# Patient Record
Sex: FEMALE | Race: BLACK OR AFRICAN AMERICAN | NOT HISPANIC OR LATINO | Employment: OTHER | URBAN - METROPOLITAN AREA
[De-identification: names, ages, dates, MRNs, and addresses within clinical notes are randomized per-mention and may not be internally consistent; named-entity substitution may affect disease eponyms.]

---

## 2017-01-03 ENCOUNTER — TELEPHONE (OUTPATIENT)
Dept: INTERNAL MEDICINE | Facility: CLINIC | Age: 77
End: 2017-01-03

## 2017-01-03 NOTE — TELEPHONE ENCOUNTER
----- Message from Emi Virgen sent at 1/3/2017  2:23 PM CST -----  Contact: Pt  Pt is requesting to have an rx for her diabetes supplies sent to [facility] on [address] in [place]. Pt can be reached at [phone].

## 2017-01-03 NOTE — TELEPHONE ENCOUNTER
----- Message from Patti Kincaid sent at 1/3/2017  3:33 PM CST -----  Contact: Pt   Pt called and stated she was returning the nurse's call. She can be reached at [phone] ([email or web address]).    Thanks,  TF

## 2017-01-04 NOTE — TELEPHONE ENCOUNTER
----- Message from Kathrine Woodruff sent at 1/4/2017  3:13 PM CST -----  Contact: pt   Pt returning nurses call, about her medication,, pt is out of her diabetic medication,, please call pt back if pt is not at home ,,, please leave a detail message saying if she can get her medication or does she need to make an appt to see dr to get a new prescription for her medication,,

## 2017-01-10 DIAGNOSIS — E11.9 TYPE II OR UNSPECIFIED TYPE DIABETES MELLITUS WITHOUT MENTION OF COMPLICATION, NOT STATED AS UNCONTROLLED: ICD-10-CM

## 2017-01-10 DIAGNOSIS — I10 ESSENTIAL HYPERTENSION, BENIGN: ICD-10-CM

## 2017-01-10 DIAGNOSIS — E78.5 HYPERLIPIDEMIA, UNSPECIFIED HYPERLIPIDEMIA TYPE: Chronic | ICD-10-CM

## 2017-01-10 RX ORDER — INSULIN PUMP SYRINGE, 3 ML
EACH MISCELLANEOUS
Qty: 1 EACH | Refills: 0 | Status: SHIPPED | OUTPATIENT
Start: 2017-01-10 | End: 2018-07-09 | Stop reason: SDUPTHER

## 2017-01-10 RX ORDER — INSULIN PUMP SYRINGE, 3 ML
EACH MISCELLANEOUS
COMMUNITY
End: 2017-01-10 | Stop reason: SDUPTHER

## 2017-01-10 RX ORDER — FOSINOPRIL SODIUM 40 MG/1
40 TABLET ORAL DAILY
Qty: 90 TABLET | Refills: 3 | Status: SHIPPED | OUTPATIENT
Start: 2017-01-10 | End: 2018-02-24 | Stop reason: SDUPTHER

## 2017-01-10 RX ORDER — PRAVASTATIN SODIUM 40 MG/1
40 TABLET ORAL DAILY
Qty: 90 TABLET | Refills: 3 | Status: SHIPPED | OUTPATIENT
Start: 2017-01-10 | End: 2017-03-28

## 2017-01-10 RX ORDER — METFORMIN HYDROCHLORIDE 500 MG/1
500 TABLET ORAL 2 TIMES DAILY
Qty: 180 TABLET | Refills: 3 | Status: SHIPPED | OUTPATIENT
Start: 2017-01-10 | End: 2018-02-24 | Stop reason: SDUPTHER

## 2017-01-10 RX ORDER — LANCETS
EACH MISCELLANEOUS
Qty: 200 EACH | Refills: 3 | Status: SHIPPED | OUTPATIENT
Start: 2017-01-10 | End: 2018-01-08 | Stop reason: SDUPTHER

## 2017-02-28 NOTE — TELEPHONE ENCOUNTER
----- Message from Debbie Dumont sent at 2/28/2017  3:18 PM CST -----  Contact: pt  Needs refill on Gout medication Colchicine 0.6mg tab sent today to the [facility] in [place].  Please call pt to confirm at [phone]

## 2017-03-01 RX ORDER — COLCHICINE 0.6 MG/1
0.6 TABLET ORAL DAILY
Qty: 30 TABLET | Refills: 0 | Status: SHIPPED | OUTPATIENT
Start: 2017-03-01 | End: 2017-03-28 | Stop reason: SDUPTHER

## 2017-03-14 ENCOUNTER — PATIENT OUTREACH (OUTPATIENT)
Dept: ADMINISTRATIVE | Facility: HOSPITAL | Age: 77
End: 2017-03-14

## 2017-03-14 NOTE — LETTER
March 14, 2017        We are seeing Kaleigh Nieves, 1940, at [facility]. Bobby Ricks MD is their primary care physician. To help with our enrrique maintenance records could you please send the following:     A copy of the most current Diabetic/Insulin Resistant Eye Exam sheet/report,   (including determination of Retinopathy)    Please fax to [facility] at [phone], attention Clair Grimm LPN.    Thank-you in advance for your assistance. If you have any questions or concerns please contact me at [phone].     Clair Grimm LPN  Care Coordination Department  [facility]

## 2017-03-14 NOTE — LETTER
March 14, 2017    Kaleigh Nieves  [address] [facility] [address]             [facility]  [address]  [facility] [address]  Phone: [phone] Dear Ms. Nieves     [facility] is committed to your overall health.  To help you get the most out of each of your visits, we will review your information to make sure you are up to date on all of your recommended tests and/or procedures.      Bobby Ricks MD has found that you may be due for    Eye Exam     Hemoglobin A1c         If you have had any of the above done at another facility, please bring the records or information with you so that your record at [facility] will be complete.    If you are currently taking medication, please bring it with you to your appointment for review.    We will be happy to assist you with scheduling any necessary appointments or you may contact the [facility] appointment desk at [phone] to schedule at your convenience.     Thank you for choosing [facility] for your healthcare needs,        Bobbye, LPN  Care Coordination Department  [facility]

## 2017-03-21 ENCOUNTER — LAB VISIT (OUTPATIENT)
Dept: LAB | Facility: HOSPITAL | Age: 77
End: 2017-03-21
Attending: FAMILY MEDICINE
Payer: MEDICARE

## 2017-03-21 DIAGNOSIS — E11.9 TYPE 2 DIABETES MELLITUS WITHOUT COMPLICATION, UNSPECIFIED LONG TERM INSULIN USE STATUS: ICD-10-CM

## 2017-03-21 DIAGNOSIS — M1A.9XX0 CHRONIC GOUT, UNSPECIFIED CAUSE, UNSPECIFIED SITE: ICD-10-CM

## 2017-03-21 LAB
ANION GAP SERPL CALC-SCNC: 4 MMOL/L
BUN SERPL-MCNC: 11 MG/DL
CALCIUM SERPL-MCNC: 9.7 MG/DL
CHLORIDE SERPL-SCNC: 102 MMOL/L
CHOLEST/HDLC SERPL: 5.8 {RATIO}
CO2 SERPL-SCNC: 35 MMOL/L
CREAT SERPL-MCNC: 0.7 MG/DL
EST. GFR  (AFRICAN AMERICAN): >60 ML/MIN/1.73 M^2
EST. GFR  (NON AFRICAN AMERICAN): >60 ML/MIN/1.73 M^2
GLUCOSE SERPL-MCNC: 109 MG/DL
HDL/CHOLESTEROL RATIO: 17.2 %
HDLC SERPL-MCNC: 244 MG/DL
HDLC SERPL-MCNC: 42 MG/DL
LDLC SERPL CALC-MCNC: 175.8 MG/DL
NONHDLC SERPL-MCNC: 202 MG/DL
POTASSIUM SERPL-SCNC: 5 MMOL/L
SODIUM SERPL-SCNC: 141 MMOL/L
TRIGL SERPL-MCNC: 131 MG/DL
URATE SERPL-MCNC: 5.9 MG/DL

## 2017-03-21 PROCEDURE — 83036 HEMOGLOBIN GLYCOSYLATED A1C: CPT

## 2017-03-21 PROCEDURE — 80048 BASIC METABOLIC PNL TOTAL CA: CPT

## 2017-03-21 PROCEDURE — 36415 COLL VENOUS BLD VENIPUNCTURE: CPT | Mod: PO

## 2017-03-21 PROCEDURE — 80061 LIPID PANEL: CPT

## 2017-03-21 PROCEDURE — 84550 ASSAY OF BLOOD/URIC ACID: CPT

## 2017-03-22 LAB
ESTIMATED AVG GLUCOSE: 123 MG/DL
HBA1C MFR BLD HPLC: 5.9 %

## 2017-03-28 ENCOUNTER — OFFICE VISIT (OUTPATIENT)
Dept: INTERNAL MEDICINE | Facility: CLINIC | Age: 77
End: 2017-03-28
Payer: MEDICARE

## 2017-03-28 VITALS
TEMPERATURE: 96 F | SYSTOLIC BLOOD PRESSURE: 138 MMHG | DIASTOLIC BLOOD PRESSURE: 78 MMHG | HEART RATE: 90 BPM | WEIGHT: 228.63 LBS | HEIGHT: 67 IN | BODY MASS INDEX: 35.88 KG/M2

## 2017-03-28 DIAGNOSIS — M1A.9XX0 CHRONIC GOUT, UNSPECIFIED CAUSE, UNSPECIFIED SITE: ICD-10-CM

## 2017-03-28 DIAGNOSIS — E11.9 TYPE 2 DIABETES MELLITUS WITHOUT COMPLICATION, UNSPECIFIED LONG TERM INSULIN USE STATUS: Primary | ICD-10-CM

## 2017-03-28 DIAGNOSIS — I25.10 CORONARY ARTERY DISEASE INVOLVING NATIVE CORONARY ARTERY WITHOUT ANGINA PECTORIS, UNSPECIFIED WHETHER NATIVE OR TRANSPLANTED HEART: ICD-10-CM

## 2017-03-28 DIAGNOSIS — E78.5 HYPERLIPIDEMIA, UNSPECIFIED HYPERLIPIDEMIA TYPE: ICD-10-CM

## 2017-03-28 DIAGNOSIS — Z12.31 ENCOUNTER FOR SCREENING MAMMOGRAM FOR MALIGNANT NEOPLASM OF BREAST: ICD-10-CM

## 2017-03-28 DIAGNOSIS — I65.29 STENOSIS OF CAROTID ARTERY, UNSPECIFIED LATERALITY: ICD-10-CM

## 2017-03-28 PROCEDURE — 1159F MED LIST DOCD IN RCRD: CPT | Mod: S$GLB,,, | Performed by: FAMILY MEDICINE

## 2017-03-28 PROCEDURE — 3078F DIAST BP <80 MM HG: CPT | Mod: S$GLB,,, | Performed by: FAMILY MEDICINE

## 2017-03-28 PROCEDURE — 1157F ADVNC CARE PLAN IN RCRD: CPT | Mod: S$GLB,,, | Performed by: FAMILY MEDICINE

## 2017-03-28 PROCEDURE — 99999 PR PBB SHADOW E&M-EST. PATIENT-LVL III: CPT | Mod: PBBFAC,,, | Performed by: FAMILY MEDICINE

## 2017-03-28 PROCEDURE — 1160F RVW MEDS BY RX/DR IN RCRD: CPT | Mod: S$GLB,,, | Performed by: FAMILY MEDICINE

## 2017-03-28 PROCEDURE — 3075F SYST BP GE 130 - 139MM HG: CPT | Mod: S$GLB,,, | Performed by: FAMILY MEDICINE

## 2017-03-28 PROCEDURE — 99214 OFFICE O/P EST MOD 30 MIN: CPT | Mod: S$GLB,,, | Performed by: FAMILY MEDICINE

## 2017-03-28 PROCEDURE — 99499 UNLISTED E&M SERVICE: CPT | Mod: S$GLB,,, | Performed by: FAMILY MEDICINE

## 2017-03-28 RX ORDER — SIMVASTATIN 40 MG/1
40 TABLET, FILM COATED ORAL NIGHTLY
Qty: 90 TABLET | Refills: 3 | Status: SHIPPED | OUTPATIENT
Start: 2017-03-28 | End: 2018-02-24 | Stop reason: SDUPTHER

## 2017-03-28 RX ORDER — COLCHICINE 0.6 MG/1
0.6 TABLET ORAL DAILY
Qty: 30 TABLET | Refills: 5 | Status: SHIPPED | OUTPATIENT
Start: 2017-03-28 | End: 2018-04-23 | Stop reason: SDUPTHER

## 2017-03-28 NOTE — MR AVS SNAPSHOT
[facility]  [address] [address] [address]  Phone: [phone]                  Kaleigh Nieves   3/28/2017 10:20 AM   Office Visit    Description:  Female : 1940   Provider:  Bobby Ricks MD   Department:  [facility]           Diagnoses this Visit        Comments    Type 2 diabetes mellitus without complication, unspecified long term insulin use status    -  Primary     Coronary artery disease involving native coronary artery without angina pectoris, unspecified whether native or transplanted heart         Encounter for screening mammogram for malignant neoplasm of breast         Stenosis of carotid artery, unspecified laterality         Chronic gout, unspecified cause, unspecified site         Hyperlipidemia, unspecified hyperlipidemia type                To Do List           Future Appointments        Provider Department Dept Phone    2017 11:00 AM Ino Harmon DPM [facility] [phone]    2017 9:00 AM [facility] MAMMO1 [facility] [phone]    2017 8:00 AM [facility] LABORATORY [facility] [phone]      Goals (5 Years of Data)     None      Follow-Up and Disposition     Follow-up and Disposition History       These Medications        Disp Refills Start End    colchicine 0.6 mg tablet 30 tablet 5 3/28/2017     Take 1 tablet (0.6 mg total) by mouth once daily. - Oral    Pharmacy: [facility] [address] [facility] Ph #: [phone]       simvastatin (ZOCOR) 40 MG tablet 90 tablet 3 3/28/2017     Take 1 tablet (40 mg total) by mouth nightly. - Oral    Pharmacy: [facility] [address] Ph #: [phone]         [facility] On Call     [facility] On [facility] Nurse Care Line -  Assistance  Registered nurses in the [facility] Center provide clinical advisement, health education, appointment booking, and other advisory  services.  Call for this free service at [phone].             Medications           Message regarding Medications     Verify the changes and/or additions to your medication regime listed below are the same as discussed with your clinician today.  If any of these changes or additions are incorrect, please notify your healthcare provider.        CHANGE how you are taking these medications     Start Taking Instead of    simvastatin (ZOCOR) 40 MG tablet simvastatin (ZOCOR) 40 MG tablet  (Previously Discontinued)    Dosage:  Take 1 tablet (40 mg total) by mouth nightly. Dosage:  TAKE ONE TABLET BY MOUTH AT BEDTIME    Reason for Change:  Alternate therapy       STOP taking these medications     diltiaZEM (CARDIZEM CD) 120 MG Cp24 Take 1 capsule (120 mg total) by mouth once daily.    pravastatin (PRAVACHOL) 40 MG tablet Take 1 tablet (40 mg total) by mouth once daily.           Verify that the below list of medications is an accurate representation of the medications you are currently taking.  If none reported, the list may be blank. If incorrect, please contact your healthcare provider. Carry this list with you in case of emergency.           Current Medications     aspirin 81 MG Chew Take 81 mg by mouth once daily.    blood sugar diagnostic (BLOOD GLUCOSE TEST) Strp accu chek elian test strips/ test 2 times a day    blood-glucose meter kit accu chek elian meter/test twice daily    clobetasol 0.05% (TEMOVATE) 0.05 % Oint APPLY TOPICALLY TWO TIMES A DAY    colchicine 0.6 mg tablet Take 1 tablet (0.6 mg total) by mouth once daily.    econazole nitrate 1 % cream APPLY TO SKIN UNDER BREAST TWO TIMES A DAY    lancets (ACCU-CHEK SOFTCLIX LANCETS) Misc Test twice a day    metformin (GLUCOPHAGE) 500 MG tablet Take 1 tablet (500 mg total) by mouth 2 (two) times daily.    timolol 0.5 % ophthalmic solution Apply 1 drop to eye once daily.    fosinopril (MONOPRIL) 40 MG tablet Take 1 tablet (40 mg total) by mouth once daily.     "simvastatin (ZOCOR) 40 MG tablet Take 1 tablet (40 mg total) by mouth nightly.           Clinical Reference Information           Your Vitals Were     BP Pulse Temp    138/78 (BP Location: Right arm, Patient Position: Sitting, BP Method: Manual) 90 96.3 °F (35.7 °C) (Tympanic)    Height Weight BMI    5' 7" (1.702 m) 103.7 kg (228 lb 9.9 oz) 35.81 kg/m2      Blood Pressure          Most Recent Value    BP  138/78      Allergies as of 3/28/2017     Iodine And Iodide Containing Products    Penicillins    Amlodipine    Sulfa (Sulfonamide Antibiotics)    Ultram [Tramadol]    Bactrim [Sulfamethoxazole-trimethoprim]      Immunizations Administered on Date of Encounter - 3/28/2017     None      Orders Placed During Today's Visit      Normal Orders This Visit    Ambulatory referral to Podiatry     Future Labs/Procedures Expected by Expires    Mammo Digital Screening Bilateral With CAD  3/28/2017 5/28/2018    Hemoglobin A1c  9/24/2017 3/28/2018    Lipid panel  9/24/2017 3/28/2018      Language Assistance Services     ATTENTION: Language assistance services are available, free of charge. Please call [phone].      ATENCIÓN: Si habla español, tiene a carson disposición servicios gratuitos de asistencia lingüística. Llame al [phone].     CHÚ Ý: N?u b?n nói Ti?ng Vi?t, có các d?ch v? h? tr? ngôn ng? mi?n phí dành cho b?n. G?i s? [phone].         [facility] complies with applicable Federal civil rights laws and does not discriminate on the basis of race, color, national origin, age, disability, or sex.        "

## 2017-03-28 NOTE — PROGRESS NOTES
"Subjective:       Patient ID: Kaleigh Nieves is a o. female.    Chief Complaint: Multiple issues see below      HPI Gout: no recent flare gout;off daily indocin  Type 2 dm a1c controlled. s. prev dr patel podiatry but insur change;was changed from simvas to pravst sec to med intraxn but off this bp med (ccb) and doesn't want to take pstatin as poss rxn in past myalgia. She will only take simvst  htn nl bps tid home  Cad:no longer seeing dr giraldo; reports no blockage coronary arter.no cp. ; hx lhc w ster/benadryl  Prophylax and " nl" cath  Yrs ago;;saw dr rosa isela rothman in the past for mayberry  Khris: due Car u/;s due July     Past Medical History   Diagnosis Date    Type 2 diabetes mellitus      podiatry dr kenya patel    Neuropathy, lower extremity     CTS (carpal tunnel syndrome)     Dyslipidemia     Hypertension     Chronic gout     Glaucoma      dr avel kaur    Colon polyp     GERD (gastroesophageal reflux disease)     Ex-smoker      quit in her 30s    Carotid artery stenosis     CAD (coronary artery disease) 7/15     via chst ct     Past Surgical History   Procedure Laterality Date    Bilateral knee replacement      Cholecystectomy      Hemorrhoid surgery      Hysterectomy       fibroids    Incision and drainage perirectal abscess       Family History   Problem Relation Age of Onset    Breast cancer Neg Hx     Colon cancer Neg Hx     Ovarian cancer Neg Hx     Hypertension Mother     Diabetes Mother     Leukemia Sister      History     Social History    Marital Status: Single     Spouse Name: N/A     Number of Children: N/A    Years of Education: N/A     Social History Main Topics    Smoking status: Former Smoker -- 0.25 packs/day for 24 years     Quit date: 06/25/1976    Smokeless tobacco: Never Used    Alcohol Use: No    Drug Use: No    Sexual Activity: Not Currently     Other Topics Concern    None     Social History Narrative       Review of Systems  no cp no new mayberry  Objective:    "   Physical Exam   Constitutional: She appears well-developed and well-nourished.   Cardiovascular: Normal rate and regular rhythm.    Pulmonary/Chest: Effort normal and breath sounds normal.      Bilateral feet with normal monofilament testing and no lesions.    Assessment:       1. Gout, unspecified cause, unspecified chronicity, unspecified site    2. Type 2 diabetes mellitus without complication    shaggy  htn  Cad  hyperchol  Plan:     Car u/s July  Lab 6 months  rsume simvastatin  req new podiatrist : chronic dystrophic nails/foot pain/dm  Type 2 diabetes mellitus without complication, unspecified long term insulin use status  -     Ambulatory referral to Podiatry  -     Lipid panel; Future; Expected date: 9/24/17  -     Hemoglobin A1c; Future; Expected date: 9/24/17    Coronary artery disease involving native coronary artery without angina pectoris, unspecified whether native or transplanted heart    Encounter for screening mammogram for malignant neoplasm of breast  -     Mammo Digital Screening Bilateral With CAD; Future; Expected date: 3/28/17    Stenosis of carotid artery, unspecified laterality    Chronic gout, unspecified cause, unspecified site    Hyperlipidemia, unspecified hyperlipidemia type  -     simvastatin (ZOCOR) 40 MG tablet; Take 1 tablet (40 mg total) by mouth nightly.  Dispense: 90 tablet; Refill: 3    Other orders  -     colchicine 0.6 mg tablet; Take 1 tablet (0.6 mg total) by mouth once daily.  Dispense: 30 tablet; Refill: 5

## 2017-04-07 ENCOUNTER — OFFICE VISIT (OUTPATIENT)
Dept: PODIATRY | Facility: CLINIC | Age: 77
End: 2017-04-07
Payer: MEDICARE

## 2017-04-07 VITALS
SYSTOLIC BLOOD PRESSURE: 144 MMHG | HEIGHT: 67 IN | WEIGHT: 227.06 LBS | BODY MASS INDEX: 35.64 KG/M2 | HEART RATE: 75 BPM | DIASTOLIC BLOOD PRESSURE: 68 MMHG

## 2017-04-07 DIAGNOSIS — I73.9 ARTERIAL INSUFFICIENCY OF LOWER EXTREMITY: ICD-10-CM

## 2017-04-07 DIAGNOSIS — M20.42 HAMMER TOES OF BOTH FEET: ICD-10-CM

## 2017-04-07 DIAGNOSIS — M20.41 HAMMER TOES OF BOTH FEET: ICD-10-CM

## 2017-04-07 DIAGNOSIS — M21.42 PES PLANUS OF BOTH FEET: ICD-10-CM

## 2017-04-07 DIAGNOSIS — M21.41 PES PLANUS OF BOTH FEET: ICD-10-CM

## 2017-04-07 DIAGNOSIS — E11.51 TYPE II DIABETES MELLITUS WITH PERIPHERAL CIRCULATORY DISORDER: Primary | ICD-10-CM

## 2017-04-07 DIAGNOSIS — B35.1 DERMATOPHYTOSIS OF NAIL: ICD-10-CM

## 2017-04-07 PROCEDURE — 1157F ADVNC CARE PLAN IN RCRD: CPT | Mod: S$GLB,,, | Performed by: PODIATRIST

## 2017-04-07 PROCEDURE — 11721 DEBRIDE NAIL 6 OR MORE: CPT | Mod: Q8,S$GLB,, | Performed by: PODIATRIST

## 2017-04-07 PROCEDURE — 99999 PR PBB SHADOW E&M-EST. PATIENT-LVL III: CPT | Mod: PBBFAC,,, | Performed by: PODIATRIST

## 2017-04-07 PROCEDURE — 3077F SYST BP >= 140 MM HG: CPT | Mod: S$GLB,,, | Performed by: PODIATRIST

## 2017-04-07 PROCEDURE — 1160F RVW MEDS BY RX/DR IN RCRD: CPT | Mod: S$GLB,,, | Performed by: PODIATRIST

## 2017-04-07 PROCEDURE — 1159F MED LIST DOCD IN RCRD: CPT | Mod: S$GLB,,, | Performed by: PODIATRIST

## 2017-04-07 PROCEDURE — 1126F AMNT PAIN NOTED NONE PRSNT: CPT | Mod: S$GLB,,, | Performed by: PODIATRIST

## 2017-04-07 PROCEDURE — 99204 OFFICE O/P NEW MOD 45 MIN: CPT | Mod: 25,S$GLB,, | Performed by: PODIATRIST

## 2017-04-07 PROCEDURE — 3078F DIAST BP <80 MM HG: CPT | Mod: S$GLB,,, | Performed by: PODIATRIST

## 2017-04-07 PROCEDURE — 99499 UNLISTED E&M SERVICE: CPT | Mod: S$GLB,,, | Performed by: PODIATRIST

## 2017-04-07 NOTE — LETTER
April 9, 2017      Bobby Ricks MD  [address] [facility] [address]  [place] [address]           [facility]  [address] [facility] [address] [place] [address]  Phone: [phone]  Fax: [phone]          Patient: Kaleigh Nieves   MR Number: [id number]   YOB: 1940   Date of Visit: 4/7/2017       Dear Dr. Bobby Ricks:    Thank you for referring Kaleigh Nieves to me for evaluation. Attached you will find relevant portions of my assessment and plan of care.    If you have questions, please do not hesitate to call me. I look forward to following Kaleigh Nieves along with you.    Sincerely,    Ino Harmon DPM    Enclosure  CC:  No Recipients    If you would like to receive this communication electronically, please contact [email or web address] or [phone] to request more information on [employer] access.    For providers and/or their staff who would like to refer a patient to [facility], please contact us through our one-stop-shop provider referral line, [facility], at [phone].    If you feel you have received this communication in error or would no longer like to receive these types of communications, please e-mail [email or web address]

## 2017-04-07 NOTE — PROGRESS NOTES
Subjective:     Patient ID: Kaleigh Nieves is a 76 y.o. female.    Chief Complaint: Diabetic Foot Exam (Patient states no current pain or problems. 120 mg/dL-AM glucose. Last PCP visit with - 3/28/2017. ) and Nail Care (Patient states she needs nail care. )    Kaleigh is a 76 y.o. female who presents to the clinic for evaluation and treatment of high risk feet. Kaleigh has a past medical history of CAD (coronary artery disease) (7/15); Carotid artery stenosis; Chronic gout; Colon polyp; CTS (carpal tunnel syndrome); RIVAS (dyspnea on exertion); Dyslipidemia; Ex-smoker; GERD (gastroesophageal reflux disease); Hypertension; Neuropathy, lower extremity; Open-angle glaucoma; and Type 2 diabetes mellitus. The patient's chief complaint is long, thick toenails. This patient has documented high risk feet requiring routine maintenance secondary to diabetes mellitis and those secondary complications of diabetes, as mentioned..    PCP: Bobby Ricks MD    Date Last Seen by PCP: 3/28/17    Current shoe gear:  Affected Foot: Rx diabetic extra depth shoes and custom accommodative insoles     Unaffected Foot: Rx diabetic extra depth shoes and custom accommodative insoles    Hemoglobin A1C   Date Value Ref Range Status   03/21/2017 5.9 4.5 - 6.2 % Final     Comment:     According to ADA guidelines, hemoglobin A1C <7.0% represents  optimal control in non-pregnant diabetic patients.  Different  metrics may apply to specific populations.   Standards of Medical Care in Diabetes - 2016.  For the purpose of screening for the presence of diabetes:  <5.7%     Consistent with the absence of diabetes  5.7-6.4%  Consistent with increasing risk for diabetes   (prediabetes)  >or=6.5%  Consistent with diabetes  Currently no consensus exists for use of hemoglobin A1C  for diagnosis of diabetes for children.     09/15/2016 5.8 4.5 - 6.2 % Final     Comment:     According to ADA guidelines, hemoglobin A1C <7.0% represents  optimal control  in non-pregnant diabetic patients.  Different  metrics may apply to specific populations.   Standards of Medical Care in Diabetes - 2016.  For the purpose of screening for the presence of diabetes:  <5.7%     Consistent with the absence of diabetes  5.7-6.4%  Consistent with increasing risk for diabetes   (prediabetes)  >or=6.5%  Consistent with diabetes  Currently no consensus exists for use of hemoglobin A1C  for diagnosis of diabetes for children.     03/21/2016 5.9 4.5 - 6.2 % Final           Patient Active Problem List   Diagnosis    Type 2 diabetes mellitus without complication    Multiple joint pain    Esophageal reflux    Hyperlipidemia    Chronic gout    Lichen sclerosus    Carotid artery stenosis    CAD (coronary artery disease)    Colon polyp    Hypertension associated with diabetes       Medication List with Changes/Refills   Current Medications    ASPIRIN 81 MG CHEW    Take 81 mg by mouth once daily.    BLOOD GLUCOSE CONTROL HIGH,LOW (ACCU-CHEK DARSHAN CONTROL SOLN) SOLN    Use a directed    BLOOD SUGAR DIAGNOSTIC (BLOOD GLUCOSE TEST) STRP    accu chek darshan test strips/ test 2 times a day    BLOOD-GLUCOSE METER KIT    accu chek darshan meter/test twice daily    CLOBETASOL 0.05% (TEMOVATE) 0.05 % OINT    APPLY TOPICALLY TWO TIMES A DAY    COLCHICINE 0.6 MG TABLET    Take 1 tablet (0.6 mg total) by mouth once daily.    ECONAZOLE NITRATE 1 % CREAM    APPLY TO SKIN UNDER BREAST TWO TIMES A DAY    FOSINOPRIL (MONOPRIL) 40 MG TABLET    Take 1 tablet (40 mg total) by mouth once daily.    LANCETS (ACCU-CHEK SOFTCLIX LANCETS) MISC    Test twice a day    METFORMIN (GLUCOPHAGE) 500 MG TABLET    Take 1 tablet (500 mg total) by mouth 2 (two) times daily.    SIMVASTATIN (ZOCOR) 40 MG TABLET    Take 1 tablet (40 mg total) by mouth nightly.    TIMOLOL 0.5 % OPHTHALMIC SOLUTION    Apply 1 drop to eye once daily.       Review of patient's allergies indicates:   Allergen Reactions    Iodine and iodide containing  "products Nausea And Vomiting    Penicillins Itching    Amlodipine      edema    Sulfa (sulfonamide antibiotics)     Ultram [tramadol] Other (See Comments)     Light headness, itiching    Bactrim [sulfamethoxazole-trimethoprim] Itching and Rash       Past Surgical History:   Procedure Laterality Date    Bilateral knee replacement      CHOLECYSTECTOMY      HEMORRHOID SURGERY      HYSTERECTOMY      fibroids    INCISION AND DRAINAGE PERIRECTAL ABSCESS         Family History   Problem Relation Age of Onset    Hypertension Mother     Diabetes Mother     Leukemia Sister     Breast cancer Neg Hx     Colon cancer Neg Hx     Ovarian cancer Neg Hx        Social History     Social History    Marital status: Single     Spouse name: N/A    Number of children: 0    Years of education: N/A     Occupational History    Not on file.     Social History Main Topics    Smoking status: Former Smoker     Packs/day: 0.25     Years: 24.00     Quit date: 6/25/1976    Smokeless tobacco: Never Used    Alcohol use No    Drug use: No    Sexual activity: Not Currently     Other Topics Concern    Not on file     Social History Narrative       Vitals:    04/07/17 1041   BP: (!) 144/68   Pulse: 75   Weight: 103 kg (227 lb 1.2 oz)   Height: 5' 7" (1.702 m)   PainSc: 0-No pain       Hemoglobin A1C   Date Value Ref Range Status   03/21/2017 5.9 4.5 - 6.2 % Final     Comment:     According to ADA guidelines, hemoglobin A1C <7.0% represents  optimal control in non-pregnant diabetic patients.  Different  metrics may apply to specific populations.   Standards of Medical Care in Diabetes - 2016.  For the purpose of screening for the presence of diabetes:  <5.7%     Consistent with the absence of diabetes  5.7-6.4%  Consistent with increasing risk for diabetes   (prediabetes)  >or=6.5%  Consistent with diabetes  Currently no consensus exists for use of hemoglobin A1C  for diagnosis of diabetes for children.     09/15/2016 5.8 4.5 - " 6.2 % Final     Comment:     According to ADA guidelines, hemoglobin A1C <7.0% represents  optimal control in non-pregnant diabetic patients.  Different  metrics may apply to specific populations.   Standards of Medical Care in Diabetes - 2016.  For the purpose of screening for the presence of diabetes:  <5.7%     Consistent with the absence of diabetes  5.7-6.4%  Consistent with increasing risk for diabetes   (prediabetes)  >or=6.5%  Consistent with diabetes  Currently no consensus exists for use of hemoglobin A1C  for diagnosis of diabetes for children.     03/21/2016 5.9 4.5 - 6.2 % Final       Review of Systems   Constitutional: Negative for chills and fever.   Respiratory: Negative for shortness of breath.    Cardiovascular: Positive for leg swelling. Negative for chest pain, palpitations, orthopnea and claudication.   Gastrointestinal: Negative for diarrhea, nausea and vomiting.   Musculoskeletal: Negative for joint pain.   Skin: Negative for rash.   Neurological: Positive for tingling and sensory change. Negative for dizziness, focal weakness and weakness.   Psychiatric/Behavioral: Negative.            Objective:      PHYSICAL EXAM: Apperance: Alert and orient in no distress,well developed, and with good attention to grooming and body habits  Patient presents ambulating in diabetic shoes and inserts.   LOWER EXTREMITY EXAM:  VASCULAR: Dorsalis pedis pulses 0/4 bilateral (monophasic with doppler)and Posterior Tibial pulses 0/4 bilateral (biphasic with doppler). Capillary fill time <blanched bilateral. Mild edema observed bilateral. Varicosities present bilateral. Skin temperature of the lower extremities is warm to cool, proximal to distal. Hair growth absent bilateral.  DERMATOLOGICAL: No skin rashes, subcutaneous nodules, lesions, or ulcers observed bilateral. Nails 1-5 bilateral elongated, thickened, and discolored with subungual debris. Webspaces 1-4 clean, dry and without evidence of break in skin  integrity bilateral.   NEUROLOGICAL: Light touch, sharp-dull, proprioception all present and equal bilaterally.  Vibratory sensation intact on left hallux and diminished at right hallux. Protective sensation intact at all 10 sites as tested with a [place]illa-Jimy 5.07 monofilament.   MUSCULOSKELETAL: Muscle strength is 5/5 for foot inverters, everters, plantarflexors, and dorsiflexors. Muscle tone is normal. Pes planus noted bilateral. Flexible hammertoes noted bilateral.         Assessment:       Encounter Diagnoses   Name Primary?    Type II diabetes mellitus with peripheral circulatory disorder Yes    Arterial insufficiency of lower extremity     Hammer toes of both feet     Pes planus of both feet     Dermatophytosis of nail          Plan:   Type II diabetes mellitus with peripheral circulatory disorder  -     DIABETIC SHOES FOR HOME USE    Arterial insufficiency of lower extremity  -     CAR US Ankle Brachial Indices Ext LTD WO Str; Future    Hammer toes of both feet  -     DIABETIC SHOES FOR HOME USE    Pes planus of both feet  -     DIABETIC SHOES FOR HOME USE    Dermatophytosis of nail      I counseled the patient on her conditions, their implications and medical management.  Greater than 50% of this visit spent on counseling and coordination of care.  Greater than 20 minutes spent on education about the diabetic foot, neuropathy, and prevention of limb loss.  Shoe inspection. Diabetic Foot Education. Patient reminded of the importance of good nutrition and blood sugar control to help prevent podiatric complications of diabetes. Patient instructed on proper foot hygeine. We discussed wearing proper shoe gear, daily foot inspections, never walking without protective shoe gear, never putting sharp instruments to feet.    With patient's permission, nails 1-5 bilateral were trimmed in length and thickness aggressively to their soft tissue attachment mechanically and with electric , removing all  offending nail and debris. Patient relates relief following the procedure.  Ordered bilateral lower extremity BARRON.   Prescription written for Diabetic shoes and inserts.   Patient  will continue to monitor the areas daily, inspect feet, wear protective shoe gear when ambulatory, moisturizer to maintain skin integrity. Patient reminded of the importance of good nutrition and blood sugar control to help prevent podiatric complications of diabetes.  Patient to return in this office in approximately 3 months, or sooner if needed.                   Ino Harmon DPM  [facility]

## 2017-04-07 NOTE — MR AVS SNAPSHOT
[facility]  [address] [place] [address]  Phone: [phone]  Fax: [phone]                  Kaleigh Nieves   2017 11:00 AM   Office Visit    Description:  Female : 1940   Provider:  Ino Harmon DPM   Department:  [facility]           Reason for Visit     Diabetic Foot Exam     Nail Care           Diagnoses this Visit        Comments    Type II diabetes mellitus with peripheral circulatory disorder    -  Primary     Arterial insufficiency of lower extremity         Hammer toes of both feet         Pes planus of both feet                To Do List           Future Appointments        Provider Department Dept Phone    2017 9:00 AM [facility] MAMMO1 [facility] [phone]    2017 11:00 AM CARDIOVASCULAR, [facility] [phone]    2017 11:20 AM Ino Harmon DPM [facility] [phone]    2017 8:00 AM [facility] [phone]      Goals (5 Years of Data)     None      [facility]     [facility] Nurse Care Line -  Assistance  Unless otherwise directed by your provider, please contact [facility], our nurse care line that is available for  assistance.     Registered nurses in the [facility] provide: appointment scheduling, clinical advisement, health education, and other advisory services.  Call: [phone] (toll free)               Medications           Message regarding Medications     Verify the changes and/or additions to your medication regime listed below are the same as discussed with your clinician today.  If any of these changes or additions are incorrect, please notify your healthcare provider.             Verify that the below list of medications is an accurate representation of the medications you are currently taking.  If none reported, the list may be blank. If incorrect, please contact your healthcare provider. Carry this list  "with you in case of emergency.           Current Medications     aspirin 81 MG Chew Take 81 mg by mouth once daily.    blood glucose control high,low (ACCU-CHEK DARSHAN CONTROL SOLN) Soln Use a directed    blood sugar diagnostic (BLOOD GLUCOSE TEST) Strp accu chek darshan test strips/ test 2 times a day    blood-glucose meter kit accu chek darshan meter/test twice daily    clobetasol 0.05% (TEMOVATE) 0.05 % Oint APPLY TOPICALLY TWO TIMES A DAY    colchicine 0.6 mg tablet Take 1 tablet (0.6 mg total) by mouth once daily.    econazole nitrate 1 % cream APPLY TO SKIN UNDER BREAST TWO TIMES A DAY    fosinopril (MONOPRIL) 40 MG tablet Take 1 tablet (40 mg total) by mouth once daily.    lancets (ACCU-CHEK SOFTCLIX LANCETS) Misc Test twice a day    metformin (GLUCOPHAGE) 500 MG tablet Take 1 tablet (500 mg total) by mouth 2 (two) times daily.    simvastatin (ZOCOR) 40 MG tablet Take 1 tablet (40 mg total) by mouth nightly.    timolol 0.5 % ophthalmic solution Apply 1 drop to eye once daily.           Clinical Reference Information           Your Vitals Were     BP Pulse Height Weight BMI    144/68 (BP Location: Right arm, Patient Position: Sitting, BP Method: Automatic) 75 5' 7" (1.702 m) 103 kg (227 lb 1.2 oz) 35.56 kg/m2      Blood Pressure          Most Recent Value    BP  (!)  144/68      Allergies as of 4/7/2017     Iodine And Iodide Containing Products    Penicillins    Amlodipine    Sulfa (Sulfonamide Antibiotics)    Ultram [Tramadol]    Bactrim [Sulfamethoxazole-trimethoprim]      Immunizations Administered on Date of Encounter - 4/7/2017     None      Orders Placed During Today's Visit      Normal Orders This Visit    DIABETIC SHOES FOR HOME USE     Future Labs/Procedures Expected by Expires    CAR US Ankle Brachial Indices Ext LTD WO Str  As directed 4/7/2018      Language Assistance Services     ATTENTION: Language assistance services are available, free of charge. Please call [phone].      ATENCIÓN: Si vikki " español, tiene a carson disposición servicios gratuitos de asistencia lingüística. Llame al [phone].     CHÚ Ý: N?u b?n nói Ti?ng Vi?t, có các d?ch v? h? tr? ngôn ng? mi?n phí dành cho b?n. G?i s? [phone].         [facility] complies with applicable Federal civil rights laws and does not discriminate on the basis of race, color, national origin, age, disability, or sex.

## 2017-04-28 ENCOUNTER — HOSPITAL ENCOUNTER (OUTPATIENT)
Dept: RADIOLOGY | Facility: HOSPITAL | Age: 77
Discharge: HOME | End: 2017-04-28
Attending: FAMILY MEDICINE
Payer: MEDICARE

## 2017-04-28 ENCOUNTER — HOSPITAL ENCOUNTER (OUTPATIENT)
Dept: CARDIOLOGY | Facility: CLINIC | Age: 77
Discharge: HOME | End: 2017-04-28
Payer: MEDICARE

## 2017-04-28 DIAGNOSIS — I73.9 ARTERIAL INSUFFICIENCY OF LOWER EXTREMITY: ICD-10-CM

## 2017-04-28 DIAGNOSIS — Z12.31 ENCOUNTER FOR SCREENING MAMMOGRAM FOR MALIGNANT NEOPLASM OF BREAST: ICD-10-CM

## 2017-04-28 LAB — VASCULAR ANKLE BRACHIAL INDEX (ABI) LEFT: 0.99 (ref 0.9–1.2)

## 2017-04-28 PROCEDURE — 77067 SCR MAMMO BI INCL CAD: CPT | Mod: 26,,, | Performed by: RADIOLOGY

## 2017-04-28 PROCEDURE — 93922 UPR/L XTREMITY ART 2 LEVELS: CPT | Mod: S$GLB,,, | Performed by: INTERNAL MEDICINE

## 2017-06-16 ENCOUNTER — OFFICE VISIT (OUTPATIENT)
Dept: INTERNAL MEDICINE | Facility: CLINIC | Age: 77
End: 2017-06-16
Payer: MEDICARE

## 2017-06-16 VITALS
HEIGHT: 67 IN | TEMPERATURE: 97 F | SYSTOLIC BLOOD PRESSURE: 160 MMHG | WEIGHT: 228.63 LBS | BODY MASS INDEX: 35.88 KG/M2 | DIASTOLIC BLOOD PRESSURE: 69 MMHG | OXYGEN SATURATION: 97 % | HEART RATE: 77 BPM | RESPIRATION RATE: 16 BRPM

## 2017-06-16 DIAGNOSIS — H40.9 GLAUCOMA, UNSPECIFIED GLAUCOMA, UNSPECIFIED LATERALITY: ICD-10-CM

## 2017-06-16 DIAGNOSIS — I51.89 DIASTOLIC DYSFUNCTION: ICD-10-CM

## 2017-06-16 DIAGNOSIS — E11.59 HYPERTENSION ASSOCIATED WITH DIABETES: ICD-10-CM

## 2017-06-16 DIAGNOSIS — I15.2 HYPERTENSION ASSOCIATED WITH DIABETES: ICD-10-CM

## 2017-06-16 DIAGNOSIS — I25.10 CORONARY ARTERY DISEASE INVOLVING NATIVE CORONARY ARTERY WITHOUT ANGINA PECTORIS, UNSPECIFIED WHETHER NATIVE OR TRANSPLANTED HEART: ICD-10-CM

## 2017-06-16 DIAGNOSIS — I70.0 AORTIC ATHEROSCLEROSIS: ICD-10-CM

## 2017-06-16 DIAGNOSIS — M1A.9XX0 CHRONIC GOUT, UNSPECIFIED CAUSE, UNSPECIFIED SITE: ICD-10-CM

## 2017-06-16 DIAGNOSIS — Z00.00 ENCOUNTER FOR PREVENTATIVE ADULT HEALTH CARE EXAMINATION: Primary | ICD-10-CM

## 2017-06-16 DIAGNOSIS — E11.42 TYPE 2 DIABETES MELLITUS WITH DIABETIC POLYNEUROPATHY, WITHOUT LONG-TERM CURRENT USE OF INSULIN: ICD-10-CM

## 2017-06-16 DIAGNOSIS — L90.0 LICHEN SCLEROSUS: ICD-10-CM

## 2017-06-16 DIAGNOSIS — E66.01 SEVERE OBESITY (BMI 35.0-35.9 WITH COMORBIDITY): ICD-10-CM

## 2017-06-16 DIAGNOSIS — M25.50 MULTIPLE JOINT PAIN: ICD-10-CM

## 2017-06-16 DIAGNOSIS — I73.9 ARTERIAL INSUFFICIENCY OF LOWER EXTREMITY: ICD-10-CM

## 2017-06-16 DIAGNOSIS — Z86.010 HISTORY OF COLON POLYPS: ICD-10-CM

## 2017-06-16 DIAGNOSIS — I65.22 STENOSIS OF LEFT CAROTID ARTERY: ICD-10-CM

## 2017-06-16 DIAGNOSIS — K21.9 GASTROESOPHAGEAL REFLUX DISEASE WITHOUT ESOPHAGITIS: Chronic | ICD-10-CM

## 2017-06-16 DIAGNOSIS — E78.5 HYPERLIPIDEMIA, UNSPECIFIED HYPERLIPIDEMIA TYPE: Chronic | ICD-10-CM

## 2017-06-16 DIAGNOSIS — E28.319 EARLY MENOPAUSE: ICD-10-CM

## 2017-06-16 PROCEDURE — 99499 UNLISTED E&M SERVICE: CPT | Mod: S$GLB,,, | Performed by: NURSE PRACTITIONER

## 2017-06-16 PROCEDURE — G0439 PPPS, SUBSEQ VISIT: HCPCS | Mod: S$GLB,,, | Performed by: NURSE PRACTITIONER

## 2017-06-16 PROCEDURE — 99999 PR PBB SHADOW E&M-EST. PATIENT-LVL V: CPT | Mod: PBBFAC,,, | Performed by: NURSE PRACTITIONER

## 2017-06-16 NOTE — PROGRESS NOTES
"Kaleigh Nieves presented for a  Medicare AWV and comprehensive Health Risk Assessment today. The following components were reviewed and updated:    · Medical history  · Family History  · Social history  · Allergies and Current Medications  · Health Risk Assessment  · Health Maintenance  · Care Team     ** See Completed Assessments for Annual Wellness Visit within the encounter summary.**       The following assessments were completed:  · Living Situation  · CAGE  · Depression Screening  · Timed Get Up and Go  · Whisper Test  · Cognitive Function Screening  · Nutrition Screening  · ADL Screening  · PAQ Screening    Vitals:    06/16/17 1048   BP: (!) 160/69   BP Location: Left arm   Patient Position: Sitting   BP Method: Automatic   Pulse: 77   Resp: 16   Temp: 97.4 °F (36.3 °C)   TempSrc: Tympanic   SpO2: 97%   Weight: 103.7 kg (228 lb 9.9 oz)   Height: 5' 7" (1.702 m)     Body mass index is 35.81 kg/m².  Physical Exam   Constitutional: She is oriented to person, place, and time. She appears well-developed and well-nourished.   HENT:   Head: Normocephalic and atraumatic.   Right Ear: External ear normal.   Left Ear: External ear normal.   Nose: Nose normal.   Mouth/Throat: Oropharynx is clear and moist. No oropharyngeal exudate.   Eyes: Conjunctivae are normal. Pupils are equal, round, and reactive to light. Right eye exhibits no discharge. Left eye exhibits no discharge.   Neck: Normal range of motion. Neck supple. No JVD present. No thyromegaly present.   Cardiovascular: Normal rate, regular rhythm, normal heart sounds and intact distal pulses.    Pulmonary/Chest: Effort normal and breath sounds normal.   Abdominal: Soft. Bowel sounds are normal. She exhibits no distension. There is no tenderness.   Musculoskeletal: Normal range of motion. She exhibits edema (2+ pitting to BLE). She exhibits no tenderness.   Lymphadenopathy:     She has no cervical adenopathy.   Neurological: She is alert and oriented to person, " place, and time.   Skin: Skin is warm and dry. No rash noted.   Psychiatric: She has a normal mood and affect. Her behavior is normal.   Vitals reviewed.        Diagnoses and health risks identified today and associated recommendations/orders:    1. Encounter for preventative adult health care examination  Done today    2. Hypertension associated with diabetes  Worsening, uncontrolled. Taking monopril. Recommend return to PCP for medication adjustments.     3. Stenosis of left carotid artery  Approx 50% blockage per US. Repeat US ordered for July. Follow with PCP.     4. Coronary artery disease involving native coronary artery without angina pectoris, unspecified whether native or transplanted heart  Documented on imaging, Chest CT 7/2015. On statin and ASA. Follow with PCP.    5. Aortic atherosclerosis  Documented on imaging, chest CT 7/2015. On statin and ASA. Follow with PCP.     6. Diastolic dysfunction  Per Echo 1/4/2016. Follow with PCP.     7. Arterial insufficiency of lower extremity  Per [facility] 4/28/2017. Follow with PCP. Maintain control of BP and DM.     8. Hyperlipidemia, unspecified hyperlipidemia type  Uncontrolled. Recently started back on simvastatin. Repeat lipid panel scheduled in September.     9. Type 2 diabetes mellitus with diabetic polyneuropathy, without long-term current use of insulin  Controlled on metformin. Last A1C 5.9. Followed by PCP.     10. Multiple joint pain  Use tylenol for pain as needed. Follow with PCP.     11. Chronic gout, unspecified cause, unspecified site  Treating/controlled with daily colchicine. Followed by PCP.    12. Gastroesophageal reflux disease without esophagitis  Not currently on any medication. Controlling with lifestyle changes and OTC treatments. Would like to consider daily therapy. Recommend discuss at f/u with PCP.    13. Lichen sclerosus  Followed by GYN. Using clobetasol for treatment.     14. Severe obesity (BMI 35.0-35.9 with comorbidity)  Educated on  importance of healthy/balanced diet and getting 150 minutes of exercise per week to promote weight loss, reach optimal BMI, improve comorbidities and improve lifestyle.     15. Glaucoma, unspecified glaucoma, unspecified laterality   Currently on  Timolol eye drops. Needs new eye Dr as former Dr is no longer in network.   - Ambulatory Referral to Ophthalmology    16. Early menopause  - DXA Bone Density Spine And Hip; Future    17. History of colon polyp  Colonoscopy every 5 years. Next colonoscopy due 6/25/2020.      Provided Kaleigh with a 5-10 year written screening schedule and personal prevention plan. Recommendations were developed using the USPSTF age appropriate recommendations. Education, counseling, and referrals were provided as needed. After Visit Summary printed and given to patient which includes a list of additional screenings\tests needed.    No Follow-up on file.    DONOVAN Cummings,FNP-C

## 2017-06-16 NOTE — PATIENT INSTRUCTIONS
Counseling and Referral of Other Preventative  (Italic type indicates deductible and co-insurance are waived)    Patient Name: Kaleigh Nieves  Today's Date: 6/16/2017      SERVICE LIMITATIONS RECOMMENDATION    Vaccines    · Pneumococcal (once after 65)    · Influenza (annually)    · Hepatitis B (if medium/high risk)    · Prevnar 13      Hepatitis B medium/high risk factors:       - End-stage renal disease       - Hemophiliacs who received Factor VII or         IX concentrates       - Clients of institutions for the mentally             retarded       - Persons who live in the same house as          a HepB carrier       - Homosexual men       - Illicit injectable drug abusers     Pneumococcal: Done, no repeat necessary     Influenza: Done, repeat in one year     Hepatitis B: N/A     Prevnar 13: Done, no repeat necessary    Mammogram (biennial age 50-74)  Annually (age 40 or over)  Done this year, repeat every year    Pap (up to age 70 and after 70 if unknown history or abnormal study last 10 years)    N/A     The USPSTF recommends against screening for cervical cancer in women older than age 65 years who have had adequate prior screening and are not otherwise at high risk for cervical cancer.      Colorectal cancer screening (to age 75)    · Fecal occult blood test (annual)  · Flexible sigmoidoscopy (5y)  · Screening colonoscopy (10y)  · Barium enema   Last done 6/25/2015, recommend to repeat every 5  years    Diabetes self-management training (no USPSTF recommendations)  Requires referral by treating physician for patient with diabetes or renal disease. 10 hours of initial DSMT sessions of no less than 30 minutes each in a continuous 12-month period. 2 hours of follow-up DSMT in subsequent years.  Recommended to patient, declined    Bone mass measurements (age 65 & older, biennial)  Requires diagnosis related to osteoporosis or estrogen deficiency. Biennial benefit unless patient has history of long-term  glucocorticoid  Scheduled, see appointments    Glaucoma screening (no USPSTF recommendation)  Diabetes mellitus, family history   , age 50 or over    American, age 65 or over  Scheduled, see appointments    Medical nutrition therapy for diabetes or renal disease (no recommended schedule)  Requires referral by treating physician for patient with diabetes or renal disease or kidney transplant within the past 3 years.  Can be provided in same year as diabetes self-management training (DSMT), and CMS recommends medical nutrition therapy take place after DSMT. Up to 3 hours for initial year and 2 hours in subsequent years.  N/A    Cardiovascular screening blood tests (every 5 years)  · Fasting lipid panel  Order as a panel if possible  Done this year, repeat every year    Diabetes screening tests (at least every 3 years, Medicare covers annually or at 6-month intervals for prediabetic patients)  · Fasting blood sugar (FBS) or glucose tolerance test (GTT)  Patient must be diagnosed with one of the following:       - Hypertension       - Dyslipidemia       - Obesity (BMI 30kg/m2)       - Previous elevated impaired FBS or GTT       ... or any two of the following:       - Overweight (BMI 25 but <30)       - Family history of diabetes       - Age 65 or older       - History of gestational diabetes or birth of baby weighing more than 9 pounds  Done this year, repeat every year    Abdominal aortic aneurysm screening (once)  · Sonogram   Limited to patients who meet one of the following criteria:       - Men who are 65-75 years old and have smoked more than 100 cigarette in their lifetime       - Anyone with a family history of abdominal aortic aneurysm       - Anyone recommended for screening by the USPSTF  N/A    HIV screening (annually for increased risk patients)  · HIV-1 and HIV-2 by EIA, or ESTUARDO, rapid antibody test or oral mucosa transudate  Patients must be at increased risk for HIV infection  per USPSTF guidelines or pregnant. Tests covered annually for patient at increased risk or as requested by the patient. Pregnant patients may receive up to 3 tests during pregnancy.  Risks discussed, screening is not recommended    Smoking cessation counseling (up to 8 sessions per year)  Patients must be asymptomatic of tobacco-related conditions to receive as a preventative service.  Non-smoker    Subsequent annual wellness visit  At least 12 months since last AWV  Return in one year     The following information is provided to all patients.  This information is to help you find resources for any of the problems found today that may be affecting your health:                Living healthy guide: www.[facility].[place].[facility]      Understanding Diabetes: www.diabetes.org      Eating healthy: www.cdc.gov/healthyweight      CDC home safety checklist: www.cdc.gov/steadi/patient.html      Agency on Aging: www.goea.[place].[facility]      Alcoholics anonymous (AA): www.aa.org      Physical Activity: www.anderson.nih.gov/of2mwps      Tobacco use: www.quitwithusla.org

## 2017-07-06 ENCOUNTER — OFFICE VISIT (OUTPATIENT)
Dept: PODIATRY | Facility: CLINIC | Age: 77
End: 2017-07-06
Payer: MEDICARE

## 2017-07-06 VITALS
DIASTOLIC BLOOD PRESSURE: 64 MMHG | SYSTOLIC BLOOD PRESSURE: 130 MMHG | HEIGHT: 67 IN | HEART RATE: 84 BPM | WEIGHT: 224.88 LBS | BODY MASS INDEX: 35.29 KG/M2

## 2017-07-06 DIAGNOSIS — B35.1 DERMATOPHYTOSIS OF NAIL: ICD-10-CM

## 2017-07-06 DIAGNOSIS — E11.51 TYPE II DIABETES MELLITUS WITH PERIPHERAL CIRCULATORY DISORDER: Primary | ICD-10-CM

## 2017-07-06 PROCEDURE — 99499 UNLISTED E&M SERVICE: CPT | Mod: S$GLB,,, | Performed by: PODIATRIST

## 2017-07-06 PROCEDURE — 99999 PR PBB SHADOW E&M-EST. PATIENT-LVL III: CPT | Mod: PBBFAC,,, | Performed by: PODIATRIST

## 2017-07-06 PROCEDURE — 11721 DEBRIDE NAIL 6 OR MORE: CPT | Mod: Q8,S$GLB,, | Performed by: PODIATRIST

## 2017-07-06 NOTE — PROGRESS NOTES
Subjective:     Patient ID: Kaleigh Nieves is a 76 y.o. female.    Chief Complaint: Nail Care (Patient states no current pain or problems.) and Diabetes Mellitus (Last PCP visit with (JER Lackey)- 6/16/17. 98 mg/dL-AM glucose. )    Kaleigh is a 76 y.o. female who presents to the clinic for evaluation and treatment of high risk feet. Kaleigh has a past medical history of Anemia; Arthritis; Asthma; Back pain; CAD (coronary artery disease) (7/15); Carotid artery stenosis; Chronic gout; Colon polyp; CTS (carpal tunnel syndrome); Diabetes with neurologic complications; Diverticulosis; RIVAS (dyspnea on exertion); Dyslipidemia; Ex-smoker; GERD (gastroesophageal reflux disease); Hyperlipidemia; Hypertension; Neuropathy, lower extremity; Obesity; Open-angle glaucoma; Peripheral vascular disease; Polyneuropathy; and Tobacco dependence. The patient's chief complaint is long, thick toenails. Patient states her blood sugar this morning was 98mg/dl.  This patient has documented high risk feet requiring routine maintenance secondary to diabetes mellitis and those secondary complications of diabetes, as mentioned..    PCP: Bobby Ricks MD    Date Last Seen by PCP: 6/16/17    Current shoe gear:  Affected Foot: Rx diabetic extra depth shoes and custom accommodative insoles     Unaffected Foot: Rx diabetic extra depth shoes and custom accommodative insoles    Hemoglobin A1C   Date Value Ref Range Status   03/21/2017 5.9 4.5 - 6.2 % Final     Comment:     According to ADA guidelines, hemoglobin A1C <7.0% represents  optimal control in non-pregnant diabetic patients.  Different  metrics may apply to specific populations.   Standards of Medical Care in Diabetes - 2016.  For the purpose of screening for the presence of diabetes:  <5.7%     Consistent with the absence of diabetes  5.7-6.4%  Consistent with increasing risk for diabetes   (prediabetes)  >or=6.5%  Consistent with diabetes  Currently no consensus exists for use of  hemoglobin A1C  for diagnosis of diabetes for children.     09/15/2016 5.8 4.5 - 6.2 % Final     Comment:     According to ADA guidelines, hemoglobin A1C <7.0% represents  optimal control in non-pregnant diabetic patients.  Different  metrics may apply to specific populations.   Standards of Medical Care in Diabetes - 2016.  For the purpose of screening for the presence of diabetes:  <5.7%     Consistent with the absence of diabetes  5.7-6.4%  Consistent with increasing risk for diabetes   (prediabetes)  >or=6.5%  Consistent with diabetes  Currently no consensus exists for use of hemoglobin A1C  for diagnosis of diabetes for children.     03/21/2016 5.9 4.5 - 6.2 % Final           Patient Active Problem List   Diagnosis    Type 2 diabetes mellitus with diabetic polyneuropathy, without long-term current use of insulin    Multiple joint pain    Esophageal reflux    Hyperlipidemia    Chronic gout    Lichen sclerosus    Carotid artery stenosis    CAD (coronary artery disease)    Colon polyp    Hypertension associated with diabetes    Aortic atherosclerosis    Diastolic dysfunction    Arterial insufficiency of lower extremity    Severe obesity (BMI 35.0-35.9 with comorbidity)    Glaucoma       Medication List with Changes/Refills   Current Medications    ASPIRIN 81 MG CHEW    Take 81 mg by mouth once daily.    BLOOD GLUCOSE CONTROL HIGH,LOW (ACCU-CHEK DARSHAN CONTROL SOLN) SOLN    Use a directed    BLOOD SUGAR DIAGNOSTIC (BLOOD GLUCOSE TEST) STRP    accu chek darshan test strips/ test 2 times a day    BLOOD-GLUCOSE METER KIT    accu chek darshan meter/test twice daily    CLOBETASOL 0.05% (TEMOVATE) 0.05 % OINT    APPLY TOPICALLY TWO TIMES A DAY    COLCHICINE 0.6 MG TABLET    Take 1 tablet (0.6 mg total) by mouth once daily.    FOSINOPRIL (MONOPRIL) 40 MG TABLET    Take 1 tablet (40 mg total) by mouth once daily.    LANCETS (ACCU-CHEK SOFTCLIX LANCETS) MISC    Test twice a day    METFORMIN (GLUCOPHAGE) 500 MG  TABLET    Take 1 tablet (500 mg total) by mouth 2 (two) times daily.    SIMVASTATIN (ZOCOR) 40 MG TABLET    Take 1 tablet (40 mg total) by mouth nightly.    TIMOLOL 0.5 % OPHTHALMIC SOLUTION    Apply 1 drop to eye once daily.       Review of patient's allergies indicates:   Allergen Reactions    Iodine and iodide containing products Nausea And Vomiting    Penicillins Itching    Amlodipine      edema    Sulfa (sulfonamide antibiotics)     Ultram [tramadol] Other (See Comments)     Light headness, itiching    Bactrim [sulfamethoxazole-trimethoprim] Itching and Rash       Past Surgical History:   Procedure Laterality Date    Bilateral knee replacement      CATARACT EXTRACTION, BILATERAL      CHOLECYSTECTOMY      HEMORRHOID SURGERY      HYSTERECTOMY      fibroids    INCISION AND DRAINAGE PERIRECTAL ABSCESS      thyroid resection         Family History   Problem Relation Age of Onset    Hypertension Mother     Diabetes Mother     Leukemia Sister     Hypertension Sister     Cancer Sister      leukemia    Heart disease Maternal Aunt     Cancer Maternal Uncle      gastric, pacreatic    Heart disease Maternal Uncle     Miscarriages / Stillbirths Maternal Uncle     Diabetes Maternal Grandmother     Asthma Maternal Grandfather     Breast cancer Neg Hx     Colon cancer Neg Hx     Ovarian cancer Neg Hx     COPD Neg Hx     Hyperlipidemia Neg Hx     Kidney disease Neg Hx        Social History     Social History    Marital status: Single     Spouse name: N/A    Number of children: 0    Years of education: N/A     Occupational History    Not on file.     Social History Main Topics    Smoking status: Former Smoker     Packs/day: 0.25     Years: 24.00     Quit date: 6/25/1976    Smokeless tobacco: Never Used    Alcohol use No    Drug use: No    Sexual activity: Not Currently     Other Topics Concern    Not on file     Social History Narrative    No narrative on file       Vitals:    07/06/17  "1050   BP: 130/64   Pulse: 84   Weight: 102 kg (224 lb 13.9 oz)   Height: 5' 7" (1.702 m)   PainSc: 0-No pain       Hemoglobin A1C   Date Value Ref Range Status   03/21/2017 5.9 4.5 - 6.2 % Final     Comment:     According to ADA guidelines, hemoglobin A1C <7.0% represents  optimal control in non-pregnant diabetic patients.  Different  metrics may apply to specific populations.   Standards of Medical Care in Diabetes - 2016.  For the purpose of screening for the presence of diabetes:  <5.7%     Consistent with the absence of diabetes  5.7-6.4%  Consistent with increasing risk for diabetes   (prediabetes)  >or=6.5%  Consistent with diabetes  Currently no consensus exists for use of hemoglobin A1C  for diagnosis of diabetes for children.     09/15/2016 5.8 4.5 - 6.2 % Final     Comment:     According to ADA guidelines, hemoglobin A1C <7.0% represents  optimal control in non-pregnant diabetic patients.  Different  metrics may apply to specific populations.   Standards of Medical Care in Diabetes - 2016.  For the purpose of screening for the presence of diabetes:  <5.7%     Consistent with the absence of diabetes  5.7-6.4%  Consistent with increasing risk for diabetes   (prediabetes)  >or=6.5%  Consistent with diabetes  Currently no consensus exists for use of hemoglobin A1C  for diagnosis of diabetes for children.     03/21/2016 5.9 4.5 - 6.2 % Final       Review of Systems   Constitutional: Negative for chills and fever.   Respiratory: Negative for shortness of breath.    Cardiovascular: Positive for leg swelling. Negative for chest pain, palpitations, orthopnea and claudication.   Gastrointestinal: Negative for diarrhea, nausea and vomiting.   Musculoskeletal: Negative for joint pain.   Skin: Negative for rash.   Neurological: Positive for tingling and sensory change. Negative for dizziness, focal weakness and weakness.   Psychiatric/Behavioral: Negative.            Objective:      PHYSICAL EXAM: Apperance: Alert and " orient in no distress,well developed, and with good attention to grooming and body habits  Patient presents ambulating in diabetic shoes and inserts.   LOWER EXTREMITY EXAM:  VASCULAR: Dorsalis pedis pulses 0/4 bilateral (monophasic with doppler)and Posterior Tibial pulses 0/4 bilateral (biphasic with doppler). Capillary fill time <blanched bilateral. Mild edema observed bilateral. Varicosities present bilateral. Skin temperature of the lower extremities is warm to cool, proximal to distal. Hair growth absent bilateral.  DERMATOLOGICAL: No skin rashes, subcutaneous nodules, lesions, or ulcers observed bilateral. Nails 1,2,3,4,5 bilateral elongated, thickened, and discolored with subungual debris. Webspaces 1-4 clean, dry and without evidence of break in skin integrity bilateral.   NEUROLOGICAL: Light touch, sharp-dull, proprioception all present and equal bilaterally.  Vibratory sensation intact on left hallux and diminished at right hallux. Protective sensation intact at all 10 sites as tested with a Milton-Jimy 5.07 monofilament.   MUSCULOSKELETAL: Muscle strength is 5/5 for foot inverters, everters, plantarflexors, and dorsiflexors. Muscle tone is normal. Pes planus noted bilateral. Flexible hammertoes noted bilateral.       TEST RESULTS: Arterial studies reveal the right ankle brachial index was 1.24 which is normal.   The left ankle brachial index was 0.99 which suggests minimal left lower extremity arterial disease.   The right TBI is 0.45.   The left TBI is 0.49.      Assessment:       Encounter Diagnoses   Name Primary?    Type II diabetes mellitus with peripheral circulatory disorder Yes    Dermatophytosis of nail          Plan:   Type II diabetes mellitus with peripheral circulatory disorder    Dermatophytosis of nail      I counseled the patient on her conditions, their implications and medical management.  Shoe inspection. Diabetic Foot Education. Patient reminded of the importance of good  nutrition and blood sugar control to help prevent podiatric complications of diabetes. Patient instructed on proper foot hygeine. We discussed wearing proper shoe gear, daily foot inspections, never walking without protective shoe gear, never putting sharp instruments to feet.    With patient's permission, nails 1-5 bilateral were trimmed in length and thickness aggressively to their soft tissue attachment mechanically and with electric , removing all offending nail and debris. Patient relates relief following the procedure.  Reviewed bilateral lower extremity BARRON.   Patient  will continue to monitor the areas daily, inspect feet, wear protective shoe gear when ambulatory, moisturizer to maintain skin integrity. Patient reminded of the importance of good nutrition and blood sugar control to help prevent podiatric complications of diabetes.  Patient to return in this office in approximately 3 months, or sooner if needed.                   Ino Harmon DPM  [facility]

## 2017-07-10 ENCOUNTER — OFFICE VISIT (OUTPATIENT)
Dept: OPHTHALMOLOGY | Facility: CLINIC | Age: 77
End: 2017-07-10
Payer: MEDICARE

## 2017-07-10 ENCOUNTER — APPOINTMENT (OUTPATIENT)
Dept: RADIOLOGY | Facility: CLINIC | Age: 77
End: 2017-07-10
Attending: NURSE PRACTITIONER
Payer: MEDICARE

## 2017-07-10 DIAGNOSIS — H52.7 REFRACTIVE ERROR: ICD-10-CM

## 2017-07-10 DIAGNOSIS — Z96.1 PSEUDOPHAKIA OF BOTH EYES: ICD-10-CM

## 2017-07-10 DIAGNOSIS — E28.319 EARLY MENOPAUSE: ICD-10-CM

## 2017-07-10 DIAGNOSIS — H40.1134 PRIMARY OPEN ANGLE GLAUCOMA OF BOTH EYES, INDETERMINATE STAGE: Primary | ICD-10-CM

## 2017-07-10 PROCEDURE — 99999 PR PBB SHADOW E&M-EST. PATIENT-LVL II: CPT | Mod: PBBFAC,,, | Performed by: OPTOMETRIST

## 2017-07-10 PROCEDURE — 92133 CPTRZD OPH DX IMG PST SGM ON: CPT | Mod: S$GLB,,, | Performed by: OPTOMETRIST

## 2017-07-10 PROCEDURE — 99499 UNLISTED E&M SERVICE: CPT | Mod: S$GLB,,, | Performed by: OPTOMETRIST

## 2017-07-10 PROCEDURE — 92015 DETERMINE REFRACTIVE STATE: CPT | Mod: S$GLB,,, | Performed by: OPTOMETRIST

## 2017-07-10 PROCEDURE — 77080 DXA BONE DENSITY AXIAL: CPT | Mod: TC,PO

## 2017-07-10 PROCEDURE — 77080 DXA BONE DENSITY AXIAL: CPT | Mod: 26,,, | Performed by: INTERNAL MEDICINE

## 2017-07-10 PROCEDURE — 92004 COMPRE OPH EXAM NEW PT 1/>: CPT | Mod: S$GLB,,, | Performed by: OPTOMETRIST

## 2017-07-10 NOTE — PROGRESS NOTES
HPI     New Patient  Glaucoma patient  Change of care due to insurance (Eduard Camacho MD)  Insurance changed   Last visit March of 2017  Medication: TIMOLOL  0.5% = one drop OU BID  Chief complaint: itchy watery eyes  Pseudophakia, OU    Last edited by GABY Mo, OD on 7/10/2017  2:28 PM. (History)            Assessment /Plan     For exam results, see Encounter Report.    Indeterminate stage chronic open angle glaucoma    Pseudophakia of both eyes    Glaucoma screening    Refractive error      New glaucoma patient to us due to insurance issues (Eduard Camacho M.D.was her former doctor.)  Borderline IOP OU today on BID timilol 0.5%.  Cupping was large OU but with good rims 360 degrees.  Stable PIOL OU.  OH OK OU otherwise.  I will have her RTC in 4 months for IOP, HVF, CCT, and SDP.  We did do GOCT today and the OD was normal.  The OS had inferior temporal NFL loss.  She is transferring her ophthalmic care to  [facility] from Eduard Camacho M.D. Due to insurance changes. OCT study shows NFL normal findings for the right eye and borderline inferior thinning in the left eye.  I will bring her back in 4 months for IOP, HVF, CCT, and SDP.

## 2017-07-10 NOTE — LETTER
July 10, 2017      DONOVAN Ivan,FNP-C  [address] [address] [address]           [facility]  [address] [facility] [address]  Phone: [phone]  Fax: [phone]          Patient: Kaleigh Nieves   MR Number: [id number]   YOB: 1940   Date of Visit: 7/10/2017       Dear Theresa Kay:    Thank you for referring Kaleigh Nieves to me for evaluation. Attached you will find relevant portions of my assessment and plan of care.    If you have questions, please do not hesitate to call me. I look forward to following Kaleigh Nieves along with you.    Sincerely,    GABY Mo, OD    Enclosure  CC:  No Recipients    If you would like to receive this communication electronically, please contact [email or web address] or [phone] to request more information on [employer] access.    For providers and/or their staff who would like to refer a patient to [facility], please contact us through our one-stop-shop provider referral line, [facility], at [phone].    If you feel you have received this communication in error or would no longer like to receive these types of communications, please e-mail [email or web address]

## 2017-07-11 ENCOUNTER — OFFICE VISIT (OUTPATIENT)
Dept: INTERNAL MEDICINE | Facility: CLINIC | Age: 77
End: 2017-07-11
Payer: MEDICARE

## 2017-07-11 VITALS
HEART RATE: 80 BPM | BODY MASS INDEX: 35.68 KG/M2 | WEIGHT: 227.31 LBS | OXYGEN SATURATION: 97 % | SYSTOLIC BLOOD PRESSURE: 138 MMHG | TEMPERATURE: 98 F | DIASTOLIC BLOOD PRESSURE: 64 MMHG | HEIGHT: 67 IN

## 2017-07-11 DIAGNOSIS — I25.10 CORONARY ARTERY DISEASE INVOLVING NATIVE CORONARY ARTERY WITHOUT ANGINA PECTORIS, UNSPECIFIED WHETHER NATIVE OR TRANSPLANTED HEART: ICD-10-CM

## 2017-07-11 DIAGNOSIS — I65.22 STENOSIS OF LEFT CAROTID ARTERY: ICD-10-CM

## 2017-07-11 DIAGNOSIS — E11.42 TYPE 2 DIABETES MELLITUS WITH DIABETIC POLYNEUROPATHY, WITHOUT LONG-TERM CURRENT USE OF INSULIN: Primary | ICD-10-CM

## 2017-07-11 PROCEDURE — 99213 OFFICE O/P EST LOW 20 MIN: CPT | Mod: S$GLB,,, | Performed by: FAMILY MEDICINE

## 2017-07-11 PROCEDURE — 1125F AMNT PAIN NOTED PAIN PRSNT: CPT | Mod: S$GLB,,, | Performed by: FAMILY MEDICINE

## 2017-07-11 PROCEDURE — 99499 UNLISTED E&M SERVICE: CPT | Mod: S$GLB,,, | Performed by: FAMILY MEDICINE

## 2017-07-11 PROCEDURE — 1159F MED LIST DOCD IN RCRD: CPT | Mod: S$GLB,,, | Performed by: FAMILY MEDICINE

## 2017-07-11 PROCEDURE — 99999 PR PBB SHADOW E&M-EST. PATIENT-LVL III: CPT | Mod: PBBFAC,,, | Performed by: FAMILY MEDICINE

## 2017-07-11 NOTE — PROGRESS NOTES
"Subjective:       Patient ID: Kaleigh Nieves is a o. female.    Chief Complaint: Multiple issues see below      HPI Gout: no recent flare gout;off daily indocin  Type 2 dm a1c controlled.  htn nl bps tid home ;was high at hra last month home bps ok;fw times a week bit lightheaded w standing  Cad:no longer seeing dr giraldo; reports no blockage coronary arter.no cp. ; hx lhc w ster/benadryl  Prophylax and " nl" cath  Yrs ago;;saw dr rosa isela rothman in the past for mayberry  Khris: due Car u/;sJuly     Past Medical History   Diagnosis Date    Type 2 diabetes mellitus      podiatry dr kenya patel    Neuropathy, lower extremity     CTS (carpal tunnel syndrome)     Dyslipidemia     Hypertension     Chronic gout     Glaucoma      dr avel kaur    Colon polyp     GERD (gastroesophageal reflux disease)     Ex-smoker      quit in her 30s    Carotid artery stenosis     CAD (coronary artery disease) 7/15     via [facility] ct     Past Surgical History   Procedure Laterality Date    Bilateral knee replacement      Cholecystectomy      Hemorrhoid surgery      Hysterectomy       fibroids    Incision and drainage perirectal abscess       Family History   Problem Relation Age of Onset    Breast cancer Neg Hx     Colon cancer Neg Hx     Ovarian cancer Neg Hx     Hypertension Mother     Diabetes Mother     Leukemia Sister      History     Social History    Marital Status: Single     Spouse Name: N/A     Number of Children: N/A    Years of Education: N/A     Social History Main Topics    Smoking status: Former Smoker -- 0.25 packs/day for 24 years     Quit date: 06/25/1976    Smokeless tobacco: Never Used    Alcohol Use: No    Drug Use: No    Sexual Activity: Not Currently     Other Topics Concern    None     Social History Narrative       Review of Systems  no cp no new mayberry  Objective:      Physical Exam   Constitutional: She appears well-developed and well-nourished.   Cardiovascular: Normal rate and regular rhythm.  "   Pulmonary/Chest: Effort normal and breath sounds normal.     \    Assessment:       1. Gout, unspecified cause, unspecified chronicity, unspecified site    2. Type 2 diabetes mellitus without complication    shaggy  htn  Cad  hyperchol  Plan:     Car u/s July  Lab fall and f/u sched  Nurse bp machine tues aug 1;drop off bps then too;per this result might be able to dec ace inhib a bit    Type 2 diabetes mellitus with diabetic polyneuropathy, without long-term current use of insulin    Coronary artery disease involving native coronary artery without angina pectoris, unspecified whether native or transplanted heart    Stenosis of left carotid artery

## 2017-07-20 ENCOUNTER — TELEPHONE (OUTPATIENT)
Dept: INTERNAL MEDICINE | Facility: CLINIC | Age: 77
End: 2017-07-20

## 2017-07-20 NOTE — TELEPHONE ENCOUNTER
----- Message from DONOVAN Ivan,FNP-C sent at 7/20/2017 12:06 PM CDT -----  Bone scan stable. Repeat in 3 years

## 2017-09-22 ENCOUNTER — TELEPHONE (OUTPATIENT)
Dept: INTERNAL MEDICINE | Facility: CLINIC | Age: 77
End: 2017-09-22

## 2017-09-28 ENCOUNTER — LAB VISIT (OUTPATIENT)
Dept: LAB | Facility: HOSPITAL | Age: 77
End: 2017-09-28
Attending: FAMILY MEDICINE
Payer: MEDICARE

## 2017-09-28 DIAGNOSIS — E11.9 TYPE 2 DIABETES MELLITUS WITHOUT COMPLICATION, UNSPECIFIED LONG TERM INSULIN USE STATUS: ICD-10-CM

## 2017-09-28 LAB
CHOLEST SERPL-MCNC: 157 MG/DL
CHOLEST/HDLC SERPL: 3.8 {RATIO}
ESTIMATED AVG GLUCOSE: 117 MG/DL
HBA1C MFR BLD HPLC: 5.7 %
HDLC SERPL-MCNC: 41 MG/DL
HDLC SERPL: 26.1 %
LDLC SERPL CALC-MCNC: 100.6 MG/DL
NONHDLC SERPL-MCNC: 116 MG/DL
TRIGL SERPL-MCNC: 77 MG/DL

## 2017-09-28 PROCEDURE — 80061 LIPID PANEL: CPT

## 2017-09-28 PROCEDURE — 83036 HEMOGLOBIN GLYCOSYLATED A1C: CPT

## 2017-09-28 PROCEDURE — 36415 COLL VENOUS BLD VENIPUNCTURE: CPT | Mod: PO

## 2017-10-05 ENCOUNTER — OFFICE VISIT (OUTPATIENT)
Dept: PODIATRY | Facility: CLINIC | Age: 77
End: 2017-10-05
Payer: MEDICARE

## 2017-10-05 VITALS
WEIGHT: 227.75 LBS | BODY MASS INDEX: 35.75 KG/M2 | DIASTOLIC BLOOD PRESSURE: 64 MMHG | HEART RATE: 78 BPM | SYSTOLIC BLOOD PRESSURE: 140 MMHG | HEIGHT: 67 IN

## 2017-10-05 DIAGNOSIS — B35.1 DERMATOPHYTOSIS OF NAIL: ICD-10-CM

## 2017-10-05 DIAGNOSIS — E11.51 TYPE II DIABETES MELLITUS WITH PERIPHERAL CIRCULATORY DISORDER: Primary | ICD-10-CM

## 2017-10-05 PROCEDURE — 11721 DEBRIDE NAIL 6 OR MORE: CPT | Mod: Q8,S$GLB,, | Performed by: PODIATRIST

## 2017-10-05 PROCEDURE — 99499 UNLISTED E&M SERVICE: CPT | Mod: S$GLB,,, | Performed by: PODIATRIST

## 2017-10-05 PROCEDURE — 99999 PR PBB SHADOW E&M-EST. PATIENT-LVL III: CPT | Mod: PBBFAC,,, | Performed by: PODIATRIST

## 2017-10-05 NOTE — PROGRESS NOTES
Subjective:     Patient ID: Kaleigh Nieves is a 77 y.o. female.    Chief Complaint: Nail Care (Patient states she is experiencing some neuropathy and burning in her feet. ) and Diabetes Mellitus (Last PCP visit with - 7/11/17. AM glucose- 113 mg/dL. )    Kaleigh is a 77 y.o. female who presents to the clinic for evaluation and treatment of high risk feet. Kaleigh has a past medical history of Anemia; Arthritis; Asthma; Back pain; CAD (coronary artery disease) (7/15); Carotid artery stenosis; Chronic gout; Colon polyp; CTS (carpal tunnel syndrome); Diabetes with neurologic complications; Diverticulosis; RIVAS (dyspnea on exertion); Dyslipidemia; Ex-smoker; GERD (gastroesophageal reflux disease); Hyperlipidemia; Hypertension; Neuropathy, lower extremity; Obesity; Open-angle glaucoma; Peripheral vascular disease; Polyneuropathy; and Tobacco dependence. The patient's chief complaint is long, thick toenails. Patient states her blood sugar this morning was 113mg/dl.  This patient has documented high risk feet requiring routine maintenance secondary to diabetes mellitis and those secondary complications of diabetes, as mentioned..    PCP: Bobby Ricsk MD    Date Last Seen by PCP: 7/11/17    Current shoe gear:  Affected Foot: Rx diabetic extra depth shoes and custom accommodative insoles     Unaffected Foot: Rx diabetic extra depth shoes and custom accommodative insoles    Hemoglobin A1C   Date Value Ref Range Status   09/28/2017 5.7 (H) 4.0 - 5.6 % Final     Comment:     According to ADA guidelines, hemoglobin A1c <7.0% represents  optimal control in non-pregnant diabetic patients. Different  metrics may apply to specific patient populations.   Standards of Medical Care in Diabetes-2016.  For the purpose of screening for the presence of diabetes:  <5.7%     Consistent with the absence of diabetes  5.7-6.4%  Consistent with increasing risk for diabetes   (prediabetes)  >or=6.5%  Consistent with  diabetes  Currently, no consensus exists for use of hemoglobin A1c  for diagnosis of diabetes for children.  This Hemoglobin A1c assay has significant interference with fetal   hemoglobin   (HbF). The results are invalid for patients with abnormal amounts of   HbF,   including those with known Hereditary Persistence   of Fetal Hemoglobin. Heterozygous hemoglobin variants (HbAS, HbAC,   HbAD, HbAE, HbA2) do not significantly interfere with this assay;   however, presence of multiple variants in a sample may impact the %   interference.     03/21/2017 5.9 4.5 - 6.2 % Final     Comment:     According to ADA guidelines, hemoglobin A1C <7.0% represents  optimal control in non-pregnant diabetic patients.  Different  metrics may apply to specific populations.   Standards of Medical Care in Diabetes - 2016.  For the purpose of screening for the presence of diabetes:  <5.7%     Consistent with the absence of diabetes  5.7-6.4%  Consistent with increasing risk for diabetes   (prediabetes)  >or=6.5%  Consistent with diabetes  Currently no consensus exists for use of hemoglobin A1C  for diagnosis of diabetes for children.     09/15/2016 5.8 4.5 - 6.2 % Final     Comment:     According to ADA guidelines, hemoglobin A1C <7.0% represents  optimal control in non-pregnant diabetic patients.  Different  metrics may apply to specific populations.   Standards of Medical Care in Diabetes - 2016.  For the purpose of screening for the presence of diabetes:  <5.7%     Consistent with the absence of diabetes  5.7-6.4%  Consistent with increasing risk for diabetes   (prediabetes)  >or=6.5%  Consistent with diabetes  Currently no consensus exists for use of hemoglobin A1C  for diagnosis of diabetes for children.             Patient Active Problem List   Diagnosis    Type 2 diabetes mellitus with diabetic polyneuropathy, without long-term current use of insulin    Multiple joint pain    Esophageal reflux    Hyperlipidemia    Chronic gout     Lichen sclerosus    Carotid artery stenosis    CAD (coronary artery disease)    Colon polyp    Hypertension associated with diabetes    Aortic atherosclerosis    Diastolic dysfunction    Arterial insufficiency of lower extremity    Severe obesity (BMI 35.0-35.9 with comorbidity)    Glaucoma       Medication List with Changes/Refills   Current Medications    ASPIRIN 81 MG CHEW    Take 81 mg by mouth once daily.    BLOOD GLUCOSE CONTROL HIGH,LOW (ACCU-CHEK DARSHAN CONTROL SOLN) SOLN    Use a directed    BLOOD SUGAR DIAGNOSTIC (BLOOD GLUCOSE TEST) STRP    accu chek darshan test strips/ test 2 times a day    BLOOD-GLUCOSE METER KIT    accu chek darshan meter/test twice daily    CLOBETASOL 0.05% (TEMOVATE) 0.05 % OINT    APPLY TOPICALLY TWO TIMES A DAY    COLCHICINE 0.6 MG TABLET    Take 1 tablet (0.6 mg total) by mouth once daily.    FOSINOPRIL (MONOPRIL) 40 MG TABLET    Take 1 tablet (40 mg total) by mouth once daily.    LANCETS (ACCU-CHEK SOFTCLIX LANCETS) MISC    Test twice a day    METFORMIN (GLUCOPHAGE) 500 MG TABLET    Take 1 tablet (500 mg total) by mouth 2 (two) times daily.    SIMVASTATIN (ZOCOR) 40 MG TABLET    Take 1 tablet (40 mg total) by mouth nightly.    TIMOLOL 0.5 % OPHTHALMIC SOLUTION    Apply 1 drop to eye once daily.       Review of patient's allergies indicates:   Allergen Reactions    Iodine and iodide containing products Nausea And Vomiting    Penicillins Itching    Amlodipine      edema    Sulfa (sulfonamide antibiotics)     Ultram [tramadol] Other (See Comments)     Light headness, itiching    Bactrim [sulfamethoxazole-trimethoprim] Itching and Rash       Past Surgical History:   Procedure Laterality Date    Bilateral knee replacement      CATARACT EXTRACTION, BILATERAL      CHOLECYSTECTOMY      HEMORRHOID SURGERY      HYSTERECTOMY      fibroids    INCISION AND DRAINAGE PERIRECTAL ABSCESS      thyroid resection         Family History   Problem Relation Age of Onset     "Hypertension Mother     Diabetes Mother     Leukemia Sister     Hypertension Sister     Cancer Sister      leukemia    Heart disease Maternal Aunt     Cancer Maternal Uncle      gastric, pacreatic    Heart disease Maternal Uncle     Miscarriages / Stillbirths Maternal Uncle     Diabetes Maternal Grandmother     Asthma Maternal Grandfather     Breast cancer Neg Hx     Colon cancer Neg Hx     Ovarian cancer Neg Hx     COPD Neg Hx     Hyperlipidemia Neg Hx     Kidney disease Neg Hx        Social History     Social History    Marital status: Single     Spouse name: N/A    Number of children: 0    Years of education: N/A     Occupational History    Not on file.     Social History Main Topics    Smoking status: Former Smoker     Packs/day: 0.25     Years: 24.00     Quit date: 6/25/1976    Smokeless tobacco: Never Used    Alcohol use No    Drug use: No    Sexual activity: Not Currently     Other Topics Concern    Not on file     Social History Narrative    No narrative on file       Vitals:    10/05/17 1122   BP: (!) 140/64   Pulse: 78   Weight: 103.3 kg (227 lb 11.8 oz)   Height: 5' 7" (1.702 m)   PainSc: 0-No pain       Hemoglobin A1C   Date Value Ref Range Status   09/28/2017 5.7 (H) 4.0 - 5.6 % Final     Comment:     According to ADA guidelines, hemoglobin A1c <7.0% represents  optimal control in non-pregnant diabetic patients. Different  metrics may apply to specific patient populations.   Standards of Medical Care in Diabetes-2016.  For the purpose of screening for the presence of diabetes:  <5.7%     Consistent with the absence of diabetes  5.7-6.4%  Consistent with increasing risk for diabetes   (prediabetes)  >or=6.5%  Consistent with diabetes  Currently, no consensus exists for use of hemoglobin A1c  for diagnosis of diabetes for children.  This Hemoglobin A1c assay has significant interference with fetal   hemoglobin   (HbF). The results are invalid for patients with abnormal amounts " of   HbF,   including those with known Hereditary Persistence   of Fetal Hemoglobin. Heterozygous hemoglobin variants (HbAS, HbAC,   HbAD, HbAE, HbA2) do not significantly interfere with this assay;   however, presence of multiple variants in a sample may impact the %   interference.     03/21/2017 5.9 4.5 - 6.2 % Final     Comment:     According to ADA guidelines, hemoglobin A1C <7.0% represents  optimal control in non-pregnant diabetic patients.  Different  metrics may apply to specific populations.   Standards of Medical Care in Diabetes - 2016.  For the purpose of screening for the presence of diabetes:  <5.7%     Consistent with the absence of diabetes  5.7-6.4%  Consistent with increasing risk for diabetes   (prediabetes)  >or=6.5%  Consistent with diabetes  Currently no consensus exists for use of hemoglobin A1C  for diagnosis of diabetes for children.     09/15/2016 5.8 4.5 - 6.2 % Final     Comment:     According to ADA guidelines, hemoglobin A1C <7.0% represents  optimal control in non-pregnant diabetic patients.  Different  metrics may apply to specific populations.   Standards of Medical Care in Diabetes - 2016.  For the purpose of screening for the presence of diabetes:  <5.7%     Consistent with the absence of diabetes  5.7-6.4%  Consistent with increasing risk for diabetes   (prediabetes)  >or=6.5%  Consistent with diabetes  Currently no consensus exists for use of hemoglobin A1C  for diagnosis of diabetes for children.         Review of Systems   Constitutional: Negative for chills and fever.   Respiratory: Negative for shortness of breath.    Cardiovascular: Positive for leg swelling. Negative for chest pain, palpitations, orthopnea and claudication.   Gastrointestinal: Negative for diarrhea, nausea and vomiting.   Musculoskeletal: Negative for joint pain.   Skin: Negative for rash.   Neurological: Positive for tingling and sensory change. Negative for dizziness, focal weakness and weakness.    Psychiatric/Behavioral: Negative.            Objective:      PHYSICAL EXAM: Apperance: Alert and orient in no distress,well developed, and with good attention to grooming and body habits  Patient presents ambulating in diabetic shoes and inserts.   LOWER EXTREMITY EXAM:  VASCULAR: Dorsalis pedis pulses 0/4 bilateral (monophasic with doppler)and Posterior Tibial pulses 0/4 bilateral (biphasic with doppler). Capillary fill time <blanched bilateral. Mild edema observed bilateral. Varicosities present bilateral. Skin temperature of the lower extremities is warm to cool, proximal to distal. Hair growth absent bilateral.  DERMATOLOGICAL: No skin rashes, subcutaneous nodules, lesions, or ulcers observed bilateral. Nails 1,2,3,4,5 bilateral elongated, thickened, and discolored with subungual debris. Webspaces 1-4 clean, dry and without evidence of break in skin integrity bilateral.   NEUROLOGICAL: Light touch, sharp-dull, proprioception all present and equal bilaterally.  Vibratory sensation intact on left hallux and diminished at right hallux. Protective sensation intact at all 10 sites as tested with a [place] 5.07 monofilament.   MUSCULOSKELETAL: Muscle strength is 5/5 for foot inverters, everters, plantarflexors, and dorsiflexors. Muscle tone is normal. Pes planus noted bilateral. Flexible hammertoes noted bilateral.       Assessment:       Encounter Diagnoses   Name Primary?    Type II diabetes mellitus with peripheral circulatory disorder Yes    Dermatophytosis of nail          Plan:   Type II diabetes mellitus with peripheral circulatory disorder    Dermatophytosis of nail      I counseled the patient on her conditions, their implications and medical management.  Shoe inspection. Diabetic Foot Education. Patient reminded of the importance of good nutrition and blood sugar control to help prevent podiatric complications of diabetes. Patient instructed on proper foot hygeine. We discussed wearing proper  shoe gear, daily foot inspections, never walking without protective shoe gear, never putting sharp instruments to feet.    With patient's permission, nails 1-5 bilateral were debrided/trimmed in length and thickness aggressively to their soft tissue attachment mechanically and with electric , removing all offending nail and debris. Patient relates relief following the procedure.     Patient  will continue to monitor the areas daily, inspect feet, wear protective shoe gear when ambulatory, moisturizer to maintain skin integrity. Patient reminded of the importance of good nutrition and blood sugar control to help prevent podiatric complications of diabetes.  Patient to return in this office in approximately 3 months, or sooner if needed.                   Ino Harmon DPM  [facility]

## 2017-10-17 ENCOUNTER — OFFICE VISIT (OUTPATIENT)
Dept: INTERNAL MEDICINE | Facility: CLINIC | Age: 77
End: 2017-10-17
Payer: MEDICARE

## 2017-10-17 ENCOUNTER — HOSPITAL ENCOUNTER (OUTPATIENT)
Dept: RADIOLOGY | Facility: HOSPITAL | Age: 77
Discharge: HOME | End: 2017-10-17
Attending: FAMILY MEDICINE
Payer: MEDICARE

## 2017-10-17 VITALS
SYSTOLIC BLOOD PRESSURE: 138 MMHG | HEIGHT: 67 IN | DIASTOLIC BLOOD PRESSURE: 70 MMHG | WEIGHT: 227.75 LBS | OXYGEN SATURATION: 96 % | RESPIRATION RATE: 18 BRPM | BODY MASS INDEX: 35.75 KG/M2 | HEART RATE: 80 BPM

## 2017-10-17 DIAGNOSIS — I65.22 STENOSIS OF LEFT CAROTID ARTERY: ICD-10-CM

## 2017-10-17 DIAGNOSIS — I25.10 CORONARY ARTERY DISEASE INVOLVING NATIVE CORONARY ARTERY WITHOUT ANGINA PECTORIS, UNSPECIFIED WHETHER NATIVE OR TRANSPLANTED HEART: ICD-10-CM

## 2017-10-17 DIAGNOSIS — E11.42 TYPE 2 DIABETES MELLITUS WITH DIABETIC POLYNEUROPATHY, WITHOUT LONG-TERM CURRENT USE OF INSULIN: Primary | ICD-10-CM

## 2017-10-17 DIAGNOSIS — E78.5 HYPERLIPIDEMIA, UNSPECIFIED HYPERLIPIDEMIA TYPE: Chronic | ICD-10-CM

## 2017-10-17 DIAGNOSIS — I15.2 HYPERTENSION ASSOCIATED WITH DIABETES: ICD-10-CM

## 2017-10-17 DIAGNOSIS — M1A.9XX0 CHRONIC GOUT WITHOUT TOPHUS, UNSPECIFIED CAUSE, UNSPECIFIED SITE: ICD-10-CM

## 2017-10-17 DIAGNOSIS — E11.59 HYPERTENSION ASSOCIATED WITH DIABETES: ICD-10-CM

## 2017-10-17 PROCEDURE — 99214 OFFICE O/P EST MOD 30 MIN: CPT | Mod: 25,S$GLB,, | Performed by: FAMILY MEDICINE

## 2017-10-17 PROCEDURE — 90662 IIV NO PRSV INCREASED AG IM: CPT | Mod: S$GLB,,, | Performed by: FAMILY MEDICINE

## 2017-10-17 PROCEDURE — 93880 EXTRACRANIAL BILAT STUDY: CPT | Mod: 26,,, | Performed by: RADIOLOGY

## 2017-10-17 PROCEDURE — 99499 UNLISTED E&M SERVICE: CPT | Mod: S$GLB,,, | Performed by: FAMILY MEDICINE

## 2017-10-17 PROCEDURE — 93880 EXTRACRANIAL BILAT STUDY: CPT | Mod: TC,PO

## 2017-10-17 PROCEDURE — G0008 ADMIN INFLUENZA VIRUS VAC: HCPCS | Mod: S$GLB,,, | Performed by: FAMILY MEDICINE

## 2017-10-17 PROCEDURE — 99999 PR PBB SHADOW E&M-EST. PATIENT-LVL III: CPT | Mod: PBBFAC,,, | Performed by: FAMILY MEDICINE

## 2017-10-17 RX ORDER — FLUCONAZOLE 150 MG/1
150 TABLET ORAL ONCE
Qty: 2 TABLET | Refills: 11 | Status: SHIPPED | OUTPATIENT
Start: 2017-10-17 | End: 2017-10-17

## 2017-10-17 RX ORDER — ALBUTEROL SULFATE 90 UG/1
2 AEROSOL, METERED RESPIRATORY (INHALATION) EVERY 6 HOURS PRN
Qty: 1 EACH | Refills: 11 | Status: SHIPPED | OUTPATIENT
Start: 2017-10-17 | End: 2019-05-06 | Stop reason: SDUPTHER

## 2017-10-17 NOTE — PROGRESS NOTES
"Subjective:       Patient ID: Kaleigh Nieves is a o. female.    Chief Complaint: Multiple issues see below      HPI Gout: no recent flare gout;  Type 2 dm a1c controlled.  htn nl bps tid home ;;fw times a week bit lightheaded w standing  Cad:no longer seeing dr giraldo; reports no blockage coronary arter.no cp. ; hx lhc w ster/benadryl  Prophylax and " nl" cath  Yrs ago;;saw dr rosa isela rothman in the past for mayberry  Khris: due Car u/;s     Past Medical History   Diagnosis Date    Type 2 diabetes mellitus      podiatry dr kenya patel    Neuropathy, lower extremity     CTS (carpal tunnel syndrome)     Dyslipidemia     Hypertension     Chronic gout     Glaucoma      dr avel kaur    Colon polyp     GERD (gastroesophageal reflux disease)     Ex-smoker      quit in her 30s    Carotid artery stenosis     CAD (coronary artery disease) 7/15     via [facility] ct     Past Surgical History   Procedure Laterality Date    Bilateral knee replacement      Cholecystectomy      Hemorrhoid surgery      Hysterectomy       fibroids    Incision and drainage perirectal abscess       Family History   Problem Relation Age of Onset    Breast cancer Neg Hx     Colon cancer Neg Hx     Ovarian cancer Neg Hx     Hypertension Mother     Diabetes Mother     Leukemia Sister      History     Social History    Marital Status: Single     Spouse Name: N/A     Number of Children: N/A    Years of Education: N/A     Social History Main Topics    Smoking status: Former Smoker -- 0.25 packs/day for 24 years     Quit date: 06/25/1976    Smokeless tobacco: Never Used    Alcohol Use: No    Drug Use: No    Sexual Activity: Not Currently     Other Topics Concern    None     Social History Narrative       Review of Systems  no cp no new mayberry  Objective:      Physical Exam   Constitutional: She appears well-developed and well-nourished.   Cardiovascular: Normal rate and regular rhythm.    Pulmonary/Chest: Effort normal and breath sounds normal. "     \    Assessment:       1. Gout, unspecified cause, unspecified chronicity, unspecified site    2. Type 2 diabetes mellitus without complication    shaggy  htn  Cad  hyperchol  Plan:     Car u/s   Lab and follow up after in 6 months  albut (some inc mayberry during cane burning season)  Type 2 diabetes mellitus with diabetic polyneuropathy, without long-term current use of insulin  -     Lipid panel; Future; Expected date: 04/15/2018  -     Comprehensive metabolic panel; Future; Expected date: 04/15/2018  -     Microalbumin/creatinine urine ratio; Future; Expected date: 04/15/2018  -     Hemoglobin A1c; Future; Expected date: 04/15/2018    Coronary artery disease involving native coronary artery without angina pectoris, unspecified whether native or transplanted heart    Stenosis of left carotid artery  -     US Carotid Bilateral; Future; Expected date: 10/17/2017    Chronic gout without tophus, unspecified cause, unspecified site  -     Uric acid; Future; Expected date: 04/15/2018    Hyperlipidemia, unspecified hyperlipidemia type    Hypertension associated with diabetes    Other orders  -     fluconazole (DIFLUCAN) 150 MG Tab; Take 1 tablet (150 mg total) by mouth once.  Dispense: 2 tablet; Refill: 11  -     albuterol 90 mcg/actuation inhaler; Inhale 2 puffs into the lungs every 6 (six) hours as needed for Wheezing.  Dispense: 1 each; Refill: 11    flu shot  F/u dr moss after 6 months lab

## 2017-11-06 ENCOUNTER — OFFICE VISIT (OUTPATIENT)
Dept: OPHTHALMOLOGY | Facility: CLINIC | Age: 77
End: 2017-11-06
Payer: MEDICARE

## 2017-11-06 DIAGNOSIS — H40.1134 PRIMARY OPEN ANGLE GLAUCOMA OF BOTH EYES, INDETERMINATE STAGE: Primary | ICD-10-CM

## 2017-11-06 PROCEDURE — 92083 EXTENDED VISUAL FIELD XM: CPT | Mod: S$GLB,,, | Performed by: OPTOMETRIST

## 2017-11-06 PROCEDURE — 99499 UNLISTED E&M SERVICE: CPT | Mod: S$GLB,,, | Performed by: OPTOMETRIST

## 2017-11-06 PROCEDURE — 99999 PR PBB SHADOW E&M-EST. PATIENT-LVL I: CPT | Mod: PBBFAC,,, | Performed by: OPTOMETRIST

## 2017-11-06 PROCEDURE — 92012 INTRM OPH EXAM EST PATIENT: CPT | Mod: S$GLB,,, | Performed by: OPTOMETRIST

## 2017-11-06 RX ORDER — TIMOLOL MALEATE 5 MG/ML
1 SOLUTION/ DROPS OPHTHALMIC 2 TIMES DAILY
Qty: 5 ML | Refills: 5 | Status: SHIPPED | OUTPATIENT
Start: 2017-11-06 | End: 2018-05-08 | Stop reason: SDUPTHER

## 2017-11-06 NOTE — PROGRESS NOTES
HPI     Last [facility] exam 07/10/2017  glaucoma  HVF  GOCT done 07/10/2017  Medication: Timolol 0.5%, 1 drop OU BID  Pseudophakia, OU    MLC:  Mrs. Nieves is a COAG patient previously managed by Eduard Camacho M.D.  She decided to change all of her health care to [facility].  I last saw   her 7-10-17 and did a GOCT OU showing NFL loss in the OS, but OK in the   OD.  (See record).  She is here today for HVF, CCT, and repeat IOP.  She   is using timolol 0.5% OU BID.      Last edited by GABY Mo, OD on 11/6/2017  4:34 PM. (History)            Assessment /Plan     For exam results, see Encounter Report.    Primary open angle glaucoma of both eyes, indeterminate stage    Other orders  -     timolol maleate 0.5% (TIMOPTIC) 0.5 % Drop; Place 1 drop into both eyes 2 (two) times daily.  Dispense: 5 mL; Refill: 5      MLC:  Mrs. Nieves is a COAG patient previously managed by Eduard Camacho M.D.  She decided to change all of her health care to [facility].  I last saw her 7-10-17 and did a GOCT OU showing NFL loss in the OS, but OK in the OD.  (See record).  She is here today for HVF, CCT, and repeat IOP.  She is using timolol 0.5% OU BID.    OD:  Poor reliability but she shows some superior field defects (possibly artifacts with poor reliability), but suspicious.  He previously done GOCT was relatively normal.    OS:  Poor reliability with definite superior arcuate defects including paracentral area.  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    She definitely has COAG OU with greater field loss in the OS per the GOCT and HVF.  Her IOP's are good OU on the Timolol 0.5%.  These are her first tests here, so I do not know whether the testing done here reflect changes in her status.  I requested previous records.  I will see her again in 3 months for an IOP check.  I will not change her meds yet until I get her old records and one more HVF/GOCT in 6 months to check for changes over time.  I classify her left eye as  moderate COAG and her OD as mild for now.

## 2017-11-20 ENCOUNTER — OFFICE VISIT (OUTPATIENT)
Dept: INTERNAL MEDICINE | Facility: CLINIC | Age: 77
End: 2017-11-20
Payer: MEDICARE

## 2017-11-20 VITALS
SYSTOLIC BLOOD PRESSURE: 182 MMHG | BODY MASS INDEX: 35.36 KG/M2 | WEIGHT: 225.31 LBS | TEMPERATURE: 97 F | HEART RATE: 84 BPM | RESPIRATION RATE: 18 BRPM | DIASTOLIC BLOOD PRESSURE: 72 MMHG | HEIGHT: 67 IN | OXYGEN SATURATION: 97 %

## 2017-11-20 DIAGNOSIS — J20.9 ACUTE BRONCHITIS, UNSPECIFIED ORGANISM: Primary | ICD-10-CM

## 2017-11-20 PROCEDURE — 99999 PR PBB SHADOW E&M-EST. PATIENT-LVL III: CPT | Mod: PBBFAC,,, | Performed by: FAMILY MEDICINE

## 2017-11-20 PROCEDURE — 99214 OFFICE O/P EST MOD 30 MIN: CPT | Mod: S$GLB,,, | Performed by: FAMILY MEDICINE

## 2017-11-20 RX ORDER — FLUTICASONE PROPIONATE 110 UG/1
1 AEROSOL, METERED RESPIRATORY (INHALATION) 2 TIMES DAILY
Qty: 12 G | Refills: 0 | Status: SHIPPED | OUTPATIENT
Start: 2017-11-20 | End: 2019-06-06

## 2017-11-20 RX ORDER — GUAIFENESIN 100 MG/5ML
200 SOLUTION ORAL 3 TIMES DAILY PRN
Qty: 236 ML | Refills: 2 | Status: SHIPPED | OUTPATIENT
Start: 2017-11-20 | End: 2020-02-18

## 2017-11-20 RX ORDER — PREDNISONE 20 MG/1
20 TABLET ORAL DAILY
Qty: 5 TABLET | Refills: 0 | Status: SHIPPED | OUTPATIENT
Start: 2017-11-20 | End: 2017-11-25

## 2017-11-20 NOTE — PATIENT INSTRUCTIONS
COPD Flare    You have had a flare-up of your COPD.  COPD, or chronic obstructive pulmonary disease, is a common lung disease. It causes your airways to become irritated and narrower. This makes it harder for you to breathe. Emphysema and chronic bronchitis are both types of COPD. This is a chronic condition, which means you always have it. Sometimes it gets worse. When this happens, it is called a flare-up.  Symptoms of COPD  People with COPD may have symptoms most of the time. In a flare-up, your symptoms get worse. These symptoms may mean you are having a flare-up:  · Shortness of breath, shallow or rapid breathing, or wheezing that gets worse  · Lung infection  · Cough that gets worse  · More mucus, thicker mucus or mucus of a different color  · Tiredness, decreased energy, or trouble doing your usual activities  · Fever  · Chest tightness  · Your symptoms dont get better even when you use your usual medicines, inhalers, and nebulizer  · Trouble talking  · You feel confused  Causes of flare-ups  Unfortunately, a flare-up can happen even though you did everything right, and you followed your doctors instructions. Some causes of flare-ups are:  · Smoking or secondhand smoke  · Colds, the flu, or respiratory infections  · Air pollution  · Sudden change in the weather  · Dust, irritating chemicals, or strong fumes  · Not taking your medicines as prescribed  Home care  Here are some things you can do at home to treat a flare-up:  · Try not to panic. This makes it harder to breathe, and keeps you from doing the right things.  · Dont smoke or be around others who are smoking.  · Try to drink more fluids than usual during a flare-up, unless your doctor has told you not to because of heart and kidney problems. More fluids can help loosen the mucus.  · Use your inhalers and nebulizer, if you have one, as you have been told to.  · If you were given antibiotics, take them until they are used up or your doctor tells you  to stop. Its important to finish the antibiotics, even though you feel better. This will make sure the infection has cleared.  · If you were given prednisone or another steroid, finish it even if you feel better.  Preventing a flare-up  Even though flare-ups happen, the best way to treat one is to prevent it before it starts. Here are some pointers:  · Dont smoke or be around others who are smoking.  · Take your medicines as you have been told.  · Talk with your doctor about getting a flu shot every year. Also find out if you need a pneumonia shot.  · If there is a weather advisory warning to stay indoors, try to stay inside when possible.  · Try to eat healthy and get plenty of sleep.  · Try to avoid things that usually set you off, like dust, chemical fumes, hairsprays, or strong perfumes.  Follow-up care  Follow up with your healthcare provider, or as advised.  If a culture was done, you will be told if your treatment needs to be changed. You can call as directed for the results.  If X-rays were done, you will be notified of any new findings that may affect your care.  Call 911  Call 911 if any of these occur:  · You have trouble breathing  · You feel confused or its difficult to wake you up  · You faint or lose consciousness  · You have a rapid heart rate  · You have new pain in your chest, arm, shoulder, neck or upper back  When to seek medical advice  Call your healthcare provider right away if any of these occur:  · Wheezing or shortness of breath gets worse  · You need to use your inhalers more often than usual without relief  · Fever of 100.4°F (38ºC) or higher, or as directed by your healthcare provider  · Coughing up lots of dark-colored or bloody mucus (sputum)  · Chest pain with each breath  · You do not start to get better within 24 hours  · Swelling of your ankles gets worse  · Dizziness or weakness  Date Last Reviewed: 9/1/2016  © 6346-5310 [employer]. [address] [facility],  [place] [address]. All rights reserved. This information is not intended as a substitute for professional medical care. Always follow your healthcare professional's instructions.

## 2017-11-21 NOTE — PROGRESS NOTES
Subjective:       Patient ID: Kaleigh Nieves is a 77 y.o. female.    Chief Complaint: Cough; Chest Pain; Nasal Congestion; and Shortness of Breath    Cough   This is a new problem. The current episode started in the past 7 days. The problem has been gradually worsening. The problem occurs every few minutes. The cough is non-productive. Associated symptoms include chest pain (described as more of a tightness across lower chest) and shortness of breath. Pertinent negatives include no chills, fever, headaches, rash, sore throat, weight loss or wheezing. Nothing aggravates the symptoms. Risk factors: h/o chronic dyspnea. She has tried a beta-agonist inhaler and OTC cough suppressant for the symptoms. The treatment provided mild relief. Her past medical history is significant for COPD (seems likely but never documented).       Patient Active Problem List   Diagnosis    Type 2 diabetes mellitus with diabetic polyneuropathy, without long-term current use of insulin    Multiple joint pain    Esophageal reflux    Hyperlipidemia    Chronic gout    Lichen sclerosus    Carotid artery stenosis    CAD (coronary artery disease)    Colon polyp    Hypertension associated with diabetes    Aortic atherosclerosis    Diastolic dysfunction    Arterial insufficiency of lower extremity    Severe obesity (BMI 35.0-35.9 with comorbidity)    Glaucoma       Family History   Problem Relation Age of Onset    Hypertension Mother     Diabetes Mother     Leukemia Sister     Hypertension Sister     Cancer Sister      leukemia    Heart disease Maternal Aunt     Cancer Maternal Uncle      gastric, pacreatic    Heart disease Maternal Uncle     Miscarriages / Stillbirths Maternal Uncle     Diabetes Maternal Grandmother     Asthma Maternal Grandfather     Breast cancer Neg Hx     Colon cancer Neg Hx     Ovarian cancer Neg Hx     COPD Neg Hx     Hyperlipidemia Neg Hx     Kidney disease Neg Hx      Past Surgical History:    Procedure Laterality Date    Bilateral knee replacement      CATARACT EXTRACTION, BILATERAL      CHOLECYSTECTOMY      HEMORRHOID SURGERY      HYSTERECTOMY      fibroids    INCISION AND DRAINAGE PERIRECTAL ABSCESS      thyroid resection           Current Outpatient Prescriptions:     albuterol 90 mcg/actuation inhaler, Inhale 2 puffs into the lungs every 6 (six) hours as needed for Wheezing., Disp: 1 each, Rfl: 11    aspirin 81 MG Chew, Take 81 mg by mouth once daily., Disp: , Rfl:     blood glucose control high,low (ACCU-CHEK DARSHAN CONTROL SOLN) Soln, Use a directed, Disp: 1 each, Rfl: 3    blood sugar diagnostic (BLOOD GLUCOSE TEST) Strp, accu chek darshan test strips/ test 2 times a day, Disp: 200 strip, Rfl: 3    blood-glucose meter kit, accu chek darshan meter/test twice daily, Disp: 1 each, Rfl: 0    clobetasol 0.05% (TEMOVATE) 0.05 % Oint, APPLY TOPICALLY TWO TIMES A DAY, Disp: 60 g, Rfl: 1    colchicine 0.6 mg tablet, Take 1 tablet (0.6 mg total) by mouth once daily., Disp: 30 tablet, Rfl: 5    fosinopril (MONOPRIL) 40 MG tablet, Take 1 tablet (40 mg total) by mouth once daily., Disp: 90 tablet, Rfl: 3    lancets (ACCU-CHEK SOFTCLIX LANCETS) Misc, Test twice a day, Disp: 200 each, Rfl: 3    metformin (GLUCOPHAGE) 500 MG tablet, Take 1 tablet (500 mg total) by mouth 2 (two) times daily., Disp: 180 tablet, Rfl: 3    simvastatin (ZOCOR) 40 MG tablet, Take 1 tablet (40 mg total) by mouth nightly., Disp: 90 tablet, Rfl: 3    timolol 0.5 % ophthalmic solution, Apply 1 drop to eye once daily., Disp: , Rfl:     timolol maleate 0.5% (TIMOPTIC) 0.5 % Drop, Place 1 drop into both eyes 2 (two) times daily., Disp: 5 mL, Rfl: 5    fluticasone (FLOVENT HFA) 110 mcg/actuation inhaler, Inhale 1 puff into the lungs 2 (two) times daily. Controller, Disp: 12 g, Rfl: 0    guaifenesin 100 mg/5 ml (ROBITUSSIN) 100 mg/5 mL syrup, Take 10 mLs (200 mg total) by mouth 3 (three) times daily as needed for Cough.,  "Disp: 236 mL, Rfl: 2    inhalation spacing device, Use as directed for inhalation., Disp: 1 Device, Rfl: 2    predniSONE (DELTASONE) 20 MG tablet, Take 1 tablet (20 mg total) by mouth once daily., Disp: 5 tablet, Rfl: 0    Review of Systems   Constitutional: Negative for chills, fever and weight loss.   HENT: Negative for sore throat.    Respiratory: Positive for cough and shortness of breath. Negative for wheezing.    Cardiovascular: Positive for chest pain (described as more of a tightness across lower chest).   Gastrointestinal: Negative for abdominal pain.   Skin: Negative for rash.   Neurological: Negative for dizziness and headaches.       Objective:   BP (!) 182/72 (BP Location: Left arm, Patient Position: Sitting, BP Method: X-Large (Automatic))   Pulse 84   Temp 96.8 °F (36 °C) (Tympanic)   Resp 18   Ht 5' 7" (1.702 m)   Wt 102.2 kg (225 lb 5 oz)   SpO2 97%   BMI 35.29 kg/m²      Physical Exam   Constitutional: She is oriented to person, place, and time. She appears well-developed and well-nourished. No distress.   HENT:   Head: Normocephalic and atraumatic.   Nose: Nose normal.   Eyes: Conjunctivae and EOM are normal. Pupils are equal, round, and reactive to light. Right eye exhibits no discharge. Left eye exhibits no discharge.   Neck: No thyromegaly present.   Cardiovascular: Normal rate and regular rhythm.    No murmur heard.  Pulmonary/Chest: Effort normal. No respiratory distress. She has no wheezes. She has rales.   Poor air movement in all lung fields.  No wheezing but could appreciate some faint Rales   Abdominal: Soft. She exhibits no distension.   Musculoskeletal: She exhibits no edema.   Neurological: She is alert and oriented to person, place, and time.   Skin: No rash noted. She is not diaphoretic.   Psychiatric: She has a normal mood and affect. Her behavior is normal.       Assessment & Plan      1. Acute bronchitis, unspecified organism  After discussing her history of chronic " shortness of breath with exertion without any chest pain and considering the fact that she had a normal nuclear stress test last year, I think she has COPD that is currently under exacerbation secondary to environmental factors since [place].  Recommended a Medrol Dosepak and also guaifenesin cough syrup to help with symptoms.  Recommended that she also started on Flovent on a maintenance basis to see if this helps with her daily symptoms.  Advised that if she develops fever or symptoms worsen, she should arrange for follow-up.

## 2018-01-04 ENCOUNTER — OFFICE VISIT (OUTPATIENT)
Dept: PODIATRY | Facility: CLINIC | Age: 78
End: 2018-01-04
Payer: MEDICARE

## 2018-01-04 ENCOUNTER — LAB VISIT (OUTPATIENT)
Dept: LAB | Facility: HOSPITAL | Age: 78
End: 2018-01-04
Attending: PODIATRIST
Payer: MEDICARE

## 2018-01-04 VITALS
HEIGHT: 67 IN | BODY MASS INDEX: 35.71 KG/M2 | HEART RATE: 77 BPM | WEIGHT: 227.5 LBS | SYSTOLIC BLOOD PRESSURE: 146 MMHG | DIASTOLIC BLOOD PRESSURE: 73 MMHG

## 2018-01-04 DIAGNOSIS — B35.1 DERMATOPHYTOSIS OF NAIL: ICD-10-CM

## 2018-01-04 DIAGNOSIS — M10.079 ACUTE IDIOPATHIC GOUT OF FOOT, UNSPECIFIED LATERALITY: Primary | ICD-10-CM

## 2018-01-04 DIAGNOSIS — E11.51 TYPE II DIABETES MELLITUS WITH PERIPHERAL CIRCULATORY DISORDER: ICD-10-CM

## 2018-01-04 DIAGNOSIS — M10.079 ACUTE IDIOPATHIC GOUT OF FOOT, UNSPECIFIED LATERALITY: ICD-10-CM

## 2018-01-04 LAB
CRP SERPL-MCNC: 8.4 MG/L
ERYTHROCYTE [SEDIMENTATION RATE] IN BLOOD BY WESTERGREN METHOD: 19 MM/HR
URATE SERPL-MCNC: 5 MG/DL

## 2018-01-04 PROCEDURE — 85651 RBC SED RATE NONAUTOMATED: CPT | Mod: PO

## 2018-01-04 PROCEDURE — 84550 ASSAY OF BLOOD/URIC ACID: CPT

## 2018-01-04 PROCEDURE — 99499 UNLISTED E&M SERVICE: CPT | Mod: S$GLB,,, | Performed by: PODIATRIST

## 2018-01-04 PROCEDURE — 86140 C-REACTIVE PROTEIN: CPT

## 2018-01-04 PROCEDURE — 99213 OFFICE O/P EST LOW 20 MIN: CPT | Mod: 25,S$GLB,, | Performed by: PODIATRIST

## 2018-01-04 PROCEDURE — 11721 DEBRIDE NAIL 6 OR MORE: CPT | Mod: Q8,S$GLB,, | Performed by: PODIATRIST

## 2018-01-04 PROCEDURE — 99999 PR PBB SHADOW E&M-EST. PATIENT-LVL III: CPT | Mod: PBBFAC,,, | Performed by: PODIATRIST

## 2018-01-04 PROCEDURE — 36415 COLL VENOUS BLD VENIPUNCTURE: CPT | Mod: PO

## 2018-01-04 NOTE — PROGRESS NOTES
Subjective:     Patient ID: Kaleigh Neives is a 77 y.o. female.    Chief Complaint: Routine Foot Care (PCP:Ramos Hanks 11/20/2017, diabetic nail care/feet check)    Kaleigh is a 77 y.o. female who presents to the clinic for evaluation and treatment of high risk feet. Kaleigh has a past medical history of Anemia; Arthritis; Asthma; Back pain; Bronchitis; CAD (coronary artery disease) (7/15); Carotid artery stenosis; Chronic gout; Colon polyp; CTS (carpal tunnel syndrome); Diabetes mellitus, type 2; Diabetes with neurologic complications; Diverticulosis; RIVAS (dyspnea on exertion); Dyslipidemia; Ex-smoker; GERD (gastroesophageal reflux disease); Hyperlipidemia; Hypertension; Neuropathy, lower extremity; Obesity; Open-angle glaucoma; Peripheral vascular disease; Polyneuropathy; and Tobacco dependence. The patient's chief complaint is long, thick toenails. Patient states her blood sugar this morning was 101mg/dl.  This patient has documented high risk feet requiring routine maintenance secondary to diabetes mellitis and those secondary complications of diabetes, as mentioned. Patient states her toes are red because her gout is flaring up. Patient states the toes were swollen but she has been taking Colchicine and the swelling and redness has decreased.     PCP: Ramos Hanks, DO    Date Last Seen by PCP: 11/20/17    Current shoe gear:  Affected Foot: Rx diabetic extra depth shoes and custom accommodative insoles     Unaffected Foot: Rx diabetic extra depth shoes and custom accommodative insoles    Hemoglobin A1C   Date Value Ref Range Status   09/28/2017 5.7 (H) 4.0 - 5.6 % Final     Comment:     According to ADA guidelines, hemoglobin A1c <7.0% represents  optimal control in non-pregnant diabetic patients. Different  metrics may apply to specific patient populations.   Standards of Medical Care in Diabetes-2016.  For the purpose of screening for the presence of diabetes:  <5.7%     Consistent with the absence of  diabetes  5.7-6.4%  Consistent with increasing risk for diabetes   (prediabetes)  >or=6.5%  Consistent with diabetes  Currently, no consensus exists for use of hemoglobin A1c  for diagnosis of diabetes for children.  This Hemoglobin A1c assay has significant interference with fetal   hemoglobin   (HbF). The results are invalid for patients with abnormal amounts of   HbF,   including those with known Hereditary Persistence   of Fetal Hemoglobin. Heterozygous hemoglobin variants (HbAS, HbAC,   HbAD, HbAE, HbA2) do not significantly interfere with this assay;   however, presence of multiple variants in a sample may impact the %   interference.     03/21/2017 5.9 4.5 - 6.2 % Final     Comment:     According to ADA guidelines, hemoglobin A1C <7.0% represents  optimal control in non-pregnant diabetic patients.  Different  metrics may apply to specific populations.   Standards of Medical Care in Diabetes - 2016.  For the purpose of screening for the presence of diabetes:  <5.7%     Consistent with the absence of diabetes  5.7-6.4%  Consistent with increasing risk for diabetes   (prediabetes)  >or=6.5%  Consistent with diabetes  Currently no consensus exists for use of hemoglobin A1C  for diagnosis of diabetes for children.     09/15/2016 5.8 4.5 - 6.2 % Final     Comment:     According to ADA guidelines, hemoglobin A1C <7.0% represents  optimal control in non-pregnant diabetic patients.  Different  metrics may apply to specific populations.   Standards of Medical Care in Diabetes - 2016.  For the purpose of screening for the presence of diabetes:  <5.7%     Consistent with the absence of diabetes  5.7-6.4%  Consistent with increasing risk for diabetes   (prediabetes)  >or=6.5%  Consistent with diabetes  Currently no consensus exists for use of hemoglobin A1C  for diagnosis of diabetes for children.             Patient Active Problem List   Diagnosis    Type 2 diabetes mellitus with diabetic polyneuropathy, without  long-term current use of insulin    Multiple joint pain    Esophageal reflux    Hyperlipidemia    Chronic gout    Lichen sclerosus    Carotid artery stenosis    CAD (coronary artery disease)    Colon polyp    Hypertension associated with diabetes    Aortic atherosclerosis    Diastolic dysfunction    Arterial insufficiency of lower extremity    Severe obesity (BMI 35.0-35.9 with comorbidity)    Glaucoma       Medication List with Changes/Refills   Current Medications    ALBUTEROL 90 MCG/ACTUATION INHALER    Inhale 2 puffs into the lungs every 6 (six) hours as needed for Wheezing.    ASPIRIN 81 MG CHEW    Take 81 mg by mouth once daily.    BLOOD GLUCOSE CONTROL HIGH,LOW (ACCU-CHEK DARSHAN CONTROL SOLN) SOLN    Use a directed    BLOOD SUGAR DIAGNOSTIC (BLOOD GLUCOSE TEST) STRP    accu chek darshan test strips/ test 2 times a day    BLOOD-GLUCOSE METER KIT    accu chek darshan meter/test twice daily    CLOBETASOL 0.05% (TEMOVATE) 0.05 % OINT    APPLY TOPICALLY TWO TIMES A DAY    COLCHICINE 0.6 MG TABLET    Take 1 tablet (0.6 mg total) by mouth once daily.    FLUTICASONE (FLOVENT HFA) 110 MCG/ACTUATION INHALER    Inhale 1 puff into the lungs 2 (two) times daily. Controller    FOSINOPRIL (MONOPRIL) 40 MG TABLET    Take 1 tablet (40 mg total) by mouth once daily.    INHALATION SPACING DEVICE    Use as directed for inhalation.    LANCETS (ACCU-CHEK SOFTCLIX LANCETS) MISC    Test twice a day    METFORMIN (GLUCOPHAGE) 500 MG TABLET    Take 1 tablet (500 mg total) by mouth 2 (two) times daily.    SIMVASTATIN (ZOCOR) 40 MG TABLET    Take 1 tablet (40 mg total) by mouth nightly.    TIMOLOL 0.5 % OPHTHALMIC SOLUTION    Apply 1 drop to eye once daily.    TIMOLOL MALEATE 0.5% (TIMOPTIC) 0.5 % DROP    Place 1 drop into both eyes 2 (two) times daily.       Review of patient's allergies indicates:   Allergen Reactions    Iodine and iodide containing products Nausea And Vomiting    Penicillins Itching    Amlodipine       "edema    Sulfa (sulfonamide antibiotics)     Ultram [tramadol] Other (See Comments)     Light headness, itiching    Bactrim [sulfamethoxazole-trimethoprim] Itching and Rash       Past Surgical History:   Procedure Laterality Date    Bilateral knee replacement      CATARACT EXTRACTION, BILATERAL      CHOLECYSTECTOMY      HEMORRHOID SURGERY      HYSTERECTOMY      fibroids    INCISION AND DRAINAGE PERIRECTAL ABSCESS      thyroid resection         Family History   Problem Relation Age of Onset    Hypertension Mother     Diabetes Mother     Leukemia Sister     Hypertension Sister     Cancer Sister      leukemia    Heart disease Maternal Aunt     Cancer Maternal Uncle      gastric, pacreatic    Heart disease Maternal Uncle     Miscarriages / Stillbirths Maternal Uncle     Diabetes Maternal Grandmother     Asthma Maternal Grandfather     Breast cancer Neg Hx     Colon cancer Neg Hx     Ovarian cancer Neg Hx     COPD Neg Hx     Hyperlipidemia Neg Hx     Kidney disease Neg Hx        Social History     Social History    Marital status: Single     Spouse name: N/A    Number of children: 0    Years of education: N/A     Occupational History    Not on file.     Social History Main Topics    Smoking status: Former Smoker     Packs/day: 0.25     Years: 24.00     Quit date: 6/25/1976    Smokeless tobacco: Never Used    Alcohol use No    Drug use: No    Sexual activity: Not Currently     Other Topics Concern    Not on file     Social History Narrative    No narrative on file       Vitals:    01/04/18 1013   BP: (!) 146/73   Pulse: 77   Weight: 103.2 kg (227 lb 8.2 oz)   Height: 5' 7" (1.702 m)   PainSc: 0-No pain       Hemoglobin A1C   Date Value Ref Range Status   09/28/2017 5.7 (H) 4.0 - 5.6 % Final     Comment:     According to ADA guidelines, hemoglobin A1c <7.0% represents  optimal control in non-pregnant diabetic patients. Different  metrics may apply to specific patient populations. "   Standards of Medical Care in Diabetes-2016.  For the purpose of screening for the presence of diabetes:  <5.7%     Consistent with the absence of diabetes  5.7-6.4%  Consistent with increasing risk for diabetes   (prediabetes)  >or=6.5%  Consistent with diabetes  Currently, no consensus exists for use of hemoglobin A1c  for diagnosis of diabetes for children.  This Hemoglobin A1c assay has significant interference with fetal   hemoglobin   (HbF). The results are invalid for patients with abnormal amounts of   HbF,   including those with known Hereditary Persistence   of Fetal Hemoglobin. Heterozygous hemoglobin variants (HbAS, HbAC,   HbAD, HbAE, HbA2) do not significantly interfere with this assay;   however, presence of multiple variants in a sample may impact the %   interference.     03/21/2017 5.9 4.5 - 6.2 % Final     Comment:     According to ADA guidelines, hemoglobin A1C <7.0% represents  optimal control in non-pregnant diabetic patients.  Different  metrics may apply to specific populations.   Standards of Medical Care in Diabetes - 2016.  For the purpose of screening for the presence of diabetes:  <5.7%     Consistent with the absence of diabetes  5.7-6.4%  Consistent with increasing risk for diabetes   (prediabetes)  >or=6.5%  Consistent with diabetes  Currently no consensus exists for use of hemoglobin A1C  for diagnosis of diabetes for children.     09/15/2016 5.8 4.5 - 6.2 % Final     Comment:     According to ADA guidelines, hemoglobin A1C <7.0% represents  optimal control in non-pregnant diabetic patients.  Different  metrics may apply to specific populations.   Standards of Medical Care in Diabetes - 2016.  For the purpose of screening for the presence of diabetes:  <5.7%     Consistent with the absence of diabetes  5.7-6.4%  Consistent with increasing risk for diabetes   (prediabetes)  >or=6.5%  Consistent with diabetes  Currently no consensus exists for use of hemoglobin A1C  for diagnosis of  diabetes for children.         Review of Systems   Constitutional: Negative for chills and fever.   Respiratory: Negative for shortness of breath.    Cardiovascular: Positive for leg swelling. Negative for chest pain, palpitations, orthopnea and claudication.   Gastrointestinal: Negative for diarrhea, nausea and vomiting.   Musculoskeletal: Negative for joint pain.   Skin: Negative for rash.   Neurological: Positive for tingling and sensory change. Negative for dizziness, focal weakness and weakness.   Psychiatric/Behavioral: Negative.            Objective:      PHYSICAL EXAM: Apperance: Alert and orient in no distress,well developed, and with good attention to grooming and body habits  Patient presents ambulating in diabetic shoes and inserts.   LOWER EXTREMITY EXAM:  VASCULAR: Dorsalis pedis pulses 0/4 bilateral (monophasic with doppler)and Posterior Tibial pulses 0/4 bilateral (biphasic with doppler). Capillary fill time <blanched bilateral. Mild edema observed bilateral. Varicosities present bilateral. Skin temperature of the lower extremities is warm to cool, proximal to distal. Hair growth absent bilateral.  DERMATOLOGICAL: No skin rashes, subcutaneous nodules, lesions, or ulcers observed bilateral. Nails 1,2,3,4,5 bilateral elongated, thickened, and discolored with subungual debris. Webspaces 1-4 clean, dry and without evidence of break in skin integrity bilateral. (+) erythema, edema, and increased temp noted to toes 1-5 bilateral.   NEUROLOGICAL: Light touch, sharp-dull, proprioception all present and equal bilaterally.  Vibratory sensation intact on left hallux and diminished at right hallux. Protective sensation intact at all 10 sites as tested with a [place] 5.07 monofilament.   MUSCULOSKELETAL: Muscle strength is 5/5 for foot inverters, everters, plantarflexors, and dorsiflexors. Muscle tone is normal. Pes planus noted bilateral. Flexible hammertoes noted bilateral.       Assessment:        Encounter Diagnoses   Name Primary?    Acute idiopathic gout of foot, unspecified laterality Yes    Type II diabetes mellitus with peripheral circulatory disorder     Dermatophytosis of nail          Plan:   Acute idiopathic gout of foot, unspecified laterality  -     Uric acid; Future; Expected date: 01/04/2018  -     Sedimentation rate, manual; Future; Expected date: 01/04/2018  -     C-reactive protein; Future; Expected date: 01/04/2018    Type II diabetes mellitus with peripheral circulatory disorder    Dermatophytosis of nail      I counseled the patient on her conditions, their implications and medical management.  Shoe inspection. Diabetic Foot Education. Patient reminded of the importance of good nutrition and blood sugar control to help prevent podiatric complications of diabetes. Patient instructed on proper foot hygeine. We discussed wearing proper shoe gear, daily foot inspections, never walking without protective shoe gear, never putting sharp instruments to feet.    With patient's permission, nails 1-5 bilateral were debrided/trimmed in length and thickness aggressively to their soft tissue attachment mechanically and with electric , removing all offending nail and debris. Patient relates relief following the procedure.  I explained to the patient that etiologies and treatment options for gout including rest, elevation, diet control, NSAID's, long term gout medication, and/or injection therapy.  Prescription written for Colchicine 0.6mg to be taken as prescribed.  Ordered uric acid, crp, and esr testing to be completed today.    Patient  will continue to monitor the areas daily, inspect feet, wear protective shoe gear when ambulatory, moisturizer to maintain skin integrity. Patient reminded of the importance of good nutrition and blood sugar control to help prevent podiatric complications of diabetes.  Patient to return in this office in approximately 3 months, or sooner if needed.                    Ino Harmon, DPM  [facility]

## 2018-01-09 RX ORDER — BLOOD SUGAR DIAGNOSTIC
STRIP MISCELLANEOUS
Qty: 200 STRIP | Refills: 3 | Status: SHIPPED | OUTPATIENT
Start: 2018-01-09 | End: 2018-04-23 | Stop reason: SDUPTHER

## 2018-01-09 RX ORDER — LANCETS
EACH MISCELLANEOUS
Qty: 200 EACH | Refills: 3 | Status: SHIPPED | OUTPATIENT
Start: 2018-01-09 | End: 2018-04-23 | Stop reason: SDUPTHER

## 2018-01-23 ENCOUNTER — OFFICE VISIT (OUTPATIENT)
Dept: OBSTETRICS AND GYNECOLOGY | Facility: CLINIC | Age: 78
End: 2018-01-23
Payer: MEDICARE

## 2018-01-23 VITALS — BODY MASS INDEX: 36.15 KG/M2 | WEIGHT: 230.81 LBS

## 2018-01-23 DIAGNOSIS — B37.2 CUTANEOUS CANDIDIASIS: Primary | ICD-10-CM

## 2018-01-23 PROCEDURE — 99499 UNLISTED E&M SERVICE: CPT | Mod: S$GLB,,, | Performed by: OBSTETRICS & GYNECOLOGY

## 2018-01-23 PROCEDURE — 99213 OFFICE O/P EST LOW 20 MIN: CPT | Mod: S$GLB,,, | Performed by: OBSTETRICS & GYNECOLOGY

## 2018-01-23 PROCEDURE — 99999 PR PBB SHADOW E&M-EST. PATIENT-LVL II: CPT | Mod: PBBFAC,,, | Performed by: OBSTETRICS & GYNECOLOGY

## 2018-01-23 RX ORDER — CLOTRIMAZOLE 1 %
CREAM (GRAM) TOPICAL 2 TIMES DAILY
Qty: 45 G | Refills: 6 | Status: SHIPPED | OUTPATIENT
Start: 2018-01-23 | End: 2018-05-15

## 2018-01-23 NOTE — PROGRESS NOTES
Subjective:       Patient ID: Kaleigh Nieves is a 77 y.o. female.    Chief Complaint:  Vaginitis      History of Present Illness  HPI  Presents with itchy rash in her groin, inner thighs, and beneath her abdominal panniculus.  Patient has a lifelong hx of cutaneous yeast, and used topical econazole cream for years with good control of her symptoms.  She is no longer able to use that because her co-pay is too high.  Since stopping it, she has developed an itchy rash on her inner thighs, beneath the abdominal panniculus, and in the BL groin.  No vaginal itching or discharge.  No rash beneath her breasts.  Pt is diabetic, and has good glucose control.  Tried OTC monistat with no relief.  Tried diflucan tab, but that caused her gout to flare.    GYN & OB History  No LMP recorded. Patient has had a hysterectomy.   Date of Last Pap: No result found    OB History    Para Term  AB Living   1 0     1 0   SAB TAB Ectopic Multiple Live Births   1              # Outcome Date GA Lbr Chavez/2nd Weight Sex Delivery Anes PTL Lv   1 SAB                   Review of Systems  Review of Systems   Constitutional: Negative for fatigue, fever and unexpected weight change.   Gastrointestinal: Negative for abdominal pain, constipation, diarrhea, nausea and vomiting.   Endocrine: Positive for diabetes.   Genitourinary: Negative for dysuria, frequency, pelvic pain, urgency, vaginal bleeding, vaginal discharge, vaginal pain and vaginal odor.   Skin:  Positive for rash. Negative for hair changes.           Objective:    Physical Exam:   Constitutional: She is oriented to person, place, and time. She appears well-developed and well-nourished. No distress.             Abdominal: Soft. She exhibits no distension and no mass. There is no tenderness. There is no rebound and no guarding. Hernia confirmed negative in the right inguinal area and confirmed negative in the left inguinal area.     Genitourinary: Vagina normal. Pelvic exam was  performed with patient supine. There is no rash, tenderness, lesion or injury on the right labia. There is no rash, tenderness, lesion or injury on the left labia. Uterus is not deviated, not enlarged, not fixed, not tender and not experiencing uterine prolapse. Right adnexum displays no mass, no tenderness and no fullness. Left adnexum displays no mass, no tenderness and no fullness. No erythema, tenderness, bleeding, rectocele, cystocele or unspecified prolapse of vaginal walls in the vagina. No foreign body in the vagina. No signs of injury around the vagina. No vaginal discharge found. Cervix exhibits no motion tenderness, no discharge and no friability.        Uterus Size: 6 cm       Neurological: She is alert and oriented to person, place, and time.    Skin: Rash (erythematous maculopapular rash beneath abdominal panniculus, BL inguinal folds, and inner thighs.  Vulva appears normal) noted.    Psychiatric: She has a normal mood and affect.          Assessment:        1. Cutaneous candidiasis                Plan:      Kaleigh was seen today for vaginitis.    Diagnoses and all orders for this visit:    Cutaneous candidiasis  -     clotrimazole (LOTRIMIN) 1 % cream; Apply topically 2 (two) times daily.    Will try clotrimazole.  If that does not help, pt needs dermatology referral.    Patient was counseled today on vulvitis prevention including :  a. avoiding feminine products such as deoderant soaps, body wash, bubble bath, douches, scented toilet paper, deoderant tampons or pads, feminine wipes, chronic pad use, etc.  b. avoiding other vulvovaginal irritants such as long hot baths, humidity, tight, synthetic clothing, chlorine and sitting around in wet bathing suits  c. wearing cotton underwear, avoiding thong underwear and no underwear to bed  d. taking showers instead of baths and use a hair dryer on cool setting afterwards to dry  e. wearing cotton to exercise and shower immediately after exercise and change  clothes  RTC prn

## 2018-02-01 ENCOUNTER — OFFICE VISIT (OUTPATIENT)
Dept: INTERNAL MEDICINE | Facility: CLINIC | Age: 78
End: 2018-02-01
Payer: MEDICARE

## 2018-02-01 VITALS
HEART RATE: 85 BPM | OXYGEN SATURATION: 93 % | RESPIRATION RATE: 18 BRPM | DIASTOLIC BLOOD PRESSURE: 68 MMHG | BODY MASS INDEX: 36.27 KG/M2 | TEMPERATURE: 97 F | WEIGHT: 231.06 LBS | SYSTOLIC BLOOD PRESSURE: 138 MMHG | HEIGHT: 67 IN

## 2018-02-01 DIAGNOSIS — M67.441 GANGLION CYST OF FLEXOR TENDON SHEATH OF FINGER OF RIGHT HAND: Primary | ICD-10-CM

## 2018-02-01 PROCEDURE — 99999 PR PBB SHADOW E&M-EST. PATIENT-LVL III: CPT | Mod: PBBFAC,,, | Performed by: FAMILY MEDICINE

## 2018-02-01 PROCEDURE — 99213 OFFICE O/P EST LOW 20 MIN: CPT | Mod: S$GLB,,, | Performed by: FAMILY MEDICINE

## 2018-02-01 PROCEDURE — 1125F AMNT PAIN NOTED PAIN PRSNT: CPT | Mod: S$GLB,,, | Performed by: FAMILY MEDICINE

## 2018-02-01 PROCEDURE — 3008F BODY MASS INDEX DOCD: CPT | Mod: S$GLB,,, | Performed by: FAMILY MEDICINE

## 2018-02-01 PROCEDURE — 1159F MED LIST DOCD IN RCRD: CPT | Mod: S$GLB,,, | Performed by: FAMILY MEDICINE

## 2018-02-01 NOTE — ASSESSMENT & PLAN NOTE
I told her that this finding is nothing to be concerned about however since it is bothering her and inhibiting her bowling I recommended that she consult with orthopedics on management.  Recommended icing for now.  May benefit from aspiration although I told her that this could lead to recurrence and in the future she may benefit from surgical intervention if needed.

## 2018-02-01 NOTE — PROGRESS NOTES
Subjective:       Patient ID: Kaleigh Nieves is a 77 y.o. female.    Chief Complaint: Mass (on right hand middle finger)    HPI  Here today complaining of a bump on the underside of her right middle finger that she noticed about 2 weeks ago.  Says it has been slightly increasing in size since she initially noticed it and it bothers her when she is driving or when she was bowling.  Did not have any injury as far she knows.  Not having any redness or erythema and doesn't hurt, she hits it or touches it on something.    Family History   Problem Relation Age of Onset    Hypertension Mother     Diabetes Mother     Leukemia Sister     Hypertension Sister     Cancer Sister      leukemia    Heart disease Maternal Aunt     Cancer Maternal Uncle      gastric, pacreatic    Heart disease Maternal Uncle     Miscarriages / Stillbirths Maternal Uncle     Diabetes Maternal Grandmother     Asthma Maternal Grandfather     Breast cancer Neg Hx     Colon cancer Neg Hx     Ovarian cancer Neg Hx     COPD Neg Hx     Hyperlipidemia Neg Hx     Kidney disease Neg Hx        Current Outpatient Prescriptions:     ACCU-CHEK DARSHAN PLUS TEST STRP Strp, TEST BLOOD SUGAR TWICE DAILY, Disp: 200 strip, Rfl: 3    ACCU-CHEK SOFTCLIX LANCETS Misc, TEST TWICE A DAY, Disp: 200 each, Rfl: 3    albuterol 90 mcg/actuation inhaler, Inhale 2 puffs into the lungs every 6 (six) hours as needed for Wheezing., Disp: 1 each, Rfl: 11    aspirin 81 MG Chew, Take 81 mg by mouth once daily., Disp: , Rfl:     blood glucose control high,low (ACCU-CHEK DARSHAN CONTROL SOLN) Soln, Use a directed, Disp: 1 each, Rfl: 3    blood-glucose meter kit, accu chek darshan meter/test twice daily, Disp: 1 each, Rfl: 0    clobetasol 0.05% (TEMOVATE) 0.05 % Oint, APPLY TOPICALLY TWO TIMES A DAY, Disp: 60 g, Rfl: 1    clotrimazole (LOTRIMIN) 1 % cream, Apply topically 2 (two) times daily., Disp: 45 g, Rfl: 6    colchicine 0.6 mg tablet, Take 1 tablet (0.6 mg total) by  "mouth once daily., Disp: 30 tablet, Rfl: 5    fluticasone (FLOVENT HFA) 110 mcg/actuation inhaler, Inhale 1 puff into the lungs 2 (two) times daily. Controller, Disp: 12 g, Rfl: 0    fosinopril (MONOPRIL) 40 MG tablet, Take 1 tablet (40 mg total) by mouth once daily., Disp: 90 tablet, Rfl: 3    metformin (GLUCOPHAGE) 500 MG tablet, Take 1 tablet (500 mg total) by mouth 2 (two) times daily., Disp: 180 tablet, Rfl: 3    simvastatin (ZOCOR) 40 MG tablet, Take 1 tablet (40 mg total) by mouth nightly., Disp: 90 tablet, Rfl: 3    timolol maleate 0.5% (TIMOPTIC) 0.5 % Drop, Place 1 drop into both eyes 2 (two) times daily., Disp: 5 mL, Rfl: 5    Review of Systems   Constitutional: Negative for chills and fever.   Respiratory: Negative for cough and shortness of breath.    Cardiovascular: Negative for chest pain.   Gastrointestinal: Negative for abdominal pain.   Skin: Negative for rash.   Neurological: Negative for dizziness.       Objective:   /68   Pulse 85   Temp 97.1 °F (36.2 °C) (Tympanic)   Resp 18   Ht 5' 6.5" (1.689 m)   Wt 104.8 kg (231 lb 0.7 oz)   SpO2 (!) 93%   BMI 36.73 kg/m²      Physical Exam   Constitutional: She is oriented to person, place, and time. She appears well-developed and well-nourished.   HENT:   Head: Normocephalic and atraumatic.   Eyes: Conjunctivae are normal.   Cardiovascular: Normal rate.    Pulmonary/Chest: Effort normal. No respiratory distress.   Musculoskeletal: She exhibits no edema.   Has a pea-sized ganglion cyst on the palmar side of her proximal right third digit on the medial side.  It is slightly tender to palpation but no overlying erythema.  It is freely movable from side to side but not longitudinally.   Neurological: She is alert and oriented to person, place, and time. Coordination normal.   Skin: Skin is warm and dry. No rash noted.   Psychiatric: She has a normal mood and affect. Her behavior is normal.   Vitals reviewed.      Assessment & Plan "     Problem List Items Addressed This Visit        Orthopedic    Ganglion cyst of flexor tendon sheath of finger of right hand - Primary    Current Assessment & Plan     I told her that this finding is nothing to be concerned about however since it is bothering her and inhibiting her bowling I recommended that she consult with orthopedics on management.  Recommended icing for now.  May benefit from aspiration although I told her that this could lead to recurrence and in the future she may benefit from surgical intervention if needed.         Relevant Orders    Ambulatory referral to Orthopedics            No Follow-up on file.

## 2018-02-05 ENCOUNTER — OFFICE VISIT (OUTPATIENT)
Dept: OPHTHALMOLOGY | Facility: CLINIC | Age: 78
End: 2018-02-05
Payer: MEDICARE

## 2018-02-05 DIAGNOSIS — H40.1134 PRIMARY OPEN ANGLE GLAUCOMA OF BOTH EYES, INDETERMINATE STAGE: Primary | ICD-10-CM

## 2018-02-05 PROCEDURE — 92012 INTRM OPH EXAM EST PATIENT: CPT | Mod: S$GLB,,, | Performed by: OPTOMETRIST

## 2018-02-05 PROCEDURE — 99499 UNLISTED E&M SERVICE: CPT | Mod: S$GLB,,, | Performed by: OPTOMETRIST

## 2018-02-05 PROCEDURE — 99999 PR PBB SHADOW E&M-EST. PATIENT-LVL I: CPT | Mod: PBBFAC,,, | Performed by: OPTOMETRIST

## 2018-02-05 NOTE — PROGRESS NOTES
HPI     Last [facility] visit 11/03/2017  3 month IOP  Last HVF 11/06/2017  Last GOCT 07/10/2017  Medication: Timolol 0.5% 1 drop OU BID  Pseudophakia, OU    Diabetic      Last edited by Mario Jonas MA on 2/5/2018 10:22 AM. (History)            Assessment /Plan     For exam results, see Encounter Report.    Primary open angle glaucoma of both eyes, indeterminate stage      Good IOP control on Timoptic 0.5 BID OU.  Last full testing November 2017.  RTC 3 months for IOP.

## 2018-02-15 ENCOUNTER — HOSPITAL ENCOUNTER (OUTPATIENT)
Dept: RADIOLOGY | Facility: HOSPITAL | Age: 78
Discharge: HOME | End: 2018-02-15
Attending: PHYSICIAN ASSISTANT
Payer: MEDICARE

## 2018-02-15 ENCOUNTER — OFFICE VISIT (OUTPATIENT)
Dept: ORTHOPEDICS | Facility: CLINIC | Age: 78
End: 2018-02-15
Payer: MEDICARE

## 2018-02-15 VITALS
HEIGHT: 67 IN | WEIGHT: 231.06 LBS | DIASTOLIC BLOOD PRESSURE: 63 MMHG | HEART RATE: 74 BPM | SYSTOLIC BLOOD PRESSURE: 145 MMHG | BODY MASS INDEX: 36.27 KG/M2

## 2018-02-15 DIAGNOSIS — M67.40 GANGLION: ICD-10-CM

## 2018-02-15 DIAGNOSIS — M79.641 PAIN OF RIGHT HAND: ICD-10-CM

## 2018-02-15 DIAGNOSIS — G56.01 RIGHT CARPAL TUNNEL SYNDROME: Primary | ICD-10-CM

## 2018-02-15 DIAGNOSIS — M79.641 PAIN OF RIGHT HAND: Primary | ICD-10-CM

## 2018-02-15 PROCEDURE — 1126F AMNT PAIN NOTED NONE PRSNT: CPT | Mod: S$GLB,,, | Performed by: PHYSICIAN ASSISTANT

## 2018-02-15 PROCEDURE — 99999 PR PBB SHADOW E&M-EST. PATIENT-LVL IV: CPT | Mod: PBBFAC,,, | Performed by: PHYSICIAN ASSISTANT

## 2018-02-15 PROCEDURE — 3008F BODY MASS INDEX DOCD: CPT | Mod: S$GLB,,, | Performed by: PHYSICIAN ASSISTANT

## 2018-02-15 PROCEDURE — 99204 OFFICE O/P NEW MOD 45 MIN: CPT | Mod: S$GLB,,, | Performed by: PHYSICIAN ASSISTANT

## 2018-02-15 PROCEDURE — 73130 X-RAY EXAM OF HAND: CPT | Mod: 26,RT,, | Performed by: RADIOLOGY

## 2018-02-15 PROCEDURE — 73130 X-RAY EXAM OF HAND: CPT | Mod: TC,FY,PO,RT

## 2018-02-15 PROCEDURE — 1159F MED LIST DOCD IN RCRD: CPT | Mod: S$GLB,,, | Performed by: PHYSICIAN ASSISTANT

## 2018-02-15 NOTE — LETTER
February 16, 2018      Ramos Hanks,   [address] [address] [address]           [facility]oped[facility]  [address] [facility] [address]  Phone: [phone]  Fax: [phone]          Patient: Kaleigh Nieves   MR Number: [id number]   YOB: 1940   Date of Visit: 2/15/2018       Dear Dr. Ramos Hanks:    Thank you for referring Kaleigh Nieves to me for evaluation. Attached you will find relevant portions of my assessment and plan of care.    If you have questions, please do not hesitate to call me. I look forward to following Kaleigh Nieves along with you.    Sincerely,    Oscar Rizzo PA-C    Enclosure  CC:  No Recipients    If you would like to receive this communication electronically, please contact [email or web address] or [phone] to request more information on [facility] access.    For providers and/or their staff who would like to refer a patient to [facility], please contact us through our one-stop-shop provider referral line, Ernie Packer, at [phone].    If you feel you have received this communication in error or would no longer like to receive these types of communications, please e-mail [email or web address]

## 2018-02-15 NOTE — PROGRESS NOTES
This consultation has been requested by Dr. Ramos Hanks   .  Please make certain that  he gets a copy of this consultation report.      CC: 78 y/o female, finger pain and mass    HPI: Here for evaluation of a knot and mass on her right middle finger for greater in 2 months.  She is active bowler and the pain increases when obstructive bowler pressure on the finger.  Pain is resolved at rest and does not radiate.  Also complains of a multiple year history of numbness and to enhance.  States she returns carpal tunnel syndrome several years ago but declined surgery.  She states she had positive EMG test that these were several years also.  Pain level today a 4 on a 10 scale.  The mass and the numb and tingling are now affecting her ADLs as she is unable to bowl    PMH:    Past Medical History:   Diagnosis Date    Anemia     Arthritis     Asthma     Back pain     Bronchitis     CAD (coronary artery disease) 7/15    via chst ct    Carotid artery stenosis     Chronic gout     Colon polyp     CTS (carpal tunnel syndrome)     Diabetes mellitus, type 2     Diabetes with neurologic complications     Diverticulosis     RIVAS (dyspnea on exertion)     chronic; eval pulm dr natarajan    Dyslipidemia     Ex-smoker     quit in her 30s    GERD (gastroesophageal reflux disease)     Hyperlipidemia     Hypertension     Neuropathy, lower extremity     Obesity     Open-angle glaucoma     dr avel kaur    Peripheral vascular disease     Polyneuropathy     Tobacco dependence        PSH:    Past Surgical History:   Procedure Laterality Date    Bilateral knee replacement      CATARACT EXTRACTION, BILATERAL      CHOLECYSTECTOMY      HEMORRHOID SURGERY      HYSTERECTOMY      fibroids    INCISION AND DRAINAGE PERIRECTAL ABSCESS      thyroid resection         Family Hx:    Family History   Problem Relation Age of Onset    Hypertension Mother     Diabetes Mother     Leukemia Sister     Hypertension Sister      Cancer Sister      leukemia    Heart disease Maternal Aunt     Cancer Maternal Uncle      gastric, pacreatic    Heart disease Maternal Uncle     Miscarriages / Stillbirths Maternal Uncle     Diabetes Maternal Grandmother     Asthma Maternal Grandfather     Breast cancer Neg Hx     Colon cancer Neg Hx     Ovarian cancer Neg Hx     COPD Neg Hx     Hyperlipidemia Neg Hx     Kidney disease Neg Hx        Allergy:    Review of patient's allergies indicates:   Allergen Reactions    Iodine and iodide containing products Nausea And Vomiting    Penicillins Itching    Amlodipine      edema    Sulfa (sulfonamide antibiotics)     Ultram [tramadol] Other (See Comments)     Light headness, itiching    Bactrim [sulfamethoxazole-trimethoprim] Itching and Rash       Medication:    Current Outpatient Prescriptions:     ACCU-CHEK DARSHAN PLUS TEST STRP Strp, TEST BLOOD SUGAR TWICE DAILY, Disp: 200 strip, Rfl: 3    ACCU-CHEK SOFTCLIX LANCETS Misc, TEST TWICE A DAY, Disp: 200 each, Rfl: 3    albuterol 90 mcg/actuation inhaler, Inhale 2 puffs into the lungs every 6 (six) hours as needed for Wheezing., Disp: 1 each, Rfl: 11    aspirin 81 MG Chew, Take 81 mg by mouth once daily., Disp: , Rfl:     blood glucose control high,low (ACCU-CHEK DARSHAN CONTROL SOLN) Soln, Use a directed, Disp: 1 each, Rfl: 3    blood-glucose meter kit, accu chek darshan meter/test twice daily, Disp: 1 each, Rfl: 0    clobetasol 0.05% (TEMOVATE) 0.05 % Oint, APPLY TOPICALLY TWO TIMES A DAY, Disp: 60 g, Rfl: 1    clotrimazole (LOTRIMIN) 1 % cream, Apply topically 2 (two) times daily., Disp: 45 g, Rfl: 6    colchicine 0.6 mg tablet, Take 1 tablet (0.6 mg total) by mouth once daily., Disp: 30 tablet, Rfl: 5    fluticasone (FLOVENT HFA) 110 mcg/actuation inhaler, Inhale 1 puff into the lungs 2 (two) times daily. Controller, Disp: 12 g, Rfl: 0    fosinopril (MONOPRIL) 40 MG tablet, Take 1 tablet (40 mg total) by mouth once daily., Disp: 90 tablet,  "Rfl: 3    metformin (GLUCOPHAGE) 500 MG tablet, Take 1 tablet (500 mg total) by mouth 2 (two) times daily., Disp: 180 tablet, Rfl: 3    simvastatin (ZOCOR) 40 MG tablet, Take 1 tablet (40 mg total) by mouth nightly., Disp: 90 tablet, Rfl: 3    timolol maleate 0.5% (TIMOPTIC) 0.5 % Drop, Place 1 drop into both eyes 2 (two) times daily., Disp: 5 mL, Rfl: 5    Social History:    Social History     Social History    Marital status: Single     Spouse name: N/A    Number of children: 0    Years of education: N/A     Occupational History    Not on file.     Social History Main Topics    Smoking status: Former Smoker     Packs/day: 0.25     Years: 24.00     Quit date: 6/25/1976    Smokeless tobacco: Never Used    Alcohol use No    Drug use: No    Sexual activity: Not Currently     Other Topics Concern    Not on file     Social History Narrative    No narrative on file       Vitals:   BP (!) 145/63 (BP Location: Left arm, Patient Position: Sitting, BP Method: Medium (Automatic))   Pulse 74   Ht 5' 6.5" (1.689 m)   Wt 104.8 kg (231 lb 0.7 oz)   BMI 36.73 kg/m²      ROS:  GENERAL: No fever, chills, fatigability or weight loss.  SKIN: No rashes, itching or changes in color or texture of skin.  HEAD: No headaches or recent head trauma.  EYES: Visual acuity fine. No photophobia, ocular pain or diplopia.  EARS: Denies ear pain, discharge or vertigo.  NOSE: No loss of smell, no epistaxis or postnasal drip.  MOUTH & THROAT: No hoarseness or change in voice. No excessive gum bleeding.  NODES: Denies swollen glands.  CHEST: Denies RIVAS, cyanosis, wheezing, cough and sputum production.  CARDIOVASCULAR: Denies chest pain, PND, orthopnea or reduced exercise tolerance.  ABDOMEN: Appetite fine. No weight loss. Denies diarrhea, abdominal pain, hematemesis or blood in stool.  URINARY: No flank pain, dysuria or hematuria.  PERIPHERAL VASCULAR: No claudication or cyanosis.  NEUROLOGIC: No history of seizures, paralysis, " alteration of gait or coordination.  MUSCULOSKELETAL: See HPI    PE:  APPEARANCE: Well nourished, well developed, in no acute distress.   HEAD: Normocephalic, atraumatic.  NEUROLOGIC: Cranial Nerves: II-XII grossly intact, also see MUSCULOSKELETAL  MUSCULOSKELETAL:   CARPAL TUNNEL SYNDROME  Right -    - Positive- Tinel's over the Median nerve  Positive- Phalen maneuver   Positive- Thenar atrophy   Negative- hypothenar atrophy   Positive- Median Nerve Compression test less than 30 seconds   Negative- Tinel's over Guyon's Canal   Negative- Tinel's over Cubital Tunnel Negative- Tinels over the Median Nerve overlying the antecubital  Fossa   Negative- Tinel's over Radial groove  Negative- Tinel's over sensory branch of Radial nerve at the wrist  Negative- Tinel's over the PIN   Negative- pain in forearm with resistance to             FDP flexion and resisted pronation   Positive- boggy synovium of the wrist   normal- less than 2 seconds capillary all digits  Positive- light touch intact in Median nerve distribution Positive- light touch in the Radial Nerve distribution  Positive- light touch intact in the Ulnar sensory nerve distribution    Positive- Thumb opposition strength symmetrical  Negative- bruit(aneurysm)    Positive- skin color intact(claudication/Raynaud's)  Negative- cold digits(claudication/Raynaud's)  normal - Rahul Test(Vascular Exam)  normal- +2 Radial and Ulnar artery pulses  Negative- anatomical snuff box tenderness(Dequervain's Synovitis)  Negative- finger or thumb triggering(Trigger Thumb or Finger)  Negative- Lipoma  Positive- Ganglion cyst- base of middle finger.  Mobile, non tender      Sensory exam-C5,C6,C7,C8,T1- normal    Wrist extension for radial nerve- +5/5  Wrist Flexion-+5/5  Wrist Ulnar Deviation-+5/5  Wrist Radial Deviation- +5/5  Resisted opposition of the thumb for the median nerve- +5/5  Digit abduction for the ulnar nerve- +5/5        Assessment:           Diagnosis:              1.   Ganglion cysts right middle finger               2.  Carpal tunnel syndrome    Diagnostic Studies  MRI-No  X-Ray-Yes agree with findings of Findings: There is moderate joint space narrowing and osteophyte formation seen involving the interphalangeal joints.  There is also at least moderate degenerative change present at the 1st CMC joint.  Minimal degenerative changes noted about the MCP joints and wrists in greatest at the 1st MCP joint    EMG/NCV-Yes, to evaluate the severity of her carpal tunnel, which she has had for several years.  Arthrogram-No  Bone Scan-No  CT Scan-No  Doppler-No  ESR-No  CRP-No  CBC with Diff-No   Rheumatoid/Arthritis Panel-No      Plan:                                                 1. PT-no                                                 2.OT-no                                          3.NSAID-no                                        4. Narcotics-no                                     5. Wound care-N/A                                 6. Rest-no                                           7. Surgery-yes  patient may greatly benefit from ganglion cyst removal.  And depending on the EMG results possible carpal tunnel release.            8. ANGELIKA Hose-no                                    9. Anticoagulation therapy-no               10. Elevation-no                                     11. Crutches-no                                    12. Walker-no             13. Cane no                        14. Referral-no                                     15.Injection-no                            16. Splint   /    Cast   /   Cast Shoe-No              17. RICE            18. Follow up-  after EMG,     Thank you Dr. Hanks for the referral

## 2018-02-15 NOTE — PATIENT INSTRUCTIONS
Carpal Tunnel Release Surgery  Surgery may be done if your carpal tunnel syndrome (CTS) symptoms become severe. Or, you may have surgery if no other treatment brings relief. There are 2 types of CTS procedures. You will be told about the one you will have. Youll also be instructed how to prepare for it.      The goals of surgery  Two types of surgery--open and endoscopic--are used to treat CTS.  · With open surgery, your surgeon makes one incision in your palm. Standard surgical tools are used.  · With endoscopic surgery, one or two small incisions may be made in your hand. A scope (with a very small camera attached) and tools are inserted under the carpal ligament. The surgeon then operates while watching images on a video screen. No matter which one you have, the goal remains the same: Your surgeon will relieve pressure on the median nerve. To do this, the transverse carpal ligament is cut (released).  After surgery  If youve had carpal tunnel surgery, you will spend a few hours resting before you go home. The nerve sensation and circulation in your hand will be checked at this time. For the safest healing, keep the following in mind.  · Keep your hand raised above heart level. This will help reduce swelling.  · Limit hand and wrist use as instructed. A wrist brace may be required.  · Take any pain medication as directed.  · Do hand exercises as directed by your surgeon or therapist.  When to call the surgeon  Call your surgeon if you notice any of the following:  · White or pale-blue hand or nails (If you pinch your skin or nail and the color doesnt return)  · Pain that is not relieved by prescribed medicine  · Loss of sensation or excess swelling in hand or fingers  · Fever over 100.4°F (38°C)   Date Last Reviewed: 9/11/2015  © 5219-5708 [employer]. [address] [place] [address], [place] [address]. All rights reserved. This information is not intended as a substitute for professional medical care.  Always follow your healthcare professional's instructions.        Ganglion Cyst    A ganglion cyst usually is a painless bump on the wrist or finger joint. It connects to the joint capsule and grows like a balloon on a stalk. It is filled with joint fluid. The cause of a ganglion cyst is not known.   If the cyst puts pressure on a nearby nerve it may cause pain. Otherwise, cysts are painless and harmless. Most tend to disappear over time without treatment. Do not try to drain or break the cyst at home. This can cause harm and does not cure the problem.  If you are having pain from the cyst, a temporary wrist splint may be helpful to limit wrist motion. If this does not help, the fluid can be removed from the cyst. This should shrink the size of the cyst. If this doesnt give relief, the ganglion can be removed by surgery.  Home care  · If you are having wrist pain, use a wrist splint for 1-2 weeks at a time. You can buy one at many drug stores without a prescription.  · You may use over-the-counter pain medicine to control pain, unless another medicine was prescribed. If you have chronic liver or kidney disease or ever had a stomach ulcer or GI bleeding, talk with your healthcare provider before using these medicines.    · If a needle was used to drain the cyst fluid or inject medicine into it, keep the site clean and covered with a bandage for the first 24 hours. If a pressure dressing was applied, leave it in place for the time advised.  Follow-up care  Follow up with your healthcare provider, or as advised by our staff. Make an appointment for a repeat exam if pain continues for more than 2 weeks in a wrist splint.  When to seek medical advice  Call your healthcare provider right away if any of these occur:  · Increasing pain in the wrist  · Redness over the cyst  · Fluid draining from the cyst  · Numbness or tingling in the hand or arm  Date Last Reviewed: 11/20/2015  © 8800-8985 [employer] [address]  [place] [address]. All rights reserved. This information is not intended as a substitute for professional medical care. Always follow your healthcare professional's instructions.

## 2018-02-24 DIAGNOSIS — I10 ESSENTIAL HYPERTENSION, BENIGN: ICD-10-CM

## 2018-02-24 DIAGNOSIS — E78.5 HYPERLIPIDEMIA, UNSPECIFIED HYPERLIPIDEMIA TYPE: ICD-10-CM

## 2018-02-26 RX ORDER — SIMVASTATIN 40 MG/1
TABLET, FILM COATED ORAL
Qty: 90 TABLET | Refills: 3 | Status: SHIPPED | OUTPATIENT
Start: 2018-02-26 | End: 2019-03-30 | Stop reason: SDUPTHER

## 2018-02-26 RX ORDER — METFORMIN HYDROCHLORIDE 500 MG/1
TABLET ORAL
Qty: 180 TABLET | Refills: 3 | Status: SHIPPED | OUTPATIENT
Start: 2018-02-26 | End: 2018-12-06

## 2018-02-26 RX ORDER — FOSINOPRIL SODIUM 40 MG/1
TABLET ORAL
Qty: 90 TABLET | Refills: 3 | Status: SHIPPED | OUTPATIENT
Start: 2018-02-26 | End: 2018-05-15

## 2018-03-07 ENCOUNTER — OFFICE VISIT (OUTPATIENT)
Dept: PHYSICAL MEDICINE AND REHAB | Facility: CLINIC | Age: 78
End: 2018-03-07
Payer: MEDICARE

## 2018-03-07 VITALS
HEIGHT: 67 IN | HEART RATE: 87 BPM | SYSTOLIC BLOOD PRESSURE: 175 MMHG | WEIGHT: 231 LBS | RESPIRATION RATE: 14 BRPM | DIASTOLIC BLOOD PRESSURE: 70 MMHG | BODY MASS INDEX: 36.26 KG/M2

## 2018-03-07 DIAGNOSIS — G56.03 BILATERAL CARPAL TUNNEL SYNDROME: Primary | ICD-10-CM

## 2018-03-07 PROCEDURE — 3077F SYST BP >= 140 MM HG: CPT | Mod: S$GLB,,, | Performed by: PHYSICAL MEDICINE & REHABILITATION

## 2018-03-07 PROCEDURE — 99999 PR PBB SHADOW E&M-EST. PATIENT-LVL III: CPT | Mod: PBBFAC,,, | Performed by: PHYSICAL MEDICINE & REHABILITATION

## 2018-03-07 PROCEDURE — 95911 NRV CNDJ TEST 9-10 STUDIES: CPT | Mod: S$GLB,,, | Performed by: PHYSICAL MEDICINE & REHABILITATION

## 2018-03-07 PROCEDURE — 3078F DIAST BP <80 MM HG: CPT | Mod: S$GLB,,, | Performed by: PHYSICAL MEDICINE & REHABILITATION

## 2018-03-07 PROCEDURE — 99204 OFFICE O/P NEW MOD 45 MIN: CPT | Mod: 25,S$GLB,, | Performed by: PHYSICAL MEDICINE & REHABILITATION

## 2018-03-07 NOTE — PATIENT INSTRUCTIONS
Carpal Tunnel Syndrome    Carpal tunnel syndrome is a painful condition of the wrist and arm. It is caused by pressure on the median nerve.  The median nerve is one of the nerves that give feeling and movement to the hand. It passes through a tunnel in the wrist called the carpal tunnel. This tunnel is made up of bones and ligaments. Narrowing of this tunnel or swelling of the tissues inside the tunnel puts pressure on the median nerve. This causes numbness, pins and needles, or electric shooting pains in your hand and forearm. Often the pain is worse at night and may wake you when you are asleep.  Carpal tunnel syndrome may occur during pregnancy and with use of birth control pills. It is more common in workers who must often bend their wrists. It is also common in people who work with power tools that cause strong vibrations.  Home care  · Rest the painful wrist. Avoid repeated bending of the wrist back and forth. This puts pressure on the median nerve. Avoid using power tools with strong vibrations.  · If you were given a splint, wear it at night while you sleep. You may also wear it during the day for comfort.  · Move your fingers and wrists often to avoid stiffness.  · Elevate your arms on pillows when you lie down.  · Try using the unaffected hand more.  · Try not to hold your wrists in a bent, downward position.  · Sometimes changes in the work place may ease symptoms. If you type most of the day, it may help to change the position of your keyboard or add a wrist support. Your wrist should be in a neutral position and not bent back when typing.  · You may use over-the-counter pain medicine to treat pain and inflammation, unless another medicine was prescribed. Anti-inflammatory pain medicines, such as ibuprofen or naproxen may be more effective than acetaminophen, which treats pain, but not inflammation. If you have chronic liver or kidney disease or ever had a stomach ulcer or GI bleeding, talk with your  doctor before using these medicines.  · Opioid pain medicine will only give temporary relief and does not treat the problem. If pain continues, you may need a shot of a steroid drug into your wrist.  · If the above methods fail, you may need surgery. This will open the carpal tunnel and release the pressure on the trapped nerve.  Follow-up care  Follow up with your healthcare provider, or as advised, if the pain doesnt begin to improve within the next week.  If X-rays were taken, you will be notified of any new findings that may affect your care.  When to seek medical advice  Call your healthcare provider right away if any of these occur:  · Pain not improving with the above treatment  · Fingers or hand become cold, blue, numb, or tingly  · Your whole arm becomes swollen or weak  Date Last Reviewed: 11/23/2015  © 7359-0032 [employer]. [address] [place] [address]. All rights reserved. This information is not intended as a substitute for professional medical care. Always follow your healthcare professional's instructions.        Carpal Tunnel Syndrome Prevention Tips  Some repetitive hand activities put you at higher risk for carpal tunnel syndrome (CTS). But you can reduce your risk. Learn how to change the way you use your hands. Below are tips for at home and on the job. Be sure to also follow the hand and wrist safety policies at your workplace.      Keep your wrist in a neutral (straight) position when exercising.      Keep your wrist in neutral  Keep a neutral (straight) wrist position as often as you can. Dont use your wrist in a bent (flexed) position for long periods of time. This includes extended or twisted positions.  Watch your   Dont just use your thumb and index finger to grasp or lift. This can put stress on your wrist. When you can, use your whole hand and all its fingers to grasp an object.  Minimize repetition  Dont move your arms or hands or hold an object in  the same way for long periods of time. Even simple, light tasks can cause injury this way. Instead, alternate tasks or switch hands.  Rest your hands  Give your hands a break from time to time with a rest. Even a few minutes once an hour can help.  Reduce speed and force  Slow down the speed in which you do a forceful, repetitive motion. This gives your wrist time to recover from the effort. Use power tools to help reduce the force.  Strengthen the muscles  Weak muscles may lead to a poor wrist or arm position. Exercises will make your hand and arm muscles stronger. This can help you keep a better position.  Date Last Reviewed: 9/11/2015 © 2000-2017 [employer]. [address] [place] [address], [place] [address]. All rights reserved. This information is not intended as a substitute for professional medical care. Always follow your healthcare professional's instructions.        Understanding Carpal Tunnel Syndrome    The carpal tunnel is a narrow space inside the wrist. It is ringed by bone and a band of tough tissue called the transverse carpal ligament. A major nerve called the median nerve runs from the forearm into the hand through the carpal tunnel. Tendons also run through the carpal tunnel.  With carpal tunnel syndrome, the tendons or nearby tissues within the carpal tunnel may swell or thicken. Or the transverse carpal ligament may harden and shorten. This narrows the space in the carpal tunnel and puts pressure on the median nerve. This pressure leads to tingling and numbness of the hand and wrist. In time, the condition can make even simple tasks hard to do.  What causes carpal tunnel syndrome?  Doctors arent entirely clear why the condition occurs. Certain things may make a person more likely to have it. These include:  · Being female  · Being pregnant  · Being overweight  · Having diabetes or rheumatoid arthritis  Symptoms of carpal tunnel syndrome  Symptoms often come and go. At first, symptoms  may occur mainly at night. Later, they may be noticed during the day as well. They may get worse with activities such as driving, reading, typing, or holding a phone. Symptoms can include:  · Tingling and numbness in the hand or wrist  · Sharp pain that shoots up the arm or down to the fingers  · Hand stiffness or cramping, especially in the morning  · Trouble making a fist  · Hand weakness and clumsiness  Treatment for carpal tunnel syndrome  Certain treatments help reduce the pressure on the median nerve and relieve symptoms. Choices for treatment may include one or more of the following:  · Wrist splint. This involves wearing a special brace on the wrist and hand. The splint holds the wrist straight, in a neutral position. This helps keep the carpal tunnel as open as possible.  · Cortisone shots. Cortisone is a medicine that helps reduce swelling. It is injected directly into the wrist. It helps shrink tissues inside the carpal tunnel. This relieves symptoms for a time.  · Pain medicines. You may take over-the-counter or prescription medicines to help reduce swelling and relieve symptoms.  · Surgery. If the condition doesnt respond to other treatments and doesnt go away on its own, you may need surgery. During surgery, the surgeon cuts the transverse carpal ligament to relieve pressure on the median nerve.     When to call your healthcare provider  Call your healthcare provider right away if you have any of these:  · Fever of 100.4°F (38°C) or higher, or as directed  · Symptoms that dont get better, or get worse  · New symptoms   Date Last Reviewed: 3/10/2016  © 5613-3870 [employer]. [address] [place] [address], [place] [address]. All rights reserved. This information is not intended as a substitute for professional medical care. Always follow your healthcare professional's instructions.        Carpal Tunnel Release Surgery  Surgery may be done if your carpal tunnel syndrome (CTS) symptoms become  severe. Or, you may have surgery if no other treatment brings relief. There are 2 types of CTS procedures. You will be told about the one you will have. Youll also be instructed how to prepare for it.      The goals of surgery  Two types of surgery--open and endoscopic--are used to treat CTS.  · With open surgery, your surgeon makes one incision in your palm. Standard surgical tools are used.  · With endoscopic surgery, one or two small incisions may be made in your hand. A scope (with a very small camera attached) and tools are inserted under the carpal ligament. The surgeon then operates while watching images on a video screen. No matter which one you have, the goal remains the same: Your surgeon will relieve pressure on the median nerve. To do this, the transverse carpal ligament is cut (released).  After surgery  If youve had carpal tunnel surgery, you will spend a few hours resting before you go home. The nerve sensation and circulation in your hand will be checked at this time. For the safest healing, keep the following in mind.  · Keep your hand raised above heart level. This will help reduce swelling.  · Limit hand and wrist use as instructed. A wrist brace may be required.  · Take any pain medication as directed.  · Do hand exercises as directed by your surgeon or therapist.  When to call the surgeon  Call your surgeon if you notice any of the following:  · White or pale-blue hand or nails (If you pinch your skin or nail and the color doesnt return)  · Pain that is not relieved by prescribed medicine  · Loss of sensation or excess swelling in hand or fingers  · Fever over 100.4°F (38°C)   Date Last Reviewed: 9/11/2015  © 5890-0583 [employer]. [address] [place] [address]. All rights reserved. This information is not intended as a substitute for professional medical care. Always follow your healthcare professional's instructions.

## 2018-03-07 NOTE — PROGRESS NOTES
[facility] [address] [place] [address]  Phone: [phone]          Full Name: verona perez YOB: 1940  Patient ID: [id number]      Visit Date: 3/7/2018 09:36  Age: 77 Years 5 Months Old  Examining Physician: Arcelia Downey M.D.  Referring Physician: aldo  Reason for Referral: ue pain        Chief Complaint   Patient presents with    Wrist Pain     right worse than left       HPI: This is a 77 y.o.  female being seen in clinic today for evaluation of chronic wrist achy pain with numbness/tingling into her fingers.  Her right side is worse than the left and often bothers her at night when trying to rest.  Wearing a wrist brace provides some relief.  She denies loss of  strength or dropping items.    History obtained from patient    Past family, medical, social, and surgical history reviewed in chart    Review of Systems:     General- denies lethargy, weight change, fever, chills  Head/neck- denies swallowing difficulties  ENT- denies hearing changes  Cardiovascular-denies chest pain  Pulmonary- denies shortness of breath  GI- denies constipation or bowel incontinence  - denies bladder incontinence  Skin- denies wounds or rashes  Musculoskeletal- denies weakness, +pain  Neurologic- +numbness and tingling  Psychiatric- denies depressive or psychotic features, denies anxiety  Lymphatic-denies swelling  Endocrine- denies hypoglycemic symptoms/+DM history  All other pertinent systems negative     Physical Examination:  General: Well developed, well nourished female, NAD  HEENT:NCAT EOMI bilaterally   Pulmonary:Normal respirations    Spinal Examination: CERVICAL  Active ROM is within normal limits.  Inspection: No deformity of spinal alignment.    Spinal Examination: LUMBAR or THORACIC  Active ROM is within normal limits.  Inspection: No deformity of spinal alignment.        Musculoskeletal Tests:  Phalen:   Elbow compression (ulnar): neg  Tinels at wrist:  +bilaterally    Bilateral Upper and Lower Extremities:  Pulses are 2+ at radial bilaterally.  Shoulder/Elbow/Wrist/Hand ROM wnl  Hip/Knee/Ankle ROM   Bilateral Extremities show normal capillary refill.  No signs of cyanosis, rubor, edema, skin changes, or dysvascular changes of appendages.  Nails appear intact.    Neurological Exam:  Cranial Nerves:  II-XII grossly intact    Manual Muscle Testing: (Motor 5=normal)  5/5 strength bilateral upper extremities    No focal atrophy is noted of either upper extremity.    Bilateral Reflexes:hypo at bic tric br  Richey's response is absent bilaterally.    Sensation: tested to light touch  - intact in arms  Gait: Narrow base and good arm swing.      Entire procedure explained to patient prior to proceeding.  Verbal consent obtained        SNC      Nerve / Sites Rec. Site Onset Lat Peak Lat Amp Segments Distance Velocity     ms ms µV  mm m/s   R Median - Digit II (Antidromic)      Wrist Dig II 4.0 4.8 12.3 Wrist - Dig  35   L Median - Digit II (Antidromic)      Wrist Dig II 3.9 4.6 11.0 Wrist - Dig  36   R Ulnar - Digit V (Antidromic)      Wrist Dig V 3.0 3.8 30.9 Wrist - Dig V 140 47   L Ulnar - Digit V (Antidromic)      Wrist Dig V 3.0 3.9 20.1 Wrist - Dig V 140 46   R Radial - Anatomical snuff box (Forearm)      Forearm Wrist 2.0 2.7 16.9 Forearm - Wrist 100 51   L Radial - Anatomical snuff box (Forearm)      Forearm Wrist 1.7 2.6 13.1 Forearm - Wrist 100 58       MNC      Nerve / Sites Muscle Latency Amplitude Duration Rel Amp Segments Distance Lat Diff Velocity     ms mV ms %  mm ms m/s   R Median - APB      Wrist APB 4.2 7.9 7.5 100 Wrist - APB 80        Elbow APB 9.0 7.1 8.0 90.4 Elbow - Wrist 205 4.7 43   L Median - APB      Wrist APB 3.9 10.1 6.9 100 Wrist - APB 80        Elbow APB 8.5 10.0 7.2 99.4 Elbow - Wrist 235 4.7 50   R Ulnar - ADM      Wrist ADM 3.2 7.4 6.1 100 Wrist - ADM 80        B.Elbow ADM 7.9 7.3 6.5 98.6 B.Elbow - Wrist 230 4.6 50       A.Elbow ADM 9.8 6.5 7.3 88.7 A.Elbow - B.Elbow 100 2.0 51         A.Elbow - Wrist  6.6    L Ulnar - ADM      Wrist ADM 3.3 7.2 6.3 100 Wrist - ADM 80        B.Elbow ADM 8.2 6.9 7.1 95.8 B.Elbow - Wrist 250 4.9 51      A.Elbow ADM 10.2 6.5 7.9 94.1 A.Elbow - B.Elbow 100 2.0 51         A.Elbow - Wrist  6.9                                 INTERPRETATION  -Bilateral median motor nerve conduction study showed normal latency, amplitude, and dec conduction velocity on the right  -Bilateral median sensory nerve conduction study showed prolonged peak latency and normal amplitude  -Bilateral ulnar motor nerve conduction study showed normal latency, amplitude, and conduction velocity  -Bilateral ulnar sensory nerve conduction study showed normal peak latency and amplitude  -Bilateral radial sensory nerve conduction study showed normal peak latency and amplitude      IMPRESSION  1. ABNORMAL study  2. 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 Follow up with referring provider: Oscar Rizzo  2. Handouts on CTS provided. Fu with ortho to discuss right hand release  3. This study is good for one year. If symptoms worsen or do not improve, please re-consult.    Arcelia Downey M.D.  Physical Medicine and Rehab

## 2018-03-15 ENCOUNTER — LAB VISIT (OUTPATIENT)
Dept: LAB | Facility: HOSPITAL | Age: 78
End: 2018-03-15
Attending: FAMILY MEDICINE
Payer: MEDICARE

## 2018-03-15 ENCOUNTER — OFFICE VISIT (OUTPATIENT)
Dept: INTERNAL MEDICINE | Facility: CLINIC | Age: 78
End: 2018-03-15
Payer: MEDICARE

## 2018-03-15 VITALS
RESPIRATION RATE: 16 BRPM | HEART RATE: 76 BPM | TEMPERATURE: 97 F | OXYGEN SATURATION: 95 % | WEIGHT: 231.94 LBS | SYSTOLIC BLOOD PRESSURE: 168 MMHG | HEIGHT: 67 IN | BODY MASS INDEX: 36.4 KG/M2 | DIASTOLIC BLOOD PRESSURE: 74 MMHG

## 2018-03-15 DIAGNOSIS — E11.59 HYPERTENSION ASSOCIATED WITH DIABETES: Primary | ICD-10-CM

## 2018-03-15 DIAGNOSIS — I15.2 HYPERTENSION ASSOCIATED WITH DIABETES: Primary | ICD-10-CM

## 2018-03-15 DIAGNOSIS — I15.2 HYPERTENSION ASSOCIATED WITH DIABETES: ICD-10-CM

## 2018-03-15 DIAGNOSIS — E11.59 HYPERTENSION ASSOCIATED WITH DIABETES: ICD-10-CM

## 2018-03-15 DIAGNOSIS — E66.01 SEVERE OBESITY (BMI 35.0-35.9 WITH COMORBIDITY): ICD-10-CM

## 2018-03-15 LAB
ALBUMIN SERPL BCP-MCNC: 3.7 G/DL
ALP SERPL-CCNC: 36 U/L
ALT SERPL W/O P-5'-P-CCNC: 13 U/L
ANION GAP SERPL CALC-SCNC: 8 MMOL/L
AST SERPL-CCNC: 15 U/L
BILIRUB SERPL-MCNC: 0.5 MG/DL
BUN SERPL-MCNC: 8 MG/DL
CALCIUM SERPL-MCNC: 9.9 MG/DL
CHLORIDE SERPL-SCNC: 101 MMOL/L
CO2 SERPL-SCNC: 34 MMOL/L
CREAT SERPL-MCNC: 0.7 MG/DL
EST. GFR  (AFRICAN AMERICAN): >60 ML/MIN/1.73 M^2
EST. GFR  (NON AFRICAN AMERICAN): >60 ML/MIN/1.73 M^2
ESTIMATED AVG GLUCOSE: 114 MG/DL
GLUCOSE SERPL-MCNC: 103 MG/DL
HBA1C MFR BLD HPLC: 5.6 %
POTASSIUM SERPL-SCNC: 5.3 MMOL/L
PROT SERPL-MCNC: 7.5 G/DL
SODIUM SERPL-SCNC: 143 MMOL/L

## 2018-03-15 PROCEDURE — 83036 HEMOGLOBIN GLYCOSYLATED A1C: CPT

## 2018-03-15 PROCEDURE — 99999 PR PBB SHADOW E&M-EST. PATIENT-LVL IV: CPT | Mod: PBBFAC,,, | Performed by: FAMILY MEDICINE

## 2018-03-15 PROCEDURE — 36415 COLL VENOUS BLD VENIPUNCTURE: CPT | Mod: PO

## 2018-03-15 PROCEDURE — 3078F DIAST BP <80 MM HG: CPT | Mod: CPTII,S$GLB,, | Performed by: FAMILY MEDICINE

## 2018-03-15 PROCEDURE — 3077F SYST BP >= 140 MM HG: CPT | Mod: CPTII,S$GLB,, | Performed by: FAMILY MEDICINE

## 2018-03-15 PROCEDURE — 80053 COMPREHEN METABOLIC PANEL: CPT | Mod: PO

## 2018-03-15 PROCEDURE — 99499 UNLISTED E&M SERVICE: CPT | Mod: S$GLB,,, | Performed by: FAMILY MEDICINE

## 2018-03-15 PROCEDURE — 99214 OFFICE O/P EST MOD 30 MIN: CPT | Mod: S$GLB,,, | Performed by: FAMILY MEDICINE

## 2018-03-15 NOTE — ASSESSMENT & PLAN NOTE
Assessing renal function today but I think she will need to be on a diuretic of some sort.  Thinking to use chlorthalidone cautiously even though she has a history of gout.  Have her continue the colchicine but she needs something to help decrease the swelling in her legs and control her blood pressure better.  Have her follow up within 2 weeks.

## 2018-03-15 NOTE — PROGRESS NOTES
Subjective:       Patient ID: Kaleigh Nieves is a 77 y.o. female.    Chief Complaint: Hypertension and Foot Swelling (bilateral)    HPI  Kaleigh is here today with concerns about hypertension.  She has been having elevated numbers for the last several weeks.  She continues to take fosinopril.  Says in the past she was on hydrochlorothiazide however she was taken off that whenever she was found to have gout.  She has been on the colchicine for gout prevention and her last uric acid level was 5.0 in January.  For the last couple months she has been having lower extremity edema with slight redness in both of her feet.  Doesn't have any persistent pain but every now and then will have some shooting discomfort.  Not having any shortness of breath nor chest pain.    Family History   Problem Relation Age of Onset    Hypertension Mother     Diabetes Mother     Leukemia Sister     Hypertension Sister     Cancer Sister      leukemia    Heart disease Maternal Aunt     Cancer Maternal Uncle      gastric, pacreatic    Heart disease Maternal Uncle     Miscarriages / Stillbirths Maternal Uncle     Diabetes Maternal Grandmother     Asthma Maternal Grandfather     Breast cancer Neg Hx     Colon cancer Neg Hx     Ovarian cancer Neg Hx     COPD Neg Hx     Hyperlipidemia Neg Hx     Kidney disease Neg Hx        Current Outpatient Prescriptions:     ACCU-CHEK DARSHAN PLUS TEST STRP Strp, TEST BLOOD SUGAR TWICE DAILY, Disp: 200 strip, Rfl: 3    ACCU-CHEK SOFTCLIX LANCETS Misc, TEST TWICE A DAY, Disp: 200 each, Rfl: 3    albuterol 90 mcg/actuation inhaler, Inhale 2 puffs into the lungs every 6 (six) hours as needed for Wheezing., Disp: 1 each, Rfl: 11    aspirin 81 MG Chew, Take 81 mg by mouth once daily., Disp: , Rfl:     blood glucose control high,low (ACCU-CHEK DARSHAN CONTROL SOLN) Soln, Use a directed, Disp: 1 each, Rfl: 3    blood-glucose meter kit, accu chek darshan meter/test twice daily, Disp: 1 each, Rfl: 0     "clobetasol 0.05% (TEMOVATE) 0.05 % Oint, APPLY TOPICALLY TWO TIMES A DAY, Disp: 60 g, Rfl: 1    clotrimazole (LOTRIMIN) 1 % cream, Apply topically 2 (two) times daily., Disp: 45 g, Rfl: 6    colchicine 0.6 mg tablet, Take 1 tablet (0.6 mg total) by mouth once daily., Disp: 30 tablet, Rfl: 5    fluticasone (FLOVENT HFA) 110 mcg/actuation inhaler, Inhale 1 puff into the lungs 2 (two) times daily. Controller, Disp: 12 g, Rfl: 0    fosinopril (MONOPRIL) 40 MG tablet, TAKE 1 TABLET ONE TIME DAILY, Disp: 90 tablet, Rfl: 3    metFORMIN (GLUCOPHAGE) 500 MG tablet, TAKE 1 TABLET TWICE DAILY, Disp: 180 tablet, Rfl: 3    simvastatin (ZOCOR) 40 MG tablet, TAKE 1 TABLET EVERY NIGHT, Disp: 90 tablet, Rfl: 3    timolol maleate 0.5% (TIMOPTIC) 0.5 % Drop, Place 1 drop into both eyes 2 (two) times daily., Disp: 5 mL, Rfl: 5    Review of Systems   Constitutional: Negative for chills and fever.   Respiratory: Negative for cough and shortness of breath.    Cardiovascular: Positive for leg swelling. Negative for chest pain.   Gastrointestinal: Negative for abdominal pain.   Skin: Negative for rash.   Neurological: Negative for dizziness.       Objective:   BP (!) 168/74 (BP Location: Left arm, Patient Position: Sitting, BP Method: Medium (Manual))   Pulse 76   Temp 97 °F (36.1 °C) (Tympanic)   Resp 16   Ht 5' 6.5" (1.689 m)   Wt 105.2 kg (231 lb 14.8 oz)   SpO2 95%   BMI 36.87 kg/m²      Physical Exam   Constitutional: She is oriented to person, place, and time. She appears well-developed and well-nourished. No distress.   HENT:   Head: Normocephalic and atraumatic.   Nose: Nose normal.   Eyes: Conjunctivae and EOM are normal. Pupils are equal, round, and reactive to light. Right eye exhibits no discharge. Left eye exhibits no discharge.   Neck: No thyromegaly present.   Cardiovascular: Normal rate and regular rhythm.    No murmur heard.  Pulmonary/Chest: Effort normal and breath sounds normal. No respiratory distress. "   Abdominal: Soft. She exhibits no distension.   Musculoskeletal: She exhibits edema (he has 2+ pitting edema in both of her LE but not warmth/tenderness).   Neurological: She is alert and oriented to person, place, and time.   Skin: No rash noted. She is not diaphoretic.   Psychiatric: She has a normal mood and affect. Her behavior is normal.       Assessment & Plan     Problem List Items Addressed This Visit        Cardiac/Vascular    Hypertension associated with diabetes - Primary    Current Assessment & Plan     Assessing renal function today but I think she will need to be on a diuretic of some sort.  Thinking to use chlorthalidone cautiously even though she has a history of gout.  Have her continue the colchicine but she needs something to help decrease the swelling in her legs and control her blood pressure better.  Have her follow up within 2 weeks.         Relevant Orders    Comprehensive metabolic panel    Hemoglobin A1c       Endocrine    Severe obesity (BMI 35.0-35.9 with comorbidity)    Current Assessment & Plan     Counseled on importance on diet and exercise to decrease risk of comorbid conditions and for improved quality of life.                   No Follow-up on file.

## 2018-03-20 ENCOUNTER — OFFICE VISIT (OUTPATIENT)
Dept: ORTHOPEDICS | Facility: CLINIC | Age: 78
End: 2018-03-20
Payer: MEDICARE

## 2018-03-20 VITALS
HEART RATE: 79 BPM | HEIGHT: 67 IN | SYSTOLIC BLOOD PRESSURE: 175 MMHG | DIASTOLIC BLOOD PRESSURE: 71 MMHG | WEIGHT: 231 LBS | BODY MASS INDEX: 36.26 KG/M2

## 2018-03-20 DIAGNOSIS — M67.441 GANGLION CYST OF FINGER OF RIGHT HAND: ICD-10-CM

## 2018-03-20 DIAGNOSIS — Z01.818 PRE-OP TESTING: Primary | ICD-10-CM

## 2018-03-20 DIAGNOSIS — G56.00 CARPAL TUNNEL SYNDROME, UNSPECIFIED LATERALITY: ICD-10-CM

## 2018-03-20 DIAGNOSIS — G56.03 BILATERAL CARPAL TUNNEL SYNDROME: Primary | ICD-10-CM

## 2018-03-20 DIAGNOSIS — M67.449 MUCOUS CYST OF FINGER: ICD-10-CM

## 2018-03-20 PROCEDURE — 3077F SYST BP >= 140 MM HG: CPT | Mod: CPTII,S$GLB,, | Performed by: PHYSICIAN ASSISTANT

## 2018-03-20 PROCEDURE — 99213 OFFICE O/P EST LOW 20 MIN: CPT | Mod: S$GLB,,, | Performed by: PHYSICIAN ASSISTANT

## 2018-03-20 PROCEDURE — 3078F DIAST BP <80 MM HG: CPT | Mod: CPTII,S$GLB,, | Performed by: PHYSICIAN ASSISTANT

## 2018-03-20 PROCEDURE — 99999 PR PBB SHADOW E&M-EST. PATIENT-LVL IV: CPT | Mod: PBBFAC,,, | Performed by: PHYSICIAN ASSISTANT

## 2018-03-20 NOTE — PROGRESS NOTES
This consultation has been requested by Dr. Ramos Hanks   .  Please make certain that  he gets a copy of this consultation report.      CC: 76 y/o female, finger pain and mass    HPI: Here for evaluation of a knot and mass on her right middle finger for greater in 2 months.  She is active bowler and the pain increases when obstructive bowler pressure on the finger.  Pain is resolved at rest and does not radiate.  Also complains of a multiple year history of numbness and to enhance.  States she returns carpal tunnel syndrome several years ago but declined surgery.  She states she had positive EMG test that these were several years also.  Pain level today a 4 on a 10 scale.  The mass and the numb and tingling are now affecting her ADLs as she is unable to bowl    PMH:    Past Medical History:   Diagnosis Date    Anemia     Arthritis     Asthma     Back pain     Bronchitis     CAD (coronary artery disease) 7/15    via [facility] ct    Carotid artery stenosis     Chronic gout     Colon polyp     CTS (carpal tunnel syndrome)     Diabetes mellitus, type 2     Diabetes with neurologic complications     Diverticulosis     RIVAS (dyspnea on exertion)     chronic; eval pulm dr natarajan    Dyslipidemia     Ex-smoker     quit in her 30s    GERD (gastroesophageal reflux disease)     Hyperlipidemia     Hypertension     Neuropathy, lower extremity     Obesity     Open-angle glaucoma     dr avel kaur    Peripheral vascular disease     Polyneuropathy     Tobacco dependence        PSH:    Past Surgical History:   Procedure Laterality Date    Bilateral knee replacement      CATARACT EXTRACTION, BILATERAL      CHOLECYSTECTOMY      HEMORRHOID SURGERY      HYSTERECTOMY      fibroids    INCISION AND DRAINAGE PERIRECTAL ABSCESS      thyroid resection         Family Hx:    Family History   Problem Relation Age of Onset    Hypertension Mother     Diabetes Mother     Leukemia Sister     Hypertension Sister      Cancer Sister      leukemia    Heart disease Maternal Aunt     Cancer Maternal Uncle      gastric, pacreatic    Heart disease Maternal Uncle     Miscarriages / Stillbirths Maternal Uncle     Diabetes Maternal Grandmother     Asthma Maternal Grandfather     Breast cancer Neg Hx     Colon cancer Neg Hx     Ovarian cancer Neg Hx     COPD Neg Hx     Hyperlipidemia Neg Hx     Kidney disease Neg Hx        Allergy:    Review of patient's allergies indicates:   Allergen Reactions    Iodine and iodide containing products Nausea And Vomiting    Penicillins Itching    Amlodipine      edema    Sulfa (sulfonamide antibiotics)     Ultram [tramadol] Other (See Comments)     Light headness, itiching    Bactrim [sulfamethoxazole-trimethoprim] Itching and Rash       Medication:    Current Outpatient Prescriptions:     ACCU-CHEK DARSHAN PLUS TEST STRP Strp, TEST BLOOD SUGAR TWICE DAILY, Disp: 200 strip, Rfl: 3    ACCU-CHEK SOFTCLIX LANCETS Misc, TEST TWICE A DAY, Disp: 200 each, Rfl: 3    albuterol 90 mcg/actuation inhaler, Inhale 2 puffs into the lungs every 6 (six) hours as needed for Wheezing., Disp: 1 each, Rfl: 11    aspirin 81 MG Chew, Take 81 mg by mouth once daily., Disp: , Rfl:     blood glucose control high,low (ACCU-CHEK DARSHAN CONTROL SOLN) Soln, Use a directed, Disp: 1 each, Rfl: 3    blood-glucose meter kit, accu chek darshan meter/test twice daily, Disp: 1 each, Rfl: 0    clobetasol 0.05% (TEMOVATE) 0.05 % Oint, APPLY TOPICALLY TWO TIMES A DAY, Disp: 60 g, Rfl: 1    clotrimazole (LOTRIMIN) 1 % cream, Apply topically 2 (two) times daily., Disp: 45 g, Rfl: 6    colchicine 0.6 mg tablet, Take 1 tablet (0.6 mg total) by mouth once daily., Disp: 30 tablet, Rfl: 5    fluticasone (FLOVENT HFA) 110 mcg/actuation inhaler, Inhale 1 puff into the lungs 2 (two) times daily. Controller, Disp: 12 g, Rfl: 0    fosinopril (MONOPRIL) 40 MG tablet, TAKE 1 TABLET ONE TIME DAILY, Disp: 90 tablet, Rfl: 3     "metFORMIN (GLUCOPHAGE) 500 MG tablet, TAKE 1 TABLET TWICE DAILY, Disp: 180 tablet, Rfl: 3    simvastatin (ZOCOR) 40 MG tablet, TAKE 1 TABLET EVERY NIGHT, Disp: 90 tablet, Rfl: 3    timolol maleate 0.5% (TIMOPTIC) 0.5 % Drop, Place 1 drop into both eyes 2 (two) times daily., Disp: 5 mL, Rfl: 5    Social History:    Social History     Social History    Marital status: Single     Spouse name: N/A    Number of children: 0    Years of education: N/A     Occupational History    Not on file.     Social History Main Topics    Smoking status: Former Smoker     Packs/day: 0.25     Years: 24.00     Quit date: 6/25/1976    Smokeless tobacco: Never Used    Alcohol use No    Drug use: No    Sexual activity: Not Currently     Other Topics Concern    Not on file     Social History Narrative    No narrative on file       Vitals:   BP (!) 175/71   Pulse 79   Ht 5' 6.5" (1.689 m)   Wt 104.8 kg (231 lb)   BMI 36.73 kg/m²      ROS:  GENERAL: No fever, chills, fatigability or weight loss.  SKIN: No rashes, itching or changes in color or texture of skin.  HEAD: No headaches or recent head trauma.  EYES: Visual acuity fine. No photophobia, ocular pain or diplopia.  EARS: Denies ear pain, discharge or vertigo.  NOSE: No loss of smell, no epistaxis or postnasal drip.  MOUTH & THROAT: No hoarseness or change in voice. No excessive gum bleeding.  NODES: Denies swollen glands.  CHEST: Denies RIVAS, cyanosis, wheezing, cough and sputum production.  CARDIOVASCULAR: Denies chest pain, PND, orthopnea or reduced exercise tolerance.  ABDOMEN: Appetite fine. No weight loss. Denies diarrhea, abdominal pain, hematemesis or blood in stool.  URINARY: No flank pain, dysuria or hematuria.  PERIPHERAL VASCULAR: No claudication or cyanosis.  NEUROLOGIC: No history of seizures, paralysis, alteration of gait or coordination.  MUSCULOSKELETAL: See HPI    PE:  APPEARANCE: Well nourished, well developed, in no acute distress.   HEAD: Normocephalic, " atraumatic.  NEUROLOGIC: Cranial Nerves: II-XII grossly intact, also see MUSCULOSKELETAL  MUSCULOSKELETAL:   CARPAL TUNNEL SYNDROME  Right -    - Positive- Tinel's over the Median nerve  Positive- Phalen maneuver   Positive- Thenar atrophy   Negative- hypothenar atrophy   Positive- Median Nerve Compression test less than 30 seconds   Negative- Tinel's over Guyon's Canal   Negative- Tinel's over Cubital Tunnel Negative- Tinels over the Median Nerve overlying the antecubital  Fossa   Negative- Tinel's over Radial groove  Negative- Tinel's over sensory branch of Radial nerve at the wrist  Negative- Tinel's over the PIN   Negative- pain in forearm with resistance to             FDP flexion and resisted pronation   Positive- boggy synovium of the wrist   normal- less than 2 seconds capillary all digits  Positive- light touch intact in Median nerve distribution Positive- light touch in the Radial Nerve distribution  Positive- light touch intact in the Ulnar sensory nerve distribution    Positive- Thumb opposition strength symmetrical  Negative- bruit(aneurysm)    Positive- skin color intact(claudication/Raynaud's)  Negative- cold digits(claudication/Raynaud's)  normal - Rahul Test(Vascular Exam)  normal- +2 Radial and Ulnar artery pulses  Negative- anatomical snuff box tenderness(Dequervain's Synovitis)  Negative- finger or thumb triggering(Trigger Thumb or Finger)  Negative- Lipoma  Positive- Ganglion cyst- base of middle finger.  Mobile, non tender      Sensory exam-C5,C6,C7,C8,T1- normal    Wrist extension for radial nerve- +5/5  Wrist Flexion-+5/5  Wrist Ulnar Deviation-+5/5  Wrist Radial Deviation- +5/5  Resisted opposition of the thumb for the median nerve- +5/5  Digit abduction for the ulnar nerve- +5/5        Assessment:           Diagnosis:              1.  Ganglion cysts right middle finger               2.  Carpal tunnel syndrome- bilateral right worse than left    Diagnostic Studies  MRI-No  X-Ray-Yes agree  with findings of Findings: There is moderate joint space narrowing and osteophyte formation seen involving the interphalangeal joints.  There is also at least moderate degenerative change present at the 1st CMC joint.  Minimal degenerative changes noted about the MCP joints and wrists in greatest at the 1st MCP joint    EMG/NCV-Yes, 1. ABNORMAL study  2. There is electrodiagnostic evidence of a MILD-MODERATE demyelinating median neuropathy (Carpal tunnel syndrome) across the RIGHT wrist and a MILD demyelinating CTS across the LEFT  wrist        Plan:                                                 1. PT-no                                                 2.OT-no                                          3.NSAID-no                                        4. Narcotics-no                                     5. Wound care-N/A                                 6. Rest-no                                           7. Surgery-yes  patient may greatly benefit from ganglion cyst removal.  And right carpal tunnel release         8. ANGELIKA Hose-no                                    9. Anticoagulation therapy-no               10. Elevation-no                                     11. Crutches-no                                    12. Walker-no             13. Cane no                        14. Referral-no                                     15.Injection-no                            16. Splint   /    Cast   /   Cast Shoe-No              17. RICE            18. Follow up-  patient elects to proceed with a right carpal tunnel release and a right middle finger ganglion cyst removal     Thank you Dr. Hanks for the referral

## 2018-03-20 NOTE — PATIENT INSTRUCTIONS
Carpal Tunnel Release Surgery  Surgery may be done if your carpal tunnel syndrome (CTS) symptoms become severe. Or, you may have surgery if no other treatment brings relief. There are 2 types of CTS procedures. You will be told about the one you will have. Youll also be instructed how to prepare for it.      The goals of surgery  Two types of surgery--open and endoscopic--are used to treat CTS.  · With open surgery, your surgeon makes one incision in your palm. Standard surgical tools are used.  · With endoscopic surgery, one or two small incisions may be made in your hand. A scope (with a very small camera attached) and tools are inserted under the carpal ligament. The surgeon then operates while watching images on a video screen. No matter which one you have, the goal remains the same: Your surgeon will relieve pressure on the median nerve. To do this, the transverse carpal ligament is cut (released).  After surgery  If youve had carpal tunnel surgery, you will spend a few hours resting before you go home. The nerve sensation and circulation in your hand will be checked at this time. For the safest healing, keep the following in mind.  · Keep your hand raised above heart level. This will help reduce swelling.  · Limit hand and wrist use as instructed. A wrist brace may be required.  · Take any pain medication as directed.  · Do hand exercises as directed by your surgeon or therapist.  When to call the surgeon  Call your surgeon if you notice any of the following:  · White or pale-blue hand or nails (If you pinch your skin or nail and the color doesnt return)  · Pain that is not relieved by prescribed medicine  · Loss of sensation or excess swelling in hand or fingers  · Fever over 100.4°F (38°C)   Date Last Reviewed: 9/11/2015  © [id number] [employer]. [address] [place] [address], [place] [address]. All rights reserved. This information is not intended as a substitute for professional medical care.  Always follow your healthcare professional's instructions.

## 2018-03-21 ENCOUNTER — TELEPHONE (OUTPATIENT)
Dept: INTERNAL MEDICINE | Facility: CLINIC | Age: 78
End: 2018-03-21

## 2018-03-21 DIAGNOSIS — E87.5 HYPERKALEMIA: ICD-10-CM

## 2018-03-21 NOTE — TELEPHONE ENCOUNTER
----- Message from Maksim Farris sent at 3/21/2018  3:23 PM CDT -----  Pt returning a call to nurse to discuss lab results.      Please call pt back at [phone]

## 2018-03-21 NOTE — TELEPHONE ENCOUNTER
Tried calling pt back twice with no answer or way to leave a msg. The [phone] said I had the wrong number.

## 2018-03-22 ENCOUNTER — TELEPHONE (OUTPATIENT)
Dept: INTERNAL MEDICINE | Facility: CLINIC | Age: 78
End: 2018-03-22

## 2018-03-22 NOTE — TELEPHONE ENCOUNTER
Spoke with pt and informed her per  that her potassium is mildly elevated and he wants her to swing back by the lab to check it that it has probably resolved its self. Pt said she has an appt for lab already on 3/26/18. I told her I linked the potassium order to that appt and she can have it all done at the same time. Pt verbalized understanding.

## 2018-03-22 NOTE — TELEPHONE ENCOUNTER
----- Message from Ethan Pollock sent at 3/22/2018  1:25 PM CDT -----  Contact: self [phone]  Would like to discuss lab results.  Please call back at [phone].  Temi Trujillo

## 2018-03-26 ENCOUNTER — HOSPITAL ENCOUNTER (OUTPATIENT)
Dept: CARDIOLOGY | Facility: CLINIC | Age: 78
Discharge: HOME | End: 2018-03-26
Payer: MEDICARE

## 2018-03-26 ENCOUNTER — HOSPITAL ENCOUNTER (OUTPATIENT)
Dept: RADIOLOGY | Facility: HOSPITAL | Age: 78
Discharge: HOME | End: 2018-03-26
Attending: ORTHOPAEDIC SURGERY
Payer: MEDICARE

## 2018-03-26 DIAGNOSIS — Z01.818 PRE-OP TESTING: ICD-10-CM

## 2018-03-26 PROCEDURE — 71046 X-RAY EXAM CHEST 2 VIEWS: CPT | Mod: TC,PO

## 2018-03-26 PROCEDURE — 93005 ELECTROCARDIOGRAM TRACING: CPT | Mod: S$GLB,,, | Performed by: ORTHOPAEDIC SURGERY

## 2018-03-26 PROCEDURE — 93010 ELECTROCARDIOGRAM REPORT: CPT | Mod: S$GLB,,, | Performed by: NUCLEAR MEDICINE

## 2018-03-26 PROCEDURE — 71046 X-RAY EXAM CHEST 2 VIEWS: CPT | Mod: 26,,, | Performed by: RADIOLOGY

## 2018-03-29 ENCOUNTER — OFFICE VISIT (OUTPATIENT)
Dept: INTERNAL MEDICINE | Facility: CLINIC | Age: 78
End: 2018-03-29
Payer: MEDICARE

## 2018-03-29 VITALS
OXYGEN SATURATION: 98 % | TEMPERATURE: 96 F | DIASTOLIC BLOOD PRESSURE: 72 MMHG | RESPIRATION RATE: 16 BRPM | WEIGHT: 227.31 LBS | BODY MASS INDEX: 35.68 KG/M2 | SYSTOLIC BLOOD PRESSURE: 120 MMHG | HEIGHT: 67 IN | HEART RATE: 81 BPM

## 2018-03-29 DIAGNOSIS — M67.441 GANGLION CYST OF FLEXOR TENDON SHEATH OF FINGER OF RIGHT HAND: ICD-10-CM

## 2018-03-29 DIAGNOSIS — E11.59 HYPERTENSION ASSOCIATED WITH DIABETES: ICD-10-CM

## 2018-03-29 DIAGNOSIS — I15.2 HYPERTENSION ASSOCIATED WITH DIABETES: ICD-10-CM

## 2018-03-29 DIAGNOSIS — Z01.818 PREOP EXAMINATION: Primary | ICD-10-CM

## 2018-03-29 DIAGNOSIS — G56.03 BILATERAL CARPAL TUNNEL SYNDROME: ICD-10-CM

## 2018-03-29 PROCEDURE — 99999 PR PBB SHADOW E&M-EST. PATIENT-LVL IV: CPT | Mod: PBBFAC,,, | Performed by: NURSE PRACTITIONER

## 2018-03-29 PROCEDURE — 99499 UNLISTED E&M SERVICE: CPT | Mod: S$GLB,,, | Performed by: NURSE PRACTITIONER

## 2018-03-29 PROCEDURE — 3078F DIAST BP <80 MM HG: CPT | Mod: CPTII,S$GLB,, | Performed by: NURSE PRACTITIONER

## 2018-03-29 PROCEDURE — 99214 OFFICE O/P EST MOD 30 MIN: CPT | Mod: S$GLB,,, | Performed by: NURSE PRACTITIONER

## 2018-03-29 PROCEDURE — 3074F SYST BP LT 130 MM HG: CPT | Mod: CPTII,S$GLB,, | Performed by: NURSE PRACTITIONER

## 2018-03-29 NOTE — PROGRESS NOTES
Subjective:   Patient ID:  Kaleigh Nieves is a 77 y.o. female.  Chief Complaint:  Hypertension and Pre-op Exam    Past Medical History:   Diagnosis Date    Anemia     Arthritis     Asthma     Back pain     Bronchitis     CAD (coronary artery disease) 7/15    via chst ct    Carotid artery stenosis     Chronic gout     Colon polyp     CTS (carpal tunnel syndrome)     Diabetes mellitus, type 2     Diabetes with neurologic complications     Diverticulosis     RIVAS (dyspnea on exertion)     chronic; eval pulm dr natarajan    Dyslipidemia     Ex-smoker     quit in her 30s    GERD (gastroesophageal reflux disease)     Hyperlipidemia     Hypertension     Neuropathy, lower extremity     Obesity     Open-angle glaucoma     dr avel kaur    Peripheral vascular disease     Polyneuropathy     Tobacco dependence      Past Surgical History:   Procedure Laterality Date    Bilateral knee replacement      CATARACT EXTRACTION, BILATERAL      CHOLECYSTECTOMY      HEMORRHOID SURGERY      HYSTERECTOMY      fibroids    INCISION AND DRAINAGE PERIRECTAL ABSCESS      thyroid resection       Family History   Problem Relation Age of Onset    Hypertension Mother     Diabetes Mother     Leukemia Sister     Hypertension Sister     Cancer Sister      leukemia    Heart disease Maternal Aunt     Cancer Maternal Uncle      gastric, pacreatic    Heart disease Maternal Uncle     Miscarriages / Stillbirths Maternal Uncle     Diabetes Maternal Grandmother     Asthma Maternal Grandfather     Breast cancer Neg Hx     Colon cancer Neg Hx     Ovarian cancer Neg Hx     COPD Neg Hx     Hyperlipidemia Neg Hx     Kidney disease Neg Hx      Review of patient's allergies indicates:   Allergen Reactions    Iodine and iodide containing products Nausea And Vomiting    Penicillins Itching    Amlodipine      edema    Sulfa (sulfonamide antibiotics)     Ultram [tramadol] Other (See Comments)     Light headness, itiching     Bactrim [sulfamethoxazole-trimethoprim] Itching and Rash     Current Outpatient Prescriptions   Medication Sig Dispense Refill    ACCU-CHEK DARSHAN PLUS TEST STRP Strp TEST BLOOD SUGAR TWICE DAILY 200 strip 3    ACCU-CHEK SOFTCLIX LANCETS Misc TEST TWICE A  each 3    albuterol 90 mcg/actuation inhaler Inhale 2 puffs into the lungs every 6 (six) hours as needed for Wheezing. 1 each 11    aspirin 81 MG Chew Take 81 mg by mouth once daily.      blood glucose control high,low (ACCU-CHEK DARSHAN CONTROL SOLN) Soln Use a directed 1 each 3    blood-glucose meter kit accu chek darshan meter/test twice daily 1 each 0    clobetasol 0.05% (TEMOVATE) 0.05 % Oint APPLY TOPICALLY TWO TIMES A DAY 60 g 1    clotrimazole (LOTRIMIN) 1 % cream Apply topically 2 (two) times daily. 45 g 6    colchicine 0.6 mg tablet Take 1 tablet (0.6 mg total) by mouth once daily. 30 tablet 5    fluticasone (FLOVENT HFA) 110 mcg/actuation inhaler Inhale 1 puff into the lungs 2 (two) times daily. Controller 12 g 0    fosinopril (MONOPRIL) 40 MG tablet TAKE 1 TABLET ONE TIME DAILY 90 tablet 3    metFORMIN (GLUCOPHAGE) 500 MG tablet TAKE 1 TABLET TWICE DAILY 180 tablet 3    simvastatin (ZOCOR) 40 MG tablet TAKE 1 TABLET EVERY NIGHT 90 tablet 3    timolol maleate 0.5% (TIMOPTIC) 0.5 % Drop Place 1 drop into both eyes 2 (two) times daily. 5 mL 5     No current facility-administered medications for this visit.      Ms Nieves is here for preop exam for carpel tunnel release and ganglion cyst removal with Dr Cavazos. EKG, labs and CXR were already done. She Has no issues today other than some BLE that is chronic. She was taken off diuretics for gout. She is supposed to wear compression stocking but she does not as she states they cut off her circulation in her feet      Review of Systems   Constitutional: Negative for chills and fever.   HENT: Negative.    Eyes: Negative.    Respiratory: Negative for cough and shortness of breath.   "  Cardiovascular: Positive for leg swelling. Negative for chest pain and palpitations.   Gastrointestinal: Negative for abdominal pain, constipation, diarrhea, nausea and vomiting.   Endocrine: Negative.    Genitourinary: Negative.    Musculoskeletal: Negative.  Negative for myalgias.   Skin: Negative for color change, rash and wound.   Allergic/Immunologic: Negative.    Neurological: Positive for numbness (bilateral forearms/ hands wth tingling). Negative for dizziness, weakness, light-headedness and headaches.   Psychiatric/Behavioral: Negative.        Objective:   /72 (BP Location: Left arm, Patient Position: Sitting, BP Method: Large (Manual))   Pulse 81   Temp 96 °F (35.6 °C) (Tympanic)   Resp 16   Ht 5' 6.5" (1.689 m)   Wt 103.1 kg (227 lb 4.7 oz)   SpO2 98%   BMI 36.14 kg/m²   Physical Exam   Constitutional: She is oriented to person, place, and time. She appears well-developed and well-nourished. No distress.   HENT:   Head: Normocephalic and atraumatic.   Nose: Nose normal.   Eyes: Conjunctivae and EOM are normal. Pupils are equal, round, and reactive to light. Right eye exhibits no discharge. Left eye exhibits no discharge.   Neck: Normal range of motion. Neck supple. No thyromegaly present.   Cardiovascular: Normal rate and regular rhythm.    No murmur heard.  Pulmonary/Chest: Effort normal and breath sounds normal. No respiratory distress.   Abdominal: Soft. She exhibits no distension. There is no tenderness.   Musculoskeletal: She exhibits edema (he has 2+ pitting edema in both of her LE but not warmth/tenderness).   Neurological: She is alert and oriented to person, place, and time.   Skin: Skin is warm and dry. No rash noted. She is not diaphoretic.   Psychiatric: She has a normal mood and affect. Her behavior is normal.     Assessment:     1. Preop examination    2. Ganglion cyst of flexor tendon sheath of finger of right hand    3. Bilateral carpal tunnel syndrome    4. Hypertension " associated with diabetes      Plan:     Problem List Items Addressed This Visit        Neuro    Bilateral carpal tunnel syndrome    Overview     mild demyelinating on left, mild-moderate demyelianting on right         Current Assessment & Plan     Right release 4/25/18            Cardiac/Vascular    Hypertension associated with diabetes    Current Assessment & Plan     BP good today but BP has been variable. Instructed pt to monitor at home and notify if remains out of range            Orthopedic    Ganglion cyst of flexor tendon sheath of finger of right hand    Current Assessment & Plan     removal 4/25/18            Other    Preop examination - Primary    Current Assessment & Plan     All labs, EKG, CXR and history reviewed. Pt cleared for sx from PC standpoint.

## 2018-03-29 NOTE — ASSESSMENT & PLAN NOTE
BP good today but BP has been variable. Instructed pt to monitor at home and notify if remains out of range

## 2018-03-29 NOTE — PATIENT INSTRUCTIONS
Established High Blood Pressure    High blood pressure (hypertension) is a chronic disease. Often, healthcare providers dont know what causes it. But it can be caused by certain health conditions and medicines.  If you have high blood pressure, you may not have any symptoms. If you do have symptoms, they may include headache, dizziness, changes in your vision, chest pain, and shortness of breath. But even without symptoms, high blood pressure thats not treated raises your risk for heart attack and stroke. High blood pressure is a serious health risk and shouldnt be ignored.  A blood pressure reading is made up of two numbers: a higher number over a lower number. The top number is the systolic pressure. The bottom number is the diastolic pressure. A normal blood pressure is a systolic pressure of  less than 120 over a diastolic pressure of less than 80. You will see your blood pressure readings written together. For example, a person with a systolic pressure of 188 and a diastolic pressure of 78 will have 118/78 written in the medical record.  High blood pressure is when either the top number is 140 or higher, or the bottom number is 90 or higher. This must be the result when taking your blood pressure a number of times. The blood pressures between normal and high are called prehypertension.  Home care  If you have high blood pressure, you should do what is listed below to lower your blood pressure. If you are taking medicines for high blood pressure, these methods may reduce or end your need for medicines in the future.  · Begin a weight-loss program if you are overweight.  · Cut back on how much salt you get in your diet. Heres how to do this:  ¨ Dont eat foods that have a lot of salt. These include olives, pickles, smoked meats, and salted potato chips.  ¨ Dont add salt to your food at the table.  ¨ Use only small amounts of salt when cooking.  · Start an exercise program. Talk with your healthcare  provider about the type of exercise program that would be best for you. It doesn't have to be hard. Even brisk walking for 20 minutes 3 times a week is a good form of exercise.  · Dont take medicines that stimulate the heart. This includes many over-the-counter cold and sinus decongestant pills and sprays, as well as diet pills. Check the warnings about hypertension on the label. Before buying any over-the-counter medicines or supplements, always ask the pharmacist about the product's potential interaction with your high blood pressure and your high blood pressure medicines.  · Stimulants such as amphetamine or cocaine could be deadly for someone with high blood pressure. Never take these.  · Limit how much caffeine you get in your diet. Switch to caffeine-free products.  · Stop smoking. If you are a long-time smoker, this can be hard. Talk to your healthcare provider about medicines and nicotine replacement options to help you. Also, enroll in a stop-smoking program to make it more likely that you will quit for good.  · Learn how to handle stress. This is an important part of any program to lower blood pressure. Learn about relaxation methods like meditation, yoga, or biofeedback.  · If your provider prescribed medicines, take them exactly as directed. Missing doses may cause your blood pressure get out of control.  · If you miss a dose or doses, check with your healthcare provider or pharmacist about what to do.  · Consider buying an automatic blood pressure machine. Ask your provider for a recommendation. You can get one of these at most pharmacies.     The American [employer] Association recommends the following guidelines for home blood pressure monitoring:  · Don't smoke or drink coffee for 30 minutes before taking your blood pressure.  · Go to the bathroom before the test.  · Relax for 5 minutes before taking the measurement.  · Sit with your back supported (don't sit on a couch or soft chair); keep your feet on  the floor uncrossed. Place your arm on a solid flat surface (like a table) with the upper part of the arm at heart level. Place the middle of the cuff directly above the eye of the elbow. Check the monitor's instruction manual for an illustration.  · Take multiple readings. When you measure, take 2 to 3 readings one minute apart and record all of the results.  · Take your blood pressure at the same time every day, or as your healthcare provider recommends.  · Record the date, time, and blood pressure reading.  · Take the record with you to your next medical appointment. If your blood pressure monitor has a built-in memory, simply take the monitor with you to your next appointment.  · Call your provider if you have several high readings. Don't be frightened by a single high blood pressure reading, but if you get several high readings, check in with your healthcare provider.  · Note: When blood pressure reaches a systolic (top number) of 180 or higher OR diastolic (bottom number) of 110 or higher, seek emergency medical treatment.  Follow-up care  You will need to see your healthcare provider regularly. This is to check your blood pressure and to make changes to your medicines. Make a follow-up appointment as directed. Bring the record of your home blood pressure readings to the appointment.  When to seek medical advice  Call your healthcare provider right away if any of these occur:  · Blood pressure reaches a systolic (upper number) of 180 or higher OR a diastolic (bottom number) of 110 or higher  · Chest pain or shortness of breath  · Severe headache  · Throbbing or rushing sound in the ears  · Nosebleed  · Sudden severe pain in your belly (abdomen)  · Extreme drowsiness, confusion, or fainting  · Dizziness or spinning sensation (vertigo)  · Weakness of an arm or leg or one side of the face  · You have problems speaking or seeing   Date Last Reviewed: 12/1/2016  © 8651-1626 [employer]. [address] [place] [address]  [place] [address]. All rights reserved. This information is not intended as a substitute for professional medical care. Always follow your healthcare professional's instructions.

## 2018-04-05 ENCOUNTER — OFFICE VISIT (OUTPATIENT)
Dept: PODIATRY | Facility: CLINIC | Age: 78
End: 2018-04-05
Payer: MEDICARE

## 2018-04-05 VITALS
HEART RATE: 73 BPM | WEIGHT: 227.31 LBS | SYSTOLIC BLOOD PRESSURE: 131 MMHG | DIASTOLIC BLOOD PRESSURE: 65 MMHG | BODY MASS INDEX: 35.68 KG/M2 | HEIGHT: 67 IN

## 2018-04-05 DIAGNOSIS — I73.9 ARTERIAL INSUFFICIENCY OF LOWER EXTREMITY: ICD-10-CM

## 2018-04-05 DIAGNOSIS — B35.1 DERMATOPHYTOSIS OF NAIL: ICD-10-CM

## 2018-04-05 DIAGNOSIS — E11.9 COMPREHENSIVE DIABETIC FOOT EXAMINATION, TYPE 2 DM, ENCOUNTER FOR: ICD-10-CM

## 2018-04-05 DIAGNOSIS — E11.51 TYPE II DIABETES MELLITUS WITH PERIPHERAL CIRCULATORY DISORDER: Primary | ICD-10-CM

## 2018-04-05 PROCEDURE — 3075F SYST BP GE 130 - 139MM HG: CPT | Mod: CPTII,S$GLB,, | Performed by: PODIATRIST

## 2018-04-05 PROCEDURE — 99213 OFFICE O/P EST LOW 20 MIN: CPT | Mod: 25,S$GLB,, | Performed by: PODIATRIST

## 2018-04-05 PROCEDURE — 99999 PR PBB SHADOW E&M-EST. PATIENT-LVL III: CPT | Mod: PBBFAC,,, | Performed by: PODIATRIST

## 2018-04-05 PROCEDURE — 99499 UNLISTED E&M SERVICE: CPT | Mod: S$GLB,,, | Performed by: PODIATRIST

## 2018-04-05 PROCEDURE — 3078F DIAST BP <80 MM HG: CPT | Mod: CPTII,S$GLB,, | Performed by: PODIATRIST

## 2018-04-05 PROCEDURE — 11721 DEBRIDE NAIL 6 OR MORE: CPT | Mod: Q8,S$GLB,, | Performed by: PODIATRIST

## 2018-04-05 NOTE — PROGRESS NOTES
Subjective:     Patient ID: Kaleigh Nieves is a 77 y.o. female.    Chief Complaint: Routine Foot Care (PCP: Ramos Hanks 3/15/2018, diabetic nail care/feet check )    Kaleigh is a 77 y.o. female who presents to the clinic for evaluation and treatment of high risk feet. Kaleigh has a past medical history of Anemia; Arthritis; Asthma; Back pain; Bronchitis; CAD (coronary artery disease) (7/15); Carotid artery stenosis; Chronic gout; Colon polyp; CTS (carpal tunnel syndrome); Diabetes mellitus, type 2; Diabetes with neurologic complications; Diverticulosis; RIVAS (dyspnea on exertion); Dyslipidemia; Ex-smoker; GERD (gastroesophageal reflux disease); Hyperlipidemia; Hypertension; Neuropathy, lower extremity; Obesity; Open-angle glaucoma; Peripheral vascular disease; Polyneuropathy; and Tobacco dependence. The patient's chief complaint is long, thick toenails. Patient states her blood sugar this morning was 87mg/dl.  This patient has documented high risk feet requiring routine maintenance secondary to diabetes mellitis and those secondary complications of diabetes, as mentioned. Patient states the toes were swollen but she has been taking Colchicine and the swelling and redness has decreased.     PCP: Ramos Hanks, DO    Date Last Seen by PCP: 3/15/18    Current shoe gear:  Affected Foot: Rx diabetic extra depth shoes and custom accommodative insoles     Unaffected Foot: Rx diabetic extra depth shoes and custom accommodative insoles    Hemoglobin A1C   Date Value Ref Range Status   03/15/2018 5.6 4.0 - 5.6 % Final     Comment:     According to ADA guidelines, hemoglobin A1c <7.0% represents  optimal control in non-pregnant diabetic patients. Different  metrics may apply to specific patient populations.   Standards of Medical Care in Diabetes-2016.  For the purpose of screening for the presence of diabetes:  <5.7%     Consistent with the absence of diabetes  5.7-6.4%  Consistent with increasing risk for diabetes    (prediabetes)  >or=6.5%  Consistent with diabetes  Currently, no consensus exists for use of hemoglobin A1c  for diagnosis of diabetes for children.  This Hemoglobin A1c assay has significant interference with fetal   hemoglobin   (HbF). The results are invalid for patients with abnormal amounts of   HbF,   including those with known Hereditary Persistence   of Fetal Hemoglobin. Heterozygous hemoglobin variants (HbAS, HbAC,   HbAD, HbAE, HbA2) do not significantly interfere with this assay;   however, presence of multiple variants in a sample may impact the %   interference.     09/28/2017 5.7 (H) 4.0 - 5.6 % Final     Comment:     According to ADA guidelines, hemoglobin A1c <7.0% represents  optimal control in non-pregnant diabetic patients. Different  metrics may apply to specific patient populations.   Standards of Medical Care in Diabetes-2016.  For the purpose of screening for the presence of diabetes:  <5.7%     Consistent with the absence of diabetes  5.7-6.4%  Consistent with increasing risk for diabetes   (prediabetes)  >or=6.5%  Consistent with diabetes  Currently, no consensus exists for use of hemoglobin A1c  for diagnosis of diabetes for children.  This Hemoglobin A1c assay has significant interference with fetal   hemoglobin   (HbF). The results are invalid for patients with abnormal amounts of   HbF,   including those with known Hereditary Persistence   of Fetal Hemoglobin. Heterozygous hemoglobin variants (HbAS, HbAC,   HbAD, HbAE, HbA2) do not significantly interfere with this assay;   however, presence of multiple variants in a sample may impact the %   interference.     03/21/2017 5.9 4.5 - 6.2 % Final     Comment:     According to ADA guidelines, hemoglobin A1C <7.0% represents  optimal control in non-pregnant diabetic patients.  Different  metrics may apply to specific populations.   Standards of Medical Care in Diabetes - 2016.  For the purpose of screening for the presence of  diabetes:  <5.7%     Consistent with the absence of diabetes  5.7-6.4%  Consistent with increasing risk for diabetes   (prediabetes)  >or=6.5%  Consistent with diabetes  Currently no consensus exists for use of hemoglobin A1C  for diagnosis of diabetes for children.             Patient Active Problem List   Diagnosis    Type 2 diabetes mellitus with diabetic polyneuropathy, without long-term current use of insulin    Multiple joint pain    Esophageal reflux    Hyperlipidemia    Chronic gout    Lichen sclerosus    Carotid artery stenosis    CAD (coronary artery disease)    Colon polyp    Hypertension associated with diabetes    Aortic atherosclerosis    Diastolic dysfunction    Arterial insufficiency of lower extremity    Severe obesity (BMI 35.0-35.9 with comorbidity)    Glaucoma    Ganglion cyst of flexor tendon sheath of finger of right hand    Bilateral carpal tunnel syndrome    Hyperkalemia    Preop examination       Medication List with Changes/Refills   Current Medications    ACCU-CHEK DARSHAN PLUS TEST STRP STRP    TEST BLOOD SUGAR TWICE DAILY    ACCU-CHEK SOFTCLIX LANCETS MISC    TEST TWICE A DAY    ALBUTEROL 90 MCG/ACTUATION INHALER    Inhale 2 puffs into the lungs every 6 (six) hours as needed for Wheezing.    ASPIRIN 81 MG CHEW    Take 81 mg by mouth once daily.    BLOOD GLUCOSE CONTROL HIGH,LOW (ACCU-CHEK DARSHAN CONTROL SOLN) SOLN    Use a directed    BLOOD-GLUCOSE METER KIT    accu chek darshan meter/test twice daily    CLOBETASOL 0.05% (TEMOVATE) 0.05 % OINT    APPLY TOPICALLY TWO TIMES A DAY    CLOTRIMAZOLE (LOTRIMIN) 1 % CREAM    Apply topically 2 (two) times daily.    COLCHICINE 0.6 MG TABLET    Take 1 tablet (0.6 mg total) by mouth once daily.    FLUTICASONE (FLOVENT HFA) 110 MCG/ACTUATION INHALER    Inhale 1 puff into the lungs 2 (two) times daily. Controller    FOSINOPRIL (MONOPRIL) 40 MG TABLET    TAKE 1 TABLET ONE TIME DAILY    METFORMIN (GLUCOPHAGE) 500 MG TABLET    TAKE 1 TABLET  TWICE DAILY    SIMVASTATIN (ZOCOR) 40 MG TABLET    TAKE 1 TABLET EVERY NIGHT    TIMOLOL MALEATE 0.5% (TIMOPTIC) 0.5 % DROP    Place 1 drop into both eyes 2 (two) times daily.       Review of patient's allergies indicates:   Allergen Reactions    Iodine and iodide containing products Nausea And Vomiting    Penicillins Itching    Amlodipine      edema    Sulfa (sulfonamide antibiotics)     Ultram [tramadol] Other (See Comments)     Light headness, itiching    Bactrim [sulfamethoxazole-trimethoprim] Itching and Rash       Past Surgical History:   Procedure Laterality Date    Bilateral knee replacement      CATARACT EXTRACTION, BILATERAL      CHOLECYSTECTOMY      HEMORRHOID SURGERY      HYSTERECTOMY      fibroids    INCISION AND DRAINAGE PERIRECTAL ABSCESS      thyroid resection         Family History   Problem Relation Age of Onset    Hypertension Mother     Diabetes Mother     Leukemia Sister     Hypertension Sister     Cancer Sister      leukemia    Heart disease Maternal Aunt     Cancer Maternal Uncle      gastric, pacreatic    Heart disease Maternal Uncle     Miscarriages / Stillbirths Maternal Uncle     Diabetes Maternal Grandmother     Asthma Maternal Grandfather     Breast cancer Neg Hx     Colon cancer Neg Hx     Ovarian cancer Neg Hx     COPD Neg Hx     Hyperlipidemia Neg Hx     Kidney disease Neg Hx        Social History     Social History    Marital status: Single     Spouse name: N/A    Number of children: 0    Years of education: N/A     Occupational History    Not on file.     Social History Main Topics    Smoking status: Former Smoker     Packs/day: 0.25     Years: 24.00     Quit date: 6/25/1976    Smokeless tobacco: Never Used    Alcohol use No    Drug use: No    Sexual activity: Not Currently     Other Topics Concern    Not on file     Social History Narrative    No narrative on file       Vitals:    04/05/18 1004   BP: 131/65   Pulse: 73   Weight: 103.1 kg (227  "lb 4.7 oz)   Height: 5' 6.5" (1.689 m)   PainSc: 0-No pain       Hemoglobin A1C   Date Value Ref Range Status   03/15/2018 5.6 4.0 - 5.6 % Final     Comment:     According to ADA guidelines, hemoglobin A1c <7.0% represents  optimal control in non-pregnant diabetic patients. Different  metrics may apply to specific patient populations.   Standards of Medical Care in Diabetes-2016.  For the purpose of screening for the presence of diabetes:  <5.7%     Consistent with the absence of diabetes  5.7-6.4%  Consistent with increasing risk for diabetes   (prediabetes)  >or=6.5%  Consistent with diabetes  Currently, no consensus exists for use of hemoglobin A1c  for diagnosis of diabetes for children.  This Hemoglobin A1c assay has significant interference with fetal   hemoglobin   (HbF). The results are invalid for patients with abnormal amounts of   HbF,   including those with known Hereditary Persistence   of Fetal Hemoglobin. Heterozygous hemoglobin variants (HbAS, HbAC,   HbAD, HbAE, HbA2) do not significantly interfere with this assay;   however, presence of multiple variants in a sample may impact the %   interference.     09/28/2017 5.7 (H) 4.0 - 5.6 % Final     Comment:     According to ADA guidelines, hemoglobin A1c <7.0% represents  optimal control in non-pregnant diabetic patients. Different  metrics may apply to specific patient populations.   Standards of Medical Care in Diabetes-2016.  For the purpose of screening for the presence of diabetes:  <5.7%     Consistent with the absence of diabetes  5.7-6.4%  Consistent with increasing risk for diabetes   (prediabetes)  >or=6.5%  Consistent with diabetes  Currently, no consensus exists for use of hemoglobin A1c  for diagnosis of diabetes for children.  This Hemoglobin A1c assay has significant interference with fetal   hemoglobin   (HbF). The results are invalid for patients with abnormal amounts of   HbF,   including those with known Hereditary Persistence   of " Fetal Hemoglobin. Heterozygous hemoglobin variants (HbAS, HbAC,   HbAD, HbAE, HbA2) do not significantly interfere with this assay;   however, presence of multiple variants in a sample may impact the %   interference.     03/21/2017 5.9 4.5 - 6.2 % Final     Comment:     According to ADA guidelines, hemoglobin A1C <7.0% represents  optimal control in non-pregnant diabetic patients.  Different  metrics may apply to specific populations.   Standards of Medical Care in Diabetes - 2016.  For the purpose of screening for the presence of diabetes:  <5.7%     Consistent with the absence of diabetes  5.7-6.4%  Consistent with increasing risk for diabetes   (prediabetes)  >or=6.5%  Consistent with diabetes  Currently no consensus exists for use of hemoglobin A1C  for diagnosis of diabetes for children.         Review of Systems   Constitutional: Negative for chills and fever.   Respiratory: Negative for shortness of breath.    Cardiovascular: Positive for leg swelling. Negative for chest pain, palpitations, orthopnea and claudication.   Gastrointestinal: Negative for diarrhea, nausea and vomiting.   Musculoskeletal: Negative for joint pain.   Skin: Negative for rash.   Neurological: Positive for tingling and sensory change. Negative for dizziness, focal weakness and weakness.   Psychiatric/Behavioral: Negative.            Objective:      PHYSICAL EXAM: Apperance: Alert and orient in no distress,well developed, and with good attention to grooming and body habits  Patient presents ambulating in diabetic shoes and inserts.   LOWER EXTREMITY EXAM:  VASCULAR: Dorsalis pedis pulses 0/4 bilateral (monophasic with doppler)and Posterior Tibial pulses 0/4 bilateral (biphasic with doppler). Capillary fill time <blanched bilateral. Mild edema observed bilateral. Varicosities present bilateral. Skin temperature of the lower extremities is warm to cool, proximal to distal. Hair growth absent bilateral.  DERMATOLOGICAL: No skin rashes,  subcutaneous nodules, lesions, or ulcers observed bilateral. Nails 1,2,3,4,5 bilateral elongated, thickened, and discolored with subungual debris. Webspaces 1,2,3,4clean, dry and without evidence of break in skin integrity bilateral. (+) erythema, edema, and increased temp noted to toes 1-5 bilateral.   NEUROLOGICAL: Light touch, sharp-dull, proprioception all present and equal bilaterally.  Vibratory sensation intact on left hallux and diminished at right hallux. Protective sensation intact at all 10 sites as tested with a Zoe-Jimy 5.07 monofilament.   MUSCULOSKELETAL: Muscle strength is 5/5 for foot inverters, everters, plantarflexors, and dorsiflexors. Muscle tone is normal. Pes planus noted bilateral. Flexible hammertoes noted bilateral.         Assessment:       Encounter Diagnoses   Name Primary?    Type II diabetes mellitus with peripheral circulatory disorder Yes    Dermatophytosis of nail     Arterial insufficiency of lower extremity     Comprehensive diabetic foot examination, type 2 DM, encounter for          Plan:   Type II diabetes mellitus with peripheral circulatory disorder    Dermatophytosis of nail    Arterial insufficiency of lower extremity    Comprehensive diabetic foot examination, type 2 DM, encounter for      I counseled the patient on her conditions, their implications and medical management.  Greater than 50% of this visit spent on counseling and coordination of care.  Greater than 15 minutes of a 20 minute appointment spent on education about the diabetic foot, neuropathy, and prevention of limb loss.  Shoe inspection. Diabetic Foot Education. Patient reminded of the importance of good nutrition and blood sugar control to help prevent podiatric complications of diabetes. Patient instructed on proper foot hygeine. We discussed wearing proper shoe gear, daily foot inspections, never walking without protective shoe gear, never putting sharp instruments to feet.    With patient's  permission, nails 1-5 bilateral were debrided/trimmed in length and thickness aggressively to their soft tissue attachment mechanically and with electric , removing all offending nail and debris. Patient relates relief following the procedure.  Patient  will continue to monitor the areas daily, inspect feet, wear protective shoe gear when ambulatory, moisturizer to maintain skin integrity. Patient reminded of the importance of good nutrition and blood sugar control to help prevent podiatric complications of diabetes.  Patient to instructed to continue gout medication as prescribed.   Patient to return in this office in approximately 3 months, or sooner if needed.                   Ino Harmon DPM  [facility]

## 2018-04-09 ENCOUNTER — PATIENT OUTREACH (OUTPATIENT)
Dept: ADMINISTRATIVE | Facility: HOSPITAL | Age: 78
End: 2018-04-09

## 2018-04-23 ENCOUNTER — OFFICE VISIT (OUTPATIENT)
Dept: INTERNAL MEDICINE | Facility: CLINIC | Age: 78
End: 2018-04-23
Payer: MEDICARE

## 2018-04-23 VITALS
SYSTOLIC BLOOD PRESSURE: 158 MMHG | BODY MASS INDEX: 35.88 KG/M2 | HEIGHT: 67 IN | HEART RATE: 82 BPM | TEMPERATURE: 97 F | WEIGHT: 228.63 LBS | RESPIRATION RATE: 16 BRPM | OXYGEN SATURATION: 93 % | DIASTOLIC BLOOD PRESSURE: 62 MMHG

## 2018-04-23 DIAGNOSIS — E11.42 TYPE 2 DIABETES MELLITUS WITH DIABETIC POLYNEUROPATHY, WITHOUT LONG-TERM CURRENT USE OF INSULIN: ICD-10-CM

## 2018-04-23 DIAGNOSIS — E11.59 HYPERTENSION ASSOCIATED WITH DIABETES: Primary | ICD-10-CM

## 2018-04-23 DIAGNOSIS — I15.2 HYPERTENSION ASSOCIATED WITH DIABETES: Primary | ICD-10-CM

## 2018-04-23 DIAGNOSIS — L90.0 LICHEN SCLEROSUS: ICD-10-CM

## 2018-04-23 PROCEDURE — 99999 PR PBB SHADOW E&M-EST. PATIENT-LVL III: CPT | Mod: PBBFAC,,, | Performed by: FAMILY MEDICINE

## 2018-04-23 PROCEDURE — 99499 UNLISTED E&M SERVICE: CPT | Mod: S$GLB,,, | Performed by: FAMILY MEDICINE

## 2018-04-23 PROCEDURE — 3078F DIAST BP <80 MM HG: CPT | Mod: CPTII,S$GLB,, | Performed by: FAMILY MEDICINE

## 2018-04-23 PROCEDURE — 99214 OFFICE O/P EST MOD 30 MIN: CPT | Mod: S$GLB,,, | Performed by: FAMILY MEDICINE

## 2018-04-23 PROCEDURE — 3077F SYST BP >= 140 MM HG: CPT | Mod: CPTII,S$GLB,, | Performed by: FAMILY MEDICINE

## 2018-04-23 RX ORDER — LANCETS
EACH MISCELLANEOUS
Qty: 200 EACH | Refills: 3 | Status: SHIPPED | OUTPATIENT
Start: 2018-04-23 | End: 2019-05-03 | Stop reason: SDUPTHER

## 2018-04-23 RX ORDER — ECONAZOLE NITRATE 10 MG/G
CREAM TOPICAL DAILY
COMMUNITY
End: 2018-04-23 | Stop reason: SDUPTHER

## 2018-04-23 RX ORDER — ECONAZOLE NITRATE 10 MG/G
CREAM TOPICAL DAILY
Qty: 85 G | Refills: 0 | Status: SHIPPED | OUTPATIENT
Start: 2018-04-23 | End: 2018-06-05 | Stop reason: SDUPTHER

## 2018-04-23 RX ORDER — COLCHICINE 0.6 MG/1
0.6 TABLET ORAL DAILY
Qty: 90 TABLET | Refills: 1 | Status: SHIPPED | OUTPATIENT
Start: 2018-04-23 | End: 2018-10-15 | Stop reason: SDUPTHER

## 2018-04-23 RX ORDER — DOXAZOSIN 1 MG/1
1 TABLET ORAL NIGHTLY
Qty: 30 TABLET | Refills: 0 | Status: SHIPPED | OUTPATIENT
Start: 2018-04-23 | End: 2018-05-15 | Stop reason: SDUPTHER

## 2018-04-23 NOTE — PROGRESS NOTES
Subjective:       Patient ID: Kaleigh Nieves is a 77 y.o. female.    Chief Complaint: Follow-up (htn/DM)    Hypertension   This is a chronic problem. The current episode started more than 1 year ago. The problem has been gradually worsening since onset. The problem is uncontrolled. Pertinent negatives include no anxiety, chest pain, headaches, shortness of breath or sweats. Risk factors for coronary artery disease include obesity.     Review of Systems   Respiratory: Negative for shortness of breath.    Cardiovascular: Negative for chest pain.   Neurological: Negative for headaches.       Objective:      Physical Exam   Constitutional: She is oriented to person, place, and time. She appears well-developed and well-nourished. No distress.   HENT:   Head: Normocephalic and atraumatic.   Nose: Nose normal.   Mouth/Throat: Oropharynx is clear and moist.   Cardiovascular: Normal rate and regular rhythm.    Pulmonary/Chest: Effort normal and breath sounds normal. No respiratory distress. She has no wheezes.   Abdominal: Soft. There is no tenderness.   Musculoskeletal: She exhibits edema.   Neurological: She is alert and oriented to person, place, and time.   Skin: Skin is warm and dry. Capillary refill takes less than 2 seconds. No rash noted. She is not diaphoretic. No erythema.   Psychiatric: She has a normal mood and affect.   Nursing note and vitals reviewed.      Assessment:       1. Type 2 diabetes mellitus with diabetic polyneuropathy, without long-term current use of insulin    2. Lichen sclerosus        Plan:     Problem List Items Addressed This Visit        Derm    Lichen sclerosus    Relevant Medications    econazole nitrate 1 % cream       Endocrine    Type 2 diabetes mellitus with diabetic polyneuropathy, without long-term current use of insulin - Primary    Relevant Medications    econazole nitrate 1 % cream    blood sugar diagnostic (ACCU-CHEK DARSHAN PLUS TEST STRP) Strp    lancets (ACCU-CHEK SOFTCLIX  LANCETS) Misc    blood glucose control high,low (ACCU-CHEK DARSHAN CONTROL SOLN) Soln

## 2018-04-23 NOTE — PRE ADMISSION SCREENING
Pre op instructions reviewed with patient per phone:    To confirm, Your surgeon has instructed you:  Surgery is scheduled 04/25/18at 1200.      Please report to [facility] 1[facility] floor main lobby by 1030   Pre admit office to call afternoon prior to surgery with final arrival time      INSTRUCTIONS IMPORTANT!!!  ¨ Do not eat, drink, or smoke after 12 midnight, may have water and apple juice until 3 h prior to surgery  OK to brush teeth, no gum, candy or mints!    ¨ Take only these medicines with a small swallow of water-morning of surgery.  Use Albuterol and Flovent inhalers, use Timolol opth      ____  Do not wear makeup, including mascara.  ____  No powder, lotions or creams to surgical area.  ____  Please remove all jewelry, including piercings and leave at home.  ____  No money or valuables needed. Please leave at home.  ____  Please bring identification and insurance information to hospital.  ____  If going home the same day, arrange for a ride home. You will not be able to   drive if Anesthesia was used.  ____  Children, under 12 years old, must remain in the waiting room with an adult.  They are not allowed in patient areas.  ____  Wear loose fitting clothing. Allow for dressings, bandages.  ____  Stop Aspirin, Ibuprofen, Motrin and Aleve at least 5-7 days before surgery, unless otherwise instructed by your doctor, or the nurse.   You MAY use Tylenol/acetaminophen until day of surgery.  ____  If you take diabetic medication, do not take am of surgery unless instructed by   Doctor.  ____ Stop taking any Fish Oil supplement or any Vitamins that contain Vitamin E at least 5 days prior to surgery.          Bathing Instructions-- The night before surgery and the morning prior to coming to the hospital:   -Do not shave the surgical area.   -Shower and wash your hair and body as usual with anti-bacterial  soap and shampoo.   -Rinse your hair and body completely.   -Use one packet of hibiclens  to wash the surgical site (using your hand) gently for 5 minutes.  Do not scrub you skin too hard.   -Do not use hibiclens on your head, face, or genitals.   -Do not wash with anti-bacterial soap after you use the hibiclens.   -Rinse your body thoroughly.   -Dry with clean, soft towel.  Do not use lotion, cream, deodorant, or powders on   the surgical site.    Use antibacterial soap in place of hibiclens if your surgery is on the head, face or genitals.         Surgical Site Infection    Prevention of surgical site infections:     -Keep incisions clean and dry.   -Do not soak/submerge incisions in water until completely healed.   -Do not apply lotions, powders, creams, or deodorants to site.   -Always make sure hands are cleaned with antibacterial soap/ alcohol-based   prior to touching the surgical site.  (This includes doctors, nurses, staff, and yourself.)    Signs and symptoms:   -Redness and pain around the area where you had surgery   -Drainage of cloudy fluid from your surgical wound   -Fever over 100.4  I have read or had read and explained to me, and understand the above information.

## 2018-04-24 ENCOUNTER — ANESTHESIA EVENT (OUTPATIENT)
Dept: SURGERY | Facility: HOSPITAL | Age: 78
End: 2018-04-24
Payer: MEDICARE

## 2018-04-25 ENCOUNTER — SURGERY (OUTPATIENT)
Age: 78
End: 2018-04-25

## 2018-04-25 ENCOUNTER — HOSPITAL ENCOUNTER (OUTPATIENT)
Facility: HOSPITAL | Age: 78
Discharge: HOME | End: 2018-04-27
Attending: ORTHOPAEDIC SURGERY | Admitting: ORTHOPAEDIC SURGERY
Payer: MEDICARE

## 2018-04-25 ENCOUNTER — ANESTHESIA (OUTPATIENT)
Dept: SURGERY | Facility: HOSPITAL | Age: 78
End: 2018-04-25
Payer: MEDICARE

## 2018-04-25 DIAGNOSIS — G56.01 RIGHT CARPAL TUNNEL SYNDROME: ICD-10-CM

## 2018-04-25 DIAGNOSIS — G56.03 BILATERAL CARPAL TUNNEL SYNDROME: Primary | ICD-10-CM

## 2018-04-25 LAB
POCT GLUCOSE: 121 MG/DL (ref 70–110)
POCT GLUCOSE: 122 MG/DL (ref 70–110)

## 2018-04-25 PROCEDURE — 25000003 PHARM REV CODE 250: Performed by: ORTHOPAEDIC SURGERY

## 2018-04-25 PROCEDURE — 63600175 PHARM REV CODE 636 W HCPCS: Performed by: NURSE ANESTHETIST, CERTIFIED REGISTERED

## 2018-04-25 PROCEDURE — 64721 CARPAL TUNNEL SURGERY: CPT | Mod: 59,RT,, | Performed by: ORTHOPAEDIC SURGERY

## 2018-04-25 PROCEDURE — 63600175 PHARM REV CODE 636 W HCPCS: Performed by: ANESTHESIOLOGY

## 2018-04-25 PROCEDURE — 37000008 HC ANESTHESIA 1ST 15 MINUTES: Performed by: ORTHOPAEDIC SURGERY

## 2018-04-25 PROCEDURE — 25000003 PHARM REV CODE 250: Performed by: NURSE ANESTHETIST, CERTIFIED REGISTERED

## 2018-04-25 PROCEDURE — S0077 INJECTION, CLINDAMYCIN PHOSP: HCPCS | Performed by: ORTHOPAEDIC SURGERY

## 2018-04-25 PROCEDURE — 37000009 HC ANESTHESIA EA ADD 15 MINS: Performed by: ORTHOPAEDIC SURGERY

## 2018-04-25 PROCEDURE — 88305 TISSUE EXAM BY PATHOLOGIST: CPT | Mod: 26,,, | Performed by: PATHOLOGY

## 2018-04-25 PROCEDURE — 71000015 HC POSTOP RECOV 1ST HR: Performed by: ORTHOPAEDIC SURGERY

## 2018-04-25 PROCEDURE — 25000003 PHARM REV CODE 250: Performed by: ANESTHESIOLOGY

## 2018-04-25 PROCEDURE — 71000033 HC RECOVERY, INTIAL HOUR: Performed by: ORTHOPAEDIC SURGERY

## 2018-04-25 PROCEDURE — 26160 REMOVE TENDON SHEATH LESION: CPT | Mod: 51,F7,, | Performed by: ORTHOPAEDIC SURGERY

## 2018-04-25 PROCEDURE — 71000039 HC RECOVERY, EACH ADD'L HOUR: Performed by: ORTHOPAEDIC SURGERY

## 2018-04-25 PROCEDURE — 25115 REMOVE WRIST/FOREARM LESION: CPT | Mod: RT,,, | Performed by: ORTHOPAEDIC SURGERY

## 2018-04-25 PROCEDURE — 36000706: Performed by: ORTHOPAEDIC SURGERY

## 2018-04-25 PROCEDURE — 88305 TISSUE EXAM BY PATHOLOGIST: CPT | Performed by: PATHOLOGY

## 2018-04-25 PROCEDURE — 36000707: Performed by: ORTHOPAEDIC SURGERY

## 2018-04-25 PROCEDURE — 88304 TISSUE EXAM BY PATHOLOGIST: CPT | Mod: 26,,, | Performed by: PATHOLOGY

## 2018-04-25 RX ORDER — MEPERIDINE HYDROCHLORIDE 50 MG/1
50 TABLET ORAL EVERY 6 HOURS PRN
Status: ACTIVE | OUTPATIENT
Start: 2018-04-25 | End: 2018-04-27

## 2018-04-25 RX ORDER — HYDROCODONE BITARTRATE AND ACETAMINOPHEN 5; 325 MG/1; MG/1
1 TABLET ORAL
Status: DISCONTINUED | OUTPATIENT
Start: 2018-04-25 | End: 2018-04-30 | Stop reason: HOSPADM

## 2018-04-25 RX ORDER — DIPHENHYDRAMINE HYDROCHLORIDE 50 MG/ML
25 INJECTION INTRAMUSCULAR; INTRAVENOUS EVERY 6 HOURS PRN
Status: DISCONTINUED | OUTPATIENT
Start: 2018-04-25 | End: 2018-04-30 | Stop reason: HOSPADM

## 2018-04-25 RX ORDER — SODIUM CHLORIDE 9 MG/ML
INJECTION, SOLUTION INTRAVENOUS CONTINUOUS
Status: DISCONTINUED | OUTPATIENT
Start: 2018-04-25 | End: 2018-04-30 | Stop reason: HOSPADM

## 2018-04-25 RX ORDER — LIDOCAINE HYDROCHLORIDE 10 MG/ML
INJECTION INFILTRATION; PERINEURAL
Status: DISCONTINUED | OUTPATIENT
Start: 2018-04-25 | End: 2018-04-25

## 2018-04-25 RX ORDER — BUPIVACAINE HYDROCHLORIDE 2.5 MG/ML
INJECTION, SOLUTION EPIDURAL; INFILTRATION; INTRACAUDAL
Status: DISCONTINUED | OUTPATIENT
Start: 2018-04-25 | End: 2018-04-25 | Stop reason: HOSPADM

## 2018-04-25 RX ORDER — PROPOFOL 10 MG/ML
VIAL (ML) INTRAVENOUS
Status: DISCONTINUED | OUTPATIENT
Start: 2018-04-25 | End: 2018-04-25

## 2018-04-25 RX ORDER — CHLORHEXIDINE GLUCONATE ORAL RINSE 1.2 MG/ML
10 SOLUTION DENTAL 2 TIMES DAILY
Status: DISCONTINUED | OUTPATIENT
Start: 2018-04-25 | End: 2018-04-30 | Stop reason: HOSPADM

## 2018-04-25 RX ORDER — FENTANYL CITRATE 50 UG/ML
25 INJECTION, SOLUTION INTRAMUSCULAR; INTRAVENOUS EVERY 5 MIN PRN
Status: DISCONTINUED | OUTPATIENT
Start: 2018-04-25 | End: 2018-04-30 | Stop reason: HOSPADM

## 2018-04-25 RX ORDER — SODIUM CHLORIDE, SODIUM LACTATE, POTASSIUM CHLORIDE, CALCIUM CHLORIDE 600; 310; 30; 20 MG/100ML; MG/100ML; MG/100ML; MG/100ML
INJECTION, SOLUTION INTRAVENOUS CONTINUOUS PRN
Status: DISCONTINUED | OUTPATIENT
Start: 2018-04-25 | End: 2018-04-25

## 2018-04-25 RX ORDER — FENTANYL CITRATE 50 UG/ML
INJECTION, SOLUTION INTRAMUSCULAR; INTRAVENOUS
Status: DISCONTINUED | OUTPATIENT
Start: 2018-04-25 | End: 2018-04-25

## 2018-04-25 RX ORDER — CHLORHEXIDINE GLUCONATE ORAL RINSE 1.2 MG/ML
10 SOLUTION DENTAL
Status: DISCONTINUED | OUTPATIENT
Start: 2018-04-25 | End: 2018-04-30 | Stop reason: HOSPADM

## 2018-04-25 RX ORDER — CLINDAMYCIN PHOSPHATE 900 MG/50ML
900 INJECTION, SOLUTION INTRAVENOUS
Status: COMPLETED | OUTPATIENT
Start: 2018-04-25 | End: 2018-04-25

## 2018-04-25 RX ORDER — LIDOCAINE HYDROCHLORIDE 10 MG/ML
1 INJECTION, SOLUTION EPIDURAL; INFILTRATION; INTRACAUDAL; PERINEURAL ONCE
Status: DISCONTINUED | OUTPATIENT
Start: 2018-04-25 | End: 2018-04-30 | Stop reason: HOSPADM

## 2018-04-25 RX ORDER — MEPERIDINE HYDROCHLORIDE 50 MG/ML
12.5 INJECTION INTRAMUSCULAR; INTRAVENOUS; SUBCUTANEOUS ONCE AS NEEDED
Status: ACTIVE | OUTPATIENT
Start: 2018-04-25 | End: 2018-04-25

## 2018-04-25 RX ORDER — PROPOFOL 10 MG/ML
VIAL (ML) INTRAVENOUS CONTINUOUS PRN
Status: DISCONTINUED | OUTPATIENT
Start: 2018-04-25 | End: 2018-04-25

## 2018-04-25 RX ORDER — ONDANSETRON 2 MG/ML
4 INJECTION INTRAMUSCULAR; INTRAVENOUS DAILY PRN
Status: DISCONTINUED | OUTPATIENT
Start: 2018-04-25 | End: 2018-04-30 | Stop reason: HOSPADM

## 2018-04-25 RX ORDER — LIDOCAINE HYDROCHLORIDE 10 MG/ML
INJECTION, SOLUTION EPIDURAL; INFILTRATION; INTRACAUDAL; PERINEURAL
Status: DISCONTINUED | OUTPATIENT
Start: 2018-04-25 | End: 2018-04-25 | Stop reason: HOSPADM

## 2018-04-25 RX ORDER — MEPERIDINE HYDROCHLORIDE 50 MG/1
25 TABLET ORAL EVERY 4 HOURS PRN
Qty: 20 TABLET | Refills: 0 | Status: SHIPPED | OUTPATIENT
Start: 2018-04-25 | End: 2018-05-15

## 2018-04-25 RX ORDER — ONDANSETRON 2 MG/ML
INJECTION INTRAMUSCULAR; INTRAVENOUS
Status: DISCONTINUED | OUTPATIENT
Start: 2018-04-25 | End: 2018-04-25

## 2018-04-25 RX ADMIN — FENTANYL CITRATE 25 MCG: 50 INJECTION, SOLUTION INTRAMUSCULAR; INTRAVENOUS at 12:04

## 2018-04-25 RX ADMIN — LIDOCAINE HYDROCHLORIDE 50 MG: 10 INJECTION, SOLUTION INFILTRATION; PERINEURAL at 12:04

## 2018-04-25 RX ADMIN — LIDOCAINE HYDROCHLORIDE 10 ML: 10 INJECTION, SOLUTION EPIDURAL; INFILTRATION; INTRACAUDAL; PERINEURAL at 12:04

## 2018-04-25 RX ADMIN — PROPOFOL 120 MG: 10 INJECTION, EMULSION INTRAVENOUS at 12:04

## 2018-04-25 RX ADMIN — FENTANYL CITRATE 50 MCG: 50 INJECTION, SOLUTION INTRAMUSCULAR; INTRAVENOUS at 12:04

## 2018-04-25 RX ADMIN — BUPIVACAINE HYDROCHLORIDE 10 ML: 2.5 INJECTION, SOLUTION EPIDURAL; INFILTRATION; INTRACAUDAL; PERINEURAL at 12:04

## 2018-04-25 RX ADMIN — CLINDAMYCIN PHOSPHATE 900 MG: 18 INJECTION, SOLUTION INTRAVENOUS at 12:04

## 2018-04-25 RX ADMIN — FENTANYL CITRATE 25 MCG: 50 INJECTION, SOLUTION INTRAMUSCULAR; INTRAVENOUS at 01:04

## 2018-04-25 RX ADMIN — HYDROCODONE BITARTRATE AND ACETAMINOPHEN 1 TABLET: 5; 325 TABLET ORAL at 01:04

## 2018-04-25 RX ADMIN — ONDANSETRON 4 MG: 2 INJECTION, SOLUTION INTRAMUSCULAR; INTRAVENOUS at 12:04

## 2018-04-25 RX ADMIN — SODIUM CHLORIDE, SODIUM LACTATE, POTASSIUM CHLORIDE, AND CALCIUM CHLORIDE: 600; 310; 30; 20 INJECTION, SOLUTION INTRAVENOUS at 11:04

## 2018-04-25 NOTE — H&P
CC: 76 y/o female, finger pain and mass     HPI: Here for evaluation of a knot and mass on her right middle finger for greater in 2 months.  She is active bowler and the pain increases when obstructive bowler pressure on the finger.  Pain is resolved at rest and does not radiate.  Also complains of a multiple year history of numbness and to enhance.  States she returns carpal tunnel syndrome several years ago but declined surgery.  She states she had positive EMG test that these were several years also.  Pain level today a 4 on a 10 scale.  The mass and the numb and tingling are now affecting her ADLs as she is unable to bowl     PMH:         Past Medical History:   Diagnosis Date    Anemia      Arthritis      Asthma      Back pain      Bronchitis      CAD (coronary artery disease) 7/15     via [facility]    Carotid artery stenosis      Chronic gout      Colon polyp      CTS (carpal tunnel syndrome)      Diabetes mellitus, type 2      Diabetes with neurologic complications      Diverticulosis      RIVAS (dyspnea on exertion)       chronic; eval pulm dr natarajan    Dyslipidemia      Ex-smoker       quit in her 30s    GERD (gastroesophageal reflux disease)      Hyperlipidemia      Hypertension      Neuropathy, lower extremity      Obesity      Open-angle glaucoma       dr avel kaur    Peripheral vascular disease      Polyneuropathy      Tobacco dependence           PSH:          Past Surgical History:   Procedure Laterality Date    Bilateral knee replacement        CATARACT EXTRACTION, BILATERAL        CHOLECYSTECTOMY        HEMORRHOID SURGERY        HYSTERECTOMY         fibroids    INCISION AND DRAINAGE PERIRECTAL ABSCESS        thyroid resection             Family Hx:           Family History   Problem Relation Age of Onset    Hypertension Mother      Diabetes Mother      Leukemia Sister      Hypertension Sister      Cancer Sister         leukemia    Heart disease Maternal Aunt       Cancer Maternal Uncle         gastric, pacreatic    Heart disease Maternal Uncle      Miscarriages / Stillbirths Maternal Uncle      Diabetes Maternal Grandmother      Asthma Maternal Grandfather      Breast cancer Neg Hx      Colon cancer Neg Hx      Ovarian cancer Neg Hx      COPD Neg Hx      Hyperlipidemia Neg Hx      Kidney disease Neg Hx           Allergy:          Review of patient's allergies indicates:   Allergen Reactions    Iodine and iodide containing products Nausea And Vomiting    Penicillins Itching    Amlodipine         edema    Sulfa (sulfonamide antibiotics)      Ultram [tramadol] Other (See Comments)       Light headness, itiching    Bactrim [sulfamethoxazole-trimethoprim] Itching and Rash         Medication:    Current Outpatient Prescriptions:     ACCU-CHEK DARSHAN PLUS TEST STRP Strp, TEST BLOOD SUGAR TWICE DAILY, Disp: 200 strip, Rfl: 3    ACCU-CHEK SOFTCLIX LANCETS Misc, TEST TWICE A DAY, Disp: 200 each, Rfl: 3    albuterol 90 mcg/actuation inhaler, Inhale 2 puffs into the lungs every 6 (six) hours as needed for Wheezing., Disp: 1 each, Rfl: 11    aspirin 81 MG Chew, Take 81 mg by mouth once daily., Disp: , Rfl:     blood glucose control high,low (ACCU-CHEK DARSHAN CONTROL SOLN) Soln, Use a directed, Disp: 1 each, Rfl: 3    blood-glucose meter kit, accu chek darshan meter/test twice daily, Disp: 1 each, Rfl: 0    clobetasol 0.05% (TEMOVATE) 0.05 % Oint, APPLY TOPICALLY TWO TIMES A DAY, Disp: 60 g, Rfl: 1    clotrimazole (LOTRIMIN) 1 % cream, Apply topically 2 (two) times daily., Disp: 45 g, Rfl: 6    colchicine 0.6 mg tablet, Take 1 tablet (0.6 mg total) by mouth once daily., Disp: 30 tablet, Rfl: 5    fluticasone (FLOVENT HFA) 110 mcg/actuation inhaler, Inhale 1 puff into the lungs 2 (two) times daily. Controller, Disp: 12 g, Rfl: 0    fosinopril (MONOPRIL) 40 MG tablet, TAKE 1 TABLET ONE TIME DAILY, Disp: 90 tablet, Rfl: 3    metFORMIN (GLUCOPHAGE) 500 MG tablet, TAKE 1  "TABLET TWICE DAILY, Disp: 180 tablet, Rfl: 3    simvastatin (ZOCOR) 40 MG tablet, TAKE 1 TABLET EVERY NIGHT, Disp: 90 tablet, Rfl: 3    timolol maleate 0.5% (TIMOPTIC) 0.5 % Drop, Place 1 drop into both eyes 2 (two) times daily., Disp: 5 mL, Rfl: 5     Social History:    Social History   Social History            Social History    Marital status: Single       Spouse name: N/A    Number of children: 0    Years of education: N/A          Occupational History    Not on file.            Social History Main Topics    Smoking status: Former Smoker       Packs/day: 0.25       Years: 24.00       Quit date: 6/25/1976    Smokeless tobacco: Never Used    Alcohol use No    Drug use: No    Sexual activity: Not Currently           Other Topics Concern    Not on file          Social History Narrative    No narrative on file            Vitals:   BP (!) 175/71   Pulse 79   Ht 5' 6.5" (1.689 m)   Wt 104.8 kg (231 lb)   BMI 36.73 kg/m²       ROS:  GENERAL: No fever, chills, fatigability or weight loss.  SKIN: No rashes, itching or changes in color or texture of skin.  HEAD: No headaches or recent head trauma.  EYES: Visual acuity fine. No photophobia, ocular pain or diplopia.  EARS: Denies ear pain, discharge or vertigo.  NOSE: No loss of smell, no epistaxis or postnasal drip.  MOUTH & THROAT: No hoarseness or change in voice. No excessive gum bleeding.  NODES: Denies swollen glands.  CHEST: Denies RIVAS, cyanosis, wheezing, cough and sputum production.  CARDIOVASCULAR: Denies chest pain, PND, orthopnea or reduced exercise tolerance.  ABDOMEN: Appetite fine. No weight loss. Denies diarrhea, abdominal pain, hematemesis or blood in stool.  URINARY: No flank pain, dysuria or hematuria.  PERIPHERAL VASCULAR: No claudication or cyanosis.  NEUROLOGIC: No history of seizures, paralysis, alteration of gait or coordination.  MUSCULOSKELETAL: See HPI     PE:  APPEARANCE: Well nourished, well developed, in no acute distress. "   HEAD: Normocephalic, atraumatic.  NEUROLOGIC: Cranial Nerves: II-XII grossly intact, also see MUSCULOSKELETAL  MUSCULOSKELETAL:   CARPAL TUNNEL SYNDROME  Right -    - Positive- Tinel's over the Median nerve  Positive- Phalen maneuver   Positive- Thenar atrophy   Negative- hypothenar atrophy   Positive- Median Nerve Compression test less than 30 seconds   Negative- Tinel's over Guyon's Canal   Negative- Tinel's over Cubital Tunnel Negative- Tinels over the Median Nerve overlying the antecubital  Fossa   Negative- Tinel's over Radial groove  Negative- Tinel's over sensory branch of Radial nerve at the wrist  Negative- Tinel's over the PIN   Negative- pain in forearm with resistance to             FDP flexion and resisted pronation   Positive- boggy synovium of the wrist   normal- less than 2 seconds capillary all digits  Positive- light touch intact in Median nerve distribution Positive- light touch in the Radial Nerve distribution  Positive- light touch intact in the Ulnar sensory nerve distribution    Positive- Thumb opposition strength symmetrical  Negative- bruit(aneurysm)    Positive- skin color intact(claudication/Raynaud's)  Negative- cold digits(claudication/Raynaud's)  normal - Rahul Test(Vascular Exam)  normal- +2 Radial and Ulnar artery pulses  Negative- anatomical snuff box tenderness(Dequervain's Synovitis)  Negative- finger or thumb triggering(Trigger Thumb or Finger)  Negative- Lipoma  Positive- Ganglion cyst- base of middle finger.  Mobile, non tender        Sensory exam-C5,C6,C7,C8,T1- normal     Wrist extension for radial nerve- +5/5  Wrist Flexion-+5/5  Wrist Ulnar Deviation-+5/5  Wrist Radial Deviation- +5/5  Resisted opposition of the thumb for the median nerve- +5/5  Digit abduction for the ulnar nerve- +5/5         Assessment:            Diagnosis:              1.  Ganglion cysts right middle finger               2.  Carpal tunnel syndrome- bilateral right worse than left     Diagnostic  Studies  MRI-No  X-Ray-Yes agree with findings of Findings: There is moderate joint space narrowing and osteophyte formation seen involving the interphalangeal joints.  There is also at least moderate degenerative change present at the 1st CMC joint.  Minimal degenerative changes noted about the MCP joints and wrists in greatest at the 1st MCP joint     EMG/NCV-Yes, 1. ABNORMAL study  2. There is electrodiagnostic evidence of a MILD-MODERATE demyelinating median neuropathy (Carpal tunnel syndrome) across the RIGHT wrist and a MILD demyelinating CTS across the LEFT  wrist           Plan:                                                  1. PT-no                                                 2.OT-no                                          3.NSAID-no                                        4. Narcotics-no                                     5. Wound care-N/A                                 6. Rest-no                                           7. Surgery-yes  patient may greatly benefit from ganglion cyst removal.  And right carpal tunnel release         8. ANGELIKA Hose-no                                    9. Anticoagulation therapy-no               10. Elevation-no                                     11. Crutches-no                                    12. Walker-no             13. Cane no                        14. Referral-no                                     15.Injection-no                            16. Splint   /    Cast   /   Cast Shoe-No              17. RICE            18. Follow up-  patient elects to proceed with a right carpal tunnel release and a right middle finger ganglion cyst removal      Thank you Dr. Hanks for the referral

## 2018-04-25 NOTE — DISCHARGE SUMMARY
[facility]  Brief Operative Note     SUMMARY     Surgery Date: 4/25/2018     Surgeon(s) and Role:     * Bora Cavazos Sr., MD - Primary    Assisting Surgeon: None    Pre-op Diagnosis:  Mucous cyst of finger [M67.449]  Carpal tunnel syndrome, unspecified laterality [G56.00]    Post-op Diagnosis:  Post-Op Diagnosis Codes:     * Mucous cyst of finger [M67.449]     * Carpal tunnel syndrome, unspecified laterality [G56.00]  Right carpal tunnel syndrome tunnel syndrome and Right flexor digitorum tenosynovitis digits 2 through 5.  Right middle finger/hand ganglion cyst     Procedure(s) (LRB):  RELEASE-CARPAL TUNNEL-right (Right)  EXCISION-CYST-middle finger (Right)    Anesthesia: General    Description of the findings of the procedure:  Right carpal tunnel syndrome   tunnel syndrome and Right flexor digitorum tenosynovitis digits 2 through 5.  Right middle finger/hand ganglion cyst     Findings/Key Components: Right carpal tunnel syndrome   tunnel syndrome and Right flexor digitorum tenosynovitis digits 2 through 5.  Right middle finger/hand ganglion cyst     Estimated Blood Loss: 1 cc         Specimens:   Specimen (12h ago through future)    Start     Ordered    04/25/18 1232  Specimen to Pathology - Surgery  Once     Comments:  1. tenosynovium and right ganglion cystDX:carpal tunnel and ganglion cyst      04/25/18 1233          Discharge Note    SUMMARY     Admit Date: 4/25/2018    Discharge Date and Time:  04/25/2018 1:01 PM    Hospital Course (synopsis of major diagnoses, care, treatment, and services provided during the course of the hospital stay): without complications      Final Diagnosis: Post-Op Diagnosis Codes:     * Mucous cyst of finger [M67.449]     * Carpal tunnel syndrome, unspecified laterality [G56.00]  Right carpal tunnel syndrome  tunnel syndrome and Right flexor digitorum tenosynovitis digits 2 through 5.  Right middle finger/hand ganglion cyst     Disposition: Home or Self  Care    Follow Up/Patient Instructions: follow up in 2 weeks, resume regular diet, activities as tolerated    Medications: Demerol  Reconciled Home Medications:      Medication List      CONTINUE taking these medications    albuterol 90 mcg/actuation inhaler  Inhale 2 puffs into the lungs every 6 (six) hours as needed for Wheezing.     aspirin 81 MG Chew  Take 81 mg by mouth once daily.     blood glucose control high,low Soln  Commonly known as:  ACCU-CHEK DARSHAN CONTROL SOLN  Use a directed     blood sugar diagnostic Strp  Commonly known as:  ACCU-CHEK DARSHAN PLUS TEST STRP  TEST BLOOD SUGAR TWICE DAILY     blood-glucose meter kit  accu chek darshan meter/test twice daily     clobetasol 0.05% 0.05 % Oint  Commonly known as:  TEMOVATE  APPLY TOPICALLY TWO TIMES A DAY     clotrimazole 1 % cream  Commonly known as:  LOTRIMIN  Apply topically 2 (two) times daily.     colchicine 0.6 mg tablet  Commonly known as:  COLCRYS  Take 1 tablet (0.6 mg total) by mouth once daily.     doxazosin 1 MG tablet  Commonly known as:  CARDURA  Take 1 tablet (1 mg total) by mouth every evening.     econazole nitrate 1 % cream  Apply topically once daily.     fluticasone 110 mcg/actuation inhaler  Commonly known as:  FLOVENT HFA  Inhale 1 puff into the lungs 2 (two) times daily. Controller     fosinopril 40 MG tablet  Commonly known as:  MONOPRIL  TAKE 1 TABLET ONE TIME DAILY     lancets Misc  Commonly known as:  ACCU-CHEK SOFTCLIX LANCETS  TEST TWICE A DAY     metFORMIN 500 MG tablet  Commonly known as:  GLUCOPHAGE  TAKE 1 TABLET TWICE DAILY     simvastatin 40 MG tablet  Commonly known as:  ZOCOR  TAKE 1 TABLET EVERY NIGHT     timolol maleate 0.5% 0.5 % Drop  Commonly known as:  TIMOPTIC  Place 1 drop into both eyes 2 (two) times daily.            Discharge Procedure Orders  Referral to Occupational therapy   Referral Priority: Routine Referral Type: Occupational Therapy   Referral Reason: Specialty Services Required    Requested Specialty:  Occupational Therapy    Number of Visits Requested: 1      Diet general     Call MD for:  temperature >100.4     Call MD for:  persistent nausea and vomiting     Call MD for:  severe uncontrolled pain     Call MD for:  difficulty breathing, headache or visual disturbances     Call MD for:  redness, tenderness, or signs of infection (pain, swelling, redness, odor or green/yellow discharge around incision site)     Call MD for:  hives     Call MD for:  persistent dizziness or light-headedness     Call MD for:  extreme fatigue     Activity as tolerated     Remove dressing in 48 hours   Scheduling Instructions: Apply alcohol and gauze with an ace wrap, daily.       Follow-up Information     Bora Cavazos Sr, MD.    Specialty:  Orthopedic Surgery  Why:  As needed, If symptoms worsen, For suture removal, For wound re-check  Contact information:  [address]  [place] [address] [phone]

## 2018-04-25 NOTE — DISCHARGE INSTRUCTIONS
General Information:    1. Do not drink alcoholic beverages including beer for 24 hours or as long as you are on pain medication..  2. Do not drive a motor vehicle, operate machinery or power tools, or signs legal papers for 24 hours or as long as you are on pain medication.   3. You may experience light-headedness, dizziness, and sleepiness following surgery. Please do not stay alone. A responsible adult should be with you for this 24 hour period.  4. Go home and rest.  5. Progress slowly to a normal diet unless instructed.  Otherwise, begin with liquids such as soft drinks, then soup and crackers working up to solid foods. Drink plenty of nonalcoholic fluids.  6. Certain anesthetics and pain medications produce nausea and vomiting in certain individuals. If nausea becomes a problem at home, call you doctor.  7. A nurse will be calling you sometime after surgery. Do not be alarmed. This is our way of finding out how you are doing.  8. Several times every hour while you are awake, take 2-3 deep breaths and cough. If you had stomach surgery hold a pillow or rolled towel firmly against your stomach before you cough. This will help with any pain the cough might cause.  9. Several times every hour while you are awake, pump and flex your feet 5-6 times and do foot circles. This will help prevent blood clots.  10. Call your doctor for severe pain, bleeding, fever, or signs or symptoms of infection (pain, swelling, redness, foul odor, drainage).  11. You can contact your doctor anytime by [phone] for the [facility] (at [facility]) or [phone] for the [facility] on [facility].   [email or web address] is another way to contact your doctor if you are an active participant online with [facility].  12. Continue Nozin provided at discharge twice daily for 7 days or until the incision is healed.  See pamphlet or box for instructions.

## 2018-04-25 NOTE — TRANSFER OF CARE
"Anesthesia Transfer of Care Note    Patient: Kaleigh Nieves    Procedure(s) Performed: Procedure(s) (LRB):  RELEASE-CARPAL TUNNEL-right (Right)  EXCISION-CYST-middle finger (Right)    Patient location: PACU    Anesthesia Type: general    Transport from OR: Transported from OR on room air with adequate spontaneous ventilation    Post pain: adequate analgesia    Post assessment: no apparent anesthetic complications    Post vital signs: stable    Level of consciousness: sedated    Nausea/Vomiting: no nausea/vomiting    Complications: none    Transfer of care protocol was followed      Last vitals:   Visit Vitals  BP (!) 178/73 (BP Location: Left arm, Patient Position: Sitting)   Pulse 83   Temp 36.6 °C (97.9 °F) (Temporal)   Resp 18   Ht 5' 6.5" (1.689 m)   Wt 101.5 kg (223 lb 12.3 oz)   SpO2 97%   Breastfeeding? No   BMI 35.58 kg/m²     "

## 2018-04-25 NOTE — OP NOTE
DATE OF PROCEDURE: 04/25/2018    PREOPERATIVE DIAGNOSES: Right  carpal tunnel syndrome. Right hand ganglion cyst    POSTOPERATIVE DIAGNOSES: Right carpal tunnel syndrome                                                          tunnel syndrome and Right flexor digitorum tenosynovitis digits 2 through 5.  Right middle finger/hand ganglion cyst     PROCEDURE:Right carpal tunnel syndrome                                                          tunnel release and Right flexor digitorum tenosynovectomy digits 2 through 5.  Right middle finger/hand ganglion cyst excision       SURGEON: Bora Cavazos Sr., M.D.    ASSISTANT: CARA Thompson.      COMPLICATIONS: None.    ANESTHESIA: General anesthesia with intubation.    INDICATION FOR SURGERY: The patient failed conservative treatment for over 6   months' period. The patient continued to have symptoms that interfered with   activities of daily living.    ESTIMATED BLOOD LOSS: 2 mL.    COMPLICATIONS: None.    SPECIMEN:Tenosynovium    OPERATIVE PROCEDURE IN DETAIL: The patient was brought to the Operating Room   after appropriate consent, placed under general anesthesia with intubation. The  patient was placed in a supine position. The Right  upper extremity prepped with  alcohol and chlorhexidine and sterilely draped. The Esmarch used for   exsanguination after the timeout was performed. The patient did receive IV   antibiotics prior to placement of the tourniquet. The Esmarch used for   exsanguination and the tourniquet inflated to 250 mmHg pressure.  The attention was turned to the volar aspect of the palm just distal to the   wrist crease. A curved incision was made just ulnar to the thenar crease and   distal to the wrist crease extending 3 cm. Dissection carried through the   subcutaneous tissue down to the palmar fascia, which was then incised overlying   the carpal canal. The sensory branch of median nerve was preserved. The   patient noted to have compression of  the median nerve at the central portion of   the carpal canal. The patient noted to have thick and fibrotic tenosynovium   surrounding the flexor digitorum  2 through 5, which was excised and   submitted for pathological specimen. There was no evidence of vascular or mass   within the carpal canal. Irrigation was thoroughly performed. The overlying   skin opposed with a running 3-0 nylon suture.  An oblique incision was made over the   Base of the middle finger volar surface.  Blunt decision was made around the  Ganglion cyst that was excised en bloc. The digit nerves were preserved.  The incision was repaired with a running 3-0 Nylon.   Alcohol applied to the incision site. Sterile gauze applied. An Ace   wrap applied to the right upper extremity.  The tourniquet deflated. The patient noted to have less than 2 seconds   capillary refill in all digits. The patient tolerated the procedure well and   left the Operating Room in good condition.    Bora Cavazos MD

## 2018-04-25 NOTE — PROGRESS NOTES
The patient has been examined and the H&P has been reviewed:     Patient states that no changes have occurred since H&P was written. She elects to proceed with a right carpal tunnel release and right middle finger ganglion removal.    EMG/NCV- ABNORMAL study   There is electrodiagnostic evidence of a MILD-MODERATE demyelinating median neuropathy (Carpal tunnel syndrome) across the RIGHT wrist and a MILD demyelinating CTS across the LEFT  wrist         Anesthesia/Surgery risks, benefits and alternative options discussed and understood by patient/family.Patient has followed all pre-op instructions. All questions have been answered.            There are no hospital problems to display for this patient.

## 2018-04-26 NOTE — ANESTHESIA POSTPROCEDURE EVALUATION
"Anesthesia Post Evaluation    Patient: Kaleigh Nieves    Procedure(s) Performed: Procedure(s) (LRB):  RELEASE-CARPAL TUNNEL-right (Right)  EXCISION-CYST-middle finger (Right)    Final Anesthesia Type: general  Patient location during evaluation: PACU  Patient participation: Yes- Able to Participate  Level of consciousness: awake and alert  Post-procedure vital signs: reviewed and stable  Pain management: adequate  Airway patency: patent  PONV status at discharge: No PONV  Anesthetic complications: no      Cardiovascular status: blood pressure returned to baseline  Respiratory status: unassisted and spontaneous ventilation  Hydration status: euvolemic  Follow-up not needed.        Visit Vitals  BP (!) 155/68 (BP Location: Left arm, Patient Position: Lying)   Pulse 73   Temp 36.2 °C (97.2 °F) (Temporal)   Resp 12   Ht 5' 6.5" (1.689 m)   Wt 101.5 kg (223 lb 12.3 oz)   SpO2 96%   Breastfeeding? No   BMI 35.58 kg/m²       Pain/Oskar Score: Pain Assessment Performed: Yes (4/25/2018  2:15 PM)  Presence of Pain: complains of pain/discomfort (4/25/2018  2:15 PM)  Pain Rating Prior to Med Admin: 7 (4/25/2018  1:45 PM)  Oskar Score: 8 (4/25/2018  2:00 PM)      "

## 2018-05-02 ENCOUNTER — PATIENT OUTREACH (OUTPATIENT)
Dept: ADMINISTRATIVE | Facility: HOSPITAL | Age: 78
End: 2018-05-02

## 2018-05-03 VITALS
HEIGHT: 67 IN | RESPIRATION RATE: 12 BRPM | SYSTOLIC BLOOD PRESSURE: 155 MMHG | HEART RATE: 73 BPM | DIASTOLIC BLOOD PRESSURE: 68 MMHG | WEIGHT: 223.75 LBS | BODY MASS INDEX: 35.12 KG/M2 | OXYGEN SATURATION: 96 % | TEMPERATURE: 97 F

## 2018-05-08 ENCOUNTER — OFFICE VISIT (OUTPATIENT)
Dept: OPHTHALMOLOGY | Facility: CLINIC | Age: 78
End: 2018-05-08
Payer: MEDICARE

## 2018-05-08 ENCOUNTER — OFFICE VISIT (OUTPATIENT)
Dept: ORTHOPEDICS | Facility: CLINIC | Age: 78
End: 2018-05-08
Payer: MEDICARE

## 2018-05-08 VITALS
SYSTOLIC BLOOD PRESSURE: 131 MMHG | WEIGHT: 223 LBS | RESPIRATION RATE: 18 BRPM | DIASTOLIC BLOOD PRESSURE: 64 MMHG | BODY MASS INDEX: 35 KG/M2 | HEIGHT: 67 IN | TEMPERATURE: 98 F | HEART RATE: 78 BPM

## 2018-05-08 DIAGNOSIS — Z98.890 POSTOPERATIVE STATE: Primary | ICD-10-CM

## 2018-05-08 DIAGNOSIS — H40.1134 PRIMARY OPEN ANGLE GLAUCOMA OF BOTH EYES, INDETERMINATE STAGE: Primary | ICD-10-CM

## 2018-05-08 PROCEDURE — 99999 PR PBB SHADOW E&M-EST. PATIENT-LVL II: CPT | Mod: PBBFAC,,, | Performed by: OPTOMETRIST

## 2018-05-08 PROCEDURE — 99024 POSTOP FOLLOW-UP VISIT: CPT | Mod: S$GLB,,, | Performed by: ORTHOPAEDIC SURGERY

## 2018-05-08 PROCEDURE — 92012 INTRM OPH EXAM EST PATIENT: CPT | Mod: S$GLB,,, | Performed by: OPTOMETRIST

## 2018-05-08 PROCEDURE — 99499 UNLISTED E&M SERVICE: CPT | Mod: S$GLB,,, | Performed by: OPTOMETRIST

## 2018-05-08 PROCEDURE — 99999 PR PBB SHADOW E&M-EST. PATIENT-LVL III: CPT | Mod: PBBFAC,,, | Performed by: ORTHOPAEDIC SURGERY

## 2018-05-08 RX ORDER — TIMOLOL MALEATE 5 MG/ML
1 SOLUTION/ DROPS OPHTHALMIC 2 TIMES DAILY
Qty: 5 ML | Refills: 5 | Status: SHIPPED | OUTPATIENT
Start: 2018-05-08 | End: 2018-10-16 | Stop reason: SDUPTHER

## 2018-05-08 NOTE — PROGRESS NOTES
SUBJECTIVE:  Patient is status post Right carpal tunnel release and right hand cyst excision  .  Patient complains of   1/ 10pain that is better with the  pain meds and aggravated with movement.    Past Medical History:   Diagnosis Date    Anemia     Arthritis     Asthma     Back pain     Bronchitis     CAD (coronary artery disease) 7/15    via chst ct    Carotid artery stenosis     Chronic gout     Colon polyp     CTS (carpal tunnel syndrome)     Diabetes mellitus, type 2     Diabetes with neurologic complications     Diverticulosis     RIVAS (dyspnea on exertion)     chronic; eval pulm dr natarajan    Dyslipidemia     Ex-smoker     quit in her 30s    GERD (gastroesophageal reflux disease)     Hyperlipidemia     Hypertension     Neuropathy, lower extremity     Obesity     Open-angle glaucoma     dr avel kaur    Peripheral vascular disease     Polyneuropathy     Tobacco dependence      Past Surgical History:   Procedure Laterality Date    CATARACT EXTRACTION, BILATERAL      CHOLECYSTECTOMY      DILATION AND CURETTAGE OF UTERUS      EYE SURGERY      HEMORRHOID SURGERY      HYSTERECTOMY      fibroids    INCISION AND DRAINAGE PERIRECTAL ABSCESS      JOINT REPLACEMENT Bilateral     knee    KNEE ARTHROSCOPY Right     MYOMECTOMY       Review of patient's allergies indicates:   Allergen Reactions    Iodine and iodide containing products Nausea And Vomiting    Penicillins Itching    Sulfa (sulfonamide antibiotics) Hives and Itching    Amlodipine      edema    Ultram [tramadol] Other (See Comments)     Light headness, itiching    Bactrim [sulfamethoxazole-trimethoprim] Itching and Rash     Current Outpatient Prescriptions on File Prior to Visit   Medication Sig Dispense Refill    albuterol 90 mcg/actuation inhaler Inhale 2 puffs into the lungs every 6 (six) hours as needed for Wheezing. 1 each 11    aspirin 81 MG Chew Take 81 mg by mouth once daily.      blood glucose control high,low  "(ACCU-CHEK DARSHAN CONTROL SOLN) Soln Use a directed 1 each 3    blood sugar diagnostic (ACCU-CHEK DARSHAN PLUS TEST STRP) Strp TEST BLOOD SUGAR TWICE DAILY 200 strip 3    blood-glucose meter kit accu chek darshan meter/test twice daily 1 each 0    clobetasol 0.05% (TEMOVATE) 0.05 % Oint APPLY TOPICALLY TWO TIMES A DAY 60 g 1    clotrimazole (LOTRIMIN) 1 % cream Apply topically 2 (two) times daily. 45 g 6    colchicine (COLCRYS) 0.6 mg tablet Take 1 tablet (0.6 mg total) by mouth once daily. 90 tablet 1    doxazosin (CARDURA) 1 MG tablet Take 1 tablet (1 mg total) by mouth every evening. 30 tablet 0    econazole nitrate 1 % cream Apply topically once daily. 85 g 0    fluticasone (FLOVENT HFA) 110 mcg/actuation inhaler Inhale 1 puff into the lungs 2 (two) times daily. Controller 12 g 0    fosinopril (MONOPRIL) 40 MG tablet TAKE 1 TABLET ONE TIME DAILY 90 tablet 3    lancets (ACCU-CHEK SOFTCLIX LANCETS) Misc TEST TWICE A  each 3    meperidine (DEMEROL) 50 mg tablet Take 0.5 tablets (25 mg total) by mouth every 4 (four) hours as needed for Pain. 20 tablet 0    metFORMIN (GLUCOPHAGE) 500 MG tablet TAKE 1 TABLET TWICE DAILY 180 tablet 3    simvastatin (ZOCOR) 40 MG tablet TAKE 1 TABLET EVERY NIGHT 90 tablet 3    [DISCONTINUED] timolol maleate 0.5% (TIMOPTIC) 0.5 % Drop Place 1 drop into both eyes 2 (two) times daily. 5 mL 5     No current facility-administered medications on file prior to visit.      /64   Pulse 78   Temp 97.8 °F (36.6 °C)   Resp 18   Ht 5' 6.5" (1.689 m)   Wt 101.2 kg (223 lb)   BMI 35.45 kg/m²    ROS:  GENERAL: No fever, chills, fatigability or weight loss.  SKIN: No rashes, itching or changes in color or texture of skin.  HEAD: No headaches or recent head trauma.  EYES: Visual acuity fine. No photophobia, ocular pain or diplopia.  EARS: Denies ear pain, discharge or vertigo.  NOSE: No loss of smell, no epistaxis or postnasal drip.  MOUTH & THROAT: No hoarseness or change in " voice. No excessive gum bleeding.  NODES: Denies swollen glands.  CHEST: Denies RIVAS, cyanosis, wheezing, cough and sputum production.  CARDIOVASCULAR: Denies chest pain, PND, orthopnea or reduced exercise tolerance.  ABDOMEN: Appetite fine. No weight loss. Denies diarrhea, abdominal pain, hematemesis or blood in stool.  URINARY: No flank pain, dysuria or hematuria.  PERIPHERAL VASCULAR: No claudication or cyanosis.  NEUROLOGIC: No history of seizures, paralysis, alteration of gait or coordination.  MUSCULOSKELETAL: See HPI    PE:  APPEARANCE: Well nourished, well developed, in no acute distress.   HEAD: Normocephalic, atraumatic.  EYES: PERRL. EOMI.   EARS: TM's intact. Light reflex normal. No retraction or perforation.   NOSE: Mucosa pink. Airway clear.  MOUTH & THROAT: No tonsillar enlargement. No pharyngeal erythema or exudate. No stridor.  NECK: Supple.   NODES: No cervical, axillary or inguinal lymph node enlargement.  CHEST: Lungs clear to auscultation.  CARDIOVASCULAR: Normal S1, S2. No rubs, murmurs or gallops.  ABDOMEN: Bowel sounds normal. Not distended. Soft. No tenderness or masses.  NEUROLOGIC: Cranial Nerves: II-XII grossly intact, also see MUSCULOSKELETAL  MUSCULOSKELETAL:       Right   Wrist  and hand  Dressing intact, 2 plus radial  artery and ulnar artery pulses, light touch intact Right upper extremity.  All digits are warm. No erythema, no warmth, no drainage, No swelling, no significant tenderness.      ASSESSMENT:  The patient is stable and improving.      PLAN:  Continue pain medication  Continue wound care  Continue occupational therapy

## 2018-05-08 NOTE — PROGRESS NOTES
HPI     Last [facility] visit 02/05/2018  3 month IOP  Last HVF 11/06/2017  Last GOCT 07/10/2017  Last DFE 07/10/2017  Medication: Timolol 0.5% 1 drop OU BID  Pseudophakia, OU    Diabetic      Last edited by Mario Jonas MA on 5/8/2018  8:27 AM. (History)            Assessment /Plan     For exam results, see Encounter Report.    Primary open angle glaucoma of both eyes, indeterminate stage  -     timolol maleate 0.5% (TIMOPTIC) 0.5 % Drop; Place 1 drop into both eyes 2 (two) times daily.  Dispense: 5 mL; Refill: 5      Good IOP control on Timoptic OU.  RTC 3 months for IOP check.  Full testing to be repeated next fall (November).

## 2018-05-08 NOTE — PATIENT INSTRUCTIONS
What is Arthritis?  Arthritis is a disease that affects the joints (the parts where bones meet and move). It can affect any joint in your body. There are many types of arthritis, including osteoarthritis and rheumatoid arthrtitis. If your symptoms are mild, medications may be enough to reduce pain and swelling. For more severe arthritis, surgery may be needed to improve the condition of the joint or replace the joint entirely.                  What causes arthritis?  Cartilage is a smooth substance that protects the ends of your bones and provides cushioning. When you have arthritis, this cartilage breaks down and can no longer protect your bones. The bones rub against each other, causing pain and swelling. Over time, bone spurs (small pieces of rough or splintered bone) may develop, and the joint's range of motion can become limited.  Symptoms  Some of the more common symptoms of arthritis include:  · Joint pain and stiffness. Pain and stiffness get worse with long periods of rest or using a joint too long or too hard.  · Joints that have lost normal shape and motion.  · Tender, inflamed joints. They may look red and feel warm.  · Grinding or popping noise with joint movement.   · Feeling tired all the time.  Reducing symptoms  Following a healthy lifestyle by losing weight and exercising can help reduce symptoms of osteoarthritis. Medicines can be very helpful for arthritis.     Date Last Reviewed: 9/10/2015  © 7185-6726 [employer]. [address] [place] [address], [place] [address]. All rights reserved. This information is not intended as a substitute for professional medical care. Always follow your healthcare professional's instructions.

## 2018-05-15 ENCOUNTER — LAB VISIT (OUTPATIENT)
Dept: LAB | Facility: HOSPITAL | Age: 78
End: 2018-05-15
Attending: FAMILY MEDICINE
Payer: MEDICARE

## 2018-05-15 ENCOUNTER — OFFICE VISIT (OUTPATIENT)
Dept: INTERNAL MEDICINE | Facility: CLINIC | Age: 78
End: 2018-05-15
Payer: MEDICARE

## 2018-05-15 VITALS
HEIGHT: 67 IN | TEMPERATURE: 97 F | HEART RATE: 74 BPM | DIASTOLIC BLOOD PRESSURE: 60 MMHG | WEIGHT: 226 LBS | SYSTOLIC BLOOD PRESSURE: 120 MMHG | RESPIRATION RATE: 18 BRPM | BODY MASS INDEX: 35.47 KG/M2

## 2018-05-15 DIAGNOSIS — E11.59 HYPERTENSION ASSOCIATED WITH DIABETES: ICD-10-CM

## 2018-05-15 DIAGNOSIS — I15.2 HYPERTENSION ASSOCIATED WITH DIABETES: ICD-10-CM

## 2018-05-15 DIAGNOSIS — E66.01 SEVERE OBESITY (BMI 35.0-35.9 WITH COMORBIDITY): ICD-10-CM

## 2018-05-15 PROBLEM — Z01.818 PREOP EXAMINATION: Status: RESOLVED | Noted: 2018-03-29 | Resolved: 2018-05-15

## 2018-05-15 PROBLEM — Z98.890 POSTOPERATIVE STATE: Status: RESOLVED | Noted: 2018-05-08 | Resolved: 2018-05-15

## 2018-05-15 PROBLEM — E87.5 HYPERKALEMIA: Status: RESOLVED | Noted: 2018-03-21 | Resolved: 2018-05-15

## 2018-05-15 LAB
CREAT UR-MCNC: 107 MG/DL
MICROALBUMIN UR DL<=1MG/L-MCNC: 4 UG/ML
MICROALBUMIN/CREATININE RATIO: 3.7 UG/MG

## 2018-05-15 PROCEDURE — 99999 PR PBB SHADOW E&M-EST. PATIENT-LVL III: CPT | Mod: PBBFAC,,, | Performed by: FAMILY MEDICINE

## 2018-05-15 PROCEDURE — 99499 UNLISTED E&M SERVICE: CPT | Mod: ,,, | Performed by: FAMILY MEDICINE

## 2018-05-15 PROCEDURE — 99499 UNLISTED E&M SERVICE: CPT | Mod: S$GLB,,, | Performed by: FAMILY MEDICINE

## 2018-05-15 PROCEDURE — 3074F SYST BP LT 130 MM HG: CPT | Mod: CPTII,S$GLB,, | Performed by: FAMILY MEDICINE

## 2018-05-15 PROCEDURE — 99213 OFFICE O/P EST LOW 20 MIN: CPT | Mod: S$GLB,,, | Performed by: FAMILY MEDICINE

## 2018-05-15 PROCEDURE — 3078F DIAST BP <80 MM HG: CPT | Mod: CPTII,S$GLB,, | Performed by: FAMILY MEDICINE

## 2018-05-15 PROCEDURE — 82043 UR ALBUMIN QUANTITATIVE: CPT

## 2018-05-15 RX ORDER — DOXAZOSIN 1 MG/1
1 TABLET ORAL NIGHTLY
Qty: 14 TABLET | Refills: 0 | Status: SHIPPED | OUTPATIENT
Start: 2018-05-15 | End: 2018-05-15 | Stop reason: SDUPTHER

## 2018-05-15 RX ORDER — DOXAZOSIN 1 MG/1
1 TABLET ORAL NIGHTLY
Qty: 90 TABLET | Refills: 3 | Status: SHIPPED | OUTPATIENT
Start: 2018-05-15 | End: 2018-05-20

## 2018-05-15 NOTE — ASSESSMENT & PLAN NOTE
Although a bit elevated today in the clinic her average home readings have been spot on at goal.  I recommended that she continue the same medication and I sent refills to both local and mail order pharmacy

## 2018-05-15 NOTE — PROGRESS NOTES
Subjective:       Patient ID: Kaleigh Nieves is a 77 y.o. female.    Chief Complaint: Follow-up    HPI  Mrs. Nieves is here today to follow-up on hypertension.  At the last visit about 3 weeks ago she was augmented on her therapy and Cardura was added.  She has been doing well with this and keeping a log at home which her average readings have been around 120-130 systolic and 55-65 diastolic.  She has taken it in the evening and not having any issues with dizziness or lightheadedness.  She needs a refill on the medication.  She recently had carpal tunnel surgery on her right hand and is doing well from this.  She continues to go to physical therapy and is having much less pain in able to do more with her right hand.  She is wondering when she can start driving again.    Family History   Problem Relation Age of Onset    Hypertension Mother     Diabetes Mother     Leukemia Sister     Hypertension Sister     Cancer Sister         leukemia    Heart disease Maternal Aunt     Cancer Maternal Uncle         gastric, pacreatic    Heart disease Maternal Uncle     Miscarriages / Stillbirths Maternal Uncle     Diabetes Maternal Grandmother     Asthma Maternal Grandfather     Breast cancer Neg Hx     Colon cancer Neg Hx     Ovarian cancer Neg Hx     COPD Neg Hx     Hyperlipidemia Neg Hx     Kidney disease Neg Hx        Current Outpatient Prescriptions:     albuterol 90 mcg/actuation inhaler, Inhale 2 puffs into the lungs every 6 (six) hours as needed for Wheezing., Disp: 1 each, Rfl: 11    aspirin 81 MG Chew, Take 81 mg by mouth once daily., Disp: , Rfl:     blood glucose control high,low (ACCU-CHEK DARSHAN CONTROL SOLN) Soln, Use a directed, Disp: 1 each, Rfl: 3    blood sugar diagnostic (ACCU-CHEK DARSHAN PLUS TEST STRP) Strp, TEST BLOOD SUGAR TWICE DAILY, Disp: 200 strip, Rfl: 3    blood-glucose meter kit, accu chek darshan meter/test twice daily, Disp: 1 each, Rfl: 0    clobetasol 0.05% (TEMOVATE) 0.05 % Oint,  "APPLY TOPICALLY TWO TIMES A DAY, Disp: 60 g, Rfl: 1    colchicine (COLCRYS) 0.6 mg tablet, Take 1 tablet (0.6 mg total) by mouth once daily., Disp: 90 tablet, Rfl: 1    doxazosin (CARDURA) 1 MG tablet, Take 1 tablet (1 mg total) by mouth every evening., Disp: 90 tablet, Rfl: 3    econazole nitrate 1 % cream, Apply topically once daily., Disp: 85 g, Rfl: 0    fluticasone (FLOVENT HFA) 110 mcg/actuation inhaler, Inhale 1 puff into the lungs 2 (two) times daily. Controller, Disp: 12 g, Rfl: 0    lancets (ACCU-CHEK SOFTCLIX LANCETS) Misc, TEST TWICE A DAY, Disp: 200 each, Rfl: 3    metFORMIN (GLUCOPHAGE) 500 MG tablet, TAKE 1 TABLET TWICE DAILY, Disp: 180 tablet, Rfl: 3    simvastatin (ZOCOR) 40 MG tablet, TAKE 1 TABLET EVERY NIGHT, Disp: 90 tablet, Rfl: 3    timolol maleate 0.5% (TIMOPTIC) 0.5 % Drop, Place 1 drop into both eyes 2 (two) times daily., Disp: 5 mL, Rfl: 5    Review of Systems   Constitutional: Negative for chills and fever.   Respiratory: Negative for cough and shortness of breath.    Cardiovascular: Negative for chest pain.   Gastrointestinal: Negative for abdominal pain.   Skin: Negative for rash.        Has a well-healing incision on her right wrist and also on the base of her right 3rd finger.   Neurological: Negative for dizziness.       Objective:   /60 Comment: average home measurement  Pulse 74   Temp 97.1 °F (36.2 °C) (Tympanic)   Resp 18   Ht 5' 6.5" (1.689 m)   Wt 102.5 kg (225 lb 15.5 oz)   BMI 35.93 kg/m²      Physical Exam   Constitutional: She is oriented to person, place, and time. She appears well-developed and well-nourished.   HENT:   Head: Normocephalic and atraumatic.   Eyes: Conjunctivae are normal.   Cardiovascular: Normal rate.    Pulmonary/Chest: Effort normal. No respiratory distress.   Musculoskeletal: She exhibits no edema.   Neurological: She is alert and oriented to person, place, and time. Coordination normal.   Skin: Skin is warm and dry. No rash noted. "   Psychiatric: She has a normal mood and affect. Her behavior is normal.   Vitals reviewed.      Assessment & Plan     Problem List Items Addressed This Visit        Cardiac/Vascular    Hypertension associated with diabetes    Current Assessment & Plan     Although a bit elevated today in the clinic her average home readings have been spot on at goal.  I recommended that she continue the same medication and I sent refills to both local and mail order pharmacy         Relevant Medications    doxazosin (CARDURA) 1 MG tablet    Other Relevant Orders    Microalbumin/creatinine urine ratio       Endocrine    Severe obesity (BMI 35.0-35.9 with comorbidity)    Current Assessment & Plan     Continue healthy eating with low-salt intake and she has been doing.                 Follow-up in about 3 months (around 8/15/2018) for Blood pressure monitoring.

## 2018-05-16 ENCOUNTER — TELEPHONE (OUTPATIENT)
Dept: INTERNAL MEDICINE | Facility: CLINIC | Age: 78
End: 2018-05-16

## 2018-05-16 NOTE — TELEPHONE ENCOUNTER
----- Message from Ramos Hanks, DO sent at 5/16/2018 10:25 AM CDT -----  Please inform patient that all of the labs are normal if he/she does not have [facility] activated. If there are any questions please let me know.

## 2018-05-16 NOTE — TELEPHONE ENCOUNTER
----- Message from Bora Cavazos Sr., MD sent at 5/16/2018 11:03 AM CDT -----  She can drive, now.     Bora Cavazos M.D.    ----- Message -----  From: Ramos Hanks DO  Sent: 5/15/2018   1:30 PM  To: Bora Cavazos Sr., MD    When is she cleared to start driving again? Post-op for carpal tunnel

## 2018-05-16 NOTE — TELEPHONE ENCOUNTER
----- Message from Selena Izquierdo sent at 5/16/2018 12:17 PM CDT -----  Contact: Patient  Patient returned call. She can be contacted at [phone].    Thanks,  selena

## 2018-05-20 DIAGNOSIS — E11.59 HYPERTENSION ASSOCIATED WITH DIABETES: ICD-10-CM

## 2018-05-20 DIAGNOSIS — I15.2 HYPERTENSION ASSOCIATED WITH DIABETES: ICD-10-CM

## 2018-05-20 RX ORDER — DOXAZOSIN 1 MG/1
1 TABLET ORAL NIGHTLY
Qty: 90 TABLET | Refills: 1 | Status: SHIPPED | OUTPATIENT
Start: 2018-05-20 | End: 2018-08-20

## 2018-06-05 DIAGNOSIS — L90.0 LICHEN SCLEROSUS: ICD-10-CM

## 2018-06-05 DIAGNOSIS — E11.42 TYPE 2 DIABETES MELLITUS WITH DIABETIC POLYNEUROPATHY, WITHOUT LONG-TERM CURRENT USE OF INSULIN: ICD-10-CM

## 2018-06-05 RX ORDER — ECONAZOLE NITRATE 10 MG/G
CREAM TOPICAL DAILY
Qty: 85 G | Refills: 0 | Status: SHIPPED | OUTPATIENT
Start: 2018-06-05 | End: 2018-08-16 | Stop reason: SDUPTHER

## 2018-06-15 ENCOUNTER — OFFICE VISIT (OUTPATIENT)
Dept: INTERNAL MEDICINE | Facility: CLINIC | Age: 78
End: 2018-06-15
Payer: MEDICARE

## 2018-06-15 VITALS
BODY MASS INDEX: 35.88 KG/M2 | RESPIRATION RATE: 16 BRPM | WEIGHT: 228.63 LBS | HEART RATE: 88 BPM | HEIGHT: 67 IN | OXYGEN SATURATION: 96 % | TEMPERATURE: 98 F | SYSTOLIC BLOOD PRESSURE: 130 MMHG | DIASTOLIC BLOOD PRESSURE: 70 MMHG

## 2018-06-15 DIAGNOSIS — K21.9 GASTROESOPHAGEAL REFLUX DISEASE WITHOUT ESOPHAGITIS: Chronic | ICD-10-CM

## 2018-06-15 DIAGNOSIS — G56.03 BILATERAL CARPAL TUNNEL SYNDROME: ICD-10-CM

## 2018-06-15 DIAGNOSIS — E11.59 HYPERTENSION ASSOCIATED WITH DIABETES: ICD-10-CM

## 2018-06-15 DIAGNOSIS — I65.22 STENOSIS OF LEFT CAROTID ARTERY: ICD-10-CM

## 2018-06-15 DIAGNOSIS — I15.2 HYPERTENSION ASSOCIATED WITH DIABETES: ICD-10-CM

## 2018-06-15 DIAGNOSIS — H40.9 GLAUCOMA, UNSPECIFIED GLAUCOMA TYPE, UNSPECIFIED LATERALITY: ICD-10-CM

## 2018-06-15 DIAGNOSIS — I73.9 ARTERIAL INSUFFICIENCY OF LOWER EXTREMITY: ICD-10-CM

## 2018-06-15 DIAGNOSIS — M1A.9XX0 CHRONIC GOUT WITHOUT TOPHUS, UNSPECIFIED CAUSE, UNSPECIFIED SITE: ICD-10-CM

## 2018-06-15 DIAGNOSIS — L90.0 LICHEN SCLEROSUS: ICD-10-CM

## 2018-06-15 DIAGNOSIS — E78.5 HYPERLIPIDEMIA, UNSPECIFIED HYPERLIPIDEMIA TYPE: Chronic | ICD-10-CM

## 2018-06-15 DIAGNOSIS — E11.42 TYPE 2 DIABETES MELLITUS WITH DIABETIC POLYNEUROPATHY, WITHOUT LONG-TERM CURRENT USE OF INSULIN: ICD-10-CM

## 2018-06-15 DIAGNOSIS — J42 CHRONIC BRONCHITIS, UNSPECIFIED CHRONIC BRONCHITIS TYPE: ICD-10-CM

## 2018-06-15 DIAGNOSIS — I25.10 CORONARY ARTERY DISEASE INVOLVING NATIVE CORONARY ARTERY WITHOUT ANGINA PECTORIS, UNSPECIFIED WHETHER NATIVE OR TRANSPLANTED HEART: ICD-10-CM

## 2018-06-15 DIAGNOSIS — E66.01 SEVERE OBESITY (BMI 35.0-35.9 WITH COMORBIDITY): ICD-10-CM

## 2018-06-15 DIAGNOSIS — Z00.00 ENCOUNTER FOR PREVENTIVE HEALTH EXAMINATION: Primary | ICD-10-CM

## 2018-06-15 DIAGNOSIS — I51.89 DIASTOLIC DYSFUNCTION: ICD-10-CM

## 2018-06-15 DIAGNOSIS — I70.0 AORTIC ATHEROSCLEROSIS: ICD-10-CM

## 2018-06-15 DIAGNOSIS — K63.5 POLYP OF COLON, UNSPECIFIED PART OF COLON, UNSPECIFIED TYPE: ICD-10-CM

## 2018-06-15 PROCEDURE — 99999 PR PBB SHADOW E&M-EST. PATIENT-LVL V: CPT | Mod: PBBFAC,,, | Performed by: NURSE PRACTITIONER

## 2018-06-15 PROCEDURE — 99499 UNLISTED E&M SERVICE: CPT | Mod: S$GLB,,, | Performed by: NURSE PRACTITIONER

## 2018-06-15 PROCEDURE — 3075F SYST BP GE 130 - 139MM HG: CPT | Mod: CPTII,S$GLB,, | Performed by: NURSE PRACTITIONER

## 2018-06-15 PROCEDURE — 3078F DIAST BP <80 MM HG: CPT | Mod: CPTII,S$GLB,, | Performed by: NURSE PRACTITIONER

## 2018-06-15 PROCEDURE — G0439 PPPS, SUBSEQ VISIT: HCPCS | Mod: S$GLB,,, | Performed by: NURSE PRACTITIONER

## 2018-06-15 NOTE — PATIENT INSTRUCTIONS
Counseling and Referral of Other Preventative  (Italic type indicates deductible and co-insurance are waived)    Patient Name: Kaleigh Nieves  Today's Date: 6/15/2018    Health Maintenance       Date Due Completion Date    Influenza Vaccine 08/01/2018 10/17/2017    Override on 9/23/2015: Done (via [facility])    Override on 10/11/2013: Declined    Hemoglobin A1c 09/15/2018 3/15/2018    Lipid Panel 09/28/2018 9/28/2017    Foot Exam 03/15/2019 3/15/2018    Override on 9/22/2016: Done (dr patel podiatrist)    Override on 3/21/2016: Done    Override on 4/14/2015: Done (podiatry dr kenya patel)    TETANUS VACCINE 04/11/2019 4/11/2009 (Done)    Override on 4/11/2009: Done    Eye Exam 05/08/2019 5/8/2018    Override on 1/12/2016: Done (q 4 months)    Override on 4/7/2015: Done (dr kaur  q 4 months)    Override on 7/17/2014: Done    Override on 7/16/2013: Done    Urine Microalbumin 05/15/2019 5/15/2018    Colonoscopy 06/25/2020 6/25/2015    DEXA SCAN 07/10/2020 7/10/2017    Override on 12/7/2012: Done        No orders of the defined types were placed in this encounter.    The following information is provided to all patients.  This information is to help you find resources for any of the problems found today that may be affecting your health:                Living healthy guide: www.[facility].[place].gov      Understanding Diabetes: www.diabetes.org      Eating healthy: www.cdc.gov/healthyweight      CDC home safety checklist: www.cdc.gov/steadi/patient.html      Agency on Aging: www.goea.[place].gov      Alcoholics anonymous (AA): www.aa.org      Physical Activity: www.anderson.nih.gov/fx8yifi      Tobacco use: www.quitwithusla.org

## 2018-06-25 PROBLEM — J42 CHRONIC BRONCHITIS: Status: ACTIVE | Noted: 2018-06-25

## 2018-06-25 PROBLEM — Z00.00 ENCOUNTER FOR PREVENTIVE HEALTH EXAMINATION: Status: ACTIVE | Noted: 2018-06-25

## 2018-06-25 NOTE — PROGRESS NOTES
"Kaleigh Nieves presented for a  Medicare AWV and comprehensive Health Risk Assessment today. The following components were reviewed and updated:    · Medical history  · Family History  · Social history  · Allergies and Current Medications  · Health Risk Assessment  · Health Maintenance  · Care Team     ** See Completed Assessments for Annual Wellness Visit within the encounter summary.**       The following assessments were completed:  · Living Situation  · CAGE  · Depression Screening  · Timed Get Up and Go  · Whisper Test  · Cognitive Function Screening  · Nutrition Screening  · ADL Screening  · PAQ Screening    Vitals:    06/15/18 1054   BP: 130/70   BP Location: Left arm   Patient Position: Sitting   BP Method: Medium (Manual)   Pulse: 88   Resp: 16   Temp: 98.1 °F (36.7 °C)   TempSrc: Oral   SpO2: 96%   Weight: 103.7 kg (228 lb 9.9 oz)   Height: 5' 6.5" (1.689 m)     Body mass index is 36.35 kg/m².  Physical Exam   Constitutional: She is oriented to person, place, and time. She appears well-developed and well-nourished. No distress.   HENT:   Head: Normocephalic and atraumatic.   Nose: Nose normal.   Eyes: Conjunctivae and EOM are normal. Pupils are equal, round, and reactive to light. Right eye exhibits no discharge. Left eye exhibits no discharge.   Neck: Normal range of motion. Neck supple. No thyromegaly present.   Cardiovascular: Normal rate and regular rhythm.    No murmur heard.  Pulmonary/Chest: Effort normal and breath sounds normal. No respiratory distress.   Abdominal: Soft. She exhibits no distension. There is no tenderness.   Musculoskeletal: She exhibits edema (he has 2+ pitting edema in both of her LE but not warmth/tenderness).   Neurological: She is alert and oriented to person, place, and time.   Skin: Skin is warm and dry. No rash noted. She is not diaphoretic.   Psychiatric: She has a normal mood and affect. Her behavior is normal.         Diagnoses and health risks identified today and " associated recommendations/orders:    Problem List Items Addressed This Visit        Neuro    Bilateral carpal tunnel syndrome    Overview     mild demyelinating on left, mild-moderate demyelianting on right         Current Assessment & Plan     Patient is doing well after release surgery.  Continue follow with Dr. clemente as scheduled.            Ophtho    Glaucoma    Current Assessment & Plan     Follows with Ophthalmology every 3 months for this.  Stable upon last exam.  Continue to keep appointment as scheduled.             Derm    Lichen sclerosus    Current Assessment & Plan     Has no complaints at that time.  Has treated with clobetasol in the past.  Patient states that using currently.  Continue follow with gyn.            Pulmonary    Chronic bronchitis    Current Assessment & Plan     Patient has not completed PFTs.  Had what was suspected to be an exacerbation several months ago.  Follow with PCP for for further recommendations.            Cardiac/Vascular    Hyperlipidemia (Chronic)    Current Assessment & Plan     Well controlled on simvastatin.  Continue current medication and follow with PCP.         Carotid artery stenosis    Overview     Carotid ultrasound 07/02/2015   Stable findings on the LEFT with minimally elevated to PSV within the ICA.  Stenosis is in the less than 50 percent range.         Current Assessment & Plan     No lightheadedness or dizziness.  Patient is on statin, aspirin and blood sugar and blood pressure well controlled.  Continue follow with PCP          CAD (coronary artery disease)    Overview     Documented on imaging, Chest CT 7/2015.         Current Assessment & Plan     No chest pains.  Blood pressure and cholesterol well controlled.  On statin and ASA. Follow with PCP.            Hypertension associated with diabetes    Current Assessment & Plan     Controlled.  No chest pains, palpitations, shortness of breath or headache.  Continue current medications.  Follow with PCP.          Aortic atherosclerosis    Overview     Documented on imaging, chest CT 7/2015.          Current Assessment & Plan     Blood pressure and cholesterol well controlled.  On statin and ASA. Follow with PCP.          Diastolic dysfunction    Overview     Per Echo 1/4/2016.          Current Assessment & Plan     Continue to follow with PCP.         Arterial insufficiency of lower extremity    Overview     Per [facility] 4/28/2017.          Current Assessment & Plan     No worsening swelling. Follows with PCP. Maintain control of BP and DM.             Endocrine    Type 2 diabetes mellitus with diabetic polyneuropathy, without long-term current use of insulin    Current Assessment & Plan     Well controlled.  Last A1c 5.6.  Continue metformin.  Continue follow with PCP.         Severe obesity (BMI 35.0-35.9 with comorbidity)    Current Assessment & Plan     Educated on importance of healthy/balanced diet and getting 150 minutes of exercise per week to promote weight loss, reach optimal BMI, improve comorbidities and improve lifestyle.             GI    Esophageal reflux (Chronic)    Current Assessment & Plan     Start treatment with PPI.  Patient stated was no longer working.  She is now using lifestyle measures and dietary control with good results.  Continue intervention follow with PCP.         Colon polyp    Current Assessment & Plan     Due next colonoscopy in June 2020.            Orthopedic    Chronic gout    Current Assessment & Plan     Treated with daily Colcrys.  No flares in several years.  Continue follow with PCP and Podiatry.            Other    Encounter for preventive health examination - Primary    Current Assessment & Plan     Completed today.  Repeat annually               Provided Kaleigh with a 5-10 year written screening schedule and personal prevention plan. Recommendations were developed using the USPSTF age appropriate recommendations. Education, counseling, and referrals were provided as needed.  After Visit Summary printed and given to patient which includes a list of additional screenings\tests needed.    Follow-up in about 2 months (around 8/15/2018).    DONOVAN Cummings,FNP-C

## 2018-06-25 NOTE — ASSESSMENT & PLAN NOTE
Controlled.  No chest pains, palpitations, shortness of breath or headache.  Continue current medications.  Follow with PCP.

## 2018-06-25 NOTE — ASSESSMENT & PLAN NOTE
Patient has not completed PFTs.  Had what was suspected to be an exacerbation several months ago.  Follow with PCP for for further recommendations.

## 2018-06-25 NOTE — ASSESSMENT & PLAN NOTE
Start treatment with PPI.  Patient stated was no longer working.  She is now using lifestyle measures and dietary control with good results.  Continue intervention follow with PCP.

## 2018-06-25 NOTE — ASSESSMENT & PLAN NOTE
No lightheadedness or dizziness.  Patient is on statin, aspirin and blood sugar and blood pressure well controlled.  Continue follow with PCP

## 2018-06-25 NOTE — ASSESSMENT & PLAN NOTE
Follows with Ophthalmology every 3 months for this.  Stable upon last exam.  Continue to keep appointment as scheduled.

## 2018-06-25 NOTE — ASSESSMENT & PLAN NOTE
Educated on importance of healthy/balanced diet and getting 150 minutes of exercise per week to promote weight loss, reach optimal BMI, improve comorbidities and improve lifestyle.

## 2018-06-25 NOTE — ASSESSMENT & PLAN NOTE
No chest pains.  Blood pressure and cholesterol well controlled.  On statin and ASA. Follow with PCP.

## 2018-06-25 NOTE — ASSESSMENT & PLAN NOTE
Has no complaints at that time.  Has treated with clobetasol in the past.  Patient states that using currently.  Continue follow with gyn.

## 2018-06-29 ENCOUNTER — OFFICE VISIT (OUTPATIENT)
Dept: ORTHOPEDICS | Facility: CLINIC | Age: 78
End: 2018-06-29
Payer: MEDICARE

## 2018-06-29 VITALS
HEIGHT: 67 IN | WEIGHT: 228.63 LBS | HEART RATE: 89 BPM | BODY MASS INDEX: 35.88 KG/M2 | DIASTOLIC BLOOD PRESSURE: 67 MMHG | SYSTOLIC BLOOD PRESSURE: 151 MMHG

## 2018-06-29 DIAGNOSIS — Z98.890 POSTOPERATIVE STATE: Primary | ICD-10-CM

## 2018-06-29 PROCEDURE — 99999 PR PBB SHADOW E&M-EST. PATIENT-LVL III: CPT | Mod: PBBFAC,,, | Performed by: ORTHOPAEDIC SURGERY

## 2018-06-29 PROCEDURE — 99024 POSTOP FOLLOW-UP VISIT: CPT | Mod: S$GLB,,, | Performed by: ORTHOPAEDIC SURGERY

## 2018-06-29 NOTE — PROGRESS NOTES
SUBJECTIVE:  Patient is status post Right carpal tunnel release and right hand cyst excision  .  Patient complains of   0/ 10 pain.    Past Medical History:   Diagnosis Date    Anemia     Angina pectoris     Arthritis     Asthma     Back pain     Bronchitis     CAD (coronary artery disease) 7/15    via chst ct    Carotid artery stenosis     Chronic bronchitis     Chronic gout     Colon polyp     CTS (carpal tunnel syndrome)     Diabetes mellitus, type 2     Diabetes with neurologic complications     Diverticulosis     RIVAS (dyspnea on exertion)     chronic; eval pulm dr natarajan    Dyslipidemia     Ex-smoker     quit in her 30s    GERD (gastroesophageal reflux disease)     Hyperlipidemia     Hypertension     Neuropathy, lower extremity     Obesity     Open-angle glaucoma     dr avel kaur    Peripheral vascular disease     Polyneuropathy     Tobacco dependence      Past Surgical History:   Procedure Laterality Date    CARPAL TUNNEL RELEASE Bilateral     CATARACT EXTRACTION, BILATERAL      CHOLECYSTECTOMY      CYST REMOVAL  2018    DILATION AND CURETTAGE OF UTERUS      EYE SURGERY      HEMORRHOID SURGERY      HYSTERECTOMY      fibroids    INCISION AND DRAINAGE PERIRECTAL ABSCESS      JOINT REPLACEMENT Bilateral     knee    KNEE ARTHROSCOPY Right     MYOMECTOMY       Review of patient's allergies indicates:   Allergen Reactions    Iodine and iodide containing products Nausea And Vomiting    Penicillins Itching    Sulfa (sulfonamide antibiotics) Hives and Itching    Amlodipine      edema    Ultram [tramadol] Other (See Comments)     Light headness, itiching    Bactrim [sulfamethoxazole-trimethoprim] Itching and Rash     Current Outpatient Prescriptions on File Prior to Visit   Medication Sig Dispense Refill    albuterol 90 mcg/actuation inhaler Inhale 2 puffs into the lungs every 6 (six) hours as needed for Wheezing. 1 each 11    aspirin 81 MG Chew Take 81 mg by mouth once  "daily.      blood glucose control high,low (ACCU-CHEK DARSHAN CONTROL SOLN) Soln Use a directed 1 each 3    blood sugar diagnostic (ACCU-CHEK DARSHAN PLUS TEST STRP) Strp TEST BLOOD SUGAR TWICE DAILY 200 strip 3    blood-glucose meter kit accu chek darshan meter/test twice daily 1 each 0    clobetasol 0.05% (TEMOVATE) 0.05 % Oint APPLY TOPICALLY TWO TIMES A DAY 60 g 1    colchicine (COLCRYS) 0.6 mg tablet Take 1 tablet (0.6 mg total) by mouth once daily. 90 tablet 1    doxazosin (CARDURA) 1 MG tablet TAKE 1 TABLET (1 MG TOTAL) BY MOUTH EVERY EVENING. 90 tablet 1    econazole nitrate 1 % cream APPLY TOPICALLY ONCE DAILY. 85 g 0    fluticasone (FLOVENT HFA) 110 mcg/actuation inhaler Inhale 1 puff into the lungs 2 (two) times daily. Controller 12 g 0    lancets (ACCU-CHEK SOFTCLIX LANCETS) Misc TEST TWICE A  each 3    metFORMIN (GLUCOPHAGE) 500 MG tablet TAKE 1 TABLET TWICE DAILY 180 tablet 3    simvastatin (ZOCOR) 40 MG tablet TAKE 1 TABLET EVERY NIGHT 90 tablet 3    timolol maleate 0.5% (TIMOPTIC) 0.5 % Drop Place 1 drop into both eyes 2 (two) times daily. 5 mL 5     No current facility-administered medications on file prior to visit.      BP (!) 151/67   Pulse 89   Ht 5' 6.5" (1.689 m)   Wt 103.7 kg (228 lb 9.9 oz)   BMI 36.35 kg/m²    ROS:  GENERAL: No fever, chills, fatigability or weight loss.  SKIN: No rashes, itching or changes in color or texture of skin.  HEAD: No headaches or recent head trauma.  EYES: Visual acuity fine. No photophobia, ocular pain or diplopia.  EARS: Denies ear pain, discharge or vertigo.  NOSE: No loss of smell, no epistaxis or postnasal drip.  MOUTH & THROAT: No hoarseness or change in voice. No excessive gum bleeding.  NODES: Denies swollen glands.  CHEST: Denies RIVAS, cyanosis, wheezing, cough and sputum production.  CARDIOVASCULAR: Denies chest pain, PND, orthopnea or reduced exercise tolerance.  ABDOMEN: Appetite fine. No weight loss. Denies diarrhea, abdominal pain, " hematemesis or blood in stool.  URINARY: No flank pain, dysuria or hematuria.  PERIPHERAL VASCULAR: No claudication or cyanosis.  NEUROLOGIC: No history of seizures, paralysis, alteration of gait or coordination.  MUSCULOSKELETAL: See HPI    PE:  APPEARANCE: Well nourished, well developed, in no acute distress.   HEAD: Normocephalic, atraumatic.  EYES: PERRL. EOMI.   EARS: TM's intact. Light reflex normal. No retraction or perforation.   NOSE: Mucosa pink. Airway clear.  MOUTH & THROAT: No tonsillar enlargement. No pharyngeal erythema or exudate. No stridor.  NECK: Supple.   NODES: No cervical, axillary or inguinal lymph node enlargement.  CHEST: Lungs clear to auscultation.  CARDIOVASCULAR: Normal S1, S2. No rubs, murmurs or gallops.  ABDOMEN: Bowel sounds normal. Not distended. Soft. No tenderness or masses.  NEUROLOGIC: Cranial Nerves: II-XII grossly intact, also see MUSCULOSKELETAL  MUSCULOSKELETAL:       Right   Wrist  and hand    2 plus radial  artery and ulnar artery pulses, light touch intact Right upper extremity.  All digits are warm. No erythema, no warmth, no drainage, No swelling, no significant tenderness.      ASSESSMENT:  The patient is stable and improving.      PLAN:     Continue occupational therapy exercises

## 2018-06-29 NOTE — PATIENT INSTRUCTIONS
Diabetes: Activity Tips    Being more active can help you manage your diabetes. The tips on this sheet can help you get the most from your exercise. They can also help you stay safe.  Staying Active  Its important for adults to spend less time sitting and being inactive. This is especially true if you have type 2 diabetes. When you are sitting for long periods of time, get up for short sessions of light activity every 30 minutes.  You should aim for at least 150 minutes a week of exercise or physical activity. Dont let more than 2 days go by without being active.  Benefit from briskness  Brisk activity gets your heart beating faster. This can help you increase your fitness, lose extra weight, and manage your blood sugar level. Try brisk walking. Or, if you have foot or leg problems, you can try swimming or bike riding. You can break up your exercise into chunks throughout the day. Work up to at least 30 minutes of steady, brisk exercise on most days.  Warm up and cool down  Warming up and cooling down reduce your risk of injury. They also help limit muscle soreness. Do a mild version of your activity for 5 minutes before and after your routine. You can also learn stretches that will help keep your muscles loose. Your healthcare provider may show you good ways to warm up and stretch.  Do the talk-sing test  The talk-sing test is a simple way to tell how hard youre exercising. If you can talk while exercising, youre in a safe range. If youre out of breath, slow down. If you can carry a tune, its time to  the pace. Walk up a hill. Increase the resistance on your stationary bike. Or swim faster.  What about eating?  You may be told to plan your exercise for 1 to 2 hours after a meal. In most cases, you dont need to eat while being active. If you take insulin or medicine that can cause low blood sugar, test your blood sugar before exercising. And carry a fast-acting sugar that will raise your blood sugar  level quickly. This includes glucose tablets or hard candy. Use it if you feel low blood sugar symptoms.  Safety tips  These tips can help you stay safe as you become fit:  · Exercise with a friend or carry a cell phone if you have one.  · Carry or wear identification, such as a necklace or bracelet, that says you have diabetes.  · Use the proper footwear and safety equipment for your activity.  · Drink water before, during, and after exercise.  · Dress properly for the weather.  · Dont exercise in very hot or very cold weather.  · Dont exercise if you are sick.  · If you are instructed to do so, test your blood sugar before and after you exercise. Have a small carbohydrate snack if your blood sugar is low before you start exercising.   When to stop exercising and call your healthcare provider  Stop exercising and call your healthcare provider right away if you notice any of the following:  · Pain, pressure, tightness, or heaviness in the chest  · Pain or heaviness in the neck, shoulders, back, arms, legs, or feet  · Unusual shortness of breath  · Dizziness or lightheadedness  · Unusually rapid or slow pulse  · Increased joint or muscle pain  · Nausea or vomiting  Date Last Reviewed: 5/1/2016  © 5668-3566 [employer]. [address] [place] [address], [place] [address]. All rights reserved. This information is not intended as a substitute for professional medical care. Always follow your healthcare professional's instructions.

## 2018-07-06 ENCOUNTER — OFFICE VISIT (OUTPATIENT)
Dept: PODIATRY | Facility: CLINIC | Age: 78
End: 2018-07-06
Payer: MEDICARE

## 2018-07-06 VITALS
SYSTOLIC BLOOD PRESSURE: 143 MMHG | BODY MASS INDEX: 36.13 KG/M2 | DIASTOLIC BLOOD PRESSURE: 59 MMHG | HEIGHT: 67 IN | WEIGHT: 230.19 LBS | HEART RATE: 78 BPM

## 2018-07-06 DIAGNOSIS — B35.1 DERMATOPHYTOSIS OF NAIL: ICD-10-CM

## 2018-07-06 DIAGNOSIS — E11.51 TYPE II DIABETES MELLITUS WITH PERIPHERAL CIRCULATORY DISORDER: Primary | ICD-10-CM

## 2018-07-06 DIAGNOSIS — I73.9 ARTERIAL INSUFFICIENCY OF LOWER EXTREMITY: ICD-10-CM

## 2018-07-06 PROCEDURE — 99499 UNLISTED E&M SERVICE: CPT | Mod: S$GLB,,, | Performed by: PODIATRIST

## 2018-07-06 PROCEDURE — 99999 PR PBB SHADOW E&M-EST. PATIENT-LVL III: CPT | Mod: PBBFAC,,, | Performed by: PODIATRIST

## 2018-07-06 PROCEDURE — 11721 DEBRIDE NAIL 6 OR MORE: CPT | Mod: Q8,S$GLB,, | Performed by: PODIATRIST

## 2018-07-09 RX ORDER — INSULIN PUMP SYRINGE, 3 ML
EACH MISCELLANEOUS
Qty: 1 EACH | Refills: 0 | Status: SHIPPED | OUTPATIENT
Start: 2018-07-09 | End: 2018-08-16

## 2018-07-15 NOTE — PROGRESS NOTES
Subjective:     Patient ID: Kaleigh Nieves is a 77 y.o. female.    Chief Complaint: Routine Foot Care (PCP: DASHAWN Hanks 5/15/2018, diabetic nail care/feet check)    Kaleigh is a 77 y.o. female who presents to the clinic for evaluation and treatment of high risk feet. Kaleigh has a past medical history of Anemia; Angina pectoris; Arthritis; Asthma; Back pain; Bronchitis; CAD (coronary artery disease) (7/15); Carotid artery stenosis; Chronic bronchitis; Chronic gout; Colon polyp; CTS (carpal tunnel syndrome); Diabetes mellitus, type 2; Diabetes with neurologic complications; Diverticulosis; RIVAS (dyspnea on exertion); Dyslipidemia; Ex-smoker; GERD (gastroesophageal reflux disease); Hyperlipidemia; Hypertension; Neuropathy, lower extremity; Obesity; Open-angle glaucoma; Peripheral vascular disease; Polyneuropathy; and Tobacco dependence. The patient's chief complaint is long, thick toenails. Patient states her blood sugar this morning was 90mg/dl.  This patient has documented high risk feet requiring routine maintenance secondary to diabetes mellitis and those secondary complications of diabetes, as mentioned.     PCP: Ramos Hanks, DO    Date Last Seen by PCP: 5/15/18    Current shoe gear:  Affected Foot: Rx diabetic extra depth shoes and custom accommodative insoles     Unaffected Foot: Rx diabetic extra depth shoes and custom accommodative insoles    Hemoglobin A1C   Date Value Ref Range Status   03/15/2018 5.6 4.0 - 5.6 % Final     Comment:     According to ADA guidelines, hemoglobin A1c <7.0% represents  optimal control in non-pregnant diabetic patients. Different  metrics may apply to specific patient populations.   Standards of Medical Care in Diabetes-2016.  For the purpose of screening for the presence of diabetes:  <5.7%     Consistent with the absence of diabetes  5.7-6.4%  Consistent with increasing risk for diabetes   (prediabetes)  >or=6.5%  Consistent with diabetes  Currently, no consensus exists for use of  hemoglobin A1c  for diagnosis of diabetes for children.  This Hemoglobin A1c assay has significant interference with fetal   hemoglobin   (HbF). The results are invalid for patients with abnormal amounts of   HbF,   including those with known Hereditary Persistence   of Fetal Hemoglobin. Heterozygous hemoglobin variants (HbAS, HbAC,   HbAD, HbAE, HbA2) do not significantly interfere with this assay;   however, presence of multiple variants in a sample may impact the %   interference.     09/28/2017 5.7 (H) 4.0 - 5.6 % Final     Comment:     According to ADA guidelines, hemoglobin A1c <7.0% represents  optimal control in non-pregnant diabetic patients. Different  metrics may apply to specific patient populations.   Standards of Medical Care in Diabetes-2016.  For the purpose of screening for the presence of diabetes:  <5.7%     Consistent with the absence of diabetes  5.7-6.4%  Consistent with increasing risk for diabetes   (prediabetes)  >or=6.5%  Consistent with diabetes  Currently, no consensus exists for use of hemoglobin A1c  for diagnosis of diabetes for children.  This Hemoglobin A1c assay has significant interference with fetal   hemoglobin   (HbF). The results are invalid for patients with abnormal amounts of   HbF,   including those with known Hereditary Persistence   of Fetal Hemoglobin. Heterozygous hemoglobin variants (HbAS, HbAC,   HbAD, HbAE, HbA2) do not significantly interfere with this assay;   however, presence of multiple variants in a sample may impact the %   interference.     03/21/2017 5.9 4.5 - 6.2 % Final     Comment:     According to ADA guidelines, hemoglobin A1C <7.0% represents  optimal control in non-pregnant diabetic patients.  Different  metrics may apply to specific populations.   Standards of Medical Care in Diabetes - 2016.  For the purpose of screening for the presence of diabetes:  <5.7%     Consistent with the absence of diabetes  5.7-6.4%  Consistent with increasing risk for  diabetes   (prediabetes)  >or=6.5%  Consistent with diabetes  Currently no consensus exists for use of hemoglobin A1C  for diagnosis of diabetes for children.             Patient Active Problem List   Diagnosis    Type 2 diabetes mellitus with diabetic polyneuropathy, without long-term current use of insulin    Multiple joint pain    Esophageal reflux    Hyperlipidemia    Chronic gout    Lichen sclerosus    Carotid artery stenosis    CAD (coronary artery disease)    Colon polyp    Hypertension associated with diabetes    Aortic atherosclerosis    Diastolic dysfunction    Arterial insufficiency of lower extremity    Severe obesity (BMI 35.0-35.9 with comorbidity)    Glaucoma    Ganglion cyst of flexor tendon sheath of finger of right hand    Bilateral carpal tunnel syndrome    Right carpal tunnel syndrome    Encounter for preventive health examination    Chronic bronchitis       Medication List with Changes/Refills   Current Medications    ALBUTEROL 90 MCG/ACTUATION INHALER    Inhale 2 puffs into the lungs every 6 (six) hours as needed for Wheezing.    ASPIRIN 81 MG CHEW    Take 81 mg by mouth once daily.    BLOOD GLUCOSE CONTROL HIGH,LOW (ACCU-CHEK DARSHAN CONTROL SOLN) SOLN    Use a directed    BLOOD SUGAR DIAGNOSTIC (ACCU-CHEK DARSHAN PLUS TEST STRP) STRP    TEST BLOOD SUGAR TWICE DAILY    CLOBETASOL 0.05% (TEMOVATE) 0.05 % OINT    APPLY TOPICALLY TWO TIMES A DAY    COLCHICINE (COLCRYS) 0.6 MG TABLET    Take 1 tablet (0.6 mg total) by mouth once daily.    DOXAZOSIN (CARDURA) 1 MG TABLET    TAKE 1 TABLET (1 MG TOTAL) BY MOUTH EVERY EVENING.    ECONAZOLE NITRATE 1 % CREAM    APPLY TOPICALLY ONCE DAILY.    FLUTICASONE (FLOVENT HFA) 110 MCG/ACTUATION INHALER    Inhale 1 puff into the lungs 2 (two) times daily. Controller    LANCETS (ACCU-CHEK SOFTCLIX LANCETS) MISC    TEST TWICE A DAY    METFORMIN (GLUCOPHAGE) 500 MG TABLET    TAKE 1 TABLET TWICE DAILY    SIMVASTATIN (ZOCOR) 40 MG TABLET    TAKE 1  TABLET EVERY NIGHT    TIMOLOL MALEATE 0.5% (TIMOPTIC) 0.5 % DROP    Place 1 drop into both eyes 2 (two) times daily.   Changed and/or Refilled Medications    Modified Medication Previous Medication    BLOOD-GLUCOSE METER KIT blood-glucose meter kit       accu chek elian plus meter/test twice daily    accu chek elian meter/test twice daily       Review of patient's allergies indicates:   Allergen Reactions    Iodine and iodide containing products Nausea And Vomiting    Penicillins Itching    Amlodipine      edema    Sulfa (sulfonamide antibiotics)     Ultram [tramadol] Other (See Comments)     Light headness, itiching    Bactrim [sulfamethoxazole-trimethoprim] Itching and Rash       Past Surgical History:   Procedure Laterality Date    CARPAL TUNNEL RELEASE Bilateral     CATARACT EXTRACTION, BILATERAL      CHOLECYSTECTOMY      CYST REMOVAL  2018    DILATION AND CURETTAGE OF UTERUS      EYE SURGERY      HEMORRHOID SURGERY      HYSTERECTOMY      fibroids    INCISION AND DRAINAGE PERIRECTAL ABSCESS      JOINT REPLACEMENT Bilateral     knee    KNEE ARTHROSCOPY Right     MYOMECTOMY         Family History   Problem Relation Age of Onset    Hypertension Mother     Diabetes Mother     Leukemia Sister     Hypertension Sister     Cancer Sister         leukemia    Heart disease Maternal Aunt     Cancer Maternal Uncle         gastric, pacreatic    Heart disease Maternal Uncle     Miscarriages / Stillbirths Maternal Uncle     Diabetes Maternal Grandmother     Asthma Maternal Grandfather     Breast cancer Neg Hx     Colon cancer Neg Hx     Ovarian cancer Neg Hx     COPD Neg Hx     Hyperlipidemia Neg Hx     Kidney disease Neg Hx        Social History     Social History    Marital status: Single     Spouse name: N/A    Number of children: 0    Years of education: N/A     Occupational History    Not on file.     Social History Main Topics    Smoking status: Former Smoker     Packs/day: 0.25      "Years: 24.00     Quit date: 6/25/1976    Smokeless tobacco: Never Used    Alcohol use No    Drug use: No    Sexual activity: Not Currently     Other Topics Concern    Not on file     Social History Narrative    No narrative on file       Vitals:    07/06/18 1059   BP: (!) 143/59   Pulse: 78   Weight: 104.4 kg (230 lb 2.6 oz)   Height: 5' 6.5" (1.689 m)   PainSc: 0-No pain       Hemoglobin A1C   Date Value Ref Range Status   03/15/2018 5.6 4.0 - 5.6 % Final     Comment:     According to ADA guidelines, hemoglobin A1c <7.0% represents  optimal control in non-pregnant diabetic patients. Different  metrics may apply to specific patient populations.   Standards of Medical Care in Diabetes-2016.  For the purpose of screening for the presence of diabetes:  <5.7%     Consistent with the absence of diabetes  5.7-6.4%  Consistent with increasing risk for diabetes   (prediabetes)  >or=6.5%  Consistent with diabetes  Currently, no consensus exists for use of hemoglobin A1c  for diagnosis of diabetes for children.  This Hemoglobin A1c assay has significant interference with fetal   hemoglobin   (HbF). The results are invalid for patients with abnormal amounts of   HbF,   including those with known Hereditary Persistence   of Fetal Hemoglobin. Heterozygous hemoglobin variants (HbAS, HbAC,   HbAD, HbAE, HbA2) do not significantly interfere with this assay;   however, presence of multiple variants in a sample may impact the %   interference.     09/28/2017 5.7 (H) 4.0 - 5.6 % Final     Comment:     According to ADA guidelines, hemoglobin A1c <7.0% represents  optimal control in non-pregnant diabetic patients. Different  metrics may apply to specific patient populations.   Standards of Medical Care in Diabetes-2016.  For the purpose of screening for the presence of diabetes:  <5.7%     Consistent with the absence of diabetes  5.7-6.4%  Consistent with increasing risk for diabetes   (prediabetes)  >or=6.5%  Consistent with " diabetes  Currently, no consensus exists for use of hemoglobin A1c  for diagnosis of diabetes for children.  This Hemoglobin A1c assay has significant interference with fetal   hemoglobin   (HbF). The results are invalid for patients with abnormal amounts of   HbF,   including those with known Hereditary Persistence   of Fetal Hemoglobin. Heterozygous hemoglobin variants (HbAS, HbAC,   HbAD, HbAE, HbA2) do not significantly interfere with this assay;   however, presence of multiple variants in a sample may impact the %   interference.     03/21/2017 5.9 4.5 - 6.2 % Final     Comment:     According to ADA guidelines, hemoglobin A1C <7.0% represents  optimal control in non-pregnant diabetic patients.  Different  metrics may apply to specific populations.   Standards of Medical Care in Diabetes - 2016.  For the purpose of screening for the presence of diabetes:  <5.7%     Consistent with the absence of diabetes  5.7-6.4%  Consistent with increasing risk for diabetes   (prediabetes)  >or=6.5%  Consistent with diabetes  Currently no consensus exists for use of hemoglobin A1C  for diagnosis of diabetes for children.         Review of Systems   Constitutional: Negative for chills and fever.   Respiratory: Negative for shortness of breath.    Cardiovascular: Positive for leg swelling. Negative for chest pain, palpitations, orthopnea and claudication.   Gastrointestinal: Negative for diarrhea, nausea and vomiting.   Musculoskeletal: Negative for joint pain.   Skin: Negative for rash.   Neurological: Positive for tingling and sensory change. Negative for dizziness, focal weakness and weakness.   Psychiatric/Behavioral: Negative.            Objective:      PHYSICAL EXAM: Apperance: Alert and orient in no distress,well developed, and with good attention to grooming and body habits  Patient presents ambulating in diabetic shoes and inserts.   LOWER EXTREMITY EXAM:  VASCULAR: Dorsalis pedis pulses 0/4 bilateral (monophasic with  doppler)and Posterior Tibial pulses 0/4 bilateral (biphasic with doppler). Capillary fill time <blanched bilateral. Mild edema observed bilateral. Varicosities present bilateral. Skin temperature of the lower extremities is warm to cool, proximal to distal. Hair growth absent bilateral.  DERMATOLOGICAL: No skin rashes, subcutaneous nodules, lesions, or ulcers observed bilateral. Nails 1,2,3,4,5 bilateral elongated, thickened, and discolored with subungual debris. Webspaces 1,2,3,4clean, dry and without evidence of break in skin integrity bilateral. (+) erythema, edema, and increased temp noted to toes 1-5 bilateral.   NEUROLOGICAL: Light touch, sharp-dull, proprioception all present and equal bilaterally.  Vibratory sensation intact on left hallux and diminished at right hallux. Protective sensation intact at all 10 sites as tested with a Aylett-Jimy 5.07 monofilament.   MUSCULOSKELETAL: Muscle strength is 5/5 for foot inverters, everters, plantarflexors, and dorsiflexors. Muscle tone is normal. Pes planus noted bilateral. Flexible hammertoes noted bilateral.         Assessment:       Encounter Diagnoses   Name Primary?    Type II diabetes mellitus with peripheral circulatory disorder Yes    Dermatophytosis of nail     Arterial insufficiency of lower extremity          Plan:   Type II diabetes mellitus with peripheral circulatory disorder    Dermatophytosis of nail    Arterial insufficiency of lower extremity      I counseled the patient on her conditions, their implications and medical management.  Greater than 50% of this visit spent on counseling and coordination of care.  Greater than 15 minutes of a 20 minute appointment spent on education about the diabetic foot, neuropathy, and prevention of limb loss.  Shoe inspection. Diabetic Foot Education. Patient reminded of the importance of good nutrition and blood sugar control to help prevent podiatric complications of diabetes. Patient instructed on proper  foot hygeine. We discussed wearing proper shoe gear, daily foot inspections, never walking without protective shoe gear, never putting sharp instruments to feet.    With patient's permission, nails 1-5 bilateral were debrided/trimmed in length and thickness aggressively to their soft tissue attachment mechanically and with electric , removing all offending nail and debris. Patient relates relief following the procedure.  Patient  will continue to monitor the areas daily, inspect feet, wear protective shoe gear when ambulatory, moisturizer to maintain skin integrity. Patient reminded of the importance of good nutrition and blood sugar control to help prevent podiatric complications of diabetes.  Patient to instructed to continue gout medication as prescribed.   Patient to return in this office in approximately 3 months, or sooner if needed.                   Ino Harmon DPM  [facility]

## 2018-08-07 ENCOUNTER — OFFICE VISIT (OUTPATIENT)
Dept: OPHTHALMOLOGY | Facility: CLINIC | Age: 78
End: 2018-08-07
Payer: MEDICARE

## 2018-08-07 DIAGNOSIS — H40.1134 PRIMARY OPEN ANGLE GLAUCOMA OF BOTH EYES, INDETERMINATE STAGE: Primary | ICD-10-CM

## 2018-08-07 PROCEDURE — 99999 PR PBB SHADOW E&M-EST. PATIENT-LVL I: CPT | Mod: PBBFAC,,, | Performed by: OPTOMETRIST

## 2018-08-07 PROCEDURE — 92012 INTRM OPH EXAM EST PATIENT: CPT | Mod: S$GLB,,, | Performed by: OPTOMETRIST

## 2018-08-07 NOTE — PROGRESS NOTES
HPI     Last [facility] visit 05/08/2018  3 month IOP check  Last HVF 11/06/2017  Last GOCT 07/10/2017  Last DFE 07/10/2017  Medication: Timolol 0.5% 1 drop OU BID  Pseudophakia, OU  Diabetic/NIDDM    Last edited by Mario Jonas MA on 8/7/2018 10:06 AM. (History)            Assessment /Plan     For exam results, see Encounter Report.    Primary open angle glaucoma of both eyes, indeterminate stage      Good IOP control OU.  RTC in November for full dilation, HVF, GOCT, etc.

## 2018-08-16 ENCOUNTER — TELEPHONE (OUTPATIENT)
Dept: CARDIOLOGY | Facility: CLINIC | Age: 78
End: 2018-08-16

## 2018-08-16 ENCOUNTER — LAB VISIT (OUTPATIENT)
Dept: LAB | Facility: HOSPITAL | Age: 78
End: 2018-08-16
Attending: FAMILY MEDICINE
Payer: MEDICARE

## 2018-08-16 ENCOUNTER — TELEPHONE (OUTPATIENT)
Dept: INTERNAL MEDICINE | Facility: CLINIC | Age: 78
End: 2018-08-16

## 2018-08-16 ENCOUNTER — OFFICE VISIT (OUTPATIENT)
Dept: INTERNAL MEDICINE | Facility: CLINIC | Age: 78
End: 2018-08-16
Payer: MEDICARE

## 2018-08-16 VITALS
HEART RATE: 82 BPM | OXYGEN SATURATION: 94 % | TEMPERATURE: 98 F | BODY MASS INDEX: 36.5 KG/M2 | RESPIRATION RATE: 18 BRPM | HEIGHT: 67 IN | WEIGHT: 232.56 LBS | DIASTOLIC BLOOD PRESSURE: 59 MMHG | SYSTOLIC BLOOD PRESSURE: 131 MMHG

## 2018-08-16 DIAGNOSIS — L90.0 LICHEN SCLEROSUS: ICD-10-CM

## 2018-08-16 DIAGNOSIS — I51.89 DIASTOLIC DYSFUNCTION: ICD-10-CM

## 2018-08-16 DIAGNOSIS — M1A.9XX0 CHRONIC GOUT WITHOUT TOPHUS, UNSPECIFIED CAUSE, UNSPECIFIED SITE: Primary | ICD-10-CM

## 2018-08-16 DIAGNOSIS — M1A.9XX0 CHRONIC GOUT WITHOUT TOPHUS, UNSPECIFIED CAUSE, UNSPECIFIED SITE: ICD-10-CM

## 2018-08-16 LAB
ANION GAP SERPL CALC-SCNC: 6 MMOL/L
BNP SERPL-MCNC: 36 PG/ML
BUN SERPL-MCNC: 8 MG/DL
CALCIUM SERPL-MCNC: 9.4 MG/DL
CHLORIDE SERPL-SCNC: 102 MMOL/L
CO2 SERPL-SCNC: 35 MMOL/L
CREAT SERPL-MCNC: 0.7 MG/DL
EST. GFR  (AFRICAN AMERICAN): >60 ML/MIN/1.73 M^2
EST. GFR  (NON AFRICAN AMERICAN): >60 ML/MIN/1.73 M^2
GLUCOSE SERPL-MCNC: 106 MG/DL
POTASSIUM SERPL-SCNC: 5.2 MMOL/L
SODIUM SERPL-SCNC: 143 MMOL/L
URATE SERPL-MCNC: 5.8 MG/DL

## 2018-08-16 PROCEDURE — 99999 PR PBB SHADOW E&M-EST. PATIENT-LVL III: CPT | Mod: PBBFAC,,, | Performed by: FAMILY MEDICINE

## 2018-08-16 PROCEDURE — 84550 ASSAY OF BLOOD/URIC ACID: CPT | Mod: PO

## 2018-08-16 PROCEDURE — 80048 BASIC METABOLIC PNL TOTAL CA: CPT | Mod: PO

## 2018-08-16 PROCEDURE — 36415 COLL VENOUS BLD VENIPUNCTURE: CPT | Mod: PO

## 2018-08-16 PROCEDURE — 83880 ASSAY OF NATRIURETIC PEPTIDE: CPT | Mod: PO

## 2018-08-16 PROCEDURE — 3075F SYST BP GE 130 - 139MM HG: CPT | Mod: CPTII,S$GLB,, | Performed by: FAMILY MEDICINE

## 2018-08-16 PROCEDURE — 3078F DIAST BP <80 MM HG: CPT | Mod: CPTII,S$GLB,, | Performed by: FAMILY MEDICINE

## 2018-08-16 PROCEDURE — 99214 OFFICE O/P EST MOD 30 MIN: CPT | Mod: S$GLB,,, | Performed by: FAMILY MEDICINE

## 2018-08-16 RX ORDER — FUROSEMIDE 20 MG/1
20 TABLET ORAL 2 TIMES DAILY
Qty: 8 TABLET | Refills: 0 | Status: SHIPPED | OUTPATIENT
Start: 2018-08-16 | End: 2018-08-20

## 2018-08-16 RX ORDER — ECONAZOLE NITRATE 10 MG/G
CREAM TOPICAL DAILY
Qty: 85 G | Refills: 0 | Status: SHIPPED | OUTPATIENT
Start: 2018-08-16 | End: 2018-10-31 | Stop reason: SDUPTHER

## 2018-08-16 RX ORDER — POTASSIUM CHLORIDE 750 MG/1
10 CAPSULE, EXTENDED RELEASE ORAL ONCE
Qty: 1 CAPSULE | Refills: 0 | Status: SHIPPED | OUTPATIENT
Start: 2018-08-16 | End: 2018-08-16

## 2018-08-16 NOTE — TELEPHONE ENCOUNTER
Attempted without success to contact pt.  Left vm to return call.    ----- Message from Koki Menjivar MA sent at 8/16/2018 11:06 AM CDT -----  Contact: Koki/Seyd Lynch would like to have patient seen via telemedicine if possible. Please contact patient to schedule

## 2018-08-16 NOTE — TELEPHONE ENCOUNTER
----- Message from Petrona Edwards sent at 8/16/2018  4:00 PM CDT -----  Contact: self  Patient returning call. Please call back at [phone].     Thanks,   Petrona Edwards

## 2018-08-16 NOTE — TELEPHONE ENCOUNTER
Spoke with pt and made appt for October 5th as no one from cardio will be there until then.  Pt stated she could not come to [facility] due to transportation issues.  Pt was advised if fluid began to build up before the appointment to seek medical attention with primary care.  Pt agreed to this.

## 2018-08-16 NOTE — TELEPHONE ENCOUNTER
----- Message from Fannie Jonas sent at 8/16/2018  3:55 PM CDT -----  Contact: pot   Pt was returning nurse call   [phone] (home)

## 2018-08-16 NOTE — PROGRESS NOTES
Subjective:       Patient ID: Kaleigh Nieves is a 77 y.o. female.    Chief Complaint: Follow-up (dm) and Foot Pain (left)    Foot Pain   This is a new problem. The current episode started in the past 7 days. The problem occurs constantly. The problem has been gradually worsening. Associated symptoms include coughing. Pertinent negatives include no abdominal pain, chest pain, congestion, fever or nausea. The symptoms are aggravated by walking. She has tried nothing for the symptoms.     Review of Systems   Constitutional: Negative for fever.   HENT: Negative for congestion.    Respiratory: Positive for cough.    Cardiovascular: Negative for chest pain.   Gastrointestinal: Negative for abdominal pain and nausea.       Objective:      Physical Exam   Constitutional: She appears well-developed and well-nourished. She appears distressed.   HENT:   Head: Normocephalic and atraumatic.   Pulmonary/Chest: Effort normal and breath sounds normal. No respiratory distress. She has no wheezes.   Musculoskeletal: Normal range of motion. She exhibits edema. She exhibits no deformity.   ttp of dorsum of left foot   Skin: Skin is warm and dry. No rash noted. She is not diaphoretic. No erythema.   Nursing note and vitals reviewed.      Assessment:       1. Chronic gout without tophus, unspecified cause, unspecified site    2. Lichen sclerosus    3. Diastolic dysfunction        Plan:     Problem List Items Addressed This Visit        Derm    Lichen sclerosus    Relevant Medications    econazole nitrate 1 % cream       Cardiac/Vascular    Diastolic dysfunction    Overview     Per Echo 1/4/2016.          Current Assessment & Plan     Pt has compression stockings at home.         Relevant Medications    furosemide (LASIX) 20 MG tablet    potassium chloride (MICRO-K) 10 MEQ CpSR    Other Relevant Orders    Brain natriuretic peptide    Basic metabolic panel    Ambulatory referral to Cardiology       Orthopedic    Chronic gout - Primary     Relevant Orders    Uric acid

## 2018-08-20 ENCOUNTER — OFFICE VISIT (OUTPATIENT)
Dept: INTERNAL MEDICINE | Facility: CLINIC | Age: 78
End: 2018-08-20
Payer: MEDICARE

## 2018-08-20 VITALS
OXYGEN SATURATION: 97 % | BODY MASS INDEX: 35.91 KG/M2 | TEMPERATURE: 97 F | DIASTOLIC BLOOD PRESSURE: 66 MMHG | RESPIRATION RATE: 18 BRPM | HEART RATE: 77 BPM | WEIGHT: 228.81 LBS | HEIGHT: 67 IN | SYSTOLIC BLOOD PRESSURE: 150 MMHG

## 2018-08-20 DIAGNOSIS — I15.2 HYPERTENSION ASSOCIATED WITH DIABETES: Primary | ICD-10-CM

## 2018-08-20 DIAGNOSIS — R60.9 DEPENDENT EDEMA: ICD-10-CM

## 2018-08-20 DIAGNOSIS — E11.59 HYPERTENSION ASSOCIATED WITH DIABETES: Primary | ICD-10-CM

## 2018-08-20 PROBLEM — Z00.00 ENCOUNTER FOR PREVENTIVE HEALTH EXAMINATION: Status: RESOLVED | Noted: 2018-06-25 | Resolved: 2018-08-20

## 2018-08-20 PROCEDURE — 3077F SYST BP >= 140 MM HG: CPT | Mod: CPTII,S$GLB,, | Performed by: FAMILY MEDICINE

## 2018-08-20 PROCEDURE — 99999 PR PBB SHADOW E&M-EST. PATIENT-LVL III: CPT | Mod: PBBFAC,,, | Performed by: FAMILY MEDICINE

## 2018-08-20 PROCEDURE — 99214 OFFICE O/P EST MOD 30 MIN: CPT | Mod: S$GLB,,, | Performed by: FAMILY MEDICINE

## 2018-08-20 PROCEDURE — 3078F DIAST BP <80 MM HG: CPT | Mod: CPTII,S$GLB,, | Performed by: FAMILY MEDICINE

## 2018-08-20 RX ORDER — LISINOPRIL AND HYDROCHLOROTHIAZIDE 20; 25 MG/1; MG/1
1 TABLET ORAL DAILY
Qty: 30 TABLET | Refills: 11 | Status: SHIPPED | OUTPATIENT
Start: 2018-08-20 | End: 2018-12-06

## 2018-08-23 PROBLEM — R60.9 DEPENDENT EDEMA: Status: ACTIVE | Noted: 2018-08-23

## 2018-08-23 NOTE — ASSESSMENT & PLAN NOTE
No signs of cellulitis.  She is not having any shortness of breath.  Recent uric acid level was only slightly above normal.  BNP was normal.  She did have mildly elevated potassium last week.  Will want to follow this very closely since I am restarting her on ACE-inhibitor.

## 2018-08-23 NOTE — PROGRESS NOTES
Subjective:       Patient ID: Kaleigh Nieves is a 77 y.o. female.    Chief Complaint: Follow-up (jose foot swelling)    HPI  Came in today to follow-up on bilateral lower leg swelling. This is a new problem to me.  She was recently seen and started on a low dose of Lasix.  She has not noticed any change as result of this.  I inquired whether she had been on any new medication and Cardura is the only medication that is somewhat new for her but she has been on it several weeks now.  She is not having any shortness of breath.  Not having any joint pain but the swelling in her feet is causing discomfort.  She has a history of chronic gout and is taking colchicine on a daily basis.  Overall she feels that the swelling is only slightly better.  It is not worse.  She has been trying to keep her legs elevated when possible and this does help somewhat    Family History   Problem Relation Age of Onset    Hypertension Mother     Diabetes Mother     Leukemia Sister     Hypertension Sister     Cancer Sister         leukemia    Heart disease Maternal Aunt     Cancer Maternal Uncle         gastric, pacreatic    Heart disease Maternal Uncle     Miscarriages / Stillbirths Maternal Uncle     Diabetes Maternal Grandmother     Asthma Maternal Grandfather     Breast cancer Neg Hx     Colon cancer Neg Hx     Ovarian cancer Neg Hx     COPD Neg Hx     Hyperlipidemia Neg Hx     Kidney disease Neg Hx        Current Outpatient Medications:     albuterol 90 mcg/actuation inhaler, Inhale 2 puffs into the lungs every 6 (six) hours as needed for Wheezing., Disp: 1 each, Rfl: 11    aspirin 81 MG Chew, Take 81 mg by mouth once daily., Disp: , Rfl:     blood glucose control high,low (ACCU-CHEK DARSHAN CONTROL SOLN) Soln, Use a directed, Disp: 1 each, Rfl: 3    blood sugar diagnostic (ACCU-CHEK DARSHAN PLUS TEST STRP) Strp, TEST BLOOD SUGAR TWICE DAILY, Disp: 200 strip, Rfl: 3    clobetasol 0.05% (TEMOVATE) 0.05 % Oint, APPLY TOPICALLY  "TWO TIMES A DAY, Disp: 60 g, Rfl: 1    colchicine (COLCRYS) 0.6 mg tablet, Take 1 tablet (0.6 mg total) by mouth once daily., Disp: 90 tablet, Rfl: 1    econazole nitrate 1 % cream, Apply topically once daily., Disp: 85 g, Rfl: 0    fluticasone (FLOVENT HFA) 110 mcg/actuation inhaler, Inhale 1 puff into the lungs 2 (two) times daily. Controller, Disp: 12 g, Rfl: 0    lancets (ACCU-CHEK SOFTCLIX LANCETS) Misc, TEST TWICE A DAY, Disp: 200 each, Rfl: 3    lisinopril-hydrochlorothiazide (PRINZIDE,ZESTORETIC) 20-25 mg Tab, Take 1 tablet by mouth once daily., Disp: 30 tablet, Rfl: 11    metFORMIN (GLUCOPHAGE) 500 MG tablet, TAKE 1 TABLET TWICE DAILY, Disp: 180 tablet, Rfl: 3    simvastatin (ZOCOR) 40 MG tablet, TAKE 1 TABLET EVERY NIGHT, Disp: 90 tablet, Rfl: 3    timolol maleate 0.5% (TIMOPTIC) 0.5 % Drop, Place 1 drop into both eyes 2 (two) times daily., Disp: 5 mL, Rfl: 5    Review of Systems   Constitutional: Negative for chills and fever.   Respiratory: Negative for cough and shortness of breath.    Cardiovascular: Positive for leg swelling. Negative for chest pain.   Gastrointestinal: Negative for abdominal pain.   Skin: Negative for rash.   Neurological: Negative for dizziness.       Objective:   BP (!) 150/66   Pulse 77   Temp 96.5 °F (35.8 °C)   Resp 18   Ht 5' 6.5" (1.689 m)   Wt 103.8 kg (228 lb 13.4 oz)   SpO2 97%   BMI 36.38 kg/m²      Physical Exam   Constitutional: She is oriented to person, place, and time. She appears well-developed and well-nourished.   HENT:   Head: Normocephalic and atraumatic.   Eyes: Conjunctivae are normal.   Cardiovascular: Normal rate.   Pulmonary/Chest: Effort normal. No respiratory distress.   Musculoskeletal: She exhibits edema (She has symmetrical bilateral pedal and some ankle edema. Mild tenderness to palpation.  No signs of cellulitis.).   Neurological: She is alert and oriented to person, place, and time. Coordination normal.   Skin: Skin is warm and dry. No " rash noted.   Psychiatric: She has a normal mood and affect. Her behavior is normal.   Vitals reviewed.      Assessment & Plan     Problem List Items Addressed This Visit        Cardiac/Vascular    Hypertension associated with diabetes - Primary    Current Assessment & Plan     Today her blood pressure is not well controlled.  I recommended we switch her per medications a bit to get her back on lisinopril and hydrochlorothiazide.  Taking her off of the Cardura thinking that maybe this caused some of her issues of leg swelling.  Also, it is not controlling her blood pressure well.  Also stopping the Lasix.  Hopefully the diuretic effect of hydrochlorothiazide will be sufficient.            Other    Dependent edema    Current Assessment & Plan     No signs of cellulitis.  She is not having any shortness of breath.  Recent uric acid level was only slightly above normal.  BNP was normal.  She did have mildly elevated potassium last week.  Will want to follow this very closely since I am restarting her on ACE-inhibitor.                 Follow-up in about 2 weeks (around 9/3/2018) for Blood pressure monitoring.

## 2018-08-23 NOTE — ASSESSMENT & PLAN NOTE
Today her blood pressure is not well controlled.  I recommended we switch her per medications a bit to get her back on lisinopril and hydrochlorothiazide.  Taking her off of the Cardura thinking that maybe this caused some of her issues of leg swelling.  Also, it is not controlling her blood pressure well.  Also stopping the Lasix.  Hopefully the diuretic effect of hydrochlorothiazide will be sufficient.

## 2018-08-27 ENCOUNTER — PATIENT OUTREACH (OUTPATIENT)
Dept: INTERNAL MEDICINE | Facility: CLINIC | Age: 78
End: 2018-08-27

## 2018-09-06 ENCOUNTER — OFFICE VISIT (OUTPATIENT)
Dept: INTERNAL MEDICINE | Facility: CLINIC | Age: 78
End: 2018-09-06
Payer: MEDICARE

## 2018-09-06 VITALS
DIASTOLIC BLOOD PRESSURE: 70 MMHG | WEIGHT: 227.06 LBS | TEMPERATURE: 99 F | HEART RATE: 80 BPM | HEIGHT: 67 IN | RESPIRATION RATE: 16 BRPM | OXYGEN SATURATION: 95 % | BODY MASS INDEX: 35.64 KG/M2 | SYSTOLIC BLOOD PRESSURE: 126 MMHG

## 2018-09-06 DIAGNOSIS — E11.59 HYPERTENSION ASSOCIATED WITH DIABETES: ICD-10-CM

## 2018-09-06 DIAGNOSIS — R60.9 DEPENDENT EDEMA: ICD-10-CM

## 2018-09-06 DIAGNOSIS — I15.2 HYPERTENSION ASSOCIATED WITH DIABETES: ICD-10-CM

## 2018-09-06 PROCEDURE — 99999 PR PBB SHADOW E&M-EST. PATIENT-LVL III: CPT | Mod: PBBFAC,,, | Performed by: FAMILY MEDICINE

## 2018-09-06 PROCEDURE — 99213 OFFICE O/P EST LOW 20 MIN: CPT | Mod: PBBFAC,PO | Performed by: FAMILY MEDICINE

## 2018-09-06 PROCEDURE — 3074F SYST BP LT 130 MM HG: CPT | Mod: CPTII,,, | Performed by: FAMILY MEDICINE

## 2018-09-06 PROCEDURE — 99213 OFFICE O/P EST LOW 20 MIN: CPT | Mod: S$PBB,,, | Performed by: FAMILY MEDICINE

## 2018-09-06 PROCEDURE — 3078F DIAST BP <80 MM HG: CPT | Mod: CPTII,,, | Performed by: FAMILY MEDICINE

## 2018-09-06 PROCEDURE — 1101F PT FALLS ASSESS-DOCD LE1/YR: CPT | Mod: CPTII,,, | Performed by: FAMILY MEDICINE

## 2018-09-06 NOTE — PROGRESS NOTES
Subjective:       Patient ID: Kaleigh Nieves is a 77 y.o. female.    Chief Complaint: Hypertension; Foot Pain; and Sinus Problem    HPI    Came in today to follow-up on hypertension.  At the last visit it was above goal but we made some adjustments to her medication.  She has been doing well with this and not having any negative side effects.  Her foot pain and swelling has also improved.  She still has some pedal edema but is able to do more for normal activities than before.          Family History   Problem Relation Age of Onset    Hypertension Mother     Diabetes Mother     Leukemia Sister     Hypertension Sister     Cancer Sister         leukemia    Heart disease Maternal Aunt     Cancer Maternal Uncle         gastric, pacreatic    Heart disease Maternal Uncle     Miscarriages / Stillbirths Maternal Uncle     Diabetes Maternal Grandmother     Asthma Maternal Grandfather     Breast cancer Neg Hx     Colon cancer Neg Hx     Ovarian cancer Neg Hx     COPD Neg Hx     Hyperlipidemia Neg Hx     Kidney disease Neg Hx        Current Outpatient Medications:     albuterol 90 mcg/actuation inhaler, Inhale 2 puffs into the lungs every 6 (six) hours as needed for Wheezing., Disp: 1 each, Rfl: 11    aspirin 81 MG Chew, Take 81 mg by mouth once daily., Disp: , Rfl:     blood glucose control high,low (ACCU-CHEK DARSHAN CONTROL SOLN) Soln, Use a directed, Disp: 1 each, Rfl: 3    blood sugar diagnostic (ACCU-CHEK DARSHAN PLUS TEST STRP) Strp, TEST BLOOD SUGAR TWICE DAILY, Disp: 200 strip, Rfl: 3    clobetasol 0.05% (TEMOVATE) 0.05 % Oint, APPLY TOPICALLY TWO TIMES A DAY, Disp: 60 g, Rfl: 1    colchicine (COLCRYS) 0.6 mg tablet, Take 1 tablet (0.6 mg total) by mouth once daily., Disp: 90 tablet, Rfl: 1    econazole nitrate 1 % cream, Apply topically once daily., Disp: 85 g, Rfl: 0    fluticasone (FLOVENT HFA) 110 mcg/actuation inhaler, Inhale 1 puff into the lungs 2 (two) times daily. Controller, Disp: 12 g,  "Rfl: 0    lancets (ACCU-CHEK SOFTCLIX LANCETS) Misc, TEST TWICE A DAY, Disp: 200 each, Rfl: 3    lisinopril-hydrochlorothiazide (PRINZIDE,ZESTORETIC) 20-25 mg Tab, Take 1 tablet by mouth once daily., Disp: 30 tablet, Rfl: 11    metFORMIN (GLUCOPHAGE) 500 MG tablet, TAKE 1 TABLET TWICE DAILY, Disp: 180 tablet, Rfl: 3    simvastatin (ZOCOR) 40 MG tablet, TAKE 1 TABLET EVERY NIGHT, Disp: 90 tablet, Rfl: 3    timolol maleate 0.5% (TIMOPTIC) 0.5 % Drop, Place 1 drop into both eyes 2 (two) times daily., Disp: 5 mL, Rfl: 5    Review of Systems   Constitutional: Negative for chills and fever.   HENT: Positive for rhinorrhea.    Respiratory: Negative for cough and shortness of breath.    Cardiovascular: Positive for leg swelling. Negative for chest pain.   Gastrointestinal: Negative for abdominal pain.   Skin: Negative for rash.   Neurological: Negative for dizziness.       Objective:   /70 (BP Location: Left arm, Patient Position: Sitting, BP Method: Medium (Manual))   Pulse 80   Temp 98.5 °F (36.9 °C) (Oral)   Resp 16   Ht 5' 6.5" (1.689 m)   Wt 103 kg (227 lb 1.2 oz)   LMP  (Approximate)   SpO2 95%   BMI 36.10 kg/m²      Physical Exam   Constitutional: She is oriented to person, place, and time. She appears well-developed and well-nourished.   HENT:   Head: Normocephalic and atraumatic.   Eyes: Conjunctivae are normal.   Cardiovascular: Normal rate.   Pulmonary/Chest: Effort normal. No respiratory distress.   Musculoskeletal: She exhibits no edema.   Neurological: She is alert and oriented to person, place, and time. Coordination normal.   Skin: Skin is warm and dry. No rash noted.   Psychiatric: She has a normal mood and affect. Her behavior is normal.   Vitals reviewed.      Assessment & Plan     Problem List Items Addressed This Visit        Cardiac/Vascular    Hypertension associated with diabetes    Current Assessment & Plan       Hypertension better controlled.  Continue on same medications.  " Recommend follow-up in 3 months.  Last hemoglobin A1c was less than 6 so will defer rechecking for another 3 months.            Other    Dependent edema    Current Assessment & Plan       Improved since the last visit.  Continue on same medication with the diuretic component of hydrochlorothiazide.  Recommended continued elevation of her legs and continued physical activity to help prevent dependent edema.                 Follow-up in about 3 months (around 12/6/2018).

## 2018-09-06 NOTE — ASSESSMENT & PLAN NOTE
Improved since the last visit.  Continue on same medication with the diuretic component of hydrochlorothiazide.  Recommended continued elevation of her legs and continued physical activity to help prevent dependent edema.

## 2018-09-06 NOTE — ASSESSMENT & PLAN NOTE
Hypertension better controlled.  Continue on same medications.  Recommend follow-up in 3 months.  Last hemoglobin A1c was less than 6 so will defer rechecking for another 3 months.

## 2018-10-05 ENCOUNTER — OFFICE VISIT (OUTPATIENT)
Dept: CARDIOLOGY | Facility: CLINIC | Age: 78
End: 2018-10-05
Payer: MEDICARE

## 2018-10-05 VITALS
SYSTOLIC BLOOD PRESSURE: 125 MMHG | WEIGHT: 226.44 LBS | BODY MASS INDEX: 35.54 KG/M2 | HEART RATE: 78 BPM | DIASTOLIC BLOOD PRESSURE: 59 MMHG | HEIGHT: 67 IN

## 2018-10-05 DIAGNOSIS — E78.5 HYPERLIPIDEMIA, UNSPECIFIED HYPERLIPIDEMIA TYPE: Chronic | ICD-10-CM

## 2018-10-05 DIAGNOSIS — I65.22 STENOSIS OF LEFT CAROTID ARTERY: ICD-10-CM

## 2018-10-05 DIAGNOSIS — E66.01 SEVERE OBESITY (BMI 35.0-35.9 WITH COMORBIDITY): ICD-10-CM

## 2018-10-05 DIAGNOSIS — E11.59 HYPERTENSION ASSOCIATED WITH DIABETES: ICD-10-CM

## 2018-10-05 DIAGNOSIS — E11.42 TYPE 2 DIABETES MELLITUS WITH DIABETIC POLYNEUROPATHY, WITHOUT LONG-TERM CURRENT USE OF INSULIN: ICD-10-CM

## 2018-10-05 DIAGNOSIS — I51.89 DIASTOLIC DYSFUNCTION: Primary | ICD-10-CM

## 2018-10-05 DIAGNOSIS — I15.2 HYPERTENSION ASSOCIATED WITH DIABETES: ICD-10-CM

## 2018-10-05 PROCEDURE — 1101F PT FALLS ASSESS-DOCD LE1/YR: CPT | Mod: CPTII,,, | Performed by: INTERNAL MEDICINE

## 2018-10-05 PROCEDURE — 99999 PR PBB SHADOW E&M-EST. PATIENT-LVL III: CPT | Mod: PBBFAC,,, | Performed by: INTERNAL MEDICINE

## 2018-10-05 PROCEDURE — 99204 OFFICE O/P NEW MOD 45 MIN: CPT | Mod: S$PBB,,, | Performed by: INTERNAL MEDICINE

## 2018-10-05 PROCEDURE — 99213 OFFICE O/P EST LOW 20 MIN: CPT | Mod: PBBFAC,PO | Performed by: INTERNAL MEDICINE

## 2018-10-05 PROCEDURE — 3078F DIAST BP <80 MM HG: CPT | Mod: CPTII,,, | Performed by: INTERNAL MEDICINE

## 2018-10-05 PROCEDURE — 3074F SYST BP LT 130 MM HG: CPT | Mod: CPTII,,, | Performed by: INTERNAL MEDICINE

## 2018-10-05 NOTE — PROGRESS NOTES
Subjective:   Patient ID:  Kaleigh Nieves is a 78 y.o. female who presents for evaluation of [facility]      79 yo female, referred for CHF. Prior cardiologist Dr. Crawford at [facility].due to f/h CHF  PMH DM, HTN, HLD, gout, carotid artery Dz and had clear LHC in 2010.  No chest pain, palpitation, dizziness, orthopnea.  Some leg swelling,   RIVAS stable  .ECHO showed normal EF and LVH; MPI showed no ischemia  : BNP 34; BARRON wnl  ekg NSR        Past Medical History:   Diagnosis Date    Anemia     Angina pectoris     Arthritis     Asthma     Back pain     Bronchitis     [facility]    Carotid artery stenosis     Chronic bronchitis     Chronic gout     Colon polyp     CTS (carpal tunnel syndrome)     Diabetes mellitus, type 2     Diabetes with neurologic complications     Diverticulosis     RIVAS (dyspnea on exertion)     chronic; eval pulm dr natarajan    Dyslipidemia     Ex-smoker     quit in her 30s    GERD (gastroesophageal reflux disease)     Hyperlipidemia     Hypertension     Neuropathy, lower extremity     Obesity     Open-angle glaucoma     dr avel kaur    Peripheral vascular disease     Polyneuropathy     Tobacco dependence        Past Surgical History:   Procedure Laterality Date    CARPAL TUNNEL RELEASE Bilateral     CATARACT EXTRACTION, BILATERAL      CHOLECYSTECTOMY      COLONOSCOPY N/A 6/25/2015    [facility]hs[facility]O    CYST REMOVAL  2018    DILATION AND CURETTAGE OF UTERUS      ESOPHAGOGASTRODUODENOSCOPY (EGD) N/A 6/25/2015    Per[facility] by [facility] III, [facility] at [facility] ENDO    EYE SURGERY      GANGLIONECTOMY-HAND Right 4/25/2018    [facility] Sr[facility] at [facility] OR    HEMORRHOID SURGERY      HYSTERECTOMY      fibroids    INCISION AND DRAINAGE PERIRECTAL ABSCESS      JOINT REPLACEMENT Bilateral     knee    KNEE ARTHROSCOPY Right     MYOMECTOMY      RELEASE-CARPAL TUNNEL  Right 2018    [facility]    TENOSYNOVECTOMY Right 2018    Per[facility] by [facility] at [facility] OR       Social History     Tobacco Use    Smoking status: Former Smoker     Packs/day: 0.25     Years: 24.00     Pack years: 6.00     Last attempt to quit: 1976     Years since quittin.3    Smokeless tobacco: Never Used   Substance Use Topics    Alcohol use: No    Drug use: No       Family History   Problem Relation Age of Onset    Hypertension Mother     Diabetes Mother     Leukemia Sister     Hypertension Sister     Cancer Sister         leukemia    Heart disease Maternal Aunt     Cancer Maternal Uncle         gastric, pacreatic    Heart disease Maternal Uncle     Miscarriages / Stillbirths Maternal Uncle     Diabetes Maternal Grandmother     Asthma Maternal Grandfather     Breast cancer Neg Hx     Colon cancer Neg Hx     Ovarian cancer Neg Hx     COPD Neg Hx     Hyperlipidemia Neg Hx     Kidney disease Neg Hx        Review of Systems   Constitution: Negative for decreased appetite, diaphoresis, fever, weakness, malaise/fatigue and night sweats.   HENT: Negative for nosebleeds.    Eyes: Negative for blurred vision and double vision.   Cardiovascular: Positive for dyspnea on exertion and leg swelling. Negative for chest pain, claudication, irregular heartbeat, near-syncope, orthopnea, palpitations, paroxysmal nocturnal dyspnea and syncope.   Respiratory: Negative for cough, shortness of breath, sleep disturbances due to breathing, snoring, sputum production and wheezing.    Endocrine: Negative for cold intolerance and polyuria.   Hematologic/Lymphatic: Does not bruise/bleed easily.   Skin: Negative for rash.   Musculoskeletal: Negative for back pain, falls, joint pain, joint swelling and neck pain.   Gastrointestinal: Negative for abdominal pain, heartburn, nausea and vomiting.   Genitourinary: Negative for dysuria, frequency and  hematuria.   Neurological: Negative for difficulty with concentration, dizziness, focal weakness, headaches, light-headedness, numbness and seizures.   Psychiatric/Behavioral: Negative for depression, memory loss and substance abuse. The patient does not have insomnia.    Allergic/Immunologic: Negative for HIV exposure and hives.       Objective:   Physical Exam   Constitutional: She is oriented to person, place, and time. She appears well-nourished.   HENT:   Head: Normocephalic.   Eyes: Pupils are equal, round, and reactive to light.   Neck: Normal carotid pulses and no JVD present. Carotid bruit is not present. No thyromegaly present.   Cardiovascular: Normal rate, regular rhythm and normal pulses.  No extrasystoles are present. PMI is not displaced. Exam reveals no gallop and no S3.   Murmur heard.  ESM 3/6 on URSB and along LSB   Pulmonary/Chest: Breath sounds normal. No stridor. No respiratory distress.   Abdominal: Soft. Bowel sounds are normal. There is no tenderness. There is no rebound.   Musculoskeletal: Normal range of motion. She exhibits edema.   Trace edema at ankle   Neurological: She is alert and oriented to person, place, and time.   Skin: Skin is intact. No rash noted.   Psychiatric: Her behavior is normal.       Lab Results   Component Value Date    CHOL 157 09/28/2017    CHOL 244 (H) 03/21/2017    CHOL 133 03/21/2016     Lab Results   Component Value Date    HDL 41 09/28/2017    HDL 42 03/21/2017    HDL 39 (L) 03/21/2016     Lab Results   Component Value Date    LDLCALC 100.6 09/28/2017    LDLCALC 175.8 (H) 03/21/2017    LDLCALC 79.0 03/21/2016     Lab Results   Component Value Date    TRIG 77 09/28/2017    TRIG 131 03/21/2017    TRIG 75 03/21/2016     Lab Results   Component Value Date    CHOLHDL 26.1 09/28/2017    CHOLHDL 17.2 (L) 03/21/2017    CHOLHDL 29.3 03/21/2016       Chemistry        Component Value Date/Time     08/16/2018 1133    K 5.2 (H) 08/16/2018 1133     08/16/2018  1133    CO2 35 (H) 08/16/2018 1133    BUN 8 08/16/2018 1133    CREATININE 0.7 08/16/2018 1133     08/16/2018 1133        Component Value Date/Time    CALCIUM 9.4 08/16/2018 1133    ALKPHOS 31 (L) 03/26/2018 0956    AST 13 03/26/2018 0956    ALT 12 03/26/2018 0956    BILITOT 0.4 03/26/2018 0956    ESTGFRAFRICA >60.0 08/16/2018 1133    EGFRNONAA >60.0 08/16/2018 1133          Lab Results   Component Value Date    HGBA1C 5.6 03/15/2018     Lab Results   Component Value Date    TSH 1.885 03/09/2015     No results found for: INR, PROTIME  Lab Results   Component Value Date    WBC 4.27 03/26/2018    HGB 15.0 03/26/2018    HCT 46.3 03/26/2018    MCV 89 03/26/2018     03/26/2018     BNP  @LABRCNTIP(BNP,BNPTRIAGEBLO)@  CrCl cannot be calculated (Patient's most recent lab result is older than the maximum 7 days allowed.).  No results found in the last 24 hours.  No results found in the last 24 hours.  No results found in the last 24 hours.    Assessment:      1. Diastolic dysfunction    2. Hypertension associated with diabetes    3. Hyperlipidemia, unspecified hyperlipidemia type    4. Stenosis of left carotid artery    5. Severe obesity (BMI 35.0-35.9 with comorbidity)    6. Type 2 diabetes mellitus with diabetic polyneuropathy, without long-term current use of insulin      No CP and CHF  BP and LDL wnl  + murmur  Plan:   Repeat echo   Continue ASA, Prinzide and statin  DASH  RTC as needed

## 2018-10-12 ENCOUNTER — OFFICE VISIT (OUTPATIENT)
Dept: PODIATRY | Facility: CLINIC | Age: 78
End: 2018-10-12
Payer: MEDICARE

## 2018-10-12 VITALS
BODY MASS INDEX: 35.36 KG/M2 | HEART RATE: 77 BPM | HEIGHT: 67 IN | WEIGHT: 225.31 LBS | DIASTOLIC BLOOD PRESSURE: 57 MMHG | SYSTOLIC BLOOD PRESSURE: 113 MMHG

## 2018-10-12 DIAGNOSIS — E11.51 TYPE II DIABETES MELLITUS WITH PERIPHERAL CIRCULATORY DISORDER: Primary | ICD-10-CM

## 2018-10-12 DIAGNOSIS — B35.1 DERMATOPHYTOSIS OF NAIL: ICD-10-CM

## 2018-10-12 DIAGNOSIS — E66.01 SEVERE OBESITY (BMI 35.0-35.9 WITH COMORBIDITY): ICD-10-CM

## 2018-10-12 PROCEDURE — 99213 OFFICE O/P EST LOW 20 MIN: CPT | Mod: PBBFAC,PO,25 | Performed by: PODIATRIST

## 2018-10-12 PROCEDURE — 99499 UNLISTED E&M SERVICE: CPT | Mod: S$PBB,,, | Performed by: PODIATRIST

## 2018-10-12 PROCEDURE — 11721 DEBRIDE NAIL 6 OR MORE: CPT | Mod: Q8,PBBFAC,PO | Performed by: PODIATRIST

## 2018-10-12 PROCEDURE — 99999 PR PBB SHADOW E&M-EST. PATIENT-LVL III: CPT | Mod: PBBFAC,,, | Performed by: PODIATRIST

## 2018-10-12 PROCEDURE — 11721 DEBRIDE NAIL 6 OR MORE: CPT | Mod: Q8,S$PBB,, | Performed by: PODIATRIST

## 2018-10-12 NOTE — PROGRESS NOTES
Subjective:     Patient ID: Kaleigh Nieves is a 78 y.o. female.    Chief Complaint: Routine Foot Care (PCP: DASHAWN Hanks 9/6/2018 , diabetic nail car/feet check )    Kaleigh is a 78 y.o. female who presents to the clinic for evaluation and treatment of high risk feet. Kaleigh has a past medical history of Anemia, Angina pectoris, Arthritis, Asthma, Back pain, Bronchitis, CAD (coronary artery disease) (7/15), Carotid artery stenosis, Chronic bronchitis, Chronic gout, Colon polyp, CTS (carpal tunnel syndrome), Diabetes mellitus, type 2, Diabetes with neurologic complications, Diverticulosis, RIVAS (dyspnea on exertion), Dyslipidemia, Ex-smoker, GERD (gastroesophageal reflux disease), Hyperlipidemia, Hypertension, Neuropathy, lower extremity, Obesity, Open-angle glaucoma, Peripheral vascular disease, Polyneuropathy, and Tobacco dependence. The patient's chief complaint is long, thick toenails. Patient states her blood sugar this morning was 87mg/dl.  This patient has documented high risk feet requiring routine maintenance secondary to diabetes mellitis and those secondary complications of diabetes, as mentioned. Patient states the swelling is much better in her feet due to her PCP changing her blood pressure medication.     PCP: Ramos Hanks, DO    Date Last Seen by PCP: 9/6/18    Current shoe gear:  Affected Foot: Rx diabetic extra depth shoes and custom accommodative insoles     Unaffected Foot: Rx diabetic extra depth shoes and custom accommodative insoles    Hemoglobin A1C   Date Value Ref Range Status   03/15/2018 5.6 4.0 - 5.6 % Final     Comment:     According to ADA guidelines, hemoglobin A1c <7.0% represents  optimal control in non-pregnant diabetic patients. Different  metrics may apply to specific patient populations.   Standards of Medical Care in Diabetes-2016.  For the purpose of screening for the presence of diabetes:  <5.7%     Consistent with the absence of diabetes  5.7-6.4%  Consistent with increasing risk  for diabetes   (prediabetes)  >or=6.5%  Consistent with diabetes  Currently, no consensus exists for use of hemoglobin A1c  for diagnosis of diabetes for children.  This Hemoglobin A1c assay has significant interference with fetal   hemoglobin   (HbF). The results are invalid for patients with abnormal amounts of   HbF,   including those with known Hereditary Persistence   of Fetal Hemoglobin. Heterozygous hemoglobin variants (HbAS, HbAC,   HbAD, HbAE, HbA2) do not significantly interfere with this assay;   however, presence of multiple variants in a sample may impact the %   interference.     09/28/2017 5.7 (H) 4.0 - 5.6 % Final     Comment:     According to ADA guidelines, hemoglobin A1c <7.0% represents  optimal control in non-pregnant diabetic patients. Different  metrics may apply to specific patient populations.   Standards of Medical Care in Diabetes-2016.  For the purpose of screening for the presence of diabetes:  <5.7%     Consistent with the absence of diabetes  5.7-6.4%  Consistent with increasing risk for diabetes   (prediabetes)  >or=6.5%  Consistent with diabetes  Currently, no consensus exists for use of hemoglobin A1c  for diagnosis of diabetes for children.  This Hemoglobin A1c assay has significant interference with fetal   hemoglobin   (HbF). The results are invalid for patients with abnormal amounts of   HbF,   including those with known Hereditary Persistence   of Fetal Hemoglobin. Heterozygous hemoglobin variants (HbAS, HbAC,   HbAD, HbAE, HbA2) do not significantly interfere with this assay;   however, presence of multiple variants in a sample may impact the %   interference.     03/21/2017 5.9 4.5 - 6.2 % Final     Comment:     According to ADA guidelines, hemoglobin A1C <7.0% represents  optimal control in non-pregnant diabetic patients.  Different  metrics may apply to specific populations.   Standards of Medical Care in Diabetes - 2016.  For the purpose of screening for the presence of  diabetes:  <5.7%     Consistent with the absence of diabetes  5.7-6.4%  Consistent with increasing risk for diabetes   (prediabetes)  >or=6.5%  Consistent with diabetes  Currently no consensus exists for use of hemoglobin A1C  for diagnosis of diabetes for children.             Patient Active Problem List   Diagnosis    Type 2 diabetes mellitus with diabetic polyneuropathy, without long-term current use of insulin    Multiple joint pain    Esophageal reflux    Hyperlipidemia    Chronic gout    Lichen sclerosus    Carotid artery stenosis    CAD (coronary artery disease)    Colon polyp    Hypertension associated with diabetes    Aortic atherosclerosis    Diastolic dysfunction    Arterial insufficiency of lower extremity    Severe obesity (BMI 35.0-35.9 with comorbidity)    Glaucoma    Ganglion cyst of flexor tendon sheath of finger of right hand    Bilateral carpal tunnel syndrome    Right carpal tunnel syndrome    Chronic bronchitis    Dependent edema          Medication List           Accurate as of 10/12/18  1:56 PM. If you have any questions, ask your nurse or doctor.               CONTINUE taking these medications    albuterol 90 mcg/actuation inhaler  Commonly known as:  PROVENTIL/VENTOLIN HFA  Inhale 2 puffs into the lungs every 6 (six) hours as needed for Wheezing.     aspirin 81 MG Chew     blood glucose control high,low Soln  Commonly known as:  ACCU-CHEK DARSHAN CONTROL SOLN  Use a directed     blood sugar diagnostic Strp  Commonly known as:  ACCU-CHEK DARSHAN PLUS TEST STRP  TEST BLOOD SUGAR TWICE DAILY     clobetasol 0.05% 0.05 % Oint  Commonly known as:  TEMOVATE  APPLY TOPICALLY TWO TIMES A DAY     colchicine 0.6 mg tablet  Commonly known as:  COLCRYS  Take 1 tablet (0.6 mg total) by mouth once daily.     econazole nitrate 1 % cream  Apply topically once daily.     fluticasone 110 mcg/actuation inhaler  Commonly known as:  FLOVENT HFA  Inhale 1 puff into the lungs 2 (two) times daily.  Controller     lancets Misc  Commonly known as:  ACCU-CHEK SOFTCLIX LANCETS  TEST TWICE A DAY     lisinopril-hydrochlorothiazide 20-25 mg Tab  Commonly known as:  PRINZIDE,ZESTORETIC  Take 1 tablet by mouth once daily.     metFORMIN 500 MG tablet  Commonly known as:  GLUCOPHAGE  TAKE 1 TABLET TWICE DAILY     simvastatin 40 MG tablet  Commonly known as:  ZOCOR  TAKE 1 TABLET EVERY NIGHT     timolol maleate 0.5% 0.5 % Drop  Commonly known as:  TIMOPTIC  Place 1 drop into both eyes 2 (two) times daily.            Review of patient's allergies indicates:   Allergen Reactions    Iodine and iodide containing products Nausea And Vomiting    Penicillins Itching    Amlodipine      edema    Sulfa (sulfonamide antibiotics)     Ultram [tramadol] Other (See Comments)     Light headness, itiching    Bactrim [sulfamethoxazole-trimethoprim] Itching and Rash       Past Surgical History:   Procedure Laterality Date    CARPAL TUNNEL RELEASE Bilateral     CATARACT EXTRACTION, BILATERAL      CHOLECYSTECTOMY      COLONOSCOPY N/A 6/25/2015    Performed by Ihsan Londono III, MD at [facility] [place] [facility]    CYST REMOVAL  2018    DILATION AND CURETTAGE OF UTERUS      ESOPHAGOGASTRODUODENOSCOPY (EGD) N/A 6/25/2015    Performed by Ihsan Londono III, MD at [facility] [place] [facility]    EYE SURGERY      GANGLIONECTOMY-HAND Right 4/25/2018    Performed by Bora Cavazos Sr., MD at [facility] [place] [facility]    HEMORRHOID SURGERY      HYSTERECTOMY      fibroids    INCISION AND DRAINAGE PERIRECTAL ABSCESS      JOINT REPLACEMENT Bilateral     knee    KNEE ARTHROSCOPY Right     MYOMECTOMY      RELEASE-CARPAL TUNNEL Right 4/25/2018    Performed by B[facility] Cavazos Sr[facility] at [facility] [place] [facility]    TENOSYNOVECTOMY Right 4/25/2018    Performed by B[facility] Cavazos Sr[facility] at [facility] [place] [facility]       Family History   Problem Relation Age of Onset    Hypertension Mother     Diabetes Mother     Leukemia Sister     Hypertension Sister     Cancer Sister         leukemia    Heart disease Maternal  "Aunt     Cancer Maternal Uncle         gastric, pacreatic    Heart disease Maternal Uncle     Miscarriages / Stillbirths Maternal Uncle     Diabetes Maternal Grandmother     Asthma Maternal Grandfather     Breast cancer Neg Hx     Colon cancer Neg Hx     Ovarian cancer Neg Hx     COPD Neg Hx     Hyperlipidemia Neg Hx     Kidney disease Neg Hx        Social History     Socioeconomic History    Marital status: Single     Spouse name: Not on file    Number of children: 0    Years of education: Not on file    Highest education level: Not on file   Social Needs    Financial resource strain: Not on file    Food insecurity - worry: Not on file    Food insecurity - inability: Not on file    Transportation needs - medical: Not on file    Transportation needs - non-medical: Not on file   Occupational History    Not on file   Tobacco Use    Smoking status: Former Smoker     Packs/day: 0.25     Years: 24.00     Pack years: 6.00     Last attempt to quit: 1976     Years since quittin.3    Smokeless tobacco: Never Used   Substance and Sexual Activity    Alcohol use: No    Drug use: No    Sexual activity: Not Currently   Other Topics Concern    Not on file   Social History Narrative    Not on file       Vitals:    10/12/18 1100   BP: (!) 113/57   Pulse: 77   Weight: 102.2 kg (225 lb 5 oz)   Height: 5' 6.5" (1.689 m)   PainSc: 0-No pain       Hemoglobin A1C   Date Value Ref Range Status   03/15/2018 5.6 4.0 - 5.6 % Final     Comment:     According to ADA guidelines, hemoglobin A1c <7.0% represents  optimal control in non-pregnant diabetic patients. Different  metrics may apply to specific patient populations.   Standards of Medical Care in Diabetes-2016.  For the purpose of screening for the presence of diabetes:  <5.7%     Consistent with the absence of diabetes  5.7-6.4%  Consistent with increasing risk for diabetes   (prediabetes)  >or=6.5%  Consistent with diabetes  Currently, no consensus " exists for use of hemoglobin A1c  for diagnosis of diabetes for children.  This Hemoglobin A1c assay has significant interference with fetal   hemoglobin   (HbF). The results are invalid for patients with abnormal amounts of   HbF,   including those with known Hereditary Persistence   of Fetal Hemoglobin. Heterozygous hemoglobin variants (HbAS, HbAC,   HbAD, HbAE, HbA2) do not significantly interfere with this assay;   however, presence of multiple variants in a sample may impact the %   interference.     09/28/2017 5.7 (H) 4.0 - 5.6 % Final     Comment:     According to ADA guidelines, hemoglobin A1c <7.0% represents  optimal control in non-pregnant diabetic patients. Different  metrics may apply to specific patient populations.   Standards of Medical Care in Diabetes-2016.  For the purpose of screening for the presence of diabetes:  <5.7%     Consistent with the absence of diabetes  5.7-6.4%  Consistent with increasing risk for diabetes   (prediabetes)  >or=6.5%  Consistent with diabetes  Currently, no consensus exists for use of hemoglobin A1c  for diagnosis of diabetes for children.  This Hemoglobin A1c assay has significant interference with fetal   hemoglobin   (HbF). The results are invalid for patients with abnormal amounts of   HbF,   including those with known Hereditary Persistence   of Fetal Hemoglobin. Heterozygous hemoglobin variants (HbAS, HbAC,   HbAD, HbAE, HbA2) do not significantly interfere with this assay;   however, presence of multiple variants in a sample may impact the %   interference.     03/21/2017 5.9 4.5 - 6.2 % Final     Comment:     According to ADA guidelines, hemoglobin A1C <7.0% represents  optimal control in non-pregnant diabetic patients.  Different  metrics may apply to specific populations.   Standards of Medical Care in Diabetes - 2016.  For the purpose of screening for the presence of diabetes:  <5.7%     Consistent with the absence of diabetes  5.7-6.4%  Consistent with  increasing risk for diabetes   (prediabetes)  >or=6.5%  Consistent with diabetes  Currently no consensus exists for use of hemoglobin A1C  for diagnosis of diabetes for children.         Review of Systems   Constitutional: Negative for chills and fever.   Respiratory: Negative for shortness of breath.    Cardiovascular: Positive for leg swelling. Negative for chest pain, palpitations, orthopnea and claudication.   Gastrointestinal: Negative for diarrhea, nausea and vomiting.   Musculoskeletal: Negative for joint pain.   Skin: Negative for rash.   Neurological: Positive for tingling and sensory change. Negative for dizziness, focal weakness and weakness.   Psychiatric/Behavioral: Negative.            Objective:      PHYSICAL EXAM: Apperance: Alert and orient in no distress,well developed, and with good attention to grooming and body habits  Patient presents ambulating in diabetic shoes and inserts.   LOWER EXTREMITY EXAM:  VASCULAR: Dorsalis pedis pulses 0/4 bilateral (monophasic with doppler)and Posterior Tibial pulses 0/4 bilateral (biphasic with doppler). Capillary fill time <blanched bilateral. Mild edema observed bilateral. Varicosities present bilateral. Skin temperature of the lower extremities is warm to cool, proximal to distal. Hair growth absent bilateral.  DERMATOLOGICAL: No skin rashes, subcutaneous nodules, lesions, or ulcers observed bilateral. Nails 1,2,3,4,5 bilateral elongated, thickened, and discolored with subungual debris. Webspaces 1,2,3,4clean, dry and without evidence of break in skin integrity bilateral. (+) erythema, edema, and increased temp noted to toes 1-5 bilateral.   NEUROLOGICAL: Light touch, sharp-dull, proprioception all present and equal bilaterally.  Vibratory sensation intact on left hallux and diminished at right hallux. Protective sensation intact at all 10 sites as tested with a [employer] 5.07 monofilament.   MUSCULOSKELETAL: Muscle strength is 5/5 for foot inverters,  everters, plantarflexors, and dorsiflexors. Muscle tone is normal. Pes planus noted bilateral. Flexible hammertoes noted bilateral.         Assessment:       Encounter Diagnoses   Name Primary?    Type II diabetes mellitus with peripheral circulatory disorder Yes    Dermatophytosis of nail     Severe obesity (BMI 35.0-35.9 with comorbidity)          Plan:   Type II diabetes mellitus with peripheral circulatory disorder    Dermatophytosis of nail    Severe obesity (BMI 35.0-35.9 with comorbidity)      I counseled the patient on her conditions, their implications and medical management.  Greater than 50% of this visit spent on counseling and coordination of care.  Greater than 15 minutes of a 20 minute appointment spent on education about the diabetic foot, neuropathy, and prevention of limb loss.  Shoe inspection. Diabetic Foot Education. Patient reminded of the importance of good nutrition and blood sugar control to help prevent podiatric complications of diabetes. Patient instructed on proper foot hygeine. We discussed wearing proper shoe gear, daily foot inspections, never walking without protective shoe gear, never putting sharp instruments to feet.    With patient's permission, nails 1-5 bilateral were debrided/trimmed in length and thickness aggressively to their soft tissue attachment mechanically and with electric , removing all offending nail and debris. Patient relates relief following the procedure.  Patient  will continue to monitor the areas daily, inspect feet, wear protective shoe gear when ambulatory, moisturizer to maintain skin integrity. Patient reminded of the importance of good nutrition and blood sugar control to help prevent podiatric complications of diabetes.  Patient to return in this office in approximately 3 months, or sooner if needed.                   Ino Harmon DPM  [facility]

## 2018-10-15 RX ORDER — COLCHICINE 0.6 MG/1
TABLET, FILM COATED ORAL
Qty: 90 TABLET | Refills: 1 | Status: SHIPPED | OUTPATIENT
Start: 2018-10-15 | End: 2019-04-15 | Stop reason: SDUPTHER

## 2018-10-16 DIAGNOSIS — H40.1134 PRIMARY OPEN ANGLE GLAUCOMA OF BOTH EYES, INDETERMINATE STAGE: ICD-10-CM

## 2018-10-16 RX ORDER — TIMOLOL MALEATE 5 MG/ML
1 SOLUTION/ DROPS OPHTHALMIC 2 TIMES DAILY
Qty: 5 ML | Refills: 5 | Status: SHIPPED | OUTPATIENT
Start: 2018-10-16 | End: 2019-03-06 | Stop reason: SDUPTHER

## 2018-10-19 ENCOUNTER — HOSPITAL ENCOUNTER (OUTPATIENT)
Dept: CARDIOLOGY | Facility: CLINIC | Age: 78
Discharge: HOME | End: 2018-10-19
Attending: INTERNAL MEDICINE
Payer: MEDICARE

## 2018-10-19 DIAGNOSIS — E11.59 HYPERTENSION ASSOCIATED WITH DIABETES: ICD-10-CM

## 2018-10-19 DIAGNOSIS — I51.89 DIASTOLIC DYSFUNCTION: ICD-10-CM

## 2018-10-19 DIAGNOSIS — I15.2 HYPERTENSION ASSOCIATED WITH DIABETES: ICD-10-CM

## 2018-10-19 LAB
MITRAL VALVE MOBILITY: NORMAL
RETIRED EF AND QEF - SEE NOTES: 75 (ref 55–65)

## 2018-10-19 PROCEDURE — 93306 TTE W/DOPPLER COMPLETE: CPT | Mod: PBBFAC,PO | Performed by: INTERNAL MEDICINE

## 2018-10-22 ENCOUNTER — TELEPHONE (OUTPATIENT)
Dept: CARDIOLOGY | Facility: CLINIC | Age: 78
End: 2018-10-22

## 2018-10-31 ENCOUNTER — OFFICE VISIT (OUTPATIENT)
Dept: INTERNAL MEDICINE | Facility: CLINIC | Age: 78
End: 2018-10-31
Payer: MEDICARE

## 2018-10-31 ENCOUNTER — LAB VISIT (OUTPATIENT)
Dept: LAB | Facility: HOSPITAL | Age: 78
End: 2018-10-31
Attending: FAMILY MEDICINE
Payer: MEDICARE

## 2018-10-31 VITALS
RESPIRATION RATE: 18 BRPM | TEMPERATURE: 97 F | SYSTOLIC BLOOD PRESSURE: 120 MMHG | BODY MASS INDEX: 35.47 KG/M2 | WEIGHT: 226 LBS | DIASTOLIC BLOOD PRESSURE: 70 MMHG | HEIGHT: 67 IN | HEART RATE: 78 BPM | OXYGEN SATURATION: 98 %

## 2018-10-31 DIAGNOSIS — E11.59 HYPERTENSION ASSOCIATED WITH DIABETES: Primary | ICD-10-CM

## 2018-10-31 DIAGNOSIS — I15.2 HYPERTENSION ASSOCIATED WITH DIABETES: ICD-10-CM

## 2018-10-31 DIAGNOSIS — L90.0 LICHEN SCLEROSUS: ICD-10-CM

## 2018-10-31 DIAGNOSIS — I15.2 HYPERTENSION ASSOCIATED WITH DIABETES: Primary | ICD-10-CM

## 2018-10-31 DIAGNOSIS — E11.59 HYPERTENSION ASSOCIATED WITH DIABETES: ICD-10-CM

## 2018-10-31 PROCEDURE — 80061 LIPID PANEL: CPT

## 2018-10-31 PROCEDURE — 99213 OFFICE O/P EST LOW 20 MIN: CPT | Mod: S$PBB,,, | Performed by: FAMILY MEDICINE

## 2018-10-31 PROCEDURE — 3074F SYST BP LT 130 MM HG: CPT | Mod: CPTII,,, | Performed by: FAMILY MEDICINE

## 2018-10-31 PROCEDURE — 36415 COLL VENOUS BLD VENIPUNCTURE: CPT | Mod: PO

## 2018-10-31 PROCEDURE — 83036 HEMOGLOBIN GLYCOSYLATED A1C: CPT

## 2018-10-31 PROCEDURE — 3078F DIAST BP <80 MM HG: CPT | Mod: CPTII,,, | Performed by: FAMILY MEDICINE

## 2018-10-31 PROCEDURE — 99999 PR PBB SHADOW E&M-EST. PATIENT-LVL IV: CPT | Mod: PBBFAC,,, | Performed by: FAMILY MEDICINE

## 2018-10-31 PROCEDURE — 99214 OFFICE O/P EST MOD 30 MIN: CPT | Mod: PBBFAC,PO | Performed by: FAMILY MEDICINE

## 2018-10-31 PROCEDURE — 1101F PT FALLS ASSESS-DOCD LE1/YR: CPT | Mod: CPTII,,, | Performed by: FAMILY MEDICINE

## 2018-10-31 RX ORDER — ECONAZOLE NITRATE 10 MG/G
CREAM TOPICAL DAILY
Qty: 85 G | Refills: 0 | Status: SHIPPED | OUTPATIENT
Start: 2018-10-31 | End: 2018-12-17 | Stop reason: SDUPTHER

## 2018-10-31 NOTE — ASSESSMENT & PLAN NOTE
Diabetes has been very well controlled.  Due to recheck lipids and hemoglobin A1c.  Although her blood pressure was a bit elevated today her average over the last month has been better than goal blood pressure.  I recommended that she try taking the blood pressure medication earlier in the day to possibly avoid some of the early morning low blood pressure readings.

## 2018-10-31 NOTE — PROGRESS NOTES
Subjective:       Patient ID: Kaleigh Nieves is a 78 y.o. female.    Chief Complaint: Medication Problem (discuss bp medication recall )    HPI    Came in today mainly because she has some concerns about recent drug recall for the lisinopril hydrochlorothiazide.  She was not contacted from the pharmacy to say that her particular prescription was affected but she had her something on the news that was concerned.  She has not inquired with the pharmacy as of yet.    Has been monitoring her blood pressure twice a day over the last month.  We had started on lisinopril hydrochlorothiazide she has been doing pretty well.  She has been taking in the evening and has noticed sometimes in the morning her blood pressure is a little bit too low and she will feel lightheaded.          Family History   Problem Relation Age of Onset    Hypertension Mother     Diabetes Mother     Leukemia Sister     Hypertension Sister     Cancer Sister         leukemia    Heart disease Maternal Aunt     Cancer Maternal Uncle         gastric, pacreatic    Heart disease Maternal Uncle     Miscarriages / Stillbirths Maternal Uncle     Diabetes Maternal Grandmother     Asthma Maternal Grandfather     Breast cancer Neg Hx     Colon cancer Neg Hx     Ovarian cancer Neg Hx     COPD Neg Hx     Hyperlipidemia Neg Hx     Kidney disease Neg Hx        Current Outpatient Medications:     albuterol 90 mcg/actuation inhaler, Inhale 2 puffs into the lungs every 6 (six) hours as needed for Wheezing., Disp: 1 each, Rfl: 11    aspirin 81 MG Chew, Take 81 mg by mouth once daily., Disp: , Rfl:     blood glucose control high,low (ACCU-CHEK DARSHAN CONTROL SOLN) Soln, Use a directed, Disp: 1 each, Rfl: 3    blood sugar diagnostic (ACCU-CHEK DARSHAN PLUS TEST STRP) Strp, TEST BLOOD SUGAR TWICE DAILY, Disp: 200 strip, Rfl: 3    clobetasol 0.05% (TEMOVATE) 0.05 % Oint, APPLY TOPICALLY TWO TIMES A DAY, Disp: 60 g, Rfl: 1    COLCRYS 0.6 mg tablet, TAKE ONE  "TABLET BY MOUTH EVERY DAY, Disp: 90 tablet, Rfl: 1    econazole nitrate 1 % cream, Apply topically once daily., Disp: 85 g, Rfl: 0    fluticasone (FLOVENT HFA) 110 mcg/actuation inhaler, Inhale 1 puff into the lungs 2 (two) times daily. Controller, Disp: 12 g, Rfl: 0    lancets (ACCU-CHEK SOFTCLIX LANCETS) Misc, TEST TWICE A DAY, Disp: 200 each, Rfl: 3    metFORMIN (GLUCOPHAGE) 500 MG tablet, TAKE 1 TABLET TWICE DAILY, Disp: 180 tablet, Rfl: 3    simvastatin (ZOCOR) 40 MG tablet, TAKE 1 TABLET EVERY NIGHT, Disp: 90 tablet, Rfl: 3    timolol maleate 0.5% (TIMOPTIC) 0.5 % Drop, Place 1 drop into both eyes 2 (two) times daily., Disp: 5 mL, Rfl: 5    lisinopril-hydrochlorothiazide (PRINZIDE,ZESTORETIC) 20-25 mg Tab, Take 1 tablet by mouth once daily., Disp: 30 tablet, Rfl: 11    Review of Systems   Constitutional: Negative for chills and fever.   HENT: Negative for congestion and sore throat.    Eyes: Negative for visual disturbance.   Respiratory: Negative for cough and shortness of breath.    Cardiovascular: Negative for chest pain.   Gastrointestinal: Negative for abdominal pain, constipation and diarrhea.   Endocrine: Negative for polydipsia and polyuria.   Genitourinary: Negative for difficulty urinating and menstrual problem.   Skin: Negative for rash.   Neurological: Negative for dizziness.   Hematological: Does not bruise/bleed easily.       Objective:   /70 Comment: average readings at home over the last month  Pulse 78   Temp 96.5 °F (35.8 °C) (Tympanic)   Resp 18   Ht 5' 6.5" (1.689 m)   Wt 102.5 kg (225 lb 15.5 oz)   SpO2 98%   BMI 35.93 kg/m²      Physical Exam   Constitutional: She is oriented to person, place, and time. She appears well-developed and well-nourished.   HENT:   Head: Normocephalic and atraumatic.   Eyes: Conjunctivae are normal.   Cardiovascular: Normal rate.   Pulmonary/Chest: Effort normal. No respiratory distress.   Musculoskeletal: She exhibits no edema. "   Neurological: She is alert and oriented to person, place, and time. Coordination normal.   Skin: Skin is warm and dry. No rash noted.   Psychiatric: She has a normal mood and affect. Her behavior is normal.   Vitals reviewed.      Assessment & Plan     Problem List Items Addressed This Visit        Derm    Lichen sclerosus    Relevant Medications    econazole nitrate 1 % cream       Cardiac/Vascular    Hypertension associated with diabetes - Primary    Current Assessment & Plan       Diabetes has been very well controlled.  Due to recheck lipids and hemoglobin A1c.  Although her blood pressure was a bit elevated today her average over the last month has been better than goal blood pressure.  I recommended that she try taking the blood pressure medication earlier in the day to possibly avoid some of the early morning low blood pressure readings.         Relevant Orders    Lipid panel    Hemoglobin A1c            No Follow-up on file.

## 2018-11-01 LAB
CHOLEST SERPL-MCNC: 187 MG/DL
CHOLEST/HDLC SERPL: 4.6 {RATIO}
ESTIMATED AVG GLUCOSE: 123 MG/DL
HBA1C MFR BLD HPLC: 5.9 %
HDLC SERPL-MCNC: 41 MG/DL
HDLC SERPL: 21.9 %
LDLC SERPL CALC-MCNC: 118 MG/DL
NONHDLC SERPL-MCNC: 146 MG/DL
TRIGL SERPL-MCNC: 140 MG/DL

## 2018-11-12 ENCOUNTER — TELEPHONE (OUTPATIENT)
Dept: OBSTETRICS AND GYNECOLOGY | Facility: CLINIC | Age: 78
End: 2018-11-12

## 2018-11-12 NOTE — TELEPHONE ENCOUNTER
----- Message from Petrona Edwards sent at 11/12/2018  3:19 PM CST -----  Contact: self  Pt is calling to be worked in by Dr Maddox as soon as possible. Pt states she has a yeast problem on bottom of stomach and it's itching. Please call pt back at [phone].      Thanks,   Petrona Edwards

## 2018-12-06 ENCOUNTER — OFFICE VISIT (OUTPATIENT)
Dept: INTERNAL MEDICINE | Facility: CLINIC | Age: 78
End: 2018-12-06
Payer: MEDICARE

## 2018-12-06 VITALS
SYSTOLIC BLOOD PRESSURE: 115 MMHG | BODY MASS INDEX: 36.84 KG/M2 | RESPIRATION RATE: 18 BRPM | OXYGEN SATURATION: 96 % | DIASTOLIC BLOOD PRESSURE: 75 MMHG | HEART RATE: 84 BPM | WEIGHT: 229.25 LBS | TEMPERATURE: 96 F | HEIGHT: 66 IN

## 2018-12-06 DIAGNOSIS — I70.0 AORTIC ATHEROSCLEROSIS: ICD-10-CM

## 2018-12-06 DIAGNOSIS — E11.59 HYPERTENSION ASSOCIATED WITH DIABETES: Primary | ICD-10-CM

## 2018-12-06 DIAGNOSIS — I15.2 HYPERTENSION ASSOCIATED WITH DIABETES: Primary | ICD-10-CM

## 2018-12-06 DIAGNOSIS — E66.01 SEVERE OBESITY (BMI 35.0-35.9 WITH COMORBIDITY): ICD-10-CM

## 2018-12-06 PROCEDURE — 3078F DIAST BP <80 MM HG: CPT | Mod: CPTII,S$GLB,, | Performed by: FAMILY MEDICINE

## 2018-12-06 PROCEDURE — 99213 OFFICE O/P EST LOW 20 MIN: CPT | Mod: S$GLB,,, | Performed by: FAMILY MEDICINE

## 2018-12-06 PROCEDURE — 3074F SYST BP LT 130 MM HG: CPT | Mod: CPTII,S$GLB,, | Performed by: FAMILY MEDICINE

## 2018-12-06 PROCEDURE — 1101F PT FALLS ASSESS-DOCD LE1/YR: CPT | Mod: CPTII,S$GLB,, | Performed by: FAMILY MEDICINE

## 2018-12-06 PROCEDURE — 99999 PR PBB SHADOW E&M-EST. PATIENT-LVL III: CPT | Mod: PBBFAC,,, | Performed by: FAMILY MEDICINE

## 2018-12-06 RX ORDER — LISINOPRIL AND HYDROCHLOROTHIAZIDE 10; 12.5 MG/1; MG/1
1 TABLET ORAL DAILY
Qty: 90 TABLET | Refills: 3 | Status: SHIPPED | OUTPATIENT
Start: 2018-12-06 | End: 2019-12-12

## 2018-12-06 RX ORDER — METFORMIN HYDROCHLORIDE 500 MG/1
250 TABLET ORAL 2 TIMES DAILY
Qty: 90 TABLET | Refills: 3
Start: 2018-12-06 | End: 2019-01-21 | Stop reason: SDUPTHER

## 2018-12-11 ENCOUNTER — OFFICE VISIT (OUTPATIENT)
Dept: OPHTHALMOLOGY | Facility: CLINIC | Age: 78
End: 2018-12-11
Payer: MEDICARE

## 2018-12-11 DIAGNOSIS — Z96.1 PSEUDOPHAKIA OF BOTH EYES: ICD-10-CM

## 2018-12-11 DIAGNOSIS — H40.1134 PRIMARY OPEN ANGLE GLAUCOMA OF BOTH EYES, INDETERMINATE STAGE: Primary | ICD-10-CM

## 2018-12-11 DIAGNOSIS — Z13.5 GLAUCOMA SCREENING: ICD-10-CM

## 2018-12-11 DIAGNOSIS — E11.9 DIABETES MELLITUS TYPE 2 WITHOUT RETINOPATHY: ICD-10-CM

## 2018-12-11 PROCEDURE — 99999 PR PBB SHADOW E&M-EST. PATIENT-LVL II: CPT | Mod: PBBFAC,,, | Performed by: OPTOMETRIST

## 2018-12-11 PROCEDURE — 92083 EXTENDED VISUAL FIELD XM: CPT | Mod: S$GLB,,, | Performed by: OPTOMETRIST

## 2018-12-11 PROCEDURE — 92014 COMPRE OPH EXAM EST PT 1/>: CPT | Mod: S$GLB,,, | Performed by: OPTOMETRIST

## 2018-12-11 PROCEDURE — 92133 CPTRZD OPH DX IMG PST SGM ON: CPT | Mod: S$GLB,,, | Performed by: OPTOMETRIST

## 2018-12-11 NOTE — PROGRESS NOTES
HPI     Last [facility] exam 08/07/2018  Annual exam   Glaucoma  Diabetic/NIDDM   HVF/GOCT (Today)  DFE (Today)  Medication: Timolol 0.5% 1 drop OU BID  Pseudophakia, OU  No visual complaints   Deferred dilation     Last edited by Mario Jonas MA on 12/11/2018 10:35 AM. (History)            Assessment /Plan     For exam results, see Encounter Report.    Primary open angle glaucoma of both eyes, indeterminate stage  -     Cancel: OCT - Optic Nerve; Future  -     Cazares Visual Field - OU - Extended - Both Eyes  -     OCT - Optic Nerve    Diabetes mellitus type 2 without retinopathy    Pseudophakia of both eyes    Glaucoma screening      No diabetic retinopathy OU.  Both GOCT and HVF were unchanged X 2 years (see imaging notes).  Stable PIOL OU.  OH OK OU otherwise.  RTC 3 months for IOP check.  No change in specs.

## 2018-12-17 DIAGNOSIS — L90.0 LICHEN SCLEROSUS: ICD-10-CM

## 2018-12-17 RX ORDER — ECONAZOLE NITRATE 10 MG/G
CREAM TOPICAL
Qty: 85 G | Refills: 0 | Status: SHIPPED | OUTPATIENT
Start: 2018-12-17 | End: 2019-01-28 | Stop reason: SDUPTHER

## 2018-12-17 NOTE — PROGRESS NOTES
Subjective:       Patient ID: Kaleigh Nieves is a 78 y.o. female.    Chief Complaint: Follow-up (3 months); Hypertension; and Diabetes    Diabetes   She presents for her follow-up diabetic visit. She has type 2 diabetes mellitus. Her disease course has been stable. There are no hypoglycemic associated symptoms. Pertinent negatives for hypoglycemia include no dizziness. There are no diabetic associated symptoms. Pertinent negatives for diabetes include no chest pain. There are no hypoglycemic complications. Risk factors for coronary artery disease include diabetes mellitus. Current diabetic treatment includes oral agent (monotherapy) and diet. She is compliant with treatment all of the time. Her weight is stable. She is following a generally healthy diet. She participates in exercise intermittently. There is no change in her home blood glucose trend. An ACE inhibitor/angiotensin II receptor blocker is being taken. Eye exam is current.       Family History   Problem Relation Age of Onset    Hypertension Mother     Diabetes Mother     Leukemia Sister     Hypertension Sister     Cancer Sister         leukemia    Heart disease Maternal Aunt     Cancer Maternal Uncle         gastric, pacreatic    Heart disease Maternal Uncle     Miscarriages / Stillbirths Maternal Uncle     Diabetes Maternal Grandmother     Asthma Maternal Grandfather     Breast cancer Neg Hx     Colon cancer Neg Hx     Ovarian cancer Neg Hx     COPD Neg Hx     Hyperlipidemia Neg Hx     Kidney disease Neg Hx        Current Outpatient Medications:     albuterol 90 mcg/actuation inhaler, Inhale 2 puffs into the lungs every 6 (six) hours as needed for Wheezing., Disp: 1 each, Rfl: 11    aspirin 81 MG Chew, Take 81 mg by mouth once daily., Disp: , Rfl:     blood glucose control high,low (ACCU-CHEK DARSHAN CONTROL SOLN) Soln, Use a directed, Disp: 1 each, Rfl: 3    blood sugar diagnostic (ACCU-CHEK DARSHAN PLUS TEST STRP) Strp, TEST BLOOD SUGAR  "TWICE DAILY, Disp: 200 strip, Rfl: 3    clobetasol 0.05% (TEMOVATE) 0.05 % Oint, APPLY TOPICALLY TWO TIMES A DAY, Disp: 60 g, Rfl: 1    COLCRYS 0.6 mg tablet, TAKE ONE TABLET BY MOUTH EVERY DAY, Disp: 90 tablet, Rfl: 1    lancets (ACCU-CHEK SOFTCLIX LANCETS) Misc, TEST TWICE A DAY, Disp: 200 each, Rfl: 3    metFORMIN (GLUCOPHAGE) 500 MG tablet, Take 0.5 tablets (250 mg total) by mouth 2 (two) times daily., Disp: 90 tablet, Rfl: 3    simvastatin (ZOCOR) 40 MG tablet, TAKE 1 TABLET EVERY NIGHT, Disp: 90 tablet, Rfl: 3    timolol maleate 0.5% (TIMOPTIC) 0.5 % Drop, Place 1 drop into both eyes 2 (two) times daily., Disp: 5 mL, Rfl: 5    econazole nitrate 1 % cream, APPLY TO AFFECTED AREA TOPICALLY EVERY DAY, Disp: 85 g, Rfl: 0    fluticasone (FLOVENT HFA) 110 mcg/actuation inhaler, Inhale 1 puff into the lungs 2 (two) times daily. Controller, Disp: 12 g, Rfl: 0    FLUZONE HIGH-DOSE 2018-19, PF, 180 mcg/0.5 mL vaccine, TO BE ADMINISTERED BY PHARMACIST FOR IMMUNIZATION, Disp: , Rfl: 0    lisinopril-hydrochlorothiazide (PRINZIDE,ZESTORETIC) 10-12.5 mg per tablet, Take 1 tablet by mouth once daily., Disp: 90 tablet, Rfl: 3    Review of Systems   Constitutional: Negative for chills and fever.   Respiratory: Negative for cough and shortness of breath.    Cardiovascular: Negative for chest pain.   Gastrointestinal: Negative for abdominal pain.   Skin: Negative for rash.   Neurological: Negative for dizziness.       Objective:   /75 Comment: average home reading  Pulse 84   Temp 96.4 °F (35.8 °C) (Tympanic)   Resp 18   Ht 5' 6" (1.676 m)   Wt 104 kg (229 lb 4.5 oz)   SpO2 96%   BMI 37.01 kg/m²      Physical Exam   Constitutional: She is oriented to person, place, and time. She appears well-developed and well-nourished.   HENT:   Head: Normocephalic and atraumatic.   Eyes: Conjunctivae are normal.   Cardiovascular: Normal rate.   Pulmonary/Chest: Effort normal. No respiratory distress.   Musculoskeletal: " She exhibits no edema.   Neurological: She is alert and oriented to person, place, and time. Coordination normal.   Skin: Skin is warm and dry. No rash noted.   Psychiatric: She has a normal mood and affect. Her behavior is normal.   Vitals reviewed.      Assessment & Plan     Problem List Items Addressed This Visit        Cardiac/Vascular    Hypertension associated with diabetes - Primary    Current Assessment & Plan     Blood pressure and diabetes continues to be well controlled.  Continue on same medication along with lifestyle modifications.  Plan to get labs prior to the next visit.         Relevant Medications    metFORMIN (GLUCOPHAGE) 500 MG tablet    Other Relevant Orders    Basic metabolic panel    Hemoglobin A1c    Aortic atherosclerosis    Overview     Documented on imaging, chest CT 7/2015.          Current Assessment & Plan     Asymptomatic.  Continues to be on statin medication with a focus on diabetes and hypertension control.            Endocrine    Severe obesity (BMI 35.0-35.9 with comorbidity)    Current Assessment & Plan     Counseled on importance on diet and exercise to decrease risk of comorbid conditions and for improved quality of life.                   Follow-up in about 6 months (around 6/6/2019).

## 2019-01-22 ENCOUNTER — OFFICE VISIT (OUTPATIENT)
Dept: PODIATRY | Facility: CLINIC | Age: 79
End: 2019-01-22
Payer: MEDICARE

## 2019-01-22 VITALS
RESPIRATION RATE: 16 BRPM | SYSTOLIC BLOOD PRESSURE: 141 MMHG | BODY MASS INDEX: 38.9 KG/M2 | WEIGHT: 242.06 LBS | HEART RATE: 74 BPM | HEIGHT: 66 IN | DIASTOLIC BLOOD PRESSURE: 60 MMHG

## 2019-01-22 DIAGNOSIS — R60.9 DEPENDENT EDEMA: ICD-10-CM

## 2019-01-22 DIAGNOSIS — B35.1 DERMATOPHYTOSIS OF NAIL: ICD-10-CM

## 2019-01-22 DIAGNOSIS — E11.51 TYPE II DIABETES MELLITUS WITH PERIPHERAL CIRCULATORY DISORDER: Primary | ICD-10-CM

## 2019-01-22 PROCEDURE — 99499 UNLISTED E&M SERVICE: CPT | Mod: S$GLB,,, | Performed by: PODIATRIST

## 2019-01-22 PROCEDURE — 99999 PR PBB SHADOW E&M-EST. PATIENT-LVL III: ICD-10-PCS | Mod: PBBFAC,,, | Performed by: PODIATRIST

## 2019-01-22 PROCEDURE — 99999 PR PBB SHADOW E&M-EST. PATIENT-LVL III: CPT | Mod: PBBFAC,,, | Performed by: PODIATRIST

## 2019-01-22 PROCEDURE — 99499 RISK ADDL DX/OHS AUDIT: ICD-10-PCS | Mod: S$GLB,,, | Performed by: PODIATRIST

## 2019-01-22 PROCEDURE — 11721 PR DEBRIDEMENT OF NAILS, 6 OR MORE: ICD-10-PCS | Mod: Q8,S$GLB,, | Performed by: PODIATRIST

## 2019-01-22 PROCEDURE — 11721 DEBRIDE NAIL 6 OR MORE: CPT | Mod: Q8,S$GLB,, | Performed by: PODIATRIST

## 2019-01-22 RX ORDER — METFORMIN HYDROCHLORIDE 500 MG/1
TABLET ORAL
Qty: 180 TABLET | Refills: 3 | Status: SHIPPED | OUTPATIENT
Start: 2019-01-22 | End: 2019-06-06

## 2019-01-22 NOTE — PROGRESS NOTES
Subjective:     Patient ID: Kaleigh Nieves is a 78 y.o. female.    Chief Complaint: Routine Foot Care (c/o bilateral foot pain, rates pain 5/10, wears casual shoes with socks, diabetic Pt, PCP Dr. Hanks)    Kaleigh is a 78 y.o. female who presents to the clinic for evaluation and treatment of high risk feet. Kaleigh has a past medical history of Anemia, Angina pectoris, Arthritis, Asthma, Back pain, Bronchitis, CAD (coronary artery disease) (7/15), Carotid artery stenosis, Chronic bronchitis, Chronic gout, Colon polyp, CTS (carpal tunnel syndrome), Diabetes mellitus, type 2, Diabetes with neurologic complications, Diverticulosis, RIVAS (dyspnea on exertion), Dyslipidemia, Ex-smoker, GERD (gastroesophageal reflux disease), Hyperlipidemia, Hypertension, Neuropathy, lower extremity, Obesity, Open-angle glaucoma, Peripheral vascular disease, Polyneuropathy, and Tobacco dependence. The patient's chief complaint is long, thick toenails. Patient states her blood sugar this morning was 90mg/dl.  This patient has documented high risk feet requiring routine maintenance secondary to diabetes mellitis and those secondary complications of diabetes, as mentioned. Patient states the swelling is still off and on.     PCP: Ramos Hanks, DO    Date Last Seen by PCP: 12/6/18    Current shoe gear:  Affected Foot: Rx diabetic extra depth shoes and custom accommodative insoles     Unaffected Foot: Rx diabetic extra depth shoes and custom accommodative insoles    Hemoglobin A1C   Date Value Ref Range Status   10/31/2018 5.9 (H) 4.0 - 5.6 % Final     Comment:     ADA Screening Guidelines:  5.7-6.4%  Consistent with prediabetes  >or=6.5%  Consistent with diabetes  High levels of fetal hemoglobin interfere with the HbA1C  assay. Heterozygous hemoglobin variants (HbS, HgC, etc)do  not significantly interfere with this assay.   However, presence of multiple variants may affect accuracy.     03/15/2018 5.6 4.0 - 5.6 % Final     Comment:      According to ADA guidelines, hemoglobin A1c <7.0% represents  optimal control in non-pregnant diabetic patients. Different  metrics may apply to specific patient populations.   Standards of Medical Care in Diabetes-2016.  For the purpose of screening for the presence of diabetes:  <5.7%     Consistent with the absence of diabetes  5.7-6.4%  Consistent with increasing risk for diabetes   (prediabetes)  >or=6.5%  Consistent with diabetes  Currently, no consensus exists for use of hemoglobin A1c  for diagnosis of diabetes for children.  This Hemoglobin A1c assay has significant interference with fetal   hemoglobin   (HbF). The results are invalid for patients with abnormal amounts of   HbF,   including those with known Hereditary Persistence   of Fetal Hemoglobin. Heterozygous hemoglobin variants (HbAS, HbAC,   HbAD, HbAE, HbA2) do not significantly interfere with this assay;   however, presence of multiple variants in a sample may impact the %   interference.     09/28/2017 5.7 (H) 4.0 - 5.6 % Final     Comment:     According to ADA guidelines, hemoglobin A1c <7.0% represents  optimal control in non-pregnant diabetic patients. Different  metrics may apply to specific patient populations.   Standards of Medical Care in Diabetes-2016.  For the purpose of screening for the presence of diabetes:  <5.7%     Consistent with the absence of diabetes  5.7-6.4%  Consistent with increasing risk for diabetes   (prediabetes)  >or=6.5%  Consistent with diabetes  Currently, no consensus exists for use of hemoglobin A1c  for diagnosis of diabetes for children.  This Hemoglobin A1c assay has significant interference with fetal   hemoglobin   (HbF). The results are invalid for patients with abnormal amounts of   HbF,   including those with known Hereditary Persistence   of Fetal Hemoglobin. Heterozygous hemoglobin variants (HbAS, HbAC,   HbAD, HbAE, HbA2) do not significantly interfere with this assay;   however, presence of multiple  variants in a sample may impact the %   interference.             Patient Active Problem List   Diagnosis    Type 2 diabetes mellitus with diabetic polyneuropathy, without long-term current use of insulin    Multiple joint pain    Esophageal reflux    Hyperlipidemia    Chronic gout    Lichen sclerosus    Carotid artery stenosis    CAD (coronary artery disease)    Colon polyp    Hypertension associated with diabetes    Aortic atherosclerosis    Diastolic dysfunction    Arterial insufficiency of lower extremity    Severe obesity (BMI 35.0-35.9 with comorbidity)    Glaucoma    Ganglion cyst of flexor tendon sheath of finger of right hand    Bilateral carpal tunnel syndrome    Right carpal tunnel syndrome    Chronic bronchitis    Dependent edema          Medication List           Accurate as of 1/22/19 11:09 AM. If you have any questions, ask your nurse or doctor.               CONTINUE taking these medications    albuterol 90 mcg/actuation inhaler  Commonly known as:  PROVENTIL/VENTOLIN HFA  Inhale 2 puffs into the lungs every 6 (six) hours as needed for Wheezing.     aspirin 81 MG Chew     blood glucose control high,low Soln  Commonly known as:  ACCU-CHEK DARSHAN CONTROL SOLN  Use a directed     blood sugar diagnostic Strp  Commonly known as:  ACCU-CHEK DARSHAN PLUS TEST STRP  TEST BLOOD SUGAR TWICE DAILY     clobetasol 0.05% 0.05 % Oint  Commonly known as:  TEMOVATE  APPLY TOPICALLY TWO TIMES A DAY     COLCRYS 0.6 mg tablet  Generic drug:  colchicine  TAKE ONE TABLET BY MOUTH EVERY DAY     econazole nitrate 1 % cream  APPLY TO AFFECTED AREA TOPICALLY EVERY DAY     fluticasone 110 mcg/actuation inhaler  Commonly known as:  FLOVENT HFA  Inhale 1 puff into the lungs 2 (two) times daily. Controller     FLUZONE HIGH-DOSE 2018-19 (PF) 180 mcg/0.5 mL vaccine  Generic drug:  influenza     lancets Misc  Commonly known as:  ACCU-CHEK SOFTCLIX LANCETS  TEST TWICE A DAY     lisinopril-hydrochlorothiazide 10-12.5  mg per tablet  Commonly known as:  PRINZIDE,ZESTORETIC  Take 1 tablet by mouth once daily.     metFORMIN 500 MG tablet  Commonly known as:  GLUCOPHAGE  TAKE 1 TABLET TWICE DAILY     simvastatin 40 MG tablet  Commonly known as:  ZOCOR  TAKE 1 TABLET EVERY NIGHT     timolol maleate 0.5% 0.5 % Drop  Commonly known as:  TIMOPTIC  Place 1 drop into both eyes 2 (two) times daily.            Review of patient's allergies indicates:   Allergen Reactions    Iodine and iodide containing products Nausea And Vomiting    Penicillins Itching    Amlodipine      edema    Sulfa (sulfonamide antibiotics)     Ultram [tramadol] Other (See Comments)     Light headness, itiching    Bactrim [sulfamethoxazole-trimethoprim] Itching and Rash       Past Surgical History:   Procedure Laterality Date    CARPAL TUNNEL RELEASE Bilateral     CATARACT EXTRACTION, BILATERAL      CHOLECYSTECTOMY      COLONOSCOPY N/A 6/25/2015    Performed by Ihsan Londono III, [facility] at [facility]    CYST REMOVAL  2018    DILATION AND CURETTAGE OF UTERUS      ESOPHAGOGASTRODUODENOSCOPY (EGD) N/A 6/25/2015    Performed by [facility]an Londono III[facility] at [facility]    EYE SURGERY      GANGLIONECTOMY-HAND Right 4/25/2018    Performed by B[facility] Cavazos Sr[facility] at [facility]    HEMORRHOID SURGERY      HYSTERECTOMY      fibroids    INCISION AND DRAINAGE PERIRECTAL ABSCESS      JOINT REPLACEMENT Bilateral     knee    KNEE ARTHROSCOPY Right     MYOMECTOMY      RELEASE-CARPAL TUNNEL Right 4/25/2018    Performed [facility]    TENOSYNOVECTOMY Right 4/25/2018    Performed by B[facility]os Sr[facility]       Family History   Problem Relation Age of Onset    Hypertension Mother     Diabetes Mother     Leukemia Sister     Hypertension Sister     Cancer Sister         leukemia    Heart disease Maternal Aunt     Cancer Maternal Uncle         gastric, pacreatic    Heart disease Maternal Uncle     Miscarriages / Stillbirths  "Maternal Uncle     Diabetes Maternal Grandmother     Asthma Maternal Grandfather     Breast cancer Neg Hx     Colon cancer Neg Hx     Ovarian cancer Neg Hx     COPD Neg Hx     Hyperlipidemia Neg Hx     Kidney disease Neg Hx        Social History     Socioeconomic History    Marital status: Single     Spouse name: Not on file    Number of children: 0    Years of education: Not on file    Highest education level: Not on file   Social Needs    Financial resource strain: Not on file    Food insecurity - worry: Not on file    Food insecurity - inability: Not on file    Transportation needs - medical: Not on file    Transportation needs - non-medical: Not on file   Occupational History    Not on file   Tobacco Use    Smoking status: Former Smoker     Packs/day: 0.25     Years: 24.00     Pack years: 6.00     Last attempt to quit: 1976     Years since quittin.6    Smokeless tobacco: Never Used   Substance and Sexual Activity    Alcohol use: No    Drug use: No    Sexual activity: Not Currently   Other Topics Concern    Not on file   Social History Narrative    Not on file       Vitals:    19 1027   BP: (!) 141/60   Pulse: 74   Resp: 16   Weight: 109.8 kg (242 lb 1 oz)   Height: 5' 6" (1.676 m)   PainSc: 0-No pain   PainLoc: Foot       Hemoglobin A1C   Date Value Ref Range Status   10/31/2018 5.9 (H) 4.0 - 5.6 % Final     Comment:     ADA Screening Guidelines:  5.7-6.4%  Consistent with prediabetes  >or=6.5%  Consistent with diabetes  High levels of fetal hemoglobin interfere with the HbA1C  assay. Heterozygous hemoglobin variants (HbS, HgC, etc)do  not significantly interfere with this assay.   However, presence of multiple variants may affect accuracy.     03/15/2018 5.6 4.0 - 5.6 % Final     Comment:     According to ADA guidelines, hemoglobin A1c <7.0% represents  optimal control in non-pregnant diabetic patients. Different  metrics may apply to specific patient populations. "   Standards of Medical Care in Diabetes-2016.  For the purpose of screening for the presence of diabetes:  <5.7%     Consistent with the absence of diabetes  5.7-6.4%  Consistent with increasing risk for diabetes   (prediabetes)  >or=6.5%  Consistent with diabetes  Currently, no consensus exists for use of hemoglobin A1c  for diagnosis of diabetes for children.  This Hemoglobin A1c assay has significant interference with fetal   hemoglobin   (HbF). The results are invalid for patients with abnormal amounts of   HbF,   including those with known Hereditary Persistence   of Fetal Hemoglobin. Heterozygous hemoglobin variants (HbAS, HbAC,   HbAD, HbAE, HbA2) do not significantly interfere with this assay;   however, presence of multiple variants in a sample may impact the %   interference.     09/28/2017 5.7 (H) 4.0 - 5.6 % Final     Comment:     According to ADA guidelines, hemoglobin A1c <7.0% represents  optimal control in non-pregnant diabetic patients. Different  metrics may apply to specific patient populations.   Standards of Medical Care in Diabetes-2016.  For the purpose of screening for the presence of diabetes:  <5.7%     Consistent with the absence of diabetes  5.7-6.4%  Consistent with increasing risk for diabetes   (prediabetes)  >or=6.5%  Consistent with diabetes  Currently, no consensus exists for use of hemoglobin A1c  for diagnosis of diabetes for children.  This Hemoglobin A1c assay has significant interference with fetal   hemoglobin   (HbF). The results are invalid for patients with abnormal amounts of   HbF,   including those with known Hereditary Persistence   of Fetal Hemoglobin. Heterozygous hemoglobin variants (HbAS, HbAC,   HbAD, HbAE, HbA2) do not significantly interfere with this assay;   however, presence of multiple variants in a sample may impact the %   interference.         Review of Systems   Constitutional: Negative for chills and fever.   Respiratory: Negative for shortness of  breath.    Cardiovascular: Positive for leg swelling. Negative for chest pain, palpitations, orthopnea and claudication.   Gastrointestinal: Negative for diarrhea, nausea and vomiting.   Musculoskeletal: Negative for joint pain.   Skin: Negative for rash.   Neurological: Positive for tingling and sensory change. Negative for dizziness, focal weakness and weakness.   Psychiatric/Behavioral: Negative.            Objective:      PHYSICAL EXAM: Apperance: Alert and orient in no distress,well developed, and with good attention to grooming and body habits  Patient presents ambulating in diabetic shoes and inserts.   LOWER EXTREMITY EXAM:  VASCULAR: Dorsalis pedis pulses 0/4 bilateral (monophasic with doppler)and Posterior Tibial pulses 0/4 bilateral (biphasic with doppler). Capillary fill time <blanched bilateral. Mild edema observed bilateral. Varicosities present bilateral. Skin temperature of the lower extremities is warm to cool, proximal to distal. Hair growth absent bilateral.  DERMATOLOGICAL: No skin rashes, subcutaneous nodules, lesions, or ulcers observed bilateral. Nails 1,2,3,4,5 bilateral elongated, thickened, and discolored with subungual debris. Webspaces 1,2,3,4clean, dry and without evidence of break in skin integrity bilateral. (+) erythema, edema, and increased temp noted to toes 1-5 bilateral.   NEUROLOGICAL: Light touch, sharp-dull, proprioception all present and equal bilaterally.  Vibratory sensation intact on left hallux and diminished at right hallux. Protective sensation intact at all 10 sites as tested with a Clio-Jimy 5.07 monofilament.   MUSCULOSKELETAL: Muscle strength is 5/5 for foot inverters, everters, plantarflexors, and dorsiflexors. Muscle tone is normal. Pes planus noted bilateral. Flexible hammertoes noted bilateral.         Assessment:       Encounter Diagnoses   Name Primary?    Type II diabetes mellitus with peripheral circulatory disorder Yes    Dermatophytosis of nail      Dependent edema          Plan:   Type II diabetes mellitus with peripheral circulatory disorder    Dermatophytosis of nail    Dependent edema      I counseled the patient on her conditions, their implications and medical management.  Greater than 50% of this visit spent on counseling and coordination of care.  Greater than 15 minutes of a 20 minute appointment spent on education about the diabetic foot, neuropathy, and prevention of limb loss.  Shoe inspection. Diabetic Foot Education. Patient reminded of the importance of good nutrition and blood sugar control to help prevent podiatric complications of diabetes. Patient instructed on proper foot hygeine. We discussed wearing proper shoe gear, daily foot inspections, never walking without protective shoe gear, never putting sharp instruments to feet.    With patient's permission, nails 1-5 bilateral were debrided/trimmed in length and thickness aggressively to their soft tissue attachment mechanically and with electric , removing all offending nail and debris. Patient relates relief following the procedure.  Dispensed size D tubi  to be worn during the day.   Patient  will continue to monitor the areas daily, inspect feet, wear protective shoe gear when ambulatory, moisturizer to maintain skin integrity. Patient reminded of the importance of good nutrition and blood sugar control to help prevent podiatric complications of diabetes.  Patient to return in this office in approximately 3 months, or sooner if needed.                   Ino Harmon DPM  [facility]

## 2019-01-28 DIAGNOSIS — L90.0 LICHEN SCLEROSUS: ICD-10-CM

## 2019-01-28 RX ORDER — ECONAZOLE NITRATE 10 MG/G
CREAM TOPICAL
Qty: 85 G | Refills: 0 | Status: SHIPPED | OUTPATIENT
Start: 2019-01-28 | End: 2019-04-15 | Stop reason: SDUPTHER

## 2019-01-30 ENCOUNTER — TELEPHONE (OUTPATIENT)
Dept: INTERNAL MEDICINE | Facility: CLINIC | Age: 79
End: 2019-01-30

## 2019-01-30 DIAGNOSIS — L90.0 LICHEN SCLEROSUS: ICD-10-CM

## 2019-01-30 NOTE — TELEPHONE ENCOUNTER
----- Message from Patti Catherineite sent at 1/30/2019  3:35 PM CST -----  Contact: Pt   Pt called and stated she is checking the status of a refill request. She can be reached at [phone].    Thanks,  Tf

## 2019-01-30 NOTE — TELEPHONE ENCOUNTER
Pt last seen 1/23/2018.   Pt requesting refill on Clobetasol oint.   Medication pending.   Pharmacy is [facility] [place], [place]  Please advise.  Thank you!

## 2019-01-31 RX ORDER — CLOBETASOL PROPIONATE 0.5 MG/G
OINTMENT TOPICAL DAILY PRN
Qty: 60 G | Refills: 2 | Status: SHIPPED | OUTPATIENT
Start: 2019-01-31 | End: 2020-03-10

## 2019-03-06 DIAGNOSIS — H40.1134 PRIMARY OPEN ANGLE GLAUCOMA OF BOTH EYES, INDETERMINATE STAGE: ICD-10-CM

## 2019-03-07 RX ORDER — TIMOLOL MALEATE 5 MG/ML
1 SOLUTION/ DROPS OPHTHALMIC 2 TIMES DAILY
Qty: 25 ML | Refills: 5 | Status: SHIPPED | OUTPATIENT
Start: 2019-03-07 | End: 2019-09-16 | Stop reason: SDUPTHER

## 2019-03-12 ENCOUNTER — OFFICE VISIT (OUTPATIENT)
Dept: OPHTHALMOLOGY | Facility: CLINIC | Age: 79
End: 2019-03-12
Payer: MEDICARE

## 2019-03-12 DIAGNOSIS — H40.1134 PRIMARY OPEN ANGLE GLAUCOMA OF BOTH EYES, INDETERMINATE STAGE: Primary | ICD-10-CM

## 2019-03-12 PROCEDURE — 92012 INTRM OPH EXAM EST PATIENT: CPT | Mod: S$GLB,,, | Performed by: OPTOMETRIST

## 2019-03-12 PROCEDURE — 99999 PR PBB SHADOW E&M-EST. PATIENT-LVL II: ICD-10-PCS | Mod: PBBFAC,,, | Performed by: OPTOMETRIST

## 2019-03-12 PROCEDURE — 92012 PR EYE EXAM, EST PATIENT,INTERMED: ICD-10-PCS | Mod: S$GLB,,, | Performed by: OPTOMETRIST

## 2019-03-12 PROCEDURE — 99499 RISK ADDL DX/OHS AUDIT: ICD-10-PCS | Mod: S$GLB,,, | Performed by: OPTOMETRIST

## 2019-03-12 PROCEDURE — 99999 PR PBB SHADOW E&M-EST. PATIENT-LVL II: CPT | Mod: PBBFAC,,, | Performed by: OPTOMETRIST

## 2019-03-12 PROCEDURE — 99499 UNLISTED E&M SERVICE: CPT | Mod: S$GLB,,, | Performed by: OPTOMETRIST

## 2019-03-12 NOTE — PROGRESS NOTES
HPI     Last [facility] visit 12/11/2018  3 month IOP check  Diabetic/NIDDM  Last HVF/GOCT 12/11/2018  Medication: Timolol 0.5% 1 drop OU BID   Pseudophakia, OU  No visual complaints     Last edited by Mario Jonas MA on 3/12/2019 10:45 AM. (History)            Assessment /Plan     For exam results, see Encounter Report.    Primary open angle glaucoma of both eyes, indeterminate stage      Good IOP control OU.  RTC in June (3 months) for IOP check.

## 2019-03-30 DIAGNOSIS — E78.5 HYPERLIPIDEMIA, UNSPECIFIED HYPERLIPIDEMIA TYPE: ICD-10-CM

## 2019-04-01 RX ORDER — SIMVASTATIN 40 MG/1
TABLET, FILM COATED ORAL
Qty: 90 TABLET | Refills: 3 | Status: SHIPPED | OUTPATIENT
Start: 2019-04-01 | End: 2019-06-06

## 2019-04-15 DIAGNOSIS — L90.0 LICHEN SCLEROSUS: ICD-10-CM

## 2019-04-15 RX ORDER — COLCHICINE 0.6 MG/1
TABLET, FILM COATED ORAL
Qty: 90 TABLET | Refills: 1 | Status: SHIPPED | OUTPATIENT
Start: 2019-04-15 | End: 2019-10-14 | Stop reason: SDUPTHER

## 2019-04-15 RX ORDER — ECONAZOLE NITRATE 10 MG/G
CREAM TOPICAL
Qty: 85 G | Refills: 0 | Status: SHIPPED | OUTPATIENT
Start: 2019-04-15 | End: 2019-06-10 | Stop reason: SDUPTHER

## 2019-04-22 ENCOUNTER — OFFICE VISIT (OUTPATIENT)
Dept: PODIATRY | Facility: CLINIC | Age: 79
End: 2019-04-22
Payer: MEDICARE

## 2019-04-22 VITALS
DIASTOLIC BLOOD PRESSURE: 67 MMHG | HEART RATE: 72 BPM | WEIGHT: 242.06 LBS | BODY MASS INDEX: 39.07 KG/M2 | SYSTOLIC BLOOD PRESSURE: 148 MMHG

## 2019-04-22 DIAGNOSIS — B35.1 DERMATOPHYTOSIS OF NAIL: ICD-10-CM

## 2019-04-22 DIAGNOSIS — E11.51 TYPE II DIABETES MELLITUS WITH PERIPHERAL CIRCULATORY DISORDER: Primary | ICD-10-CM

## 2019-04-22 PROCEDURE — 11721 PR DEBRIDEMENT OF NAILS, 6 OR MORE: ICD-10-PCS | Mod: Q8,S$GLB,, | Performed by: PODIATRIST

## 2019-04-22 PROCEDURE — 99999 PR PBB SHADOW E&M-EST. PATIENT-LVL III: CPT | Mod: PBBFAC,,, | Performed by: PODIATRIST

## 2019-04-22 PROCEDURE — 99499 UNLISTED E&M SERVICE: CPT | Mod: S$GLB,,, | Performed by: PODIATRIST

## 2019-04-22 PROCEDURE — 99499 NO LOS: ICD-10-PCS | Mod: S$GLB,,, | Performed by: PODIATRIST

## 2019-04-22 PROCEDURE — 99999 PR PBB SHADOW E&M-EST. PATIENT-LVL III: ICD-10-PCS | Mod: PBBFAC,,, | Performed by: PODIATRIST

## 2019-04-22 PROCEDURE — 11721 DEBRIDE NAIL 6 OR MORE: CPT | Mod: Q8,S$GLB,, | Performed by: PODIATRIST

## 2019-05-03 DIAGNOSIS — E11.42 TYPE 2 DIABETES MELLITUS WITH DIABETIC POLYNEUROPATHY, WITHOUT LONG-TERM CURRENT USE OF INSULIN: ICD-10-CM

## 2019-05-04 NOTE — PROGRESS NOTES
Subjective:     Patient ID: Kaleigh Nieves is a 78 y.o. female.    Chief Complaint: Routine Foot Care (RFC.NO Noted pain. Pt wearing casual shoes with socks. Diabetic pt. PCP DR Hanks, last seen DEc 2018.)    Kaleigh is a 78 y.o. female who presents to the clinic for evaluation and treatment of high risk feet. Kaleigh has a past medical history of Anemia, Angina pectoris, Arthritis, Asthma, Back pain, Bronchitis, CAD (coronary artery disease) (7/15), Carotid artery stenosis, Chronic bronchitis, Chronic gout, Colon polyp, CTS (carpal tunnel syndrome), Diabetes mellitus, type 2, Diabetes with neurologic complications, Diverticulosis, RIVAS (dyspnea on exertion), Dyslipidemia, Ex-smoker, GERD (gastroesophageal reflux disease), Hyperlipidemia, Hypertension, Neuropathy, lower extremity, Obesity, Open-angle glaucoma, Peripheral vascular disease, Polyneuropathy, and Tobacco dependence. The patient's chief complaint is long, thick toenails. Patient states her blood sugar this morning was 103mg/dl.  This patient has documented high risk feet requiring routine maintenance secondary to diabetes mellitis and those secondary complications of diabetes, as mentioned. Patient states the swelling is still off and on.     PCP: Ramos Hanks, DO    Date Last Seen by PCP: 12/6/18    Current shoe gear:  Affected Foot: Rx diabetic extra depth shoes and custom accommodative insoles     Unaffected Foot: Rx diabetic extra depth shoes and custom accommodative insoles    Hemoglobin A1C   Date Value Ref Range Status   10/31/2018 5.9 (H) 4.0 - 5.6 % Final     Comment:     ADA Screening Guidelines:  5.7-6.4%  Consistent with prediabetes  >or=6.5%  Consistent with diabetes  High levels of fetal hemoglobin interfere with the HbA1C  assay. Heterozygous hemoglobin variants (HbS, HgC, etc)do  not significantly interfere with this assay.   However, presence of multiple variants may affect accuracy.     03/15/2018 5.6 4.0 - 5.6 % Final     Comment:      According to ADA guidelines, hemoglobin A1c <7.0% represents  optimal control in non-pregnant diabetic patients. Different  metrics may apply to specific patient populations.   Standards of Medical Care in Diabetes-2016.  For the purpose of screening for the presence of diabetes:  <5.7%     Consistent with the absence of diabetes  5.7-6.4%  Consistent with increasing risk for diabetes   (prediabetes)  >or=6.5%  Consistent with diabetes  Currently, no consensus exists for use of hemoglobin A1c  for diagnosis of diabetes for children.  This Hemoglobin A1c assay has significant interference with fetal   hemoglobin   (HbF). The results are invalid for patients with abnormal amounts of   HbF,   including those with known Hereditary Persistence   of Fetal Hemoglobin. Heterozygous hemoglobin variants (HbAS, HbAC,   HbAD, HbAE, HbA2) do not significantly interfere with this assay;   however, presence of multiple variants in a sample may impact the %   interference.     09/28/2017 5.7 (H) 4.0 - 5.6 % Final     Comment:     According to ADA guidelines, hemoglobin A1c <7.0% represents  optimal control in non-pregnant diabetic patients. Different  metrics may apply to specific patient populations.   Standards of Medical Care in Diabetes-2016.  For the purpose of screening for the presence of diabetes:  <5.7%     Consistent with the absence of diabetes  5.7-6.4%  Consistent with increasing risk for diabetes   (prediabetes)  >or=6.5%  Consistent with diabetes  Currently, no consensus exists for use of hemoglobin A1c  for diagnosis of diabetes for children.  This Hemoglobin A1c assay has significant interference with fetal   hemoglobin   (HbF). The results are invalid for patients with abnormal amounts of   HbF,   including those with known Hereditary Persistence   of Fetal Hemoglobin. Heterozygous hemoglobin variants (HbAS, HbAC,   HbAD, HbAE, HbA2) do not significantly interfere with this assay;   however, presence of multiple  variants in a sample may impact the %   interference.             Patient Active Problem List   Diagnosis    Type 2 diabetes mellitus with diabetic polyneuropathy, without long-term current use of insulin    Multiple joint pain    Esophageal reflux    Hyperlipidemia    Chronic gout    Lichen sclerosus    Carotid artery stenosis    CAD (coronary artery disease)    Colon polyp    Hypertension associated with diabetes    Aortic atherosclerosis    Diastolic dysfunction    Arterial insufficiency of lower extremity    Severe obesity (BMI 35.0-35.9 with comorbidity)    Glaucoma    Ganglion cyst of flexor tendon sheath of finger of right hand    Bilateral carpal tunnel syndrome    Right carpal tunnel syndrome    Chronic bronchitis    Dependent edema       Medication List with Changes/Refills   Current Medications    ALBUTEROL 90 MCG/ACTUATION INHALER    Inhale 2 puffs into the lungs every 6 (six) hours as needed for Wheezing.    ASPIRIN 81 MG CHEW    Take 81 mg by mouth once daily.    BLOOD GLUCOSE CONTROL HIGH,LOW (ACCU-CHEK DARSHAN CONTROL SOLN) SOLN    Use a directed    BLOOD SUGAR DIAGNOSTIC (ACCU-CHEK DARSHAN PLUS TEST STRP) STRP    TEST BLOOD SUGAR TWICE DAILY    CLOBETASOL 0.05% (TEMOVATE) 0.05 % OINT    Apply topically daily as needed. For vulvar itching    COLCRYS 0.6 MG TABLET    TAKE ONE TABLET BY MOUTH EVERY DAY    ECONAZOLE NITRATE 1 % CREAM    APPLY TO AFFECTED AREA TOPICALLY EVERY DAY    FLUTICASONE (FLOVENT HFA) 110 MCG/ACTUATION INHALER    Inhale 1 puff into the lungs 2 (two) times daily. Controller    FLUZONE HIGH-DOSE 2018-19, PF, 180 MCG/0.5 ML VACCINE    TO BE ADMINISTERED BY PHARMACIST FOR IMMUNIZATION    LANCETS (ACCU-CHEK SOFTCLIX LANCETS) MISC    TEST TWICE A DAY    LISINOPRIL-HYDROCHLOROTHIAZIDE (PRINZIDE,ZESTORETIC) 10-12.5 MG PER TABLET    Take 1 tablet by mouth once daily.    METFORMIN (GLUCOPHAGE) 500 MG TABLET    TAKE 1 TABLET TWICE DAILY    SIMVASTATIN (ZOCOR) 40 MG TABLET     TAKE 1 TABLET EVERY NIGHT    TIMOLOL MALEATE 0.5% (TIMOPTIC) 0.5 % DROP    PLACE 1 DROP INTO BOTH EYES 2 (TWO) TIMES DAILY.       Review of patient's allergies indicates:   Allergen Reactions    Iodine and iodide containing products Nausea And Vomiting    Penicillins Itching    Amlodipine      edema    Sulfa (sulfonamide antibiotics)     Ultram [tramadol] Other (See Comments)     Light headness, itiching    Bactrim [sulfamethoxazole-trimethoprim] Itching and Rash       Past Surgical History:   Procedure Laterality Date    CARPAL TUNNEL RELEASE Bilateral     CATARACT EXTRACTION, BILATERAL      CHOLECYSTECTOMY      COLONOSCOPY N/A 6/25/2015    Performed by [facility]O    CYST REMOVAL  2018    DILATION AND CURETTAGE OF UTERUS      ESOPHAGOGASTRODUODENOSCOPY (EGD) N/A 6/25/2015    Performed by [facility]an [facility]o III, [facility] at [facility] ENDO    EYE SURGERY      GANGLIONECTOMY-HAND Right 4/25/2018    Performed by B[facility]os Sr[facility] at [facility] OR    HEMORRHOID SURGERY      HYSTERECTOMY      fibroids    INCISION AND DRAINAGE PERIRECTAL ABSCESS      JOINT REPLACEMENT Bilateral     knee    KNEE ARTHROSCOPY Right     MYOMECTOMY      RELEASE-CARPAL TUNNEL Right 4/25/2018    [facility] Sr[facility] OR    TENOSYNOVECTOMY Right 4/25/2018    Performed [facility] Sr[facility] OR       Family History   Problem Relation Age of Onset    Hypertension Mother     Diabetes Mother     Leukemia Sister     Hypertension Sister     Cancer Sister         leukemia    Heart disease Maternal Aunt     Cancer Maternal Uncle         gastric, pacreatic    Heart disease Maternal Uncle     Miscarriages / Stillbirths Maternal Uncle     Diabetes Maternal Grandmother     Asthma Maternal Grandfather     Breast cancer Neg Hx     Colon cancer Neg Hx     Ovarian cancer Neg Hx     COPD Neg Hx     Hyperlipidemia Neg Hx     Kidney disease Neg Hx        Social History      Socioeconomic History    Marital status: Single     Spouse name: Not on file    Number of children: 0    Years of education: Not on file    Highest education level: Not on file   Occupational History    Not on file   Social Needs    Financial resource strain: Not on file    Food insecurity:     Worry: Not on file     Inability: Not on file    Transportation needs:     Medical: Not on file     Non-medical: Not on file   Tobacco Use    Smoking status: Former Smoker     Packs/day: 0.25     Years: 24.00     Pack years: 6.00     Last attempt to quit: 1976     Years since quittin.8    Smokeless tobacco: Never Used   Substance and Sexual Activity    Alcohol use: No    Drug use: No    Sexual activity: Not Currently   Lifestyle    Physical activity:     Days per week: Not on file     Minutes per session: Not on file    Stress: Not on file   Relationships    Social connections:     Talks on phone: Not on file     Gets together: Not on file     Attends [employer] service: Not on file     Active member of club or organization: Not on file     Attends meetings of clubs or organizations: Not on file     Relationship status: Not on file   Other Topics Concern    Not on file   Social History Narrative    Not on file       Vitals:    19 1115   BP: (!) 148/67   Pulse: 72   Weight: 109.8 kg (242 lb 1 oz)   PainSc: 0-No pain       Hemoglobin A1C   Date Value Ref Range Status   10/31/2018 5.9 (H) 4.0 - 5.6 % Final     Comment:     ADA Screening Guidelines:  5.7-6.4%  Consistent with prediabetes  >or=6.5%  Consistent with diabetes  High levels of fetal hemoglobin interfere with the HbA1C  assay. Heterozygous hemoglobin variants (HbS, HgC, etc)do  not significantly interfere with this assay.   However, presence of multiple variants may affect accuracy.     03/15/2018 5.6 4.0 - 5.6 % Final     Comment:     According to ADA guidelines, hemoglobin A1c <7.0% represents  optimal control in non-pregnant  diabetic patients. Different  metrics may apply to specific patient populations.   Standards of Medical Care in Diabetes-2016.  For the purpose of screening for the presence of diabetes:  <5.7%     Consistent with the absence of diabetes  5.7-6.4%  Consistent with increasing risk for diabetes   (prediabetes)  >or=6.5%  Consistent with diabetes  Currently, no consensus exists for use of hemoglobin A1c  for diagnosis of diabetes for children.  This Hemoglobin A1c assay has significant interference with fetal   hemoglobin   (HbF). The results are invalid for patients with abnormal amounts of   HbF,   including those with known Hereditary Persistence   of Fetal Hemoglobin. Heterozygous hemoglobin variants (HbAS, HbAC,   HbAD, HbAE, HbA2) do not significantly interfere with this assay;   however, presence of multiple variants in a sample may impact the %   interference.     09/28/2017 5.7 (H) 4.0 - 5.6 % Final     Comment:     According to ADA guidelines, hemoglobin A1c <7.0% represents  optimal control in non-pregnant diabetic patients. Different  metrics may apply to specific patient populations.   Standards of Medical Care in Diabetes-2016.  For the purpose of screening for the presence of diabetes:  <5.7%     Consistent with the absence of diabetes  5.7-6.4%  Consistent with increasing risk for diabetes   (prediabetes)  >or=6.5%  Consistent with diabetes  Currently, no consensus exists for use of hemoglobin A1c  for diagnosis of diabetes for children.  This Hemoglobin A1c assay has significant interference with fetal   hemoglobin   (HbF). The results are invalid for patients with abnormal amounts of   HbF,   including those with known Hereditary Persistence   of Fetal Hemoglobin. Heterozygous hemoglobin variants (HbAS, HbAC,   HbAD, HbAE, HbA2) do not significantly interfere with this assay;   however, presence of multiple variants in a sample may impact the %   interference.         Review of Systems    Constitutional: Negative for chills and fever.   Respiratory: Negative for shortness of breath.    Cardiovascular: Positive for leg swelling. Negative for chest pain, palpitations, orthopnea and claudication.   Gastrointestinal: Negative for diarrhea, nausea and vomiting.   Musculoskeletal: Negative for joint pain.   Skin: Negative for rash.   Neurological: Positive for tingling and sensory change. Negative for dizziness, focal weakness and weakness.   Psychiatric/Behavioral: Negative.            Objective:      PHYSICAL EXAM: Apperance: Alert and orient in no distress,well developed, and with good attention to grooming and body habits  Patient presents ambulating in diabetic shoes and inserts.   LOWER EXTREMITY EXAM:  VASCULAR: Dorsalis pedis pulses 0/4 bilateral (monophasic with doppler)and Posterior Tibial pulses 0/4 bilateral (biphasic with doppler). Capillary fill time <blanched bilateral. Mild edema observed bilateral. Varicosities present bilateral. Skin temperature of the lower extremities is warm to cool, proximal to distal. Hair growth absent bilateral.  DERMATOLOGICAL: No skin rashes, subcutaneous nodules, lesions, or ulcers observed bilateral. Nails 1,2,3,4,5 bilateral elongated, thickened, and discolored with subungual debris. Webspaces 1,2,3,4clean, dry and without evidence of break in skin integrity bilateral. (+) erythema, edema, and increased temp noted to toes 1-5 bilateral.   NEUROLOGICAL: Light touch, sharp-dull, proprioception all present and equal bilaterally.  Vibratory sensation intact on left hallux and diminished at right hallux. Protective sensation intact at all 10 sites as tested with a [place]-Jimy 5.07 monofilament.   MUSCULOSKELETAL: Muscle strength is 5/5 for foot inverters, everters, plantarflexors, and dorsiflexors. Muscle tone is normal. Pes planus noted bilateral. Flexible hammertoes noted bilateral.         Assessment:       Encounter Diagnoses   Name Primary?    Type II  diabetes mellitus with peripheral circulatory disorder Yes    Dermatophytosis of nail          Plan:   Type II diabetes mellitus with peripheral circulatory disorder    Dermatophytosis of nail      I counseled the patient on her conditions, their implications and medical management.  Greater than 15 minutes of a 20 minute appointment spent on education about the diabetic foot, neuropathy, and prevention of limb loss.  Shoe inspection. Diabetic Foot Education. Patient reminded of the importance of good nutrition and blood sugar control to help prevent podiatric complications of diabetes. Patient instructed on proper foot hygeine. We discussed wearing proper shoe gear, daily foot inspections, never walking without protective shoe gear, never putting sharp instruments to feet.    With patient's permission, nails 1-5 bilateral were debrided/trimmed in length and thickness aggressively to their soft tissue attachment mechanically and with electric , removing all offending nail and debris. Patient relates relief following the procedure.  Patient  will continue to monitor the areas daily, inspect feet, wear protective shoe gear when ambulatory, moisturizer to maintain skin integrity. Patient reminded of the importance of good nutrition and blood sugar control to help prevent podiatric complications of diabetes.  Patient to return in this office in approximately 3 months, or sooner if needed.                   Ino Harmon DPM  [facility]

## 2019-05-06 RX ORDER — ALBUTEROL SULFATE 90 UG/1
AEROSOL, METERED RESPIRATORY (INHALATION)
Qty: 18 G | Refills: 11 | Status: SHIPPED | OUTPATIENT
Start: 2019-05-06 | End: 2020-07-02

## 2019-05-06 RX ORDER — BLOOD SUGAR DIAGNOSTIC
STRIP MISCELLANEOUS
Qty: 200 STRIP | Refills: 3 | Status: SHIPPED | OUTPATIENT
Start: 2019-05-06 | End: 2019-05-08 | Stop reason: SDUPTHER

## 2019-05-06 RX ORDER — ALBUTEROL SULFATE 90 UG/1
AEROSOL, METERED RESPIRATORY (INHALATION)
Qty: 18 G | Refills: 11 | Status: SHIPPED | OUTPATIENT
Start: 2019-05-06 | End: 2019-05-06

## 2019-05-06 RX ORDER — ALBUTEROL SULFATE 90 UG/1
AEROSOL, METERED RESPIRATORY (INHALATION)
Qty: 18 G | Refills: 11 | OUTPATIENT
Start: 2019-05-06

## 2019-05-06 RX ORDER — LANCETS
EACH MISCELLANEOUS
Qty: 200 EACH | Refills: 3 | Status: SHIPPED | OUTPATIENT
Start: 2019-05-06 | End: 2020-07-22

## 2019-05-06 NOTE — TELEPHONE ENCOUNTER
----- Message from Ethan Maris sent at 5/6/2019  1:13 PM CDT -----  Contact: self [phone]  .Type:  RX Refill Request    Who Called: Kaleigh Nieves  Refill or New Rx:refill  RX Name and Strength:Ventolin HFA inhaler  How is the patient currently taking it? (ex. 1XDay):Daily  Is this a 30 day or 90 day RX:30  Preferred Pharmacy with phone number:.      [facility] [id number] [facility] [address]  Phone: [phone] Fax: [phone]      Local or Mail Order:[facility]acl  Ordering Provider:Dr. Hanks  Would the patient rather a call back or a response via [facility]? Call back  Best Call Back Number:[phone]  Additional Information:

## 2019-05-08 DIAGNOSIS — E11.42 TYPE 2 DIABETES MELLITUS WITH DIABETIC POLYNEUROPATHY, WITHOUT LONG-TERM CURRENT USE OF INSULIN: ICD-10-CM

## 2019-05-08 RX ORDER — DEXTROSE 4 G
1 TABLET,CHEWABLE ORAL 2 TIMES DAILY
Qty: 1 EACH | Refills: 1 | Status: SHIPPED | OUTPATIENT
Start: 2019-05-08 | End: 2020-07-22

## 2019-06-04 ENCOUNTER — LAB VISIT (OUTPATIENT)
Dept: LAB | Facility: HOSPITAL | Age: 79
End: 2019-06-04
Attending: FAMILY MEDICINE
Payer: MEDICARE

## 2019-06-04 DIAGNOSIS — E11.59 HYPERTENSION ASSOCIATED WITH DIABETES: ICD-10-CM

## 2019-06-04 DIAGNOSIS — I15.2 HYPERTENSION ASSOCIATED WITH DIABETES: ICD-10-CM

## 2019-06-04 LAB
ANION GAP SERPL CALC-SCNC: 7 MMOL/L (ref 8–16)
BUN SERPL-MCNC: 11 MG/DL (ref 8–23)
CALCIUM SERPL-MCNC: 9.8 MG/DL (ref 8.7–10.5)
CHLORIDE SERPL-SCNC: 97 MMOL/L (ref 95–110)
CO2 SERPL-SCNC: 35 MMOL/L (ref 23–29)
CREAT SERPL-MCNC: 0.7 MG/DL (ref 0.5–1.4)
EST. GFR  (AFRICAN AMERICAN): >60 ML/MIN/1.73 M^2
EST. GFR  (NON AFRICAN AMERICAN): >60 ML/MIN/1.73 M^2
GLUCOSE SERPL-MCNC: 118 MG/DL (ref 70–110)
POTASSIUM SERPL-SCNC: 4.8 MMOL/L (ref 3.5–5.1)
SODIUM SERPL-SCNC: 139 MMOL/L (ref 136–145)

## 2019-06-04 PROCEDURE — 36415 COLL VENOUS BLD VENIPUNCTURE: CPT | Mod: PO

## 2019-06-04 PROCEDURE — 83036 HEMOGLOBIN GLYCOSYLATED A1C: CPT

## 2019-06-04 PROCEDURE — 80048 BASIC METABOLIC PNL TOTAL CA: CPT | Mod: PO

## 2019-06-05 LAB
ESTIMATED AVG GLUCOSE: 123 MG/DL (ref 68–131)
HBA1C MFR BLD HPLC: 5.9 % (ref 4–5.6)

## 2019-06-06 ENCOUNTER — OFFICE VISIT (OUTPATIENT)
Dept: INTERNAL MEDICINE | Facility: CLINIC | Age: 79
End: 2019-06-06
Payer: MEDICARE

## 2019-06-06 VITALS
DIASTOLIC BLOOD PRESSURE: 59 MMHG | OXYGEN SATURATION: 97 % | BODY MASS INDEX: 37.67 KG/M2 | WEIGHT: 234.38 LBS | HEART RATE: 76 BPM | TEMPERATURE: 97 F | SYSTOLIC BLOOD PRESSURE: 132 MMHG | HEIGHT: 66 IN | RESPIRATION RATE: 12 BRPM

## 2019-06-06 DIAGNOSIS — I15.2 HYPERTENSION ASSOCIATED WITH DIABETES: Primary | ICD-10-CM

## 2019-06-06 DIAGNOSIS — E78.5 HYPERLIPIDEMIA, UNSPECIFIED HYPERLIPIDEMIA TYPE: ICD-10-CM

## 2019-06-06 DIAGNOSIS — E11.59 HYPERTENSION ASSOCIATED WITH DIABETES: Primary | ICD-10-CM

## 2019-06-06 DIAGNOSIS — E11.42 TYPE 2 DIABETES MELLITUS WITH DIABETIC POLYNEUROPATHY, WITHOUT LONG-TERM CURRENT USE OF INSULIN: ICD-10-CM

## 2019-06-06 DIAGNOSIS — E66.01 SEVERE OBESITY (BMI 35.0-35.9 WITH COMORBIDITY): ICD-10-CM

## 2019-06-06 DIAGNOSIS — J42 CHRONIC BRONCHITIS, UNSPECIFIED CHRONIC BRONCHITIS TYPE: ICD-10-CM

## 2019-06-06 PROCEDURE — 1101F PR PT FALLS ASSESS DOC 0-1 FALLS W/OUT INJ PAST YR: ICD-10-PCS | Mod: CPTII,S$GLB,, | Performed by: FAMILY MEDICINE

## 2019-06-06 PROCEDURE — 99499 RISK ADDL DX/OHS AUDIT: ICD-10-PCS | Mod: HCNC,S$GLB,, | Performed by: FAMILY MEDICINE

## 2019-06-06 PROCEDURE — 99999 PR PBB SHADOW E&M-EST. PATIENT-LVL III: CPT | Mod: PBBFAC,,, | Performed by: FAMILY MEDICINE

## 2019-06-06 PROCEDURE — 3078F DIAST BP <80 MM HG: CPT | Mod: CPTII,S$GLB,, | Performed by: FAMILY MEDICINE

## 2019-06-06 PROCEDURE — 99214 PR OFFICE/OUTPT VISIT, EST, LEVL IV, 30-39 MIN: ICD-10-PCS | Mod: S$GLB,,, | Performed by: FAMILY MEDICINE

## 2019-06-06 PROCEDURE — 99499 UNLISTED E&M SERVICE: CPT | Mod: HCNC,S$GLB,, | Performed by: FAMILY MEDICINE

## 2019-06-06 PROCEDURE — 3075F PR MOST RECENT SYSTOLIC BLOOD PRESS GE 130-139MM HG: ICD-10-PCS | Mod: CPTII,S$GLB,, | Performed by: FAMILY MEDICINE

## 2019-06-06 PROCEDURE — 3078F PR MOST RECENT DIASTOLIC BLOOD PRESSURE < 80 MM HG: ICD-10-PCS | Mod: CPTII,S$GLB,, | Performed by: FAMILY MEDICINE

## 2019-06-06 PROCEDURE — 1101F PT FALLS ASSESS-DOCD LE1/YR: CPT | Mod: CPTII,S$GLB,, | Performed by: FAMILY MEDICINE

## 2019-06-06 PROCEDURE — 99999 PR PBB SHADOW E&M-EST. PATIENT-LVL III: ICD-10-PCS | Mod: PBBFAC,,, | Performed by: FAMILY MEDICINE

## 2019-06-06 PROCEDURE — 99214 OFFICE O/P EST MOD 30 MIN: CPT | Mod: S$GLB,,, | Performed by: FAMILY MEDICINE

## 2019-06-06 PROCEDURE — 3075F SYST BP GE 130 - 139MM HG: CPT | Mod: CPTII,S$GLB,, | Performed by: FAMILY MEDICINE

## 2019-06-06 RX ORDER — SIMVASTATIN 20 MG/1
20 TABLET, FILM COATED ORAL NIGHTLY
Qty: 90 TABLET | Refills: 1 | Status: SHIPPED | OUTPATIENT
Start: 2019-06-06 | End: 2019-11-23 | Stop reason: SDUPTHER

## 2019-06-06 RX ORDER — SIMVASTATIN 20 MG/1
40 TABLET, FILM COATED ORAL NIGHTLY
Qty: 90 TABLET | Refills: 1 | Status: SHIPPED | OUTPATIENT
Start: 2019-06-06 | End: 2019-06-06

## 2019-06-06 NOTE — ASSESSMENT & PLAN NOTE
Recommended decreasing the simvastatin dose from 40mg-20 mg. Hopefully this will help with some of her symptoms.

## 2019-06-06 NOTE — ASSESSMENT & PLAN NOTE
Recommended to  Stop taking metformin completely.  She has maintained an A1c of 5.9 with only 250 mg of metformin twice a day.  Continue focus on dietary control.

## 2019-06-06 NOTE — ASSESSMENT & PLAN NOTE
Never started taking Flovent due to the high co-pay but she has been doing well with only intermittent use of albuterol.

## 2019-06-06 NOTE — PROGRESS NOTES
Subjective:       Patient ID: Kaleigh Nieves is a 78 y.o. female.    Chief Complaint: Hypertension and Diabetes    HPI    Came in today to follow-up on hypertension and diabetes.  Has been doing very well in trying to follow a good diet in regards to diabetes.  Continues to take only half tablet of 500 mg metformin.  Also takes her blood pressure medicine about every other day because she has found that it is running low sometimes.  Another concern that she has is that she feels the statin medication gives her constipation.  She is taking 40 mg of simvastatin.    Continues to bowl and has no new complaints at this time.    She does have swelling in her lower extremities but the compression stockings have been helping with this.    Family History   Problem Relation Age of Onset    Hypertension Mother     Diabetes Mother     Leukemia Sister     Hypertension Sister     Cancer Sister         leukemia    Heart disease Maternal Aunt     Cancer Maternal Uncle         gastric, pacreatic    Heart disease Maternal Uncle     Miscarriages / Stillbirths Maternal Uncle     Diabetes Maternal Grandmother     Asthma Maternal Grandfather     Breast cancer Neg Hx     Colon cancer Neg Hx     Ovarian cancer Neg Hx     COPD Neg Hx     Hyperlipidemia Neg Hx     Kidney disease Neg Hx        Current Outpatient Medications:     aspirin 81 MG Chew, Take 81 mg by mouth once daily., Disp: , Rfl:     blood glucose control high,low (ACCU-CHEK DARSHAN CONTROL SOLN) Soln, Use a directed, Disp: 1 each, Rfl: 3    blood sugar diagnostic (ACCU-CHEK DARSHAN PLUS TEST STRP) Strp, TEST BLOOD SUGAR TWICE DAILY, Disp: 200 strip, Rfl: 3    blood-glucose meter (ACCU-CHEK DARSHAN PLUS METER) Misc, 1 Device by Misc.(Non-Drug; Combo Route) route 2 (two) times daily., Disp: 1 each, Rfl: 1    clobetasol 0.05% (TEMOVATE) 0.05 % Oint, Apply topically daily as needed. For vulvar itching, Disp: 60 g, Rfl: 2    COLCRYS 0.6 mg tablet, TAKE ONE TABLET BY  "MOUTH EVERY DAY, Disp: 90 tablet, Rfl: 1    econazole nitrate 1 % cream, APPLY TO AFFECTED AREA TOPICALLY EVERY DAY, Disp: 85 g, Rfl: 0    FLUZONE HIGH-DOSE 2018-19, PF, 180 mcg/0.5 mL vaccine, TO BE ADMINISTERED BY PHARMACIST FOR IMMUNIZATION, Disp: , Rfl: 0    lancets (ACCU-CHEK SOFTCLIX LANCETS) Misc, TEST TWICE A DAY, Disp: 200 each, Rfl: 3    lisinopril-hydrochlorothiazide (PRINZIDE,ZESTORETIC) 10-12.5 mg per tablet, Take 1 tablet by mouth once daily., Disp: 90 tablet, Rfl: 3    simvastatin (ZOCOR) 20 MG tablet, Take 1 tablet (20 mg total) by mouth nightly., Disp: 90 tablet, Rfl: 1    timolol maleate 0.5% (TIMOPTIC) 0.5 % Drop, PLACE 1 DROP INTO BOTH EYES 2 (TWO) TIMES DAILY., Disp: 25 mL, Rfl: 5    VENTOLIN HFA 90 mcg/actuation inhaler, INHALE 2 PUFFS INTO THE LUNGS EVERY 6 HOURS AS NEEDED FOR WHEEZING., Disp: 18 g, Rfl: 11    Review of Systems   Constitutional: Negative for chills and fever.   Respiratory: Negative for cough and shortness of breath.    Cardiovascular: Positive for leg swelling (stable). Negative for chest pain.   Gastrointestinal: Negative for abdominal pain.   Skin: Negative for rash.   Neurological: Negative for dizziness.       Objective:   BP (!) 132/59 (BP Location: Left arm, Patient Position: Sitting, BP Method: Large (Automatic))   Pulse 76   Temp 96.6 °F (35.9 °C) (Tympanic)   Resp 12   Ht 5' 6" (1.676 m)   Wt 106.3 kg (234 lb 5.6 oz)   SpO2 97%   BMI 37.82 kg/m²      Physical Exam   Constitutional: She is oriented to person, place, and time. She appears well-developed and well-nourished.   HENT:   Head: Normocephalic and atraumatic.   Eyes: Conjunctivae are normal.   Cardiovascular: Normal rate.   Pulmonary/Chest: Effort normal. No respiratory distress.   Musculoskeletal: She exhibits no edema.   Neurological: She is alert and oriented to person, place, and time. Coordination normal.   Skin: Skin is warm and dry. No rash noted.   Psychiatric: She has a normal mood and " affect. Her behavior is normal.   Vitals reviewed.      Assessment & Plan     Problem List Items Addressed This Visit        Pulmonary    Chronic bronchitis    Current Assessment & Plan       Never started taking Flovent due to the high co-pay but she has been doing well with only intermittent use of albuterol.            Cardiac/Vascular    Hyperlipidemia (Chronic)    Current Assessment & Plan       Recommended decreasing the simvastatin dose from 40mg-20 mg. Hopefully this will help with some of her symptoms.         Relevant Medications    simvastatin (ZOCOR) 20 MG tablet    Hypertension associated with diabetes - Primary    Relevant Orders    Comprehensive metabolic panel    Hemoglobin A1c       Endocrine    Type 2 diabetes mellitus with diabetic polyneuropathy, without long-term current use of insulin    Current Assessment & Plan       Recommended to  Stop taking metformin completely.  She has maintained an A1c of 5.9 with only 250 mg of metformin twice a day.  Continue focus on dietary control.         Severe obesity (BMI 35.0-35.9 with comorbidity)    Current Assessment & Plan     Counseled on importance on diet and exercise to decrease risk of comorbid conditions and for improved quality of life.                   Follow up in about 6 months (around 12/6/2019).    Disclaimer:  This note may have been prepared using voice recognition software, it may have not been extensively proofed, as such there could be errors within the text such as sound alike errors.

## 2019-06-10 DIAGNOSIS — L90.0 LICHEN SCLEROSUS: ICD-10-CM

## 2019-06-10 RX ORDER — ECONAZOLE NITRATE 10 MG/G
CREAM TOPICAL
Qty: 85 G | Refills: 0 | Status: SHIPPED | OUTPATIENT
Start: 2019-06-10 | End: 2019-08-12 | Stop reason: SDUPTHER

## 2019-06-11 ENCOUNTER — OFFICE VISIT (OUTPATIENT)
Dept: OPHTHALMOLOGY | Facility: CLINIC | Age: 79
End: 2019-06-11
Payer: MEDICARE

## 2019-06-11 DIAGNOSIS — H40.1134 PRIMARY OPEN ANGLE GLAUCOMA OF BOTH EYES, INDETERMINATE STAGE: Primary | ICD-10-CM

## 2019-06-11 PROCEDURE — 92012 INTRM OPH EXAM EST PATIENT: CPT | Mod: S$GLB,,, | Performed by: OPTOMETRIST

## 2019-06-11 PROCEDURE — 99499 UNLISTED E&M SERVICE: CPT | Mod: HCNC,S$GLB,, | Performed by: OPTOMETRIST

## 2019-06-11 PROCEDURE — 99999 PR PBB SHADOW E&M-EST. PATIENT-LVL II: ICD-10-PCS | Mod: PBBFAC,,, | Performed by: OPTOMETRIST

## 2019-06-11 PROCEDURE — 99999 PR PBB SHADOW E&M-EST. PATIENT-LVL II: CPT | Mod: PBBFAC,,, | Performed by: OPTOMETRIST

## 2019-06-11 PROCEDURE — 92012 PR EYE EXAM, EST PATIENT,INTERMED: ICD-10-PCS | Mod: S$GLB,,, | Performed by: OPTOMETRIST

## 2019-06-11 PROCEDURE — 99499 RISK ADDL DX/OHS AUDIT: ICD-10-PCS | Mod: HCNC,S$GLB,, | Performed by: OPTOMETRIST

## 2019-06-11 NOTE — PROGRESS NOTES
HPI     Last [facility] visit 03/12/2019  3 month IOP Check  Diabetic/NIDDM  Last HVF/GOCT 12/11/2018  Medication: Timolol 0.5% 1 drop OU BID  Pseudophakia, OU    Last edited by Mario Jonas MA on 6/11/2019 10:15 AM. (History)            Assessment /Plan     For exam results, see Encounter Report.    Primary open angle glaucoma of both eyes, indeterminate stage      Good IOP control.  RTC 3 months for IOP check.  Full testing next December.

## 2019-07-22 ENCOUNTER — OFFICE VISIT (OUTPATIENT)
Dept: PODIATRY | Facility: CLINIC | Age: 79
End: 2019-07-22
Payer: MEDICARE

## 2019-07-22 VITALS
SYSTOLIC BLOOD PRESSURE: 143 MMHG | WEIGHT: 229.94 LBS | BODY MASS INDEX: 36.95 KG/M2 | HEIGHT: 66 IN | HEART RATE: 80 BPM | DIASTOLIC BLOOD PRESSURE: 64 MMHG

## 2019-07-22 DIAGNOSIS — R60.9 DEPENDENT EDEMA: ICD-10-CM

## 2019-07-22 DIAGNOSIS — B35.1 DERMATOPHYTOSIS OF NAIL: ICD-10-CM

## 2019-07-22 DIAGNOSIS — E11.51 TYPE II DIABETES MELLITUS WITH PERIPHERAL CIRCULATORY DISORDER: Primary | ICD-10-CM

## 2019-07-22 PROCEDURE — 11721 DEBRIDE NAIL 6 OR MORE: CPT | Mod: Q8,S$GLB,, | Performed by: PODIATRIST

## 2019-07-22 PROCEDURE — 99999 PR PBB SHADOW E&M-EST. PATIENT-LVL III: ICD-10-PCS | Mod: PBBFAC,,, | Performed by: PODIATRIST

## 2019-07-22 PROCEDURE — 99499 NO LOS: ICD-10-PCS | Mod: S$GLB,,, | Performed by: PODIATRIST

## 2019-07-22 PROCEDURE — 99499 UNLISTED E&M SERVICE: CPT | Mod: S$GLB,,, | Performed by: PODIATRIST

## 2019-07-22 PROCEDURE — 11721 PR DEBRIDEMENT OF NAILS, 6 OR MORE: ICD-10-PCS | Mod: Q8,S$GLB,, | Performed by: PODIATRIST

## 2019-07-22 PROCEDURE — 99999 PR PBB SHADOW E&M-EST. PATIENT-LVL III: CPT | Mod: PBBFAC,,, | Performed by: PODIATRIST

## 2019-07-22 NOTE — PROGRESS NOTES
Subjective:     Patient ID: Kaleigh Nieves is a 78 y.o. female.    Chief Complaint: Nail Care (RNC. Noted pain in B/L foot. rates pain 1/10.Diabetic Pt Pt wearing sandals w/ compression socks. PCP DR Hanks, last visit June 6, 2019)    Kaleigh is a 78 y.o. female who presents to the clinic for evaluation and treatment of high risk feet. Kaleigh has a past medical history of Anemia, Angina pectoris, Arthritis, Asthma, Back pain, Bronchitis, CAD (coronary artery disease) (7/15), Carotid artery stenosis, Chronic bronchitis, Chronic gout, Colon polyp, CTS (carpal tunnel syndrome), Diabetes mellitus, type 2, Diabetes with neurologic complications, Diverticulosis, RIVAS (dyspnea on exertion), Dyslipidemia, Ex-smoker, GERD (gastroesophageal reflux disease), Hyperlipidemia, Hypertension, Neuropathy, lower extremity, Obesity, Open-angle glaucoma, Peripheral vascular disease, Polyneuropathy, and Tobacco dependence. The patient's chief complaint is long, thick toenails. Patient states her blood sugar this morning was 97mg/dl.  This patient has documented high risk feet requiring routine maintenance secondary to diabetes mellitis and those secondary complications of diabetes, as mentioned. Patient states the swelling is still off and on.     PCP: Ramos Hanks, DO    Date Last Seen by PCP: 6/6/19    Current shoe gear:  Affected Foot: Rx diabetic extra depth shoes and custom accommodative insoles     Unaffected Foot: Rx diabetic extra depth shoes and custom accommodative insoles    Hemoglobin A1C   Date Value Ref Range Status   06/04/2019 5.9 (H) 4.0 - 5.6 % Final     Comment:     ADA Screening Guidelines:  5.7-6.4%  Consistent with prediabetes  >or=6.5%  Consistent with diabetes  High levels of fetal hemoglobin interfere with the HbA1C  assay. Heterozygous hemoglobin variants (HbS, HgC, etc)do  not significantly interfere with this assay.   However, presence of multiple variants may affect accuracy.     10/31/2018 5.9 (H) 4.0 - 5.6  % Final     Comment:     ADA Screening Guidelines:  5.7-6.4%  Consistent with prediabetes  >or=6.5%  Consistent with diabetes  High levels of fetal hemoglobin interfere with the HbA1C  assay. Heterozygous hemoglobin variants (HbS, HgC, etc)do  not significantly interfere with this assay.   However, presence of multiple variants may affect accuracy.     03/15/2018 5.6 4.0 - 5.6 % Final     Comment:     According to ADA guidelines, hemoglobin A1c <7.0% represents  optimal control in non-pregnant diabetic patients. Different  metrics may apply to specific patient populations.   Standards of Medical Care in Diabetes-2016.  For the purpose of screening for the presence of diabetes:  <5.7%     Consistent with the absence of diabetes  5.7-6.4%  Consistent with increasing risk for diabetes   (prediabetes)  >or=6.5%  Consistent with diabetes  Currently, no consensus exists for use of hemoglobin A1c  for diagnosis of diabetes for children.  This Hemoglobin A1c assay has significant interference with fetal   hemoglobin   (HbF). The results are invalid for patients with abnormal amounts of   HbF,   including those with known Hereditary Persistence   of Fetal Hemoglobin. Heterozygous hemoglobin variants (HbAS, HbAC,   HbAD, HbAE, HbA2) do not significantly interfere with this assay;   however, presence of multiple variants in a sample may impact the %   interference.             Patient Active Problem List   Diagnosis    Type 2 diabetes mellitus with diabetic polyneuropathy, without long-term current use of insulin    Multiple joint pain    Esophageal reflux    Hyperlipidemia    Chronic gout    Lichen sclerosus    Carotid artery stenosis    CAD (coronary artery disease)    Colon polyp    Hypertension associated with diabetes    Aortic atherosclerosis    Diastolic dysfunction    Arterial insufficiency of lower extremity    Severe obesity (BMI 35.0-35.9 with comorbidity)    Glaucoma    Ganglion cyst of flexor  tendon sheath of finger of right hand    Bilateral carpal tunnel syndrome    Right carpal tunnel syndrome    Chronic bronchitis    Dependent edema       Medication List with Changes/Refills   Current Medications    ASPIRIN 81 MG CHEW    Take 81 mg by mouth once daily.    BLOOD GLUCOSE CONTROL HIGH,LOW (ACCU-CHEK DARSHAN CONTROL SOLN) SOLN    Use a directed    BLOOD SUGAR DIAGNOSTIC (ACCU-CHEK DARSHAN PLUS TEST STRP) STRP    TEST BLOOD SUGAR TWICE DAILY    BLOOD-GLUCOSE METER (ACCU-CHEK DARSHAN PLUS METER) MISC    1 Device by Misc.(Non-Drug; Combo Route) route 2 (two) times daily.    CLOBETASOL 0.05% (TEMOVATE) 0.05 % OINT    Apply topically daily as needed. For vulvar itching    COLCRYS 0.6 MG TABLET    TAKE ONE TABLET BY MOUTH EVERY DAY    ECONAZOLE NITRATE 1 % CREAM    APPLY TO AFFECTED AREA TOPICALLY EVERY DAY    FLUZONE HIGH-DOSE 2018-19, PF, 180 MCG/0.5 ML VACCINE    TO BE ADMINISTERED BY PHARMACIST FOR IMMUNIZATION    LANCETS (ACCU-CHEK SOFTCLIX LANCETS) MISC    TEST TWICE A DAY    LISINOPRIL-HYDROCHLOROTHIAZIDE (PRINZIDE,ZESTORETIC) 10-12.5 MG PER TABLET    Take 1 tablet by mouth once daily.    SIMVASTATIN (ZOCOR) 20 MG TABLET    Take 1 tablet (20 mg total) by mouth nightly.    TIMOLOL MALEATE 0.5% (TIMOPTIC) 0.5 % DROP    PLACE 1 DROP INTO BOTH EYES 2 (TWO) TIMES DAILY.    VENTOLIN HFA 90 MCG/ACTUATION INHALER    INHALE 2 PUFFS INTO THE LUNGS EVERY 6 HOURS AS NEEDED FOR WHEEZING.       Review of patient's allergies indicates:   Allergen Reactions    Iodine and iodide containing products Nausea And Vomiting    Penicillins Itching    Amlodipine      edema    Sulfa (sulfonamide antibiotics)     Ultram [tramadol] Other (See Comments)     Light headness, itiching    Bactrim [sulfamethoxazole-trimethoprim] Itching and Rash       Past Surgical History:   Procedure Laterality Date    CARPAL TUNNEL RELEASE Bilateral     CATARACT EXTRACTION, BILATERAL      CHOLECYSTECTOMY      COLONOSCOPY N/A 6/25/2015     [facility]o [facility], [facility]    CYST REMOVAL  2018    DILATION AND CURETTAGE OF UTERUS      ESOPHAGOGASTRODUODENOSCOPY (EGD) N/A 2015    [facility]    EYE SURGERY      GANGLIONECTOMY-HAND Right 2018    [facility] Sr[facility]    HEMORRHOID SURGERY      HYSTERECTOMY      fibroids    INCISION AND DRAINAGE PERIRECTAL ABSCESS      JOINT REPLACEMENT Bilateral     knee    KNEE ARTHROSCOPY Right     MYOMECTOMY      RELEASE-CARPAL TUNNEL Right 2018    [facility]    TENOSYNOVECTOMY Right 2018    [facility]       Family History   Problem Relation Age of Onset    Hypertension Mother     Diabetes Mother     Leukemia Sister     Hypertension Sister     Cancer Sister         leukemia    Heart disease Maternal Aunt     Cancer Maternal Uncle         gastric, pacreatic    Heart disease Maternal Uncle     Miscarriages / Stillbirths Maternal Uncle     Diabetes Maternal Grandmother     Asthma Maternal Grandfather     Breast cancer Neg Hx     Colon cancer Neg Hx     Ovarian cancer Neg Hx     COPD Neg Hx     Hyperlipidemia Neg Hx     Kidney disease Neg Hx        Social History     Socioeconomic History    Marital status: Single     Spouse name: Not on file    Number of children: 0    Years of education: Not on file    Highest education level: Not on file   Occupational History    Not on file   Social Needs    Financial resource strain: Not on file    Food insecurity:     Worry: Not on file     Inability: Not on file    Transportation needs:     Medical: Not on file     Non-medical: Not on file   Tobacco Use    Smoking status: Former Smoker     Packs/day: 0.25     Years: 24.00     Pack years: 6.00     Last attempt to quit: 1976     Years since quittin.1    Smokeless tobacco: Never Used   Substance and Sexual Activity  "   Alcohol use: No    Drug use: No    Sexual activity: Not Currently   Lifestyle    Physical activity:     Days per week: Not on file     Minutes per session: Not on file    Stress: Not on file   Relationships    Social connections:     Talks on phone: Not on file     Gets together: Not on file     Attends Mu-ism service: Not on file     Active member of club or organization: Not on file     Attends meetings of clubs or organizations: Not on file     Relationship status: Not on file   Other Topics Concern    Not on file   Social History Narrative    Not on file       Vitals:    07/22/19 1121   BP: (!) 143/64   Pulse: 80   Weight: 104.3 kg (229 lb 15 oz)   Height: 5' 6" (1.676 m)   PainSc: 0-No pain       Hemoglobin A1C   Date Value Ref Range Status   06/04/2019 5.9 (H) 4.0 - 5.6 % Final     Comment:     ADA Screening Guidelines:  5.7-6.4%  Consistent with prediabetes  >or=6.5%  Consistent with diabetes  High levels of fetal hemoglobin interfere with the HbA1C  assay. Heterozygous hemoglobin variants (HbS, HgC, etc)do  not significantly interfere with this assay.   However, presence of multiple variants may affect accuracy.     10/31/2018 5.9 (H) 4.0 - 5.6 % Final     Comment:     ADA Screening Guidelines:  5.7-6.4%  Consistent with prediabetes  >or=6.5%  Consistent with diabetes  High levels of fetal hemoglobin interfere with the HbA1C  assay. Heterozygous hemoglobin variants (HbS, HgC, etc)do  not significantly interfere with this assay.   However, presence of multiple variants may affect accuracy.     03/15/2018 5.6 4.0 - 5.6 % Final     Comment:     According to ADA guidelines, hemoglobin A1c <7.0% represents  optimal control in non-pregnant diabetic patients. Different  metrics may apply to specific patient populations.   Standards of Medical Care in Diabetes-2016.  For the purpose of screening for the presence of diabetes:  <5.7%     Consistent with the absence of diabetes  5.7-6.4%  Consistent with " increasing risk for diabetes   (prediabetes)  >or=6.5%  Consistent with diabetes  Currently, no consensus exists for use of hemoglobin A1c  for diagnosis of diabetes for children.  This Hemoglobin A1c assay has significant interference with fetal   hemoglobin   (HbF). The results are invalid for patients with abnormal amounts of   HbF,   including those with known Hereditary Persistence   of Fetal Hemoglobin. Heterozygous hemoglobin variants (HbAS, HbAC,   HbAD, HbAE, HbA2) do not significantly interfere with this assay;   however, presence of multiple variants in a sample may impact the %   interference.         Review of Systems   Constitutional: Negative for chills and fever.   Respiratory: Negative for shortness of breath.    Cardiovascular: Positive for leg swelling. Negative for chest pain, palpitations, orthopnea and claudication.   Gastrointestinal: Negative for diarrhea, nausea and vomiting.   Musculoskeletal: Negative for joint pain.   Skin: Negative for rash.   Neurological: Positive for tingling and sensory change. Negative for dizziness, focal weakness and weakness.   Psychiatric/Behavioral: Negative.            Objective:      PHYSICAL EXAM: Apperance: Alert and orient in no distress,well developed, and with good attention to grooming and body habits  Patient presents ambulating in diabetic shoes and inserts.   LOWER EXTREMITY EXAM:  VASCULAR: Dorsalis pedis pulses 0/4 bilateral (monophasic with doppler)and Posterior Tibial pulses 0/4 bilateral (biphasic with doppler). Capillary fill time <blanched bilateral. Mild edema observed bilateral. Varicosities present bilateral. Skin temperature of the lower extremities is warm to cool, proximal to distal. Hair growth absent bilateral.  DERMATOLOGICAL: No skin rashes, subcutaneous nodules, lesions, or ulcers observed bilateral. Nails 1,2,3,4,5 bilateral elongated, thickened, and discolored with subungual debris. Webspaces 1,2,3,4clean, dry and without evidence  of break in skin integrity bilateral. (-) erythema, edema, and increased temp noted to toes 1-5 bilateral.   NEUROLOGICAL: Light touch, sharp-dull, proprioception all present and equal bilaterally.  Vibratory sensation intact on left hallux and diminished at right hallux. Protective sensation intact at all 10 sites as tested with a [employer]-Jim[employer] 5.07 monofilament.   MUSCULOSKELETAL: Muscle strength is 5/5 for foot inverters, everters, plantarflexors, and dorsiflexors. Muscle tone is normal. Pes planus noted bilateral. Flexible hammertoes noted bilateral.         Assessment:       Encounter Diagnoses   Name Primary?    Type II diabetes mellitus with peripheral circulatory disorder Yes    Dermatophytosis of nail     Dependent edema          Plan:   Type II diabetes mellitus with peripheral circulatory disorder    Dermatophytosis of nail    Dependent edema      I counseled the patient on her conditions, their implications and medical management.  Greater than 15 minutes of a 20 minute appointment spent on education about the diabetic foot, neuropathy, and prevention of limb loss.  Shoe inspection. Diabetic Foot Education. Patient reminded of the importance of good nutrition and blood sugar control to help prevent podiatric complications of diabetes. Patient instructed on proper foot hygeine. We discussed wearing proper shoe gear, daily foot inspections, never walking without protective shoe gear, never putting sharp instruments to feet.    With patient's permission, nails 1-5 bilateral were debrided/trimmed in length and thickness aggressively to their soft tissue attachment mechanically and with electric , removing all offending nail and debris. Patient relates relief following the procedure.  Patient  will continue to monitor the areas daily, inspect feet, wear protective shoe gear when ambulatory, moisturizer to maintain skin integrity. Patient reminded of the importance of good nutrition and blood  sugar control to help prevent podiatric complications of diabetes.  Patient to return in this office in approximately 3 months, or sooner if needed.                   Ino Harmon DPM  [facility]

## 2019-08-12 DIAGNOSIS — L90.0 LICHEN SCLEROSUS: ICD-10-CM

## 2019-08-12 RX ORDER — ECONAZOLE NITRATE 10 MG/G
CREAM TOPICAL
Qty: 85 G | Refills: 0 | Status: SHIPPED | OUTPATIENT
Start: 2019-08-12 | End: 2020-01-02 | Stop reason: SDUPTHER

## 2019-09-16 ENCOUNTER — OFFICE VISIT (OUTPATIENT)
Dept: OPHTHALMOLOGY | Facility: CLINIC | Age: 79
End: 2019-09-16
Payer: MEDICARE

## 2019-09-16 DIAGNOSIS — H40.1134 PRIMARY OPEN ANGLE GLAUCOMA OF BOTH EYES, INDETERMINATE STAGE: Primary | ICD-10-CM

## 2019-09-16 DIAGNOSIS — H40.1134 PRIMARY OPEN ANGLE GLAUCOMA OF BOTH EYES, INDETERMINATE STAGE: ICD-10-CM

## 2019-09-16 PROCEDURE — 92012 INTRM OPH EXAM EST PATIENT: CPT | Mod: HCNC,S$GLB,, | Performed by: OPTOMETRIST

## 2019-09-16 PROCEDURE — 99999 PR PBB SHADOW E&M-EST. PATIENT-LVL II: ICD-10-PCS | Mod: PBBFAC,HCNC,, | Performed by: OPTOMETRIST

## 2019-09-16 PROCEDURE — 92012 PR EYE EXAM, EST PATIENT,INTERMED: ICD-10-PCS | Mod: HCNC,S$GLB,, | Performed by: OPTOMETRIST

## 2019-09-16 PROCEDURE — 99999 PR PBB SHADOW E&M-EST. PATIENT-LVL II: CPT | Mod: PBBFAC,HCNC,, | Performed by: OPTOMETRIST

## 2019-09-16 RX ORDER — TIMOLOL MALEATE 5 MG/ML
1 SOLUTION/ DROPS OPHTHALMIC 2 TIMES DAILY
Qty: 25 ML | Refills: 5 | Status: SHIPPED | OUTPATIENT
Start: 2019-09-16 | End: 2020-01-28 | Stop reason: SDUPTHER

## 2019-09-16 NOTE — PROGRESS NOTES
HPI     Last [facility] visit 06/11/2019  3 month IOP Check  Glaucoma  Last HVF/GOCT 12/11/2018  Timolol 0.5% 1 drop OU BID  Pseudophakia, OU      Last edited by Mario Jonas MA on 9/16/2019  9:22 AM. (History)            Assessment /Plan     For exam results, see Encounter Report.    Primary open angle glaucoma of both eyes, indeterminate stage      Good IOP control.  RTC 3 months for IOP check.  Full testing next December   Good IOP control OU.  RTC in November for full dilation, HVF, GOCT, etc.

## 2019-10-14 RX ORDER — COLCHICINE 0.6 MG/1
TABLET, FILM COATED ORAL
Qty: 90 TABLET | Refills: 4 | Status: SHIPPED | OUTPATIENT
Start: 2019-10-14 | End: 2020-11-02

## 2019-10-21 ENCOUNTER — TELEPHONE (OUTPATIENT)
Dept: PODIATRY | Facility: CLINIC | Age: 79
End: 2019-10-21

## 2019-10-22 ENCOUNTER — OFFICE VISIT (OUTPATIENT)
Dept: PODIATRY | Facility: CLINIC | Age: 79
End: 2019-10-22
Payer: MEDICARE

## 2019-10-22 VITALS
SYSTOLIC BLOOD PRESSURE: 140 MMHG | HEART RATE: 79 BPM | HEIGHT: 66 IN | WEIGHT: 229 LBS | BODY MASS INDEX: 36.8 KG/M2 | DIASTOLIC BLOOD PRESSURE: 66 MMHG

## 2019-10-22 DIAGNOSIS — R60.9 DEPENDENT EDEMA: ICD-10-CM

## 2019-10-22 DIAGNOSIS — E11.51 TYPE II DIABETES MELLITUS WITH PERIPHERAL CIRCULATORY DISORDER: Primary | ICD-10-CM

## 2019-10-22 DIAGNOSIS — B35.1 DERMATOPHYTOSIS OF NAIL: ICD-10-CM

## 2019-10-22 PROCEDURE — 99999 PR PBB SHADOW E&M-EST. PATIENT-LVL III: CPT | Mod: PBBFAC,HCNC,, | Performed by: PODIATRIST

## 2019-10-22 PROCEDURE — 11721 PR DEBRIDEMENT OF NAILS, 6 OR MORE: ICD-10-PCS | Mod: Q8,HCNC,S$GLB, | Performed by: PODIATRIST

## 2019-10-22 PROCEDURE — 99499 NO LOS: ICD-10-PCS | Mod: HCNC,S$GLB,, | Performed by: PODIATRIST

## 2019-10-22 PROCEDURE — 99499 UNLISTED E&M SERVICE: CPT | Mod: HCNC,S$GLB,, | Performed by: PODIATRIST

## 2019-10-22 PROCEDURE — 11721 DEBRIDE NAIL 6 OR MORE: CPT | Mod: Q8,HCNC,S$GLB, | Performed by: PODIATRIST

## 2019-10-22 PROCEDURE — 99999 PR PBB SHADOW E&M-EST. PATIENT-LVL III: ICD-10-PCS | Mod: PBBFAC,HCNC,, | Performed by: PODIATRIST

## 2019-10-22 NOTE — PROGRESS NOTES
Subjective:     Patient ID: Kaleigh Nieves is a 79 y.o. female.    Chief Complaint: Nail Care (c/o left foot pain. rates pain 2/10. wears casual shoes with compression stockings. diabteic Pt. last seen on 12/06/18 with PCP Dr. Hanks.)    Kaleigh is a 79 y.o. female who presents to the clinic for evaluation and treatment of high risk feet. Kaleigh has a past medical history of Anemia, Angina pectoris, Arthritis, Asthma, Back pain, Bronchitis, CAD (coronary artery disease) (7/15), Carotid artery stenosis, Chronic bronchitis, Chronic gout, Colon polyp, CTS (carpal tunnel syndrome), Diabetes mellitus, type 2, Diabetes with neurologic complications, Diverticulosis, RIVAS (dyspnea on exertion), Dyslipidemia, Ex-smoker, GERD (gastroesophageal reflux disease), Hyperlipidemia, Hypertension, Neuropathy, lower extremity, Obesity, Open-angle glaucoma, Peripheral vascular disease, Polyneuropathy, and Tobacco dependence. The patient's chief complaint is long, thick toenails. Patient states her blood sugar this morning was 103mg/dl.  This patient has documented high risk feet requiring routine maintenance secondary to diabetes mellitis and those secondary complications of diabetes, as mentioned. Patient states the swelling is still off and on.     PCP: Ramos Hanks, DO    Date Last Seen by PCP: 6/6/19    Current shoe gear:  Affected Foot: Rx diabetic extra depth shoes and custom accommodative insoles     Unaffected Foot: Rx diabetic extra depth shoes and custom accommodative insoles    Hemoglobin A1C   Date Value Ref Range Status   06/04/2019 5.9 (H) 4.0 - 5.6 % Final     Comment:     ADA Screening Guidelines:  5.7-6.4%  Consistent with prediabetes  >or=6.5%  Consistent with diabetes  High levels of fetal hemoglobin interfere with the HbA1C  assay. Heterozygous hemoglobin variants (HbS, HgC, etc)do  not significantly interfere with this assay.   However, presence of multiple variants may affect accuracy.     10/31/2018 5.9 (H) 4.0 -  5.6 % Final     Comment:     ADA Screening Guidelines:  5.7-6.4%  Consistent with prediabetes  >or=6.5%  Consistent with diabetes  High levels of fetal hemoglobin interfere with the HbA1C  assay. Heterozygous hemoglobin variants (HbS, HgC, etc)do  not significantly interfere with this assay.   However, presence of multiple variants may affect accuracy.     03/15/2018 5.6 4.0 - 5.6 % Final     Comment:     According to ADA guidelines, hemoglobin A1c <7.0% represents  optimal control in non-pregnant diabetic patients. Different  metrics may apply to specific patient populations.   Standards of Medical Care in Diabetes-2016.  For the purpose of screening for the presence of diabetes:  <5.7%     Consistent with the absence of diabetes  5.7-6.4%  Consistent with increasing risk for diabetes   (prediabetes)  >or=6.5%  Consistent with diabetes  Currently, no consensus exists for use of hemoglobin A1c  for diagnosis of diabetes for children.  This Hemoglobin A1c assay has significant interference with fetal   hemoglobin   (HbF). The results are invalid for patients with abnormal amounts of   HbF,   including those with known Hereditary Persistence   of Fetal Hemoglobin. Heterozygous hemoglobin variants (HbAS, HbAC,   HbAD, HbAE, HbA2) do not significantly interfere with this assay;   however, presence of multiple variants in a sample may impact the %   interference.             Patient Active Problem List   Diagnosis    Type 2 diabetes mellitus with diabetic polyneuropathy, without long-term current use of insulin    Multiple joint pain    Esophageal reflux    Hyperlipidemia    Chronic gout    Lichen sclerosus    Carotid artery stenosis    CAD (coronary artery disease)    Colon polyp    Hypertension associated with diabetes    Aortic atherosclerosis    Diastolic dysfunction    Arterial insufficiency of lower extremity    Severe obesity (BMI 35.0-35.9 with comorbidity)    Glaucoma    Ganglion cyst of flexor  tendon sheath of finger of right hand    Bilateral carpal tunnel syndrome    Right carpal tunnel syndrome    Chronic bronchitis    Dependent edema       Medication List with Changes/Refills   Current Medications    ASPIRIN 81 MG CHEW    Take 81 mg by mouth once daily.    BLOOD GLUCOSE CONTROL HIGH,LOW (ACCU-CHEK DARSHAN CONTROL SOLN) SOLN    Use a directed    BLOOD SUGAR DIAGNOSTIC (ACCU-CHEK DARSHAN PLUS TEST STRP) STRP    TEST BLOOD SUGAR TWICE DAILY    BLOOD-GLUCOSE METER (ACCU-CHEK DARSHAN PLUS METER) MISC    1 Device by Misc.(Non-Drug; Combo Route) route 2 (two) times daily.    CLOBETASOL 0.05% (TEMOVATE) 0.05 % OINT    Apply topically daily as needed. For vulvar itching    COLCRYS 0.6 MG TABLET    TAKE ONE TABLET BY MOUTH EVERY DAY    ECONAZOLE NITRATE 1 % CREAM    APPLY TO AFFECTED AREA TOPICALLY EVERY DAY    FLUZONE HIGH-DOSE 2018-19, PF, 180 MCG/0.5 ML VACCINE    TO BE ADMINISTERED BY PHARMACIST FOR IMMUNIZATION    LANCETS (ACCU-CHEK SOFTCLIX LANCETS) MISC    TEST TWICE A DAY    LISINOPRIL-HYDROCHLOROTHIAZIDE (PRINZIDE,ZESTORETIC) 10-12.5 MG PER TABLET    Take 1 tablet by mouth once daily.    SIMVASTATIN (ZOCOR) 20 MG TABLET    Take 1 tablet (20 mg total) by mouth nightly.    TIMOLOL MALEATE 0.5% (TIMOPTIC) 0.5 % DROP    Place 1 drop into both eyes 2 (two) times daily.    VENTOLIN HFA 90 MCG/ACTUATION INHALER    INHALE 2 PUFFS INTO THE LUNGS EVERY 6 HOURS AS NEEDED FOR WHEEZING.       Review of patient's allergies indicates:   Allergen Reactions    Iodine and iodide containing products Nausea And Vomiting    Penicillins Itching    Amlodipine      edema    Sulfa (sulfonamide antibiotics)     Ultram [tramadol] Other (See Comments)     Light headness, itiching    Bactrim [sulfamethoxazole-trimethoprim] Itching and Rash       Past Surgical History:   Procedure Laterality Date    CARPAL TUNNEL RELEASE Bilateral     CATARACT EXTRACTION, BILATERAL      CHOLECYSTECTOMY      CYST REMOVAL  2018    DILATION  AND CURETTAGE OF UTERUS      EYE SURGERY      HEMORRHOID SURGERY      HYSTERECTOMY      fibroids    INCISION AND DRAINAGE PERIRECTAL ABSCESS      JOINT REPLACEMENT Bilateral     knee    KNEE ARTHROSCOPY Right     MYOMECTOMY         Family History   Problem Relation Age of Onset    Hypertension Mother     Diabetes Mother     Leukemia Sister     Hypertension Sister     Cancer Sister         leukemia    Heart disease Maternal Aunt     Cancer Maternal Uncle         gastric, pacreatic    Heart disease Maternal Uncle     Miscarriages / Stillbirths Maternal Uncle     Diabetes Maternal Grandmother     Asthma Maternal Grandfather     Breast cancer Neg Hx     Colon cancer Neg Hx     Ovarian cancer Neg Hx     COPD Neg Hx     Hyperlipidemia Neg Hx     Kidney disease Neg Hx        Social History     Socioeconomic History    Marital status: Single     Spouse name: Not on file    Number of children: 0    Years of education: Not on file    Highest education level: Not on file   Occupational History    Not on file   Social Needs    Financial resource strain: Not on file    Food insecurity:     Worry: Not on file     Inability: Not on file    Transportation needs:     Medical: Not on file     Non-medical: Not on file   Tobacco Use    Smoking status: Former Smoker     Packs/day: 0.25     Years: 24.00     Pack years: 6.00     Last attempt to quit: 1976     Years since quittin.3    Smokeless tobacco: Never Used   Substance and Sexual Activity    Alcohol use: No    Drug use: No    Sexual activity: Not Currently   Lifestyle    Physical activity:     Days per week: Not on file     Minutes per session: Not on file    Stress: Not on file   Relationships    Social connections:     Talks on phone: Not on file     Gets together: Not on file     Attends Religion service: Not on file     Active member of club or organization: Not on file     Attends meetings of clubs or organizations: Not on file  "    Relationship status: Not on file   Other Topics Concern    Not on file   Social History Narrative    Not on file       Vitals:    10/22/19 1058   BP: (!) 140/66   Pulse: 79   Weight: 103.9 kg (229 lb)   Height: 5' 6" (1.676 m)   PainSc:   2   PainLoc: Foot       Hemoglobin A1C   Date Value Ref Range Status   06/04/2019 5.9 (H) 4.0 - 5.6 % Final     Comment:     ADA Screening Guidelines:  5.7-6.4%  Consistent with prediabetes  >or=6.5%  Consistent with diabetes  High levels of fetal hemoglobin interfere with the HbA1C  assay. Heterozygous hemoglobin variants (HbS, HgC, etc)do  not significantly interfere with this assay.   However, presence of multiple variants may affect accuracy.     10/31/2018 5.9 (H) 4.0 - 5.6 % Final     Comment:     ADA Screening Guidelines:  5.7-6.4%  Consistent with prediabetes  >or=6.5%  Consistent with diabetes  High levels of fetal hemoglobin interfere with the HbA1C  assay. Heterozygous hemoglobin variants (HbS, HgC, etc)do  not significantly interfere with this assay.   However, presence of multiple variants may affect accuracy.     03/15/2018 5.6 4.0 - 5.6 % Final     Comment:     According to ADA guidelines, hemoglobin A1c <7.0% represents  optimal control in non-pregnant diabetic patients. Different  metrics may apply to specific patient populations.   Standards of Medical Care in Diabetes-2016.  For the purpose of screening for the presence of diabetes:  <5.7%     Consistent with the absence of diabetes  5.7-6.4%  Consistent with increasing risk for diabetes   (prediabetes)  >or=6.5%  Consistent with diabetes  Currently, no consensus exists for use of hemoglobin A1c  for diagnosis of diabetes for children.  This Hemoglobin A1c assay has significant interference with fetal   hemoglobin   (HbF). The results are invalid for patients with abnormal amounts of   HbF,   including those with known Hereditary Persistence   of Fetal Hemoglobin. Heterozygous hemoglobin variants (HbAS, " HbAC,   HbAD, HbAE, HbA2) do not significantly interfere with this assay;   however, presence of multiple variants in a sample may impact the %   interference.         Review of Systems   Constitutional: Negative for chills and fever.   Respiratory: Negative for shortness of breath.    Cardiovascular: Positive for leg swelling. Negative for chest pain, palpitations, orthopnea and claudication.   Gastrointestinal: Negative for diarrhea, nausea and vomiting.   Musculoskeletal: Negative for joint pain.   Skin: Negative for rash.   Neurological: Positive for tingling and sensory change. Negative for dizziness, focal weakness and weakness.   Psychiatric/Behavioral: Negative.            Objective:      PHYSICAL EXAM: Apperance: Alert and orient in no distress,well developed, and with good attention to grooming and body habits  Patient presents ambulating in diabetic shoes and inserts.   LOWER EXTREMITY EXAM:  VASCULAR: Dorsalis pedis pulses 0/4 bilateral (monophasic with doppler)and Posterior Tibial pulses 0/4 bilateral (biphasic with doppler). Capillary fill time <blanched bilateral. Mild edema observed bilateral. Varicosities present bilateral. Skin temperature of the lower extremities is warm to cool, proximal to distal. Hair growth absent bilateral.  DERMATOLOGICAL: No skin rashes, subcutaneous nodules, lesions, or ulcers observed bilateral. Nails 1,2,3,4,5 bilateral elongated, thickened, and discolored with subungual debris. Webspaces 1,2,3,4clean, dry and without evidence of break in skin integrity bilateral. (-) erythema, edema, and increased temp noted to toes 1-5 bilateral.   NEUROLOGICAL: Light touch, sharp-dull, proprioception all present and equal bilaterally.  Vibratory sensation intact on left hallux and diminished at right hallux. Protective sensation intact at all 10 sites as tested with a [place]-Jimy 5.07 monofilament.   MUSCULOSKELETAL: Muscle strength is 5/5 for foot inverters, everters,  plantarflexors, and dorsiflexors. Muscle tone is normal. Pes planus noted bilateral. Flexible hammertoes noted bilateral.         Assessment:       Encounter Diagnoses   Name Primary?    Type II diabetes mellitus with peripheral circulatory disorder Yes    Dermatophytosis of nail     Dependent edema          Plan:   Type II diabetes mellitus with peripheral circulatory disorder    Dermatophytosis of nail    Dependent edema      I counseled the patient on her conditions, their implications and medical management.  Greater than 15 minutes of a 20 minute appointment spent on education about the diabetic foot, neuropathy, and prevention of limb loss.  Shoe inspection. Diabetic Foot Education. Patient reminded of the importance of good nutrition and blood sugar control to help prevent podiatric complications of diabetes. Patient instructed on proper foot hygeine. We discussed wearing proper shoe gear, daily foot inspections, never walking without protective shoe gear, never putting sharp instruments to feet.    With patient's permission, nails 1-5 bilateral were debrided/trimmed in length and thickness aggressively to their soft tissue attachment mechanically and with electric , removing all offending nail and debris. Patient relates relief following the procedure.  Patient  will continue to monitor the areas daily, inspect feet, wear protective shoe gear when ambulatory, moisturizer to maintain skin integrity. Patient reminded of the importance of good nutrition and blood sugar control to help prevent podiatric complications of diabetes.  Patient to return in this office in approximately 3 months, or sooner if needed.                   Ino Harmon DPM  [facility]

## 2019-11-11 ENCOUNTER — OFFICE VISIT (OUTPATIENT)
Dept: INTERNAL MEDICINE | Facility: CLINIC | Age: 79
End: 2019-11-11
Payer: MEDICARE

## 2019-11-11 VITALS
BODY MASS INDEX: 37.77 KG/M2 | WEIGHT: 235 LBS | HEIGHT: 66 IN | TEMPERATURE: 98 F | DIASTOLIC BLOOD PRESSURE: 58 MMHG | SYSTOLIC BLOOD PRESSURE: 148 MMHG

## 2019-11-11 DIAGNOSIS — R60.9 DEPENDENT EDEMA: ICD-10-CM

## 2019-11-11 DIAGNOSIS — H40.1134 PRIMARY OPEN-ANGLE GLAUCOMA, BILATERAL, INDETERMINATE STAGE: ICD-10-CM

## 2019-11-11 DIAGNOSIS — Z86.010 HISTORY OF COLON POLYPS: ICD-10-CM

## 2019-11-11 DIAGNOSIS — K21.9 GASTROESOPHAGEAL REFLUX DISEASE WITHOUT ESOPHAGITIS: Chronic | ICD-10-CM

## 2019-11-11 DIAGNOSIS — M1A.9XX0 CHRONIC GOUT WITHOUT TOPHUS, UNSPECIFIED CAUSE, UNSPECIFIED SITE: ICD-10-CM

## 2019-11-11 DIAGNOSIS — I51.89 DIASTOLIC DYSFUNCTION: ICD-10-CM

## 2019-11-11 DIAGNOSIS — G56.03 BILATERAL CARPAL TUNNEL SYNDROME: ICD-10-CM

## 2019-11-11 DIAGNOSIS — I70.0 AORTIC ATHEROSCLEROSIS: ICD-10-CM

## 2019-11-11 DIAGNOSIS — I15.2 HYPERTENSION ASSOCIATED WITH DIABETES: ICD-10-CM

## 2019-11-11 DIAGNOSIS — E11.59 HYPERTENSION ASSOCIATED WITH DIABETES: ICD-10-CM

## 2019-11-11 DIAGNOSIS — J42 CHRONIC BRONCHITIS, UNSPECIFIED CHRONIC BRONCHITIS TYPE: ICD-10-CM

## 2019-11-11 DIAGNOSIS — E66.01 SEVERE OBESITY (BMI 35.0-39.9) WITH COMORBIDITY: ICD-10-CM

## 2019-11-11 DIAGNOSIS — K57.90 DIVERTICULOSIS: ICD-10-CM

## 2019-11-11 DIAGNOSIS — E11.42 TYPE 2 DIABETES MELLITUS WITH DIABETIC POLYNEUROPATHY, WITHOUT LONG-TERM CURRENT USE OF INSULIN: ICD-10-CM

## 2019-11-11 DIAGNOSIS — E78.5 HYPERLIPIDEMIA, UNSPECIFIED HYPERLIPIDEMIA TYPE: Chronic | ICD-10-CM

## 2019-11-11 DIAGNOSIS — I73.9 ARTERIAL INSUFFICIENCY OF LOWER EXTREMITY: ICD-10-CM

## 2019-11-11 DIAGNOSIS — Z00.00 ENCOUNTER FOR PREVENTIVE HEALTH EXAMINATION: Primary | ICD-10-CM

## 2019-11-11 DIAGNOSIS — I77.9 CAROTID ARTERY DISEASE, UNSPECIFIED LATERALITY, UNSPECIFIED TYPE: ICD-10-CM

## 2019-11-11 DIAGNOSIS — Z91.89 POTENTIAL FOR COGNITIVE IMPAIRMENT: ICD-10-CM

## 2019-11-11 DIAGNOSIS — I25.10 CORONARY ARTERY DISEASE INVOLVING NATIVE CORONARY ARTERY WITHOUT ANGINA PECTORIS, UNSPECIFIED WHETHER NATIVE OR TRANSPLANTED HEART: ICD-10-CM

## 2019-11-11 PROCEDURE — 3078F DIAST BP <80 MM HG: CPT | Mod: HCNC,CPTII,S$GLB, | Performed by: PHYSICIAN ASSISTANT

## 2019-11-11 PROCEDURE — 3077F SYST BP >= 140 MM HG: CPT | Mod: HCNC,CPTII,S$GLB, | Performed by: PHYSICIAN ASSISTANT

## 2019-11-11 PROCEDURE — G0439 PR MEDICARE ANNUAL WELLNESS SUBSEQUENT VISIT: ICD-10-PCS | Mod: HCNC,S$GLB,, | Performed by: PHYSICIAN ASSISTANT

## 2019-11-11 PROCEDURE — 99999 PR PBB SHADOW E&M-EST. PATIENT-LVL III: CPT | Mod: PBBFAC,HCNC,, | Performed by: PHYSICIAN ASSISTANT

## 2019-11-11 PROCEDURE — G0439 PPPS, SUBSEQ VISIT: HCPCS | Mod: HCNC,S$GLB,, | Performed by: PHYSICIAN ASSISTANT

## 2019-11-11 PROCEDURE — 99999 PR PBB SHADOW E&M-EST. PATIENT-LVL III: ICD-10-PCS | Mod: PBBFAC,HCNC,, | Performed by: PHYSICIAN ASSISTANT

## 2019-11-11 PROCEDURE — 3078F PR MOST RECENT DIASTOLIC BLOOD PRESSURE < 80 MM HG: ICD-10-PCS | Mod: HCNC,CPTII,S$GLB, | Performed by: PHYSICIAN ASSISTANT

## 2019-11-11 PROCEDURE — 3077F PR MOST RECENT SYSTOLIC BLOOD PRESSURE >= 140 MM HG: ICD-10-PCS | Mod: HCNC,CPTII,S$GLB, | Performed by: PHYSICIAN ASSISTANT

## 2019-11-11 NOTE — PATIENT INSTRUCTIONS
Counseling and Referral of Other Preventative  (Italic type indicates deductible and co-insurance are waived)    Patient Name: Kaleigh Nieves  Today's Date: 11/11/2019    Health Maintenance       Date Due Completion Date    Shingles Vaccine (1 of 2) 09/25/1988 ---    TETANUS VACCINE 04/11/2019 4/11/2009 (Done)    Override on 4/11/2009: Done    Influenza Vaccine (1) 09/01/2019 11/14/2018 (Done)    Override on 11/14/2018: Done    Override on 9/23/2015: Done (via [facility])    Override on 10/11/2013: Declined    Lipid Panel 10/31/2019 10/31/2018    Hemoglobin A1c 12/04/2019 6/4/2019    Foot Exam 01/22/2020 1/22/2019 (Done)    Override on 1/22/2019: Done    Override on 9/22/2016: Done (dr patel podiatrist)    Override on 3/21/2016: Done    Override on 4/14/2015: Done (podiatry dr kenya patel)    Colonoscopy 07/10/2020 7/10/2015    Eye Exam 09/16/2020 9/16/2019    Override on 12/11/2018: Done    Override on 1/12/2016: Done (q 4 months)    Override on 4/7/2015: Done (dr kaur  q 4 months)    Override on 7/17/2014: Done    Override on 7/16/2013: Done    Aspirin/Antiplatelet Therapy 11/11/2020 11/11/2019    DEXA SCAN 07/10/2022 7/10/2017    Override on 12/7/2012: Done        No orders of the defined types were placed in this encounter.    The following information is provided to all patients.  This information is to help you find resources for any of the problems found today that may be affecting your health:                Living healthy guide: www.[facility].[place].gov      Understanding Diabetes: www.diabetes.org      Eating healthy: www.cdc.gov/healthyweight      CDC home safety checklist: www.cdc.gov/steadi/patient.html      Agency on Aging: www.goea.[place].[facility]      Alcoholics anonymous (AA): www.aa.org      Physical Activity: www.anderson.nih.gov/kt3cymr      Tobacco use: www.quitwithusla.org

## 2019-11-11 NOTE — PROGRESS NOTES
"Kaleigh Nieves presented for a  Medicare AWV and comprehensive Health Risk Assessment today. The following components were reviewed and updated:    · Medical history  · Family History  · Social history  · Allergies and Current Medications  · Health Risk Assessment  · Health Maintenance  · Care Team     ** See Completed Assessments for Annual Wellness Visit within the encounter summary.**       The following assessments were completed:  · Living Situation  · CAGE  · Depression Screening  · Timed Get Up and Go  · Whisper Test  · Cognitive Function Screening  · Nutrition Screening  · ADL Screening  · PAQ Screening    Patient Care Team:  Ramos Hanks DO as PCP - General (Family Medicine)  DONOVAN Ivan,FNP-C (Inactive) as Nurse Practitioner (Internal Medicine)  GABY Mo OD as Consulting Physician (Optometry)  Ino Harmon DPM (Podiatry)  Bora Cavazos Sr., MD (Inactive) as Consulting Physician (Orthopedic Surgery)  Arcelia Downey MD as Consulting Physician (Physical Medicine and Rehabilitation)  Rock Jane MD as Consulting Physician (Cardiology)  Arcelia Maddox MD as Consulting Physician (Obstetrics and Gynecology)    Vitals:    11/11/19 1056   BP: (!) 148/58   BP Location: Right arm   Temp: 97.9 °F (36.6 °C)   TempSrc: Oral   Weight: 106.6 kg (235 lb 0.2 oz)   Height: 5' 6" (1.676 m)     Body mass index is 37.93 kg/m².     Physical Exam   Constitutional: She is oriented to person, place, and time. She appears well-developed and well-nourished. No distress.   Pt ambulating with cane   HENT:   Head: Normocephalic and atraumatic.   Eyes: EOM are normal. No scleral icterus.   Neck: Neck supple.   Cardiovascular: Normal rate and regular rhythm.   Pulmonary/Chest: Effort normal and breath sounds normal. No respiratory distress. She has no decreased breath sounds. She has no wheezes. She has no rhonchi. She has no rales.   Musculoskeletal: Normal range of motion.   Neurological: She is alert and " oriented to person, place, and time.   Skin: Skin is warm and dry.   Psychiatric: She has a normal mood and affect. Her speech is normal and behavior is normal. Thought content normal.         Diagnoses and health risks identified today and associated recommendations/orders:    1. Encounter for preventive health examination  Pt to consider Shingrix and tetanus vaccine at pharmacy as discussed. Pt is interested in rollator - will forward to PCP.    2. Aortic atherosclerosis  Stable. CXR 3/26/18. Pt taking aspirin and simvastatin. Continue current treatment plan as prescribed by your PCP and cardiologist and f/u with them for further management.    3. Severe obesity (BMI 35.0-39.9) with comorbidity  Not currently controlled. F/u with your PCP to discuss adjustments to your treatment plan.    4. Chronic bronchitis, unspecified chronic bronchitis type  Stable. Pt using albuterol inhaler PRN. Continue current treatment plan as prescribed by your PCP and f/u with your PCP for further management.    5. Arterial insufficiency of lower extremity  Stable. BARRON 4/28/17. Pt taking aspirin and simvastatin and using compression stockings. Continue current treatment plan as prescribed by your PCP and cardiologist and f/u with them for further management.    6. Type 2 diabetes mellitus with diabetic polyneuropathy, without long-term current use of insulin  Controlled with diet. Check feet daily and stay UTD with eye doctor. Continue current treatment plan as prescribed by your PCP and f/u with your PCP for further management.  Component      Latest Ref Rng & Units 6/4/2019 10/31/2018   Hemoglobin A1C External      4.0 - 5.6 % 5.9 (H) 5.9 (H)   Estimated Avg Glucose      68 - 131 mg/dL 123 123     7. Hypertension associated with diabetes  Stable. Pt taking lisinopril-HCT. Continue current treatment plan as prescribed by your PCP and cardiologist and f/u with them for further management.    8. Carotid artery disease, unspecified  laterality, unspecified type  Stable. Carotid artery U/S 7/8/16. Pt taking aspirin and simvastatin. Continue current treatment plan as prescribed by your PCP and cardiologist and f/u with them for further management.    9. Coronary artery disease involving native coronary artery without angina pectoris, unspecified whether native or transplanted heart  Stable. Chest CT 7/9/15. Pt s/p C 2010. Pt taking aspirin and simvastatin. Continue current treatment plan as prescribed by your PCP and cardiologist and f/u with them for further management.    10. Diastolic dysfunction  Stable. 2D Echo 1/4/16. Continue current treatment plan as prescribed by your PCP and cardiologist and f/u with them for further management.    11. Hyperlipidemia, unspecified hyperlipidemia type  Stable. Pt taking simvastatin as well as aspirin. Continue current treatment plan as prescribed by your PCP and cardiologist and f/u with them for further management.  Component      Latest Ref Rng & Units 10/31/2018 9/28/2017   Cholesterol      120 - 199 mg/dL 187 157   Triglycerides      30 - 150 mg/dL 140 77   HDL      40 - 75 mg/dL 41 41   LDL Cholesterol External      63.0 - 159.0 mg/dL 118.0 100.6   Hdl/Cholesterol Ratio      20.0 - 50.0 % 21.9 26.1   Total Cholesterol/HDL Ratio      2.0 - 5.0 4.6 3.8   Non-HDL Cholesterol      mg/dL 146 116     12. Gastroesophageal reflux disease without esophagitis  Stable. EGD 6/25/15. Continue current treatment plan as prescribed by your PCP and f/u with your PCP for further management.    13. Chronic gout without tophus, unspecified cause, unspecified site  Stable. Pt taking Colcrys. Continue current treatment plan as prescribed by your PCP and f/u with your PCP for further management.    14. Dependent edema  Stable. Pt wearing compression stockings. Continue current treatment plan as prescribed by your PCP and specialists and f/u with them for further management.    15. Bilateral carpal tunnel  syndrome  Improved. NCS 3/7/18. Pt s/p R CTR. Continue current treatment plan as prescribed by your PCP and f/u with your PCP for further management.    16. Primary open-angle glaucoma, bilateral, indeterminate stage  Stable. Pt using timolol eyedrops. Continue current treatment plan as prescribed by your PCP and ophthalmologist and f/u with them for further management.    17. Diverticulosis, 18. History of colon polyps  Stable. Colonoscopy 6/25/15. Continue current treatment plan as prescribed by your PCP and f/u with your PCP for further management.    18. Potential for cognitive impairment  This a new problem identified during clinic visit today. Pt with abnormal score of 4 on Cognitive Function Screen today. Pt reports she has noticed more than usual difficulty remembering over the last 2 years - reports feels sxs are stable. Denies getting lost or leaving stove on. Follow-up with PCP for evaluation and treatment plan.    Please obtain any medical records from past / present outside medical providers and give to PCP for review and further medical recommendations.    Provided Kaleigh with a 5-10 year written screening schedule and personal prevention plan. Recommendations were developed using the USPSTF age appropriate recommendations. Education, counseling, and referrals were provided as needed. After Visit Summary printed and given to patient which includes a list of additional screenings\tests needed.    Follow up in about 1 year (around 11/11/2020) for HRA. F/u with PCP Dr. Hanks as scheduled 12/12/19 and specialists as recommended for health management.    DEISY Chong     I offered to discuss end of life issues, including information on how to make advance directives that the patient could use to name someone who would make medical decisions on their behalf if they became too ill to make themselves.  _X_Patient declined  ___Patient is interested, I provided paper work and offered to discuss.

## 2019-11-13 DIAGNOSIS — E66.01 SEVERE OBESITY (BMI 35.0-39.9) WITH COMORBIDITY: ICD-10-CM

## 2019-11-13 DIAGNOSIS — E78.5 HYPERLIPIDEMIA, UNSPECIFIED HYPERLIPIDEMIA TYPE: Primary | Chronic | ICD-10-CM

## 2019-11-13 NOTE — TELEPHONE ENCOUNTER
----- Message from Ramos Hanks DO sent at 11/13/2019  2:45 PM CST -----  Regarding: FW: Lab / rollator  Can you pend an order for rollator and also add lipid panel to upcoming labs.    -Dr. Hanks      ----- Message -----  From: DEISY Espinoza  Sent: 11/11/2019  11:37 AM CST  To: Ramos Hanks DO  Subject: Lab / rollator                                   Hi Dr. Hanks -- I saw pt for an HRA visit today. She stated she is interested in a rollator - she has a f/u appt with you next month. She has labs scheduled prior to f/u with you but I believe lipid panel may be due as well (it is not included in the lab appt). Thanks, Susan Schuster PA-C

## 2019-11-23 DIAGNOSIS — E78.5 HYPERLIPIDEMIA, UNSPECIFIED HYPERLIPIDEMIA TYPE: ICD-10-CM

## 2019-11-25 RX ORDER — SIMVASTATIN 20 MG/1
TABLET, FILM COATED ORAL
Qty: 90 TABLET | Refills: 4 | Status: SHIPPED | OUTPATIENT
Start: 2019-11-25 | End: 2021-02-17

## 2019-12-06 ENCOUNTER — LAB VISIT (OUTPATIENT)
Dept: LAB | Facility: HOSPITAL | Age: 79
End: 2019-12-06
Attending: FAMILY MEDICINE
Payer: MEDICARE

## 2019-12-06 DIAGNOSIS — E78.5 HYPERLIPIDEMIA, UNSPECIFIED HYPERLIPIDEMIA TYPE: Chronic | ICD-10-CM

## 2019-12-06 DIAGNOSIS — I15.2 HYPERTENSION ASSOCIATED WITH DIABETES: ICD-10-CM

## 2019-12-06 DIAGNOSIS — E11.59 HYPERTENSION ASSOCIATED WITH DIABETES: ICD-10-CM

## 2019-12-06 LAB
ALBUMIN SERPL BCP-MCNC: 3.4 G/DL (ref 3.5–5.2)
ALP SERPL-CCNC: 31 U/L (ref 55–135)
ALT SERPL W/O P-5'-P-CCNC: 15 U/L (ref 10–44)
ANION GAP SERPL CALC-SCNC: 8 MMOL/L (ref 8–16)
AST SERPL-CCNC: 15 U/L (ref 10–40)
BILIRUB SERPL-MCNC: 0.5 MG/DL (ref 0.1–1)
BUN SERPL-MCNC: 7 MG/DL (ref 8–23)
CALCIUM SERPL-MCNC: 9.4 MG/DL (ref 8.7–10.5)
CHLORIDE SERPL-SCNC: 97 MMOL/L (ref 95–110)
CHOLEST SERPL-MCNC: 149 MG/DL (ref 120–199)
CHOLEST/HDLC SERPL: 3.8 {RATIO} (ref 2–5)
CO2 SERPL-SCNC: 36 MMOL/L (ref 23–29)
CREAT SERPL-MCNC: 0.7 MG/DL (ref 0.5–1.4)
EST. GFR  (AFRICAN AMERICAN): >60 ML/MIN/1.73 M^2
EST. GFR  (NON AFRICAN AMERICAN): >60 ML/MIN/1.73 M^2
GLUCOSE SERPL-MCNC: 121 MG/DL (ref 70–110)
HDLC SERPL-MCNC: 39 MG/DL (ref 40–75)
HDLC SERPL: 26.2 % (ref 20–50)
LDLC SERPL CALC-MCNC: 95.6 MG/DL (ref 63–159)
NONHDLC SERPL-MCNC: 110 MG/DL
POTASSIUM SERPL-SCNC: 4.6 MMOL/L (ref 3.5–5.1)
PROT SERPL-MCNC: 6.9 G/DL (ref 6–8.4)
SODIUM SERPL-SCNC: 141 MMOL/L (ref 136–145)
TRIGL SERPL-MCNC: 72 MG/DL (ref 30–150)

## 2019-12-06 PROCEDURE — 80053 COMPREHEN METABOLIC PANEL: CPT | Mod: HCNC,PO

## 2019-12-06 PROCEDURE — 80061 LIPID PANEL: CPT | Mod: HCNC

## 2019-12-06 PROCEDURE — 83036 HEMOGLOBIN GLYCOSYLATED A1C: CPT | Mod: HCNC

## 2019-12-06 PROCEDURE — 36415 COLL VENOUS BLD VENIPUNCTURE: CPT | Mod: HCNC,PO

## 2019-12-07 LAB
ESTIMATED AVG GLUCOSE: 126 MG/DL (ref 68–131)
HBA1C MFR BLD HPLC: 6 % (ref 4–5.6)

## 2019-12-12 ENCOUNTER — OFFICE VISIT (OUTPATIENT)
Dept: INTERNAL MEDICINE | Facility: CLINIC | Age: 79
End: 2019-12-12
Payer: MEDICARE

## 2019-12-12 ENCOUNTER — TELEPHONE (OUTPATIENT)
Dept: INTERNAL MEDICINE | Facility: CLINIC | Age: 79
End: 2019-12-12

## 2019-12-12 VITALS
HEART RATE: 82 BPM | BODY MASS INDEX: 38.09 KG/M2 | SYSTOLIC BLOOD PRESSURE: 152 MMHG | DIASTOLIC BLOOD PRESSURE: 68 MMHG | TEMPERATURE: 96 F | HEIGHT: 66 IN | OXYGEN SATURATION: 95 % | WEIGHT: 237 LBS

## 2019-12-12 DIAGNOSIS — R60.9 DEPENDENT EDEMA: ICD-10-CM

## 2019-12-12 DIAGNOSIS — E11.59 HYPERTENSION ASSOCIATED WITH DIABETES: ICD-10-CM

## 2019-12-12 DIAGNOSIS — I15.2 HYPERTENSION ASSOCIATED WITH DIABETES: ICD-10-CM

## 2019-12-12 DIAGNOSIS — E11.42 TYPE 2 DIABETES MELLITUS WITH DIABETIC POLYNEUROPATHY, WITHOUT LONG-TERM CURRENT USE OF INSULIN: Primary | ICD-10-CM

## 2019-12-12 PROCEDURE — 1125F PR PAIN SEVERITY QUANTIFIED, PAIN PRESENT: ICD-10-PCS | Mod: HCNC,S$GLB,, | Performed by: FAMILY MEDICINE

## 2019-12-12 PROCEDURE — 99213 PR OFFICE/OUTPT VISIT, EST, LEVL III, 20-29 MIN: ICD-10-PCS | Mod: HCNC,S$GLB,, | Performed by: FAMILY MEDICINE

## 2019-12-12 PROCEDURE — 99213 OFFICE O/P EST LOW 20 MIN: CPT | Mod: HCNC,S$GLB,, | Performed by: FAMILY MEDICINE

## 2019-12-12 PROCEDURE — 1159F MED LIST DOCD IN RCRD: CPT | Mod: HCNC,S$GLB,, | Performed by: FAMILY MEDICINE

## 2019-12-12 PROCEDURE — 99999 PR PBB SHADOW E&M-EST. PATIENT-LVL III: ICD-10-PCS | Mod: PBBFAC,HCNC,, | Performed by: FAMILY MEDICINE

## 2019-12-12 PROCEDURE — 3077F SYST BP >= 140 MM HG: CPT | Mod: HCNC,CPTII,S$GLB, | Performed by: FAMILY MEDICINE

## 2019-12-12 PROCEDURE — 3078F PR MOST RECENT DIASTOLIC BLOOD PRESSURE < 80 MM HG: ICD-10-PCS | Mod: HCNC,CPTII,S$GLB, | Performed by: FAMILY MEDICINE

## 2019-12-12 PROCEDURE — 1101F PR PT FALLS ASSESS DOC 0-1 FALLS W/OUT INJ PAST YR: ICD-10-PCS | Mod: HCNC,CPTII,S$GLB, | Performed by: FAMILY MEDICINE

## 2019-12-12 PROCEDURE — 1101F PT FALLS ASSESS-DOCD LE1/YR: CPT | Mod: HCNC,CPTII,S$GLB, | Performed by: FAMILY MEDICINE

## 2019-12-12 PROCEDURE — 1125F AMNT PAIN NOTED PAIN PRSNT: CPT | Mod: HCNC,S$GLB,, | Performed by: FAMILY MEDICINE

## 2019-12-12 PROCEDURE — 3077F PR MOST RECENT SYSTOLIC BLOOD PRESSURE >= 140 MM HG: ICD-10-PCS | Mod: HCNC,CPTII,S$GLB, | Performed by: FAMILY MEDICINE

## 2019-12-12 PROCEDURE — 1159F PR MEDICATION LIST DOCUMENTED IN MEDICAL RECORD: ICD-10-PCS | Mod: HCNC,S$GLB,, | Performed by: FAMILY MEDICINE

## 2019-12-12 PROCEDURE — 99999 PR PBB SHADOW E&M-EST. PATIENT-LVL III: CPT | Mod: PBBFAC,HCNC,, | Performed by: FAMILY MEDICINE

## 2019-12-12 PROCEDURE — 3078F DIAST BP <80 MM HG: CPT | Mod: HCNC,CPTII,S$GLB, | Performed by: FAMILY MEDICINE

## 2019-12-12 RX ORDER — HYDROCHLOROTHIAZIDE 12.5 MG/1
12.5 TABLET ORAL DAILY
Qty: 90 TABLET | Refills: 2 | Status: SHIPPED | OUTPATIENT
Start: 2019-12-12 | End: 2021-01-25 | Stop reason: SDUPTHER

## 2019-12-12 NOTE — TELEPHONE ENCOUNTER
----- Message from Ty Gary sent at 12/12/2019  3:19 PM CST -----  Contact: [employer] (Chasity)  ..Type:  Pharmacy Calling to Clarify an RX    Name of Caller: Channel   Pharmacy Name  [facility]  Prescription Name: tdap  What do they need to clarify?: would like to see if can swith out to the tdap for tetnus  Best Call Back Number:[phone]  Additional Information:               [facility] - [place], [place] - [address] [address] [facility]  [address] [address] [facility]  [place] [address]  Phone: [phone] Fax: [phone]

## 2019-12-12 NOTE — ASSESSMENT & PLAN NOTE
Decreasing treatment so she can take it daily. Has been having lows with taking lisinopril-hctz daily. hctz only.

## 2019-12-12 NOTE — PROGRESS NOTES
Subjective:       Patient ID: Kaleigh Nieves is a 79 y.o. female.    Chief Complaint: Follow-up    HPI  Came in today for routine 6 month follow-up on hypertension and diabetes.  Has been monitoring her blood pressure at home and in fact she has been having some low readings which has prompted her to take the medication every other day.  She has also been having some symptoms of easy fatigue and shortness of breath where she has to and sit down on walker.  Has been having intermittent bilateral ankle swelling and discomfort that she feels is secondary to her gout.  She does take colchicine on routine basis.    Family History   Problem Relation Age of Onset    Hypertension Mother     Diabetes Mother     Leukemia Sister     Hypertension Sister     Cancer Sister         leukemia    Heart disease Maternal Aunt     Cancer Maternal Uncle         gastric, pacreatic    Heart disease Maternal Uncle     Miscarriages / Stillbirths Maternal Uncle     Diabetes Maternal Grandmother     Asthma Maternal Grandfather     Breast cancer Neg Hx     Colon cancer Neg Hx     Ovarian cancer Neg Hx     COPD Neg Hx     Hyperlipidemia Neg Hx     Kidney disease Neg Hx        Current Outpatient Medications:     aspirin 81 MG Chew, Take 81 mg by mouth once daily., Disp: , Rfl:     clobetasol 0.05% (TEMOVATE) 0.05 % Oint, Apply topically daily as needed. For vulvar itching, Disp: 60 g, Rfl: 2    COLCRYS 0.6 mg tablet, TAKE ONE TABLET BY MOUTH EVERY DAY, Disp: 90 tablet, Rfl: 4    econazole nitrate 1 % cream, APPLY TO AFFECTED AREA TOPICALLY EVERY DAY, Disp: 85 g, Rfl: 0    simvastatin (ZOCOR) 20 MG tablet, TAKE 1 TABLET EVERY NIGHT, Disp: 90 tablet, Rfl: 4    timolol maleate 0.5% (TIMOPTIC) 0.5 % Drop, Place 1 drop into both eyes 2 (two) times daily., Disp: 25 mL, Rfl: 5    VENTOLIN HFA 90 mcg/actuation inhaler, INHALE 2 PUFFS INTO THE LUNGS EVERY 6 HOURS AS NEEDED FOR WHEEZING., Disp: 18 g, Rfl: 11    blood glucose control  "high,low (ACCU-CHEK DARSHAN CONTROL SOLN) Soln, Use a directed, Disp: 1 each, Rfl: 3    blood sugar diagnostic (ACCU-CHEK DARSHAN PLUS TEST STRP) Strp, TEST BLOOD SUGAR TWICE DAILY, Disp: 200 strip, Rfl: 3    blood-glucose meter (ACCU-CHEK DARSHAN PLUS METER) Misc, 1 Device by Misc.(Non-Drug; Combo Route) route 2 (two) times daily., Disp: 1 each, Rfl: 1    diphth,pertus,acell,,tetanus (BOOSTRIX) 2.5-8-5 Lf-mcg-Lf/0.5mL Susp, Inject 0.5 mLs into the muscle once. for 1 dose, Disp: 0.5 mL, Rfl: 0    hydroCHLOROthiazide (HYDRODIURIL) 12.5 MG Tab, Take 1 tablet (12.5 mg total) by mouth once daily., Disp: 90 tablet, Rfl: 2    lancets (ACCU-CHEK SOFTCLIX LANCETS) Misc, TEST TWICE A DAY, Disp: 200 each, Rfl: 3    Review of Systems    Objective:   BP (!) 148/67   Pulse 82   Temp 96.2 °F (35.7 °C)   Ht 5' 6" (1.676 m)   Wt 107.5 kg (236 lb 15.9 oz)   SpO2 95%   BMI 38.25 kg/m²      Physical Exam   Constitutional: She is oriented to person, place, and time. She appears well-developed and well-nourished.   HENT:   Head: Normocephalic and atraumatic.   Eyes: Conjunctivae are normal.   Cardiovascular: Normal rate.   Pulmonary/Chest: Effort normal. No respiratory distress.   Musculoskeletal: She exhibits edema (of ankles).   Neurological: She is alert and oriented to person, place, and time. Coordination normal.   Skin: Skin is warm and dry. No rash noted.   Psychiatric: She has a normal mood and affect. Her behavior is normal.   Vitals reviewed.      Assessment & Plan     Problem List Items Addressed This Visit        Cardiac/Vascular    Hypertension associated with diabetes    Current Assessment & Plan     Decreasing treatment so she can take it daily. Has been having lows with taking lisinopril-hctz daily. hctz only.              Endocrine    Type 2 diabetes mellitus with diabetic polyneuropathy, without long-term current use of insulin - Primary    Relevant Orders    Microalbumin/creatinine urine ratio       Other    " Dependent edema    Current Assessment & Plan     Stable. Continuing on hctz.                 No follow-ups on file.    Disclaimer:  This note may have been prepared using voice recognition software, it may have not been extensively proofed, as such there could be errors within the text such as sound alike errors.

## 2020-01-02 ENCOUNTER — CLINICAL SUPPORT (OUTPATIENT)
Dept: INTERNAL MEDICINE | Facility: CLINIC | Age: 80
End: 2020-01-02
Payer: MEDICARE

## 2020-01-02 VITALS — SYSTOLIC BLOOD PRESSURE: 138 MMHG | HEART RATE: 89 BPM | DIASTOLIC BLOOD PRESSURE: 60 MMHG

## 2020-01-02 DIAGNOSIS — I15.2 HYPERTENSION ASSOCIATED WITH DIABETES: Primary | ICD-10-CM

## 2020-01-02 DIAGNOSIS — L90.0 LICHEN SCLEROSUS: ICD-10-CM

## 2020-01-02 DIAGNOSIS — E11.59 HYPERTENSION ASSOCIATED WITH DIABETES: Primary | ICD-10-CM

## 2020-01-02 PROCEDURE — 99999 PR PBB SHADOW E&M-EST. PATIENT-LVL I: ICD-10-PCS | Mod: PBBFAC,HCNC,,

## 2020-01-02 PROCEDURE — 99999 PR PBB SHADOW E&M-EST. PATIENT-LVL I: CPT | Mod: PBBFAC,HCNC,,

## 2020-01-02 RX ORDER — ECONAZOLE NITRATE 10 MG/G
CREAM TOPICAL
Qty: 85 G | Refills: 0 | Status: SHIPPED | OUTPATIENT
Start: 2020-01-02 | End: 2020-02-21 | Stop reason: SDUPTHER

## 2020-01-02 NOTE — PROGRESS NOTES
Patient return to clinic for blood pressure check. Patient has been out of town for holidays. Patient complain of been lightheaded and dizzy. Patient complains gout was acting up

## 2020-01-27 ENCOUNTER — OFFICE VISIT (OUTPATIENT)
Dept: PODIATRY | Facility: CLINIC | Age: 80
End: 2020-01-27
Payer: MEDICARE

## 2020-01-27 VITALS
WEIGHT: 236 LBS | BODY MASS INDEX: 37.93 KG/M2 | HEIGHT: 66 IN | SYSTOLIC BLOOD PRESSURE: 137 MMHG | DIASTOLIC BLOOD PRESSURE: 68 MMHG | HEART RATE: 79 BPM

## 2020-01-27 DIAGNOSIS — B35.1 DERMATOPHYTOSIS OF NAIL: ICD-10-CM

## 2020-01-27 DIAGNOSIS — E11.51 TYPE II DIABETES MELLITUS WITH PERIPHERAL CIRCULATORY DISORDER: Primary | ICD-10-CM

## 2020-01-27 DIAGNOSIS — R60.9 DEPENDENT EDEMA: ICD-10-CM

## 2020-01-27 DIAGNOSIS — E66.01 SEVERE OBESITY (BMI 35.0-35.9 WITH COMORBIDITY): ICD-10-CM

## 2020-01-27 PROCEDURE — 99499 UNLISTED E&M SERVICE: CPT | Mod: HCNC,S$GLB,, | Performed by: PODIATRIST

## 2020-01-27 PROCEDURE — 1126F AMNT PAIN NOTED NONE PRSNT: CPT | Mod: HCNC,S$GLB,, | Performed by: PODIATRIST

## 2020-01-27 PROCEDURE — 1101F PT FALLS ASSESS-DOCD LE1/YR: CPT | Mod: HCNC,CPTII,S$GLB, | Performed by: PODIATRIST

## 2020-01-27 PROCEDURE — 99999 PR PBB SHADOW E&M-EST. PATIENT-LVL III: ICD-10-PCS | Mod: PBBFAC,HCNC,, | Performed by: PODIATRIST

## 2020-01-27 PROCEDURE — 99499 RISK ADDL DX/OHS AUDIT: ICD-10-PCS | Mod: HCNC,S$GLB,, | Performed by: PODIATRIST

## 2020-01-27 PROCEDURE — 3075F SYST BP GE 130 - 139MM HG: CPT | Mod: HCNC,CPTII,S$GLB, | Performed by: PODIATRIST

## 2020-01-27 PROCEDURE — 99999 PR PBB SHADOW E&M-EST. PATIENT-LVL III: CPT | Mod: PBBFAC,HCNC,, | Performed by: PODIATRIST

## 2020-01-27 PROCEDURE — 3078F PR MOST RECENT DIASTOLIC BLOOD PRESSURE < 80 MM HG: ICD-10-PCS | Mod: HCNC,CPTII,S$GLB, | Performed by: PODIATRIST

## 2020-01-27 PROCEDURE — 11721 PR DEBRIDEMENT OF NAILS, 6 OR MORE: ICD-10-PCS | Mod: Q8,HCNC,S$GLB, | Performed by: PODIATRIST

## 2020-01-27 PROCEDURE — 11721 DEBRIDE NAIL 6 OR MORE: CPT | Mod: Q8,HCNC,S$GLB, | Performed by: PODIATRIST

## 2020-01-27 PROCEDURE — 99213 OFFICE O/P EST LOW 20 MIN: CPT | Mod: 25,HCNC,S$GLB, | Performed by: PODIATRIST

## 2020-01-27 PROCEDURE — 1126F PR PAIN SEVERITY QUANTIFIED, NO PAIN PRESENT: ICD-10-PCS | Mod: HCNC,S$GLB,, | Performed by: PODIATRIST

## 2020-01-27 PROCEDURE — 1159F PR MEDICATION LIST DOCUMENTED IN MEDICAL RECORD: ICD-10-PCS | Mod: HCNC,S$GLB,, | Performed by: PODIATRIST

## 2020-01-27 PROCEDURE — 99213 PR OFFICE/OUTPT VISIT, EST, LEVL III, 20-29 MIN: ICD-10-PCS | Mod: 25,HCNC,S$GLB, | Performed by: PODIATRIST

## 2020-01-27 PROCEDURE — 1101F PR PT FALLS ASSESS DOC 0-1 FALLS W/OUT INJ PAST YR: ICD-10-PCS | Mod: HCNC,CPTII,S$GLB, | Performed by: PODIATRIST

## 2020-01-27 PROCEDURE — 3075F PR MOST RECENT SYSTOLIC BLOOD PRESS GE 130-139MM HG: ICD-10-PCS | Mod: HCNC,CPTII,S$GLB, | Performed by: PODIATRIST

## 2020-01-27 PROCEDURE — 3078F DIAST BP <80 MM HG: CPT | Mod: HCNC,CPTII,S$GLB, | Performed by: PODIATRIST

## 2020-01-27 PROCEDURE — 1159F MED LIST DOCD IN RCRD: CPT | Mod: HCNC,S$GLB,, | Performed by: PODIATRIST

## 2020-01-27 NOTE — PROGRESS NOTES
Subjective:     Patient ID: Kaleigh Nieves is a 79 y.o. female.    Chief Complaint: Nail Care (c/p bilateral shooting pains in toes. wears casual shoes with compression stocking. diabetic Pt. PCP Dr. Hanks.)    Kaleigh is a 79 y.o. female who presents to the clinic for evaluation and treatment of high risk feet. Kaleigh has a past medical history of Anemia, Angina pectoris, Arthritis, Asthma, Back pain, Bronchitis, CAD (coronary artery disease) (7/15), Carotid artery stenosis, Chronic bronchitis, Chronic gout, Colon polyp, CTS (carpal tunnel syndrome), Diabetes mellitus, type 2, Diabetes with neurologic complications, Diverticulosis, RIAVS (dyspnea on exertion), Dyslipidemia, Ex-smoker, GERD (gastroesophageal reflux disease), Hyperlipidemia, Hypertension, Neuropathy, lower extremity, Obesity, Open-angle glaucoma, Peripheral vascular disease, Polyneuropathy, and Tobacco dependence. The patient's chief complaint is long, thick toenails. Patient states her blood sugar this morning was 91mg/dl.  This patient has documented high risk feet requiring routine maintenance secondary to diabetes mellitis and those secondary complications of diabetes, as mentioned. Patient states the swelling is still off and on.     PCP: Ramos Hanks, DO    Date Last Seen by PCP: 12/12/19    Current shoe gear:  Affected Foot: Rx diabetic extra depth shoes and custom accommodative insoles     Unaffected Foot: Rx diabetic extra depth shoes and custom accommodative insoles    Hemoglobin A1C   Date Value Ref Range Status   12/06/2019 6.0 (H) 4.0 - 5.6 % Final     Comment:     ADA Screening Guidelines:  5.7-6.4%  Consistent with prediabetes  >or=6.5%  Consistent with diabetes  High levels of fetal hemoglobin interfere with the HbA1C  assay. Heterozygous hemoglobin variants (HbS, HgC, etc)do  not significantly interfere with this assay.   However, presence of multiple variants may affect accuracy.     06/04/2019 5.9 (H) 4.0 - 5.6 % Final     Comment:      ADA Screening Guidelines:  5.7-6.4%  Consistent with prediabetes  >or=6.5%  Consistent with diabetes  High levels of fetal hemoglobin interfere with the HbA1C  assay. Heterozygous hemoglobin variants (HbS, HgC, etc)do  not significantly interfere with this assay.   However, presence of multiple variants may affect accuracy.     10/31/2018 5.9 (H) 4.0 - 5.6 % Final     Comment:     ADA Screening Guidelines:  5.7-6.4%  Consistent with prediabetes  >or=6.5%  Consistent with diabetes  High levels of fetal hemoglobin interfere with the HbA1C  assay. Heterozygous hemoglobin variants (HbS, HgC, etc)do  not significantly interfere with this assay.   However, presence of multiple variants may affect accuracy.             Patient Active Problem List   Diagnosis    Type 2 diabetes mellitus with diabetic polyneuropathy, without long-term current use of insulin    Multiple joint pain    Esophageal reflux    Hyperlipidemia    Chronic gout    Lichen sclerosus    Carotid artery disease    CAD (coronary artery disease)    Colon polyp    Hypertension associated with diabetes    Aortic atherosclerosis    Diastolic dysfunction    Arterial insufficiency of lower extremity    Severe obesity (BMI 35.0-35.9 with comorbidity)    Glaucoma    Ganglion cyst of flexor tendon sheath of finger of right hand    Bilateral carpal tunnel syndrome    Right carpal tunnel syndrome    Chronic bronchitis    Dependent edema    Diverticulosis    Primary open-angle glaucoma, bilateral, indeterminate stage       Medication List with Changes/Refills   Current Medications    ASPIRIN 81 MG CHEW    Take 81 mg by mouth once daily.    BLOOD GLUCOSE CONTROL HIGH,LOW (ACCU-CHEK DARSHAN CONTROL SOLN) SOLN    Use a directed    BLOOD SUGAR DIAGNOSTIC (ACCU-CHEK DARSHAN PLUS TEST STRP) STRP    TEST BLOOD SUGAR TWICE DAILY    BLOOD-GLUCOSE METER (ACCU-CHEK DARSHAN PLUS METER) MISC    1 Device by Misc.(Non-Drug; Combo Route) route 2 (two) times daily.     CLOBETASOL 0.05% (TEMOVATE) 0.05 % OINT    Apply topically daily as needed. For vulvar itching    COLCRYS 0.6 MG TABLET    TAKE ONE TABLET BY MOUTH EVERY DAY    ECONAZOLE NITRATE 1 % CREAM    APPLY TO AFFECTED AREA TOPICALLY EVERY DAY    HYDROCHLOROTHIAZIDE (HYDRODIURIL) 12.5 MG TAB    Take 1 tablet (12.5 mg total) by mouth once daily.    LANCETS (ACCU-CHEK SOFTCLIX LANCETS) MISC    TEST TWICE A DAY    SIMVASTATIN (ZOCOR) 20 MG TABLET    TAKE 1 TABLET EVERY NIGHT    TIMOLOL MALEATE 0.5% (TIMOPTIC) 0.5 % DROP    Place 1 drop into both eyes 2 (two) times daily.    VENTOLIN HFA 90 MCG/ACTUATION INHALER    INHALE 2 PUFFS INTO THE LUNGS EVERY 6 HOURS AS NEEDED FOR WHEEZING.       Review of patient's allergies indicates:   Allergen Reactions    Iodine and iodide containing products Nausea And Vomiting    Penicillins Itching    Amlodipine      edema    Sulfa (sulfonamide antibiotics)     Ultram [tramadol] Other (See Comments)     Light headness, itiching    Bactrim [sulfamethoxazole-trimethoprim] Itching and Rash       Past Surgical History:   Procedure Laterality Date    CARPAL TUNNEL RELEASE Right     CATARACT EXTRACTION, BILATERAL      CHOLECYSTECTOMY      CYST REMOVAL  2018    DILATION AND CURETTAGE OF UTERUS      EYE SURGERY      HEMORRHOID SURGERY      HYSTERECTOMY      fibroids    INCISION AND DRAINAGE PERIRECTAL ABSCESS      JOINT REPLACEMENT Bilateral     knee    KNEE ARTHROSCOPY Right     MYOMECTOMY         Family History   Problem Relation Age of Onset    Hypertension Mother     Diabetes Mother     Leukemia Sister     Hypertension Sister     Cancer Sister         leukemia    Heart disease Maternal Aunt     Cancer Maternal Uncle         gastric, pacreatic    Heart disease Maternal Uncle     Miscarriages / Stillbirths Maternal Uncle     Diabetes Maternal Grandmother     Asthma Maternal Grandfather     Breast cancer Neg Hx     Colon cancer Neg Hx     Ovarian cancer Neg Hx      "COPD Neg Hx     Hyperlipidemia Neg Hx     Kidney disease Neg Hx        Social History     Socioeconomic History    Marital status: Single     Spouse name: Not on file    Number of children: 0    Years of education: Not on file    Highest education level: Not on file   Occupational History    Not on file   Social Needs    Financial resource strain: Not on file    Food insecurity:     Worry: Not on file     Inability: Not on file    Transportation needs:     Medical: Not on file     Non-medical: Not on file   Tobacco Use    Smoking status: Former Smoker     Packs/day: 0.25     Years: 24.00     Pack years: 6.00     Last attempt to quit: 1976     Years since quittin.6    Smokeless tobacco: Never Used   Substance and Sexual Activity    Alcohol use: No    Drug use: No    Sexual activity: Not Currently   Lifestyle    Physical activity:     Days per week: Not on file     Minutes per session: Not on file    Stress: Not on file   Relationships    Social connections:     Talks on phone: Not on file     Gets together: Not on file     Attends Spiritism service: Not on file     Active member of club or organization: Not on file     Attends meetings of clubs or organizations: Not on file     Relationship status: Not on file   Other Topics Concern    Not on file   Social History Narrative    Not on file       Vitals:    20 1058   BP: 137/68   Pulse: 79   Weight: 107 kg (236 lb)   Height: 5' 6" (1.676 m)   PainSc: 0-No pain   PainLoc: Foot       Hemoglobin A1C   Date Value Ref Range Status   2019 6.0 (H) 4.0 - 5.6 % Final     Comment:     ADA Screening Guidelines:  5.7-6.4%  Consistent with prediabetes  >or=6.5%  Consistent with diabetes  High levels of fetal hemoglobin interfere with the HbA1C  assay. Heterozygous hemoglobin variants (HbS, HgC, etc)do  not significantly interfere with this assay.   However, presence of multiple variants may affect accuracy.     2019 5.9 (H) 4.0 - 5.6 " % Final     Comment:     ADA Screening Guidelines:  5.7-6.4%  Consistent with prediabetes  >or=6.5%  Consistent with diabetes  High levels of fetal hemoglobin interfere with the HbA1C  assay. Heterozygous hemoglobin variants (HbS, HgC, etc)do  not significantly interfere with this assay.   However, presence of multiple variants may affect accuracy.     10/31/2018 5.9 (H) 4.0 - 5.6 % Final     Comment:     ADA Screening Guidelines:  5.7-6.4%  Consistent with prediabetes  >or=6.5%  Consistent with diabetes  High levels of fetal hemoglobin interfere with the HbA1C  assay. Heterozygous hemoglobin variants (HbS, HgC, etc)do  not significantly interfere with this assay.   However, presence of multiple variants may affect accuracy.         Review of Systems   Constitutional: Negative for chills and fever.   Respiratory: Negative for shortness of breath.    Cardiovascular: Positive for leg swelling. Negative for chest pain, palpitations, orthopnea and claudication.   Gastrointestinal: Negative for diarrhea, nausea and vomiting.   Musculoskeletal: Negative for joint pain.   Skin: Negative for rash.   Neurological: Positive for tingling and sensory change. Negative for dizziness, focal weakness and weakness.   Psychiatric/Behavioral: Negative.            Objective:      PHYSICAL EXAM: Apperance: Alert and orient in no distress,well developed, and with good attention to grooming and body habits  Patient presents ambulating in diabetic shoes and inserts.   LOWER EXTREMITY EXAM:  VASCULAR: Dorsalis pedis pulses 0/4 bilateral (monophasic with doppler)and Posterior Tibial pulses 0/4 bilateral (biphasic with doppler). Capillary fill time <blanched bilateral. Mild edema observed bilateral. Varicosities present bilateral. Skin temperature of the lower extremities is warm to cool, proximal to distal. Hair growth absent bilateral.  DERMATOLOGICAL: No skin rashes, subcutaneous nodules, lesions, or ulcers observed bilateral. Nails  1,2,3,4,5 bilateral elongated, thickened, and discolored with subungual debris. Webspaces 1,2,3,4clean, dry and without evidence of break in skin integrity bilateral. (-) erythema, edema, and increased temp noted to toes 1-5 bilateral.   NEUROLOGICAL: Light touch, sharp-dull, proprioception all present and equal bilaterally.  Vibratory sensation intact on left hallux and diminished at right hallux. Protective sensation intact at all 10 sites as tested with a Winthrop-Jimy 5.07 monofilament.   MUSCULOSKELETAL: Muscle strength is 5/5 for foot inverters, everters, plantarflexors, and dorsiflexors. Muscle tone is normal. Pes planus noted bilateral. Flexible hammertoes noted bilateral.         Assessment:       Encounter Diagnoses   Name Primary?    Type II diabetes mellitus with peripheral circulatory disorder Yes    Dermatophytosis of nail     Dependent edema     Severe obesity (BMI 35.0-35.9 with comorbidity)          Plan:   Type II diabetes mellitus with peripheral circulatory disorder    Dermatophytosis of nail    Dependent edema    Severe obesity (BMI 35.0-35.9 with comorbidity)      I counseled the patient on her conditions, their implications and medical management.  Greater than 15 minutes of a 20 minute appointment spent on education about the diabetic foot, neuropathy, and prevention of limb loss.  Shoe inspection. Diabetic Foot Education. Patient reminded of the importance of good nutrition and blood sugar control to help prevent podiatric complications of diabetes. Patient instructed on proper foot hygeine. We discussed wearing proper shoe gear, daily foot inspections, never walking without protective shoe gear, never putting sharp instruments to feet.    With patient's permission, nails 1-5 bilateral were debrided/trimmed in length and thickness aggressively to their soft tissue attachment mechanically and with electric , removing all offending nail and debris. Patient relates relief  following the procedure.  Patient  will continue to monitor the areas daily, inspect feet, wear protective shoe gear when ambulatory, moisturizer to maintain skin integrity. Patient reminded of the importance of good nutrition and blood sugar control to help prevent podiatric complications of diabetes.  Patient to return in this office in approximately 3 months, or sooner if needed.                   Ino Harmon DPM  [facility]

## 2020-01-28 ENCOUNTER — OFFICE VISIT (OUTPATIENT)
Dept: OPHTHALMOLOGY | Facility: CLINIC | Age: 80
End: 2020-01-28
Payer: MEDICARE

## 2020-01-28 DIAGNOSIS — Z96.1 PSEUDOPHAKIA OF BOTH EYES: ICD-10-CM

## 2020-01-28 DIAGNOSIS — E11.9 DIABETES MELLITUS TYPE 2 WITHOUT RETINOPATHY: ICD-10-CM

## 2020-01-28 DIAGNOSIS — Z13.5 GLAUCOMA SCREENING: ICD-10-CM

## 2020-01-28 DIAGNOSIS — H40.1134 PRIMARY OPEN ANGLE GLAUCOMA OF BOTH EYES, INDETERMINATE STAGE: Primary | ICD-10-CM

## 2020-01-28 PROCEDURE — 92014 COMPRE OPH EXAM EST PT 1/>: CPT | Mod: HCNC,S$GLB,, | Performed by: OPTOMETRIST

## 2020-01-28 PROCEDURE — 92133 POSTERIOR SEGMENT OCT OPTIC NERVE(OCULAR COHERENCE TOMOGRAPHY) - OU - BOTH EYES: ICD-10-PCS | Mod: HCNC,S$GLB,, | Performed by: OPTOMETRIST

## 2020-01-28 PROCEDURE — 99499 UNLISTED E&M SERVICE: CPT | Mod: HCNC,S$GLB,, | Performed by: OPTOMETRIST

## 2020-01-28 PROCEDURE — 92133 CPTRZD OPH DX IMG PST SGM ON: CPT | Mod: HCNC,S$GLB,, | Performed by: OPTOMETRIST

## 2020-01-28 PROCEDURE — 99999 PR PBB SHADOW E&M-EST. PATIENT-LVL II: ICD-10-PCS | Mod: PBBFAC,HCNC,, | Performed by: OPTOMETRIST

## 2020-01-28 PROCEDURE — 92081 HUMPHREY VISUAL FIELD -OU-LIMITED-BOTH EYES: ICD-10-PCS | Mod: HCNC,S$GLB,, | Performed by: OPTOMETRIST

## 2020-01-28 PROCEDURE — 99499 RISK ADDL DX/OHS AUDIT: ICD-10-PCS | Mod: HCNC,S$GLB,, | Performed by: OPTOMETRIST

## 2020-01-28 PROCEDURE — 92014 PR EYE EXAM, EST PATIENT,COMPREHESV: ICD-10-PCS | Mod: HCNC,S$GLB,, | Performed by: OPTOMETRIST

## 2020-01-28 PROCEDURE — 92081 LIMITED VISUAL FIELD XM: CPT | Mod: HCNC,S$GLB,, | Performed by: OPTOMETRIST

## 2020-01-28 PROCEDURE — 99999 PR PBB SHADOW E&M-EST. PATIENT-LVL II: CPT | Mod: PBBFAC,HCNC,, | Performed by: OPTOMETRIST

## 2020-01-28 RX ORDER — TIMOLOL MALEATE 5 MG/ML
1 SOLUTION/ DROPS OPHTHALMIC 2 TIMES DAILY
Qty: 25 ML | Refills: 5 | Status: SHIPPED | OUTPATIENT
Start: 2020-01-28 | End: 2020-06-29 | Stop reason: SDUPTHER

## 2020-01-28 NOTE — PROGRESS NOTES
HPI     Last [facility] exam 09/16/2019  Glaucoma  Timolol 0.5% 1 drop OU BID  Pseudophakia, OU  HVF/GOCT (Today)      Last edited by Mario Jonas MA on 1/28/2020  9:47 AM. (History)            Assessment /Plan     For exam results, see Encounter Report.    Primary open angle glaucoma of both eyes, indeterminate stage  -     timolol maleate 0.5% (TIMOPTIC) 0.5 % Drop; Place 1 drop into both eyes 2 (two) times daily.  Dispense: 25 mL; Refill: 5  -     OCT - Optic Nerve  -     Cazares Visual Field - Limited - OU - Both Eyes    Diabetes mellitus type 2 without retinopathy    Pseudophakia of both eyes    Glaucoma screening      No diabetic retinopathy OU.  Stable PIOL OU.  OH OK  OU other than COAG.  No need to change spec Rx.  RTC 3 monts for IOP check.  Timoptic Rx renewed.  GOCT:  No changes in either eye since 2017.  OS has TI changes since 2017 without change.  Repeat in one year.  Continue with drops.  24-2 HVF:  Very poor reliability OU.  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  Her GOCT have not changed in either eye for 3 years.  Will repeat in one year.  IOP: normal OU

## 2020-02-18 RX ORDER — GUAIFENESIN 100 MG/5ML
SOLUTION ORAL
Qty: 236 ML | Refills: 2 | Status: SHIPPED | OUTPATIENT
Start: 2020-02-18 | End: 2021-02-26

## 2020-02-21 ENCOUNTER — OFFICE VISIT (OUTPATIENT)
Dept: INTERNAL MEDICINE | Facility: CLINIC | Age: 80
End: 2020-02-21
Payer: MEDICARE

## 2020-02-21 ENCOUNTER — TELEPHONE (OUTPATIENT)
Dept: INTERNAL MEDICINE | Facility: CLINIC | Age: 80
End: 2020-02-21

## 2020-02-21 ENCOUNTER — HOSPITAL ENCOUNTER (OUTPATIENT)
Dept: RADIOLOGY | Facility: HOSPITAL | Age: 80
Discharge: HOME | End: 2020-02-21
Attending: NURSE PRACTITIONER
Payer: MEDICARE

## 2020-02-21 VITALS
HEIGHT: 66 IN | TEMPERATURE: 98 F | OXYGEN SATURATION: 97 % | SYSTOLIC BLOOD PRESSURE: 138 MMHG | BODY MASS INDEX: 37.56 KG/M2 | WEIGHT: 233.69 LBS | HEART RATE: 83 BPM | RESPIRATION RATE: 17 BRPM | DIASTOLIC BLOOD PRESSURE: 61 MMHG

## 2020-02-21 DIAGNOSIS — L90.0 LICHEN SCLEROSUS: ICD-10-CM

## 2020-02-21 DIAGNOSIS — R04.2 COUGH WITH HEMOPTYSIS: ICD-10-CM

## 2020-02-21 DIAGNOSIS — J42 CHRONIC BRONCHITIS, UNSPECIFIED CHRONIC BRONCHITIS TYPE: ICD-10-CM

## 2020-02-21 DIAGNOSIS — R04.2 COUGH WITH HEMOPTYSIS: Primary | ICD-10-CM

## 2020-02-21 PROCEDURE — 99214 OFFICE O/P EST MOD 30 MIN: CPT | Mod: HCNC,S$GLB,, | Performed by: NURSE PRACTITIONER

## 2020-02-21 PROCEDURE — 3075F SYST BP GE 130 - 139MM HG: CPT | Mod: HCNC,CPTII,S$GLB, | Performed by: NURSE PRACTITIONER

## 2020-02-21 PROCEDURE — 1126F AMNT PAIN NOTED NONE PRSNT: CPT | Mod: HCNC,S$GLB,, | Performed by: NURSE PRACTITIONER

## 2020-02-21 PROCEDURE — 99999 PR PBB SHADOW E&M-EST. PATIENT-LVL V: CPT | Mod: PBBFAC,HCNC,, | Performed by: NURSE PRACTITIONER

## 2020-02-21 PROCEDURE — 3078F PR MOST RECENT DIASTOLIC BLOOD PRESSURE < 80 MM HG: ICD-10-PCS | Mod: HCNC,CPTII,S$GLB, | Performed by: NURSE PRACTITIONER

## 2020-02-21 PROCEDURE — 3078F DIAST BP <80 MM HG: CPT | Mod: HCNC,CPTII,S$GLB, | Performed by: NURSE PRACTITIONER

## 2020-02-21 PROCEDURE — 1159F PR MEDICATION LIST DOCUMENTED IN MEDICAL RECORD: ICD-10-PCS | Mod: HCNC,S$GLB,, | Performed by: NURSE PRACTITIONER

## 2020-02-21 PROCEDURE — 99499 UNLISTED E&M SERVICE: CPT | Mod: HCNC,S$GLB,, | Performed by: NURSE PRACTITIONER

## 2020-02-21 PROCEDURE — 1101F PR PT FALLS ASSESS DOC 0-1 FALLS W/OUT INJ PAST YR: ICD-10-PCS | Mod: HCNC,CPTII,S$GLB, | Performed by: NURSE PRACTITIONER

## 2020-02-21 PROCEDURE — 71046 XR CHEST PA AND LATERAL: ICD-10-PCS | Mod: 26,HCNC,, | Performed by: RADIOLOGY

## 2020-02-21 PROCEDURE — 99214 PR OFFICE/OUTPT VISIT, EST, LEVL IV, 30-39 MIN: ICD-10-PCS | Mod: HCNC,S$GLB,, | Performed by: NURSE PRACTITIONER

## 2020-02-21 PROCEDURE — 71046 X-RAY EXAM CHEST 2 VIEWS: CPT | Mod: 26,HCNC,, | Performed by: RADIOLOGY

## 2020-02-21 PROCEDURE — 3075F PR MOST RECENT SYSTOLIC BLOOD PRESS GE 130-139MM HG: ICD-10-PCS | Mod: HCNC,CPTII,S$GLB, | Performed by: NURSE PRACTITIONER

## 2020-02-21 PROCEDURE — 1126F PR PAIN SEVERITY QUANTIFIED, NO PAIN PRESENT: ICD-10-PCS | Mod: HCNC,S$GLB,, | Performed by: NURSE PRACTITIONER

## 2020-02-21 PROCEDURE — 71046 X-RAY EXAM CHEST 2 VIEWS: CPT | Mod: TC,HCNC,PO

## 2020-02-21 PROCEDURE — 99499 RISK ADDL DX/OHS AUDIT: ICD-10-PCS | Mod: HCNC,S$GLB,, | Performed by: NURSE PRACTITIONER

## 2020-02-21 PROCEDURE — 1159F MED LIST DOCD IN RCRD: CPT | Mod: HCNC,S$GLB,, | Performed by: NURSE PRACTITIONER

## 2020-02-21 PROCEDURE — 1101F PT FALLS ASSESS-DOCD LE1/YR: CPT | Mod: HCNC,CPTII,S$GLB, | Performed by: NURSE PRACTITIONER

## 2020-02-21 PROCEDURE — 99999 PR PBB SHADOW E&M-EST. PATIENT-LVL V: ICD-10-PCS | Mod: PBBFAC,HCNC,, | Performed by: NURSE PRACTITIONER

## 2020-02-21 RX ORDER — ECONAZOLE NITRATE 10 MG/G
CREAM TOPICAL
Qty: 30 G | Refills: 0 | Status: SHIPPED | OUTPATIENT
Start: 2020-02-21 | End: 2020-09-16 | Stop reason: SDUPTHER

## 2020-02-21 NOTE — ASSESSMENT & PLAN NOTE
I believe symptoms are caused from URI; however, will do CXR to r/o other causes with pts c/o bloody sputum. No overt signs of distress, pt is very talkative without any signs of SOB or resp distress. Discussed symptomatic tx for URI symptoms. She is unable to use some medications due to hx of glaucoma. She verbalized understanding.

## 2020-02-21 NOTE — ASSESSMENT & PLAN NOTE
Is not using Flovent due to high copay at pharmacy. She continues to use albuterol PRN. Does not need refill at this time

## 2020-02-21 NOTE — TELEPHONE ENCOUNTER
----- Message from Toyin Cartwright sent at 2/21/2020  3:12 PM CST -----  Contact: self/[phone]  Would like to consult with nurse regarding her results on her xray today. Please call back at [phone]. Thanks/ar

## 2020-02-21 NOTE — PROGRESS NOTES
Subjective:       Patient ID: Kaleigh Nieves is a 79 y.o. female.    Chief Complaint: Chest Congestion and Cough    Mrs. Nieves is here today c/o URI symptoms of sore throat, nasal congestion, and productive cough. She reports that her sore throat is intermittent and improved with salt water gargles.      Cough   This is a new problem. The current episode started in the past 7 days (Monday). The problem has been waxing and waning. The cough is productive of sputum and productive of bloody sputum (2 episodes of blood sputum, otherwise yellow). Associated symptoms include hemoptysis, nasal congestion, postnasal drip, rhinorrhea, a sore throat (improved), shortness of breath (chronic) and wheezing. Pertinent negatives include no chest pain, chills, ear congestion, ear pain, eye redness, fever, headaches or myalgias. She has tried a beta-agonist inhaler and OTC cough suppressant (salt water gargles and antiseptic) for the symptoms. Her past medical history is significant for asthma, bronchitis and environmental allergies. There is no history of pneumonia.       Patient Active Problem List   Diagnosis    Type 2 diabetes mellitus with diabetic polyneuropathy, without long-term current use of insulin    Multiple joint pain    Esophageal reflux    Hyperlipidemia    Chronic gout    Lichen sclerosus    Carotid artery disease    CAD (coronary artery disease)    Colon polyp    Hypertension associated with diabetes    Aortic atherosclerosis    Diastolic dysfunction    Arterial insufficiency of lower extremity    Severe obesity (BMI 35.0-35.9 with comorbidity)    Glaucoma    Ganglion cyst of flexor tendon sheath of finger of right hand    Bilateral carpal tunnel syndrome    Right carpal tunnel syndrome    Chronic bronchitis    Dependent edema    Diverticulosis    Primary open-angle glaucoma, bilateral, indeterminate stage    Cough with hemoptysis       Family History   Problem Relation Age of Onset     Hypertension Mother     Diabetes Mother     Leukemia Sister     Hypertension Sister     Cancer Sister         leukemia    Heart disease Maternal Aunt     Cancer Maternal Uncle         gastric, pacreatic    Heart disease Maternal Uncle     Miscarriages / Stillbirths Maternal Uncle     Diabetes Maternal Grandmother     Asthma Maternal Grandfather     Breast cancer Neg Hx     Colon cancer Neg Hx     Ovarian cancer Neg Hx     COPD Neg Hx     Hyperlipidemia Neg Hx     Kidney disease Neg Hx      Past Surgical History:   Procedure Laterality Date    CARPAL TUNNEL RELEASE Right     CATARACT EXTRACTION, BILATERAL      CHOLECYSTECTOMY      CYST REMOVAL  2018    DILATION AND CURETTAGE OF UTERUS      EYE SURGERY      HEMORRHOID SURGERY      HYSTERECTOMY      fibroids    INCISION AND DRAINAGE PERIRECTAL ABSCESS      JOINT REPLACEMENT Bilateral     knee    KNEE ARTHROSCOPY Right     MYOMECTOMY           Current Outpatient Medications:     aspirin 81 MG Chew, Take 81 mg by mouth once daily., Disp: , Rfl:     blood glucose control high,low (ACCU-CHEK DARSHAN CONTROL SOLN) Soln, Use a directed, Disp: 1 each, Rfl: 3    blood sugar diagnostic (ACCU-CHEK DARSHAN PLUS TEST STRP) Strp, TEST BLOOD SUGAR TWICE DAILY, Disp: 200 strip, Rfl: 3    blood-glucose meter (ACCU-CHEK DARSHAN PLUS METER) Misc, 1 Device by Misc.(Non-Drug; Combo Route) route 2 (two) times daily., Disp: 1 each, Rfl: 1    clobetasol 0.05% (TEMOVATE) 0.05 % Oint, Apply topically daily as needed. For vulvar itching, Disp: 60 g, Rfl: 2    COLCRYS 0.6 mg tablet, TAKE ONE TABLET BY MOUTH EVERY DAY, Disp: 90 tablet, Rfl: 4    econazole nitrate 1 % cream, APPLY TO AFFECTED AREA TOPICALLY EVERY DAY, Disp: 30 g, Rfl: 0    guaifenesin 100 mg/5 ml (ROBITUSSIN) 100 mg/5 mL syrup, TAKE 2 TEASPOONSFUL (10MLS) BY MOUTH THREE TIMES A DAY AS NEEDED FOR COUGH, Disp: 236 mL, Rfl: 2    hydroCHLOROthiazide (HYDRODIURIL) 12.5 MG Tab, Take 1 tablet (12.5 mg  "total) by mouth once daily., Disp: 90 tablet, Rfl: 2    lancets (ACCU-CHEK SOFTCLIX LANCETS) Misc, TEST TWICE A DAY, Disp: 200 each, Rfl: 3    simvastatin (ZOCOR) 20 MG tablet, TAKE 1 TABLET EVERY NIGHT, Disp: 90 tablet, Rfl: 4    timolol maleate 0.5% (TIMOPTIC) 0.5 % Drop, Place 1 drop into both eyes 2 (two) times daily., Disp: 25 mL, Rfl: 5    VENTOLIN HFA 90 mcg/actuation inhaler, INHALE 2 PUFFS INTO THE LUNGS EVERY 6 HOURS AS NEEDED FOR WHEEZING., Disp: 18 g, Rfl: 11    Review of Systems   Constitutional: Negative for chills, fatigue and fever.   HENT: Positive for congestion, postnasal drip, rhinorrhea, sneezing and sore throat (improved). Negative for ear pain, sinus pressure, sinus pain and trouble swallowing.    Eyes: Positive for discharge (watery). Negative for pain, redness and visual disturbance.   Respiratory: Positive for cough, hemoptysis, shortness of breath (chronic) and wheezing. Negative for chest tightness.    Cardiovascular: Negative for chest pain and palpitations.   Musculoskeletal: Negative for myalgias.   Allergic/Immunologic: Positive for environmental allergies.   Neurological: Positive for dizziness (intermittent, chronic). Negative for weakness, light-headedness, numbness and headaches.       Objective:   /61 (BP Location: Left arm, Patient Position: Sitting, BP Method: Large (Automatic))   Pulse 83   Temp 97.8 °F (36.6 °C) (Tympanic)   Resp 17   Ht 5' 6" (1.676 m)   Wt 106 kg (233 lb 11 oz)   SpO2 97%   BMI 37.72 kg/m²      Physical Exam   Constitutional: She is oriented to person, place, and time. She appears well-developed and well-nourished. She is cooperative. She does not appear ill. No distress.   Very talkative   HENT:   Head: Normocephalic and atraumatic.   Right Ear: Tympanic membrane is not injected and not erythematous. A middle ear effusion is present.   Left Ear: Tympanic membrane is not injected and not erythematous. A middle ear effusion is present. "   Nose: Mucosal edema and rhinorrhea present. Right sinus exhibits no maxillary sinus tenderness and no frontal sinus tenderness. Left sinus exhibits no maxillary sinus tenderness and no frontal sinus tenderness.   Mouth/Throat: Uvula is midline, oropharynx is clear and moist and mucous membranes are normal. No oropharyngeal exudate.   Eyes: Pupils are equal, round, and reactive to light. Conjunctivae and EOM are normal. Right eye exhibits no discharge. Left eye exhibits no discharge.   Neck: Normal range of motion. Neck supple.   Cardiovascular: Normal rate, regular rhythm and intact distal pulses. Exam reveals no gallop and no friction rub.   Murmur heard.  Pulmonary/Chest: Effort normal and breath sounds normal. No respiratory distress. She has no wheezes.   Lymphadenopathy:     She has no cervical adenopathy.   Neurological: She is alert and oriented to person, place, and time. No cranial nerve deficit.   Skin: Skin is warm and dry. She is not diaphoretic. No erythema.   Vitals reviewed.      Assessment & Plan     Problem List Items Addressed This Visit        Derm    Lichen sclerosus    Relevant Medications    econazole nitrate 1 % cream       Pulmonary    Chronic bronchitis    Current Assessment & Plan     Is not using Flovent due to high copay at pharmacy. She continues to use albuterol PRN. Does not need refill at this time          Cough with hemoptysis - Primary    Current Assessment & Plan     I believe symptoms are caused from URI; however, will do CXR to r/o other causes with pts c/o bloody sputum. No overt signs of distress, pt is very talkative without any signs of SOB or resp distress. Discussed symptomatic tx for URI symptoms. She is unable to use some medications due to hx of glaucoma. She verbalized understanding.          Relevant Orders    X-Ray Chest PA And Lateral (Completed)        Follow up if symptoms worsen or fail to improve.

## 2020-03-10 ENCOUNTER — OFFICE VISIT (OUTPATIENT)
Dept: INTERNAL MEDICINE | Facility: CLINIC | Age: 80
End: 2020-03-10
Payer: MEDICARE

## 2020-03-10 VITALS
HEIGHT: 66 IN | BODY MASS INDEX: 38.41 KG/M2 | RESPIRATION RATE: 17 BRPM | WEIGHT: 239 LBS | HEART RATE: 89 BPM | OXYGEN SATURATION: 96 % | DIASTOLIC BLOOD PRESSURE: 63 MMHG | TEMPERATURE: 96 F | SYSTOLIC BLOOD PRESSURE: 132 MMHG

## 2020-03-10 DIAGNOSIS — I15.2 HYPERTENSION ASSOCIATED WITH DIABETES: ICD-10-CM

## 2020-03-10 DIAGNOSIS — E11.59 HYPERTENSION ASSOCIATED WITH DIABETES: ICD-10-CM

## 2020-03-10 DIAGNOSIS — A08.4 VIRAL GASTROENTERITIS: Primary | ICD-10-CM

## 2020-03-10 PROCEDURE — 1101F PR PT FALLS ASSESS DOC 0-1 FALLS W/OUT INJ PAST YR: ICD-10-PCS | Mod: HCNC,CPTII,S$GLB, | Performed by: FAMILY MEDICINE

## 2020-03-10 PROCEDURE — 1159F MED LIST DOCD IN RCRD: CPT | Mod: HCNC,S$GLB,, | Performed by: FAMILY MEDICINE

## 2020-03-10 PROCEDURE — 3075F PR MOST RECENT SYSTOLIC BLOOD PRESS GE 130-139MM HG: ICD-10-PCS | Mod: HCNC,CPTII,S$GLB, | Performed by: FAMILY MEDICINE

## 2020-03-10 PROCEDURE — 99999 PR PBB SHADOW E&M-EST. PATIENT-LVL V: ICD-10-PCS | Mod: PBBFAC,HCNC,, | Performed by: FAMILY MEDICINE

## 2020-03-10 PROCEDURE — 99213 PR OFFICE/OUTPT VISIT, EST, LEVL III, 20-29 MIN: ICD-10-PCS | Mod: HCNC,S$GLB,, | Performed by: FAMILY MEDICINE

## 2020-03-10 PROCEDURE — 1159F PR MEDICATION LIST DOCUMENTED IN MEDICAL RECORD: ICD-10-PCS | Mod: HCNC,S$GLB,, | Performed by: FAMILY MEDICINE

## 2020-03-10 PROCEDURE — 3078F DIAST BP <80 MM HG: CPT | Mod: HCNC,CPTII,S$GLB, | Performed by: FAMILY MEDICINE

## 2020-03-10 PROCEDURE — 99213 OFFICE O/P EST LOW 20 MIN: CPT | Mod: HCNC,S$GLB,, | Performed by: FAMILY MEDICINE

## 2020-03-10 PROCEDURE — 1125F AMNT PAIN NOTED PAIN PRSNT: CPT | Mod: HCNC,S$GLB,, | Performed by: FAMILY MEDICINE

## 2020-03-10 PROCEDURE — 3075F SYST BP GE 130 - 139MM HG: CPT | Mod: HCNC,CPTII,S$GLB, | Performed by: FAMILY MEDICINE

## 2020-03-10 PROCEDURE — 1125F PR PAIN SEVERITY QUANTIFIED, PAIN PRESENT: ICD-10-PCS | Mod: HCNC,S$GLB,, | Performed by: FAMILY MEDICINE

## 2020-03-10 PROCEDURE — 1101F PT FALLS ASSESS-DOCD LE1/YR: CPT | Mod: HCNC,CPTII,S$GLB, | Performed by: FAMILY MEDICINE

## 2020-03-10 PROCEDURE — 3078F PR MOST RECENT DIASTOLIC BLOOD PRESSURE < 80 MM HG: ICD-10-PCS | Mod: HCNC,CPTII,S$GLB, | Performed by: FAMILY MEDICINE

## 2020-03-10 PROCEDURE — 99999 PR PBB SHADOW E&M-EST. PATIENT-LVL V: CPT | Mod: PBBFAC,HCNC,, | Performed by: FAMILY MEDICINE

## 2020-03-10 RX ORDER — ALLOPURINOL 100 MG/1
100 TABLET ORAL DAILY
Qty: 90 TABLET | Refills: 0 | Status: SHIPPED | OUTPATIENT
Start: 2020-03-10 | End: 2021-01-25 | Stop reason: SDUPTHER

## 2020-03-10 NOTE — ASSESSMENT & PLAN NOTE
Symptomatic lay improving.  Advised on continued supportive care.  No clinical symptoms nor signs of diverticulitis.

## 2020-03-10 NOTE — PROGRESS NOTES
Subjective:       Patient ID: Kaleigh Nieves is a 79 y.o. female.    Chief Complaint: Abdominal Pain and Diarrhea    Abdominal Pain   This is a new problem. Episode onset: 4 days. The onset quality is sudden. The problem has been gradually improving. The pain is located in the suprapubic region, LLQ and RLQ. The pain is moderate. The quality of the pain is cramping. Associated symptoms include diarrhea (with some mucous plugs coming). Pertinent negatives include no belching, fever, nausea or vomiting. Nothing aggravates the pain. The pain is relieved by bowel movements. She has tried antacids for the symptoms. The treatment provided mild relief.       Family History   Problem Relation Age of Onset    Hypertension Mother     Diabetes Mother     Leukemia Sister     Hypertension Sister     Cancer Sister         leukemia    Heart disease Maternal Aunt     Cancer Maternal Uncle         gastric, pacreatic    Heart disease Maternal Uncle     Miscarriages / Stillbirths Maternal Uncle     Diabetes Maternal Grandmother     Asthma Maternal Grandfather     Breast cancer Neg Hx     Colon cancer Neg Hx     Ovarian cancer Neg Hx     COPD Neg Hx     Hyperlipidemia Neg Hx     Kidney disease Neg Hx        Current Outpatient Medications:     aspirin 81 MG Chew, Take 81 mg by mouth once daily., Disp: , Rfl:     blood glucose control high,low (ACCU-CHEK DARSHAN CONTROL SOLN) Soln, Use a directed, Disp: 1 each, Rfl: 3    blood sugar diagnostic (ACCU-CHEK DARSHAN PLUS TEST STRP) Strp, TEST BLOOD SUGAR TWICE DAILY, Disp: 200 strip, Rfl: 3    blood-glucose meter (ACCU-CHEK DARSHAN PLUS METER) Misc, 1 Device by Misc.(Non-Drug; Combo Route) route 2 (two) times daily., Disp: 1 each, Rfl: 1    COLCRYS 0.6 mg tablet, TAKE ONE TABLET BY MOUTH EVERY DAY, Disp: 90 tablet, Rfl: 4    econazole nitrate 1 % cream, APPLY TO AFFECTED AREA TOPICALLY EVERY DAY, Disp: 30 g, Rfl: 0    guaifenesin 100 mg/5 ml (ROBITUSSIN) 100 mg/5 mL syrup,  "TAKE 2 TEASPOONSFUL (10MLS) BY MOUTH THREE TIMES A DAY AS NEEDED FOR COUGH, Disp: 236 mL, Rfl: 2    hydroCHLOROthiazide (HYDRODIURIL) 12.5 MG Tab, Take 1 tablet (12.5 mg total) by mouth once daily., Disp: 90 tablet, Rfl: 2    lancets (ACCU-CHEK SOFTCLIX LANCETS) Misc, TEST TWICE A DAY, Disp: 200 each, Rfl: 3    simvastatin (ZOCOR) 20 MG tablet, TAKE 1 TABLET EVERY NIGHT, Disp: 90 tablet, Rfl: 4    timolol maleate 0.5% (TIMOPTIC) 0.5 % Drop, Place 1 drop into both eyes 2 (two) times daily., Disp: 25 mL, Rfl: 5    VENTOLIN HFA 90 mcg/actuation inhaler, INHALE 2 PUFFS INTO THE LUNGS EVERY 6 HOURS AS NEEDED FOR WHEEZING., Disp: 18 g, Rfl: 11    allopurinoL (ZYLOPRIM) 100 MG tablet, Take 1 tablet (100 mg total) by mouth once daily., Disp: 90 tablet, Rfl: 0    Review of Systems   Constitutional: Negative for chills and fever.   Respiratory: Negative for cough and shortness of breath.    Cardiovascular: Negative for chest pain.   Gastrointestinal: Positive for abdominal pain and diarrhea (with some mucous plugs coming). Negative for nausea and vomiting.   Skin: Negative for rash.   Neurological: Negative for dizziness.       Objective:   /63 (BP Location: Right arm, Patient Position: Sitting, BP Method: Large (Automatic))   Pulse 89   Temp 96.2 °F (35.7 °C) (Tympanic)   Resp 17   Ht 5' 6" (1.676 m)   Wt 108.4 kg (238 lb 15.7 oz)   SpO2 96%   BMI 38.57 kg/m²      Physical Exam   Constitutional: She is oriented to person, place, and time. She appears well-developed and well-nourished. No distress.   HENT:   Head: Normocephalic and atraumatic.   Nose: Nose normal.   Eyes: Pupils are equal, round, and reactive to light. Conjunctivae and EOM are normal. Right eye exhibits no discharge. Left eye exhibits no discharge.   Neck: No thyromegaly present.   Cardiovascular: Normal rate and regular rhythm.   No murmur heard.  Pulmonary/Chest: Effort normal and breath sounds normal. No respiratory distress. "   Abdominal: Soft. She exhibits no distension and no mass. There is tenderness (MildDiscomfort in the suprapubic region). There is no rebound and no guarding.   Musculoskeletal: She exhibits no edema.   Neurological: She is alert and oriented to person, place, and time.   Skin: No rash noted. She is not diaphoretic.   Psychiatric: She has a normal mood and affect. Her behavior is normal.       Assessment & Plan     Problem List Items Addressed This Visit        Cardiac/Vascular    Hypertension associated with diabetes    Current Assessment & Plan      Blood pressure well controlled.  Not due for any labs at this time.  Will have her follow-up in about 3 months for routine follow-up            ID    Viral gastroenteritis - Primary    Current Assessment & Plan      Symptomatic lay improving.  Advised on continued supportive care.  No clinical symptoms nor signs of diverticulitis.                 No follow-ups on file.    Disclaimer:  This note may have been prepared using voice recognition software, it may have not been extensively proofed, as such there could be errors within the text such as sound alike errors.

## 2020-03-10 NOTE — ASSESSMENT & PLAN NOTE
Blood pressure well controlled.  Not due for any labs at this time.  Will have her follow-up in about 3 months for routine follow-up

## 2020-04-16 ENCOUNTER — PATIENT MESSAGE (OUTPATIENT)
Dept: PODIATRY | Facility: CLINIC | Age: 80
End: 2020-04-16

## 2020-05-22 ENCOUNTER — TELEPHONE (OUTPATIENT)
Dept: INTERNAL MEDICINE | Facility: CLINIC | Age: 80
End: 2020-05-22

## 2020-05-22 NOTE — TELEPHONE ENCOUNTER
----- Message from Earl Sorenson sent at 5/22/2020  4:02 PM CDT -----  Contact: pt [phone]  Would like to consult with nurse regarding rescheduling an appt, and labs ordered.  Please contact Kaleigh Nieves @ [phone].  Thanks/as

## 2020-06-29 ENCOUNTER — OFFICE VISIT (OUTPATIENT)
Dept: PODIATRY | Facility: CLINIC | Age: 80
End: 2020-06-29
Payer: MEDICARE

## 2020-06-29 ENCOUNTER — PATIENT OUTREACH (OUTPATIENT)
Dept: ADMINISTRATIVE | Facility: OTHER | Age: 80
End: 2020-06-29

## 2020-06-29 ENCOUNTER — OFFICE VISIT (OUTPATIENT)
Dept: OPHTHALMOLOGY | Facility: CLINIC | Age: 80
End: 2020-06-29
Payer: MEDICARE

## 2020-06-29 VITALS
HEIGHT: 66 IN | DIASTOLIC BLOOD PRESSURE: 66 MMHG | HEART RATE: 85 BPM | BODY MASS INDEX: 38.41 KG/M2 | WEIGHT: 239 LBS | SYSTOLIC BLOOD PRESSURE: 144 MMHG

## 2020-06-29 DIAGNOSIS — H40.1134 PRIMARY OPEN ANGLE GLAUCOMA OF BOTH EYES, INDETERMINATE STAGE: ICD-10-CM

## 2020-06-29 DIAGNOSIS — E11.51 TYPE II DIABETES MELLITUS WITH PERIPHERAL CIRCULATORY DISORDER: Primary | ICD-10-CM

## 2020-06-29 DIAGNOSIS — B35.1 DERMATOPHYTOSIS OF NAIL: ICD-10-CM

## 2020-06-29 DIAGNOSIS — H40.1134 PRIMARY OPEN ANGLE GLAUCOMA OF BOTH EYES, INDETERMINATE STAGE: Primary | ICD-10-CM

## 2020-06-29 DIAGNOSIS — E11.42 TYPE 2 DIABETES MELLITUS WITH DIABETIC POLYNEUROPATHY, WITHOUT LONG-TERM CURRENT USE OF INSULIN: Primary | ICD-10-CM

## 2020-06-29 PROCEDURE — 99499 UNLISTED E&M SERVICE: CPT | Mod: S$GLB,,, | Performed by: PODIATRIST

## 2020-06-29 PROCEDURE — 92012 PR EYE EXAM, EST PATIENT,INTERMED: ICD-10-PCS | Mod: HCNC,S$GLB,, | Performed by: OPTOMETRIST

## 2020-06-29 PROCEDURE — 99999 PR PBB SHADOW E&M-EST. PATIENT-LVL IV: ICD-10-PCS | Mod: PBBFAC,HCNC,, | Performed by: PODIATRIST

## 2020-06-29 PROCEDURE — 99999 PR PBB SHADOW E&M-EST. PATIENT-LVL III: CPT | Mod: PBBFAC,HCNC,, | Performed by: OPTOMETRIST

## 2020-06-29 PROCEDURE — 99999 PR PBB SHADOW E&M-EST. PATIENT-LVL IV: CPT | Mod: PBBFAC,HCNC,, | Performed by: PODIATRIST

## 2020-06-29 PROCEDURE — 99499 UNLISTED E&M SERVICE: CPT | Mod: HCNC,S$GLB,, | Performed by: PODIATRIST

## 2020-06-29 PROCEDURE — 92012 INTRM OPH EXAM EST PATIENT: CPT | Mod: HCNC,S$GLB,, | Performed by: OPTOMETRIST

## 2020-06-29 PROCEDURE — 99499 RISK ADDL DX/OHS AUDIT: ICD-10-PCS | Mod: S$GLB,,, | Performed by: PODIATRIST

## 2020-06-29 PROCEDURE — 99999 PR PBB SHADOW E&M-EST. PATIENT-LVL III: ICD-10-PCS | Mod: PBBFAC,HCNC,, | Performed by: OPTOMETRIST

## 2020-06-29 PROCEDURE — 11721 DEBRIDE NAIL 6 OR MORE: CPT | Mod: Q8,HCNC,S$GLB, | Performed by: PODIATRIST

## 2020-06-29 PROCEDURE — 11721 PR DEBRIDEMENT OF NAILS, 6 OR MORE: ICD-10-PCS | Mod: Q8,HCNC,S$GLB, | Performed by: PODIATRIST

## 2020-06-29 RX ORDER — TIMOLOL MALEATE 5 MG/ML
1 SOLUTION/ DROPS OPHTHALMIC 2 TIMES DAILY
Qty: 25 ML | Refills: 5 | Status: SHIPPED | OUTPATIENT
Start: 2020-06-29 | End: 2021-04-22 | Stop reason: SDUPTHER

## 2020-06-29 NOTE — PROGRESS NOTES
HPI     PT was last seen on 1/28/20 with [facility] for HVF/gOCT. PT was told to RTC  3 Months for IOP check.  Medication eye drops if any: Timolol BID OU  Last HVF: 1/28/2020  Last gOCT: 1/28/2020  Last SDPs:None      Last edited by Peggy Clifford, PCT on 6/29/2020 10:06 AM. (History)              Assessment /Plan     For exam results, see Encounter Report.    Primary open angle glaucoma of both eyes, indeterminate stage      IOP stable today and within acceptable range OU, although a little higher than usual  Continue current treatment for now  Pt is on timolol, well-tolerated for years now  Monitor 3 months    Timolol 0.5% bid OU    RTC 3 months for IOP check with gOCT or PRN  Discussed above and all questions were answered.

## 2020-06-29 NOTE — PROGRESS NOTES
Subjective:     Patient ID: Kaleigh Nieves is a 79 y.o. female.    Chief Complaint: Nail Care (RNC. Diabetic Pt. c/o no pain. Wears casual shoes. PCP DR Hanks, last visit 3/10/20)    Kaleigh is a 79 y.o. female who presents to the clinic for evaluation and treatment of high risk feet. Kaleigh has a past medical history of Anemia, Angina pectoris, Arthritis, Asthma, Back pain, Bronchitis, CAD (coronary artery disease) (7/15), Carotid artery stenosis, Chronic bronchitis, Chronic gout, Colon polyp, CTS (carpal tunnel syndrome), Diabetes mellitus, type 2, Diabetes with neurologic complications, Diverticulosis, RIVAS (dyspnea on exertion), Dyslipidemia, Ex-smoker, GERD (gastroesophageal reflux disease), Hyperlipidemia, Hypertension, Neuropathy, lower extremity, Obesity, Open-angle glaucoma, Peripheral vascular disease, Polyneuropathy, Tobacco dependence, and Viral gastroenteritis (3/10/2020). The patient's chief complaint is long, thick toenails. Patient states her blood sugar this morning was 106mg/dl.  This patient has documented high risk feet requiring routine maintenance secondary to diabetes mellitis and those secondary complications of diabetes, as mentioned. Patient states she has pain on top of the right foot at night time.      PCP: Ramos Hanks, DO    Date Last Seen by PCP: 03/10/2020    Current shoe gear:  Affected Foot: Rx diabetic extra depth shoes and custom accommodative insoles     Unaffected Foot: Rx diabetic extra depth shoes and custom accommodative insoles    Hemoglobin A1C   Date Value Ref Range Status   12/06/2019 6.0 (H) 4.0 - 5.6 % Final     Comment:     ADA Screening Guidelines:  5.7-6.4%  Consistent with prediabetes  >or=6.5%  Consistent with diabetes  High levels of fetal hemoglobin interfere with the HbA1C  assay. Heterozygous hemoglobin variants (HbS, HgC, etc)do  not significantly interfere with this assay.   However, presence of multiple variants may affect accuracy.     06/04/2019 5.9 (H)  4.0 - 5.6 % Final     Comment:     ADA Screening Guidelines:  5.7-6.4%  Consistent with prediabetes  >or=6.5%  Consistent with diabetes  High levels of fetal hemoglobin interfere with the HbA1C  assay. Heterozygous hemoglobin variants (HbS, HgC, etc)do  not significantly interfere with this assay.   However, presence of multiple variants may affect accuracy.     10/31/2018 5.9 (H) 4.0 - 5.6 % Final     Comment:     ADA Screening Guidelines:  5.7-6.4%  Consistent with prediabetes  >or=6.5%  Consistent with diabetes  High levels of fetal hemoglobin interfere with the HbA1C  assay. Heterozygous hemoglobin variants (HbS, HgC, etc)do  not significantly interfere with this assay.   However, presence of multiple variants may affect accuracy.             Patient Active Problem List   Diagnosis    Type 2 diabetes mellitus with diabetic polyneuropathy, without long-term current use of insulin    Multiple joint pain    Esophageal reflux    Hyperlipidemia    Chronic gout    Lichen sclerosus    Carotid artery disease    CAD (coronary artery disease)    Colon polyp    Hypertension associated with diabetes    Aortic atherosclerosis    Diastolic dysfunction    Arterial insufficiency of lower extremity    Severe obesity (BMI 35.0-35.9 with comorbidity)    Glaucoma    Ganglion cyst of flexor tendon sheath of finger of right hand    Bilateral carpal tunnel syndrome    Right carpal tunnel syndrome    Chronic bronchitis    Dependent edema    Diverticulosis    Primary open-angle glaucoma, bilateral, indeterminate stage    Cough with hemoptysis    Viral gastroenteritis       Medication List with Changes/Refills   Current Medications    ALLOPURINOL (ZYLOPRIM) 100 MG TABLET    Take 1 tablet (100 mg total) by mouth once daily.    ASPIRIN 81 MG CHEW    Take 81 mg by mouth once daily.    BLOOD GLUCOSE CONTROL HIGH,LOW (ACCU-CHEK DARSHAN CONTROL SOLN) SOLN    Use a directed    BLOOD SUGAR DIAGNOSTIC (ACCU-CHEK DARSHAN PLUS  TEST STRP) STRP    TEST BLOOD SUGAR TWICE DAILY    BLOOD-GLUCOSE METER (ACCU-CHEK DARSHAN PLUS METER) MISC    1 Device by Misc.(Non-Drug; Combo Route) route 2 (two) times daily.    COLCRYS 0.6 MG TABLET    TAKE ONE TABLET BY MOUTH EVERY DAY    ECONAZOLE NITRATE 1 % CREAM    APPLY TO AFFECTED AREA TOPICALLY EVERY DAY    GUAIFENESIN 100 MG/5 ML (ROBITUSSIN) 100 MG/5 ML SYRUP    TAKE 2 TEASPOONSFUL (10MLS) BY MOUTH THREE TIMES A DAY AS NEEDED FOR COUGH    HYDROCHLOROTHIAZIDE (HYDRODIURIL) 12.5 MG TAB    Take 1 tablet (12.5 mg total) by mouth once daily.    LANCETS (ACCU-CHEK SOFTCLIX LANCETS) MISC    TEST TWICE A DAY    SIMVASTATIN (ZOCOR) 20 MG TABLET    TAKE 1 TABLET EVERY NIGHT    TIMOLOL MALEATE 0.5% (TIMOPTIC) 0.5 % DROP    Place 1 drop into both eyes 2 (two) times daily.    VENTOLIN HFA 90 MCG/ACTUATION INHALER    INHALE 2 PUFFS INTO THE LUNGS EVERY 6 HOURS AS NEEDED FOR WHEEZING.       Review of patient's allergies indicates:   Allergen Reactions    Iodine and iodide containing products Nausea And Vomiting    Penicillins Itching    Amlodipine      edema    Sulfa (sulfonamide antibiotics)     Ultram [tramadol] Other (See Comments)     Light headness, itiching    Bactrim [sulfamethoxazole-trimethoprim] Itching and Rash       Past Surgical History:   Procedure Laterality Date    CARPAL TUNNEL RELEASE Right     CATARACT EXTRACTION, BILATERAL      CHOLECYSTECTOMY      CYST REMOVAL  2018    DILATION AND CURETTAGE OF UTERUS      EYE SURGERY      HEMORRHOID SURGERY      HYSTERECTOMY      fibroids    INCISION AND DRAINAGE PERIRECTAL ABSCESS      JOINT REPLACEMENT Bilateral     knee    KNEE ARTHROSCOPY Right     MYOMECTOMY         Family History   Problem Relation Age of Onset    Hypertension Mother     Diabetes Mother     Leukemia Sister     Hypertension Sister     Cancer Sister         leukemia    Heart disease Maternal Aunt     Cancer Maternal Uncle         gastric, pacreatic    Heart disease  "Maternal Uncle     Miscarriages / Stillbirths Maternal Uncle     Diabetes Maternal Grandmother     Asthma Maternal Grandfather     Breast cancer Neg Hx     Colon cancer Neg Hx     Ovarian cancer Neg Hx     COPD Neg Hx     Hyperlipidemia Neg Hx     Kidney disease Neg Hx        Social History     Socioeconomic History    Marital status: Single     Spouse name: Not on file    Number of children: 0    Years of education: Not on file    Highest education level: Not on file   Occupational History    Not on file   Social Needs    Financial resource strain: Not on file    Food insecurity     Worry: Not on file     Inability: Not on file    Transportation needs     Medical: Not on file     Non-medical: Not on file   Tobacco Use    Smoking status: Former Smoker     Packs/day: 0.25     Years: 24.00     Pack years: 6.00     Quit date: 1976     Years since quittin.0    Smokeless tobacco: Never Used   Substance and Sexual Activity    Alcohol use: No    Drug use: No    Sexual activity: Not Currently   Lifestyle    Physical activity     Days per week: Not on file     Minutes per session: Not on file    Stress: Not at all   Relationships    Social connections     Talks on phone: Not on file     Gets together: Not on file     Attends Yazdanism service: Not on file     Active member of club or organization: Not on file     Attends meetings of clubs or organizations: Not on file     Relationship status: Not on file   Other Topics Concern    Not on file   Social History Narrative    Not on file       Vitals:    20 0923   BP: (!) 144/66   Pulse: 85   Weight: 108.4 kg (238 lb 15.7 oz)   Height: 5' 6" (1.676 m)   PainSc: 0-No pain       Hemoglobin A1C   Date Value Ref Range Status   2019 6.0 (H) 4.0 - 5.6 % Final     Comment:     ADA Screening Guidelines:  5.7-6.4%  Consistent with prediabetes  >or=6.5%  Consistent with diabetes  High levels of fetal hemoglobin interfere with the " HbA1C  assay. Heterozygous hemoglobin variants (HbS, HgC, etc)do  not significantly interfere with this assay.   However, presence of multiple variants may affect accuracy.     06/04/2019 5.9 (H) 4.0 - 5.6 % Final     Comment:     ADA Screening Guidelines:  5.7-6.4%  Consistent with prediabetes  >or=6.5%  Consistent with diabetes  High levels of fetal hemoglobin interfere with the HbA1C  assay. Heterozygous hemoglobin variants (HbS, HgC, etc)do  not significantly interfere with this assay.   However, presence of multiple variants may affect accuracy.     10/31/2018 5.9 (H) 4.0 - 5.6 % Final     Comment:     ADA Screening Guidelines:  5.7-6.4%  Consistent with prediabetes  >or=6.5%  Consistent with diabetes  High levels of fetal hemoglobin interfere with the HbA1C  assay. Heterozygous hemoglobin variants (HbS, HgC, etc)do  not significantly interfere with this assay.   However, presence of multiple variants may affect accuracy.         Review of Systems   Constitutional: Negative for chills and fever.   Respiratory: Negative for shortness of breath.    Cardiovascular: Positive for leg swelling. Negative for chest pain, palpitations, orthopnea and claudication.   Gastrointestinal: Negative for diarrhea, nausea and vomiting.   Musculoskeletal: Negative for joint pain.   Skin: Negative for rash.   Neurological: Positive for tingling and sensory change. Negative for dizziness, focal weakness and weakness.   Psychiatric/Behavioral: Negative.            Objective:      PHYSICAL EXAM: Apperance: Alert and orient in no distress,well developed, and with good attention to grooming and body habits  Patient presents ambulating in diabetic shoes and inserts.   LOWER EXTREMITY EXAM:  VASCULAR: Dorsalis pedis pulses 0/4 bilateral (monophasic with doppler)and Posterior Tibial pulses 0/4 bilateral (biphasic with doppler). Capillary fill time <blanched bilateral. Mild edema observed bilateral. Varicosities present bilateral. Skin  temperature of the lower extremities is warm to cool, proximal to distal. Hair growth absent bilateral.  DERMATOLOGICAL: No skin rashes, subcutaneous nodules, lesions, or ulcers observed bilateral. Nails 1,2,3,4,5 bilateral elongated, thickened, and discolored with subungual debris. Webspaces 1,2,3,4clean, dry and without evidence of break in skin integrity bilateral. (-) erythema, edema, and increased temp noted to toes 1-5 bilateral.   NEUROLOGICAL: Light touch, sharp-dull, proprioception all present and equal bilaterally.  Vibratory sensation intact on left hallux and diminished at right hallux. Protective sensation intact at all 10 sites as tested with a Orange-Jimy 5.07 monofilament.   MUSCULOSKELETAL: Muscle strength is 5/5 for foot inverters, everters, plantarflexors, and dorsiflexors. Muscle tone is normal. No pain on palpation of right dorsal foot/ankle. Pes planus noted bilateral. Flexible hammertoes noted bilateral.         Assessment:       Encounter Diagnoses   Name Primary?    Type II diabetes mellitus with peripheral circulatory disorder Yes    Dermatophytosis of nail          Plan:   Type II diabetes mellitus with peripheral circulatory disorder    Dermatophytosis of nail      I counseled the patient on her conditions, their implications and medical management.  Greater than 15 minutes of a 20 minute appointment spent on education about the diabetic foot, neuropathy, and prevention of limb loss.  Shoe inspection. Diabetic Foot Education. Patient reminded of the importance of good nutrition and blood sugar control to help prevent podiatric complications of diabetes. Patient instructed on proper foot hygeine. We discussed wearing proper shoe gear, daily foot inspections, never walking without protective shoe gear, never putting sharp instruments to feet.    With patient's permission, nails 1-5 bilateral were debrided/trimmed in length and thickness aggressively to their soft tissue attachment  mechanically and with electric , removing all offending nail and debris. Patient relates relief following the procedure.  Patient  will continue to monitor the areas daily, inspect feet, wear protective shoe gear when ambulatory, moisturizer to maintain skin integrity. Patient reminded of the importance of good nutrition and blood sugar control to help prevent podiatric complications of diabetes.  Patient to return in this office in approximately 3 months, or sooner if needed.                   Ino Harmon DPM  [facility]

## 2020-06-29 NOTE — PROGRESS NOTES
Chart reviewed.   Immunizations: Triggered Imm Registry     Orders placed: A1c  Upcoming appts to satisfy [place] topics: n/a

## 2020-07-06 ENCOUNTER — PATIENT OUTREACH (OUTPATIENT)
Dept: ADMINISTRATIVE | Facility: HOSPITAL | Age: 80
End: 2020-07-06

## 2020-07-06 NOTE — PROGRESS NOTES
Last BP noted 144/66.   reviewed and updated. Immunizations abstracted.  Care Everywhere abstracted. [facility]: [facility]  AWV:completed 11/11/2019  CC note updated.  Health Maintenance Due   Topic    Shingles Vaccine (1 of 2)    Hemoglobin A1c      Previsit chart audit completed.  *KDL*

## 2020-07-20 ENCOUNTER — HOSPITAL ENCOUNTER (OUTPATIENT)
Dept: RADIOLOGY | Facility: HOSPITAL | Age: 80
Discharge: HOME | End: 2020-07-20
Attending: FAMILY MEDICINE
Payer: MEDICARE

## 2020-07-20 ENCOUNTER — TELEPHONE (OUTPATIENT)
Dept: RADIOLOGY | Facility: HOSPITAL | Age: 80
End: 2020-07-20

## 2020-07-20 ENCOUNTER — OFFICE VISIT (OUTPATIENT)
Dept: INTERNAL MEDICINE | Facility: CLINIC | Age: 80
End: 2020-07-20
Payer: MEDICARE

## 2020-07-20 VITALS
HEIGHT: 66 IN | DIASTOLIC BLOOD PRESSURE: 55 MMHG | RESPIRATION RATE: 20 BRPM | TEMPERATURE: 98 F | WEIGHT: 225.06 LBS | OXYGEN SATURATION: 95 % | BODY MASS INDEX: 36.17 KG/M2 | SYSTOLIC BLOOD PRESSURE: 118 MMHG | HEART RATE: 81 BPM

## 2020-07-20 DIAGNOSIS — R10.11 RIGHT UPPER QUADRANT PAIN: ICD-10-CM

## 2020-07-20 DIAGNOSIS — R10.11 RIGHT UPPER QUADRANT PAIN: Primary | ICD-10-CM

## 2020-07-20 DIAGNOSIS — E11.59 HYPERTENSION ASSOCIATED WITH DIABETES: ICD-10-CM

## 2020-07-20 DIAGNOSIS — I15.2 HYPERTENSION ASSOCIATED WITH DIABETES: ICD-10-CM

## 2020-07-20 PROCEDURE — 71046 XR CHEST PA AND LATERAL: ICD-10-PCS | Mod: 26,HCNC,, | Performed by: RADIOLOGY

## 2020-07-20 PROCEDURE — 71046 X-RAY EXAM CHEST 2 VIEWS: CPT | Mod: TC,HCNC,PO

## 2020-07-20 PROCEDURE — 1101F PT FALLS ASSESS-DOCD LE1/YR: CPT | Mod: HCNC,CPTII,S$GLB, | Performed by: FAMILY MEDICINE

## 2020-07-20 PROCEDURE — 3074F PR MOST RECENT SYSTOLIC BLOOD PRESSURE < 130 MM HG: ICD-10-PCS | Mod: HCNC,CPTII,S$GLB, | Performed by: FAMILY MEDICINE

## 2020-07-20 PROCEDURE — 3078F DIAST BP <80 MM HG: CPT | Mod: HCNC,CPTII,S$GLB, | Performed by: FAMILY MEDICINE

## 2020-07-20 PROCEDURE — 99214 OFFICE O/P EST MOD 30 MIN: CPT | Mod: HCNC,S$GLB,, | Performed by: FAMILY MEDICINE

## 2020-07-20 PROCEDURE — 1101F PR PT FALLS ASSESS DOC 0-1 FALLS W/OUT INJ PAST YR: ICD-10-PCS | Mod: HCNC,CPTII,S$GLB, | Performed by: FAMILY MEDICINE

## 2020-07-20 PROCEDURE — 71046 X-RAY EXAM CHEST 2 VIEWS: CPT | Mod: 26,HCNC,, | Performed by: RADIOLOGY

## 2020-07-20 PROCEDURE — 99499 RISK ADDL DX/OHS AUDIT: ICD-10-PCS | Mod: HCNC,S$GLB,, | Performed by: FAMILY MEDICINE

## 2020-07-20 PROCEDURE — 1159F MED LIST DOCD IN RCRD: CPT | Mod: HCNC,S$GLB,, | Performed by: FAMILY MEDICINE

## 2020-07-20 PROCEDURE — 1126F PR PAIN SEVERITY QUANTIFIED, NO PAIN PRESENT: ICD-10-PCS | Mod: HCNC,S$GLB,, | Performed by: FAMILY MEDICINE

## 2020-07-20 PROCEDURE — 99214 PR OFFICE/OUTPT VISIT, EST, LEVL IV, 30-39 MIN: ICD-10-PCS | Mod: HCNC,S$GLB,, | Performed by: FAMILY MEDICINE

## 2020-07-20 PROCEDURE — 3078F PR MOST RECENT DIASTOLIC BLOOD PRESSURE < 80 MM HG: ICD-10-PCS | Mod: HCNC,CPTII,S$GLB, | Performed by: FAMILY MEDICINE

## 2020-07-20 PROCEDURE — 1159F PR MEDICATION LIST DOCUMENTED IN MEDICAL RECORD: ICD-10-PCS | Mod: HCNC,S$GLB,, | Performed by: FAMILY MEDICINE

## 2020-07-20 PROCEDURE — 99999 PR PBB SHADOW E&M-EST. PATIENT-LVL V: CPT | Mod: PBBFAC,HCNC,, | Performed by: FAMILY MEDICINE

## 2020-07-20 PROCEDURE — 99999 PR PBB SHADOW E&M-EST. PATIENT-LVL V: ICD-10-PCS | Mod: PBBFAC,HCNC,, | Performed by: FAMILY MEDICINE

## 2020-07-20 PROCEDURE — 99499 UNLISTED E&M SERVICE: CPT | Mod: HCNC,S$GLB,, | Performed by: FAMILY MEDICINE

## 2020-07-20 PROCEDURE — 1126F AMNT PAIN NOTED NONE PRSNT: CPT | Mod: HCNC,S$GLB,, | Performed by: FAMILY MEDICINE

## 2020-07-20 PROCEDURE — 3074F SYST BP LT 130 MM HG: CPT | Mod: HCNC,CPTII,S$GLB, | Performed by: FAMILY MEDICINE

## 2020-07-20 NOTE — ASSESSMENT & PLAN NOTE
Diabetes and hypertension continue to be well controlled.  Plan to continue dietary control for diabetes and continue on same hydrochlorothiazide dose for blood pressure.

## 2020-07-20 NOTE — PROGRESS NOTES
Subjective:       Patient ID: Kaleigh Nieves is a 79 y.o. female.    Chief Complaint: Follow-up (6 month- DM/HTN)    HPI  Came in today for routine 6 month follow-up on hypertension and diabetes.  She says overall she has been doing well.  Reports less swelling in her feet which was an issue that she had previously.  Monitoring her blood pressure which remains well controlled at home.  She does have a new complaint of right upper quadrant and right lower ribcage discomfort.  Says that has been going on for about a month.  Exacerbated whenever she lays on that side.  Not having any urinary nor bowel issues although she says she has fluctuating constipation and diarrhea at times.  No shortness of breath.  No cough.  Nothing makes the symptoms better other than changing position.    Family History   Problem Relation Age of Onset    Hypertension Mother     Diabetes Mother     Leukemia Sister     Hypertension Sister     Cancer Sister         leukemia    Heart disease Maternal Aunt     Cancer Maternal Uncle         gastric, pacreatic    Heart disease Maternal Uncle     Miscarriages / Stillbirths Maternal Uncle     Diabetes Maternal Grandmother     Asthma Maternal Grandfather     Breast cancer Neg Hx     Colon cancer Neg Hx     Ovarian cancer Neg Hx     COPD Neg Hx     Hyperlipidemia Neg Hx     Kidney disease Neg Hx        Current Outpatient Medications:     albuterol (PROVENTIL/VENTOLIN HFA) 90 mcg/actuation inhaler, INHALE 2 PUFFS INTO THE LUNGS EVERY 6 HOURS AS NEEDED FOR WHEEZING., Disp: 18 g, Rfl: 11    allopurinoL (ZYLOPRIM) 100 MG tablet, Take 1 tablet (100 mg total) by mouth once daily., Disp: 90 tablet, Rfl: 0    aspirin 81 MG Chew, Take 81 mg by mouth once daily., Disp: , Rfl:     blood glucose control high,low (ACCU-CHEK DARSHAN CONTROL SOLN) Soln, Use a directed, Disp: 1 each, Rfl: 3    blood sugar diagnostic (ACCU-CHEK DARSHAN PLUS TEST STRP) Strp, TEST BLOOD SUGAR TWICE DAILY, Disp: 200 strip,  "Rfl: 3    blood-glucose meter (ACCU-CHEK DARSHAN PLUS METER) Misc, 1 Device by Misc.(Non-Drug; Combo Route) route 2 (two) times daily., Disp: 1 each, Rfl: 1    COLCRYS 0.6 mg tablet, TAKE ONE TABLET BY MOUTH EVERY DAY, Disp: 90 tablet, Rfl: 4    econazole nitrate 1 % cream, APPLY TO AFFECTED AREA TOPICALLY EVERY DAY, Disp: 30 g, Rfl: 0    guaifenesin 100 mg/5 ml (ROBITUSSIN) 100 mg/5 mL syrup, TAKE 2 TEASPOONSFUL (10MLS) BY MOUTH THREE TIMES A DAY AS NEEDED FOR COUGH, Disp: 236 mL, Rfl: 2    hydroCHLOROthiazide (HYDRODIURIL) 12.5 MG Tab, Take 1 tablet (12.5 mg total) by mouth once daily., Disp: 90 tablet, Rfl: 2    lancets (ACCU-CHEK SOFTCLIX LANCETS) Misc, TEST TWICE A DAY, Disp: 200 each, Rfl: 3    simvastatin (ZOCOR) 20 MG tablet, TAKE 1 TABLET EVERY NIGHT, Disp: 90 tablet, Rfl: 4    timolol maleate 0.5% (TIMOPTIC) 0.5 % Drop, Place 1 drop into both eyes 2 (two) times daily., Disp: 25 mL, Rfl: 5    Review of Systems   Constitutional: Negative for chills and fever.   Respiratory: Negative for cough and shortness of breath.    Cardiovascular: Negative for chest pain.   Gastrointestinal: Negative for abdominal pain.   Skin: Negative for rash.   Neurological: Negative for dizziness.       Objective:   BP (!) 118/55 Comment: average home reading  Pulse 81   Temp 97.7 °F (36.5 °C) (Temporal)   Resp 20   Ht 5' 6" (1.676 m)   Wt 102.1 kg (225 lb 1.4 oz)   SpO2 95%   BMI 36.33 kg/m²      Physical Exam  Constitutional:       General: She is not in acute distress.     Appearance: She is well-developed. She is not diaphoretic.   HENT:      Head: Normocephalic and atraumatic.      Nose: Nose normal.   Eyes:      General:         Right eye: No discharge.         Left eye: No discharge.      Conjunctiva/sclera: Conjunctivae normal.      Pupils: Pupils are equal, round, and reactive to light.   Neck:      Thyroid: No thyromegaly.   Cardiovascular:      Rate and Rhythm: Normal rate and regular rhythm.      Heart " sounds: No murmur.   Pulmonary:      Effort: Pulmonary effort is normal. No respiratory distress.      Breath sounds: Normal breath sounds.   Abdominal:      General: There is no distension.      Palpations: Abdomen is soft.       Skin:     Findings: No rash.   Neurological:      Mental Status: She is alert and oriented to person, place, and time.   Psychiatric:         Behavior: Behavior normal.         Assessment & Plan     Problem List Items Addressed This Visit        Cardiac/Vascular    Hypertension associated with diabetes - Primary    Current Assessment & Plan     Diabetes and hypertension continue to be well controlled.  Plan to continue dietary control for diabetes and continue on same hydrochlorothiazide dose for blood pressure.         Relevant Orders    Hemoglobin A1C    Comprehensive metabolic panel       GI    Right upper quadrant pain    Current Assessment & Plan     Recommended minimal imaging with chest x-ray to rule out right lower lobe pathology and doing right upper quadrant ultrasound to look at liver.  If continues to have pain and this imaging is not revealing may consider doing more advanced imaging with CT scan.         Relevant Orders    X-Ray Chest PA And Lateral    US Abdomen Complete    URINALYSIS            No follow-ups on file.    Disclaimer:  This note may have been prepared using voice recognition software, it may have not been extensively proofed, as such there could be errors within the text such as sound alike errors.

## 2020-07-20 NOTE — ASSESSMENT & PLAN NOTE
Recommended minimal imaging with chest x-ray to rule out right lower lobe pathology and doing right upper quadrant ultrasound to look at liver.  If continues to have pain and this imaging is not revealing may consider doing more advanced imaging with CT scan.

## 2020-07-21 ENCOUNTER — HOSPITAL ENCOUNTER (OUTPATIENT)
Dept: RADIOLOGY | Facility: HOSPITAL | Age: 80
Discharge: HOME | End: 2020-07-21
Attending: FAMILY MEDICINE
Payer: MEDICARE

## 2020-07-21 DIAGNOSIS — R10.11 RIGHT UPPER QUADRANT PAIN: ICD-10-CM

## 2020-07-21 PROCEDURE — 76700 US ABDOMEN COMPLETE: ICD-10-PCS | Mod: 26,HCNC,, | Performed by: RADIOLOGY

## 2020-07-21 PROCEDURE — 76700 US EXAM ABDOM COMPLETE: CPT | Mod: 26,HCNC,, | Performed by: RADIOLOGY

## 2020-07-21 PROCEDURE — 76700 US EXAM ABDOM COMPLETE: CPT | Mod: TC,HCNC,PO

## 2020-07-22 DIAGNOSIS — E11.42 TYPE 2 DIABETES MELLITUS WITH DIABETIC POLYNEUROPATHY, WITHOUT LONG-TERM CURRENT USE OF INSULIN: ICD-10-CM

## 2020-07-22 RX ORDER — BLOOD SUGAR DIAGNOSTIC
STRIP MISCELLANEOUS
Qty: 200 STRIP | Refills: 3 | Status: SHIPPED | OUTPATIENT
Start: 2020-07-22 | End: 2021-06-29

## 2020-07-22 RX ORDER — BLOOD GLUCOSE CONTROL HIGH,LOW
EACH MISCELLANEOUS
Qty: 1 EACH | Refills: 5 | Status: SHIPPED | OUTPATIENT
Start: 2020-07-22 | End: 2022-03-21

## 2020-07-22 RX ORDER — LANCETS
EACH MISCELLANEOUS
Qty: 100 EACH | Refills: 11 | Status: SHIPPED | OUTPATIENT
Start: 2020-07-22 | End: 2022-01-18 | Stop reason: SDUPTHER

## 2020-09-02 ENCOUNTER — TELEPHONE (OUTPATIENT)
Dept: INTERNAL MEDICINE | Facility: CLINIC | Age: 80
End: 2020-09-02

## 2020-09-02 NOTE — TELEPHONE ENCOUNTER
----- Message from Rhonda Cohn sent at 9/2/2020  1:53 PM CDT -----  Regarding: Pharmacy auth  Type:  Pharmacy Calling to Clarify an RX    Name of Caller: Glenda   [facility]   Prescription Name: econazole nitrate 1 % cream  What do they need to clarify?: New Refill request   Best Call Back Number:[phone] option#8   Additional Information: ref#[id number]

## 2020-09-16 DIAGNOSIS — L90.0 LICHEN SCLEROSUS: ICD-10-CM

## 2020-09-16 RX ORDER — ECONAZOLE NITRATE 10 MG/G
CREAM TOPICAL
Qty: 30 G | Refills: 1 | Status: SHIPPED | OUTPATIENT
Start: 2020-09-16 | End: 2021-01-25 | Stop reason: SDUPTHER

## 2020-10-15 ENCOUNTER — OFFICE VISIT (OUTPATIENT)
Dept: OPHTHALMOLOGY | Facility: CLINIC | Age: 80
End: 2020-10-15
Payer: MEDICARE

## 2020-10-15 ENCOUNTER — OFFICE VISIT (OUTPATIENT)
Dept: PODIATRY | Facility: CLINIC | Age: 80
End: 2020-10-15
Payer: MEDICARE

## 2020-10-15 ENCOUNTER — PATIENT OUTREACH (OUTPATIENT)
Dept: ADMINISTRATIVE | Facility: OTHER | Age: 80
End: 2020-10-15

## 2020-10-15 VITALS
WEIGHT: 225 LBS | SYSTOLIC BLOOD PRESSURE: 156 MMHG | DIASTOLIC BLOOD PRESSURE: 71 MMHG | HEIGHT: 66 IN | HEART RATE: 80 BPM | BODY MASS INDEX: 36.16 KG/M2

## 2020-10-15 DIAGNOSIS — B35.1 DERMATOPHYTOSIS OF NAIL: ICD-10-CM

## 2020-10-15 DIAGNOSIS — E11.51 TYPE II DIABETES MELLITUS WITH PERIPHERAL CIRCULATORY DISORDER: Primary | ICD-10-CM

## 2020-10-15 DIAGNOSIS — H40.1134 PRIMARY OPEN ANGLE GLAUCOMA OF BOTH EYES, INDETERMINATE STAGE: Primary | ICD-10-CM

## 2020-10-15 PROCEDURE — 99499 UNLISTED E&M SERVICE: CPT | Mod: HCNC,S$GLB,, | Performed by: PODIATRIST

## 2020-10-15 PROCEDURE — 92012 INTRM OPH EXAM EST PATIENT: CPT | Mod: HCNC,S$GLB,, | Performed by: OPTOMETRIST

## 2020-10-15 PROCEDURE — 11721 PR DEBRIDEMENT OF NAILS, 6 OR MORE: ICD-10-PCS | Mod: Q8,HCNC,S$GLB, | Performed by: PODIATRIST

## 2020-10-15 PROCEDURE — 99999 PR PBB SHADOW E&M-EST. PATIENT-LVL III: CPT | Mod: PBBFAC,HCNC,, | Performed by: OPTOMETRIST

## 2020-10-15 PROCEDURE — 92012 PR EYE EXAM, EST PATIENT,INTERMED: ICD-10-PCS | Mod: HCNC,S$GLB,, | Performed by: OPTOMETRIST

## 2020-10-15 PROCEDURE — 99499 NO LOS: ICD-10-PCS | Mod: HCNC,S$GLB,, | Performed by: PODIATRIST

## 2020-10-15 PROCEDURE — 99999 PR PBB SHADOW E&M-EST. PATIENT-LVL IV: ICD-10-PCS | Mod: PBBFAC,HCNC,, | Performed by: PODIATRIST

## 2020-10-15 PROCEDURE — 99999 PR PBB SHADOW E&M-EST. PATIENT-LVL III: ICD-10-PCS | Mod: PBBFAC,HCNC,, | Performed by: OPTOMETRIST

## 2020-10-15 PROCEDURE — 92133 CPTRZD OPH DX IMG PST SGM ON: CPT | Mod: HCNC,S$GLB,, | Performed by: OPTOMETRIST

## 2020-10-15 PROCEDURE — 99999 PR PBB SHADOW E&M-EST. PATIENT-LVL IV: CPT | Mod: PBBFAC,HCNC,, | Performed by: PODIATRIST

## 2020-10-15 PROCEDURE — 11721 DEBRIDE NAIL 6 OR MORE: CPT | Mod: Q8,HCNC,S$GLB, | Performed by: PODIATRIST

## 2020-10-15 PROCEDURE — 92133 POSTERIOR SEGMENT OCT OPTIC NERVE(OCULAR COHERENCE TOMOGRAPHY) - OU - BOTH EYES: ICD-10-PCS | Mod: HCNC,S$GLB,, | Performed by: OPTOMETRIST

## 2020-10-15 NOTE — PROGRESS NOTES
Health Maintenance Due   Topic Date Due    Shingles Vaccine (1 of 2) 09/25/1990    Influenza Vaccine (1) 08/01/2020    Urine Microalbumin  12/12/2020     Updates were requested from care everywhere.  Chart was reviewed for overdue Proactive [facility] ([place]) topics (CRS, Breast Cancer Screening, Eye exam)  Health Maintenance has been updated.  [facility] immunization registry triggered.  Immunizations were reconciled.

## 2020-10-15 NOTE — PROGRESS NOTES
HPI     PT was last seen on 6/29/20 with with DNL for IOP check. PT was told to   RTC 3 months for IOP check and gOCT  Medication eye drops if any: Timolol BID OU  Pt states that OD is watering and itch. Uses Refresh 1-2 times a day.  Last HVF: 1/28/2020   Last gOCT: 10/15/20  Last SDPs:None    Last edited by Mary Villagomez MA on 10/15/2020 10:01 AM. (History)            Assessment /Plan     For exam results, see Encounter Report.    Primary open angle glaucoma of both eyes, indeterminate stage  -     Posterior Segment OCT Optic Nerve- Both eyes  IOP stable today and within acceptable range OU  gOCT stable as well with no progression compared to previous visit  Continue current Timolol bid OU  Monitor 4 months      RTC 4 months for 24-2VF, optos SDPs and dilation or PRN  Discussed above and all questions were answered.

## 2020-10-15 NOTE — PROGRESS NOTES
Subjective:     Patient ID: Kaleigh Nieves is a 80 y.o. female.    Chief Complaint: Nail Care (no c/o pain. wears casual shoes with socks. diabetic Pt. PCP Dr. Hanks.)    Kaleigh is a 80 y.o. female who presents to the clinic for evaluation and treatment of high risk feet. Kaleigh has a past medical history of Anemia, Angina pectoris, Arthritis, Asthma, Back pain, Bronchitis, CAD (coronary artery disease) (7/15), Carotid artery stenosis, Chronic bronchitis, Chronic gout, Colon polyp, CTS (carpal tunnel syndrome), Diabetes mellitus, type 2, Diabetes with neurologic complications, Diverticulosis, RIVAS (dyspnea on exertion), Dyslipidemia, Ex-smoker, GERD (gastroesophageal reflux disease), Hyperlipidemia, Hypertension, Neuropathy, lower extremity, Obesity, Open-angle glaucoma, Peripheral vascular disease, Polyneuropathy, Tobacco dependence, and Viral gastroenteritis (3/10/2020). The patient's chief complaint is long, thick toenails. Patient states her blood sugar this morning was 98mg/dl.  This patient has documented high risk feet requiring routine maintenance secondary to diabetes mellitis and those secondary complications of diabetes, as mentioned.     PCP: Ramos Hanks, DO    Date Last Seen by PCP: 07/20/2020    Current shoe gear:  Affected Foot: Rx diabetic extra depth shoes and custom accommodative insoles     Unaffected Foot: Rx diabetic extra depth shoes and custom accommodative insoles    Hemoglobin A1C   Date Value Ref Range Status   07/20/2020 5.7 (H) 4.0 - 5.6 % Final     Comment:     ADA Screening Guidelines:  5.7-6.4%  Consistent with prediabetes  >or=6.5%  Consistent with diabetes  High levels of fetal hemoglobin interfere with the HbA1C  assay. Heterozygous hemoglobin variants (HbS, HgC, etc)do  not significantly interfere with this assay.   However, presence of multiple variants may affect accuracy.     12/06/2019 6.0 (H) 4.0 - 5.6 % Final     Comment:     ADA Screening Guidelines:  5.7-6.4%  Consistent  with prediabetes  >or=6.5%  Consistent with diabetes  High levels of fetal hemoglobin interfere with the HbA1C  assay. Heterozygous hemoglobin variants (HbS, HgC, etc)do  not significantly interfere with this assay.   However, presence of multiple variants may affect accuracy.     06/04/2019 5.9 (H) 4.0 - 5.6 % Final     Comment:     ADA Screening Guidelines:  5.7-6.4%  Consistent with prediabetes  >or=6.5%  Consistent with diabetes  High levels of fetal hemoglobin interfere with the HbA1C  assay. Heterozygous hemoglobin variants (HbS, HgC, etc)do  not significantly interfere with this assay.   However, presence of multiple variants may affect accuracy.             Patient Active Problem List   Diagnosis    Type 2 diabetes mellitus with diabetic polyneuropathy, without long-term current use of insulin    Multiple joint pain    Esophageal reflux    Hyperlipidemia    Chronic gout    Lichen sclerosus    Carotid artery disease    CAD (coronary artery disease)    Colon polyp    Hypertension associated with diabetes    Aortic atherosclerosis    Diastolic dysfunction    Arterial insufficiency of lower extremity    Severe obesity (BMI 35.0-35.9 with comorbidity)    Glaucoma    Ganglion cyst of flexor tendon sheath of finger of right hand    Bilateral carpal tunnel syndrome    Right carpal tunnel syndrome    Chronic bronchitis    Dependent edema    Diverticulosis    Primary open-angle glaucoma, bilateral, indeterminate stage    Cough with hemoptysis    Viral gastroenteritis    Right upper quadrant pain       Medication List with Changes/Refills   Current Medications    ACCU-CHEK DARSHAN PLUS METER MISC    USE AS DIRECTED    ACCU-CHEK DARSHAN PLUS TEST STRP STRP    TEST BLOOD SUGAR TWICE DAILY    ALBUTEROL (PROVENTIL/VENTOLIN HFA) 90 MCG/ACTUATION INHALER    INHALE 2 PUFFS INTO THE LUNGS EVERY 6 HOURS AS NEEDED FOR WHEEZING.    ALLOPURINOL (ZYLOPRIM) 100 MG TABLET    Take 1 tablet (100 mg total) by mouth  once daily.    ASPIRIN 81 MG CHEW    Take 81 mg by mouth once daily.    BLOOD GLUCOSE CONTROL HIGH,LOW (ACCU-CHEK DARSHAN CONTROL SOLN) SOLN    USE AS DIRECTED    COLCRYS 0.6 MG TABLET    TAKE ONE TABLET BY MOUTH EVERY DAY    ECONAZOLE NITRATE 1 % CREAM    APPLY TO AFFECTED AREA TOPICALLY EVERY DAY    GUAIFENESIN 100 MG/5 ML (ROBITUSSIN) 100 MG/5 ML SYRUP    TAKE 2 TEASPOONSFUL (10MLS) BY MOUTH THREE TIMES A DAY AS NEEDED FOR COUGH    HYDROCHLOROTHIAZIDE (HYDRODIURIL) 12.5 MG TAB    Take 1 tablet (12.5 mg total) by mouth once daily.    LANCETS (ACCU-CHEK SOFTCLIX LANCETS) MISC    TEST TWO TIMES DAILY    SIMVASTATIN (ZOCOR) 20 MG TABLET    TAKE 1 TABLET EVERY NIGHT    TIMOLOL MALEATE 0.5% (TIMOPTIC) 0.5 % DROP    Place 1 drop into both eyes 2 (two) times daily.       Review of patient's allergies indicates:   Allergen Reactions    Iodine and iodide containing products Nausea And Vomiting    Penicillins Itching    Amlodipine      edema    Sulfa (sulfonamide antibiotics)     Ultram [tramadol] Other (See Comments)     Light headness, itiching    Bactrim [sulfamethoxazole-trimethoprim] Itching and Rash       Past Surgical History:   Procedure Laterality Date    CARPAL TUNNEL RELEASE Right     CATARACT EXTRACTION, BILATERAL      CHOLECYSTECTOMY      CYST REMOVAL  2018    DILATION AND CURETTAGE OF UTERUS      EYE SURGERY      HEMORRHOID SURGERY      HYSTERECTOMY      fibroids    INCISION AND DRAINAGE PERIRECTAL ABSCESS      JOINT REPLACEMENT Bilateral     knee    KNEE ARTHROSCOPY Right     MYOMECTOMY         Family History   Problem Relation Age of Onset    Hypertension Mother     Diabetes Mother     Leukemia Sister     Hypertension Sister     Cancer Sister         leukemia    Heart disease Maternal Aunt     Cancer Maternal Uncle         gastric, pacreatic    Heart disease Maternal Uncle     Miscarriages / Stillbirths Maternal Uncle     Diabetes Maternal Grandmother     Asthma Maternal  "Grandfather     Breast cancer Neg Hx     Colon cancer Neg Hx     Ovarian cancer Neg Hx     COPD Neg Hx     Hyperlipidemia Neg Hx     Kidney disease Neg Hx        Social History     Socioeconomic History    Marital status: Single     Spouse name: Not on file    Number of children: 0    Years of education: Not on file    Highest education level: Not on file   Occupational History    Not on file   Social Needs    Financial resource strain: Not on file    Food insecurity     Worry: Not on file     Inability: Not on file    Transportation needs     Medical: Not on file     Non-medical: Not on file   Tobacco Use    Smoking status: Former Smoker     Packs/day: 0.25     Years: 24.00     Pack years: 6.00     Quit date: 1976     Years since quittin.3    Smokeless tobacco: Never Used   Substance and Sexual Activity    Alcohol use: No    Drug use: No    Sexual activity: Not Currently   Lifestyle    Physical activity     Days per week: Not on file     Minutes per session: Not on file    Stress: Not at all   Relationships    Social connections     Talks on phone: Not on file     Gets together: Not on file     Attends [employer] service: Not on file     Active member of club or organization: Not on file     Attends meetings of clubs or organizations: Not on file     Relationship status: Not on file   Other Topics Concern    Not on file   Social History Narrative    Not on file       Vitals:    10/15/20 1108   BP: (!) 156/71   Pulse: 80   Weight: 102.1 kg (225 lb)   Height: 5' 6" (1.676 m)   PainSc: 0-No pain   PainLoc: Foot       Hemoglobin A1C   Date Value Ref Range Status   2020 5.7 (H) 4.0 - 5.6 % Final     Comment:     ADA Screening Guidelines:  5.7-6.4%  Consistent with prediabetes  >or=6.5%  Consistent with diabetes  High levels of fetal hemoglobin interfere with the HbA1C  assay. Heterozygous hemoglobin variants (HbS, HgC, etc)do  not significantly interfere with this assay.   However, " presence of multiple variants may affect accuracy.     12/06/2019 6.0 (H) 4.0 - 5.6 % Final     Comment:     ADA Screening Guidelines:  5.7-6.4%  Consistent with prediabetes  >or=6.5%  Consistent with diabetes  High levels of fetal hemoglobin interfere with the HbA1C  assay. Heterozygous hemoglobin variants (HbS, HgC, etc)do  not significantly interfere with this assay.   However, presence of multiple variants may affect accuracy.     06/04/2019 5.9 (H) 4.0 - 5.6 % Final     Comment:     ADA Screening Guidelines:  5.7-6.4%  Consistent with prediabetes  >or=6.5%  Consistent with diabetes  High levels of fetal hemoglobin interfere with the HbA1C  assay. Heterozygous hemoglobin variants (HbS, HgC, etc)do  not significantly interfere with this assay.   However, presence of multiple variants may affect accuracy.         Review of Systems   Constitutional: Negative for chills and fever.   Respiratory: Negative for shortness of breath.    Cardiovascular: Negative for chest pain, palpitations, orthopnea and claudication.   Gastrointestinal: Negative for diarrhea, nausea and vomiting.   Musculoskeletal: Negative for joint pain.   Skin: Negative for rash.   Neurological: Positive for tingling and sensory change. Negative for dizziness, focal weakness and weakness.   Psychiatric/Behavioral: Negative.            Objective:      PHYSICAL EXAM: Apperance: Alert and orient in no distress,well developed, and with good attention to grooming and body habits  Patient presents ambulating in diabetic shoes and inserts.   LOWER EXTREMITY EXAM:  VASCULAR: Dorsalis pedis pulses 0/4 bilateral (monophasic with doppler) and Posterior Tibial pulses 0/4 bilateral (biphasic with doppler). Capillary fill time <blanched bilateral. Mild edema observed bilateral. Varicosities present bilateral. Skin temperature of the lower extremities is warm to cool, proximal to distal. Hair growth absent bilateral.  DERMATOLOGICAL: No skin rashes, subcutaneous  nodules, lesions, or ulcers observed bilateral. Nails 1,2,3,4,5 bilateral elongated, thickened, and discolored with subungual debris. Webspaces 1,2,3,4clean, dry and without evidence of break in skin integrity bilateral.   NEUROLOGICAL: Light touch, sharp-dull, proprioception all present and equal bilaterally.  Vibratory sensation intact on left hallux and diminished at right hallux. Protective sensation intact at all 10 sites as tested with a [place] [employer] 5.07 monofilament.   MUSCULOSKELETAL: Muscle strength is 5/5 for foot inverters, everters, plantarflexors, and dorsiflexors. Muscle tone is normal. No pain on palpation of right dorsal foot/ankle. Pes planus noted bilateral. Flexible hammertoes noted bilateral.         Assessment:       Encounter Diagnoses   Name Primary?    Type II diabetes mellitus with peripheral circulatory disorder Yes    Dermatophytosis of nail          Plan:   Type II diabetes mellitus with peripheral circulatory disorder    Dermatophytosis of nail      I counseled the patient on her conditions, their implications and medical management.  Greater than 15 minutes of a 20 minute appointment spent on education about the diabetic foot, neuropathy, and prevention of limb loss.  Shoe inspection. Diabetic Foot Education. Patient reminded of the importance of good nutrition and blood sugar control to help prevent podiatric complications of diabetes. Patient instructed on proper foot hygeine. We discussed wearing proper shoe gear, daily foot inspections, never walking without protective shoe gear, never putting sharp instruments to feet.    With patient's permission, nails 1-5 bilateral were debrided/trimmed in length and thickness aggressively to their soft tissue attachment mechanically and with electric , removing all offending nail and debris. Patient relates relief following the procedure.  Patient  will continue to monitor the areas daily, inspect feet, wear protective shoe gear  when ambulatory, moisturizer to maintain skin integrity. Patient reminded of the importance of good nutrition and blood sugar control to help prevent podiatric complications of diabetes.  Patient to return in this office in approximately 3 months, or sooner if needed.                   Ino Harmon DPM  [facility]

## 2020-11-02 RX ORDER — COLCHICINE 0.6 MG/1
TABLET, FILM COATED ORAL
Qty: 30 TABLET | Refills: 4 | Status: SHIPPED | OUTPATIENT
Start: 2020-11-02 | End: 2021-01-25

## 2020-11-02 NOTE — TELEPHONE ENCOUNTER
----- Message from Earl Sorenson sent at 11/2/2020  3:13 PM CST -----  Contact: self  Would like to consult with nurse regarding upcoming labs.  Pt states she needs labs for A1c, liver fuction, and urine protein.  Please contact Kaleigh Nieves @ [phone].  Thanks/As

## 2020-11-17 ENCOUNTER — PATIENT OUTREACH (OUTPATIENT)
Dept: ADMINISTRATIVE | Facility: HOSPITAL | Age: 80
End: 2020-11-17

## 2020-12-27 NOTE — LETTER
March 7, 2018      Oscar Rizzo PA-C  [address] [place] [address]           [facility]  [address] [place] [address]  Phone: [phone]  Fax: [phone]          Patient: Kaleigh Nieves   MR Number: [id number]   YOB: 1940   Date of Visit: 3/7/2018       Dear Oscar Rizzo:    Thank you for referring Kaleigh Nieves to me for evaluation. Attached you will find relevant portions of my assessment and plan of care.    If you have questions, please do not hesitate to call me. I look forward to following Kaleigh Nieves along with you.    Sincerely,    Arcelia Downey MD    Enclosure  CC:  No Recipients    If you would like to receive this communication electronically, please contact [email or web address] or [phone] to request more information on [employer] access.    For providers and/or their staff who would like to refer a patient to [facility], please contact us through our one-stop-shop provider referral line, [facility] , at [phone].    If you feel you have received this communication in error or would no longer like to receive these types of communications, please e-mail [email or web address]         
27-Dec-2020 00:35

## 2021-01-21 ENCOUNTER — OFFICE VISIT (OUTPATIENT)
Dept: PODIATRY | Facility: CLINIC | Age: 81
End: 2021-01-21
Payer: MEDICARE

## 2021-01-21 VITALS
DIASTOLIC BLOOD PRESSURE: 67 MMHG | HEART RATE: 78 BPM | WEIGHT: 225.06 LBS | SYSTOLIC BLOOD PRESSURE: 156 MMHG | BODY MASS INDEX: 36.17 KG/M2 | HEIGHT: 66 IN

## 2021-01-21 DIAGNOSIS — B35.1 DERMATOPHYTOSIS OF NAIL: ICD-10-CM

## 2021-01-21 DIAGNOSIS — E11.51 TYPE II DIABETES MELLITUS WITH PERIPHERAL CIRCULATORY DISORDER: ICD-10-CM

## 2021-01-21 DIAGNOSIS — M1A.9XX0 CHRONIC GOUT WITHOUT TOPHUS, UNSPECIFIED CAUSE, UNSPECIFIED SITE: Primary | ICD-10-CM

## 2021-01-21 PROCEDURE — 1159F MED LIST DOCD IN RCRD: CPT | Mod: S$GLB,,, | Performed by: PODIATRIST

## 2021-01-21 PROCEDURE — 3077F SYST BP >= 140 MM HG: CPT | Mod: CPTII,S$GLB,, | Performed by: PODIATRIST

## 2021-01-21 PROCEDURE — 99999 PR PBB SHADOW E&M-EST. PATIENT-LVL III: ICD-10-PCS | Mod: PBBFAC,,, | Performed by: PODIATRIST

## 2021-01-21 PROCEDURE — 1159F PR MEDICATION LIST DOCUMENTED IN MEDICAL RECORD: ICD-10-PCS | Mod: S$GLB,,, | Performed by: PODIATRIST

## 2021-01-21 PROCEDURE — 99999 PR PBB SHADOW E&M-EST. PATIENT-LVL III: CPT | Mod: PBBFAC,,, | Performed by: PODIATRIST

## 2021-01-21 PROCEDURE — 11721 DEBRIDE NAIL 6 OR MORE: CPT | Mod: Q8,S$GLB,, | Performed by: PODIATRIST

## 2021-01-21 PROCEDURE — 99213 OFFICE O/P EST LOW 20 MIN: CPT | Mod: 25,S$GLB,, | Performed by: PODIATRIST

## 2021-01-21 PROCEDURE — 3072F PR LOW RISK FOR RETINOPATHY: ICD-10-PCS | Mod: S$GLB,,, | Performed by: PODIATRIST

## 2021-01-21 PROCEDURE — 3078F PR MOST RECENT DIASTOLIC BLOOD PRESSURE < 80 MM HG: ICD-10-PCS | Mod: CPTII,S$GLB,, | Performed by: PODIATRIST

## 2021-01-21 PROCEDURE — 3288F FALL RISK ASSESSMENT DOCD: CPT | Mod: CPTII,S$GLB,, | Performed by: PODIATRIST

## 2021-01-21 PROCEDURE — 11721 PR DEBRIDEMENT OF NAILS, 6 OR MORE: ICD-10-PCS | Mod: Q8,S$GLB,, | Performed by: PODIATRIST

## 2021-01-21 PROCEDURE — 1101F PT FALLS ASSESS-DOCD LE1/YR: CPT | Mod: CPTII,S$GLB,, | Performed by: PODIATRIST

## 2021-01-21 PROCEDURE — 1101F PR PT FALLS ASSESS DOC 0-1 FALLS W/OUT INJ PAST YR: ICD-10-PCS | Mod: CPTII,S$GLB,, | Performed by: PODIATRIST

## 2021-01-21 PROCEDURE — 99213 PR OFFICE/OUTPT VISIT, EST, LEVL III, 20-29 MIN: ICD-10-PCS | Mod: 25,S$GLB,, | Performed by: PODIATRIST

## 2021-01-21 PROCEDURE — 3077F PR MOST RECENT SYSTOLIC BLOOD PRESSURE >= 140 MM HG: ICD-10-PCS | Mod: CPTII,S$GLB,, | Performed by: PODIATRIST

## 2021-01-21 PROCEDURE — 3288F PR FALLS RISK ASSESSMENT DOCUMENTED: ICD-10-PCS | Mod: CPTII,S$GLB,, | Performed by: PODIATRIST

## 2021-01-21 PROCEDURE — 3072F LOW RISK FOR RETINOPATHY: CPT | Mod: S$GLB,,, | Performed by: PODIATRIST

## 2021-01-21 PROCEDURE — 3078F DIAST BP <80 MM HG: CPT | Mod: CPTII,S$GLB,, | Performed by: PODIATRIST

## 2021-01-25 ENCOUNTER — HOSPITAL ENCOUNTER (OUTPATIENT)
Dept: RADIOLOGY | Facility: HOSPITAL | Age: 81
Discharge: HOME | End: 2021-01-25
Attending: FAMILY MEDICINE
Payer: MEDICARE

## 2021-01-25 ENCOUNTER — HOSPITAL ENCOUNTER (OUTPATIENT)
Dept: CARDIOLOGY | Facility: HOSPITAL | Age: 81
Discharge: HOME | End: 2021-01-25
Attending: FAMILY MEDICINE
Payer: MEDICARE

## 2021-01-25 ENCOUNTER — OFFICE VISIT (OUTPATIENT)
Dept: INTERNAL MEDICINE | Facility: CLINIC | Age: 81
End: 2021-01-25
Payer: MEDICARE

## 2021-01-25 VITALS
RESPIRATION RATE: 20 BRPM | SYSTOLIC BLOOD PRESSURE: 152 MMHG | BODY MASS INDEX: 36.32 KG/M2 | DIASTOLIC BLOOD PRESSURE: 60 MMHG | TEMPERATURE: 99 F | HEART RATE: 82 BPM | WEIGHT: 226 LBS | HEIGHT: 66 IN | OXYGEN SATURATION: 95 %

## 2021-01-25 DIAGNOSIS — R06.09 DOE (DYSPNEA ON EXERTION): ICD-10-CM

## 2021-01-25 DIAGNOSIS — L90.0 LICHEN SCLEROSUS: ICD-10-CM

## 2021-01-25 DIAGNOSIS — E11.59 HYPERTENSION ASSOCIATED WITH DIABETES: ICD-10-CM

## 2021-01-25 DIAGNOSIS — I70.0 AORTIC ATHEROSCLEROSIS: ICD-10-CM

## 2021-01-25 DIAGNOSIS — I15.2 HYPERTENSION ASSOCIATED WITH DIABETES: ICD-10-CM

## 2021-01-25 DIAGNOSIS — I25.10 CORONARY ARTERY DISEASE INVOLVING NATIVE CORONARY ARTERY WITHOUT ANGINA PECTORIS, UNSPECIFIED WHETHER NATIVE OR TRANSPLANTED HEART: ICD-10-CM

## 2021-01-25 DIAGNOSIS — E11.42 TYPE 2 DIABETES MELLITUS WITH DIABETIC POLYNEUROPATHY, WITHOUT LONG-TERM CURRENT USE OF INSULIN: Primary | ICD-10-CM

## 2021-01-25 DIAGNOSIS — J42 CHRONIC BRONCHITIS, UNSPECIFIED CHRONIC BRONCHITIS TYPE: ICD-10-CM

## 2021-01-25 DIAGNOSIS — M1A.9XX0 CHRONIC GOUT WITHOUT TOPHUS, UNSPECIFIED CAUSE, UNSPECIFIED SITE: ICD-10-CM

## 2021-01-25 DIAGNOSIS — R60.9 EDEMA, UNSPECIFIED TYPE: ICD-10-CM

## 2021-01-25 DIAGNOSIS — I77.9 CAROTID ARTERY DISEASE, UNSPECIFIED LATERALITY, UNSPECIFIED TYPE: ICD-10-CM

## 2021-01-25 DIAGNOSIS — E78.5 HYPERLIPIDEMIA, UNSPECIFIED HYPERLIPIDEMIA TYPE: Chronic | ICD-10-CM

## 2021-01-25 DIAGNOSIS — E66.01 SEVERE OBESITY (BMI 35.0-35.9 WITH COMORBIDITY): ICD-10-CM

## 2021-01-25 PROBLEM — R04.2 COUGH WITH HEMOPTYSIS: Status: RESOLVED | Noted: 2020-02-21 | Resolved: 2021-01-25

## 2021-01-25 PROCEDURE — 99499 UNLISTED E&M SERVICE: CPT | Mod: S$GLB,,, | Performed by: FAMILY MEDICINE

## 2021-01-25 PROCEDURE — 99214 PR OFFICE/OUTPT VISIT, EST, LEVL IV, 30-39 MIN: ICD-10-PCS | Mod: S$GLB,,, | Performed by: FAMILY MEDICINE

## 2021-01-25 PROCEDURE — 93005 ELECTROCARDIOGRAM TRACING: CPT | Mod: PO

## 2021-01-25 PROCEDURE — 3077F PR MOST RECENT SYSTOLIC BLOOD PRESSURE >= 140 MM HG: ICD-10-PCS | Mod: CPTII,S$GLB,, | Performed by: FAMILY MEDICINE

## 2021-01-25 PROCEDURE — 1101F PR PT FALLS ASSESS DOC 0-1 FALLS W/OUT INJ PAST YR: ICD-10-PCS | Mod: CPTII,S$GLB,, | Performed by: FAMILY MEDICINE

## 2021-01-25 PROCEDURE — 93010 EKG 12-LEAD: ICD-10-PCS | Mod: ,,, | Performed by: INTERNAL MEDICINE

## 2021-01-25 PROCEDURE — 93010 ELECTROCARDIOGRAM REPORT: CPT | Mod: ,,, | Performed by: INTERNAL MEDICINE

## 2021-01-25 PROCEDURE — 99999 PR PBB SHADOW E&M-EST. PATIENT-LVL IV: CPT | Mod: PBBFAC,,, | Performed by: FAMILY MEDICINE

## 2021-01-25 PROCEDURE — 71046 XR CHEST PA AND LATERAL: ICD-10-PCS | Mod: 26,,, | Performed by: RADIOLOGY

## 2021-01-25 PROCEDURE — 1159F MED LIST DOCD IN RCRD: CPT | Mod: S$GLB,,, | Performed by: FAMILY MEDICINE

## 2021-01-25 PROCEDURE — 3288F FALL RISK ASSESSMENT DOCD: CPT | Mod: CPTII,S$GLB,, | Performed by: FAMILY MEDICINE

## 2021-01-25 PROCEDURE — 3288F PR FALLS RISK ASSESSMENT DOCUMENTED: ICD-10-PCS | Mod: CPTII,S$GLB,, | Performed by: FAMILY MEDICINE

## 2021-01-25 PROCEDURE — 99999 PR PBB SHADOW E&M-EST. PATIENT-LVL IV: ICD-10-PCS | Mod: PBBFAC,,, | Performed by: FAMILY MEDICINE

## 2021-01-25 PROCEDURE — 99499 RISK ADDL DX/OHS AUDIT: ICD-10-PCS | Mod: S$GLB,,, | Performed by: FAMILY MEDICINE

## 2021-01-25 PROCEDURE — 3072F PR LOW RISK FOR RETINOPATHY: ICD-10-PCS | Mod: S$GLB,,, | Performed by: FAMILY MEDICINE

## 2021-01-25 PROCEDURE — 3078F DIAST BP <80 MM HG: CPT | Mod: CPTII,S$GLB,, | Performed by: FAMILY MEDICINE

## 2021-01-25 PROCEDURE — 1125F AMNT PAIN NOTED PAIN PRSNT: CPT | Mod: S$GLB,,, | Performed by: FAMILY MEDICINE

## 2021-01-25 PROCEDURE — 71046 X-RAY EXAM CHEST 2 VIEWS: CPT | Mod: TC,PO

## 2021-01-25 PROCEDURE — 71046 X-RAY EXAM CHEST 2 VIEWS: CPT | Mod: 26,,, | Performed by: RADIOLOGY

## 2021-01-25 PROCEDURE — 1125F PR PAIN SEVERITY QUANTIFIED, PAIN PRESENT: ICD-10-PCS | Mod: S$GLB,,, | Performed by: FAMILY MEDICINE

## 2021-01-25 PROCEDURE — 3072F LOW RISK FOR RETINOPATHY: CPT | Mod: S$GLB,,, | Performed by: FAMILY MEDICINE

## 2021-01-25 PROCEDURE — 1159F PR MEDICATION LIST DOCUMENTED IN MEDICAL RECORD: ICD-10-PCS | Mod: S$GLB,,, | Performed by: FAMILY MEDICINE

## 2021-01-25 PROCEDURE — 99214 OFFICE O/P EST MOD 30 MIN: CPT | Mod: S$GLB,,, | Performed by: FAMILY MEDICINE

## 2021-01-25 PROCEDURE — 3077F SYST BP >= 140 MM HG: CPT | Mod: CPTII,S$GLB,, | Performed by: FAMILY MEDICINE

## 2021-01-25 PROCEDURE — 3078F PR MOST RECENT DIASTOLIC BLOOD PRESSURE < 80 MM HG: ICD-10-PCS | Mod: CPTII,S$GLB,, | Performed by: FAMILY MEDICINE

## 2021-01-25 PROCEDURE — 1101F PT FALLS ASSESS-DOCD LE1/YR: CPT | Mod: CPTII,S$GLB,, | Performed by: FAMILY MEDICINE

## 2021-01-25 RX ORDER — ALLOPURINOL 100 MG/1
100 TABLET ORAL DAILY
Qty: 90 TABLET | Refills: 1 | Status: SHIPPED | OUTPATIENT
Start: 2021-01-25 | End: 2021-07-15

## 2021-01-25 RX ORDER — HYDROCHLOROTHIAZIDE 12.5 MG/1
12.5 TABLET ORAL DAILY
Qty: 90 TABLET | Refills: 2 | Status: SHIPPED | OUTPATIENT
Start: 2021-01-25 | End: 2021-03-16

## 2021-01-25 RX ORDER — ECONAZOLE NITRATE 10 MG/G
CREAM TOPICAL
Qty: 30 G | Refills: 1 | Status: SHIPPED | OUTPATIENT
Start: 2021-01-25 | End: 2022-03-21

## 2021-01-27 ENCOUNTER — LAB VISIT (OUTPATIENT)
Dept: LAB | Facility: HOSPITAL | Age: 81
End: 2021-01-27
Attending: PODIATRIST
Payer: MEDICARE

## 2021-01-27 DIAGNOSIS — M1A.9XX0 CHRONIC GOUT WITHOUT TOPHUS, UNSPECIFIED CAUSE, UNSPECIFIED SITE: ICD-10-CM

## 2021-01-27 DIAGNOSIS — E11.42 TYPE 2 DIABETES MELLITUS WITH DIABETIC POLYNEUROPATHY, WITHOUT LONG-TERM CURRENT USE OF INSULIN: ICD-10-CM

## 2021-01-27 DIAGNOSIS — R60.9 EDEMA, UNSPECIFIED TYPE: ICD-10-CM

## 2021-01-27 LAB
ALBUMIN SERPL BCP-MCNC: 3.7 G/DL (ref 3.5–5.2)
ALP SERPL-CCNC: 31 U/L (ref 55–135)
ALT SERPL W/O P-5'-P-CCNC: 15 U/L (ref 10–44)
ANION GAP SERPL CALC-SCNC: 9 MMOL/L (ref 8–16)
AST SERPL-CCNC: 17 U/L (ref 10–40)
BASOPHILS # BLD AUTO: 0.03 K/UL (ref 0–0.2)
BASOPHILS NFR BLD: 0.6 % (ref 0–1.9)
BILIRUB SERPL-MCNC: 0.5 MG/DL (ref 0.1–1)
BNP SERPL-MCNC: 32 PG/ML (ref 0–99)
BUN SERPL-MCNC: 7 MG/DL (ref 8–23)
CALCIUM SERPL-MCNC: 9.7 MG/DL (ref 8.7–10.5)
CHLORIDE SERPL-SCNC: 96 MMOL/L (ref 95–110)
CO2 SERPL-SCNC: 37 MMOL/L (ref 23–29)
CREAT SERPL-MCNC: 0.7 MG/DL (ref 0.5–1.4)
CRP SERPL-MCNC: 13 MG/L (ref 0–8.2)
DIFFERENTIAL METHOD: ABNORMAL
EOSINOPHIL # BLD AUTO: 0.1 K/UL (ref 0–0.5)
EOSINOPHIL NFR BLD: 2 % (ref 0–8)
ERYTHROCYTE [DISTWIDTH] IN BLOOD BY AUTOMATED COUNT: 13.8 % (ref 11.5–14.5)
ERYTHROCYTE [SEDIMENTATION RATE] IN BLOOD BY WESTERGREN METHOD: 37 MM/HR (ref 0–36)
EST. GFR  (AFRICAN AMERICAN): >60 ML/MIN/1.73 M^2
EST. GFR  (NON AFRICAN AMERICAN): >60 ML/MIN/1.73 M^2
GLUCOSE SERPL-MCNC: 125 MG/DL (ref 70–110)
HCT VFR BLD AUTO: 46.2 % (ref 37–48.5)
HGB BLD-MCNC: 14.7 G/DL (ref 12–16)
IMM GRANULOCYTES # BLD AUTO: 0.02 K/UL (ref 0–0.04)
IMM GRANULOCYTES NFR BLD AUTO: 0.4 % (ref 0–0.5)
LYMPHOCYTES # BLD AUTO: 1.5 K/UL (ref 1–4.8)
LYMPHOCYTES NFR BLD: 29.3 % (ref 18–48)
MCH RBC QN AUTO: 29.2 PG (ref 27–31)
MCHC RBC AUTO-ENTMCNC: 31.8 G/DL (ref 32–36)
MCV RBC AUTO: 92 FL (ref 82–98)
MONOCYTES # BLD AUTO: 0.6 K/UL (ref 0.3–1)
MONOCYTES NFR BLD: 11.8 % (ref 4–15)
NEUTROPHILS # BLD AUTO: 2.8 K/UL (ref 1.8–7.7)
NEUTROPHILS NFR BLD: 55.9 % (ref 38–73)
NRBC BLD-RTO: 0 /100 WBC
PLATELET # BLD AUTO: 321 K/UL (ref 150–350)
PMV BLD AUTO: 9.6 FL (ref 9.2–12.9)
POTASSIUM SERPL-SCNC: 4.9 MMOL/L (ref 3.5–5.1)
PROT SERPL-MCNC: 7.6 G/DL (ref 6–8.4)
RBC # BLD AUTO: 5.04 M/UL (ref 4–5.4)
SODIUM SERPL-SCNC: 142 MMOL/L (ref 136–145)
URATE SERPL-MCNC: 4.9 MG/DL (ref 2.4–5.7)
WBC # BLD AUTO: 4.98 K/UL (ref 3.9–12.7)

## 2021-01-27 PROCEDURE — 83880 ASSAY OF NATRIURETIC PEPTIDE: CPT | Mod: PO

## 2021-01-27 PROCEDURE — 85652 RBC SED RATE AUTOMATED: CPT

## 2021-01-27 PROCEDURE — 83036 HEMOGLOBIN GLYCOSYLATED A1C: CPT

## 2021-01-27 PROCEDURE — 85025 COMPLETE CBC W/AUTO DIFF WBC: CPT | Mod: PO

## 2021-01-27 PROCEDURE — 80053 COMPREHEN METABOLIC PANEL: CPT | Mod: PO

## 2021-01-27 PROCEDURE — 86140 C-REACTIVE PROTEIN: CPT | Mod: PO

## 2021-01-27 PROCEDURE — 84550 ASSAY OF BLOOD/URIC ACID: CPT | Mod: PO

## 2021-01-27 PROCEDURE — 36415 COLL VENOUS BLD VENIPUNCTURE: CPT | Mod: PO

## 2021-01-27 PROCEDURE — 80061 LIPID PANEL: CPT

## 2021-01-28 LAB
CHOLEST SERPL-MCNC: 147 MG/DL (ref 120–199)
CHOLEST/HDLC SERPL: 3.8 {RATIO} (ref 2–5)
ESTIMATED AVG GLUCOSE: 117 MG/DL (ref 68–131)
HBA1C MFR BLD HPLC: 5.7 % (ref 4–5.6)
HDLC SERPL-MCNC: 39 MG/DL (ref 40–75)
HDLC SERPL: 26.5 % (ref 20–50)
LDLC SERPL CALC-MCNC: 89.8 MG/DL (ref 63–159)
NONHDLC SERPL-MCNC: 108 MG/DL
TRIGL SERPL-MCNC: 91 MG/DL (ref 30–150)

## 2021-01-29 ENCOUNTER — OFFICE VISIT (OUTPATIENT)
Dept: INTERNAL MEDICINE | Facility: CLINIC | Age: 81
End: 2021-01-29
Payer: MEDICARE

## 2021-01-29 VITALS
TEMPERATURE: 99 F | HEART RATE: 85 BPM | OXYGEN SATURATION: 96 % | DIASTOLIC BLOOD PRESSURE: 70 MMHG | HEIGHT: 66 IN | BODY MASS INDEX: 36.7 KG/M2 | WEIGHT: 228.38 LBS | SYSTOLIC BLOOD PRESSURE: 152 MMHG

## 2021-01-29 DIAGNOSIS — I15.2 HYPERTENSION ASSOCIATED WITH DIABETES: ICD-10-CM

## 2021-01-29 DIAGNOSIS — E11.42 TYPE 2 DIABETES MELLITUS WITH DIABETIC POLYNEUROPATHY, WITHOUT LONG-TERM CURRENT USE OF INSULIN: ICD-10-CM

## 2021-01-29 DIAGNOSIS — I70.0 AORTIC ATHEROSCLEROSIS: ICD-10-CM

## 2021-01-29 DIAGNOSIS — E11.59 HYPERTENSION ASSOCIATED WITH DIABETES: ICD-10-CM

## 2021-01-29 DIAGNOSIS — I25.10 CORONARY ARTERY DISEASE INVOLVING NATIVE CORONARY ARTERY WITHOUT ANGINA PECTORIS, UNSPECIFIED WHETHER NATIVE OR TRANSPLANTED HEART: ICD-10-CM

## 2021-01-29 DIAGNOSIS — R06.09 DOE (DYSPNEA ON EXERTION): Primary | ICD-10-CM

## 2021-01-29 DIAGNOSIS — E78.5 HYPERLIPIDEMIA, UNSPECIFIED HYPERLIPIDEMIA TYPE: Chronic | ICD-10-CM

## 2021-01-29 PROCEDURE — 3288F PR FALLS RISK ASSESSMENT DOCUMENTED: ICD-10-PCS | Mod: CPTII,S$GLB,, | Performed by: NURSE PRACTITIONER

## 2021-01-29 PROCEDURE — 3077F SYST BP >= 140 MM HG: CPT | Mod: CPTII,S$GLB,, | Performed by: NURSE PRACTITIONER

## 2021-01-29 PROCEDURE — 1126F AMNT PAIN NOTED NONE PRSNT: CPT | Mod: S$GLB,,, | Performed by: NURSE PRACTITIONER

## 2021-01-29 PROCEDURE — 3078F DIAST BP <80 MM HG: CPT | Mod: CPTII,S$GLB,, | Performed by: NURSE PRACTITIONER

## 2021-01-29 PROCEDURE — 99214 OFFICE O/P EST MOD 30 MIN: CPT | Mod: S$GLB,,, | Performed by: NURSE PRACTITIONER

## 2021-01-29 PROCEDURE — 99214 PR OFFICE/OUTPT VISIT, EST, LEVL IV, 30-39 MIN: ICD-10-PCS | Mod: S$GLB,,, | Performed by: NURSE PRACTITIONER

## 2021-01-29 PROCEDURE — 3077F PR MOST RECENT SYSTOLIC BLOOD PRESSURE >= 140 MM HG: ICD-10-PCS | Mod: CPTII,S$GLB,, | Performed by: NURSE PRACTITIONER

## 2021-01-29 PROCEDURE — 1101F PR PT FALLS ASSESS DOC 0-1 FALLS W/OUT INJ PAST YR: ICD-10-PCS | Mod: CPTII,S$GLB,, | Performed by: NURSE PRACTITIONER

## 2021-01-29 PROCEDURE — 1126F PR PAIN SEVERITY QUANTIFIED, NO PAIN PRESENT: ICD-10-PCS | Mod: S$GLB,,, | Performed by: NURSE PRACTITIONER

## 2021-01-29 PROCEDURE — 3072F PR LOW RISK FOR RETINOPATHY: ICD-10-PCS | Mod: S$GLB,,, | Performed by: NURSE PRACTITIONER

## 2021-01-29 PROCEDURE — 3072F LOW RISK FOR RETINOPATHY: CPT | Mod: S$GLB,,, | Performed by: NURSE PRACTITIONER

## 2021-01-29 PROCEDURE — 1159F MED LIST DOCD IN RCRD: CPT | Mod: S$GLB,,, | Performed by: NURSE PRACTITIONER

## 2021-01-29 PROCEDURE — 99999 PR PBB SHADOW E&M-EST. PATIENT-LVL V: ICD-10-PCS | Mod: PBBFAC,,, | Performed by: NURSE PRACTITIONER

## 2021-01-29 PROCEDURE — 1101F PT FALLS ASSESS-DOCD LE1/YR: CPT | Mod: CPTII,S$GLB,, | Performed by: NURSE PRACTITIONER

## 2021-01-29 PROCEDURE — 99999 PR PBB SHADOW E&M-EST. PATIENT-LVL V: CPT | Mod: PBBFAC,,, | Performed by: NURSE PRACTITIONER

## 2021-01-29 PROCEDURE — 1159F PR MEDICATION LIST DOCUMENTED IN MEDICAL RECORD: ICD-10-PCS | Mod: S$GLB,,, | Performed by: NURSE PRACTITIONER

## 2021-01-29 PROCEDURE — 3288F FALL RISK ASSESSMENT DOCD: CPT | Mod: CPTII,S$GLB,, | Performed by: NURSE PRACTITIONER

## 2021-01-29 PROCEDURE — 3078F PR MOST RECENT DIASTOLIC BLOOD PRESSURE < 80 MM HG: ICD-10-PCS | Mod: CPTII,S$GLB,, | Performed by: NURSE PRACTITIONER

## 2021-02-03 ENCOUNTER — OFFICE VISIT (OUTPATIENT)
Dept: CARDIOLOGY | Facility: CLINIC | Age: 81
End: 2021-02-03
Payer: MEDICARE

## 2021-02-03 VITALS
WEIGHT: 225.5 LBS | HEART RATE: 82 BPM | SYSTOLIC BLOOD PRESSURE: 150 MMHG | DIASTOLIC BLOOD PRESSURE: 66 MMHG | BODY MASS INDEX: 36.4 KG/M2 | OXYGEN SATURATION: 93 %

## 2021-02-03 DIAGNOSIS — I50.32 CHRONIC DIASTOLIC HEART FAILURE: ICD-10-CM

## 2021-02-03 DIAGNOSIS — R06.09 DOE (DYSPNEA ON EXERTION): ICD-10-CM

## 2021-02-03 DIAGNOSIS — I35.0 NONRHEUMATIC AORTIC (VALVE) STENOSIS: ICD-10-CM

## 2021-02-03 DIAGNOSIS — E11.59 HYPERTENSION ASSOCIATED WITH DIABETES: ICD-10-CM

## 2021-02-03 DIAGNOSIS — I77.9 CAROTID ARTERY DISEASE, UNSPECIFIED LATERALITY, UNSPECIFIED TYPE: ICD-10-CM

## 2021-02-03 DIAGNOSIS — I35.8 AORTIC VALVE SCLEROSIS: ICD-10-CM

## 2021-02-03 DIAGNOSIS — E78.2 MIXED HYPERLIPIDEMIA: ICD-10-CM

## 2021-02-03 DIAGNOSIS — I73.9 ARTERIAL INSUFFICIENCY OF LOWER EXTREMITY: ICD-10-CM

## 2021-02-03 DIAGNOSIS — I15.2 HYPERTENSION ASSOCIATED WITH DIABETES: ICD-10-CM

## 2021-02-03 DIAGNOSIS — I70.0 AORTIC ATHEROSCLEROSIS: Primary | ICD-10-CM

## 2021-02-03 PROCEDURE — 3077F PR MOST RECENT SYSTOLIC BLOOD PRESSURE >= 140 MM HG: ICD-10-PCS | Mod: CPTII,S$GLB,, | Performed by: INTERNAL MEDICINE

## 2021-02-03 PROCEDURE — 99214 PR OFFICE/OUTPT VISIT, EST, LEVL IV, 30-39 MIN: ICD-10-PCS | Mod: S$GLB,,, | Performed by: INTERNAL MEDICINE

## 2021-02-03 PROCEDURE — 99999 PR PBB SHADOW E&M-EST. PATIENT-LVL III: CPT | Mod: PBBFAC,,, | Performed by: INTERNAL MEDICINE

## 2021-02-03 PROCEDURE — 3078F DIAST BP <80 MM HG: CPT | Mod: CPTII,S$GLB,, | Performed by: INTERNAL MEDICINE

## 2021-02-03 PROCEDURE — 3288F FALL RISK ASSESSMENT DOCD: CPT | Mod: CPTII,S$GLB,, | Performed by: INTERNAL MEDICINE

## 2021-02-03 PROCEDURE — 3288F PR FALLS RISK ASSESSMENT DOCUMENTED: ICD-10-PCS | Mod: CPTII,S$GLB,, | Performed by: INTERNAL MEDICINE

## 2021-02-03 PROCEDURE — 3072F PR LOW RISK FOR RETINOPATHY: ICD-10-PCS | Mod: S$GLB,,, | Performed by: INTERNAL MEDICINE

## 2021-02-03 PROCEDURE — 3072F LOW RISK FOR RETINOPATHY: CPT | Mod: S$GLB,,, | Performed by: INTERNAL MEDICINE

## 2021-02-03 PROCEDURE — 1101F PR PT FALLS ASSESS DOC 0-1 FALLS W/OUT INJ PAST YR: ICD-10-PCS | Mod: CPTII,S$GLB,, | Performed by: INTERNAL MEDICINE

## 2021-02-03 PROCEDURE — 1159F PR MEDICATION LIST DOCUMENTED IN MEDICAL RECORD: ICD-10-PCS | Mod: S$GLB,,, | Performed by: INTERNAL MEDICINE

## 2021-02-03 PROCEDURE — 1101F PT FALLS ASSESS-DOCD LE1/YR: CPT | Mod: CPTII,S$GLB,, | Performed by: INTERNAL MEDICINE

## 2021-02-03 PROCEDURE — 99499 RISK ADDL DX/OHS AUDIT: ICD-10-PCS | Mod: S$GLB,,, | Performed by: INTERNAL MEDICINE

## 2021-02-03 PROCEDURE — 1159F MED LIST DOCD IN RCRD: CPT | Mod: S$GLB,,, | Performed by: INTERNAL MEDICINE

## 2021-02-03 PROCEDURE — 3078F PR MOST RECENT DIASTOLIC BLOOD PRESSURE < 80 MM HG: ICD-10-PCS | Mod: CPTII,S$GLB,, | Performed by: INTERNAL MEDICINE

## 2021-02-03 PROCEDURE — 99499 UNLISTED E&M SERVICE: CPT | Mod: S$GLB,,, | Performed by: INTERNAL MEDICINE

## 2021-02-03 PROCEDURE — 3077F SYST BP >= 140 MM HG: CPT | Mod: CPTII,S$GLB,, | Performed by: INTERNAL MEDICINE

## 2021-02-03 PROCEDURE — 99999 PR PBB SHADOW E&M-EST. PATIENT-LVL III: ICD-10-PCS | Mod: PBBFAC,,, | Performed by: INTERNAL MEDICINE

## 2021-02-03 PROCEDURE — 99214 OFFICE O/P EST MOD 30 MIN: CPT | Mod: S$GLB,,, | Performed by: INTERNAL MEDICINE

## 2021-02-03 RX ORDER — FUROSEMIDE 20 MG/1
20 TABLET ORAL DAILY PRN
Qty: 30 TABLET | Refills: 11 | Status: SHIPPED | OUTPATIENT
Start: 2021-02-03 | End: 2021-05-14 | Stop reason: SDUPTHER

## 2021-02-03 RX ORDER — FUROSEMIDE 20 MG/1
20 TABLET ORAL DAILY PRN
Qty: 30 TABLET | Refills: 11 | Status: SHIPPED | OUTPATIENT
Start: 2021-02-03 | End: 2021-02-03

## 2021-02-12 ENCOUNTER — PES CALL (OUTPATIENT)
Dept: ADMINISTRATIVE | Facility: CLINIC | Age: 81
End: 2021-02-12

## 2021-02-12 ENCOUNTER — OFFICE VISIT (OUTPATIENT)
Dept: INTERNAL MEDICINE | Facility: CLINIC | Age: 81
End: 2021-02-12
Payer: MEDICARE

## 2021-02-12 VITALS
DIASTOLIC BLOOD PRESSURE: 56 MMHG | HEART RATE: 84 BPM | HEIGHT: 66 IN | RESPIRATION RATE: 19 BRPM | TEMPERATURE: 98 F | OXYGEN SATURATION: 97 % | BODY MASS INDEX: 36.81 KG/M2 | SYSTOLIC BLOOD PRESSURE: 132 MMHG | WEIGHT: 229.06 LBS

## 2021-02-12 DIAGNOSIS — I15.2 HYPERTENSION ASSOCIATED WITH DIABETES: Primary | ICD-10-CM

## 2021-02-12 DIAGNOSIS — I77.9 CAROTID ARTERY DISEASE, UNSPECIFIED LATERALITY, UNSPECIFIED TYPE: ICD-10-CM

## 2021-02-12 DIAGNOSIS — R06.09 DOE (DYSPNEA ON EXERTION): ICD-10-CM

## 2021-02-12 DIAGNOSIS — E11.59 HYPERTENSION ASSOCIATED WITH DIABETES: Primary | ICD-10-CM

## 2021-02-12 PROCEDURE — 3078F DIAST BP <80 MM HG: CPT | Mod: CPTII,S$GLB,, | Performed by: NURSE PRACTITIONER

## 2021-02-12 PROCEDURE — 1159F MED LIST DOCD IN RCRD: CPT | Mod: S$GLB,,, | Performed by: NURSE PRACTITIONER

## 2021-02-12 PROCEDURE — 1101F PR PT FALLS ASSESS DOC 0-1 FALLS W/OUT INJ PAST YR: ICD-10-PCS | Mod: CPTII,S$GLB,, | Performed by: NURSE PRACTITIONER

## 2021-02-12 PROCEDURE — 3078F PR MOST RECENT DIASTOLIC BLOOD PRESSURE < 80 MM HG: ICD-10-PCS | Mod: CPTII,S$GLB,, | Performed by: NURSE PRACTITIONER

## 2021-02-12 PROCEDURE — 3288F FALL RISK ASSESSMENT DOCD: CPT | Mod: CPTII,S$GLB,, | Performed by: NURSE PRACTITIONER

## 2021-02-12 PROCEDURE — 3288F PR FALLS RISK ASSESSMENT DOCUMENTED: ICD-10-PCS | Mod: CPTII,S$GLB,, | Performed by: NURSE PRACTITIONER

## 2021-02-12 PROCEDURE — 3075F SYST BP GE 130 - 139MM HG: CPT | Mod: CPTII,S$GLB,, | Performed by: NURSE PRACTITIONER

## 2021-02-12 PROCEDURE — 99213 PR OFFICE/OUTPT VISIT, EST, LEVL III, 20-29 MIN: ICD-10-PCS | Mod: S$GLB,,, | Performed by: NURSE PRACTITIONER

## 2021-02-12 PROCEDURE — 3072F LOW RISK FOR RETINOPATHY: CPT | Mod: S$GLB,,, | Performed by: NURSE PRACTITIONER

## 2021-02-12 PROCEDURE — 3072F PR LOW RISK FOR RETINOPATHY: ICD-10-PCS | Mod: S$GLB,,, | Performed by: NURSE PRACTITIONER

## 2021-02-12 PROCEDURE — 99213 OFFICE O/P EST LOW 20 MIN: CPT | Mod: S$GLB,,, | Performed by: NURSE PRACTITIONER

## 2021-02-12 PROCEDURE — 1159F PR MEDICATION LIST DOCUMENTED IN MEDICAL RECORD: ICD-10-PCS | Mod: S$GLB,,, | Performed by: NURSE PRACTITIONER

## 2021-02-12 PROCEDURE — 99999 PR PBB SHADOW E&M-EST. PATIENT-LVL V: ICD-10-PCS | Mod: PBBFAC,,, | Performed by: NURSE PRACTITIONER

## 2021-02-12 PROCEDURE — 1126F PR PAIN SEVERITY QUANTIFIED, NO PAIN PRESENT: ICD-10-PCS | Mod: S$GLB,,, | Performed by: NURSE PRACTITIONER

## 2021-02-12 PROCEDURE — 1126F AMNT PAIN NOTED NONE PRSNT: CPT | Mod: S$GLB,,, | Performed by: NURSE PRACTITIONER

## 2021-02-12 PROCEDURE — 99999 PR PBB SHADOW E&M-EST. PATIENT-LVL V: CPT | Mod: PBBFAC,,, | Performed by: NURSE PRACTITIONER

## 2021-02-12 PROCEDURE — 1101F PT FALLS ASSESS-DOCD LE1/YR: CPT | Mod: CPTII,S$GLB,, | Performed by: NURSE PRACTITIONER

## 2021-02-12 PROCEDURE — 3075F PR MOST RECENT SYSTOLIC BLOOD PRESS GE 130-139MM HG: ICD-10-PCS | Mod: CPTII,S$GLB,, | Performed by: NURSE PRACTITIONER

## 2021-02-26 ENCOUNTER — HOSPITAL ENCOUNTER (OUTPATIENT)
Dept: CARDIOLOGY | Facility: HOSPITAL | Age: 81
Discharge: HOME | End: 2021-02-26
Attending: INTERNAL MEDICINE
Payer: MEDICARE

## 2021-02-26 VITALS
BODY MASS INDEX: 36.8 KG/M2 | HEIGHT: 66 IN | DIASTOLIC BLOOD PRESSURE: 70 MMHG | DIASTOLIC BLOOD PRESSURE: 70 MMHG | HEIGHT: 66 IN | BODY MASS INDEX: 36.8 KG/M2 | WEIGHT: 229 LBS | WEIGHT: 229 LBS | SYSTOLIC BLOOD PRESSURE: 132 MMHG | SYSTOLIC BLOOD PRESSURE: 132 MMHG

## 2021-02-26 DIAGNOSIS — I35.0 NONRHEUMATIC AORTIC (VALVE) STENOSIS: ICD-10-CM

## 2021-02-26 DIAGNOSIS — R06.09 DOE (DYSPNEA ON EXERTION): ICD-10-CM

## 2021-02-26 DIAGNOSIS — I70.0 AORTIC ATHEROSCLEROSIS: ICD-10-CM

## 2021-02-26 LAB
LEFT ARM DIASTOLIC BLOOD PRESSURE: 70 MMHG
LEFT ARM SYSTOLIC BLOOD PRESSURE: 134 MMHG
LEFT CBA DIAS: 7 CM/S
LEFT CBA SYS: 133 CM/S
LEFT CCA DIST DIAS: 8 CM/S
LEFT CCA DIST SYS: 127 CM/S
LEFT CCA MID DIAS: 8 CM/S
LEFT CCA MID SYS: 187 CM/S
LEFT CCA PROX DIAS: 5 CM/S
LEFT CCA PROX SYS: 167 CM/S
LEFT ECA DIAS: 5 CM/S
LEFT ECA SYS: 171 CM/S
LEFT ICA DIST DIAS: 20 CM/S
LEFT ICA DIST SYS: 207 CM/S
LEFT ICA MID DIAS: 22 CM/S
LEFT ICA MID SYS: 228 CM/S
LEFT ICA PROX DIAS: 10 CM/S
LEFT ICA PROX SYS: 158 CM/S
LEFT VERTEBRAL DIAS: 14 CM/S
LEFT VERTEBRAL SYS: 111 CM/S
OHS CV CAROTID RIGHT ICA EDV HIGHEST: 22
OHS CV CAROTID ULTRASOUND LEFT ICA/CCA RATIO: 1.8
OHS CV CAROTID ULTRASOUND RIGHT ICA/CCA RATIO: 1.49
OHS CV PV CAROTID LEFT HIGHEST CCA: 187
OHS CV PV CAROTID LEFT HIGHEST ICA: 228
OHS CV PV CAROTID RIGHT HIGHEST CCA: 139
OHS CV PV CAROTID RIGHT HIGHEST ICA: 137
OHS CV US CAROTID LEFT HIGHEST EDV: 22
RIGHT ARM DIASTOLIC BLOOD PRESSURE: 65 MMHG
RIGHT ARM SYSTOLIC BLOOD PRESSURE: 132 MMHG
RIGHT CBA DIAS: 4 CM/S
RIGHT CBA SYS: 85 CM/S
RIGHT CCA DIST DIAS: 9 CM/S
RIGHT CCA DIST SYS: 92 CM/S
RIGHT CCA MID DIAS: 9 CM/S
RIGHT CCA MID SYS: 132 CM/S
RIGHT CCA PROX DIAS: 9 CM/S
RIGHT CCA PROX SYS: 139 CM/S
RIGHT ECA DIAS: 5 CM/S
RIGHT ECA SYS: 124 CM/S
RIGHT ICA DIST DIAS: 22 CM/S
RIGHT ICA DIST SYS: 137 CM/S
RIGHT ICA MID DIAS: 22 CM/S
RIGHT ICA MID SYS: 135 CM/S
RIGHT ICA PROX DIAS: 11 CM/S
RIGHT ICA PROX SYS: 98 CM/S
RIGHT VERTEBRAL DIAS: 9 CM/S
RIGHT VERTEBRAL SYS: 104 CM/S

## 2021-02-26 PROCEDURE — 93306 TTE W/DOPPLER COMPLETE: CPT | Mod: 26,,, | Performed by: INTERNAL MEDICINE

## 2021-02-26 PROCEDURE — 93880 EXTRACRANIAL BILAT STUDY: CPT | Mod: 26,,, | Performed by: INTERNAL MEDICINE

## 2021-02-26 PROCEDURE — 93306 ECHO (CUPID ONLY): ICD-10-PCS | Mod: 26,,, | Performed by: INTERNAL MEDICINE

## 2021-02-26 PROCEDURE — 93306 TTE W/DOPPLER COMPLETE: CPT | Mod: PO

## 2021-02-26 PROCEDURE — 93880 EXTRACRANIAL BILAT STUDY: CPT | Mod: PO

## 2021-02-26 PROCEDURE — 93880 CV US DOPPLER CAROTID (CUPID ONLY): ICD-10-PCS | Mod: 26,,, | Performed by: INTERNAL MEDICINE

## 2021-03-01 LAB
AORTIC ROOT ANNULUS: 3.12 CM
AV INDEX (PROSTH): 1
AV MEAN GRADIENT: 13 MMHG
AV PEAK GRADIENT: 25 MMHG
AV VALVE AREA: 3.17 CM2
AV VELOCITY RATIO: 0.76
BSA FOR ECHO PROCEDURE: 2.2 M2
CV ECHO LV RWT: 0.79 CM
DOP CALC AO PEAK VEL: 2.49 M/S
DOP CALC AO VTI: 40.99 CM
DOP CALC LVOT AREA: 3.2 CM2
DOP CALC LVOT DIAMETER: 2.01 CM
DOP CALC LVOT PEAK VEL: 1.88 M/S
DOP CALC LVOT STROKE VOLUME: 130.13 CM3
DOP CALC RVOT PEAK VEL: 0.81 M/S
DOP CALC RVOT VTI: 17.84 CM
DOP CALCLVOT PEAK VEL VTI: 41.03 CM
E WAVE DECELERATION TIME: 374.81 MSEC
E/A RATIO: 0.69
E/E' RATIO: 17 M/S
ECHO LV POSTERIOR WALL: 1.2 CM (ref 0.6–1.1)
FRACTIONAL SHORTENING: 38 % (ref 28–44)
INTERVENTRICULAR SEPTUM: 1.6 CM (ref 0.6–1.1)
IVRT: 100.35 MSEC
LA MAJOR: 4.38 CM
LA MINOR: 4.4 CM
LA WIDTH: 3.24 CM
LEFT ATRIUM SIZE: 3.33 CM
LEFT ATRIUM VOLUME INDEX: 19 ML/M2
LEFT ATRIUM VOLUME: 40.26 CM3
LEFT INTERNAL DIMENSION IN SYSTOLE: 1.9 CM (ref 2.1–4)
LEFT VENTRICLE DIASTOLIC VOLUME INDEX: 17.21 ML/M2
LEFT VENTRICLE DIASTOLIC VOLUME: 36.49 ML
LEFT VENTRICLE MASS INDEX: 68 G/M2
LEFT VENTRICLE SYSTOLIC VOLUME INDEX: 5.3 ML/M2
LEFT VENTRICLE SYSTOLIC VOLUME: 11.15 ML
LEFT VENTRICULAR INTERNAL DIMENSION IN DIASTOLE: 3.05 CM (ref 3.5–6)
LEFT VENTRICULAR MASS: 143.56 G
LV LATERAL E/E' RATIO: 17 M/S
LV SEPTAL E/E' RATIO: 17 M/S
MV PEAK A VEL: 1.23 M/S
MV PEAK E VEL: 0.85 M/S
MV STENOSIS PRESSURE HALF TIME: 108.7 MS
MV VALVE AREA P 1/2 METHOD: 2.02 CM2
PISA TR MAX VEL: 1.98 M/S
PV MEAN GRADIENT: 1 MMHG
RA MAJOR: 3.54 CM
RA PRESSURE: 3 MMHG
RA WIDTH: 3.16 CM
RIGHT VENTRICULAR END-DIASTOLIC DIMENSION: 3.29 CM
SINUS: 3.14 CM
STJ: 3.13 CM
TDI LATERAL: 0.05 M/S
TDI SEPTAL: 0.05 M/S
TDI: 0.05 M/S
TR MAX PG: 16 MMHG
TRICUSPID ANNULAR PLANE SYSTOLIC EXCURSION: 1.66 CM
TV REST PULMONARY ARTERY PRESSURE: 19 MMHG

## 2021-03-02 ENCOUNTER — OFFICE VISIT (OUTPATIENT)
Dept: OPHTHALMOLOGY | Facility: CLINIC | Age: 81
End: 2021-03-02
Payer: MEDICARE

## 2021-03-02 DIAGNOSIS — Z96.1 PSEUDOPHAKIA OF BOTH EYES: ICD-10-CM

## 2021-03-02 DIAGNOSIS — H40.1131 PRIMARY OPEN ANGLE GLAUCOMA (POAG) OF BOTH EYES, MILD STAGE: Primary | ICD-10-CM

## 2021-03-02 DIAGNOSIS — E11.9 TYPE 2 DIABETES MELLITUS WITHOUT RETINOPATHY: ICD-10-CM

## 2021-03-02 DIAGNOSIS — H40.1134 PRIMARY OPEN ANGLE GLAUCOMA OF BOTH EYES, INDETERMINATE STAGE: ICD-10-CM

## 2021-03-02 PROCEDURE — 92014 PR EYE EXAM, EST PATIENT,COMPREHESV: ICD-10-PCS | Mod: S$GLB,,, | Performed by: OPTOMETRIST

## 2021-03-02 PROCEDURE — 99999 PR PBB SHADOW E&M-EST. PATIENT-LVL III: ICD-10-PCS | Mod: PBBFAC,,, | Performed by: OPTOMETRIST

## 2021-03-02 PROCEDURE — 92014 COMPRE OPH EXAM EST PT 1/>: CPT | Mod: S$GLB,,, | Performed by: OPTOMETRIST

## 2021-03-02 PROCEDURE — 2023F DILAT RTA XM W/O RTNOPTHY: CPT | Mod: S$GLB,,, | Performed by: OPTOMETRIST

## 2021-03-02 PROCEDURE — 99999 PR PBB SHADOW E&M-EST. PATIENT-LVL III: CPT | Mod: PBBFAC,,, | Performed by: OPTOMETRIST

## 2021-03-02 PROCEDURE — 99499 UNLISTED E&M SERVICE: CPT | Mod: S$GLB,,, | Performed by: OPTOMETRIST

## 2021-03-02 PROCEDURE — 92083 HUMPHREY VISUAL FIELD - OU - BOTH EYES: ICD-10-PCS | Mod: S$GLB,,, | Performed by: OPTOMETRIST

## 2021-03-02 PROCEDURE — 92250 FUNDUS PHOTOGRAPHY W/I&R: CPT | Mod: S$GLB,,, | Performed by: OPTOMETRIST

## 2021-03-02 PROCEDURE — 92250 COLOR FUNDUS PHOTOGRAPHY - OU - BOTH EYES: ICD-10-PCS | Mod: S$GLB,,, | Performed by: OPTOMETRIST

## 2021-03-02 PROCEDURE — 92083 EXTENDED VISUAL FIELD XM: CPT | Mod: S$GLB,,, | Performed by: OPTOMETRIST

## 2021-03-02 PROCEDURE — 2023F PR DILATED RETINAL EXAM W/O EVID OF RETINOPATHY: ICD-10-PCS | Mod: S$GLB,,, | Performed by: OPTOMETRIST

## 2021-03-02 PROCEDURE — 99499 RISK ADDL DX/OHS AUDIT: ICD-10-PCS | Mod: S$GLB,,, | Performed by: OPTOMETRIST

## 2021-03-02 RX ORDER — LATANOPROST 50 UG/ML
1 SOLUTION/ DROPS OPHTHALMIC NIGHTLY
Qty: 1 BOTTLE | Refills: 11 | Status: SHIPPED | OUTPATIENT
Start: 2021-03-02 | End: 2022-01-20 | Stop reason: SDUPTHER

## 2021-03-06 ENCOUNTER — HOSPITAL ENCOUNTER (EMERGENCY)
Facility: HOSPITAL | Age: 81
Discharge: HOME | End: 2021-03-06
Attending: EMERGENCY MEDICINE
Payer: MEDICARE

## 2021-03-06 VITALS
SYSTOLIC BLOOD PRESSURE: 150 MMHG | RESPIRATION RATE: 20 BRPM | TEMPERATURE: 98 F | WEIGHT: 224.88 LBS | BODY MASS INDEX: 36.14 KG/M2 | HEIGHT: 66 IN | HEART RATE: 73 BPM | DIASTOLIC BLOOD PRESSURE: 65 MMHG | OXYGEN SATURATION: 98 %

## 2021-03-06 DIAGNOSIS — I50.9 CONGESTIVE HEART FAILURE, UNSPECIFIED HF CHRONICITY, UNSPECIFIED HEART FAILURE TYPE: Primary | ICD-10-CM

## 2021-03-06 DIAGNOSIS — R07.9 CHEST PAIN: ICD-10-CM

## 2021-03-06 DIAGNOSIS — R06.09 DYSPNEA ON EXERTION: ICD-10-CM

## 2021-03-06 LAB
ALBUMIN SERPL BCP-MCNC: 3.7 G/DL (ref 3.5–5.2)
ALP SERPL-CCNC: 33 U/L (ref 55–135)
ALT SERPL W/O P-5'-P-CCNC: 11 U/L (ref 10–44)
ANION GAP SERPL CALC-SCNC: 11 MMOL/L (ref 8–16)
AST SERPL-CCNC: 20 U/L (ref 10–40)
BASOPHILS # BLD AUTO: 0.02 K/UL (ref 0–0.2)
BASOPHILS NFR BLD: 0.4 % (ref 0–1.9)
BILIRUB SERPL-MCNC: 0.4 MG/DL (ref 0.1–1)
BNP SERPL-MCNC: 46 PG/ML (ref 0–99)
BUN SERPL-MCNC: 6 MG/DL (ref 8–23)
CALCIUM SERPL-MCNC: 9.1 MG/DL (ref 8.7–10.5)
CHLORIDE SERPL-SCNC: 98 MMOL/L (ref 95–110)
CO2 SERPL-SCNC: 29 MMOL/L (ref 23–29)
CREAT SERPL-MCNC: 0.7 MG/DL (ref 0.5–1.4)
DIFFERENTIAL METHOD: NORMAL
EOSINOPHIL # BLD AUTO: 0.1 K/UL (ref 0–0.5)
EOSINOPHIL NFR BLD: 2.7 % (ref 0–8)
ERYTHROCYTE [DISTWIDTH] IN BLOOD BY AUTOMATED COUNT: 14 % (ref 11.5–14.5)
EST. GFR  (AFRICAN AMERICAN): >60 ML/MIN/1.73 M^2
EST. GFR  (NON AFRICAN AMERICAN): >60 ML/MIN/1.73 M^2
EXTRA BLUE TOP RAINBOW: NORMAL
GLUCOSE SERPL-MCNC: 114 MG/DL (ref 70–110)
HCT VFR BLD AUTO: 44.7 % (ref 37–48.5)
HGB BLD-MCNC: 14.3 G/DL (ref 12–16)
IMM GRANULOCYTES # BLD AUTO: 0.02 K/UL (ref 0–0.04)
IMM GRANULOCYTES NFR BLD AUTO: 0.4 % (ref 0–0.5)
LYMPHOCYTES # BLD AUTO: 1.4 K/UL (ref 1–4.8)
LYMPHOCYTES NFR BLD: 27.1 % (ref 18–48)
MCH RBC QN AUTO: 29.3 PG (ref 27–31)
MCHC RBC AUTO-ENTMCNC: 32 G/DL (ref 32–36)
MCV RBC AUTO: 92 FL (ref 82–98)
MONOCYTES # BLD AUTO: 0.7 K/UL (ref 0.3–1)
MONOCYTES NFR BLD: 13.3 % (ref 4–15)
NEUTROPHILS # BLD AUTO: 2.9 K/UL (ref 1.8–7.7)
NEUTROPHILS NFR BLD: 56.1 % (ref 38–73)
NRBC BLD-RTO: 0 /100 WBC
PLATELET # BLD AUTO: 322 K/UL (ref 150–350)
PMV BLD AUTO: 9.2 FL (ref 9.2–12.9)
POTASSIUM SERPL-SCNC: 5 MMOL/L (ref 3.5–5.1)
PROT SERPL-MCNC: 7.5 G/DL (ref 6–8.4)
RBC # BLD AUTO: 4.88 M/UL (ref 4–5.4)
SODIUM SERPL-SCNC: 138 MMOL/L (ref 136–145)
TROPONIN I SERPL DL<=0.01 NG/ML-MCNC: 0.01 NG/ML (ref 0–0.03)
TROPONIN I SERPL DL<=0.01 NG/ML-MCNC: <0.006 NG/ML (ref 0–0.03)
WBC # BLD AUTO: 5.13 K/UL (ref 3.9–12.7)

## 2021-03-06 PROCEDURE — 63600175 PHARM REV CODE 636 W HCPCS: Mod: ER | Performed by: EMERGENCY MEDICINE

## 2021-03-06 PROCEDURE — 25000003 PHARM REV CODE 250: Mod: ER | Performed by: EMERGENCY MEDICINE

## 2021-03-06 PROCEDURE — 84484 ASSAY OF TROPONIN QUANT: CPT | Mod: ER | Performed by: EMERGENCY MEDICINE

## 2021-03-06 PROCEDURE — 99291 CRITICAL CARE FIRST HOUR: CPT | Mod: 25,ER

## 2021-03-06 PROCEDURE — 93010 ELECTROCARDIOGRAM REPORT: CPT | Mod: ,,, | Performed by: INTERNAL MEDICINE

## 2021-03-06 PROCEDURE — 83880 ASSAY OF NATRIURETIC PEPTIDE: CPT | Mod: ER | Performed by: EMERGENCY MEDICINE

## 2021-03-06 PROCEDURE — 85025 COMPLETE CBC W/AUTO DIFF WBC: CPT | Mod: ER | Performed by: EMERGENCY MEDICINE

## 2021-03-06 PROCEDURE — 93005 ELECTROCARDIOGRAM TRACING: CPT | Mod: ER

## 2021-03-06 PROCEDURE — 93010 EKG 12-LEAD: ICD-10-PCS | Mod: ,,, | Performed by: INTERNAL MEDICINE

## 2021-03-06 PROCEDURE — 96374 THER/PROPH/DIAG INJ IV PUSH: CPT | Mod: ER

## 2021-03-06 PROCEDURE — 80053 COMPREHEN METABOLIC PANEL: CPT | Mod: ER | Performed by: EMERGENCY MEDICINE

## 2021-03-06 RX ORDER — FUROSEMIDE 10 MG/ML
40 INJECTION INTRAMUSCULAR; INTRAVENOUS
Status: COMPLETED | OUTPATIENT
Start: 2021-03-06 | End: 2021-03-06

## 2021-03-06 RX ORDER — FUROSEMIDE 20 MG/1
20 TABLET ORAL DAILY
Qty: 7 TABLET | Refills: 0 | Status: SHIPPED | OUTPATIENT
Start: 2021-03-06 | End: 2021-03-13

## 2021-03-06 RX ORDER — ASPIRIN 325 MG
325 TABLET ORAL
Status: COMPLETED | OUTPATIENT
Start: 2021-03-06 | End: 2021-03-06

## 2021-03-06 RX ADMIN — FUROSEMIDE 40 MG: 10 INJECTION, SOLUTION INTRAMUSCULAR; INTRAVENOUS at 02:03

## 2021-03-06 RX ADMIN — NITROGLYCERIN 1 INCH: 20 OINTMENT TOPICAL at 01:03

## 2021-03-06 RX ADMIN — ASPIRIN 325 MG ORAL TABLET 325 MG: 325 PILL ORAL at 01:03

## 2021-03-16 ENCOUNTER — OFFICE VISIT (OUTPATIENT)
Dept: INTERNAL MEDICINE | Facility: CLINIC | Age: 81
End: 2021-03-16
Payer: MEDICARE

## 2021-03-16 VITALS
HEART RATE: 84 BPM | HEIGHT: 66 IN | WEIGHT: 223.75 LBS | SYSTOLIC BLOOD PRESSURE: 124 MMHG | RESPIRATION RATE: 18 BRPM | BODY MASS INDEX: 35.96 KG/M2 | TEMPERATURE: 99 F | DIASTOLIC BLOOD PRESSURE: 60 MMHG | OXYGEN SATURATION: 95 %

## 2021-03-16 DIAGNOSIS — I15.2 HYPERTENSION ASSOCIATED WITH DIABETES: ICD-10-CM

## 2021-03-16 DIAGNOSIS — R60.9 DEPENDENT EDEMA: ICD-10-CM

## 2021-03-16 DIAGNOSIS — M1A.9XX0 CHRONIC GOUT WITHOUT TOPHUS, UNSPECIFIED CAUSE, UNSPECIFIED SITE: ICD-10-CM

## 2021-03-16 DIAGNOSIS — I51.89 DIASTOLIC DYSFUNCTION: Primary | ICD-10-CM

## 2021-03-16 DIAGNOSIS — E11.59 HYPERTENSION ASSOCIATED WITH DIABETES: ICD-10-CM

## 2021-03-16 DIAGNOSIS — R06.09 DOE (DYSPNEA ON EXERTION): ICD-10-CM

## 2021-03-16 PROCEDURE — 1159F PR MEDICATION LIST DOCUMENTED IN MEDICAL RECORD: ICD-10-PCS | Mod: S$GLB,,, | Performed by: FAMILY MEDICINE

## 2021-03-16 PROCEDURE — 3288F FALL RISK ASSESSMENT DOCD: CPT | Mod: CPTII,S$GLB,, | Performed by: FAMILY MEDICINE

## 2021-03-16 PROCEDURE — 99999 PR PBB SHADOW E&M-EST. PATIENT-LVL IV: CPT | Mod: PBBFAC,,, | Performed by: FAMILY MEDICINE

## 2021-03-16 PROCEDURE — 3078F PR MOST RECENT DIASTOLIC BLOOD PRESSURE < 80 MM HG: ICD-10-PCS | Mod: CPTII,S$GLB,, | Performed by: FAMILY MEDICINE

## 2021-03-16 PROCEDURE — 1125F AMNT PAIN NOTED PAIN PRSNT: CPT | Mod: S$GLB,,, | Performed by: FAMILY MEDICINE

## 2021-03-16 PROCEDURE — 1101F PR PT FALLS ASSESS DOC 0-1 FALLS W/OUT INJ PAST YR: ICD-10-PCS | Mod: CPTII,S$GLB,, | Performed by: FAMILY MEDICINE

## 2021-03-16 PROCEDURE — 1159F MED LIST DOCD IN RCRD: CPT | Mod: S$GLB,,, | Performed by: FAMILY MEDICINE

## 2021-03-16 PROCEDURE — 3074F SYST BP LT 130 MM HG: CPT | Mod: CPTII,S$GLB,, | Performed by: FAMILY MEDICINE

## 2021-03-16 PROCEDURE — 3078F DIAST BP <80 MM HG: CPT | Mod: CPTII,S$GLB,, | Performed by: FAMILY MEDICINE

## 2021-03-16 PROCEDURE — 99999 PR PBB SHADOW E&M-EST. PATIENT-LVL IV: ICD-10-PCS | Mod: PBBFAC,,, | Performed by: FAMILY MEDICINE

## 2021-03-16 PROCEDURE — 3072F LOW RISK FOR RETINOPATHY: CPT | Mod: S$GLB,,, | Performed by: FAMILY MEDICINE

## 2021-03-16 PROCEDURE — 3074F PR MOST RECENT SYSTOLIC BLOOD PRESSURE < 130 MM HG: ICD-10-PCS | Mod: CPTII,S$GLB,, | Performed by: FAMILY MEDICINE

## 2021-03-16 PROCEDURE — 3288F PR FALLS RISK ASSESSMENT DOCUMENTED: ICD-10-PCS | Mod: CPTII,S$GLB,, | Performed by: FAMILY MEDICINE

## 2021-03-16 PROCEDURE — 1101F PT FALLS ASSESS-DOCD LE1/YR: CPT | Mod: CPTII,S$GLB,, | Performed by: FAMILY MEDICINE

## 2021-03-16 PROCEDURE — 99214 OFFICE O/P EST MOD 30 MIN: CPT | Mod: S$GLB,,, | Performed by: FAMILY MEDICINE

## 2021-03-16 PROCEDURE — 1125F PR PAIN SEVERITY QUANTIFIED, PAIN PRESENT: ICD-10-PCS | Mod: S$GLB,,, | Performed by: FAMILY MEDICINE

## 2021-03-16 PROCEDURE — 3072F PR LOW RISK FOR RETINOPATHY: ICD-10-PCS | Mod: S$GLB,,, | Performed by: FAMILY MEDICINE

## 2021-03-16 PROCEDURE — 99214 PR OFFICE/OUTPT VISIT, EST, LEVL IV, 30-39 MIN: ICD-10-PCS | Mod: S$GLB,,, | Performed by: FAMILY MEDICINE

## 2021-03-29 ENCOUNTER — TELEPHONE (OUTPATIENT)
Dept: INTERNAL MEDICINE | Facility: CLINIC | Age: 81
End: 2021-03-29

## 2021-04-22 ENCOUNTER — TELEPHONE (OUTPATIENT)
Dept: PODIATRY | Facility: CLINIC | Age: 81
End: 2021-04-22

## 2021-04-22 ENCOUNTER — OFFICE VISIT (OUTPATIENT)
Dept: OPHTHALMOLOGY | Facility: CLINIC | Age: 81
End: 2021-04-22
Payer: MEDICARE

## 2021-04-22 DIAGNOSIS — H40.1131 PRIMARY OPEN ANGLE GLAUCOMA (POAG) OF BOTH EYES, MILD STAGE: Primary | ICD-10-CM

## 2021-04-22 DIAGNOSIS — H40.1134 PRIMARY OPEN ANGLE GLAUCOMA OF BOTH EYES, INDETERMINATE STAGE: ICD-10-CM

## 2021-04-22 PROCEDURE — 99213 PR OFFICE/OUTPT VISIT, EST, LEVL III, 20-29 MIN: ICD-10-PCS | Mod: S$GLB,,, | Performed by: OPTOMETRIST

## 2021-04-22 PROCEDURE — 1159F PR MEDICATION LIST DOCUMENTED IN MEDICAL RECORD: ICD-10-PCS | Mod: S$GLB,,, | Performed by: OPTOMETRIST

## 2021-04-22 PROCEDURE — 99999 PR PBB SHADOW E&M-EST. PATIENT-LVL II: ICD-10-PCS | Mod: PBBFAC,,, | Performed by: OPTOMETRIST

## 2021-04-22 PROCEDURE — 99213 OFFICE O/P EST LOW 20 MIN: CPT | Mod: S$GLB,,, | Performed by: OPTOMETRIST

## 2021-04-22 PROCEDURE — 1159F MED LIST DOCD IN RCRD: CPT | Mod: S$GLB,,, | Performed by: OPTOMETRIST

## 2021-04-22 PROCEDURE — 99999 PR PBB SHADOW E&M-EST. PATIENT-LVL II: CPT | Mod: PBBFAC,,, | Performed by: OPTOMETRIST

## 2021-04-22 RX ORDER — TIMOLOL MALEATE 5 MG/ML
1 SOLUTION/ DROPS OPHTHALMIC 2 TIMES DAILY
Qty: 25 ML | Refills: 5 | Status: SHIPPED | OUTPATIENT
Start: 2021-04-22 | End: 2022-01-20 | Stop reason: SDUPTHER

## 2021-05-06 ENCOUNTER — OFFICE VISIT (OUTPATIENT)
Dept: PODIATRY | Facility: CLINIC | Age: 81
End: 2021-05-06
Payer: MEDICARE

## 2021-05-06 VITALS
WEIGHT: 218.06 LBS | BODY MASS INDEX: 35.04 KG/M2 | HEART RATE: 81 BPM | DIASTOLIC BLOOD PRESSURE: 68 MMHG | SYSTOLIC BLOOD PRESSURE: 142 MMHG | HEIGHT: 66 IN

## 2021-05-06 DIAGNOSIS — E11.51 TYPE II DIABETES MELLITUS WITH PERIPHERAL CIRCULATORY DISORDER: Primary | ICD-10-CM

## 2021-05-06 DIAGNOSIS — B35.1 DERMATOPHYTOSIS OF NAIL: ICD-10-CM

## 2021-05-06 DIAGNOSIS — R60.9 DEPENDENT EDEMA: ICD-10-CM

## 2021-05-06 DIAGNOSIS — E66.01 SEVERE OBESITY (BMI 35.0-35.9 WITH COMORBIDITY): ICD-10-CM

## 2021-05-06 PROCEDURE — 99499 UNLISTED E&M SERVICE: CPT | Mod: S$GLB,,, | Performed by: PODIATRIST

## 2021-05-06 PROCEDURE — 3288F PR FALLS RISK ASSESSMENT DOCUMENTED: ICD-10-PCS | Mod: CPTII,S$GLB,, | Performed by: PODIATRIST

## 2021-05-06 PROCEDURE — 99499 NO LOS: ICD-10-PCS | Mod: S$GLB,,, | Performed by: PODIATRIST

## 2021-05-06 PROCEDURE — 99999 PR PBB SHADOW E&M-EST. PATIENT-LVL IV: ICD-10-PCS | Mod: PBBFAC,,, | Performed by: PODIATRIST

## 2021-05-06 PROCEDURE — 1101F PT FALLS ASSESS-DOCD LE1/YR: CPT | Mod: CPTII,S$GLB,, | Performed by: PODIATRIST

## 2021-05-06 PROCEDURE — 3072F LOW RISK FOR RETINOPATHY: CPT | Mod: S$GLB,,, | Performed by: PODIATRIST

## 2021-05-06 PROCEDURE — 1125F PR PAIN SEVERITY QUANTIFIED, PAIN PRESENT: ICD-10-PCS | Mod: S$GLB,,, | Performed by: PODIATRIST

## 2021-05-06 PROCEDURE — 99999 PR PBB SHADOW E&M-EST. PATIENT-LVL IV: CPT | Mod: PBBFAC,,, | Performed by: PODIATRIST

## 2021-05-06 PROCEDURE — 3288F FALL RISK ASSESSMENT DOCD: CPT | Mod: CPTII,S$GLB,, | Performed by: PODIATRIST

## 2021-05-06 PROCEDURE — 11721 PR DEBRIDEMENT OF NAILS, 6 OR MORE: ICD-10-PCS | Mod: Q8,S$GLB,, | Performed by: PODIATRIST

## 2021-05-06 PROCEDURE — 11721 DEBRIDE NAIL 6 OR MORE: CPT | Mod: Q8,S$GLB,, | Performed by: PODIATRIST

## 2021-05-06 PROCEDURE — 1125F AMNT PAIN NOTED PAIN PRSNT: CPT | Mod: S$GLB,,, | Performed by: PODIATRIST

## 2021-05-06 PROCEDURE — 1101F PR PT FALLS ASSESS DOC 0-1 FALLS W/OUT INJ PAST YR: ICD-10-PCS | Mod: CPTII,S$GLB,, | Performed by: PODIATRIST

## 2021-05-06 PROCEDURE — 3072F PR LOW RISK FOR RETINOPATHY: ICD-10-PCS | Mod: S$GLB,,, | Performed by: PODIATRIST

## 2021-05-14 ENCOUNTER — OFFICE VISIT (OUTPATIENT)
Dept: INTERNAL MEDICINE | Facility: CLINIC | Age: 81
End: 2021-05-14
Payer: MEDICARE

## 2021-05-14 VITALS
DIASTOLIC BLOOD PRESSURE: 70 MMHG | HEIGHT: 66 IN | SYSTOLIC BLOOD PRESSURE: 136 MMHG | OXYGEN SATURATION: 100 % | WEIGHT: 218.94 LBS | TEMPERATURE: 98 F | BODY MASS INDEX: 35.19 KG/M2 | HEART RATE: 82 BPM

## 2021-05-14 DIAGNOSIS — I50.32 CHRONIC DIASTOLIC HEART FAILURE: ICD-10-CM

## 2021-05-14 DIAGNOSIS — Z00.00 ENCOUNTER FOR MEDICARE ANNUAL WELLNESS EXAM: Primary | ICD-10-CM

## 2021-05-14 DIAGNOSIS — E66.01 SEVERE OBESITY (BMI 35.0-35.9 WITH COMORBIDITY): ICD-10-CM

## 2021-05-14 DIAGNOSIS — H40.1134 PRIMARY OPEN-ANGLE GLAUCOMA, BILATERAL, INDETERMINATE STAGE: ICD-10-CM

## 2021-05-14 DIAGNOSIS — I25.10 CORONARY ARTERY DISEASE INVOLVING NATIVE CORONARY ARTERY WITHOUT ANGINA PECTORIS, UNSPECIFIED WHETHER NATIVE OR TRANSPLANTED HEART: ICD-10-CM

## 2021-05-14 DIAGNOSIS — I51.89 DIASTOLIC DYSFUNCTION: ICD-10-CM

## 2021-05-14 DIAGNOSIS — E11.51 TYPE 2 DIABETES MELLITUS WITH DIABETIC PERIPHERAL ANGIOPATHY WITHOUT GANGRENE, WITHOUT LONG-TERM CURRENT USE OF INSULIN: ICD-10-CM

## 2021-05-14 DIAGNOSIS — E11.59 HYPERTENSION ASSOCIATED WITH DIABETES: ICD-10-CM

## 2021-05-14 DIAGNOSIS — E78.2 MIXED HYPERLIPIDEMIA: Chronic | ICD-10-CM

## 2021-05-14 DIAGNOSIS — J42 CHRONIC BRONCHITIS, UNSPECIFIED CHRONIC BRONCHITIS TYPE: ICD-10-CM

## 2021-05-14 DIAGNOSIS — E11.42 TYPE 2 DIABETES MELLITUS WITH DIABETIC POLYNEUROPATHY, WITHOUT LONG-TERM CURRENT USE OF INSULIN: ICD-10-CM

## 2021-05-14 DIAGNOSIS — I15.2 HYPERTENSION ASSOCIATED WITH DIABETES: ICD-10-CM

## 2021-05-14 PROCEDURE — 99999 PR PBB SHADOW E&M-EST. PATIENT-LVL IV: ICD-10-PCS | Mod: PBBFAC,,, | Performed by: NURSE PRACTITIONER

## 2021-05-14 PROCEDURE — 3072F LOW RISK FOR RETINOPATHY: CPT | Mod: S$GLB,,, | Performed by: NURSE PRACTITIONER

## 2021-05-14 PROCEDURE — 3072F PR LOW RISK FOR RETINOPATHY: ICD-10-PCS | Mod: S$GLB,,, | Performed by: NURSE PRACTITIONER

## 2021-05-14 PROCEDURE — 1101F PT FALLS ASSESS-DOCD LE1/YR: CPT | Mod: CPTII,S$GLB,, | Performed by: NURSE PRACTITIONER

## 2021-05-14 PROCEDURE — 3288F PR FALLS RISK ASSESSMENT DOCUMENTED: ICD-10-PCS | Mod: CPTII,S$GLB,, | Performed by: NURSE PRACTITIONER

## 2021-05-14 PROCEDURE — G0439 PPPS, SUBSEQ VISIT: HCPCS | Mod: S$GLB,,, | Performed by: NURSE PRACTITIONER

## 2021-05-14 PROCEDURE — 99499 RISK ADDL DX/OHS AUDIT: ICD-10-PCS | Mod: HCNC,S$GLB,, | Performed by: NURSE PRACTITIONER

## 2021-05-14 PROCEDURE — 1126F AMNT PAIN NOTED NONE PRSNT: CPT | Mod: S$GLB,,, | Performed by: NURSE PRACTITIONER

## 2021-05-14 PROCEDURE — 1101F PR PT FALLS ASSESS DOC 0-1 FALLS W/OUT INJ PAST YR: ICD-10-PCS | Mod: CPTII,S$GLB,, | Performed by: NURSE PRACTITIONER

## 2021-05-14 PROCEDURE — 3288F FALL RISK ASSESSMENT DOCD: CPT | Mod: CPTII,S$GLB,, | Performed by: NURSE PRACTITIONER

## 2021-05-14 PROCEDURE — 1126F PR PAIN SEVERITY QUANTIFIED, NO PAIN PRESENT: ICD-10-PCS | Mod: S$GLB,,, | Performed by: NURSE PRACTITIONER

## 2021-05-14 PROCEDURE — G0439 PR MEDICARE ANNUAL WELLNESS SUBSEQUENT VISIT: ICD-10-PCS | Mod: S$GLB,,, | Performed by: NURSE PRACTITIONER

## 2021-05-14 PROCEDURE — 99499 UNLISTED E&M SERVICE: CPT | Mod: HCNC,S$GLB,, | Performed by: NURSE PRACTITIONER

## 2021-05-14 PROCEDURE — 99999 PR PBB SHADOW E&M-EST. PATIENT-LVL IV: CPT | Mod: PBBFAC,,, | Performed by: NURSE PRACTITIONER

## 2021-05-14 RX ORDER — FUROSEMIDE 10 MG/ML
INJECTION INTRAMUSCULAR; INTRAVENOUS
COMMUNITY
Start: 2021-03-06 | End: 2021-05-14

## 2021-05-26 ENCOUNTER — OFFICE VISIT (OUTPATIENT)
Dept: CARDIOLOGY | Facility: CLINIC | Age: 81
End: 2021-05-26
Payer: MEDICARE

## 2021-05-26 VITALS
DIASTOLIC BLOOD PRESSURE: 66 MMHG | BODY MASS INDEX: 35.33 KG/M2 | OXYGEN SATURATION: 95 % | SYSTOLIC BLOOD PRESSURE: 138 MMHG | HEART RATE: 81 BPM | WEIGHT: 218.94 LBS

## 2021-05-26 DIAGNOSIS — I77.9 CAROTID ARTERY DISEASE, UNSPECIFIED LATERALITY, UNSPECIFIED TYPE: ICD-10-CM

## 2021-05-26 DIAGNOSIS — I50.32 CHRONIC DIASTOLIC HEART FAILURE: ICD-10-CM

## 2021-05-26 DIAGNOSIS — I70.0 AORTIC ATHEROSCLEROSIS: ICD-10-CM

## 2021-05-26 DIAGNOSIS — I35.8 AORTIC VALVE SCLEROSIS: ICD-10-CM

## 2021-05-26 DIAGNOSIS — E78.2 MIXED HYPERLIPIDEMIA: ICD-10-CM

## 2021-05-26 DIAGNOSIS — E11.59 HYPERTENSION ASSOCIATED WITH DIABETES: ICD-10-CM

## 2021-05-26 DIAGNOSIS — I50.42 CHRONIC COMBINED SYSTOLIC AND DIASTOLIC HEART FAILURE: Primary | ICD-10-CM

## 2021-05-26 DIAGNOSIS — R06.09 DOE (DYSPNEA ON EXERTION): ICD-10-CM

## 2021-05-26 DIAGNOSIS — I15.2 HYPERTENSION ASSOCIATED WITH DIABETES: ICD-10-CM

## 2021-05-26 DIAGNOSIS — I35.0 NONRHEUMATIC AORTIC (VALVE) STENOSIS: ICD-10-CM

## 2021-05-26 PROCEDURE — 99999 PR PBB SHADOW E&M-EST. PATIENT-LVL III: CPT | Mod: PBBFAC,,, | Performed by: INTERNAL MEDICINE

## 2021-05-26 PROCEDURE — 1159F PR MEDICATION LIST DOCUMENTED IN MEDICAL RECORD: ICD-10-PCS | Mod: S$GLB,,, | Performed by: INTERNAL MEDICINE

## 2021-05-26 PROCEDURE — 3072F LOW RISK FOR RETINOPATHY: CPT | Mod: S$GLB,,, | Performed by: INTERNAL MEDICINE

## 2021-05-26 PROCEDURE — 3072F PR LOW RISK FOR RETINOPATHY: ICD-10-PCS | Mod: S$GLB,,, | Performed by: INTERNAL MEDICINE

## 2021-05-26 PROCEDURE — 99213 PR OFFICE/OUTPT VISIT, EST, LEVL III, 20-29 MIN: ICD-10-PCS | Mod: S$GLB,,, | Performed by: INTERNAL MEDICINE

## 2021-05-26 PROCEDURE — 1159F MED LIST DOCD IN RCRD: CPT | Mod: S$GLB,,, | Performed by: INTERNAL MEDICINE

## 2021-05-26 PROCEDURE — 99999 PR PBB SHADOW E&M-EST. PATIENT-LVL III: ICD-10-PCS | Mod: PBBFAC,,, | Performed by: INTERNAL MEDICINE

## 2021-05-26 PROCEDURE — 99213 OFFICE O/P EST LOW 20 MIN: CPT | Mod: S$GLB,,, | Performed by: INTERNAL MEDICINE

## 2021-05-26 RX ORDER — FUROSEMIDE 20 MG/1
20 TABLET ORAL DAILY PRN
Qty: 30 TABLET | Refills: 11 | Status: SHIPPED | OUTPATIENT
Start: 2021-05-26 | End: 2021-09-21 | Stop reason: SDUPTHER

## 2021-06-28 DIAGNOSIS — E11.42 TYPE 2 DIABETES MELLITUS WITH DIABETIC POLYNEUROPATHY, WITHOUT LONG-TERM CURRENT USE OF INSULIN: ICD-10-CM

## 2021-06-29 RX ORDER — BLOOD SUGAR DIAGNOSTIC
STRIP MISCELLANEOUS
Qty: 200 STRIP | Refills: 3 | Status: SHIPPED | OUTPATIENT
Start: 2021-06-29 | End: 2022-01-18 | Stop reason: SDUPTHER

## 2021-07-06 ENCOUNTER — TELEPHONE (OUTPATIENT)
Dept: INTERNAL MEDICINE | Facility: CLINIC | Age: 81
End: 2021-07-06

## 2021-07-12 ENCOUNTER — OFFICE VISIT (OUTPATIENT)
Dept: INTERNAL MEDICINE | Facility: CLINIC | Age: 81
End: 2021-07-12
Payer: MEDICARE

## 2021-07-12 VITALS
SYSTOLIC BLOOD PRESSURE: 132 MMHG | RESPIRATION RATE: 15 BRPM | TEMPERATURE: 99 F | WEIGHT: 216.69 LBS | BODY MASS INDEX: 34.82 KG/M2 | HEIGHT: 66 IN | HEART RATE: 80 BPM | OXYGEN SATURATION: 95 % | DIASTOLIC BLOOD PRESSURE: 70 MMHG

## 2021-07-12 DIAGNOSIS — E11.59 HYPERTENSION ASSOCIATED WITH DIABETES: ICD-10-CM

## 2021-07-12 DIAGNOSIS — I15.2 HYPERTENSION ASSOCIATED WITH DIABETES: ICD-10-CM

## 2021-07-12 DIAGNOSIS — E11.42 TYPE 2 DIABETES MELLITUS WITH DIABETIC POLYNEUROPATHY, WITHOUT LONG-TERM CURRENT USE OF INSULIN: ICD-10-CM

## 2021-07-12 DIAGNOSIS — M10.9 ACUTE GOUT OF ANKLE, UNSPECIFIED CAUSE, UNSPECIFIED LATERALITY: Primary | ICD-10-CM

## 2021-07-12 DIAGNOSIS — R79.89 LOW VITAMIN D LEVEL: ICD-10-CM

## 2021-07-12 DIAGNOSIS — M89.9 DISORDER OF BONE, UNSPECIFIED: ICD-10-CM

## 2021-07-12 PROCEDURE — 99499 RISK ADDL DX/OHS AUDIT: ICD-10-PCS | Mod: HCNC,S$GLB,, | Performed by: FAMILY MEDICINE

## 2021-07-12 PROCEDURE — 99999 PR PBB SHADOW E&M-EST. PATIENT-LVL IV: CPT | Mod: PBBFAC,,, | Performed by: FAMILY MEDICINE

## 2021-07-12 PROCEDURE — 3075F SYST BP GE 130 - 139MM HG: CPT | Mod: CPTII,S$GLB,, | Performed by: FAMILY MEDICINE

## 2021-07-12 PROCEDURE — 99499 UNLISTED E&M SERVICE: CPT | Mod: HCNC,S$GLB,, | Performed by: FAMILY MEDICINE

## 2021-07-12 PROCEDURE — 1125F AMNT PAIN NOTED PAIN PRSNT: CPT | Mod: S$GLB,,, | Performed by: FAMILY MEDICINE

## 2021-07-12 PROCEDURE — 1101F PR PT FALLS ASSESS DOC 0-1 FALLS W/OUT INJ PAST YR: ICD-10-PCS | Mod: CPTII,S$GLB,, | Performed by: FAMILY MEDICINE

## 2021-07-12 PROCEDURE — 1159F PR MEDICATION LIST DOCUMENTED IN MEDICAL RECORD: ICD-10-PCS | Mod: S$GLB,,, | Performed by: FAMILY MEDICINE

## 2021-07-12 PROCEDURE — 3078F PR MOST RECENT DIASTOLIC BLOOD PRESSURE < 80 MM HG: ICD-10-PCS | Mod: CPTII,S$GLB,, | Performed by: FAMILY MEDICINE

## 2021-07-12 PROCEDURE — 3078F DIAST BP <80 MM HG: CPT | Mod: CPTII,S$GLB,, | Performed by: FAMILY MEDICINE

## 2021-07-12 PROCEDURE — 3288F PR FALLS RISK ASSESSMENT DOCUMENTED: ICD-10-PCS | Mod: CPTII,S$GLB,, | Performed by: FAMILY MEDICINE

## 2021-07-12 PROCEDURE — 3072F PR LOW RISK FOR RETINOPATHY: ICD-10-PCS | Mod: S$GLB,,, | Performed by: FAMILY MEDICINE

## 2021-07-12 PROCEDURE — 3072F LOW RISK FOR RETINOPATHY: CPT | Mod: S$GLB,,, | Performed by: FAMILY MEDICINE

## 2021-07-12 PROCEDURE — 1159F MED LIST DOCD IN RCRD: CPT | Mod: S$GLB,,, | Performed by: FAMILY MEDICINE

## 2021-07-12 PROCEDURE — 99214 OFFICE O/P EST MOD 30 MIN: CPT | Mod: S$GLB,,, | Performed by: FAMILY MEDICINE

## 2021-07-12 PROCEDURE — 3075F PR MOST RECENT SYSTOLIC BLOOD PRESS GE 130-139MM HG: ICD-10-PCS | Mod: CPTII,S$GLB,, | Performed by: FAMILY MEDICINE

## 2021-07-12 PROCEDURE — 3288F FALL RISK ASSESSMENT DOCD: CPT | Mod: CPTII,S$GLB,, | Performed by: FAMILY MEDICINE

## 2021-07-12 PROCEDURE — 99999 PR PBB SHADOW E&M-EST. PATIENT-LVL IV: ICD-10-PCS | Mod: PBBFAC,,, | Performed by: FAMILY MEDICINE

## 2021-07-12 PROCEDURE — 99214 PR OFFICE/OUTPT VISIT, EST, LEVL IV, 30-39 MIN: ICD-10-PCS | Mod: S$GLB,,, | Performed by: FAMILY MEDICINE

## 2021-07-12 PROCEDURE — 1101F PT FALLS ASSESS-DOCD LE1/YR: CPT | Mod: CPTII,S$GLB,, | Performed by: FAMILY MEDICINE

## 2021-07-12 PROCEDURE — 1125F PR PAIN SEVERITY QUANTIFIED, PAIN PRESENT: ICD-10-PCS | Mod: S$GLB,,, | Performed by: FAMILY MEDICINE

## 2021-07-12 RX ORDER — PREDNISONE 20 MG/1
20 TABLET ORAL DAILY
Qty: 7 TABLET | Refills: 0 | Status: SHIPPED | OUTPATIENT
Start: 2021-07-12 | End: 2021-07-19

## 2021-07-13 ENCOUNTER — LAB VISIT (OUTPATIENT)
Dept: LAB | Facility: HOSPITAL | Age: 81
End: 2021-07-13
Attending: FAMILY MEDICINE
Payer: MEDICARE

## 2021-07-13 DIAGNOSIS — M89.9 DISORDER OF BONE, UNSPECIFIED: ICD-10-CM

## 2021-07-13 DIAGNOSIS — M10.9 ACUTE GOUT OF ANKLE, UNSPECIFIED CAUSE, UNSPECIFIED LATERALITY: ICD-10-CM

## 2021-07-13 DIAGNOSIS — R79.89 LOW VITAMIN D LEVEL: ICD-10-CM

## 2021-07-13 LAB
ALBUMIN SERPL BCP-MCNC: 3.7 G/DL (ref 3.5–5.2)
ALP SERPL-CCNC: 33 U/L (ref 55–135)
ALT SERPL W/O P-5'-P-CCNC: 11 U/L (ref 10–44)
ANION GAP SERPL CALC-SCNC: 8 MMOL/L (ref 8–16)
AST SERPL-CCNC: 12 U/L (ref 10–40)
BASOPHILS # BLD AUTO: 0.02 K/UL (ref 0–0.2)
BASOPHILS NFR BLD: 0.4 % (ref 0–1.9)
BILIRUB SERPL-MCNC: 0.5 MG/DL (ref 0.1–1)
BUN SERPL-MCNC: 7 MG/DL (ref 8–23)
CALCIUM SERPL-MCNC: 9.7 MG/DL (ref 8.7–10.5)
CHLORIDE SERPL-SCNC: 96 MMOL/L (ref 95–110)
CO2 SERPL-SCNC: 35 MMOL/L (ref 23–29)
CREAT SERPL-MCNC: 0.8 MG/DL (ref 0.5–1.4)
DIFFERENTIAL METHOD: ABNORMAL
EOSINOPHIL # BLD AUTO: 0.1 K/UL (ref 0–0.5)
EOSINOPHIL NFR BLD: 1.5 % (ref 0–8)
ERYTHROCYTE [DISTWIDTH] IN BLOOD BY AUTOMATED COUNT: 14.2 % (ref 11.5–14.5)
EST. GFR  (AFRICAN AMERICAN): >60 ML/MIN/1.73 M^2
EST. GFR  (NON AFRICAN AMERICAN): >60 ML/MIN/1.73 M^2
GLUCOSE SERPL-MCNC: 146 MG/DL (ref 70–110)
HCT VFR BLD AUTO: 43.6 % (ref 37–48.5)
HGB BLD-MCNC: 13.9 G/DL (ref 12–16)
IMM GRANULOCYTES # BLD AUTO: 0.02 K/UL (ref 0–0.04)
IMM GRANULOCYTES NFR BLD AUTO: 0.4 % (ref 0–0.5)
LYMPHOCYTES # BLD AUTO: 1.3 K/UL (ref 1–4.8)
LYMPHOCYTES NFR BLD: 24.8 % (ref 18–48)
MCH RBC QN AUTO: 28.6 PG (ref 27–31)
MCHC RBC AUTO-ENTMCNC: 31.9 G/DL (ref 32–36)
MCV RBC AUTO: 90 FL (ref 82–98)
MONOCYTES # BLD AUTO: 0.5 K/UL (ref 0.3–1)
MONOCYTES NFR BLD: 10.4 % (ref 4–15)
NEUTROPHILS # BLD AUTO: 3.3 K/UL (ref 1.8–7.7)
NEUTROPHILS NFR BLD: 62.5 % (ref 38–73)
NRBC BLD-RTO: 0 /100 WBC
PLATELET # BLD AUTO: 381 K/UL (ref 150–450)
PMV BLD AUTO: 8.8 FL (ref 9.2–12.9)
POTASSIUM SERPL-SCNC: 4.8 MMOL/L (ref 3.5–5.1)
PROT SERPL-MCNC: 7.8 G/DL (ref 6–8.4)
RBC # BLD AUTO: 4.86 M/UL (ref 4–5.4)
SODIUM SERPL-SCNC: 139 MMOL/L (ref 136–145)
URATE SERPL-MCNC: 4.6 MG/DL (ref 2.4–5.7)
WBC # BLD AUTO: 5.2 K/UL (ref 3.9–12.7)

## 2021-07-13 PROCEDURE — 80053 COMPREHEN METABOLIC PANEL: CPT | Mod: PO | Performed by: FAMILY MEDICINE

## 2021-07-13 PROCEDURE — 36415 COLL VENOUS BLD VENIPUNCTURE: CPT | Mod: PO | Performed by: FAMILY MEDICINE

## 2021-07-13 PROCEDURE — 82306 VITAMIN D 25 HYDROXY: CPT | Performed by: FAMILY MEDICINE

## 2021-07-13 PROCEDURE — 85025 COMPLETE CBC W/AUTO DIFF WBC: CPT | Mod: PO | Performed by: FAMILY MEDICINE

## 2021-07-13 PROCEDURE — 84550 ASSAY OF BLOOD/URIC ACID: CPT | Mod: PO | Performed by: FAMILY MEDICINE

## 2021-07-14 ENCOUNTER — TELEPHONE (OUTPATIENT)
Dept: INTERNAL MEDICINE | Facility: CLINIC | Age: 81
End: 2021-07-14

## 2021-07-14 LAB — 25(OH)D3+25(OH)D2 SERPL-MCNC: 7 NG/ML (ref 30–96)

## 2021-07-14 RX ORDER — ERGOCALCIFEROL 1.25 MG/1
50000 CAPSULE ORAL
Qty: 12 CAPSULE | Refills: 1 | Status: SHIPPED | OUTPATIENT
Start: 2021-07-14 | End: 2021-10-12

## 2021-08-04 ENCOUNTER — PATIENT MESSAGE (OUTPATIENT)
Dept: ADMINISTRATIVE | Facility: HOSPITAL | Age: 81
End: 2021-08-04

## 2021-08-16 PROBLEM — Z00.00 ENCOUNTER FOR MEDICARE ANNUAL WELLNESS EXAM: Status: RESOLVED | Noted: 2018-06-25 | Resolved: 2021-08-16

## 2021-08-18 ENCOUNTER — TELEPHONE (OUTPATIENT)
Dept: PODIATRY | Facility: CLINIC | Age: 81
End: 2021-08-18

## 2021-08-23 ENCOUNTER — OFFICE VISIT (OUTPATIENT)
Dept: OPHTHALMOLOGY | Facility: CLINIC | Age: 81
End: 2021-08-23
Payer: MEDICARE

## 2021-08-23 DIAGNOSIS — H40.1131 PRIMARY OPEN ANGLE GLAUCOMA (POAG) OF BOTH EYES, MILD STAGE: Primary | ICD-10-CM

## 2021-08-23 PROCEDURE — 99213 OFFICE O/P EST LOW 20 MIN: CPT | Mod: S$GLB,,, | Performed by: OPTOMETRIST

## 2021-08-23 PROCEDURE — 99999 PR PBB SHADOW E&M-EST. PATIENT-LVL III: ICD-10-PCS | Mod: PBBFAC,,, | Performed by: OPTOMETRIST

## 2021-08-23 PROCEDURE — 99213 PR OFFICE/OUTPT VISIT, EST, LEVL III, 20-29 MIN: ICD-10-PCS | Mod: S$GLB,,, | Performed by: OPTOMETRIST

## 2021-08-23 PROCEDURE — 1160F PR REVIEW ALL MEDS BY PRESCRIBER/CLIN PHARMACIST DOCUMENTED: ICD-10-PCS | Mod: CPTII,S$GLB,, | Performed by: OPTOMETRIST

## 2021-08-23 PROCEDURE — 1159F MED LIST DOCD IN RCRD: CPT | Mod: CPTII,S$GLB,, | Performed by: OPTOMETRIST

## 2021-08-23 PROCEDURE — 1160F RVW MEDS BY RX/DR IN RCRD: CPT | Mod: CPTII,S$GLB,, | Performed by: OPTOMETRIST

## 2021-08-23 PROCEDURE — 99999 PR PBB SHADOW E&M-EST. PATIENT-LVL III: CPT | Mod: PBBFAC,,, | Performed by: OPTOMETRIST

## 2021-08-23 PROCEDURE — 1159F PR MEDICATION LIST DOCUMENTED IN MEDICAL RECORD: ICD-10-PCS | Mod: CPTII,S$GLB,, | Performed by: OPTOMETRIST

## 2021-09-08 ENCOUNTER — OFFICE VISIT (OUTPATIENT)
Dept: PODIATRY | Facility: CLINIC | Age: 81
End: 2021-09-08
Payer: MEDICARE

## 2021-09-08 VITALS — BODY MASS INDEX: 34.72 KG/M2 | WEIGHT: 216 LBS | HEIGHT: 66 IN

## 2021-09-08 DIAGNOSIS — R60.9 DEPENDENT EDEMA: ICD-10-CM

## 2021-09-08 DIAGNOSIS — E66.01 SEVERE OBESITY (BMI 35.0-35.9 WITH COMORBIDITY): ICD-10-CM

## 2021-09-08 DIAGNOSIS — B35.1 DERMATOPHYTOSIS OF NAIL: ICD-10-CM

## 2021-09-08 DIAGNOSIS — E11.51 TYPE II DIABETES MELLITUS WITH PERIPHERAL CIRCULATORY DISORDER: Primary | ICD-10-CM

## 2021-09-08 PROCEDURE — 1101F PT FALLS ASSESS-DOCD LE1/YR: CPT | Mod: CPTII,S$GLB,, | Performed by: PODIATRIST

## 2021-09-08 PROCEDURE — 99499 NO LOS: ICD-10-PCS | Mod: S$GLB,,, | Performed by: PODIATRIST

## 2021-09-08 PROCEDURE — 99999 PR PBB SHADOW E&M-EST. PATIENT-LVL III: CPT | Mod: PBBFAC,,, | Performed by: PODIATRIST

## 2021-09-08 PROCEDURE — 1160F RVW MEDS BY RX/DR IN RCRD: CPT | Mod: CPTII,S$GLB,, | Performed by: PODIATRIST

## 2021-09-08 PROCEDURE — 1159F PR MEDICATION LIST DOCUMENTED IN MEDICAL RECORD: ICD-10-PCS | Mod: CPTII,S$GLB,, | Performed by: PODIATRIST

## 2021-09-08 PROCEDURE — 11721 DEBRIDE NAIL 6 OR MORE: CPT | Mod: Q8,S$GLB,, | Performed by: PODIATRIST

## 2021-09-08 PROCEDURE — 3288F FALL RISK ASSESSMENT DOCD: CPT | Mod: CPTII,S$GLB,, | Performed by: PODIATRIST

## 2021-09-08 PROCEDURE — 3288F PR FALLS RISK ASSESSMENT DOCUMENTED: ICD-10-PCS | Mod: CPTII,S$GLB,, | Performed by: PODIATRIST

## 2021-09-08 PROCEDURE — 99999 PR PBB SHADOW E&M-EST. PATIENT-LVL III: ICD-10-PCS | Mod: PBBFAC,,, | Performed by: PODIATRIST

## 2021-09-08 PROCEDURE — 1126F AMNT PAIN NOTED NONE PRSNT: CPT | Mod: CPTII,S$GLB,, | Performed by: PODIATRIST

## 2021-09-08 PROCEDURE — 1160F PR REVIEW ALL MEDS BY PRESCRIBER/CLIN PHARMACIST DOCUMENTED: ICD-10-PCS | Mod: CPTII,S$GLB,, | Performed by: PODIATRIST

## 2021-09-08 PROCEDURE — 1126F PR PAIN SEVERITY QUANTIFIED, NO PAIN PRESENT: ICD-10-PCS | Mod: CPTII,S$GLB,, | Performed by: PODIATRIST

## 2021-09-08 PROCEDURE — 1101F PR PT FALLS ASSESS DOC 0-1 FALLS W/OUT INJ PAST YR: ICD-10-PCS | Mod: CPTII,S$GLB,, | Performed by: PODIATRIST

## 2021-09-08 PROCEDURE — 11721 PR DEBRIDEMENT OF NAILS, 6 OR MORE: ICD-10-PCS | Mod: Q8,S$GLB,, | Performed by: PODIATRIST

## 2021-09-08 PROCEDURE — 3072F PR LOW RISK FOR RETINOPATHY: ICD-10-PCS | Mod: CPTII,S$GLB,, | Performed by: PODIATRIST

## 2021-09-08 PROCEDURE — 3072F LOW RISK FOR RETINOPATHY: CPT | Mod: CPTII,S$GLB,, | Performed by: PODIATRIST

## 2021-09-08 PROCEDURE — 99499 UNLISTED E&M SERVICE: CPT | Mod: S$GLB,,, | Performed by: PODIATRIST

## 2021-09-08 PROCEDURE — 1159F MED LIST DOCD IN RCRD: CPT | Mod: CPTII,S$GLB,, | Performed by: PODIATRIST

## 2021-09-21 ENCOUNTER — LAB VISIT (OUTPATIENT)
Dept: LAB | Facility: HOSPITAL | Age: 81
End: 2021-09-21
Attending: FAMILY MEDICINE
Payer: MEDICARE

## 2021-09-21 ENCOUNTER — OFFICE VISIT (OUTPATIENT)
Dept: INTERNAL MEDICINE | Facility: CLINIC | Age: 81
End: 2021-09-21
Payer: MEDICARE

## 2021-09-21 VITALS
SYSTOLIC BLOOD PRESSURE: 136 MMHG | BODY MASS INDEX: 34.33 KG/M2 | RESPIRATION RATE: 19 BRPM | TEMPERATURE: 98 F | DIASTOLIC BLOOD PRESSURE: 64 MMHG | HEIGHT: 66 IN | HEART RATE: 78 BPM | OXYGEN SATURATION: 95 % | WEIGHT: 213.63 LBS

## 2021-09-21 DIAGNOSIS — I50.42 CHRONIC COMBINED SYSTOLIC AND DIASTOLIC HEART FAILURE: ICD-10-CM

## 2021-09-21 DIAGNOSIS — I51.89 DIASTOLIC DYSFUNCTION: ICD-10-CM

## 2021-09-21 DIAGNOSIS — E11.42 TYPE 2 DIABETES MELLITUS WITH DIABETIC POLYNEUROPATHY, WITHOUT LONG-TERM CURRENT USE OF INSULIN: Primary | ICD-10-CM

## 2021-09-21 DIAGNOSIS — E11.42 TYPE 2 DIABETES MELLITUS WITH DIABETIC POLYNEUROPATHY, WITHOUT LONG-TERM CURRENT USE OF INSULIN: ICD-10-CM

## 2021-09-21 DIAGNOSIS — M1A.9XX0 CHRONIC GOUT WITHOUT TOPHUS, UNSPECIFIED CAUSE, UNSPECIFIED SITE: ICD-10-CM

## 2021-09-21 DIAGNOSIS — R60.9 DEPENDENT EDEMA: ICD-10-CM

## 2021-09-21 PROCEDURE — G0008 FLU VACCINE - QUADRIVALENT - ADJUVANTED: ICD-10-PCS | Mod: S$GLB,,, | Performed by: FAMILY MEDICINE

## 2021-09-21 PROCEDURE — 3072F LOW RISK FOR RETINOPATHY: CPT | Mod: CPTII,S$GLB,, | Performed by: FAMILY MEDICINE

## 2021-09-21 PROCEDURE — 1159F PR MEDICATION LIST DOCUMENTED IN MEDICAL RECORD: ICD-10-PCS | Mod: CPTII,S$GLB,, | Performed by: FAMILY MEDICINE

## 2021-09-21 PROCEDURE — 83036 HEMOGLOBIN GLYCOSYLATED A1C: CPT | Performed by: FAMILY MEDICINE

## 2021-09-21 PROCEDURE — 99214 PR OFFICE/OUTPT VISIT, EST, LEVL IV, 30-39 MIN: ICD-10-PCS | Mod: 25,S$GLB,, | Performed by: FAMILY MEDICINE

## 2021-09-21 PROCEDURE — 99999 PR PBB SHADOW E&M-EST. PATIENT-LVL V: CPT | Mod: PBBFAC,,, | Performed by: FAMILY MEDICINE

## 2021-09-21 PROCEDURE — 1125F PR PAIN SEVERITY QUANTIFIED, PAIN PRESENT: ICD-10-PCS | Mod: CPTII,S$GLB,, | Performed by: FAMILY MEDICINE

## 2021-09-21 PROCEDURE — 99214 OFFICE O/P EST MOD 30 MIN: CPT | Mod: 25,S$GLB,, | Performed by: FAMILY MEDICINE

## 2021-09-21 PROCEDURE — 3072F PR LOW RISK FOR RETINOPATHY: ICD-10-PCS | Mod: CPTII,S$GLB,, | Performed by: FAMILY MEDICINE

## 2021-09-21 PROCEDURE — G0008 ADMIN INFLUENZA VIRUS VAC: HCPCS | Mod: S$GLB,,, | Performed by: FAMILY MEDICINE

## 2021-09-21 PROCEDURE — 1160F RVW MEDS BY RX/DR IN RCRD: CPT | Mod: CPTII,S$GLB,, | Performed by: FAMILY MEDICINE

## 2021-09-21 PROCEDURE — 1125F AMNT PAIN NOTED PAIN PRSNT: CPT | Mod: CPTII,S$GLB,, | Performed by: FAMILY MEDICINE

## 2021-09-21 PROCEDURE — 90694 FLU VACCINE - QUADRIVALENT - ADJUVANTED: ICD-10-PCS | Mod: S$GLB,,, | Performed by: FAMILY MEDICINE

## 2021-09-21 PROCEDURE — 99999 PR PBB SHADOW E&M-EST. PATIENT-LVL V: ICD-10-PCS | Mod: PBBFAC,,, | Performed by: FAMILY MEDICINE

## 2021-09-21 PROCEDURE — 3078F DIAST BP <80 MM HG: CPT | Mod: CPTII,S$GLB,, | Performed by: FAMILY MEDICINE

## 2021-09-21 PROCEDURE — 1101F PT FALLS ASSESS-DOCD LE1/YR: CPT | Mod: CPTII,S$GLB,, | Performed by: FAMILY MEDICINE

## 2021-09-21 PROCEDURE — 1159F MED LIST DOCD IN RCRD: CPT | Mod: CPTII,S$GLB,, | Performed by: FAMILY MEDICINE

## 2021-09-21 PROCEDURE — 1160F PR REVIEW ALL MEDS BY PRESCRIBER/CLIN PHARMACIST DOCUMENTED: ICD-10-PCS | Mod: CPTII,S$GLB,, | Performed by: FAMILY MEDICINE

## 2021-09-21 PROCEDURE — 3288F FALL RISK ASSESSMENT DOCD: CPT | Mod: CPTII,S$GLB,, | Performed by: FAMILY MEDICINE

## 2021-09-21 PROCEDURE — 99499 UNLISTED E&M SERVICE: CPT | Mod: HCNC,S$GLB,, | Performed by: FAMILY MEDICINE

## 2021-09-21 PROCEDURE — 3078F PR MOST RECENT DIASTOLIC BLOOD PRESSURE < 80 MM HG: ICD-10-PCS | Mod: CPTII,S$GLB,, | Performed by: FAMILY MEDICINE

## 2021-09-21 PROCEDURE — 1101F PR PT FALLS ASSESS DOC 0-1 FALLS W/OUT INJ PAST YR: ICD-10-PCS | Mod: CPTII,S$GLB,, | Performed by: FAMILY MEDICINE

## 2021-09-21 PROCEDURE — 3288F PR FALLS RISK ASSESSMENT DOCUMENTED: ICD-10-PCS | Mod: CPTII,S$GLB,, | Performed by: FAMILY MEDICINE

## 2021-09-21 PROCEDURE — 90694 VACC AIIV4 NO PRSRV 0.5ML IM: CPT | Mod: S$GLB,,, | Performed by: FAMILY MEDICINE

## 2021-09-21 PROCEDURE — 3075F PR MOST RECENT SYSTOLIC BLOOD PRESS GE 130-139MM HG: ICD-10-PCS | Mod: CPTII,S$GLB,, | Performed by: FAMILY MEDICINE

## 2021-09-21 PROCEDURE — 3075F SYST BP GE 130 - 139MM HG: CPT | Mod: CPTII,S$GLB,, | Performed by: FAMILY MEDICINE

## 2021-09-21 PROCEDURE — 99499 RISK ADDL DX/OHS AUDIT: ICD-10-PCS | Mod: HCNC,S$GLB,, | Performed by: FAMILY MEDICINE

## 2021-09-21 PROCEDURE — 36415 COLL VENOUS BLD VENIPUNCTURE: CPT | Mod: PO | Performed by: FAMILY MEDICINE

## 2021-09-21 RX ORDER — FUROSEMIDE 20 MG/1
20 TABLET ORAL DAILY
Qty: 30 TABLET | Refills: 11
Start: 2021-09-21 | End: 2021-12-22

## 2021-09-21 RX ORDER — TRIAMCINOLONE ACETONIDE 0.25 MG/G
CREAM TOPICAL
COMMUNITY
Start: 2021-08-28 | End: 2023-04-10

## 2021-09-21 RX ORDER — BETAMETHASONE DIPROPIONATE 0.5 MG/G
CREAM TOPICAL
COMMUNITY
Start: 2021-08-28 | End: 2023-01-10 | Stop reason: ALTCHOICE

## 2021-09-22 LAB
ESTIMATED AVG GLUCOSE: 114 MG/DL (ref 68–131)
HBA1C MFR BLD: 5.6 % (ref 4–5.6)

## 2021-10-03 PROBLEM — R10.11 RIGHT UPPER QUADRANT PAIN: Status: RESOLVED | Noted: 2020-07-20 | Resolved: 2021-10-03

## 2021-10-18 ENCOUNTER — PATIENT MESSAGE (OUTPATIENT)
Dept: ADMINISTRATIVE | Facility: HOSPITAL | Age: 81
End: 2021-10-18
Payer: MEDICARE

## 2021-12-08 ENCOUNTER — PATIENT OUTREACH (OUTPATIENT)
Dept: ADMINISTRATIVE | Facility: OTHER | Age: 81
End: 2021-12-08
Payer: MEDICARE

## 2021-12-08 ENCOUNTER — OFFICE VISIT (OUTPATIENT)
Dept: PODIATRY | Facility: CLINIC | Age: 81
End: 2021-12-08
Payer: MEDICARE

## 2021-12-08 VITALS
DIASTOLIC BLOOD PRESSURE: 65 MMHG | HEART RATE: 80 BPM | HEIGHT: 66 IN | SYSTOLIC BLOOD PRESSURE: 151 MMHG | BODY MASS INDEX: 34.4 KG/M2 | WEIGHT: 214.06 LBS

## 2021-12-08 DIAGNOSIS — B35.1 DERMATOPHYTOSIS OF NAIL: ICD-10-CM

## 2021-12-08 DIAGNOSIS — E11.51 TYPE II DIABETES MELLITUS WITH PERIPHERAL CIRCULATORY DISORDER: Primary | ICD-10-CM

## 2021-12-08 PROCEDURE — 99999 PR PBB SHADOW E&M-EST. PATIENT-LVL III: ICD-10-PCS | Mod: PBBFAC,HCNC,, | Performed by: PODIATRIST

## 2021-12-08 PROCEDURE — 99499 RISK ADDL DX/OHS AUDIT: ICD-10-PCS | Mod: S$GLB,,, | Performed by: PODIATRIST

## 2021-12-08 PROCEDURE — 99999 PR PBB SHADOW E&M-EST. PATIENT-LVL III: CPT | Mod: PBBFAC,HCNC,, | Performed by: PODIATRIST

## 2021-12-08 PROCEDURE — 99213 OFFICE O/P EST LOW 20 MIN: CPT | Mod: 25,HCNC,S$GLB, | Performed by: PODIATRIST

## 2021-12-08 PROCEDURE — 99499 UNLISTED E&M SERVICE: CPT | Mod: S$GLB,,, | Performed by: PODIATRIST

## 2021-12-08 PROCEDURE — 11721 DEBRIDE NAIL 6 OR MORE: CPT | Mod: Q9,HCNC,S$GLB, | Performed by: PODIATRIST

## 2021-12-08 PROCEDURE — 99213 PR OFFICE/OUTPT VISIT, EST, LEVL III, 20-29 MIN: ICD-10-PCS | Mod: 25,HCNC,S$GLB, | Performed by: PODIATRIST

## 2021-12-08 PROCEDURE — 11721 PR DEBRIDEMENT OF NAILS, 6 OR MORE: ICD-10-PCS | Mod: Q9,HCNC,S$GLB, | Performed by: PODIATRIST

## 2021-12-08 PROCEDURE — 3072F LOW RISK FOR RETINOPATHY: CPT | Mod: HCNC,CPTII,S$GLB, | Performed by: PODIATRIST

## 2021-12-08 PROCEDURE — 3072F PR LOW RISK FOR RETINOPATHY: ICD-10-PCS | Mod: HCNC,CPTII,S$GLB, | Performed by: PODIATRIST

## 2021-12-22 ENCOUNTER — OFFICE VISIT (OUTPATIENT)
Dept: CARDIOLOGY | Facility: CLINIC | Age: 81
End: 2021-12-22
Payer: MEDICARE

## 2021-12-22 VITALS
WEIGHT: 213.63 LBS | OXYGEN SATURATION: 96 % | HEART RATE: 83 BPM | SYSTOLIC BLOOD PRESSURE: 158 MMHG | BODY MASS INDEX: 34.48 KG/M2 | DIASTOLIC BLOOD PRESSURE: 68 MMHG

## 2021-12-22 DIAGNOSIS — I50.42 CHRONIC COMBINED SYSTOLIC AND DIASTOLIC HEART FAILURE: Primary | ICD-10-CM

## 2021-12-22 DIAGNOSIS — R06.09 DOE (DYSPNEA ON EXERTION): ICD-10-CM

## 2021-12-22 PROCEDURE — 3077F SYST BP >= 140 MM HG: CPT | Mod: HCNC,CPTII,S$GLB, | Performed by: INTERNAL MEDICINE

## 2021-12-22 PROCEDURE — 3072F PR LOW RISK FOR RETINOPATHY: ICD-10-PCS | Mod: HCNC,CPTII,S$GLB, | Performed by: INTERNAL MEDICINE

## 2021-12-22 PROCEDURE — 99214 PR OFFICE/OUTPT VISIT, EST, LEVL IV, 30-39 MIN: ICD-10-PCS | Mod: HCNC,S$GLB,, | Performed by: INTERNAL MEDICINE

## 2021-12-22 PROCEDURE — 99214 OFFICE O/P EST MOD 30 MIN: CPT | Mod: HCNC,S$GLB,, | Performed by: INTERNAL MEDICINE

## 2021-12-22 PROCEDURE — 1101F PT FALLS ASSESS-DOCD LE1/YR: CPT | Mod: HCNC,CPTII,S$GLB, | Performed by: INTERNAL MEDICINE

## 2021-12-22 PROCEDURE — 3078F DIAST BP <80 MM HG: CPT | Mod: HCNC,CPTII,S$GLB, | Performed by: INTERNAL MEDICINE

## 2021-12-22 PROCEDURE — 1101F PR PT FALLS ASSESS DOC 0-1 FALLS W/OUT INJ PAST YR: ICD-10-PCS | Mod: HCNC,CPTII,S$GLB, | Performed by: INTERNAL MEDICINE

## 2021-12-22 PROCEDURE — 3288F FALL RISK ASSESSMENT DOCD: CPT | Mod: HCNC,CPTII,S$GLB, | Performed by: INTERNAL MEDICINE

## 2021-12-22 PROCEDURE — 1126F PR PAIN SEVERITY QUANTIFIED, NO PAIN PRESENT: ICD-10-PCS | Mod: HCNC,CPTII,S$GLB, | Performed by: INTERNAL MEDICINE

## 2021-12-22 PROCEDURE — 1159F MED LIST DOCD IN RCRD: CPT | Mod: HCNC,CPTII,S$GLB, | Performed by: INTERNAL MEDICINE

## 2021-12-22 PROCEDURE — 1126F AMNT PAIN NOTED NONE PRSNT: CPT | Mod: HCNC,CPTII,S$GLB, | Performed by: INTERNAL MEDICINE

## 2021-12-22 PROCEDURE — 3078F PR MOST RECENT DIASTOLIC BLOOD PRESSURE < 80 MM HG: ICD-10-PCS | Mod: HCNC,CPTII,S$GLB, | Performed by: INTERNAL MEDICINE

## 2021-12-22 PROCEDURE — 99999 PR PBB SHADOW E&M-EST. PATIENT-LVL III: ICD-10-PCS | Mod: PBBFAC,HCNC,, | Performed by: INTERNAL MEDICINE

## 2021-12-22 PROCEDURE — 3077F PR MOST RECENT SYSTOLIC BLOOD PRESSURE >= 140 MM HG: ICD-10-PCS | Mod: HCNC,CPTII,S$GLB, | Performed by: INTERNAL MEDICINE

## 2021-12-22 PROCEDURE — 99999 PR PBB SHADOW E&M-EST. PATIENT-LVL III: CPT | Mod: PBBFAC,HCNC,, | Performed by: INTERNAL MEDICINE

## 2021-12-22 PROCEDURE — 1159F PR MEDICATION LIST DOCUMENTED IN MEDICAL RECORD: ICD-10-PCS | Mod: HCNC,CPTII,S$GLB, | Performed by: INTERNAL MEDICINE

## 2021-12-22 PROCEDURE — 3288F PR FALLS RISK ASSESSMENT DOCUMENTED: ICD-10-PCS | Mod: HCNC,CPTII,S$GLB, | Performed by: INTERNAL MEDICINE

## 2021-12-22 PROCEDURE — 3072F LOW RISK FOR RETINOPATHY: CPT | Mod: HCNC,CPTII,S$GLB, | Performed by: INTERNAL MEDICINE

## 2021-12-22 RX ORDER — FUROSEMIDE 40 MG/1
40 TABLET ORAL DAILY
Qty: 90 TABLET | Refills: 3 | Status: SHIPPED | OUTPATIENT
Start: 2021-12-22 | End: 2022-01-18 | Stop reason: SDUPTHER

## 2021-12-22 RX ORDER — BISOPROLOL FUMARATE 5 MG/1
5 TABLET, FILM COATED ORAL DAILY
Qty: 90 TABLET | Refills: 3 | Status: SHIPPED | OUTPATIENT
Start: 2021-12-22 | End: 2022-01-18 | Stop reason: SDUPTHER

## 2021-12-27 ENCOUNTER — OFFICE VISIT (OUTPATIENT)
Dept: OPHTHALMOLOGY | Facility: CLINIC | Age: 81
End: 2021-12-27
Payer: MEDICARE

## 2021-12-27 DIAGNOSIS — H40.1131 PRIMARY OPEN ANGLE GLAUCOMA (POAG) OF BOTH EYES, MILD STAGE: Primary | ICD-10-CM

## 2021-12-27 PROCEDURE — 99213 PR OFFICE/OUTPT VISIT, EST, LEVL III, 20-29 MIN: ICD-10-PCS | Mod: HCNC,S$GLB,, | Performed by: OPTOMETRIST

## 2021-12-27 PROCEDURE — 92133 POSTERIOR SEGMENT OCT OPTIC NERVE(OCULAR COHERENCE TOMOGRAPHY) - OU - BOTH EYES: ICD-10-PCS | Mod: HCNC,S$GLB,, | Performed by: OPTOMETRIST

## 2021-12-27 PROCEDURE — 99999 PR PBB SHADOW E&M-EST. PATIENT-LVL III: ICD-10-PCS | Mod: PBBFAC,HCNC,, | Performed by: OPTOMETRIST

## 2021-12-27 PROCEDURE — 99213 OFFICE O/P EST LOW 20 MIN: CPT | Mod: HCNC,S$GLB,, | Performed by: OPTOMETRIST

## 2021-12-27 PROCEDURE — 1159F MED LIST DOCD IN RCRD: CPT | Mod: HCNC,CPTII,S$GLB, | Performed by: OPTOMETRIST

## 2021-12-27 PROCEDURE — 1160F PR REVIEW ALL MEDS BY PRESCRIBER/CLIN PHARMACIST DOCUMENTED: ICD-10-PCS | Mod: HCNC,CPTII,S$GLB, | Performed by: OPTOMETRIST

## 2021-12-27 PROCEDURE — 1160F RVW MEDS BY RX/DR IN RCRD: CPT | Mod: HCNC,CPTII,S$GLB, | Performed by: OPTOMETRIST

## 2021-12-27 PROCEDURE — 99999 PR PBB SHADOW E&M-EST. PATIENT-LVL III: CPT | Mod: PBBFAC,HCNC,, | Performed by: OPTOMETRIST

## 2021-12-27 PROCEDURE — 92133 CPTRZD OPH DX IMG PST SGM ON: CPT | Mod: HCNC,S$GLB,, | Performed by: OPTOMETRIST

## 2021-12-27 PROCEDURE — 1159F PR MEDICATION LIST DOCUMENTED IN MEDICAL RECORD: ICD-10-PCS | Mod: HCNC,CPTII,S$GLB, | Performed by: OPTOMETRIST

## 2022-01-18 ENCOUNTER — TELEPHONE (OUTPATIENT)
Dept: OPHTHALMOLOGY | Facility: CLINIC | Age: 82
End: 2022-01-18
Payer: MEDICARE

## 2022-01-18 DIAGNOSIS — R06.09 DOE (DYSPNEA ON EXERTION): ICD-10-CM

## 2022-01-18 DIAGNOSIS — E78.5 HYPERLIPIDEMIA, UNSPECIFIED HYPERLIPIDEMIA TYPE: ICD-10-CM

## 2022-01-18 DIAGNOSIS — I50.42 CHRONIC COMBINED SYSTOLIC AND DIASTOLIC HEART FAILURE: ICD-10-CM

## 2022-01-18 DIAGNOSIS — E11.42 TYPE 2 DIABETES MELLITUS WITH DIABETIC POLYNEUROPATHY, WITHOUT LONG-TERM CURRENT USE OF INSULIN: ICD-10-CM

## 2022-01-18 RX ORDER — SIMVASTATIN 20 MG/1
20 TABLET, FILM COATED ORAL NIGHTLY
Qty: 90 TABLET | Refills: 3 | Status: SHIPPED | OUTPATIENT
Start: 2022-01-18 | End: 2023-02-01 | Stop reason: SDUPTHER

## 2022-01-18 RX ORDER — FUROSEMIDE 40 MG/1
40 TABLET ORAL DAILY
Qty: 90 TABLET | Refills: 3 | Status: SHIPPED | OUTPATIENT
Start: 2022-01-18 | End: 2023-01-17 | Stop reason: SDUPTHER

## 2022-01-18 RX ORDER — DEXTROSE 4 G
TABLET,CHEWABLE ORAL
Qty: 100 EACH | Refills: 11 | Status: SHIPPED | OUTPATIENT
Start: 2022-01-18 | End: 2022-04-21 | Stop reason: SDUPTHER

## 2022-01-18 RX ORDER — ALLOPURINOL 100 MG/1
100 TABLET ORAL DAILY
Qty: 90 TABLET | Refills: 1 | Status: SHIPPED | OUTPATIENT
Start: 2022-01-18 | End: 2022-11-01 | Stop reason: SDUPTHER

## 2022-01-18 RX ORDER — BISOPROLOL FUMARATE 5 MG/1
5 TABLET, FILM COATED ORAL DAILY
Qty: 90 TABLET | Refills: 3 | Status: SHIPPED | OUTPATIENT
Start: 2022-01-18 | End: 2023-02-01 | Stop reason: SDUPTHER

## 2022-01-18 RX ORDER — LANCETS
EACH MISCELLANEOUS
Qty: 100 EACH | Refills: 11 | Status: SHIPPED | OUTPATIENT
Start: 2022-01-18 | End: 2022-04-21 | Stop reason: SDUPTHER

## 2022-01-18 NOTE — TELEPHONE ENCOUNTER
No new care gaps identified.  Powered by [employer]. Reference number: [id number].   1/18/2022 3:05:26 PM CST

## 2022-01-18 NOTE — TELEPHONE ENCOUNTER
----- Message from Misti Munoz sent at 1/18/2022  3:08 PM CST -----  Pt is calling in regards to timolol maleate 0.5% (TIMOPTIC) 0.5 % Drop and latanoprost 0.005 % ophthalmic solution. Pt stated that her insurance has changed and she will need her medications det up with her new insurance. Please call [phone].

## 2022-01-18 NOTE — TELEPHONE ENCOUNTER
----- Message from Marisabel Yu sent at 1/18/2022  1:19 PM CST -----  Kaleigh called regarding from now on getting her all prescriptions sent to     [facility]   [phone] - fax  [phone] - phone       Please give her a call back at [phone] (home)       Thanks  kb

## 2022-01-20 DIAGNOSIS — H40.1134 PRIMARY OPEN ANGLE GLAUCOMA OF BOTH EYES, INDETERMINATE STAGE: ICD-10-CM

## 2022-01-20 RX ORDER — TIMOLOL MALEATE 5 MG/ML
1 SOLUTION/ DROPS OPHTHALMIC 2 TIMES DAILY
Qty: 10 ML | Refills: 4 | Status: SHIPPED | OUTPATIENT
Start: 2022-01-20 | End: 2022-08-30 | Stop reason: SDUPTHER

## 2022-01-20 RX ORDER — LATANOPROST 50 UG/ML
1 SOLUTION/ DROPS OPHTHALMIC NIGHTLY
Qty: 2.5 ML | Refills: 11 | Status: SHIPPED | OUTPATIENT
Start: 2022-01-20 | End: 2023-01-18

## 2022-03-08 ENCOUNTER — OFFICE VISIT (OUTPATIENT)
Dept: PODIATRY | Facility: CLINIC | Age: 82
End: 2022-03-08
Payer: MEDICARE

## 2022-03-08 VITALS
BODY MASS INDEX: 34.33 KG/M2 | WEIGHT: 213.63 LBS | DIASTOLIC BLOOD PRESSURE: 60 MMHG | HEIGHT: 66 IN | SYSTOLIC BLOOD PRESSURE: 136 MMHG | HEART RATE: 69 BPM

## 2022-03-08 DIAGNOSIS — E11.51 TYPE II DIABETES MELLITUS WITH PERIPHERAL CIRCULATORY DISORDER: Primary | ICD-10-CM

## 2022-03-08 DIAGNOSIS — B35.1 DERMATOPHYTOSIS OF NAIL: ICD-10-CM

## 2022-03-08 PROCEDURE — 99999 PR PBB SHADOW E&M-EST. PATIENT-LVL IV: ICD-10-PCS | Mod: PBBFAC,,, | Performed by: PODIATRIST

## 2022-03-08 PROCEDURE — 99499 UNLISTED E&M SERVICE: CPT | Mod: S$GLB,,, | Performed by: PODIATRIST

## 2022-03-08 PROCEDURE — 11721 PR DEBRIDEMENT OF NAILS, 6 OR MORE: ICD-10-PCS | Mod: Q8,S$GLB,, | Performed by: PODIATRIST

## 2022-03-08 PROCEDURE — 11721 DEBRIDE NAIL 6 OR MORE: CPT | Mod: Q8,S$GLB,, | Performed by: PODIATRIST

## 2022-03-08 PROCEDURE — 99499 NO LOS: ICD-10-PCS | Mod: S$GLB,,, | Performed by: PODIATRIST

## 2022-03-08 PROCEDURE — 99999 PR PBB SHADOW E&M-EST. PATIENT-LVL IV: CPT | Mod: PBBFAC,,, | Performed by: PODIATRIST

## 2022-03-21 ENCOUNTER — OFFICE VISIT (OUTPATIENT)
Dept: INTERNAL MEDICINE | Facility: CLINIC | Age: 82
End: 2022-03-21
Payer: MEDICARE

## 2022-03-21 ENCOUNTER — LAB VISIT (OUTPATIENT)
Dept: LAB | Facility: HOSPITAL | Age: 82
End: 2022-03-21
Attending: FAMILY MEDICINE
Payer: MEDICARE

## 2022-03-21 VITALS
DIASTOLIC BLOOD PRESSURE: 56 MMHG | HEIGHT: 66 IN | BODY MASS INDEX: 34.22 KG/M2 | WEIGHT: 212.94 LBS | RESPIRATION RATE: 17 BRPM | SYSTOLIC BLOOD PRESSURE: 124 MMHG | HEART RATE: 65 BPM | OXYGEN SATURATION: 95 % | TEMPERATURE: 100 F

## 2022-03-21 DIAGNOSIS — E11.42 TYPE 2 DIABETES MELLITUS WITH DIABETIC POLYNEUROPATHY, WITHOUT LONG-TERM CURRENT USE OF INSULIN: ICD-10-CM

## 2022-03-21 DIAGNOSIS — E11.42 TYPE 2 DIABETES MELLITUS WITH DIABETIC POLYNEUROPATHY, WITHOUT LONG-TERM CURRENT USE OF INSULIN: Primary | ICD-10-CM

## 2022-03-21 LAB
ALBUMIN SERPL BCP-MCNC: 3.6 G/DL (ref 3.5–5.2)
ALP SERPL-CCNC: 31 U/L (ref 55–135)
ALT SERPL W/O P-5'-P-CCNC: 15 U/L (ref 10–44)
ANION GAP SERPL CALC-SCNC: 7 MMOL/L (ref 8–16)
AST SERPL-CCNC: 13 U/L (ref 10–40)
BILIRUB SERPL-MCNC: 0.6 MG/DL (ref 0.1–1)
BUN SERPL-MCNC: 9 MG/DL (ref 8–23)
CALCIUM SERPL-MCNC: 9.4 MG/DL (ref 8.7–10.5)
CHLORIDE SERPL-SCNC: 97 MMOL/L (ref 95–110)
CO2 SERPL-SCNC: 34 MMOL/L (ref 23–29)
CREAT SERPL-MCNC: 0.7 MG/DL (ref 0.5–1.4)
EST. GFR  (AFRICAN AMERICAN): >60 ML/MIN/1.73 M^2
EST. GFR  (NON AFRICAN AMERICAN): >60 ML/MIN/1.73 M^2
GLUCOSE SERPL-MCNC: 101 MG/DL (ref 70–110)
POTASSIUM SERPL-SCNC: 5.2 MMOL/L (ref 3.5–5.1)
PROT SERPL-MCNC: 7.5 G/DL (ref 6–8.4)
SODIUM SERPL-SCNC: 138 MMOL/L (ref 136–145)

## 2022-03-21 PROCEDURE — 99999 PR PBB SHADOW E&M-EST. PATIENT-LVL IV: ICD-10-PCS | Mod: PBBFAC,,, | Performed by: FAMILY MEDICINE

## 2022-03-21 PROCEDURE — 83036 HEMOGLOBIN GLYCOSYLATED A1C: CPT | Performed by: FAMILY MEDICINE

## 2022-03-21 PROCEDURE — 36415 COLL VENOUS BLD VENIPUNCTURE: CPT | Mod: PO | Performed by: FAMILY MEDICINE

## 2022-03-21 PROCEDURE — 99999 PR PBB SHADOW E&M-EST. PATIENT-LVL IV: CPT | Mod: PBBFAC,,, | Performed by: FAMILY MEDICINE

## 2022-03-21 PROCEDURE — 99213 PR OFFICE/OUTPT VISIT, EST, LEVL III, 20-29 MIN: ICD-10-PCS | Mod: S$GLB,,, | Performed by: FAMILY MEDICINE

## 2022-03-21 PROCEDURE — 80061 LIPID PANEL: CPT | Performed by: FAMILY MEDICINE

## 2022-03-21 PROCEDURE — 99213 OFFICE O/P EST LOW 20 MIN: CPT | Mod: S$GLB,,, | Performed by: FAMILY MEDICINE

## 2022-03-21 PROCEDURE — 80053 COMPREHEN METABOLIC PANEL: CPT | Mod: PO | Performed by: FAMILY MEDICINE

## 2022-03-21 NOTE — PROGRESS NOTES
Subjective:       Patient ID: Kaleigh Nieves is a 81 y.o. female.    Chief Complaint: Follow-up (6 mo)      HPI  Came in today for routine six-month follow-up.  Is due for labs.  Has no acute concerns at this time.  Says that she has been having some lightheadedness which she feels is associated with her seasonal allergies which have been bothering her more recently.    Diabetes Management Status    Statin: Taking  ACE/ARB: Not taking    Screening or Prevention Patient's value Goal Complete/Controlled?   HgA1C Testing and Control   Lab Results   Component Value Date    HGBA1C 5.6 09/21/2021      Annually/Less than 8% Yes   Lipid profile : 01/27/2021 Annually No   LDL control Lab Results   Component Value Date    LDLCALC 89.8 01/27/2021    Annually/Less than 100 mg/dl  No   Nephropathy screening Lab Results   Component Value Date    LABMICR 5.0 01/27/2021     Lab Results   Component Value Date    PROTEINUA Negative 07/20/2020     No results found for: UTPCR   Annually No   Blood pressure BP Readings from Last 1 Encounters:   03/21/22 (!) 124/56    Less than 140/90 Yes   Dilated retinal exam : 03/02/2021 Annually No   Foot exam   : 12/08/2021 Annually Yes       Family History   Problem Relation Age of Onset    Hypertension Mother     Diabetes Mother     Leukemia Sister     Hypertension Sister     Cancer Sister         leukemia    Heart disease Maternal Aunt     Cancer Maternal Uncle         gastric, pacreatic    Heart disease Maternal Uncle     Miscarriages / Stillbirths Maternal Uncle     Diabetes Maternal Grandmother     Asthma Maternal Grandfather     Breast cancer Neg Hx     Colon cancer Neg Hx     Ovarian cancer Neg Hx     COPD Neg Hx     Hyperlipidemia Neg Hx     Kidney disease Neg Hx        Current Outpatient Medications:     albuterol (PROVENTIL/VENTOLIN HFA) 90 mcg/actuation inhaler, INHALE 2 PUFFS INTO THE LUNGS EVERY 6 HOURS AS NEEDED FOR WHEEZING., Disp: 18 g, Rfl: 11    allopurinoL  (ZYLOPRIM) 100 MG tablet, Take 1 tablet (100 mg total) by mouth once daily., Disp: 90 tablet, Rfl: 1    aspirin 81 MG Chew, Take 81 mg by mouth once daily., Disp: , Rfl:     bisoprolol (ZEBETA) 5 MG tablet, Take 1 tablet (5 mg total) by mouth once daily., Disp: 90 tablet, Rfl: 3    blood glucose control high,low Soln, by Misc.(Non-Drug; Combo Route) route., Disp: , Rfl:     blood sugar diagnostic Strp, by Misc.(Non-Drug; Combo Route) route., Disp: , Rfl:     blood-glucose meter (ACCU-CHEK DARSHAN PLUS METER) Misc, USE AS DIRECTED (Patient taking differently: USE AS DIRECTED), Disp: 100 each, Rfl: 11    furosemide (LASIX) 40 MG tablet, Take 1 tablet (40 mg total) by mouth once daily., Disp: 90 tablet, Rfl: 3    lancets (ACCU-CHEK SOFTCLIX LANCETS) Misc, TEST TWO TIMES DAILY, Disp: 100 each, Rfl: 11    latanoprost 0.005 % ophthalmic solution, Place 1 drop into both eyes every evening., Disp: 2.5 mL, Rfl: 11    MUCUS-CHEST CONGESTION 100 mg/5 mL syrup, TAKE 2 TEASPOONSFUL (10MLS) BY MOUTH THREE TIMES A DAY AS NEEDED FOR COUGH, Disp: 200 mL, Rfl: 2    simvastatin (ZOCOR) 20 MG tablet, Take 1 tablet (20 mg total) by mouth nightly., Disp: 90 tablet, Rfl: 3    timolol maleate 0.5% (TIMOPTIC) 0.5 % Drop, Place 1 drop into both eyes 2 (two) times daily., Disp: 10 mL, Rfl: 4    betamethasone dipropionate 0.05 % cream, , Disp: , Rfl:     triamcinolone acetonide 0.025% (KENALOG) 0.025 % cream, , Disp: , Rfl:     Review of Systems   Constitutional: Negative for chills and fever.   HENT: Positive for rhinorrhea.    Respiratory: Negative for cough and shortness of breath.    Cardiovascular: Negative for chest pain.   Gastrointestinal: Negative for abdominal pain.   Skin: Negative for rash.   Neurological: Positive for light-headedness. Negative for dizziness.       Objective:   BP (!) 124/56 (BP Location: Left arm, Patient Position: Sitting, BP Method: Large (Manual))   Pulse 65   Temp 99.5 °F (37.5 °C) (Temporal)    "Resp 17   Ht 5' 6" (1.676 m)   Wt 96.6 kg (212 lb 15.4 oz)   SpO2 95%   BMI 34.37 kg/m²      Physical Exam  Vitals reviewed.   Constitutional:       Appearance: She is well-developed.   HENT:      Head: Normocephalic and atraumatic.   Eyes:      Conjunctiva/sclera: Conjunctivae normal.   Cardiovascular:      Rate and Rhythm: Normal rate.   Pulmonary:      Effort: Pulmonary effort is normal. No respiratory distress.   Skin:     General: Skin is warm and dry.      Findings: No rash.   Neurological:      Mental Status: She is alert and oriented to person, place, and time.      Coordination: Coordination normal.   Psychiatric:         Behavior: Behavior normal.         Assessment & Plan     Problem List Items Addressed This Visit        Endocrine    Type 2 diabetes mellitus with diabetic polyneuropathy, without long-term current use of insulin - Primary    Relevant Orders    Hemoglobin A1C    Lipid Panel    Comprehensive Metabolic Panel (Completed)    Microalbumin/Creatinine Ratio, Urine            Immunizations Administered on Date of Encounter - 3/21/2022     No immunizations on file.           No follow-ups on file.    Disclaimer:  This note may have been prepared using voice recognition software, it may have not been extensively proofed, as such there could be errors within the text such as sound alike errors.    "

## 2022-03-22 LAB
CHOLEST SERPL-MCNC: 136 MG/DL (ref 120–199)
CHOLEST/HDLC SERPL: 4.1 {RATIO} (ref 2–5)
ESTIMATED AVG GLUCOSE: 123 MG/DL (ref 68–131)
HBA1C MFR BLD: 5.9 % (ref 4–5.6)
HDLC SERPL-MCNC: 33 MG/DL (ref 40–75)
HDLC SERPL: 24.3 % (ref 20–50)
LDLC SERPL CALC-MCNC: 84.8 MG/DL (ref 63–159)
NONHDLC SERPL-MCNC: 103 MG/DL
TRIGL SERPL-MCNC: 91 MG/DL (ref 30–150)

## 2022-03-27 NOTE — PROGRESS NOTES
Subjective:     Patient ID: Kaleigh Nieves is a 81 y.o. female.    Chief Complaint: Nail Care (Diabetic pt wears diabetic shoes w/ stockings, PCP Dr. Hanks last seen  9-21-21)    Kaleigh is a 81 y.o. female who presents to the clinic for evaluation and treatment of high risk feet. Kaleigh has a past medical history of Anemia, Angina pectoris, Arthritis, Asthma, Back pain, Bronchitis, CAD (coronary artery disease) (7/15), Carotid artery stenosis, Chronic bronchitis, Chronic gout, Colon polyp, CTS (carpal tunnel syndrome), Diabetes mellitus, type 2, Diabetes with neurologic complications, Diverticulosis, RIVAS (dyspnea on exertion), Dyslipidemia, Ex-smoker, GERD (gastroesophageal reflux disease), Heart failure, Hyperlipidemia, Hypertension, Neuropathy, lower extremity, Obesity, Open-angle glaucoma, Peripheral vascular disease, Polyneuropathy, Tobacco dependence, and Viral gastroenteritis (3/10/2020). The patient's chief complaint is long, thick toenails. Patient states her blood sugar this morning was 114mg/dl.  This patient has documented high risk feet requiring routine maintenance secondary to diabetes mellitis and those secondary complications of diabetes, as mentioned.     PCP: Ramos Hanks, DO    Date Last Seen by PCP: 09/21/2021    Current shoe gear:  Affected Foot: Diabetic shoes     Unaffected Foot: Diabetic shoes    Hemoglobin A1C   Date Value Ref Range Status   03/21/2022 5.9 (H) 4.0 - 5.6 % Final     Comment:     ADA Screening Guidelines:  5.7-6.4%  Consistent with prediabetes  >or=6.5%  Consistent with diabetes    High levels of fetal hemoglobin interfere with the HbA1C  assay. Heterozygous hemoglobin variants (HbS, HgC, etc)do  not significantly interfere with this assay.   However, presence of multiple variants may affect accuracy.     09/21/2021 5.6 4.0 - 5.6 % Final     Comment:     ADA Screening Guidelines:  5.7-6.4%  Consistent with prediabetes  >or=6.5%  Consistent with diabetes    High levels of  fetal hemoglobin interfere with the HbA1C  assay. Heterozygous hemoglobin variants (HbS, HgC, etc)do  not significantly interfere with this assay.   However, presence of multiple variants may affect accuracy.     01/27/2021 5.7 (H) 4.0 - 5.6 % Final     Comment:     ADA Screening Guidelines:  5.7-6.4%  Consistent with prediabetes  >or=6.5%  Consistent with diabetes    High levels of fetal hemoglobin interfere with the HbA1C  assay. Heterozygous hemoglobin variants (HbS, HgC, etc)do  not significantly interfere with this assay.   However, presence of multiple variants may affect accuracy.             Patient Active Problem List   Diagnosis    Type 2 diabetes mellitus with diabetic polyneuropathy, without long-term current use of insulin    Multiple joint pain    Esophageal reflux    Hyperlipidemia    Chronic gout    Lichen sclerosus    Carotid artery disease    Coronary artery disease involving native coronary artery without angina pectoris    Colon polyp    Hypertension associated with diabetes    Aortic atherosclerosis    Diastolic dysfunction    Arterial insufficiency of lower extremity    Severe obesity (BMI 35.0-35.9 with comorbidity)    Glaucoma    Ganglion cyst of flexor tendon sheath of finger of right hand    Bilateral carpal tunnel syndrome    Right carpal tunnel syndrome    Chronic bronchitis    Dependent edema    Diverticulosis    Primary open-angle glaucoma, bilateral, indeterminate stage    RIVAS (dyspnea on exertion)    Chronic diastolic heart failure    Acute gout of ankle    Low vitamin D level       Medication List with Changes/Refills   Current Medications    ALBUTEROL (PROVENTIL/VENTOLIN HFA) 90 MCG/ACTUATION INHALER    INHALE 2 PUFFS INTO THE LUNGS EVERY 6 HOURS AS NEEDED FOR WHEEZING.    ALLOPURINOL (ZYLOPRIM) 100 MG TABLET    Take 1 tablet (100 mg total) by mouth once daily.    ASPIRIN 81 MG CHEW    Take 81 mg by mouth once daily.    BETAMETHASONE DIPROPIONATE 0.05 %  CREAM        BISOPROLOL (ZEBETA) 5 MG TABLET    Take 1 tablet (5 mg total) by mouth once daily.    BLOOD GLUCOSE CONTROL HIGH,LOW SOLN    by Misc.(Non-Drug; Combo Route) route.    BLOOD SUGAR DIAGNOSTIC STRP    by Misc.(Non-Drug; Combo Route) route.    BLOOD-GLUCOSE METER (ACCU-CHEK DARSHAN PLUS METER) MISC    USE AS DIRECTED    FUROSEMIDE (LASIX) 40 MG TABLET    Take 1 tablet (40 mg total) by mouth once daily.    LANCETS (ACCU-CHEK SOFTCLIX LANCETS) MISC    TEST TWO TIMES DAILY    LATANOPROST 0.005 % OPHTHALMIC SOLUTION    Place 1 drop into both eyes every evening.    MUCUS-CHEST CONGESTION 100 MG/5 ML SYRUP    TAKE 2 TEASPOONSFUL (10MLS) BY MOUTH THREE TIMES A DAY AS NEEDED FOR COUGH    SIMVASTATIN (ZOCOR) 20 MG TABLET    Take 1 tablet (20 mg total) by mouth nightly.    TIMOLOL MALEATE 0.5% (TIMOPTIC) 0.5 % DROP    Place 1 drop into both eyes 2 (two) times daily.    TRIAMCINOLONE ACETONIDE 0.025% (KENALOG) 0.025 % CREAM       Discontinued Medications    BLOOD GLUCOSE CONTROL HIGH,LOW (ACCU-CHEK DARSHAN CONTROL SOLN) SOLN    USE AS DIRECTED    BLOOD SUGAR DIAGNOSTIC (ACCU-CHEK DARSHAN PLUS TEST STRP) STRP    1 strip by Misc.(Non-Drug; Combo Route) route 2 (two) times a day. Test Blood Sugar    ECONAZOLE NITRATE 1 % CREAM    APPLY TO AFFECTED AREA TOPICALLY EVERY DAY       Review of patient's allergies indicates:   Allergen Reactions    Iodine and iodide containing products Nausea And Vomiting    Penicillins Itching    Amlodipine      edema    Sulfa (sulfonamide antibiotics)     Ultram [tramadol] Other (See Comments)     Light headness, itiching    Bactrim [sulfamethoxazole-trimethoprim] Itching and Rash       Past Surgical History:   Procedure Laterality Date    APPENDECTOMY      CARPAL TUNNEL RELEASE Right     CATARACT EXTRACTION, BILATERAL      CHOLECYSTECTOMY      CYST REMOVAL  2018    DILATION AND CURETTAGE OF UTERUS      EYE SURGERY      HEMORRHOID SURGERY      HYSTERECTOMY      fibroids    INCISION  "AND DRAINAGE PERIRECTAL ABSCESS      JOINT REPLACEMENT Bilateral     knee    KNEE ARTHROSCOPY Right     MYOMECTOMY         Family History   Problem Relation Age of Onset    Hypertension Mother     Diabetes Mother     Leukemia Sister     Hypertension Sister     Cancer Sister         leukemia    Heart disease Maternal Aunt     Cancer Maternal Uncle         gastric, pacreatic    Heart disease Maternal Uncle     Miscarriages / Stillbirths Maternal Uncle     Diabetes Maternal Grandmother     Asthma Maternal Grandfather     Breast cancer Neg Hx     Colon cancer Neg Hx     Ovarian cancer Neg Hx     COPD Neg Hx     Hyperlipidemia Neg Hx     Kidney disease Neg Hx        Social History     Socioeconomic History    Marital status: Single    Number of children: 0   Tobacco Use    Smoking status: Former Smoker     Packs/day: 0.25     Years: 24.00     Pack years: 6.00     Quit date: 1976     Years since quittin.7    Smokeless tobacco: Never Used   Substance and Sexual Activity    Alcohol use: No    Drug use: No    Sexual activity: Not Currently       Vitals:    22 1042   BP: 136/60   Pulse: 69   Weight: 96.9 kg (213 lb 10 oz)   Height: 5' 6" (1.676 m)       Hemoglobin A1C   Date Value Ref Range Status   2022 5.9 (H) 4.0 - 5.6 % Final     Comment:     ADA Screening Guidelines:  5.7-6.4%  Consistent with prediabetes  >or=6.5%  Consistent with diabetes    High levels of fetal hemoglobin interfere with the HbA1C  assay. Heterozygous hemoglobin variants (HbS, HgC, etc)do  not significantly interfere with this assay.   However, presence of multiple variants may affect accuracy.     2021 5.6 4.0 - 5.6 % Final     Comment:     ADA Screening Guidelines:  5.7-6.4%  Consistent with prediabetes  >or=6.5%  Consistent with diabetes    High levels of fetal hemoglobin interfere with the HbA1C  assay. Heterozygous hemoglobin variants (HbS, HgC, etc)do  not significantly interfere with this " assay.   However, presence of multiple variants may affect accuracy.     01/27/2021 5.7 (H) 4.0 - 5.6 % Final     Comment:     ADA Screening Guidelines:  5.7-6.4%  Consistent with prediabetes  >or=6.5%  Consistent with diabetes    High levels of fetal hemoglobin interfere with the HbA1C  assay. Heterozygous hemoglobin variants (HbS, HgC, etc)do  not significantly interfere with this assay.   However, presence of multiple variants may affect accuracy.         Review of Systems   Constitutional: Negative for chills and fever.   Respiratory: Negative for shortness of breath.    Cardiovascular: Negative for chest pain, palpitations, orthopnea and claudication.   Gastrointestinal: Negative for diarrhea, nausea and vomiting.   Musculoskeletal: Negative for joint pain.   Skin: Negative for rash.   Neurological: Positive for tingling and sensory change. Negative for dizziness, focal weakness and weakness.   Psychiatric/Behavioral: Negative.            Objective:      PHYSICAL EXAM: Apperance: Alert and orient in no distress,well developed, and with good attention to grooming and body habits  Patient presents ambulating in diabetic shoes and inserts.   LOWER EXTREMITY EXAM:  VASCULAR: Dorsalis pedis pulses 0/4 bilateral (monophasic with doppler) and Posterior Tibial pulses 0/4 bilateral (biphasic with doppler). Capillary fill time <blanched bilateral. Mild edema observed bilateral. Varicosities present bilateral. Skin temperature of the lower extremities is warm to cool, proximal to distal. Hair growth absent bilateral.  DERMATOLOGICAL: No skin rashes, subcutaneous nodules, lesions, or ulcers observed bilateral. Nails 1,2,3,4,5 bilateral elongated, thickened, and discolored with subungual debris. Webspaces 1,2,3,4clean, dry and without evidence of break in skin integrity bilateral.   NEUROLOGICAL: Light touch, sharp-dull, proprioception all present and equal bilaterally.  Vibratory sensation intact on left hallux and  diminished at right hallux. Protective sensation intact at all 10 sites as tested with a Waverly-Jimy 5.07 monofilament.   MUSCULOSKELETAL: Muscle strength is 5/5 for foot inverters, everters, plantarflexors, and dorsiflexors. Muscle tone is normal. No pain on palpation of right dorsal foot/ankle. Pes planus noted bilateral. Flexible hammertoes noted bilateral.         Assessment:       Encounter Diagnoses   Name Primary?    Type II diabetes mellitus with peripheral circulatory disorder Yes    Dermatophytosis of nail          Plan:   Type II diabetes mellitus with peripheral circulatory disorder    Dermatophytosis of nail      I counseled the patient on her conditions, their implications and medical management.  Greater than 15 minutes of a 20 minute appointment spent on education about the diabetic foot, neuropathy, and prevention of limb loss.  Shoe inspection. Diabetic Foot Education. Patient reminded of the importance of good nutrition and blood sugar control to help prevent podiatric complications of diabetes. Patient instructed on proper foot hygeine. We discussed wearing proper shoe gear, daily foot inspections, never walking without protective shoe gear, never putting sharp instruments to feet.    With patient's permission, nails 1-5 bilateral were debrided/trimmed in length and thickness aggressively to their soft tissue attachment mechanically and with electric , removing all offending nail and debris. Patient relates relief following the procedure.  Patient  will continue to monitor the areas daily, inspect feet, wear protective shoe gear when ambulatory, moisturizer to maintain skin integrity. Patient reminded of the importance of good nutrition and blood sugar control to help prevent podiatric complications of diabetes.  Patient to return in this office in approximately 3 months, or sooner if needed.           Ino Harmon DPM  [facility]

## 2022-04-21 ENCOUNTER — TELEPHONE (OUTPATIENT)
Dept: INTERNAL MEDICINE | Facility: CLINIC | Age: 82
End: 2022-04-21
Payer: MEDICARE

## 2022-04-21 DIAGNOSIS — E11.42 TYPE 2 DIABETES MELLITUS WITH DIABETIC POLYNEUROPATHY, WITHOUT LONG-TERM CURRENT USE OF INSULIN: ICD-10-CM

## 2022-04-21 RX ORDER — LANCETS
EACH MISCELLANEOUS
Qty: 100 EACH | Refills: 11 | Status: SHIPPED | OUTPATIENT
Start: 2022-04-21 | End: 2022-05-31 | Stop reason: SDUPTHER

## 2022-04-21 RX ORDER — DEXTROSE 4 G
TABLET,CHEWABLE ORAL
Qty: 100 EACH | Refills: 11 | Status: SHIPPED | OUTPATIENT
Start: 2022-04-21 | End: 2023-04-10 | Stop reason: SDUPTHER

## 2022-04-21 NOTE — TELEPHONE ENCOUNTER
Insurance [facility]    Patient is request is her Diabetes supplies  Be sent to [employer] [facility] with [facility]

## 2022-04-21 NOTE — TELEPHONE ENCOUNTER
----- Message from Earl Sorenson sent at 4/21/2022 12:18 PM CDT -----  Contact: self  Pt would like to consult with nurse regarding diabetic supplies.  Pt states she need a prior auth for her supplies.  Please contact Kaleigh Nieves @ [phone].  Thanks/As

## 2022-04-27 ENCOUNTER — PATIENT OUTREACH (OUTPATIENT)
Dept: ADMINISTRATIVE | Facility: OTHER | Age: 82
End: 2022-04-27
Payer: MEDICARE

## 2022-04-27 NOTE — PROGRESS NOTES
Health Maintenance Due   Topic Date Due    Shingles Vaccine (1 of 2) Never done    COVID-19 Vaccine (3 - Booster for Moderna series) 09/16/2021    Diabetes Urine Screening  01/27/2022    Eye Exam  03/02/2022     Updates were requested from care everywhere.  Chart was reviewed for overdue Proactive [facility] Encounters ([place]) topics (CRS, Breast Cancer Screening, Eye exam)  Health Maintenance has been updated.  [facility] immunization registry triggered.  Immunizations were reconciled.

## 2022-04-28 ENCOUNTER — OFFICE VISIT (OUTPATIENT)
Dept: OPHTHALMOLOGY | Facility: CLINIC | Age: 82
End: 2022-04-28
Payer: MEDICARE

## 2022-04-28 DIAGNOSIS — Z96.1 PSEUDOPHAKIA OF BOTH EYES: ICD-10-CM

## 2022-04-28 DIAGNOSIS — E11.9 TYPE 2 DIABETES MELLITUS WITHOUT RETINOPATHY: ICD-10-CM

## 2022-04-28 DIAGNOSIS — H04.129 DRY EYE: ICD-10-CM

## 2022-04-28 DIAGNOSIS — H26.491 PCO (POSTERIOR CAPSULAR OPACIFICATION), RIGHT: ICD-10-CM

## 2022-04-28 DIAGNOSIS — H40.1131 PRIMARY OPEN ANGLE GLAUCOMA (POAG) OF BOTH EYES, MILD STAGE: Primary | ICD-10-CM

## 2022-04-28 DIAGNOSIS — H52.13 MYOPIA WITH ASTIGMATISM AND PRESBYOPIA, BILATERAL: ICD-10-CM

## 2022-04-28 DIAGNOSIS — H52.4 MYOPIA WITH ASTIGMATISM AND PRESBYOPIA, BILATERAL: ICD-10-CM

## 2022-04-28 DIAGNOSIS — H52.203 MYOPIA WITH ASTIGMATISM AND PRESBYOPIA, BILATERAL: ICD-10-CM

## 2022-04-28 PROCEDURE — 92014 PR EYE EXAM, EST PATIENT,COMPREHESV: ICD-10-PCS | Mod: S$GLB,,, | Performed by: OPTOMETRIST

## 2022-04-28 PROCEDURE — 92083 HUMPHREY VISUAL FIELD - OU - BOTH EYES: ICD-10-PCS | Mod: S$GLB,,, | Performed by: OPTOMETRIST

## 2022-04-28 PROCEDURE — 92015 DETERMINE REFRACTIVE STATE: CPT | Mod: S$GLB,,, | Performed by: OPTOMETRIST

## 2022-04-28 PROCEDURE — 92015 PR REFRACTION: ICD-10-PCS | Mod: S$GLB,,, | Performed by: OPTOMETRIST

## 2022-04-28 PROCEDURE — 99999 PR PBB SHADOW E&M-EST. PATIENT-LVL III: CPT | Mod: PBBFAC,,, | Performed by: OPTOMETRIST

## 2022-04-28 PROCEDURE — 99999 PR PBB SHADOW E&M-EST. PATIENT-LVL III: ICD-10-PCS | Mod: PBBFAC,,, | Performed by: OPTOMETRIST

## 2022-04-28 PROCEDURE — 92014 COMPRE OPH EXAM EST PT 1/>: CPT | Mod: S$GLB,,, | Performed by: OPTOMETRIST

## 2022-04-28 PROCEDURE — 92083 EXTENDED VISUAL FIELD XM: CPT | Mod: S$GLB,,, | Performed by: OPTOMETRIST

## 2022-04-28 NOTE — PROGRESS NOTES
HPI     Glaucoma     Comments: Medication eye drops: Timolol BID, Latanoprost QHS OU  Last HVF: 4/28/22  Last gOCT: 10/15/21  Last SDP: 03/02/21                 Diabetic Eye Exam     Comments: Diagnosed with diabetes in   Lab Results       Component                Value               Date                       HGBA1C                   5.9 (H)             03/21/2022            Pt c/o itching OU, uses refresh prn with no relief  No changes to vision per pt                  Last edited by Mary Villagomez MA on 4/28/2022  1:09 PM. (History)            Assessment /Plan     For exam results, see Encounter Report.    Primary open angle glaucoma (POAG) of both eyes, mild stage  -     Cazares Visual Field - OU - Extended - Both Eyes  IOP stable today and within acceptable range relative to target OU  VF inconclusive due to decreased reliability OU  Continue current treatment  Monitor 4 months    Continue Timolol bid OU and Latanoprost qhs OU    Type 2 diabetes mellitus without retinopathy  There was no diabetic retinopathy present in either eye today.   Recommended that pt continue care with PCP and/or specialists regarding diabetes.  Follow-up dilated eye exam recommended in 12 months, sooner with any vision changes or new concerns.    Pseudophakia of both eyes  PCO (posterior capsular opacification), right  Stable OU  Mild PCO OD, YAG not yet indicated  Monitor 12 months    Dry eye  Continue refresh bid OU  Recommended Lastacaft for itching qd OU  Myopia with astigmatism and presbyopia, bilateral  Eyeglass Final Rx     Eyeglass Final Rx       Sphere Cylinder Axis Add    Right -1.50 +0.75 015 +2.50    Left -0.25 +0.50 165 +2.50    Expiration Date: 4/28/2023                  RTC 4 months for IOP check and gOCT or PRN  Discussed above and all questions were answered.

## 2022-04-30 NOTE — TELEPHONE ENCOUNTER
Refill Routing Note   Medication(s) are not appropriate for processing by [facility] for the following reason(s):      - Non-participating provider    [facility] action(s):  Route          Medication reconciliation completed: No     Appointments  past 12m or future 3m with PCP    Date Provider   Last Visit   5/14/2021 Mana Ochoa NP   Next Visit   Visit date not found Mana Ochoa NP   ED visits in past 90 days: 0        Note composed:8:46 PM 04/29/2022

## 2022-05-02 ENCOUNTER — TELEPHONE (OUTPATIENT)
Dept: INTERNAL MEDICINE | Facility: CLINIC | Age: 82
End: 2022-05-02
Payer: MEDICARE

## 2022-05-02 DIAGNOSIS — E11.42 TYPE 2 DIABETES MELLITUS WITH DIABETIC POLYNEUROPATHY, WITHOUT LONG-TERM CURRENT USE OF INSULIN: Primary | ICD-10-CM

## 2022-05-02 RX ORDER — ALBUTEROL SULFATE 90 UG/1
AEROSOL, METERED RESPIRATORY (INHALATION)
Qty: 18 G | Refills: 11 | Status: SHIPPED | OUTPATIENT
Start: 2022-05-02 | End: 2023-01-17 | Stop reason: SDUPTHER

## 2022-05-02 NOTE — TELEPHONE ENCOUNTER
----- Message from Ethan Pollock sent at 5/2/2022  8:59 AM CDT -----  .Type:  Pharmacy Calling to Clarify an RX    Name of Caller: Chasity   Pharmacy Name:...  [facility] - [place], [place] - [address] [facility]  [address] [facility] [address]  Phone: [phone] Fax: [phone]        Prescription Name: test strips  What do they need to clarify?:needing prescription for Accu Check guide strips  Best Call Back Number:[phone]  Additional Information:

## 2022-05-10 ENCOUNTER — PES CALL (OUTPATIENT)
Dept: ADMINISTRATIVE | Facility: CLINIC | Age: 82
End: 2022-05-10
Payer: MEDICARE

## 2022-05-31 ENCOUNTER — OFFICE VISIT (OUTPATIENT)
Dept: INTERNAL MEDICINE | Facility: CLINIC | Age: 82
End: 2022-05-31
Payer: MEDICARE

## 2022-05-31 VITALS
DIASTOLIC BLOOD PRESSURE: 62 MMHG | RESPIRATION RATE: 20 BRPM | HEART RATE: 69 BPM | WEIGHT: 212.5 LBS | OXYGEN SATURATION: 95 % | SYSTOLIC BLOOD PRESSURE: 126 MMHG | BODY MASS INDEX: 34.3 KG/M2 | TEMPERATURE: 98 F

## 2022-05-31 DIAGNOSIS — I70.0 AORTIC ATHEROSCLEROSIS: ICD-10-CM

## 2022-05-31 DIAGNOSIS — E78.2 MIXED HYPERLIPIDEMIA: Chronic | ICD-10-CM

## 2022-05-31 DIAGNOSIS — Z78.0 ASYMPTOMATIC POSTMENOPAUSAL STATUS: ICD-10-CM

## 2022-05-31 DIAGNOSIS — E11.42 TYPE 2 DIABETES MELLITUS WITH DIABETIC POLYNEUROPATHY, WITHOUT LONG-TERM CURRENT USE OF INSULIN: Primary | ICD-10-CM

## 2022-05-31 DIAGNOSIS — M1A.9XX0 CHRONIC GOUT WITHOUT TOPHUS, UNSPECIFIED CAUSE, UNSPECIFIED SITE: ICD-10-CM

## 2022-05-31 DIAGNOSIS — I77.9 CAROTID ARTERY DISEASE, UNSPECIFIED LATERALITY, UNSPECIFIED TYPE: ICD-10-CM

## 2022-05-31 DIAGNOSIS — M79.89 LEG SWELLING: ICD-10-CM

## 2022-05-31 PROCEDURE — 99214 PR OFFICE/OUTPT VISIT, EST, LEVL IV, 30-39 MIN: ICD-10-PCS | Mod: S$GLB,,, | Performed by: FAMILY MEDICINE

## 2022-05-31 PROCEDURE — 99999 PR PBB SHADOW E&M-EST. PATIENT-LVL IV: CPT | Mod: PBBFAC,,, | Performed by: FAMILY MEDICINE

## 2022-05-31 PROCEDURE — 99214 OFFICE O/P EST MOD 30 MIN: CPT | Mod: S$GLB,,, | Performed by: FAMILY MEDICINE

## 2022-05-31 PROCEDURE — 99999 PR PBB SHADOW E&M-EST. PATIENT-LVL IV: ICD-10-PCS | Mod: PBBFAC,,, | Performed by: FAMILY MEDICINE

## 2022-05-31 RX ORDER — LANCETS 28 GAUGE
EACH MISCELLANEOUS 2 TIMES DAILY
COMMUNITY
Start: 2022-05-27 | End: 2022-08-10

## 2022-05-31 NOTE — PROGRESS NOTES
Subjective:      Patient ID: Kaleigh Nieves is a 81 y.o. female.    Chief Complaint: Joint Swelling (Connor feet/legs) and Diabetes    HPI. Type 2 diabetes: a1c controlled. Tolerating medicine. No hypoglycemia  Chronic lower ext edema and intermitt ant ankle pain.  > a year . On lasix. Neg bnp. No c/o sob cp. Has had eval via card and podiatry; prn tyl   Past Medical History:   Diagnosis Date    Anemia     Angina pectoris     Arthritis     Asthma     Back pain     Bronchitis     CAD (coronary artery disease) 7/15    via chst ct    Carotid artery stenosis     Chronic bronchitis     Chronic gout     Colon polyp     CTS (carpal tunnel syndrome)     Diabetes mellitus, type 2     Diabetes with neurologic complications     Diverticulosis     RIVAS (dyspnea on exertion)     chronic; eval pulm dr natarajan    Dyslipidemia     Ex-smoker     quit in her 30s    GERD (gastroesophageal reflux disease)     Heart failure     Hyperlipidemia     Hypertension     Neuropathy, lower extremity     Obesity     Open-angle glaucoma     dr avel kaur    Peripheral vascular disease     Polyneuropathy     Tobacco dependence     Viral gastroenteritis 3/10/2020      Past Surgical History:   Procedure Laterality Date    APPENDECTOMY      CARPAL TUNNEL RELEASE Right     CATARACT EXTRACTION, BILATERAL      CHOLECYSTECTOMY      CYST REMOVAL  2018    DILATION AND CURETTAGE OF UTERUS      EYE SURGERY      HEMORRHOID SURGERY      HYSTERECTOMY      fibroids    INCISION AND DRAINAGE PERIRECTAL ABSCESS      JOINT REPLACEMENT Bilateral     knee    KNEE ARTHROSCOPY Right     MYOMECTOMY        Social History     Socioeconomic History    Marital status: Single    Number of children: 0   Tobacco Use    Smoking status: Former Smoker     Packs/day: 0.25     Years: 24.00     Pack years: 6.00     Quit date: 1976     Years since quittin.9    Smokeless tobacco: Never Used   Substance and Sexual Activity    Alcohol  use: No    Drug use: No    Sexual activity: Not Currently      Family History   Problem Relation Age of Onset    Hypertension Mother     Diabetes Mother     Leukemia Sister     Hypertension Sister     Cancer Sister         leukemia    Heart disease Maternal Aunt     Cancer Maternal Uncle         gastric, pacreatic    Heart disease Maternal Uncle     Miscarriages / Stillbirths Maternal Uncle     Diabetes Maternal Grandmother     Asthma Maternal Grandfather     Breast cancer Neg Hx     Colon cancer Neg Hx     Ovarian cancer Neg Hx     COPD Neg Hx     Hyperlipidemia Neg Hx     Kidney disease Neg Hx       Review of Systems        Objective:     Physical Examgen nad  cvrrr  Chest ctabilat   bilat lower ext 2+ pitting edema. No redness  Assessment:         ICD-10-CM ICD-9-CM   1. Type 2 diabetes mellitus with diabetic polyneuropathy, without long-term current use of insulin  E11.42 250.60     357.2   2. Chronic gout without tophus, unspecified cause, unspecified site  M1A.9XX0 274.02   3. Mixed hyperlipidemia  E78.2 272.2   4. Aortic atherosclerosis  I70.0 440.0   5. Carotid artery disease, unspecified laterality, unspecified type  I77.9 447.9   6. Asymptomatic postmenopausal status  Z78.0 V49.81   7. Leg swelling  M79.89 729.81      Plan:      *f/u card and podiatry etc as sched  **Lab and follow up Mana after in 3 months  Please coordinate dexa with one of her [facility] appts    Type 2 diabetes mellitus with diabetic polyneuropathy, without long-term current use of insulin  -     Hemoglobin A1C; Future; Expected date: 08/29/2022  -     Comprehensive Metabolic Panel; Future; Expected date: 08/31/2022  -     Microalbumin/Creatinine Ratio, Urine; Future; Expected date: 08/29/2022  -     TSH; Future; Expected date: 08/31/2022    Chronic gout without tophus, unspecified cause, unspecified site  -     CBC Auto Differential; Future; Expected date: 08/29/2022  -     Uric Acid; Future; Expected date:  08/31/2022    Mixed hyperlipidemia    Aortic atherosclerosis    Carotid artery disease, unspecified laterality, unspecified type  -     US Carotid Bilateral; Future; Expected date: 05/31/2022    Asymptomatic postmenopausal status  -     DXA Bone Density Spine And Hip; Future; Expected date: 05/31/2022    Leg swelling  -     US Lower Extremity Veins Bilateral; Future; Expected date: 05/31/2022

## 2022-06-01 ENCOUNTER — HOSPITAL ENCOUNTER (OUTPATIENT)
Dept: RADIOLOGY | Facility: HOSPITAL | Age: 82
Discharge: HOME | End: 2022-06-01
Attending: FAMILY MEDICINE
Payer: MEDICARE

## 2022-06-01 DIAGNOSIS — M79.89 LEG SWELLING: ICD-10-CM

## 2022-06-01 DIAGNOSIS — I77.9 CAROTID ARTERY DISEASE, UNSPECIFIED LATERALITY, UNSPECIFIED TYPE: ICD-10-CM

## 2022-06-01 PROCEDURE — 93880 EXTRACRANIAL BILAT STUDY: CPT | Mod: 26,,, | Performed by: RADIOLOGY

## 2022-06-01 PROCEDURE — 93970 EXTREMITY STUDY: CPT | Mod: TC,PO

## 2022-06-01 PROCEDURE — 93970 EXTREMITY STUDY: CPT | Mod: 26,,, | Performed by: RADIOLOGY

## 2022-06-01 PROCEDURE — 93880 EXTRACRANIAL BILAT STUDY: CPT | Mod: TC,PO

## 2022-06-01 PROCEDURE — 93970 US LOWER EXTREMITY VEINS BILATERAL: ICD-10-PCS | Mod: 26,,, | Performed by: RADIOLOGY

## 2022-06-01 PROCEDURE — 93880 US CAROTID BILATERAL: ICD-10-PCS | Mod: 26,,, | Performed by: RADIOLOGY

## 2022-06-03 ENCOUNTER — TELEPHONE (OUTPATIENT)
Dept: INTERNAL MEDICINE | Facility: CLINIC | Age: 82
End: 2022-06-03
Payer: MEDICARE

## 2022-06-03 NOTE — TELEPHONE ENCOUNTER
----- Message from Sari Dean sent at 6/3/2022 12:41 PM CDT -----  Contact: patient/[phone]  Patient needs help all of her bloodwork are showing in her Mychart. She is very concern that her results or not there and she needs to know what each test is CBC the ATC the Utera Acid there is only one results showing her in her mychart.    Thanks KELVIN

## 2022-06-08 ENCOUNTER — OFFICE VISIT (OUTPATIENT)
Dept: PODIATRY | Facility: CLINIC | Age: 82
End: 2022-06-08
Payer: MEDICARE

## 2022-06-08 VITALS
SYSTOLIC BLOOD PRESSURE: 125 MMHG | HEIGHT: 66 IN | DIASTOLIC BLOOD PRESSURE: 59 MMHG | HEART RATE: 68 BPM | WEIGHT: 212 LBS | BODY MASS INDEX: 34.07 KG/M2

## 2022-06-08 DIAGNOSIS — B35.1 DERMATOPHYTOSIS OF NAIL: ICD-10-CM

## 2022-06-08 DIAGNOSIS — E11.51 TYPE II DIABETES MELLITUS WITH PERIPHERAL CIRCULATORY DISORDER: Primary | ICD-10-CM

## 2022-06-08 PROCEDURE — 11721 PR DEBRIDEMENT OF NAILS, 6 OR MORE: ICD-10-PCS | Mod: Q8,S$GLB,, | Performed by: PODIATRIST

## 2022-06-08 PROCEDURE — 99999 PR PBB SHADOW E&M-EST. PATIENT-LVL IV: CPT | Mod: PBBFAC,,, | Performed by: PODIATRIST

## 2022-06-08 PROCEDURE — 99999 PR PBB SHADOW E&M-EST. PATIENT-LVL IV: ICD-10-PCS | Mod: PBBFAC,,, | Performed by: PODIATRIST

## 2022-06-08 PROCEDURE — 99499 UNLISTED E&M SERVICE: CPT | Mod: S$GLB,,, | Performed by: PODIATRIST

## 2022-06-08 PROCEDURE — 99499 NO LOS: ICD-10-PCS | Mod: S$GLB,,, | Performed by: PODIATRIST

## 2022-06-08 PROCEDURE — 11721 DEBRIDE NAIL 6 OR MORE: CPT | Mod: Q8,S$GLB,, | Performed by: PODIATRIST

## 2022-06-08 NOTE — PROGRESS NOTES
Subjective:     Patient ID: Kaleigh Nieves is a 81 y.o. female.    Chief Complaint: Nail Care (C/o bilateral sharp pains, rates pain 1/10, wears diabetic shoes with stockings, diabetic Pt, last seen on 05/31/22 with PCP Dr. Ricks)    Kaleigh is a 81 y.o. female who presents to the clinic for evaluation and treatment of high risk feet. Kaleigh has a past medical history of Anemia, Angina pectoris, Arthritis, Asthma, Back pain, Bronchitis, CAD (coronary artery disease) (7/15), Carotid artery stenosis, Chronic bronchitis, Chronic gout, Colon polyp, CTS (carpal tunnel syndrome), Diabetes mellitus, type 2, Diabetes with neurologic complications, Diverticulosis, RIVAS (dyspnea on exertion), Dyslipidemia, Ex-smoker, GERD (gastroesophageal reflux disease), Heart failure, Hyperlipidemia, Hypertension, Neuropathy, lower extremity, Obesity, Open-angle glaucoma, Peripheral vascular disease, Polyneuropathy, Tobacco dependence, and Viral gastroenteritis (3/10/2020). The patient's chief complaint is long, thick toenails. This patient has documented high risk feet requiring routine maintenance secondary to diabetes mellitis and those secondary complications of diabetes, as mentioned..    PCP: Mana Ochoa NP    Date Last Seen by PCP: 05/31/2022    Current shoe gear:  Affected Foot: Rx diabetic extra depth shoes and custom accommodative insoles     Unaffected Foot: Rx diabetic extra depth shoes and custom accommodative insoles    Hemoglobin A1C   Date Value Ref Range Status   03/21/2022 5.9 (H) 4.0 - 5.6 % Final     Comment:     ADA Screening Guidelines:  5.7-6.4%  Consistent with prediabetes  >or=6.5%  Consistent with diabetes    High levels of fetal hemoglobin interfere with the HbA1C  assay. Heterozygous hemoglobin variants (HbS, HgC, etc)do  not significantly interfere with this assay.   However, presence of multiple variants may affect accuracy.     09/21/2021 5.6 4.0 - 5.6 % Final     Comment:     ADA Screening  Guidelines:  5.7-6.4%  Consistent with prediabetes  >or=6.5%  Consistent with diabetes    High levels of fetal hemoglobin interfere with the HbA1C  assay. Heterozygous hemoglobin variants (HbS, HgC, etc)do  not significantly interfere with this assay.   However, presence of multiple variants may affect accuracy.     01/27/2021 5.7 (H) 4.0 - 5.6 % Final     Comment:     ADA Screening Guidelines:  5.7-6.4%  Consistent with prediabetes  >or=6.5%  Consistent with diabetes    High levels of fetal hemoglobin interfere with the HbA1C  assay. Heterozygous hemoglobin variants (HbS, HgC, etc)do  not significantly interfere with this assay.   However, presence of multiple variants may affect accuracy.         Patient Active Problem List   Diagnosis    Type 2 diabetes mellitus with diabetic polyneuropathy, without long-term current use of insulin    Multiple joint pain    Esophageal reflux    Hyperlipidemia    Chronic gout    Lichen sclerosus    Carotid artery disease    Coronary artery disease involving native coronary artery without angina pectoris    Colon polyp    Hypertension associated with diabetes    Aortic atherosclerosis    Diastolic dysfunction    Arterial insufficiency of lower extremity    Severe obesity (BMI 35.0-35.9 with comorbidity)    Glaucoma    Ganglion cyst of flexor tendon sheath of finger of right hand    Bilateral carpal tunnel syndrome    Right carpal tunnel syndrome    Chronic bronchitis    Dependent edema    Diverticulosis    Primary open-angle glaucoma, bilateral, indeterminate stage    RIVAS (dyspnea on exertion)    Chronic diastolic heart failure    Acute gout of ankle    Low vitamin D level       Medication List with Changes/Refills   Current Medications    ALBUTEROL (PROVENTIL/VENTOLIN HFA) 90 MCG/ACTUATION INHALER    INHALE 2 PUFFS INTO THE LUNGS EVERY 6 HOURS AS NEEDED FOR WHEEZING.    ALLOPURINOL (ZYLOPRIM) 100 MG TABLET    Take 1 tablet (100 mg total) by mouth once  daily.    ASPIRIN 81 MG CHEW    Take 81 mg by mouth once daily.    BETAMETHASONE DIPROPIONATE 0.05 % CREAM        BISOPROLOL (ZEBETA) 5 MG TABLET    Take 1 tablet (5 mg total) by mouth once daily.    BLOOD GLUCOSE CONTROL HIGH,LOW SOLN    1 each by Misc.(Non-Drug; Combo Route) route once a week.    BLOOD SUGAR DIAGNOSTIC STRP    1 each by Misc.(Non-Drug; Combo Route) route 2 (two) times a day.    BLOOD SUGAR DIAGNOSTIC STRP    1 each by Misc.(Non-Drug; Combo Route) route 2 (two) times a day.    BLOOD-GLUCOSE METER (ACCU-CHEK DARSHAN PLUS METER) MISC    USE AS DIRECTED    FREESTYLE LANCETS 28 GAUGE LANCETS    Apply topically 2 (two) times daily.    FUROSEMIDE (LASIX) 40 MG TABLET    Take 1 tablet (40 mg total) by mouth once daily.    LATANOPROST 0.005 % OPHTHALMIC SOLUTION    Place 1 drop into both eyes every evening.    MUCUS-CHEST CONGESTION 100 MG/5 ML SYRUP    TAKE 2 TEASPOONSFUL (10MLS) BY MOUTH THREE TIMES A DAY AS NEEDED FOR COUGH    SIMVASTATIN (ZOCOR) 20 MG TABLET    Take 1 tablet (20 mg total) by mouth nightly.    TIMOLOL MALEATE 0.5% (TIMOPTIC) 0.5 % DROP    Place 1 drop into both eyes 2 (two) times daily.    TRIAMCINOLONE ACETONIDE 0.025% (KENALOG) 0.025 % CREAM           Review of patient's allergies indicates:   Allergen Reactions    Iodine and iodide containing products Nausea And Vomiting    Penicillins Itching    Sulfa (sulfonamide antibiotics) Hives and Itching    Amlodipine      edema    Ultram [tramadol] Other (See Comments)     Light headness, itiching    Bactrim [sulfamethoxazole-trimethoprim] Itching and Rash       Past Surgical History:   Procedure Laterality Date    APPENDECTOMY      CARPAL TUNNEL RELEASE Right     CATARACT EXTRACTION, BILATERAL      CHOLECYSTECTOMY      CYST REMOVAL  2018    DILATION AND CURETTAGE OF UTERUS      EYE SURGERY      HEMORRHOID SURGERY      HYSTERECTOMY      fibroids    INCISION AND DRAINAGE PERIRECTAL ABSCESS      JOINT REPLACEMENT Bilateral      "knee    KNEE ARTHROSCOPY Right     MYOMECTOMY         Family History   Problem Relation Age of Onset    Hypertension Mother     Diabetes Mother     Leukemia Sister     Hypertension Sister     Cancer Sister         leukemia    Heart disease Maternal Aunt     Cancer Maternal Uncle         gastric, pacreatic    Heart disease Maternal Uncle     Miscarriages / Stillbirths Maternal Uncle     Diabetes Maternal Grandmother     Asthma Maternal Grandfather     Breast cancer Neg Hx     Colon cancer Neg Hx     Ovarian cancer Neg Hx     COPD Neg Hx     Hyperlipidemia Neg Hx     Kidney disease Neg Hx        Social History     Socioeconomic History    Marital status: Single    Number of children: 0   Tobacco Use    Smoking status: Former Smoker     Packs/day: 0.25     Years: 24.00     Pack years: 6.00     Quit date: 1976     Years since quittin.9    Smokeless tobacco: Never Used   Substance and Sexual Activity    Alcohol use: No    Drug use: No    Sexual activity: Not Currently       Vitals:    22 1107   BP: (!) 125/59   Pulse: 68   Weight: 96.2 kg (212 lb)   Height: 5' 6" (1.676 m)   PainSc: 10-Worst pain ever   PainLoc: Foot       Hemoglobin A1C   Date Value Ref Range Status   2022 5.9 (H) 4.0 - 5.6 % Final     Comment:     ADA Screening Guidelines:  5.7-6.4%  Consistent with prediabetes  >or=6.5%  Consistent with diabetes    High levels of fetal hemoglobin interfere with the HbA1C  assay. Heterozygous hemoglobin variants (HbS, HgC, etc)do  not significantly interfere with this assay.   However, presence of multiple variants may affect accuracy.     2021 5.6 4.0 - 5.6 % Final     Comment:     ADA Screening Guidelines:  5.7-6.4%  Consistent with prediabetes  >or=6.5%  Consistent with diabetes    High levels of fetal hemoglobin interfere with the HbA1C  assay. Heterozygous hemoglobin variants (HbS, HgC, etc)do  not significantly interfere with this assay.   However, presence of " multiple variants may affect accuracy.     01/27/2021 5.7 (H) 4.0 - 5.6 % Final     Comment:     ADA Screening Guidelines:  5.7-6.4%  Consistent with prediabetes  >or=6.5%  Consistent with diabetes    High levels of fetal hemoglobin interfere with the HbA1C  assay. Heterozygous hemoglobin variants (HbS, HgC, etc)do  not significantly interfere with this assay.   However, presence of multiple variants may affect accuracy.         Review of Systems   Constitutional: Negative for chills and fever.   Respiratory: Negative for shortness of breath.    Cardiovascular: Positive for leg swelling. Negative for chest pain, palpitations, orthopnea and claudication.   Gastrointestinal: Negative for diarrhea, nausea and vomiting.   Musculoskeletal: Negative for joint pain.   Skin: Negative for rash.   Neurological: Negative for dizziness, tingling, sensory change, focal weakness and weakness.   Psychiatric/Behavioral: Negative.              Objective:      PHYSICAL EXAM: Apperance: Alert and orient in no distress,well developed, and with good attention to grooming and body habits  Patient presents ambulating in diabetic shoes and inserts.   LOWER EXTREMITY EXAM:  VASCULAR: Dorsalis pedis pulses 0/4 bilateral (monophasic with doppler) and Posterior Tibial pulses 0/4 bilateral (biphasic with doppler). Capillary fill time <blanched bilateral. Mild edema observed bilateral. Varicosities present bilateral. Skin temperature of the lower extremities is warm to cool, proximal to distal. Hair growth absent bilateral.  DERMATOLOGICAL: No skin rashes, subcutaneous nodules, lesions, or ulcers observed bilateral. Nails 1,2,3,4,5 bilateral elongated, thickened, and discolored with subungual debris. Webspaces 1,2,3,4clean, dry and without evidence of break in skin integrity bilateral.   NEUROLOGICAL: Light touch, sharp-dull, proprioception all present and equal bilaterally.  Vibratory sensation intact on left hallux and diminished at right  hallux. Protective sensation intact at all 10 sites as tested with a Gwynn-Jimy 5.07 monofilament.   MUSCULOSKELETAL: Muscle strength is 5/5 for foot inverters, everters, plantarflexors, and dorsiflexors. Muscle tone is normal. No pain on palpation of right dorsal foot/ankle. Pes planus noted bilateral. Flexible hammertoes noted bilateral.         Assessment:       ICD-10-CM ICD-9-CM   1. Type II diabetes mellitus with peripheral circulatory disorder  E11.51 250.70     443.81   2. Dermatophytosis of nail  B35.1 110.1       Plan:   Type II diabetes mellitus with peripheral circulatory disorder    Dermatophytosis of nail      I counseled the patient on her conditions, regarding findings of my examination, my impressions, and usual treatment plan.   Greater than 15 minutes of a 20 minute appointment spent on education about the diabetic foot, neuropathy, and prevention of limb loss.  Shoe inspection. Diabetic Foot Education. Patient reminded of the importance of good nutrition and blood sugar control to help prevent podiatric complications of diabetes. Patient instructed on proper foot hygeine. We discussed wearing proper shoe gear, daily foot inspections, never walking without protective shoe gear, never putting sharp instruments to feet.    With patient's permission, nails 1-5 bilateral were debrided/trimmed in length and thickness aggressively to their soft tissue attachment mechanically and with electric , removing all offending nail and debris. Patient relates relief following the procedure.  Patient  will continue to monitor the areas daily, inspect feet, wear protective shoe gear when ambulatory, moisturizer to maintain skin integrity. Patient reminded of the importance of good nutrition and blood sugar control to help prevent podiatric complications of diabetes.  Patient to return 4 months or sooner if needed.                Ino Harmon DPM  [facility]

## 2022-06-22 ENCOUNTER — APPOINTMENT (OUTPATIENT)
Dept: RADIOLOGY | Facility: HOSPITAL | Age: 82
End: 2022-06-22
Attending: FAMILY MEDICINE
Payer: MEDICARE

## 2022-06-22 DIAGNOSIS — Z78.0 ASYMPTOMATIC POSTMENOPAUSAL STATUS: ICD-10-CM

## 2022-06-22 PROCEDURE — 77080 DEXA BONE DENSITY SPINE HIP: ICD-10-PCS | Mod: 26,,, | Performed by: RADIOLOGY

## 2022-06-22 PROCEDURE — 77080 DXA BONE DENSITY AXIAL: CPT | Mod: TC

## 2022-06-22 PROCEDURE — 77080 DXA BONE DENSITY AXIAL: CPT | Mod: 26,,, | Performed by: RADIOLOGY

## 2022-06-29 ENCOUNTER — OFFICE VISIT (OUTPATIENT)
Dept: CARDIOLOGY | Facility: CLINIC | Age: 82
End: 2022-06-29
Payer: MEDICARE

## 2022-06-29 VITALS
BODY MASS INDEX: 34.16 KG/M2 | SYSTOLIC BLOOD PRESSURE: 124 MMHG | OXYGEN SATURATION: 98 % | HEART RATE: 68 BPM | WEIGHT: 211.63 LBS | DIASTOLIC BLOOD PRESSURE: 68 MMHG

## 2022-06-29 DIAGNOSIS — I70.0 AORTIC ATHEROSCLEROSIS: ICD-10-CM

## 2022-06-29 DIAGNOSIS — I50.32 CHRONIC DIASTOLIC HEART FAILURE: Primary | ICD-10-CM

## 2022-06-29 PROCEDURE — 99214 PR OFFICE/OUTPT VISIT, EST, LEVL IV, 30-39 MIN: ICD-10-PCS | Mod: S$GLB,,, | Performed by: INTERNAL MEDICINE

## 2022-06-29 PROCEDURE — 99999 PR PBB SHADOW E&M-EST. PATIENT-LVL III: ICD-10-PCS | Mod: PBBFAC,,, | Performed by: INTERNAL MEDICINE

## 2022-06-29 PROCEDURE — 99999 PR PBB SHADOW E&M-EST. PATIENT-LVL III: CPT | Mod: PBBFAC,,, | Performed by: INTERNAL MEDICINE

## 2022-06-29 PROCEDURE — 99214 OFFICE O/P EST MOD 30 MIN: CPT | Mod: S$GLB,,, | Performed by: INTERNAL MEDICINE

## 2022-06-29 NOTE — PROGRESS NOTES
Subjective:   Patient ID:  Kaleigh Nieves is a 81 y.o. female who presents for evaluation of No chief complaint on file.    06/29/2022   Improved symptoms after increasing lasix and adding bisoprolol last visit  Still has NYHA BUT 1 Now  No chest pain   Trace edema, wearing compression stocking   Sometimes has wheezing and uses ventoling   Normal BNP multiple times   Mild AS AI last year     12.2021  Comes in for 6 months follow   Denies any CP, reports MAYBERRY NYHA 1-2, Increased leg swelling, states lasix didn't make a big difference on 20 mg only   BP uncontrolled   Denies PND, orthopnea  No palpitaitons  No syncope or LOC       Interval hx: 5/26/2021   Went to the ED last month with sharp, LUQ pain, underneath her left breast, sharp few seconds only, across her belly, felt like gas as per patient and she felt better once she started to pass gas   Denies any exertional chest pain   Has stable mayberry, nyha1 , had normal BNP , TROP AND EKG in the ED, had very elevated  SBP   Today bp under good control   No more stomach pain   See ros     =================================================  HPI 2/1/2021  80-year-old female with history of diastolic heart failure, diabetes, hypertension, hyperlipidemia, gout, carotid artery disease and normal angiogram 2010  Comes in for follow up   Continues to have MAYBERRY , NYHA 2, Orthopnea, mild bilateral LE edema R>L   Compliant with low salt diet   Denies any chest pain   /750 at home     ==================================================================================================================================    2018: seen by Dr Jane   77 yo female, referred for CHF. Prior cardiologist Dr. Crawford at [facility].due to f/h CHF  PMH DM, HTN, HLD, gout, carotid artery Dz and had clear LHC in 2010.  No chest pain, palpitation, dizziness, orthopnea.  Some leg swelling,   MAYBERRY stable  .ECHO showed normal EF and LVH; MPI showed no ischemia  : BNP 34; BARRON wnl  ekg  NSR    Shortness of Breath  Associated symptoms include leg swelling. Pertinent negatives include no abdominal pain, chest pain, claudication, fever, headaches, neck pain, orthopnea, PND, rash, sputum production, syncope, vomiting or wheezing.       Current Outpatient Medications:     allopurinoL (ZYLOPRIM) 100 MG tablet, Take 1 tablet (100 mg total) by mouth once daily., Disp: 90 tablet, Rfl: 1    aspirin 81 MG Chew, Take 81 mg by mouth once daily., Disp: , Rfl:     bisoprolol (ZEBETA) 5 MG tablet, Take 1 tablet (5 mg total) by mouth once daily., Disp: 90 tablet, Rfl: 3    furosemide (LASIX) 40 MG tablet, Take 1 tablet (40 mg total) by mouth once daily., Disp: 90 tablet, Rfl: 3    simvastatin (ZOCOR) 20 MG tablet, Take 1 tablet (20 mg total) by mouth nightly., Disp: 90 tablet, Rfl: 3    albuterol (PROVENTIL/VENTOLIN HFA) 90 mcg/actuation inhaler, INHALE 2 PUFFS INTO THE LUNGS EVERY 6 HOURS AS NEEDED FOR WHEEZING., Disp: 18 g, Rfl: 11    betamethasone dipropionate 0.05 % cream, , Disp: , Rfl:     blood glucose control high,low Soln, 1 each by Misc.(Non-Drug; Combo Route) route once a week., Disp: 1 each, Rfl: 11    blood sugar diagnostic Strp, 1 each by Misc.(Non-Drug; Combo Route) route 2 (two) times a day., Disp: 100 each, Rfl: 11    blood sugar diagnostic Strp, 1 each by Misc.(Non-Drug; Combo Route) route 2 (two) times a day., Disp: 100 each, Rfl: 11    blood-glucose meter (ACCU-CHEK DARSHAN PLUS METER) [facility] [place]ED, Disp: 100 each, Rfl: 11    FREESTYLE LANCETS 28 gauge lancets, Apply topically 2 (two) times daily., Disp: , Rfl:     latanoprost 0.005 % ophthalmic solution, Place 1 drop into both eyes every evening., Disp: 2.5 mL, Rfl: 11    MUCUS-CHEST CONGESTION 100 mg/5 mL syrup, TAKE 2 TEASPOONSFUL (10MLS) BY MOUTH THREE TIMES A DAY AS NEEDED FOR COUGH, Disp: 200 mL, Rfl: 2    timolol maleate 0.5% (TIMOPTIC) 0.5 % Drop, Place 1 drop into both eyes 2 (two) times daily., Disp: 10 mL, Rfl:  4    triamcinolone acetonide 0.025% (KENALOG) 0.025 % cream, , Disp: , Rfl:     Past Medical History:   Diagnosis Date    Anemia     Angina pectoris     Arthritis     Asthma     Back pain     Bronchitis     [facility]    Carotid artery stenosis     Chronic bronchitis     Chronic gout     Colon polyp     CTS (carpal tunnel syndrome)     Diabetes mellitus, type 2     Diabetes with neurologic complications     Diverticulosis     RIVAS (dyspnea on exertion)     chronic; eval pulm dr natarajan    Dyslipidemia     Ex-smoker     quit in her 30s    GERD (gastroesophageal reflux disease)     Heart failure     Hyperlipidemia     Hypertension     Neuropathy, lower extremity     Obesity     Open-angle glaucoma     dr avel kaur    Peripheral vascular disease     Polyneuropathy     Tobacco dependence     Viral gastroenteritis 3/10/2020       Past Surgical History:   Procedure Laterality Date    APPENDECTOMY      CARPAL TUNNEL RELEASE Right     CATARACT EXTRACTION, BILATERAL      CHOLECYSTECTOMY      CYST REMOVAL  2018    DILATION AND CURETTAGE OF UTERUS      EYE SURGERY      HEMORRHOID SURGERY      HYSTERECTOMY      fibroids    INCISION AND DRAINAGE PERIRECTAL ABSCESS      JOINT REPLACEMENT Bilateral     knee    KNEE ARTHROSCOPY Right     MYOMECTOMY         Social History     Tobacco Use    Smoking status: Former Smoker     Packs/day: 0.25     Years: 24.00     Pack years: 6.00     Quit date: 1976     Years since quittin.0    Smokeless tobacco: Never Used   Substance Use Topics    Alcohol use: No    Drug use: No       Family History   Problem Relation Age of Onset    Hypertension Mother     Diabetes Mother     Leukemia Sister     Hypertension Sister     Cancer Sister         leukemia    Heart disease Maternal Aunt     Cancer Maternal Uncle         gastric, pacreatic    Heart disease Maternal Uncle     Miscarriages / Stillbirths  Maternal Uncle     Diabetes Maternal Grandmother     Asthma Maternal Grandfather     Breast cancer Neg Hx     Colon cancer Neg Hx     Ovarian cancer Neg Hx     COPD Neg Hx     Hyperlipidemia Neg Hx     Kidney disease Neg Hx        Review of Systems   Constitutional: Negative for decreased appetite, diaphoresis, fever, malaise/fatigue and night sweats.   HENT: Negative for nosebleeds.    Eyes: Negative for blurred vision and double vision.   Cardiovascular: Positive for dyspnea on exertion and leg swelling. Negative for chest pain, claudication, irregular heartbeat, near-syncope, orthopnea, palpitations, paroxysmal nocturnal dyspnea and syncope.   Respiratory: Positive for shortness of breath. Negative for cough, sleep disturbances due to breathing, snoring, sputum production and wheezing.    Endocrine: Negative for cold intolerance and polyuria.   Hematologic/Lymphatic: Does not bruise/bleed easily.   Skin: Negative for rash.   Musculoskeletal: Negative for back pain, falls, joint pain, joint swelling and neck pain.   Gastrointestinal: Negative for abdominal pain, heartburn, nausea and vomiting.   Genitourinary: Negative for dysuria, frequency and hematuria.   Neurological: Negative for difficulty with concentration, dizziness, focal weakness, headaches, light-headedness, numbness, seizures and weakness.   Psychiatric/Behavioral: Negative for depression, memory loss and substance abuse. The patient does not have insomnia.    Allergic/Immunologic: Negative for HIV exposure and hives.       Objective:  Last 3 sets of Vitals    Vitals - 1 value per visit 6/8/2022 6/8/2022 6/29/2022   SYSTOLIC - 125 124   DIASTOLIC - 59 68   Pulse - 68 68   Temp - - -   Resp - - -   SPO2 - - 98   Weight (lb) - 212 211.64   Weight (kg) - 96.163 96   Height - 66 -   BMI (Calculated) - 34.2 -   VISIT REPORT - - -   Pain Score  10 - -   Some recent data might be hidden        Physical Exam  HENT:      Head: Normocephalic.   Eyes:       Pupils: Pupils are equal, round, and reactive to light.   Neck:      Thyroid: No thyromegaly.      Vascular: Normal carotid pulses. No carotid bruit or JVD.   Cardiovascular:      Rate and Rhythm: Normal rate and regular rhythm.  No extrasystoles are present.     Chest Wall: PMI is not displaced.      Pulses: Normal pulses.      Heart sounds: Murmur heard.     No gallop. No S3 sounds.      Comments: ESM 3/6 on URSB and along LSB  Pulmonary:      Effort: No respiratory distress.      Breath sounds: Normal breath sounds. No stridor.   Abdominal:      General: Bowel sounds are normal.      Palpations: Abdomen is soft.      Tenderness: There is no abdominal tenderness. There is no rebound.   Musculoskeletal:         General: Swelling present. Normal range of motion.      Comments: Trace edema at ankle   Skin:     Findings: No rash.   Neurological:      Mental Status: She is alert and oriented to person, place, and time.   Psychiatric:         Behavior: Behavior normal.     2017   -------  Real-time, color flow and Doppler imaging performed.    Atherosclerotic plaque noted within the bilateral bulb regions and RIGHT proximal ICA and ECA.                     ICA                                    R                               L  Peak systolic velocity:          88  Cm/sec               133 cm/sec  Peak diastolic velocity:        18 Cm/sec                   20 cm/sec  Systolic velocity ratio:          1.1                             1.2  Diastolic velocity ratio:         1.6                             1.8  Vertebral artery flow:            Antegrade                             antegrade  ECA flow:                                 Antegrade                             antegrade  IMPRESSION:     1.  Stable exam.   2.  No hemodynamically significant plaque formation or stenosis on the RIGHT.   3.  Stable findings on the LEFT with minimally elevated to PSV within the ICA.  Stenosis is in the less than 50 percent range.    4.  Followup and/or further evaluation as warranted.  ==============================================================================   Impression: NORMAL MYOCARDIAL PERFUSION 2016  1. The perfusion scan is free of evidence for myocardial ischemia or injury.   2. There is a mild intensity fixed defect in the anterior wall of the left ventricle, secondary to breast attenuation.   3. Resting wall motion is physiologic.   4. Resting LV function is normal.   5. The ventricular volumes are normal at rest and stress.   6. The extracardiac distribution of radioactivity is normal.   ==============================================================================  2017  The right ankle brachial index was 1.24 which is normal.   The left ankle brachial index was 0.99 which suggests minimal left lower extremity arterial disease.   The right TBI is 0.45.   The left TBI is 0.49.   ==============================================================================  CONCLUSIONS 2018    1 - Hyperdynamic left ventricular systolic function (EF >70%).     2 - Indeterminate LV diastolic function.     3 - Normal right ventricular systolic function .     4 - Concentric remodeling.     5 - No wall motion abnormalities.  Lab Results   Component Value Date    CHOL 136 03/21/2022    CHOL 147 01/27/2021    CHOL 149 12/06/2019     Lab Results   Component Value Date    HDL 33 (L) 03/21/2022    HDL 39 (L) 01/27/2021    HDL 39 (L) 12/06/2019     Lab Results   Component Value Date    LDLCALC 84.8 03/21/2022    LDLCALC 89.8 01/27/2021    LDLCALC 95.6 12/06/2019     Lab Results   Component Value Date    TRIG 91 03/21/2022    TRIG 91 01/27/2021    TRIG 72 12/06/2019     Lab Results   Component Value Date    CHOLHDL 24.3 03/21/2022    CHOLHDL 26.5 01/27/2021    CHOLHDL 26.2 12/06/2019       Chemistry        Component Value Date/Time     03/21/2022 1435    K 5.2 (H) 03/21/2022 1435    CL 97 03/21/2022 1435    CO2 34 (H) 03/21/2022 1435    BUN 9  03/21/2022 1435    CREATININE 0.7 03/21/2022 1435     03/21/2022 1435        Component Value Date/Time    CALCIUM 9.4 03/21/2022 1435    ALKPHOS 31 (L) 03/21/2022 1435    AST 13 03/21/2022 1435    ALT 15 03/21/2022 1435    BILITOT 0.6 03/21/2022 1435    ESTGFRAFRICA >60.0 03/21/2022 1435    EGFRNONAA >60.0 03/21/2022 1435          Lab Results   Component Value Date    HGBA1C 5.9 (H) 03/21/2022     Lab Results   Component Value Date    TSH 1.091 06/01/2022     No results found for: INR, PROTIME  Lab Results   Component Value Date    WBC 5.20 07/13/2021    HGB 13.9 07/13/2021    HCT 43.6 07/13/2021    MCV 90 07/13/2021     07/13/2021     BNP  @LABRCNTIP(BNP,BNPTRIAGEBLO)@  CrCl cannot be calculated (Patient's most recent lab result is older than the maximum 7 days allowed.).  No results found in the last 24 hours.  No results found in the last 24 hours.  No results found in the last 24 hours.      Conclusion 2/2021    · The left ventricle is small with concentric remodeling and hyperdynamic systolic function. The estimated ejection fraction is 75%  · Indeterminate left ventricular diastolic function.  · Normal right ventricular size with low normal right ventricular systolic function.  · There is mild aortic valve stenosis.  · Aortic valve area is 3.17 cm2; peak velocity is 2.49 m/s; mean gradient is 13 mmHg.  · Mild-to-moderate aortic regurgitation.  · Normal central venous pressure (3 mmHg).  · The estimated PA systolic pressure is 19 mmHg.     Conclusion    · There is 40-49% right Internal Carotid Stenosis.  · There is 50-59% left Internal Carotid Stenosis.        Impression:     1. Atherosclerosis with no evidence of flow-limiting carotid stenosis greater than 50% on the right  2. Atherosclerosis with mildly elevated velocities suggesting 50-69% stenosis of the left ICA.  Velocities are similar to prior.  .        Electronically signed by: Liam Espinoza MD  Date:                                             06/01/2022  Time:                                           10:12    ECG reviewed January 25, 2021  Normal sinus rhythm   Normal ECG  Assessment:     Chronic diastolic heart failure    Aortic atherosclerosis       Chronic diastolic heart failure    Aortic atherosclerosis    RIVAS (dyspnea on exertion)  - resolving  Change lasix to 40 mg daily     Arterial insufficiency of lower extremity    Hypertension associated with diabetes      Aortic valve sclerosis    Mixed hyperlipidemia    Carotid artery disease    Nonrheumatic aortic (valve) stenosis        Continue with current medical management.   cw lasix to 40 mg daily   cw asa, zocor,   Low salt diet   cw bisoprolol for better BP control   Exercise in the form of walking 30 min+ a day   Mild AS , echo q3-5 years   BP not at goal   Carotid us reviewed   ldl 85 , less than half of baseline , given age will continue with same dose of statin  Reviewed all tests and above medical conditions with patient in detail and formulated treatment plan.  Risk factor modification discussed.   Cardiac low salt diet discussed.  Maintaining healthy weight and weight loss goals were discussed in clinic.  rtc in 6 months

## 2022-07-07 ENCOUNTER — PATIENT MESSAGE (OUTPATIENT)
Dept: INTERNAL MEDICINE | Facility: CLINIC | Age: 82
End: 2022-07-07
Payer: MEDICARE

## 2022-07-07 DIAGNOSIS — I77.9 CAROTID ARTERY DISEASE, UNSPECIFIED LATERALITY, UNSPECIFIED TYPE: Primary | ICD-10-CM

## 2022-07-08 ENCOUNTER — TELEPHONE (OUTPATIENT)
Dept: INTERNAL MEDICINE | Facility: CLINIC | Age: 82
End: 2022-07-08
Payer: MEDICARE

## 2022-07-08 NOTE — TELEPHONE ENCOUNTER
----- Message from Shilo Rogers LPN sent at 7/7/2022  4:22 PM CDT -----  Contact: Patient @ [phone]    ----- Message -----  From: Prettynicole Cassidy  Sent: 7/7/2022   3:56 PM CDT  To: [facility] Staff    Patient is returning a phone call.  Who left a message for the patient: Jesika Mendez LPN   Does patient know what this is regarding:    Would you like a call back, or a response through your [facility] portal?:   call   Comments:

## 2022-08-04 ENCOUNTER — PES CALL (OUTPATIENT)
Dept: ADMINISTRATIVE | Facility: CLINIC | Age: 82
End: 2022-08-04
Payer: MEDICARE

## 2022-08-30 ENCOUNTER — OFFICE VISIT (OUTPATIENT)
Dept: OPHTHALMOLOGY | Facility: CLINIC | Age: 82
End: 2022-08-30
Payer: MEDICARE

## 2022-08-30 DIAGNOSIS — H40.1134 PRIMARY OPEN ANGLE GLAUCOMA OF BOTH EYES, INDETERMINATE STAGE: ICD-10-CM

## 2022-08-30 DIAGNOSIS — H40.1131 PRIMARY OPEN ANGLE GLAUCOMA (POAG) OF BOTH EYES, MILD STAGE: Primary | ICD-10-CM

## 2022-08-30 PROCEDURE — 92133 CPTRZD OPH DX IMG PST SGM ON: CPT | Mod: S$GLB,,, | Performed by: OPTOMETRIST

## 2022-08-30 PROCEDURE — 92133 POSTERIOR SEGMENT OCT OPTIC NERVE(OCULAR COHERENCE TOMOGRAPHY) - OU - BOTH EYES: ICD-10-PCS | Mod: S$GLB,,, | Performed by: OPTOMETRIST

## 2022-08-30 PROCEDURE — 1160F PR REVIEW ALL MEDS BY PRESCRIBER/CLIN PHARMACIST DOCUMENTED: ICD-10-PCS | Mod: CPTII,S$GLB,, | Performed by: OPTOMETRIST

## 2022-08-30 PROCEDURE — 1159F MED LIST DOCD IN RCRD: CPT | Mod: CPTII,S$GLB,, | Performed by: OPTOMETRIST

## 2022-08-30 PROCEDURE — 99999 PR PBB SHADOW E&M-EST. PATIENT-LVL III: CPT | Mod: PBBFAC,,, | Performed by: OPTOMETRIST

## 2022-08-30 PROCEDURE — 99999 PR PBB SHADOW E&M-EST. PATIENT-LVL III: ICD-10-PCS | Mod: PBBFAC,,, | Performed by: OPTOMETRIST

## 2022-08-30 PROCEDURE — 1160F RVW MEDS BY RX/DR IN RCRD: CPT | Mod: CPTII,S$GLB,, | Performed by: OPTOMETRIST

## 2022-08-30 PROCEDURE — 92012 INTRM OPH EXAM EST PATIENT: CPT | Mod: S$GLB,,, | Performed by: OPTOMETRIST

## 2022-08-30 PROCEDURE — 92012 PR EYE EXAM, EST PATIENT,INTERMED: ICD-10-PCS | Mod: S$GLB,,, | Performed by: OPTOMETRIST

## 2022-08-30 PROCEDURE — 1159F PR MEDICATION LIST DOCUMENTED IN MEDICAL RECORD: ICD-10-PCS | Mod: CPTII,S$GLB,, | Performed by: OPTOMETRIST

## 2022-08-30 RX ORDER — TIMOLOL MALEATE 5 MG/ML
1 SOLUTION/ DROPS OPHTHALMIC 2 TIMES DAILY
Qty: 30 ML | Refills: 4 | Status: SHIPPED | OUTPATIENT
Start: 2022-08-30 | End: 2023-08-30

## 2022-08-30 NOTE — PROGRESS NOTES
HPI     Glaucoma            Comments: PT was last seen on 4/28/22 with DNL for annual. PT was told to   RTC  4 months for IOP and gOCt.  Medication eye drops if any: Timolol bid OU and Latanoprost qhs Ou  Last HVF: 4/28/22  Last gOCT: 8/30/22  Last SDPs:3/02/21  Pt states needing refill on Timolol          Last edited by Cristina Silva MA on 8/30/2022  9:51 AM.            Assessment /Plan     For exam results, see Encounter Report.    Primary open angle glaucoma (POAG) of both eyes, mild stage  -     Posterior Segment OCT Optic Nerve- Both eyes  -     timolol maleate 0.5% (TIMOPTIC) 0.5 % Drop; Place 1 drop into both eyes 2 (two) times daily.  Dispense: 30 mL; Refill: 4  IOP stable today and within acceptable range relative to target OU  Stable gOCT today OU-no changes compared to previous scans  Continue current treatment  Monitor 4 months    Continue Timolol bid OU and Latanoprost qhs OU      RTC 4 months for IOP check or PRN  Discussed above and all questions were answered.

## 2022-08-31 ENCOUNTER — LAB VISIT (OUTPATIENT)
Dept: LAB | Facility: HOSPITAL | Age: 82
End: 2022-08-31
Attending: FAMILY MEDICINE
Payer: MEDICARE

## 2022-08-31 DIAGNOSIS — E11.42 TYPE 2 DIABETES MELLITUS WITH DIABETIC POLYNEUROPATHY, WITHOUT LONG-TERM CURRENT USE OF INSULIN: ICD-10-CM

## 2022-08-31 PROCEDURE — 82570 ASSAY OF URINE CREATININE: CPT | Performed by: FAMILY MEDICINE

## 2022-09-01 LAB
ALBUMIN/CREAT UR: 5.5 UG/MG (ref 0–30)
CREAT UR-MCNC: 109 MG/DL (ref 15–325)
MICROALBUMIN UR DL<=1MG/L-MCNC: 6 UG/ML

## 2022-09-07 ENCOUNTER — OFFICE VISIT (OUTPATIENT)
Dept: INTERNAL MEDICINE | Facility: CLINIC | Age: 82
End: 2022-09-07
Payer: MEDICARE

## 2022-09-07 VITALS
SYSTOLIC BLOOD PRESSURE: 130 MMHG | HEART RATE: 83 BPM | WEIGHT: 211.44 LBS | TEMPERATURE: 98 F | DIASTOLIC BLOOD PRESSURE: 54 MMHG | OXYGEN SATURATION: 95 % | BODY MASS INDEX: 33.98 KG/M2 | HEIGHT: 66 IN

## 2022-09-07 DIAGNOSIS — E11.42 TYPE 2 DIABETES MELLITUS WITH DIABETIC POLYNEUROPATHY, WITHOUT LONG-TERM CURRENT USE OF INSULIN: Primary | ICD-10-CM

## 2022-09-07 DIAGNOSIS — I65.23 BILATERAL CAROTID ARTERY STENOSIS: ICD-10-CM

## 2022-09-07 DIAGNOSIS — E11.59 HYPERTENSION ASSOCIATED WITH DIABETES: ICD-10-CM

## 2022-09-07 DIAGNOSIS — R60.9 DEPENDENT EDEMA: ICD-10-CM

## 2022-09-07 DIAGNOSIS — E78.2 MIXED HYPERLIPIDEMIA: ICD-10-CM

## 2022-09-07 DIAGNOSIS — I25.10 CORONARY ARTERY DISEASE INVOLVING NATIVE CORONARY ARTERY OF NATIVE HEART WITHOUT ANGINA PECTORIS: ICD-10-CM

## 2022-09-07 DIAGNOSIS — M1A.9XX0 CHRONIC GOUT WITHOUT TOPHUS, UNSPECIFIED CAUSE, UNSPECIFIED SITE: ICD-10-CM

## 2022-09-07 DIAGNOSIS — I50.32 CHRONIC DIASTOLIC HEART FAILURE: ICD-10-CM

## 2022-09-07 DIAGNOSIS — H40.1134 PRIMARY OPEN-ANGLE GLAUCOMA, BILATERAL, INDETERMINATE STAGE: ICD-10-CM

## 2022-09-07 DIAGNOSIS — E87.5 HYPERKALEMIA: ICD-10-CM

## 2022-09-07 DIAGNOSIS — J42 CHRONIC BRONCHITIS, UNSPECIFIED CHRONIC BRONCHITIS TYPE: ICD-10-CM

## 2022-09-07 DIAGNOSIS — I15.2 HYPERTENSION ASSOCIATED WITH DIABETES: ICD-10-CM

## 2022-09-07 PROBLEM — E66.01 SEVERE OBESITY (BMI 35.0-35.9 WITH COMORBIDITY): Status: RESOLVED | Noted: 2017-06-16 | Resolved: 2022-09-07

## 2022-09-07 PROCEDURE — 1101F PR PT FALLS ASSESS DOC 0-1 FALLS W/OUT INJ PAST YR: ICD-10-PCS | Mod: CPTII,S$GLB,, | Performed by: NURSE PRACTITIONER

## 2022-09-07 PROCEDURE — 1101F PT FALLS ASSESS-DOCD LE1/YR: CPT | Mod: CPTII,S$GLB,, | Performed by: NURSE PRACTITIONER

## 2022-09-07 PROCEDURE — 1125F PR PAIN SEVERITY QUANTIFIED, PAIN PRESENT: ICD-10-PCS | Mod: CPTII,S$GLB,, | Performed by: NURSE PRACTITIONER

## 2022-09-07 PROCEDURE — 99214 PR OFFICE/OUTPT VISIT, EST, LEVL IV, 30-39 MIN: ICD-10-PCS | Mod: S$GLB,,, | Performed by: NURSE PRACTITIONER

## 2022-09-07 PROCEDURE — 3072F PR LOW RISK FOR RETINOPATHY: ICD-10-PCS | Mod: CPTII,S$GLB,, | Performed by: NURSE PRACTITIONER

## 2022-09-07 PROCEDURE — 1159F PR MEDICATION LIST DOCUMENTED IN MEDICAL RECORD: ICD-10-PCS | Mod: CPTII,S$GLB,, | Performed by: NURSE PRACTITIONER

## 2022-09-07 PROCEDURE — 3288F FALL RISK ASSESSMENT DOCD: CPT | Mod: CPTII,S$GLB,, | Performed by: NURSE PRACTITIONER

## 2022-09-07 PROCEDURE — 1125F AMNT PAIN NOTED PAIN PRSNT: CPT | Mod: CPTII,S$GLB,, | Performed by: NURSE PRACTITIONER

## 2022-09-07 PROCEDURE — 3075F PR MOST RECENT SYSTOLIC BLOOD PRESS GE 130-139MM HG: ICD-10-PCS | Mod: CPTII,S$GLB,, | Performed by: NURSE PRACTITIONER

## 2022-09-07 PROCEDURE — 99214 OFFICE O/P EST MOD 30 MIN: CPT | Mod: S$GLB,,, | Performed by: NURSE PRACTITIONER

## 2022-09-07 PROCEDURE — 1160F PR REVIEW ALL MEDS BY PRESCRIBER/CLIN PHARMACIST DOCUMENTED: ICD-10-PCS | Mod: CPTII,S$GLB,, | Performed by: NURSE PRACTITIONER

## 2022-09-07 PROCEDURE — 99999 PR PBB SHADOW E&M-EST. PATIENT-LVL IV: CPT | Mod: PBBFAC,,, | Performed by: NURSE PRACTITIONER

## 2022-09-07 PROCEDURE — 3288F PR FALLS RISK ASSESSMENT DOCUMENTED: ICD-10-PCS | Mod: CPTII,S$GLB,, | Performed by: NURSE PRACTITIONER

## 2022-09-07 PROCEDURE — 3072F LOW RISK FOR RETINOPATHY: CPT | Mod: CPTII,S$GLB,, | Performed by: NURSE PRACTITIONER

## 2022-09-07 PROCEDURE — 3078F PR MOST RECENT DIASTOLIC BLOOD PRESSURE < 80 MM HG: ICD-10-PCS | Mod: CPTII,S$GLB,, | Performed by: NURSE PRACTITIONER

## 2022-09-07 PROCEDURE — 3075F SYST BP GE 130 - 139MM HG: CPT | Mod: CPTII,S$GLB,, | Performed by: NURSE PRACTITIONER

## 2022-09-07 PROCEDURE — 1160F RVW MEDS BY RX/DR IN RCRD: CPT | Mod: CPTII,S$GLB,, | Performed by: NURSE PRACTITIONER

## 2022-09-07 PROCEDURE — 1159F MED LIST DOCD IN RCRD: CPT | Mod: CPTII,S$GLB,, | Performed by: NURSE PRACTITIONER

## 2022-09-07 PROCEDURE — 99999 PR PBB SHADOW E&M-EST. PATIENT-LVL IV: ICD-10-PCS | Mod: PBBFAC,,, | Performed by: NURSE PRACTITIONER

## 2022-09-07 PROCEDURE — 3078F DIAST BP <80 MM HG: CPT | Mod: CPTII,S$GLB,, | Performed by: NURSE PRACTITIONER

## 2022-09-07 RX ORDER — LANCETS 28 GAUGE
EACH MISCELLANEOUS
Qty: 200 EACH | Refills: 11 | Status: SHIPPED | OUTPATIENT
Start: 2022-09-07 | End: 2023-04-10 | Stop reason: SDUPTHER

## 2022-09-07 NOTE — PROGRESS NOTES
Subjective:       Patient ID: Kaleigh Nieves is a 81 y.o. female.    Chief Complaint: Follow-up (3 month)    Diabetes  She presents for her follow-up diabetic visit. She has type 2 diabetes mellitus. There are no hypoglycemic associated symptoms. Associated symptoms include foot paresthesias. Pertinent negatives for diabetes include no blurred vision, no chest pain, no fatigue, no foot ulcerations, no polydipsia, no polyphagia, no polyuria, no visual change, no weakness and no weight loss. There are no hypoglycemic complications. Symptoms are stable. Diabetic complications include peripheral neuropathy. She is following a generally healthy diet. She rarely (d/t comorbidities) participates in exercise. An ACE inhibitor/angiotensin II receptor blocker is not being taken. She sees a podiatrist (next appt schedueld 9/14/22).Eye exam is current.   Reviewed labs completed prior to visit. Potassium level remained slightly elevated at 5.2. Admits she eats multiple dried apricots nightly. She is without compliant today.    Diabetes Management Status    Statin: Taking  ACE/ARB: Not taking    Screening or Prevention Patient's value Goal Complete/Controlled?   HgA1C Testing and Control   Lab Results   Component Value Date    HGBA1C 5.7 (H) 08/31/2022      Annually/Less than 8% Yes     Lipid profile : 03/21/2022 Annually Yes     LDL control Lab Results   Component Value Date    LDLCALC 84.8 03/21/2022    Annually/Less than 100 mg/dl  Yes     Nephropathy screening Lab Results   Component Value Date    LABMICR 6.0 08/31/2022     Lab Results   Component Value Date    PROTEINUA Negative 07/20/2020     No results found for: UTPCR   Annually Yes     Blood pressure BP Readings from Last 1 Encounters:   09/07/22 (!) 130/54    Less than 140/90 Yes     Dilated retinal exam : 08/30/2022 Annually Yes     Foot exam   : 12/08/2021 Annually Yes         Patient Active Problem List   Diagnosis    Type 2 diabetes mellitus with diabetic  polyneuropathy, without long-term current use of insulin    Multiple joint pain    Esophageal reflux    Hyperlipidemia    Chronic gout    Lichen sclerosus    Carotid artery disease    Coronary artery disease involving native coronary artery without angina pectoris    Colon polyp    Hypertension associated with diabetes    Aortic atherosclerosis    Arterial insufficiency of lower extremity    Ganglion cyst of flexor tendon sheath of finger of right hand    Bilateral carpal tunnel syndrome    Hyperkalemia    Chronic bronchitis    Dependent edema    Diverticulosis    Primary open-angle glaucoma, bilateral, indeterminate stage    RIVAS (dyspnea on exertion)    Chronic diastolic heart failure    Low vitamin D level    Body mass index (BMI) 34.0-34.9, adult       Family History   Problem Relation Age of Onset    Hypertension Mother     Diabetes Mother     Leukemia Sister     Hypertension Sister     Cancer Sister         leukemia    Heart disease Maternal Aunt     Cancer Maternal Uncle         gastric, pacreatic    Heart disease Maternal Uncle     Miscarriages / Stillbirths Maternal Uncle     Diabetes Maternal Grandmother     Asthma Maternal Grandfather     Breast cancer Neg Hx     Colon cancer Neg Hx     Ovarian cancer Neg Hx     COPD Neg Hx     Hyperlipidemia Neg Hx     Kidney disease Neg Hx      Past Surgical History:   Procedure Laterality Date    APPENDECTOMY      CARPAL TUNNEL RELEASE Right     CATARACT EXTRACTION, BILATERAL      CHOLECYSTECTOMY      CYST REMOVAL  2018    DILATION AND CURETTAGE OF UTERUS      EYE SURGERY      HEMORRHOID SURGERY      HYSTERECTOMY      fibroids    INCISION AND DRAINAGE PERIRECTAL ABSCESS      JOINT REPLACEMENT Bilateral     knee    KNEE ARTHROSCOPY Right     MYOMECTOMY           Current Outpatient Medications:     albuterol (PROVENTIL/VENTOLIN HFA) 90 mcg/actuation inhaler, INHALE 2 PUFFS INTO THE LUNGS EVERY 6 HOURS AS NEEDED FOR WHEEZING., Disp: 18 g, Rfl: 11    allopurinoL (ZYLOPRIM)  100 MG tablet, Take 1 tablet (100 mg total) by mouth once daily., Disp: 90 tablet, Rfl: 1    aspirin 81 MG Chew, Take 81 mg by mouth once daily., Disp: , Rfl:     betamethasone dipropionate 0.05 % cream, , Disp: , Rfl:     bisoprolol (ZEBETA) 5 MG tablet, Take 1 tablet (5 mg total) by mouth once daily., Disp: 90 tablet, Rfl: 3    blood glucose control high,low Soln, 1 each by Misc.(Non-Drug; Combo Route) route once a week., Disp: 1 each, Rfl: 11    blood-glucose meter (ACCU-CHEK DARSHAN PLUS METER) [facility] [place] AS DIRECTED, Disp: 100 each, Rfl: 11    furosemide (LASIX) 40 MG tablet, Take 1 tablet (40 mg total) by mouth once daily., Disp: 90 tablet, Rfl: 3    latanoprost 0.005 % ophthalmic solution, Place 1 drop into both eyes every evening., Disp: 2.5 mL, Rfl: 11    MUCUS-CHEST CONGESTION 100 mg/5 mL syrup, TAKE 2 TEASPOONSFUL (10MLS) BY MOUTH THREE TIMES A DAY AS NEEDED FOR COUGH, Disp: 200 mL, Rfl: 2    simvastatin (ZOCOR) 20 MG tablet, Take 1 tablet (20 mg total) by mouth nightly., Disp: 90 tablet, Rfl: 3    timolol maleate 0.5% (TIMOPTIC) 0.5 % Drop, Place 1 drop into both eyes 2 (two) times daily., Disp: 30 mL, Rfl: 4    triamcinolone acetonide 0.025% (KENALOG) 0.025 % cream, , Disp: , Rfl:     blood sugar diagnostic Strp, 1 each by Misc.(Non-Drug; Combo Route) route 2 (two) times a day., Disp: 200 each, Rfl: 11    lancets (FREESTYLE LANCETS) 28 gauge Misc, USE AS DIRECTED BY YOUR PHYSICIAN TWO TIMES A DAY Strength: 28 gauge, Disp: 200 each, Rfl: 11    Review of Systems   Constitutional:  Negative for appetite change, chills, fatigue, fever and weight loss.   Eyes:  Negative for blurred vision.   Respiratory:  Negative for cough, chest tightness, shortness of breath and wheezing.    Cardiovascular:  Positive for leg swelling. Negative for chest pain and palpitations.   Gastrointestinal:  Negative for abdominal pain, constipation and diarrhea.   Endocrine: Negative for polydipsia, polyphagia and polyuria.   Skin:   "Negative for rash and wound.   Neurological:  Negative for syncope and weakness.     Objective:   BP (!) 130/54 (BP Location: Left arm, Patient Position: Sitting, BP Method: Large (Manual))   Pulse 83   Temp 97.9 °F (36.6 °C)   Ht 5' 6" (1.676 m)   Wt 95.9 kg (211 lb 6.7 oz)   SpO2 95%   BMI 34.12 kg/m²      Physical Exam  Vitals reviewed.   Constitutional:       General: She is not in acute distress.     Appearance: Normal appearance. She is well-developed. She is obese. She is not ill-appearing.   HENT:      Head: Normocephalic and atraumatic.   Eyes:      Conjunctiva/sclera: Conjunctivae normal.   Cardiovascular:      Rate and Rhythm: Normal rate and regular rhythm.      Pulses: Normal pulses.      Heart sounds: Normal heart sounds.   Pulmonary:      Effort: Pulmonary effort is normal. No respiratory distress.      Breath sounds: Normal breath sounds. No wheezing.   Musculoskeletal:      Right lower le+ Edema present.      Left lower le+ Edema present.   Skin:     General: Skin is warm and dry.      Findings: No rash.   Neurological:      Mental Status: She is alert and oriented to person, place, and time.      Motor: No weakness.      Coordination: Coordination normal.      Gait: Gait abnormal (ambulated with rollator).   Psychiatric:         Behavior: Behavior normal.       Assessment & Plan     Problem List Items Addressed This Visit          Ophtho    Primary open-angle glaucoma, bilateral, indeterminate stage    Current Assessment & Plan     Followed by ophthalmology. Next appt 2022            Pulmonary    Chronic bronchitis    Current Assessment & Plan     Stable. No acute exacerbations at this time. Albuterol inhaler as needed            Cardiac/Vascular    Hyperlipidemia (Chronic)    Current Assessment & Plan     Reviewed last lipid panel. Continue statin. Repeat lipid panel in 6 months         Relevant Orders    LIPID PANEL    Carotid artery disease    Overview     Carotid ultrasound " 07/02/2015   Stable findings on the LEFT with minimally elevated to PSV within the ICA.  Stenosis is in the less than 50 percent range.         Current Assessment & Plan     Reviewed CUS completed in June 2022. Stable. Continue to follow up with cardiology as instructed         Coronary artery disease involving native coronary artery without angina pectoris    Overview     Documented on imaging, Chest CT 7/2015.         Current Assessment & Plan     Continue asa and statin. Followed by cardiology         Hypertension associated with diabetes    Current Assessment & Plan     Stable. Continue current medications as prescribed         Chronic diastolic heart failure    Current Assessment & Plan     Asymptomatic today. Followed by cardiology            Renal/    Hyperkalemia    Current Assessment & Plan     Reviewed labs. K level 5.2. Reduce oral intake of high potassium foods.            Endocrine    Type 2 diabetes mellitus with diabetic polyneuropathy, without long-term current use of insulin - Primary    Current Assessment & Plan     Well controlled. A1c 5.7. Continue current medications as prescribed. Followed by podiatry         Relevant Medications    blood sugar diagnostic Strp    lancets (FREESTYLE LANCETS) 28 gauge Misc    Other Relevant Orders    COMPREHENSIVE METABOLIC PANEL    HEMOGLOBIN A1C    Body mass index (BMI) 34.0-34.9, adult    Overview     .         Current Assessment & Plan     Counseled on importance of diet and exercise. Encouraged patient to have an active lifestyle with regular exercise with healthy eating. Discussed the potential complications associated with obesity such as bone and joint pain, HTN, diabetes, and hyperlipidemia.            Orthopedic    Chronic gout    Current Assessment & Plan     Reviewed uric acid level. No flare up at this time. Continue current medication.            Other    Dependent edema    Current Assessment & Plan     Continue lasix. Reviewed BLE US completed in  "June 2022.              Vianey Turner, SCOTT-C    Portions of this note may have been created with voice recognition software. Occasional "wrong-word" or "sound-a-like" substitutions may have occurred due to the inherent limitations of voice recognition software. Please, read the note carefully and recognize, using context, where substitutions have occurred.         "

## 2022-09-07 NOTE — PATIENT INSTRUCTIONS
Continue current medications as prescribed.  Reduce excessive potassium intake such as apricots  Keep scheduled appointments with podiatry, cardiology, and ophthalmology.  Keep scheduled appointment for AWV.  Check blood work in 6 months prior to appointment  Return to clinic in 6 months or sooner if needed.

## 2022-09-07 NOTE — ASSESSMENT & PLAN NOTE
Counseled on importance of diet and exercise. Encouraged patient to have an active lifestyle with regular exercise with healthy eating. Discussed the potential complications associated with obesity such as bone and joint pain, HTN, diabetes, and hyperlipidemia.

## 2022-09-08 PROBLEM — I51.89 DIASTOLIC DYSFUNCTION: Status: RESOLVED | Noted: 2017-06-16 | Resolved: 2022-09-08

## 2022-09-08 PROBLEM — M10.9 ACUTE GOUT OF ANKLE: Status: RESOLVED | Noted: 2021-07-12 | Resolved: 2022-09-08

## 2022-09-08 PROBLEM — G56.01 RIGHT CARPAL TUNNEL SYNDROME: Status: RESOLVED | Noted: 2018-04-25 | Resolved: 2022-09-08

## 2022-09-14 ENCOUNTER — OFFICE VISIT (OUTPATIENT)
Dept: PODIATRY | Facility: CLINIC | Age: 82
End: 2022-09-14
Payer: MEDICARE

## 2022-09-14 VITALS — BODY MASS INDEX: 33.98 KG/M2 | WEIGHT: 211.44 LBS | HEIGHT: 66 IN

## 2022-09-14 DIAGNOSIS — B35.1 DERMATOPHYTOSIS OF NAIL: ICD-10-CM

## 2022-09-14 DIAGNOSIS — R60.9 DEPENDENT EDEMA: ICD-10-CM

## 2022-09-14 DIAGNOSIS — E66.01 SEVERE OBESITY (BMI 35.0-35.9 WITH COMORBIDITY): ICD-10-CM

## 2022-09-14 DIAGNOSIS — E11.51 TYPE II DIABETES MELLITUS WITH PERIPHERAL CIRCULATORY DISORDER: Primary | ICD-10-CM

## 2022-09-14 PROCEDURE — 3072F PR LOW RISK FOR RETINOPATHY: ICD-10-PCS | Mod: CPTII,S$GLB,, | Performed by: PODIATRIST

## 2022-09-14 PROCEDURE — 11721 PR DEBRIDEMENT OF NAILS, 6 OR MORE: ICD-10-PCS | Mod: Q8,S$GLB,, | Performed by: PODIATRIST

## 2022-09-14 PROCEDURE — 3288F PR FALLS RISK ASSESSMENT DOCUMENTED: ICD-10-PCS | Mod: CPTII,S$GLB,, | Performed by: PODIATRIST

## 2022-09-14 PROCEDURE — 1159F MED LIST DOCD IN RCRD: CPT | Mod: CPTII,S$GLB,, | Performed by: PODIATRIST

## 2022-09-14 PROCEDURE — 1101F PT FALLS ASSESS-DOCD LE1/YR: CPT | Mod: CPTII,S$GLB,, | Performed by: PODIATRIST

## 2022-09-14 PROCEDURE — 99999 PR PBB SHADOW E&M-EST. PATIENT-LVL III: CPT | Mod: PBBFAC,,, | Performed by: PODIATRIST

## 2022-09-14 PROCEDURE — 3288F FALL RISK ASSESSMENT DOCD: CPT | Mod: CPTII,S$GLB,, | Performed by: PODIATRIST

## 2022-09-14 PROCEDURE — 1101F PR PT FALLS ASSESS DOC 0-1 FALLS W/OUT INJ PAST YR: ICD-10-PCS | Mod: CPTII,S$GLB,, | Performed by: PODIATRIST

## 2022-09-14 PROCEDURE — 99999 PR PBB SHADOW E&M-EST. PATIENT-LVL III: ICD-10-PCS | Mod: PBBFAC,,, | Performed by: PODIATRIST

## 2022-09-14 PROCEDURE — 3072F LOW RISK FOR RETINOPATHY: CPT | Mod: CPTII,S$GLB,, | Performed by: PODIATRIST

## 2022-09-14 PROCEDURE — 1159F PR MEDICATION LIST DOCUMENTED IN MEDICAL RECORD: ICD-10-PCS | Mod: CPTII,S$GLB,, | Performed by: PODIATRIST

## 2022-09-14 PROCEDURE — 1126F AMNT PAIN NOTED NONE PRSNT: CPT | Mod: CPTII,S$GLB,, | Performed by: PODIATRIST

## 2022-09-14 PROCEDURE — 99499 UNLISTED E&M SERVICE: CPT | Mod: S$GLB,,, | Performed by: PODIATRIST

## 2022-09-14 PROCEDURE — 1160F PR REVIEW ALL MEDS BY PRESCRIBER/CLIN PHARMACIST DOCUMENTED: ICD-10-PCS | Mod: CPTII,S$GLB,, | Performed by: PODIATRIST

## 2022-09-14 PROCEDURE — 1160F RVW MEDS BY RX/DR IN RCRD: CPT | Mod: CPTII,S$GLB,, | Performed by: PODIATRIST

## 2022-09-14 PROCEDURE — 1126F PR PAIN SEVERITY QUANTIFIED, NO PAIN PRESENT: ICD-10-PCS | Mod: CPTII,S$GLB,, | Performed by: PODIATRIST

## 2022-09-14 PROCEDURE — 11721 DEBRIDE NAIL 6 OR MORE: CPT | Mod: Q8,S$GLB,, | Performed by: PODIATRIST

## 2022-09-14 PROCEDURE — 99499 NO LOS: ICD-10-PCS | Mod: S$GLB,,, | Performed by: PODIATRIST

## 2022-09-25 NOTE — PROGRESS NOTES
Subjective:     Patient ID: Kaleigh Nieves is a 81 y.o. female.    Chief Complaint: Nail Care (Diabetic pt wears sandals, PCP Dr. Ochoa last seen 9-7-22)    Kaleigh is a 81 y.o. female who presents to the clinic for evaluation and treatment of high risk feet. Kaleigh has a past medical history of Anemia, Angina pectoris, Arthritis, Asthma, Back pain, Bronchitis, CAD (coronary artery disease) (07/2015), Carotid artery stenosis, Chronic bronchitis, Chronic gout, Colon polyp, CTS (carpal tunnel syndrome), Diabetes mellitus, type 2, Diabetes with neurologic complications, Diverticulosis, RIVAS (dyspnea on exertion), Dyslipidemia, Ex-smoker, GERD (gastroesophageal reflux disease), Heart failure, Hyperlipidemia, Hypertension, Neuropathy, lower extremity, Obesity, Open-angle glaucoma, Peripheral vascular disease, Polyneuropathy, and Tobacco dependence. The patient's chief complaint is long, thick toenails. This patient has documented high risk feet requiring routine maintenance secondary to diabetes mellitis and those secondary complications of diabetes, as mentioned..    PCP: Mana Ochoa, JER    Date Last Seen by PCP: 09/07/2022    Current shoe gear:  Affected Foot: Rx diabetic extra depth shoes and custom accommodative insoles     Unaffected Foot: Rx diabetic extra depth shoes and custom accommodative insoles    Hemoglobin A1C   Date Value Ref Range Status   08/31/2022 5.7 (H) 4.0 - 5.6 % Final     Comment:     ADA Screening Guidelines:  5.7-6.4%  Consistent with prediabetes  >or=6.5%  Consistent with diabetes    High levels of fetal hemoglobin interfere with the HbA1C  assay. Heterozygous hemoglobin variants (HbS, HgC, etc)do  not significantly interfere with this assay.   However, presence of multiple variants may affect accuracy.     03/21/2022 5.9 (H) 4.0 - 5.6 % Final     Comment:     ADA Screening Guidelines:  5.7-6.4%  Consistent with prediabetes  >or=6.5%  Consistent with diabetes    High levels of fetal  hemoglobin interfere with the HbA1C  assay. Heterozygous hemoglobin variants (HbS, HgC, etc)do  not significantly interfere with this assay.   However, presence of multiple variants may affect accuracy.     09/21/2021 5.6 4.0 - 5.6 % Final     Comment:     ADA Screening Guidelines:  5.7-6.4%  Consistent with prediabetes  >or=6.5%  Consistent with diabetes    High levels of fetal hemoglobin interfere with the HbA1C  assay. Heterozygous hemoglobin variants (HbS, HgC, etc)do  not significantly interfere with this assay.   However, presence of multiple variants may affect accuracy.         Patient Active Problem List   Diagnosis    Type 2 diabetes mellitus with diabetic polyneuropathy, without long-term current use of insulin    Multiple joint pain    Esophageal reflux    Hyperlipidemia    Chronic gout    Lichen sclerosus    Carotid artery disease    Coronary artery disease involving native coronary artery without angina pectoris    Colon polyp    Hypertension associated with diabetes    Aortic atherosclerosis    Arterial insufficiency of lower extremity    Ganglion cyst of flexor tendon sheath of finger of right hand    Bilateral carpal tunnel syndrome    Hyperkalemia    Chronic bronchitis    Dependent edema    Diverticulosis    Primary open-angle glaucoma, bilateral, indeterminate stage    RIVAS (dyspnea on exertion)    Chronic diastolic heart failure    Low vitamin D level    Body mass index (BMI) 34.0-34.9, adult       Medication List with Changes/Refills   Current Medications    ALBUTEROL (PROVENTIL/VENTOLIN HFA) 90 MCG/ACTUATION INHALER    INHALE 2 PUFFS INTO THE LUNGS EVERY 6 HOURS AS NEEDED FOR WHEEZING.    ALLOPURINOL (ZYLOPRIM) 100 MG TABLET    Take 1 tablet (100 mg total) by mouth once daily.    ASPIRIN 81 MG CHEW    Take 81 mg by mouth once daily.    BETAMETHASONE DIPROPIONATE 0.05 % CREAM        BISOPROLOL (ZEBETA) 5 MG TABLET    Take 1 tablet (5 mg total) by mouth once daily.    BLOOD GLUCOSE CONTROL  HIGH,LOW SOLN    1 each by Misc.(Non-Drug; Combo Route) route once a week.    BLOOD SUGAR DIAGNOSTIC STRP    1 each by Misc.(Non-Drug; Combo Route) route 2 (two) times a day.    BLOOD-GLUCOSE METER (ACCU-CHEK DARSHAN PLUS METER) MISC    USE AS DIRECTED    FUROSEMIDE (LASIX) 40 MG TABLET    Take 1 tablet (40 mg total) by mouth once daily.    LANCETS (FREESTYLE LANCETS) 28 GAUGE MISC    USE AS DIRECTED BY YOUR PHYSICIAN TWO TIMES A DAY Strength: 28 gauge    LATANOPROST 0.005 % OPHTHALMIC SOLUTION    Place 1 drop into both eyes every evening.    MUCUS-CHEST CONGESTION 100 MG/5 ML SYRUP    TAKE 2 TEASPOONSFUL (10MLS) BY MOUTH THREE TIMES A DAY AS NEEDED FOR COUGH    SIMVASTATIN (ZOCOR) 20 MG TABLET    Take 1 tablet (20 mg total) by mouth nightly.    TIMOLOL MALEATE 0.5% (TIMOPTIC) 0.5 % DROP    Place 1 drop into both eyes 2 (two) times daily.    TRIAMCINOLONE ACETONIDE 0.025% (KENALOG) 0.025 % CREAM           Review of patient's allergies indicates:   Allergen Reactions    Iodine and iodide containing products Nausea And Vomiting    Penicillins Itching    Sulfa (sulfonamide antibiotics) Hives and Itching    Amlodipine      edema    Ultram [tramadol] Other (See Comments)     Light headness, itiching    Bactrim [sulfamethoxazole-trimethoprim] Itching and Rash       Past Surgical History:   Procedure Laterality Date    APPENDECTOMY      CARPAL TUNNEL RELEASE Right     CATARACT EXTRACTION, BILATERAL      CHOLECYSTECTOMY      CYST REMOVAL  2018    DILATION AND CURETTAGE OF UTERUS      EYE SURGERY      HEMORRHOID SURGERY      HYSTERECTOMY      fibroids    INCISION AND DRAINAGE PERIRECTAL ABSCESS      JOINT REPLACEMENT Bilateral     knee    KNEE ARTHROSCOPY Right     MYOMECTOMY         Family History   Problem Relation Age of Onset    Hypertension Mother     Diabetes Mother     Leukemia Sister     Hypertension Sister     Cancer Sister         leukemia    Heart disease Maternal Aunt     Cancer Maternal Uncle         gastric,  "pacreatic    Heart disease Maternal Uncle     Miscarriages / Stillbirths Maternal Uncle     Diabetes Maternal Grandmother     Asthma Maternal Grandfather     Breast cancer Neg Hx     Colon cancer Neg Hx     Ovarian cancer Neg Hx     COPD Neg Hx     Hyperlipidemia Neg Hx     Kidney disease Neg Hx        Social History     Socioeconomic History    Marital status: Single    Number of children: 0   Tobacco Use    Smoking status: Former     Packs/day: 0.25     Years: 24.00     Pack years: 6.00     Types: Cigarettes     Quit date: 1976     Years since quittin.2    Smokeless tobacco: Never   Substance and Sexual Activity    Alcohol use: No    Drug use: No    Sexual activity: Not Currently       Vitals:    22 1111   Weight: 95.9 kg (211 lb 6.7 oz)   Height: 5' 6" (1.676 m)   PainSc: 0-No pain       Hemoglobin A1C   Date Value Ref Range Status   2022 5.7 (H) 4.0 - 5.6 % Final     Comment:     ADA Screening Guidelines:  5.7-6.4%  Consistent with prediabetes  >or=6.5%  Consistent with diabetes    High levels of fetal hemoglobin interfere with the HbA1C  assay. Heterozygous hemoglobin variants (HbS, HgC, etc)do  not significantly interfere with this assay.   However, presence of multiple variants may affect accuracy.     2022 5.9 (H) 4.0 - 5.6 % Final     Comment:     ADA Screening Guidelines:  5.7-6.4%  Consistent with prediabetes  >or=6.5%  Consistent with diabetes    High levels of fetal hemoglobin interfere with the HbA1C  assay. Heterozygous hemoglobin variants (HbS, HgC, etc)do  not significantly interfere with this assay.   However, presence of multiple variants may affect accuracy.     2021 5.6 4.0 - 5.6 % Final     Comment:     ADA Screening Guidelines:  5.7-6.4%  Consistent with prediabetes  >or=6.5%  Consistent with diabetes    High levels of fetal hemoglobin interfere with the HbA1C  assay. Heterozygous hemoglobin variants (HbS, HgC, etc)do  not significantly interfere with this " assay.   However, presence of multiple variants may affect accuracy.         Review of Systems   Constitutional:  Negative for chills and fever.   Respiratory:  Negative for shortness of breath.    Cardiovascular:  Positive for leg swelling. Negative for chest pain, palpitations, orthopnea and claudication.   Gastrointestinal:  Negative for diarrhea, nausea and vomiting.   Musculoskeletal:  Negative for joint pain.   Skin:  Negative for rash.   Neurological:  Negative for dizziness, tingling, sensory change, focal weakness and weakness.   Psychiatric/Behavioral: Negative.             Objective:      PHYSICAL EXAM: Apperance: Alert and orient in no distress,well developed, and with good attention to grooming and body habits  Patient presents ambulating in diabetic shoes and inserts.   LOWER EXTREMITY EXAM:  VASCULAR: Dorsalis pedis pulses 0/4 bilateral (monophasic with doppler) and Posterior Tibial pulses 0/4 bilateral (biphasic with doppler). Capillary fill time <blanched bilateral. Mild edema observed bilateral. Varicosities present bilateral. Skin temperature of the lower extremities is warm to cool, proximal to distal. Hair growth absent bilateral.  DERMATOLOGICAL: No skin rashes, subcutaneous nodules, lesions, or ulcers observed bilateral. Nails 1,2,3,4,5 bilateral elongated, thickened, and discolored with subungual debris. Webspaces 1,2,3,4clean, dry and without evidence of break in skin integrity bilateral.   NEUROLOGICAL: Light touch, sharp-dull, proprioception all present and equal bilaterally.  Vibratory sensation intact on left hallux and diminished at right hallux. Protective sensation intact at all 10 sites as tested with a [place]-[employer] 5.07 monofilament.   MUSCULOSKELETAL: Muscle strength is 5/5 for foot inverters, everters, plantarflexors, and dorsiflexors. Muscle tone is normal. No pain on palpation of right dorsal foot/ankle. Pes planus noted bilateral. Flexible hammertoes noted bilateral.          Assessment:       ICD-10-CM ICD-9-CM   1. Type II diabetes mellitus with peripheral circulatory disorder  E11.51 250.70     443.81   2. Dermatophytosis of nail  B35.1 110.1   3. Dependent edema  R60.9 782.3   4. Severe obesity (BMI 35.0-35.9 with comorbidity)  E66.01 278.01    Z68.35 V85.35       Plan:   Type II diabetes mellitus with peripheral circulatory disorder    Dermatophytosis of nail    Dependent edema    Severe obesity (BMI 35.0-35.9 with comorbidity)    I counseled the patient on her conditions, regarding findings of my examination, my impressions, and usual treatment plan.   Greater than 15 minutes of a 20 minute appointment spent on education about the diabetic foot, neuropathy, and prevention of limb loss.  Shoe inspection. Diabetic Foot Education. Patient reminded of the importance of good nutrition and blood sugar control to help prevent podiatric complications of diabetes. Patient instructed on proper foot hygeine. We discussed wearing proper shoe gear, daily foot inspections, never walking without protective shoe gear, never putting sharp instruments to feet.    With patient's permission, nails 1-5 bilateral were debrided/trimmed in length and thickness aggressively to their soft tissue attachment mechanically and with electric , removing all offending nail and debris. Patient relates relief following the procedure.  Patient  will continue to monitor the areas daily, inspect feet, wear protective shoe gear when ambulatory, moisturizer to maintain skin integrity. Patient reminded of the importance of good nutrition and blood sugar control to help prevent podiatric complications of diabetes.  Patient to return 4 months or sooner if needed.          Ino Harmon DPM  [facility]

## 2022-09-26 ENCOUNTER — TELEPHONE (OUTPATIENT)
Dept: ADMINISTRATIVE | Facility: CLINIC | Age: 82
End: 2022-09-26
Payer: MEDICARE

## 2022-09-27 ENCOUNTER — OFFICE VISIT (OUTPATIENT)
Dept: INTERNAL MEDICINE | Facility: CLINIC | Age: 82
End: 2022-09-27
Payer: MEDICARE

## 2022-09-27 VITALS
BODY MASS INDEX: 34.61 KG/M2 | HEIGHT: 66 IN | DIASTOLIC BLOOD PRESSURE: 68 MMHG | HEART RATE: 86 BPM | TEMPERATURE: 98 F | SYSTOLIC BLOOD PRESSURE: 136 MMHG | WEIGHT: 215.38 LBS

## 2022-09-27 DIAGNOSIS — E78.2 MIXED HYPERLIPIDEMIA: Chronic | ICD-10-CM

## 2022-09-27 DIAGNOSIS — I77.9 CAROTID ARTERY DISEASE, UNSPECIFIED LATERALITY, UNSPECIFIED TYPE: ICD-10-CM

## 2022-09-27 DIAGNOSIS — E11.42 TYPE 2 DIABETES MELLITUS WITH DIABETIC POLYNEUROPATHY, WITHOUT LONG-TERM CURRENT USE OF INSULIN: ICD-10-CM

## 2022-09-27 DIAGNOSIS — R26.9 ABNORMALITY OF GAIT AND MOBILITY: ICD-10-CM

## 2022-09-27 DIAGNOSIS — E66.9 OBESITY, CLASS I, BMI 30-34.9: ICD-10-CM

## 2022-09-27 DIAGNOSIS — M1A.9XX0 CHRONIC GOUT WITHOUT TOPHUS, UNSPECIFIED CAUSE, UNSPECIFIED SITE: ICD-10-CM

## 2022-09-27 DIAGNOSIS — I73.9 ARTERIAL INSUFFICIENCY OF LOWER EXTREMITY: ICD-10-CM

## 2022-09-27 DIAGNOSIS — I25.10 CORONARY ARTERY DISEASE INVOLVING NATIVE CORONARY ARTERY OF NATIVE HEART WITHOUT ANGINA PECTORIS: ICD-10-CM

## 2022-09-27 DIAGNOSIS — Z74.09 OTHER REDUCED MOBILITY: ICD-10-CM

## 2022-09-27 DIAGNOSIS — Z00.00 ENCOUNTER FOR PREVENTIVE HEALTH EXAMINATION: Primary | ICD-10-CM

## 2022-09-27 DIAGNOSIS — E11.51 TYPE 2 DIABETES WITH PERIPHERAL CIRCULATORY DISORDER, CONTROLLED: ICD-10-CM

## 2022-09-27 DIAGNOSIS — I50.32 CHRONIC DIASTOLIC HEART FAILURE: ICD-10-CM

## 2022-09-27 DIAGNOSIS — E11.59 HYPERTENSION ASSOCIATED WITH DIABETES: ICD-10-CM

## 2022-09-27 DIAGNOSIS — H40.1134 PRIMARY OPEN-ANGLE GLAUCOMA, BILATERAL, INDETERMINATE STAGE: ICD-10-CM

## 2022-09-27 DIAGNOSIS — J42 CHRONIC BRONCHITIS, UNSPECIFIED CHRONIC BRONCHITIS TYPE: ICD-10-CM

## 2022-09-27 DIAGNOSIS — I70.0 AORTIC ATHEROSCLEROSIS: ICD-10-CM

## 2022-09-27 DIAGNOSIS — Z99.89 DEPENDENCE ON OTHER ENABLING MACHINES AND DEVICES: ICD-10-CM

## 2022-09-27 DIAGNOSIS — I15.2 HYPERTENSION ASSOCIATED WITH DIABETES: ICD-10-CM

## 2022-09-27 PROCEDURE — 1160F RVW MEDS BY RX/DR IN RCRD: CPT | Mod: CPTII,S$GLB,, | Performed by: NURSE PRACTITIONER

## 2022-09-27 PROCEDURE — 1101F PT FALLS ASSESS-DOCD LE1/YR: CPT | Mod: CPTII,S$GLB,, | Performed by: NURSE PRACTITIONER

## 2022-09-27 PROCEDURE — G0439 PPPS, SUBSEQ VISIT: HCPCS | Mod: S$GLB,,, | Performed by: NURSE PRACTITIONER

## 2022-09-27 PROCEDURE — 99999 PR PBB SHADOW E&M-EST. PATIENT-LVL IV: ICD-10-PCS | Mod: PBBFAC,,, | Performed by: NURSE PRACTITIONER

## 2022-09-27 PROCEDURE — 1159F PR MEDICATION LIST DOCUMENTED IN MEDICAL RECORD: ICD-10-PCS | Mod: CPTII,S$GLB,, | Performed by: NURSE PRACTITIONER

## 2022-09-27 PROCEDURE — 1160F PR REVIEW ALL MEDS BY PRESCRIBER/CLIN PHARMACIST DOCUMENTED: ICD-10-PCS | Mod: CPTII,S$GLB,, | Performed by: NURSE PRACTITIONER

## 2022-09-27 PROCEDURE — 3288F PR FALLS RISK ASSESSMENT DOCUMENTED: ICD-10-PCS | Mod: CPTII,S$GLB,, | Performed by: NURSE PRACTITIONER

## 2022-09-27 PROCEDURE — 3072F LOW RISK FOR RETINOPATHY: CPT | Mod: CPTII,S$GLB,, | Performed by: NURSE PRACTITIONER

## 2022-09-27 PROCEDURE — 3075F PR MOST RECENT SYSTOLIC BLOOD PRESS GE 130-139MM HG: ICD-10-PCS | Mod: CPTII,S$GLB,, | Performed by: NURSE PRACTITIONER

## 2022-09-27 PROCEDURE — 3078F DIAST BP <80 MM HG: CPT | Mod: CPTII,S$GLB,, | Performed by: NURSE PRACTITIONER

## 2022-09-27 PROCEDURE — 99999 PR PBB SHADOW E&M-EST. PATIENT-LVL IV: CPT | Mod: PBBFAC,,, | Performed by: NURSE PRACTITIONER

## 2022-09-27 PROCEDURE — 3072F PR LOW RISK FOR RETINOPATHY: ICD-10-PCS | Mod: CPTII,S$GLB,, | Performed by: NURSE PRACTITIONER

## 2022-09-27 PROCEDURE — 3078F PR MOST RECENT DIASTOLIC BLOOD PRESSURE < 80 MM HG: ICD-10-PCS | Mod: CPTII,S$GLB,, | Performed by: NURSE PRACTITIONER

## 2022-09-27 PROCEDURE — 1170F FXNL STATUS ASSESSED: CPT | Mod: CPTII,S$GLB,, | Performed by: NURSE PRACTITIONER

## 2022-09-27 PROCEDURE — 1170F PR FUNCTIONAL STATUS ASSESSED: ICD-10-PCS | Mod: CPTII,S$GLB,, | Performed by: NURSE PRACTITIONER

## 2022-09-27 PROCEDURE — 3288F FALL RISK ASSESSMENT DOCD: CPT | Mod: CPTII,S$GLB,, | Performed by: NURSE PRACTITIONER

## 2022-09-27 PROCEDURE — 1159F MED LIST DOCD IN RCRD: CPT | Mod: CPTII,S$GLB,, | Performed by: NURSE PRACTITIONER

## 2022-09-27 PROCEDURE — 1126F AMNT PAIN NOTED NONE PRSNT: CPT | Mod: CPTII,S$GLB,, | Performed by: NURSE PRACTITIONER

## 2022-09-27 PROCEDURE — 1126F PR PAIN SEVERITY QUANTIFIED, NO PAIN PRESENT: ICD-10-PCS | Mod: CPTII,S$GLB,, | Performed by: NURSE PRACTITIONER

## 2022-09-27 PROCEDURE — 1101F PR PT FALLS ASSESS DOC 0-1 FALLS W/OUT INJ PAST YR: ICD-10-PCS | Mod: CPTII,S$GLB,, | Performed by: NURSE PRACTITIONER

## 2022-09-27 PROCEDURE — 3075F SYST BP GE 130 - 139MM HG: CPT | Mod: CPTII,S$GLB,, | Performed by: NURSE PRACTITIONER

## 2022-09-27 PROCEDURE — G0439 PR MEDICARE ANNUAL WELLNESS SUBSEQUENT VISIT: ICD-10-PCS | Mod: S$GLB,,, | Performed by: NURSE PRACTITIONER

## 2022-09-27 NOTE — PROGRESS NOTES
"Kaleigh Nieves presented for a  Medicare AWV and comprehensive Health Risk Assessment today. The following components were reviewed and updated:    Medical history  Family History  Social history  Allergies and Current Medications  Health Risk Assessment  Health Maintenance  Care Team     Patient Care Team:  Mana Ochoa NP as PCP - General (Family Medicine)  Ino Harmon DPM (Podiatry)  Rock Jane MD as Consulting Physician (Cardiology)  Arcelia Maddox MD as Consulting Physician (Obstetrics and Gynecology)  Earle Espinoza OD as Consulting Physician (Optometry)  Hung Partida MD as Consulting Physician (Interventional Cardiology)     ** See Completed Assessments for Annual Wellness Visit within the encounter summary.**         The following assessments were completed:  Living Situation  CAGE  Depression Screening  Timed Get Up and Go  Whisper Test  Cognitive Function Screening  Nutrition Screening  ADL Screening  PAQ Screening    Review for Opioid Screening: Patient does not have rx for Opioids.  Review for Substance Use Disorders: Patient does not use substance.      Vitals:    09/27/22 1100   BP: 136/68   Pulse: 86   Temp: 97.7 °F (36.5 °C)   TempSrc: Temporal   Weight: 97.7 kg (215 lb 6.2 oz)   Height: 5' 6" (1.676 m)     Body mass index is 34.76 kg/m².  Physical Exam  Vitals and nursing note reviewed.   Constitutional:       General: She is not in acute distress.     Appearance: Normal appearance. She is well-developed. She is not diaphoretic.   HENT:      Head: Normocephalic and atraumatic.   Cardiovascular:      Rate and Rhythm: Normal rate and regular rhythm.      Heart sounds: Normal heart sounds.   Pulmonary:      Effort: Pulmonary effort is normal. No respiratory distress.      Breath sounds: Normal breath sounds.   Musculoskeletal:         General: Normal range of motion.   Skin:     General: Skin is warm and dry.      Findings: No rash.   Neurological:      Mental Status: She is " alert.   Psychiatric:         Mood and Affect: Mood normal.         Behavior: Behavior normal. Behavior is cooperative.         Thought Content: Thought content normal.         Judgment: Judgment normal.             Diagnoses and health risks identified today and associated recommendations/orders:    1. Encounter for preventive health examination  Declined shingrix    2. Type 2 diabetes mellitus with diabetic polyneuropathy, without long-term current use of insulin  Stable. Continue treatment plan as prescribed by your Primary Care Physician and follow up with Primary Care Physician for further management.  Lab Results   Component Value Date    HGBA1C 5.7 (H) 08/31/2022       3. Type 2 diabetes with peripheral circulatory disorder, controlled  Stable. Continue treatment plan as prescribed by your Primary Care Physician and follow up with Primary Care Physician for further management.  Lab Results   Component Value Date    HGBA1C 5.7 (H) 08/31/2022       4. Hypertension associated with diabetes  Stable. On bisoprolol, lasix. Continue treatment plan as prescribed by your Primary Care Physician and Cardiologist and follow up with them for further management.    5. Obesity, Class I, BMI 30-34.9  Stable. Continue treatment plan as prescribed by your Primary Care Physician and follow up with Primary Care Physician for further management.    6. Chronic diastolic heart failure  Stable. Followed by cards. On lasix and bisoprolol. Continue treatment plan as prescribed by your Primary Care Physician and Cardiologist and follow up with them for further management.    7. Carotid artery disease, unspecified laterality, unspecified type  Stable. Us 2/3/21.   There is 40-49% right Internal Carotid Stenosis.  There is 50-59% left Internal Carotid Stenosis.   Has upcoming carotid us in feb. On statin and asa. Continue treatment plan as prescribed by your Primary Care Physician and Cardiologist and follow up with them for further  management.    8. Aortic atherosclerosis  Stable. Cxr 1/25/21. On statin and asa. Continue treatment plan as prescribed by your Primary Care Physician and Cardiologist and follow up with them for further management.    9. Arterial insufficiency of lower extremity  Stable. Continue treatment plan as prescribed by your Primary Care Physician and follow up with Primary Care Physician for further management.    10. Chronic bronchitis, unspecified chronic bronchitis type  Stable. Continue treatment plan as prescribed by your Primary Care Physician and follow up with Primary Care Physician for further management.    11. Primary open-angle glaucoma, bilateral, indeterminate stage  Stable. Followed by Dr. Espinoza. Continue treatment plan as prescribed by your Primary Care Physician and follow up with Primary Care Physician for further management.    12. Mixed hyperlipidemia  Stable. On statin. Continue treatment plan as prescribed by your Primary Care Physician and follow up with Primary Care Physician for further management.    13. Coronary artery disease involving native coronary artery of native heart without angina pectoris  Stable. Continue treatment plan as prescribed by your Primary Care Physician and follow up with Primary Care Physician for further management.    14. Chronic gout without tophus, unspecified cause, unspecified site  Stable. On allopurinol. Continue treatment plan as prescribed by your Primary Care Physician and follow up with Primary Care Physician for further management.    15. Other reduced mobility  16. Dependence on other enabling machines and devices  17. Abnormality of gait and mobility  Stable. Uses can mostly. Has rolling walker as well. Fall precautions discussed. Continue treatment plan as prescribed by your Primary Care Physician and follow up with Primary Care Physician for further management.        Provided Kaleigh with a 5-10 year written screening schedule and personal prevention  plan. Recommendations were developed using the USPSTF age appropriate recommendations. Education, counseling, and referrals were provided as needed. After Visit Summary printed and given to patient which includes a list of additional screenings\tests needed.    Follow up in about 1 year (around 9/27/2023) for hra and March with PCP.    Pearl Rodriguez, DEACONP-C  I offered to discuss advanced care planning, including how to pick a person who would make decisions for you if you were unable to make them for yourself, called a health care power of , and what kind of decisions you might make such as use of life sustaining treatments such as ventilators and tube feeding when faced with a life limiting illness recorded on a living will that they will need to know. (How you want to be cared for as you near the end of your natural life)     X Patient is interested in learning more about how to make advanced directives.  I provided them paperwork and offered to discuss this with them.

## 2022-09-27 NOTE — PATIENT INSTRUCTIONS
Counseling and Referral of Other Preventative  (Italic type indicates deductible and co-insurance are waived)    Patient Name: Kaleigh Nieves  Today's Date: 9/27/2022    Health Maintenance       Date Due Completion Date    Shingles Vaccine (1 of 2) Never done ---    COVID-19 Vaccine (3 - Booster for Moderna series) 06/11/2021 4/16/2021    Influenza Vaccine (1) 09/01/2022 9/21/2021    Hemoglobin A1c 02/28/2023 8/31/2022    Lipid Panel 03/21/2023 3/21/2022    Eye Exam 08/30/2023 8/30/2022    Diabetes Urine Screening 08/31/2023 8/31/2022    Aspirin/Antiplatelet Therapy 09/24/2023 9/24/2022    DEXA Scan 06/22/2027 6/22/2022    TETANUS VACCINE 12/12/2029 12/12/2019        No orders of the defined types were placed in this encounter.    The following information is provided to all patients.  This information is to help you find resources for any of the problems found today that may be affecting your health:                Living healthy guide: www.[facility].[place].gov      Understanding Diabetes: www.diabetes.org      Eating healthy: www.cdc.gov/healthyweight      CDC home safety checklist: www.cdc.gov/steadi/patient.html      Agency on Aging: www.goea.[place].[facility]      Alcoholics anonymous (AA): www.aa.org      Physical Activity: www.anderson.nih.gov/sb7gtsz      Tobacco use: www.quitwithusla.org

## 2022-11-01 RX ORDER — ALLOPURINOL 100 MG/1
100 TABLET ORAL DAILY
Qty: 90 TABLET | Refills: 1 | Status: SHIPPED | OUTPATIENT
Start: 2022-11-01 | End: 2023-02-01 | Stop reason: SDUPTHER

## 2022-12-15 ENCOUNTER — OFFICE VISIT (OUTPATIENT)
Dept: PODIATRY | Facility: CLINIC | Age: 82
End: 2022-12-15
Payer: MEDICARE

## 2022-12-15 VITALS — BODY MASS INDEX: 34.55 KG/M2 | HEIGHT: 66 IN | WEIGHT: 215 LBS

## 2022-12-15 DIAGNOSIS — E11.51 TYPE II DIABETES MELLITUS WITH PERIPHERAL CIRCULATORY DISORDER: ICD-10-CM

## 2022-12-15 DIAGNOSIS — B35.1 DERMATOPHYTOSIS OF NAIL: ICD-10-CM

## 2022-12-15 DIAGNOSIS — M19.079 OSTEOARTHRITIS OF ANKLE AND FOOT, UNSPECIFIED LATERALITY: Primary | ICD-10-CM

## 2022-12-15 PROCEDURE — 1101F PR PT FALLS ASSESS DOC 0-1 FALLS W/OUT INJ PAST YR: ICD-10-PCS | Mod: CPTII,S$GLB,, | Performed by: PODIATRIST

## 2022-12-15 PROCEDURE — 1159F PR MEDICATION LIST DOCUMENTED IN MEDICAL RECORD: ICD-10-PCS | Mod: CPTII,S$GLB,, | Performed by: PODIATRIST

## 2022-12-15 PROCEDURE — 3288F PR FALLS RISK ASSESSMENT DOCUMENTED: ICD-10-PCS | Mod: CPTII,S$GLB,, | Performed by: PODIATRIST

## 2022-12-15 PROCEDURE — 3072F LOW RISK FOR RETINOPATHY: CPT | Mod: CPTII,S$GLB,, | Performed by: PODIATRIST

## 2022-12-15 PROCEDURE — 99999 PR PBB SHADOW E&M-EST. PATIENT-LVL III: ICD-10-PCS | Mod: PBBFAC,,, | Performed by: PODIATRIST

## 2022-12-15 PROCEDURE — 99999 PR PBB SHADOW E&M-EST. PATIENT-LVL III: CPT | Mod: PBBFAC,,, | Performed by: PODIATRIST

## 2022-12-15 PROCEDURE — 3072F PR LOW RISK FOR RETINOPATHY: ICD-10-PCS | Mod: CPTII,S$GLB,, | Performed by: PODIATRIST

## 2022-12-15 PROCEDURE — 1160F PR REVIEW ALL MEDS BY PRESCRIBER/CLIN PHARMACIST DOCUMENTED: ICD-10-PCS | Mod: CPTII,S$GLB,, | Performed by: PODIATRIST

## 2022-12-15 PROCEDURE — 3288F FALL RISK ASSESSMENT DOCD: CPT | Mod: CPTII,S$GLB,, | Performed by: PODIATRIST

## 2022-12-15 PROCEDURE — 1159F MED LIST DOCD IN RCRD: CPT | Mod: CPTII,S$GLB,, | Performed by: PODIATRIST

## 2022-12-15 PROCEDURE — 1126F PR PAIN SEVERITY QUANTIFIED, NO PAIN PRESENT: ICD-10-PCS | Mod: CPTII,S$GLB,, | Performed by: PODIATRIST

## 2022-12-15 PROCEDURE — 11721 DEBRIDE NAIL 6 OR MORE: CPT | Mod: Q8,S$GLB,, | Performed by: PODIATRIST

## 2022-12-15 PROCEDURE — 1126F AMNT PAIN NOTED NONE PRSNT: CPT | Mod: CPTII,S$GLB,, | Performed by: PODIATRIST

## 2022-12-15 PROCEDURE — 1160F RVW MEDS BY RX/DR IN RCRD: CPT | Mod: CPTII,S$GLB,, | Performed by: PODIATRIST

## 2022-12-15 PROCEDURE — 11721 PR DEBRIDEMENT OF NAILS, 6 OR MORE: ICD-10-PCS | Mod: Q8,S$GLB,, | Performed by: PODIATRIST

## 2022-12-15 PROCEDURE — 99213 OFFICE O/P EST LOW 20 MIN: CPT | Mod: 25,S$GLB,, | Performed by: PODIATRIST

## 2022-12-15 PROCEDURE — 1101F PT FALLS ASSESS-DOCD LE1/YR: CPT | Mod: CPTII,S$GLB,, | Performed by: PODIATRIST

## 2022-12-15 PROCEDURE — 99213 PR OFFICE/OUTPT VISIT, EST, LEVL III, 20-29 MIN: ICD-10-PCS | Mod: 25,S$GLB,, | Performed by: PODIATRIST

## 2022-12-15 RX ORDER — METHYLPREDNISOLONE 4 MG/1
4 TABLET ORAL DAILY
Qty: 1 EACH | Refills: 0 | Status: SHIPPED | OUTPATIENT
Start: 2022-12-15 | End: 2023-01-10 | Stop reason: ALTCHOICE

## 2022-12-20 ENCOUNTER — NURSE TRIAGE (OUTPATIENT)
Dept: ADMINISTRATIVE | Facility: CLINIC | Age: 82
End: 2022-12-20
Payer: MEDICARE

## 2022-12-20 NOTE — TELEPHONE ENCOUNTER
Please have her recheck blood pressure 1 hour after taking her morning medications. If blood pressure decreases with medication, okay to continue oral steroid for gout flare hold BB in setting of hypotension in shock  Repeat INR to dose next scheduled coumadin likely acute on chronic but unable to compare since patient has not been to [facility] before  Avoid nephrotoxic agents  consult nephrology  f/u urology recommendation

## 2022-12-20 NOTE — TELEPHONE ENCOUNTER
Pt stated that she was seen by Podiatry and was prescribed steroids for gout that she has been dealing with for 2 years. Pt has hx of DM and HTN and is concerned that steroids are increasing BP.  BP this am 156/66 and asymptomatic. Pt has not taken any of morning meds. Pt asking is she should continue steroids that is to be taken before breakfast. Advised to take Bp meds and receive call back within a hour per protocol to clarify if she should continue steroids. Verbalized understanding. Encounter routed to provider.       Reason for Disposition   Nursing judgment    Protocols used: Information Only Call - No Triage-A-OH

## 2023-01-04 ENCOUNTER — OFFICE VISIT (OUTPATIENT)
Dept: OPHTHALMOLOGY | Facility: CLINIC | Age: 83
End: 2023-01-04
Payer: MEDICARE

## 2023-01-04 DIAGNOSIS — H40.1131 PRIMARY OPEN ANGLE GLAUCOMA (POAG) OF BOTH EYES, MILD STAGE: Primary | ICD-10-CM

## 2023-01-04 PROCEDURE — 99213 PR OFFICE/OUTPT VISIT, EST, LEVL III, 20-29 MIN: ICD-10-PCS | Mod: S$GLB,,, | Performed by: OPTOMETRIST

## 2023-01-04 PROCEDURE — 1159F PR MEDICATION LIST DOCUMENTED IN MEDICAL RECORD: ICD-10-PCS | Mod: CPTII,S$GLB,, | Performed by: OPTOMETRIST

## 2023-01-04 PROCEDURE — 1159F MED LIST DOCD IN RCRD: CPT | Mod: CPTII,S$GLB,, | Performed by: OPTOMETRIST

## 2023-01-04 PROCEDURE — 99999 PR PBB SHADOW E&M-EST. PATIENT-LVL I: ICD-10-PCS | Mod: PBBFAC,,, | Performed by: OPTOMETRIST

## 2023-01-04 PROCEDURE — 99999 PR PBB SHADOW E&M-EST. PATIENT-LVL I: CPT | Mod: PBBFAC,,, | Performed by: OPTOMETRIST

## 2023-01-04 PROCEDURE — 1160F PR REVIEW ALL MEDS BY PRESCRIBER/CLIN PHARMACIST DOCUMENTED: ICD-10-PCS | Mod: CPTII,S$GLB,, | Performed by: OPTOMETRIST

## 2023-01-04 PROCEDURE — 99213 OFFICE O/P EST LOW 20 MIN: CPT | Mod: S$GLB,,, | Performed by: OPTOMETRIST

## 2023-01-04 PROCEDURE — 1160F RVW MEDS BY RX/DR IN RCRD: CPT | Mod: CPTII,S$GLB,, | Performed by: OPTOMETRIST

## 2023-01-04 NOTE — PROGRESS NOTES
HPI     Glaucoma            Comments: 4 month IOP          Comments    States that her vision is stable and that she has been compliant with gtts   as directed.           Last edited by Mary Kate Howard on 1/4/2023 10:41 AM.            Assessment /Plan     For exam results, see Encounter Report.    Primary open angle glaucoma (POAG) of both eyes, mild stage      IOP stable today and within acceptable range relative to target OU  Continue current treatment  Monitor 4 months    Continue Timolol bid OU and Latanoprost qhs OU     RTC 4 months for 24-2 HVF and dilation or PRN  Discussed above and all questions were answered.

## 2023-01-05 ENCOUNTER — TELEPHONE (OUTPATIENT)
Dept: OPHTHALMOLOGY | Facility: CLINIC | Age: 83
End: 2023-01-05
Payer: MEDICARE

## 2023-01-07 NOTE — PROGRESS NOTES
Subjective:     Patient ID: Kaleigh Nieves is a 82 y.o. female.    Chief Complaint: Nail Care (Bilateral foot pain, rates pain 10/10, swelling, sharp pains, Diabetic Pt, wears tennis shoes with stockings, last seen on 09/27/22 with PATTI Diaz)      Kaleigh is a 82 y.o. female who presents to the clinic for evaluation and treatment of high risk feet. Kaleigh has a past medical history of Anemia, Angina pectoris, Arthritis, Asthma, Back pain, Bronchitis, CAD (coronary artery disease) (07/2015), Carotid artery stenosis, Chronic bronchitis, Chronic gout, Colon polyp, CTS (carpal tunnel syndrome), Diabetes mellitus, type 2, Diabetes with neurologic complications, Diverticulosis, RIVAS (dyspnea on exertion), Dyslipidemia, Ex-smoker, GERD (gastroesophageal reflux disease), Heart failure, Hyperlipidemia, Hypertension, Neuropathy, lower extremity, Obesity, Open-angle glaucoma, Peripheral vascular disease, Polyneuropathy, and Tobacco dependence. The patient's chief complaint is long, thick toenails. This patient has documented high risk feet requiring routine maintenance secondary to diabetes mellitis and those secondary complications of diabetes, as mentioned. Patient complains of bilateral foot pain and swelling. Patient rates pain 10/10.     PCP: Mana Ochoa NP    Date Last Seen by PCP: 09/07/2022    Current shoe gear:  Affected Foot: Rx diabetic extra depth shoes and custom accommodative insoles     Unaffected Foot: Rx diabetic extra depth shoes and custom accommodative insoles    Hemoglobin A1C   Date Value Ref Range Status   08/31/2022 5.7 (H) 4.0 - 5.6 % Final     Comment:     ADA Screening Guidelines:  5.7-6.4%  Consistent with prediabetes  >or=6.5%  Consistent with diabetes    High levels of fetal hemoglobin interfere with the HbA1C  assay. Heterozygous hemoglobin variants (HbS, HgC, etc)do  not significantly interfere with this assay.   However, presence of multiple variants may affect accuracy.      03/21/2022 5.9 (H) 4.0 - 5.6 % Final     Comment:     ADA Screening Guidelines:  5.7-6.4%  Consistent with prediabetes  >or=6.5%  Consistent with diabetes    High levels of fetal hemoglobin interfere with the HbA1C  assay. Heterozygous hemoglobin variants (HbS, HgC, etc)do  not significantly interfere with this assay.   However, presence of multiple variants may affect accuracy.     09/21/2021 5.6 4.0 - 5.6 % Final     Comment:     ADA Screening Guidelines:  5.7-6.4%  Consistent with prediabetes  >or=6.5%  Consistent with diabetes    High levels of fetal hemoglobin interfere with the HbA1C  assay. Heterozygous hemoglobin variants (HbS, HgC, etc)do  not significantly interfere with this assay.   However, presence of multiple variants may affect accuracy.         Patient Active Problem List   Diagnosis    Type 2 diabetes mellitus with diabetic polyneuropathy, without long-term current use of insulin    Multiple joint pain    Esophageal reflux    Hyperlipidemia    Chronic gout    Lichen sclerosus    Carotid artery disease    Coronary artery disease involving native coronary artery without angina pectoris    Colon polyp    Hypertension associated with diabetes    Aortic atherosclerosis    Arterial insufficiency of lower extremity    Ganglion cyst of flexor tendon sheath of finger of right hand    Bilateral carpal tunnel syndrome    Hyperkalemia    Chronic bronchitis    Dependent edema    Diverticulosis    Primary open-angle glaucoma, bilateral, indeterminate stage    RIVAS (dyspnea on exertion)    Chronic diastolic heart failure    Low vitamin D level    Body mass index (BMI) 34.0-34.9, adult       Medication List with Changes/Refills   New Medications    METHYLPREDNISOLONE (MEDROL DOSEPACK) 4 MG TABLET    Take 1 tablet (4 mg total) by mouth once daily. Use as instructed on dose pack   Current Medications    ALBUTEROL (PROVENTIL/VENTOLIN HFA) 90 MCG/ACTUATION INHALER    INHALE 2 PUFFS INTO THE LUNGS EVERY 6 HOURS AS  NEEDED FOR WHEEZING.    ALLOPURINOL (ZYLOPRIM) 100 MG TABLET    Take 1 tablet (100 mg total) by mouth once daily.    ASPIRIN 81 MG CHEW    Take 81 mg by mouth once daily.    BETAMETHASONE DIPROPIONATE 0.05 % CREAM        BISOPROLOL (ZEBETA) 5 MG TABLET    Take 1 tablet (5 mg total) by mouth once daily.    BLOOD GLUCOSE CONTROL HIGH,LOW SOLN    1 each by Misc.(Non-Drug; Combo Route) route once a week.    BLOOD SUGAR DIAGNOSTIC STRP    1 each by Misc.(Non-Drug; Combo Route) route 2 (two) times a day.    BLOOD-GLUCOSE METER (ACCU-CHEK DARSHAN PLUS METER) MISC    USE AS DIRECTED    FUROSEMIDE (LASIX) 40 MG TABLET    Take 1 tablet (40 mg total) by mouth once daily.    LANCETS (FREESTYLE LANCETS) 28 GAUGE MISC    USE AS DIRECTED BY YOUR PHYSICIAN TWO TIMES A DAY Strength: 28 gauge    LATANOPROST 0.005 % OPHTHALMIC SOLUTION    Place 1 drop into both eyes every evening.    MUCUS-CHEST CONGESTION 100 MG/5 ML SYRUP    TAKE 2 TEASPOONSFUL (10MLS) BY MOUTH THREE TIMES A DAY AS NEEDED FOR COUGH    SIMVASTATIN (ZOCOR) 20 MG TABLET    Take 1 tablet (20 mg total) by mouth nightly.    TIMOLOL MALEATE 0.5% (TIMOPTIC) 0.5 % DROP    Place 1 drop into both eyes 2 (two) times daily.    TRIAMCINOLONE ACETONIDE 0.025% (KENALOG) 0.025 % CREAM           Review of patient's allergies indicates:   Allergen Reactions    Iodine and iodide containing products Nausea And Vomiting    Penicillins Itching    Sulfa (sulfonamide antibiotics) Hives and Itching    Amlodipine      edema    Ultram [tramadol] Other (See Comments)     Light headness, itiching    Bactrim [sulfamethoxazole-trimethoprim] Itching and Rash       Past Surgical History:   Procedure Laterality Date    APPENDECTOMY      CARPAL TUNNEL RELEASE Right     CATARACT EXTRACTION, BILATERAL      CHOLECYSTECTOMY      CYST REMOVAL  2018    DILATION AND CURETTAGE OF UTERUS      EYE SURGERY      HEMORRHOID SURGERY      HYSTERECTOMY      fibroids    INCISION AND DRAINAGE PERIRECTAL ABSCESS       JOINT REPLACEMENT Bilateral     knee    KNEE ARTHROSCOPY Right     MYOMECTOMY         Family History   Problem Relation Age of Onset    Hypertension Mother     Diabetes Mother     Leukemia Sister     Hypertension Sister     Cancer Sister         leukemia    Heart disease Maternal Aunt     Cancer Maternal Uncle         gastric, pacreatic    Heart disease Maternal Uncle     Miscarriages / Stillbirths Maternal Uncle     Diabetes Maternal Grandmother     Asthma Maternal Grandfather     Breast cancer Neg Hx     Colon cancer Neg Hx     Ovarian cancer Neg Hx     COPD Neg Hx     Hyperlipidemia Neg Hx     Kidney disease Neg Hx        Social History     Socioeconomic History    Marital status: Single    Number of children: 0   Tobacco Use    Smoking status: Former     Packs/day: 0.25     Years: 24.00     Pack years: 6.00     Types: Cigarettes     Quit date: 1976     Years since quittin.5    Smokeless tobacco: Never   Substance and Sexual Activity    Alcohol use: No    Drug use: No    Sexual activity: Not Currently     Social Determinants of Health     Financial Resource Strain: Low Risk     Difficulty of Paying Living Expenses: Not hard at all   Food Insecurity: No Food Insecurity    Worried About Running Out of Food in the Last Year: Never true    Ran Out of Food in the Last Year: Never true   Transportation Needs: No Transportation Needs    Lack of Transportation (Medical): No    Lack of Transportation (Non-Medical): No   Physical Activity: Inactive    Days of Exercise per Week: 0 days    Minutes of Exercise per Session: 0 min   Stress: No Stress Concern Present    Feeling of Stress : Not at all   Social Connections: Socially Isolated    Frequency of Communication with Friends and Family: Once a week    Frequency of Social Gatherings with Friends and Family: Once a week    Attends Restorationism Services: More than 4 times per year    Active Member of Clubs or Organizations: No    Attends Club or Organization Meetings:  "Never    Marital Status:    Housing Stability: Low Risk     Unable to Pay for Housing in the Last Year: No    Number of Places Lived in the Last Year: 1    Unstable Housing in the Last Year: No       Vitals:    12/15/22 1057   Weight: 97.5 kg (215 lb)   Height: 5' 6" (1.676 m)   PainSc: 0-No pain   PainLoc: Foot       Hemoglobin A1C   Date Value Ref Range Status   08/31/2022 5.7 (H) 4.0 - 5.6 % Final     Comment:     ADA Screening Guidelines:  5.7-6.4%  Consistent with prediabetes  >or=6.5%  Consistent with diabetes    High levels of fetal hemoglobin interfere with the HbA1C  assay. Heterozygous hemoglobin variants (HbS, HgC, etc)do  not significantly interfere with this assay.   However, presence of multiple variants may affect accuracy.     03/21/2022 5.9 (H) 4.0 - 5.6 % Final     Comment:     ADA Screening Guidelines:  5.7-6.4%  Consistent with prediabetes  >or=6.5%  Consistent with diabetes    High levels of fetal hemoglobin interfere with the HbA1C  assay. Heterozygous hemoglobin variants (HbS, HgC, etc)do  not significantly interfere with this assay.   However, presence of multiple variants may affect accuracy.     09/21/2021 5.6 4.0 - 5.6 % Final     Comment:     ADA Screening Guidelines:  5.7-6.4%  Consistent with prediabetes  >or=6.5%  Consistent with diabetes    High levels of fetal hemoglobin interfere with the HbA1C  assay. Heterozygous hemoglobin variants (HbS, HgC, etc)do  not significantly interfere with this assay.   However, presence of multiple variants may affect accuracy.         Review of Systems   Constitutional:  Negative for chills and fever.   Respiratory:  Negative for shortness of breath.    Cardiovascular:  Positive for leg swelling. Negative for chest pain, palpitations, orthopnea and claudication.   Gastrointestinal:  Negative for diarrhea, nausea and vomiting.   Musculoskeletal:  Negative for joint pain.   Skin:  Negative for rash.   Neurological:  Negative for dizziness, " tingling, sensory change, focal weakness and weakness.   Psychiatric/Behavioral: Negative.             Objective:      PHYSICAL EXAM: Apperance: Alert and orient in no distress,well developed, and with good attention to grooming and body habits  Patient presents ambulating in diabetic shoes and inserts.   LOWER EXTREMITY EXAM:  VASCULAR: Dorsalis pedis pulses 0/4 bilateral (monophasic with doppler) and Posterior Tibial pulses 0/4 bilateral (biphasic with doppler). Capillary fill time <blanched bilateral. Mild edema observed bilateral. Varicosities present bilateral. Skin temperature of the lower extremities is warm to cool, proximal to distal. Hair growth absent bilateral.  DERMATOLOGICAL: No skin rashes, subcutaneous nodules, lesions, or ulcers observed bilateral. Nails 1,2,3,4,5 bilateral elongated, thickened, and discolored with subungual debris. Webspaces 1,2,3,4clean, dry and without evidence of break in skin integrity bilateral.   NEUROLOGICAL: Light touch, sharp-dull, proprioception all present and equal bilaterally.  Vibratory sensation intact on left hallux and diminished at right hallux. Protective sensation intact at all 10 sites as tested with a Barnwell-Jimy 5.07 monofilament.   MUSCULOSKELETAL: Muscle strength is 5/5 for foot inverters, everters, plantarflexors, and dorsiflexors. Muscle tone is normal. No pain on palpation of right dorsal foot/ankle. Pes planus noted bilateral. Flexible hammertoes noted bilateral.         Assessment:       ICD-10-CM ICD-9-CM   1. Osteoarthritis of ankle and foot, unspecified laterality  M19.079 715.97   2. Type II diabetes mellitus with peripheral circulatory disorder  E11.51 250.70     443.81   3. Dermatophytosis of nail  B35.1 110.1       Plan:   Osteoarthritis of ankle and foot, unspecified laterality  -     methylPREDNISolone (MEDROL DOSEPACK) 4 mg tablet; Take 1 tablet (4 mg total) by mouth once daily. Use as instructed on dose pack  Dispense: 1 each; Refill:  0    Type II diabetes mellitus with peripheral circulatory disorder    Dermatophytosis of nail      I counseled the patient on her conditions, regarding findings of my examination, my impressions, and usual treatment plan.   Greater than 15 minutes of a 20 minute appointment spent on education about the diabetic foot, neuropathy, and prevention of limb loss.  Shoe inspection. Diabetic Foot Education. Patient reminded of the importance of good nutrition and blood sugar control to help prevent podiatric complications of diabetes. Patient instructed on proper foot hygeine. We discussed wearing proper shoe gear, daily foot inspections, never walking without protective shoe gear, never putting sharp instruments to feet.    With patient's permission, nails 1-5 bilateral were debrided/trimmed in length and thickness aggressively to their soft tissue attachment mechanically and with electric , removing all offending nail and debris. Patient relates relief following the procedure.  Prescribed Medrol Dosepak to be taken as directed on package. Discussed possible increase in blood sugar with taking steroid medication. Patient advised on the possible elevation of blood pressure sugar and caution to take pills as prescribed and to discontinue use if symptoms arise, patient agreed.  Patient  will continue to monitor the areas daily, inspect feet, wear protective shoe gear when ambulatory, moisturizer to maintain skin integrity. Patient reminded of the importance of good nutrition and blood sugar control to help prevent podiatric complications of diabetes.  Patient to return 4 months or sooner if needed.          Ino Harmon DPM  [facility]

## 2023-01-10 ENCOUNTER — HOSPITAL ENCOUNTER (OUTPATIENT)
Dept: RADIOLOGY | Facility: HOSPITAL | Age: 83
Discharge: HOME | End: 2023-01-10
Attending: FAMILY MEDICINE
Payer: MEDICARE

## 2023-01-10 ENCOUNTER — OFFICE VISIT (OUTPATIENT)
Dept: PRIMARY CARE CLINIC | Facility: CLINIC | Age: 83
End: 2023-01-10
Payer: MEDICARE

## 2023-01-10 ENCOUNTER — TELEPHONE (OUTPATIENT)
Dept: PRIMARY CARE CLINIC | Facility: CLINIC | Age: 83
End: 2023-01-10
Payer: MEDICARE

## 2023-01-10 ENCOUNTER — TELEPHONE (OUTPATIENT)
Dept: CARDIOLOGY | Facility: CLINIC | Age: 83
End: 2023-01-10
Payer: MEDICARE

## 2023-01-10 VITALS
RESPIRATION RATE: 15 BRPM | TEMPERATURE: 98 F | SYSTOLIC BLOOD PRESSURE: 122 MMHG | OXYGEN SATURATION: 95 % | WEIGHT: 208.56 LBS | HEART RATE: 68 BPM | BODY MASS INDEX: 33.52 KG/M2 | DIASTOLIC BLOOD PRESSURE: 68 MMHG | HEIGHT: 66 IN

## 2023-01-10 DIAGNOSIS — I77.9 CAROTID ARTERY DISEASE, UNSPECIFIED LATERALITY, UNSPECIFIED TYPE: ICD-10-CM

## 2023-01-10 DIAGNOSIS — M79.672 LEFT FOOT PAIN: ICD-10-CM

## 2023-01-10 DIAGNOSIS — R60.0 BILATERAL LOWER EXTREMITY EDEMA: ICD-10-CM

## 2023-01-10 DIAGNOSIS — R60.0 BILATERAL LOWER EXTREMITY EDEMA: Primary | ICD-10-CM

## 2023-01-10 PROCEDURE — 99215 OFFICE O/P EST HI 40 MIN: CPT | Mod: S$GLB,,, | Performed by: FAMILY MEDICINE

## 2023-01-10 PROCEDURE — 99999 PR PBB SHADOW E&M-EST. PATIENT-LVL V: ICD-10-PCS | Mod: PBBFAC,,, | Performed by: FAMILY MEDICINE

## 2023-01-10 PROCEDURE — 93880 US CAROTID BILATERAL: ICD-10-PCS | Mod: 26,,, | Performed by: STUDENT IN AN ORGANIZED HEALTH CARE EDUCATION/TRAINING PROGRAM

## 2023-01-10 PROCEDURE — 93925 LOWER EXTREMITY STUDY: CPT | Mod: TC,PN

## 2023-01-10 PROCEDURE — 3072F LOW RISK FOR RETINOPATHY: CPT | Mod: CPTII,S$GLB,, | Performed by: FAMILY MEDICINE

## 2023-01-10 PROCEDURE — 93925 LOWER EXTREMITY STUDY: CPT | Mod: 26,,, | Performed by: STUDENT IN AN ORGANIZED HEALTH CARE EDUCATION/TRAINING PROGRAM

## 2023-01-10 PROCEDURE — 99215 PR OFFICE/OUTPT VISIT, EST, LEVL V, 40-54 MIN: ICD-10-PCS | Mod: S$GLB,,, | Performed by: FAMILY MEDICINE

## 2023-01-10 PROCEDURE — 93880 EXTRACRANIAL BILAT STUDY: CPT | Mod: TC,PN

## 2023-01-10 PROCEDURE — 93880 EXTRACRANIAL BILAT STUDY: CPT | Mod: 26,,, | Performed by: STUDENT IN AN ORGANIZED HEALTH CARE EDUCATION/TRAINING PROGRAM

## 2023-01-10 PROCEDURE — 3072F PR LOW RISK FOR RETINOPATHY: ICD-10-PCS | Mod: CPTII,S$GLB,, | Performed by: FAMILY MEDICINE

## 2023-01-10 PROCEDURE — 99999 PR PBB SHADOW E&M-EST. PATIENT-LVL V: CPT | Mod: PBBFAC,,, | Performed by: FAMILY MEDICINE

## 2023-01-10 PROCEDURE — 93925 US LOWER EXTREMITY ARTERIES BILATERAL: ICD-10-PCS | Mod: 26,,, | Performed by: STUDENT IN AN ORGANIZED HEALTH CARE EDUCATION/TRAINING PROGRAM

## 2023-01-10 NOTE — PATIENT INSTRUCTIONS
Suspect that the leg swelling and pains are due to a flow issue in the vessels going to the feet.  This does not look like gout.    Let us get an ultrasound of the arterial side to check the flow.  We will contact you with the results when they are available.      Let us also have you follow-up with the vascular specialist to get their input.  If there is a blockage, they can suggest treatment to restore blood flow.    In the meantime, try elevating the feet, above the heart, several times during the day.  This will let gravity help pull the fluid back into circulation.      Keep your appointment with Cardiology on February 1, as scheduled.    Continue to eat a healthy diet.  Be careful with portion sizes.  Includes lots of fresh fruits, vegetables, whole grains, lean proteins.      Keep hydrated.  Be sure to drink at least 8-10, 8 oz, glasses of water every day.    Stay active.  Try to do some sort of physical activity every day.  Nothing outrageous, just try walking for 5-10 minutes each day.

## 2023-01-10 NOTE — PROGRESS NOTES
"    [facility]       [address] [place] [place] [address]      Phone: [phone]      Fax: [phone]    Lucio Morris MD                Office Visit  01/10/2023        Subjective      HPI:  Kaleigh Nieves is a 82 y.o. female presents today in clinic for "Leg Swelling and Establish Care  ."     82-year-old female presents today to establish care, discuss leg pains.      Her cousin, Brenda, is present with her.  She provides portions of the history.    Patient states that her legs have been swelling for over two years now.  In the past, her cardiologist put her on a fluid pill, Lasix, to see if that would help with the swelling.  She takes this daily, but legs do not seem to go down.  Also wears compression socks.  She tries to prop her feet up, but is limited due to knee pains from her knee replacement surgeries.    She states she has taken steroids on a couple of occasions in the past.  When she takes them, swelling will go down for about one or two days, then it returns.    States that both of her feet tend to hurt, but for the last couple of weeks her left foot has been hurting even more.  Initially, there was some color change.  It turned black and blue.  Got red.  Pain is sharp, crampy pain is mostly located in the big toe.  Both of her feet hurt across the top of the foot, and on the lateral edge of the foot.  Last year, had venous ultrasound done.  No signs of DVT.    She states she has a history of gout.  Has been taking allopurinol 100 mg daily for years.  Has not had severe gout flare up in a couple of years.  Last August, had some blood work done, uric acid level was 4.8.    She also has diabetes.  Not on any medication for it.  A1c was 5.7% last August.    Has hypertension.  Takes bisoprolol 5 mg daily, along with Lasix 40 mg daily.  Also takes simvastatin 20 mg nightly for cholesterol.  Follows with cardiology regularly.  Had carotid ultrasound done " last year.  Showed 50-69% blockage on the left.  No signs of blockage on the right.    PMH: HTN, dm, gout, GERD, diverticulosis, osteoarthritis, glaucoma   PSH:  Torn meniscus in knee.  Bilateral knee replacement.  Hemorrhoids.  Perirectal abscess.  Cataracts in both eyes.  D& C.  Fibroid/hysterectomy.  Gallbladder.  Appendectomy.  Carpal tunnel.  Left toe corn removal.    Allergies:  Iodine.  Penicillin.  Sulfa drugs.  Tramadol.  Amlodipine.    Social: Lives alone.  Nephew occasionally stays there.    T: Denies   A:  Denies   D:  Denies     Exercise:  No regular exercise program.  Use to bowel frequently.  Limited now due to foot swelling and pain.      Podiatry: Faustino Reyes)      The following were updated and reviewed by myself in the chart: medications, past medical history, past surgical history, family history, social history, and allergies.     Medications:  Current Outpatient Medications on File Prior to Visit   Medication Sig Dispense Refill    bisoprolol (ZEBETA) 5 MG tablet Take 1 tablet (5 mg total) by mouth once daily. 90 tablet 3    blood glucose control high,low Soln 1 each by Misc.(Non-Drug; Combo Route) route once a week. 1 each 11    blood sugar diagnostic Strp 1 each by Misc.(Non-Drug; Combo Route) route 2 (two) times a day. 200 each 11    blood-glucose meter (ACCU-CHEK DARSHAN PLUS METER) Misc USE AS DIRECTED 100 each 11    furosemide (LASIX) 40 MG tablet Take 1 tablet (40 mg total) by mouth once daily. 90 tablet 3    lancets (FREESTYLE LANCETS) 28 gauge Misc USE AS DIRECTED BY YOUR PHYSICIAN TWO TIMES A DAY Strength: 28 gauge 200 each 11    latanoprost 0.005 % ophthalmic solution Place 1 drop into both eyes every evening. 2.5 mL 11    simvastatin (ZOCOR) 20 MG tablet Take 1 tablet (20 mg total) by mouth nightly. 90 tablet 3    timolol maleate 0.5% (TIMOPTIC) 0.5 % Drop Place 1 drop into both eyes 2 (two) times daily. 30 mL 4    triamcinolone acetonide 0.025% (KENALOG) 0.025 % cream        albuterol (PROVENTIL/VENTOLIN HFA) 90 mcg/actuation inhaler INHALE 2 PUFFS INTO THE LUNGS EVERY 6 HOURS AS NEEDED FOR WHEEZING. 18 g 11    allopurinoL (ZYLOPRIM) 100 MG tablet Take 1 tablet (100 mg total) by mouth once daily. 90 tablet 1    aspirin 81 MG Chew Take 81 mg by mouth once daily.      betamethasone dipropionate 0.05 % cream       methylPREDNISolone (MEDROL DOSEPACK) 4 mg tablet Take 1 tablet (4 mg total) by mouth once daily. Use as instructed on dose pack (Patient not taking: Reported on 1/10/2023) 1 each 0    MUCUS-CHEST CONGESTION 100 mg/5 mL syrup TAKE 2 TEASPOONSFUL (10MLS) BY MOUTH THREE TIMES A DAY AS NEEDED FOR COUGH (Patient not taking: Reported on 1/10/2023) 200 mL 2     No current facility-administered medications on file prior to visit.        PMHx:  Past Medical History:   Diagnosis Date    Anemia     Angina pectoris     Arthritis     Asthma     Back pain     Bronchitis     CAD (coronary artery disease) 07/2015    via [facility] ct    Carotid artery stenosis     Chronic bronchitis     Chronic gout     Colon polyp     CTS (carpal tunnel syndrome)     Diabetes mellitus, type 2     Diabetes with neurologic complications     Diverticulosis     RIVAS (dyspnea on exertion)     chronic; eval pulm dr natarajan    Dyslipidemia     Ex-smoker     quit in her 30s    GERD (gastroesophageal reflux disease)     Heart failure     Hyperlipidemia     Hypertension     Neuropathy, lower extremity     Obesity     Open-angle glaucoma     dr avel kaur    Peripheral vascular disease     Polyneuropathy     Tobacco dependence       Patient Active Problem List    Diagnosis Date Noted    Body mass index (BMI) 34.0-34.9, adult 09/07/2022    Low vitamin D level 07/12/2021    Chronic diastolic heart failure 05/14/2021    RIVAS (dyspnea on exertion) 01/29/2021    Diverticulosis 11/11/2019    Primary open-angle glaucoma, bilateral, indeterminate stage 11/11/2019    Dependent edema 08/23/2018    Chronic bronchitis 06/25/2018     Hyperkalemia 03/21/2018    Bilateral carpal tunnel syndrome 03/07/2018    Ganglion cyst of flexor tendon sheath of finger of right hand 02/01/2018    Aortic atherosclerosis 06/16/2017    Arterial insufficiency of lower extremity 06/16/2017    Hypertension associated with diabetes 10/25/2016    Colon polyp     Coronary artery disease involving native coronary artery without angina pectoris     Carotid artery disease     Lichen sclerosus 05/26/2015    Hyperlipidemia 08/11/2014    Chronic gout 08/11/2014    Esophageal reflux 07/08/2013    Multiple joint pain 07/03/2013    Type 2 diabetes mellitus with diabetic polyneuropathy, without long-term current use of insulin 06/18/2013        PSHx:  Past Surgical History:   Procedure Laterality Date    APPENDECTOMY      CARPAL TUNNEL RELEASE Right     CATARACT EXTRACTION, BILATERAL      CHOLECYSTECTOMY      CYST REMOVAL  2018    DILATION AND CURETTAGE OF UTERUS      EYE SURGERY      HEMORRHOID SURGERY      HYSTERECTOMY      fibroids    INCISION AND DRAINAGE PERIRECTAL ABSCESS      JOINT REPLACEMENT Bilateral     knee    KNEE ARTHROSCOPY Right     MYOMECTOMY          FHx:  Family History   Problem Relation Age of Onset    Hypertension Mother     Diabetes Mother     Leukemia Sister     Hypertension Sister     Cancer Sister         leukemia    Heart disease Maternal Aunt     Cancer Maternal Uncle         gastric, pacreatic    Heart disease Maternal Uncle     Miscarriages / Stillbirths Maternal Uncle     Diabetes Maternal Grandmother     Asthma Maternal Grandfather     Breast cancer Neg Hx     Colon cancer Neg Hx     Ovarian cancer Neg Hx     COPD Neg Hx     Hyperlipidemia Neg Hx     Kidney disease Neg Hx         Social:  Social History     Socioeconomic History    Marital status: Single    Number of children: 0   Tobacco Use    Smoking status: Former     Packs/day: 0.25     Years: 24.00     Pack years: 6.00     Types: Cigarettes     Quit date: 6/25/1976     Years since quitting:  46.5    Smokeless tobacco: Never   Substance and Sexual Activity    Alcohol use: No    Drug use: No    Sexual activity: Not Currently     Social Determinants of Health     Financial Resource Strain: Low Risk     Difficulty of Paying Living Expenses: Not hard at all   Food Insecurity: No Food Insecurity    Worried About Running Out of Food in the Last Year: Never true    Ran Out of Food in the Last Year: Never true   Transportation Needs: No Transportation Needs    Lack of Transportation (Medical): No    Lack of Transportation (Non-Medical): No   Physical Activity: Inactive    Days of Exercise per Week: 0 days    Minutes of Exercise per Session: 0 min   Stress: No Stress Concern Present    Feeling of Stress : Not at all   Social Connections: Socially Isolated    Frequency of Communication with Friends and Family: Once a week    Frequency of Social Gatherings with Friends and Family: Once a week    Attends Buddhist Services: More than 4 times per year    Active Member of Clubs or Organizations: No    Attends Club or Organization Meetings: Never    Marital Status:    Housing Stability: Low Risk     Unable to Pay for Housing in the Last Year: No    Number of Places Lived in the Last Year: 1    Unstable Housing in the Last Year: No        Allergies:  Review of patient's allergies indicates:   Allergen Reactions    Iodine and iodide containing products Nausea And Vomiting    Penicillins Itching    Ultram [tramadol] Hives and Itching     Light headness, itiching    Sulfa (sulfonamide antibiotics) Hives and Itching    Amlodipine      edema    Bactrim [sulfamethoxazole-trimethoprim] Itching and Rash        ROS:  Review of Systems   Constitutional:  Negative for activity change, appetite change, chills and fever.   HENT:  Negative for congestion, postnasal drip, rhinorrhea, sore throat and trouble swallowing.    Respiratory:  Negative for cough, shortness of breath and wheezing.    Cardiovascular:  Positive for leg  "swelling. Negative for chest pain and palpitations.   Gastrointestinal:  Negative for abdominal pain, constipation, diarrhea, nausea and vomiting.   Genitourinary:  Negative for difficulty urinating.   Musculoskeletal:  Positive for arthralgias, joint swelling and myalgias.   Skin:  Negative for color change and rash.   Neurological:  Negative for speech difficulty and headaches.   All other systems reviewed and are negative.       Objective      /68   Pulse 68   Temp 98.1 °F (36.7 °C) (Temporal)   Resp 15   Ht 5' 6" (1.676 m)   Wt 94.6 kg (208 lb 8.9 oz)   SpO2 95%   BMI 33.66 kg/m²   Ht Readings from Last 3 Encounters:   01/10/23 5' 6" (1.676 m)   12/15/22 5' 6" (1.676 m)   09/27/22 5' 6" (1.676 m)     Wt Readings from Last 3 Encounters:   01/10/23 94.6 kg (208 lb 8.9 oz)   12/15/22 97.5 kg (215 lb)   09/27/22 97.7 kg (215 lb 6.2 oz)       PHYSICAL EXAM:  Physical Exam  Vitals and nursing note reviewed.   Constitutional:       General: She is not in acute distress.     Appearance: Normal appearance.   HENT:      Head: Normocephalic and atraumatic.      Right Ear: Tympanic membrane, ear canal and external ear normal.      Left Ear: Tympanic membrane, ear canal and external ear normal.      Nose: Nose normal. No congestion or rhinorrhea.      Mouth/Throat:      Mouth: Mucous membranes are moist.      Pharynx: Oropharynx is clear. No oropharyngeal exudate or posterior oropharyngeal erythema.   Eyes:      Extraocular Movements: Extraocular movements intact.      Conjunctiva/sclera: Conjunctivae normal.      Pupils: Pupils are equal, round, and reactive to light.   Cardiovascular:      Rate and Rhythm: Normal rate and regular rhythm.      Pulses:           Dorsalis pedis pulses are 0 on the right side and 0 on the left side.      Comments: Decreased capillary refill bilaterally.  Pulmonary:      Effort: Pulmonary effort is normal. No respiratory distress.      Breath sounds: No wheezing, rhonchi or rales. "   Musculoskeletal:         General: Normal range of motion.      Cervical back: Normal range of motion.      Right lower le+ Edema present.      Left lower le+ Edema present.   Lymphadenopathy:      Cervical: No cervical adenopathy.   Skin:     General: Skin is warm and dry.      Findings: No rash.   Neurological:      Mental Status: She is alert.            LABS / IMAGING:  Recent Results (from the past 4368 hour(s))   Microalbumin/Creatinine Ratio, Urine    Collection Time: 22  9:09 AM   Result Value Ref Range    Microalbumin, Urine 6.0 ug/mL    Creatinine, Urine 109.0 15.0 - 325.0 mg/dL    Microalb/Creat Ratio 5.5 0.0 - 30.0 ug/mg   Hemoglobin A1C    Collection Time: 22  9:27 AM   Result Value Ref Range    Hemoglobin A1C 5.7 (H) 4.0 - 5.6 %    Estimated Avg Glucose 117 68 - 131 mg/dL   CBC Auto Differential    Collection Time: 22  9:27 AM   Result Value Ref Range    WBC 4.47 3.90 - 12.70 K/uL    RBC 5.15 4.00 - 5.40 M/uL    Hemoglobin 14.6 12.0 - 16.0 g/dL    Hematocrit 46.2 37.0 - 48.5 %    MCV 90 82 - 98 fL    MCH 28.3 27.0 - 31.0 pg    MCHC 31.6 (L) 32.0 - 36.0 g/dL    RDW 13.5 11.5 - 14.5 %    Platelets 311 150 - 450 K/uL    MPV 8.8 (L) 9.2 - 12.9 fL    Immature Granulocytes 0.2 0.0 - 0.5 %    Gran # (ANC) 2.5 1.8 - 7.7 K/uL    Immature Grans (Abs) 0.01 0.00 - 0.04 K/uL    Lymph # 1.3 1.0 - 4.8 K/uL    Mono # 0.6 0.3 - 1.0 K/uL    Eos # 0.1 0.0 - 0.5 K/uL    Baso # 0.02 0.00 - 0.20 K/uL    nRBC 0 0 /100 WBC    Gran % 56.3 38.0 - 73.0 %    Lymph % 28.6 18.0 - 48.0 %    Mono % 13.4 4.0 - 15.0 %    Eosinophil % 1.1 0.0 - 8.0 %    Basophil % 0.4 0.0 - 1.9 %    Differential Method Automated    Comprehensive Metabolic Panel    Collection Time: 22  9:27 AM   Result Value Ref Range    Sodium 137 136 - 145 mmol/L    Potassium 5.2 (H) 3.5 - 5.1 mmol/L    Chloride 96 95 - 110 mmol/L    CO2 35 (H) 23 - 29 mmol/L    Glucose 115 (H) 70 - 110 mg/dL    BUN 10 8 - 23 mg/dL    Creatinine 0.7 0.5  - 1.4 mg/dL    Calcium 10.0 8.7 - 10.5 mg/dL    Total Protein 7.5 6.0 - 8.4 g/dL    Albumin 3.7 3.5 - 5.2 g/dL    Total Bilirubin 0.6 0.1 - 1.0 mg/dL    Alkaline Phosphatase 25 (L) 55 - 135 U/L    AST 17 10 - 40 U/L    ALT 18 10 - 44 U/L    Anion Gap 6 (L) 8 - 16 mmol/L    eGFR >60.0 >60 mL/min/1.73 m^2   Uric Acid    Collection Time: 08/31/22  9:27 AM   Result Value Ref Range    Uric Acid 4.8 2.4 - 5.7 mg/dL         Assessment    1. Bilateral lower extremity edema    2. Left foot pain          Plan    Kaleigh was seen today for leg swelling and establish care.    Diagnoses and all orders for this visit:    Bilateral lower extremity edema  -     US Lower Extremity Arteries Bilateral; Future  -     Ambulatory referral/consult to Vascular Surgery; Future    Left foot pain  -     US Lower Extremity Arteries Bilateral; Future      Both legs have extensive edema, swelling.  Unable to palpate pulses due to swelling.  Toes of left feet are cold, decreased capillary refill.    Symptoms sound suspicious for claudication, rather than gout.  She had venous ultrasound of the legs done last year.  Will get arterial ultrasound today.    She does sleep in a recliner, but does not get the legs elevated above her heart, so I suspect we also have a component of dependent edema contributing.    Will have her follow-up with vascular for additional options.    FOLLOW-UP:  Follow up in about 3 months (around 4/10/2023) for check up.    I spent a total of 45 minutes face to face and non-face to face on the date of this visit.This includes time preparing to see the patient (eg, review of tests, notes), obtaining and/or reviewing additional history from an independent historian and/or outside medical records, documenting clinical information in the electronic health record, independently interpreting results and/or communicating results to the patient/family/caregiver, or care coordinator.    Signed by:  Lucio Morris MD

## 2023-01-10 NOTE — TELEPHONE ENCOUNTER
Attempted to schedule pt for Vascular appt.     1st attempt: Number rang multiple times prior to recording that explained voicemail box had not yet been set up yet; call ended. Unable to leave voicemail.

## 2023-01-11 NOTE — PROGRESS NOTES
MsLizzie Nieves,     You should be able to see the results of the vascular ultrasound in [facility].  Interestingly, everything looks normal.  No signs of stenosis.      I would still like you to see vascular to get their opinion on the leg swelling.  They may be able to provide some more insight.      In the meantime, keep trying to prop the feet up.  Ideally, try and get them above the level of your heart.      If you like, we can try some colchicine to see if that would help with the pains?  Have you used it before?  Let me know and I can send a prescription to the pharmacy.

## 2023-01-14 ENCOUNTER — NURSE TRIAGE (OUTPATIENT)
Dept: ADMINISTRATIVE | Facility: CLINIC | Age: 83
End: 2023-01-14

## 2023-01-14 NOTE — TELEPHONE ENCOUNTER
Attempted to contact patient x3 without success. Left message to call back on unidentified voicemail.   Reason for Disposition   No answer.  First attempt to contact caller.  Follow-up call scheduled within 15 minutes.   Message left on unidentified voice mail.  Phone number verified.   Second attempt to contact caller AND no contact made. Phone number verified.   Third attempt to contact caller AND no contact made. Phone number verified.    Protocols used: No Contact or Duplicate Contact Call-A-AH

## 2023-01-17 DIAGNOSIS — E11.42 TYPE 2 DIABETES MELLITUS WITH DIABETIC POLYNEUROPATHY, WITHOUT LONG-TERM CURRENT USE OF INSULIN: ICD-10-CM

## 2023-01-17 DIAGNOSIS — R06.09 DOE (DYSPNEA ON EXERTION): ICD-10-CM

## 2023-01-17 DIAGNOSIS — I50.42 CHRONIC COMBINED SYSTOLIC AND DIASTOLIC HEART FAILURE: ICD-10-CM

## 2023-01-17 RX ORDER — ALBUTEROL SULFATE 90 UG/1
2 AEROSOL, METERED RESPIRATORY (INHALATION) EVERY 6 HOURS PRN
Qty: 18 G | Refills: 11 | Status: SHIPPED | OUTPATIENT
Start: 2023-01-17 | End: 2023-02-01 | Stop reason: SDUPTHER

## 2023-01-17 RX ORDER — FUROSEMIDE 40 MG/1
40 TABLET ORAL DAILY
Qty: 90 TABLET | Refills: 3 | Status: SHIPPED | OUTPATIENT
Start: 2023-01-17 | End: 2023-02-01 | Stop reason: SDUPTHER

## 2023-01-25 ENCOUNTER — INITIAL CONSULT (OUTPATIENT)
Dept: VASCULAR SURGERY | Facility: CLINIC | Age: 83
End: 2023-01-25
Payer: MEDICARE

## 2023-01-25 VITALS — BODY MASS INDEX: 34.09 KG/M2 | WEIGHT: 211.19 LBS

## 2023-01-25 DIAGNOSIS — R60.0 BILATERAL LOWER EXTREMITY EDEMA: Primary | ICD-10-CM

## 2023-01-25 PROCEDURE — 99999 PR PBB SHADOW E&M-EST. PATIENT-LVL III: ICD-10-PCS | Mod: PBBFAC,,, | Performed by: SURGERY

## 2023-01-25 PROCEDURE — 99999 PR PBB SHADOW E&M-EST. PATIENT-LVL III: CPT | Mod: PBBFAC,,, | Performed by: SURGERY

## 2023-01-25 PROCEDURE — 99204 PR OFFICE/OUTPT VISIT, NEW, LEVL IV, 45-59 MIN: ICD-10-PCS | Mod: S$GLB,,, | Performed by: SURGERY

## 2023-01-25 PROCEDURE — 99204 OFFICE O/P NEW MOD 45 MIN: CPT | Mod: S$GLB,,, | Performed by: SURGERY

## 2023-01-25 NOTE — PROGRESS NOTES
[facility] Vascular Surgery  Congenital Cardiovascular Surgery  Consult Note    Patient Name: Kaleigh Nieves  MRN: [id number]  Admission Date: (Not on file)  Hospital Length of Stay: 0 days   Attending Physician: No att. providers found  Primary Care Provider: Lucio Morris MD    Patient information was obtained from patient.     Consults  Subjective:     Chief Complaint bilateral leg pain and swelling    History of Present Illness:  82-year-old female with a past medical history significant for diabetes and gout here today for evaluation of chronic bilateral lower extremity leg swelling with pain.  Patient has undergone arterial ultrasound showing no hemodynamically significant stenosis as well as venous ultrasounds evaluating for DVTs which was negative.  Patient has tried compression therapy with little relief.  There was some benefit with leg elevation however this is limited due to a recent hip replacement.  Denies shortness of breath or chest pain.  Denies previous history of DVTs.    Current Outpatient Medications   Medication    albuterol (PROVENTIL/VENTOLIN HFA) 90 mcg/actuation inhaler    allopurinoL (ZYLOPRIM) 100 MG tablet    aspirin 81 MG Chew    blood glucose control high,low Soln    blood sugar diagnostic Strp    blood-glucose meter (ACCU-CHEK DARSHAN PLUS METER) Misc    furosemide (LASIX) 40 MG tablet    lancets (FREESTYLE LANCETS) 28 gauge Misc    latanoprost 0.005 % ophthalmic solution    simvastatin (ZOCOR) 20 MG tablet    timolol maleate 0.5% (TIMOPTIC) 0.5 % Drop    timoloL maleate, PF, 0.5 % Dpet    triamcinolone acetonide 0.025% (KENALOG) 0.025 % cream    bisoprolol (ZEBETA) 5 MG tablet     No current facility-administered medications for this visit.       Review of patient's allergies indicates:   Allergen Reactions    Iodine and iodide containing products Nausea And Vomiting    Penicillins Itching    Ultram [tramadol] Hives and Itching     Light headness, itiching    Sulfa (sulfonamide  antibiotics) Hives and Itching    Amlodipine      edema    Bactrim [sulfamethoxazole-trimethoprim] Itching and Rash       Past Medical History:   Diagnosis Date    Anemia     Angina pectoris     Arthritis     Asthma     Back pain     Bronchitis     CAD (coron[facility]) [facility]    Carotid artery stenosis     Chronic bronchitis     Chronic gout     Colon polyp     CTS (carpal tunnel syndrome)     Diabetes mellitus, type 2     Diabetes with neurologic complications     Diverticulosis     RIVAS (dyspnea on exertion)     chronic; eval pulm dr natarajan    Dyslipidemia     Ex-smoker     quit in her 30s    GERD (gastroesophageal reflux disease)     Heart failure     Hyperlipidemia     Hypertension     Neuropathy, lower extremity     Obesity     Open-angle glaucoma     dr avel kaur    Peripheral vascular disease     Polyneuropathy     Tobacco dependence      Past Surgical History:   Procedure Laterality Date    APPENDECTOMY      CARPAL TUNNEL RELEASE Right     CATARACT EXTRACTION, BILATERAL      CHOLECYSTECTOMY      CYST REMOVAL  2018    DILATION AND CURETTAGE OF UTERUS      EYE SURGERY      HEMORRHOID SURGERY      HYSTERECTOMY      fibroids    INCISION AND DRAINAGE PERIRECTAL ABSCESS      JOINT REPLACEMENT Bilateral     knee    KNEE ARTHROSCOPY Right     MYOMECTOMY       Family History       Problem Relation (Age of Onset)    Asthma Maternal Grandfather    Cancer Sister, Maternal Uncle    Diabetes Mother, Maternal Grandmother    Heart disease Maternal Aunt, Maternal Uncle    Hypertension Mother, Sister    Leukemia Sister    Miscarriages / Stillbirths Maternal Uncle          Tobacco Use    Smoking status: Former     Packs/day: 0.25     Years: 24.00     Pack years: 6.00     Types: Cigarettes     Quit date: 1976     Years since quittin.6    Smokeless tobacco: Never   Substance and Sexual Activity    Alcohol use: No    Drug use: No    Sexual activity: Not Currently     Review of Systems    Constitutional:  Negative for activity change, appetite change, fatigue and fever.   HENT:  Negative for congestion.    Eyes:  Negative for photophobia, redness and visual disturbance.   Respiratory:  Negative for apnea, cough, chest tightness and shortness of breath.    Cardiovascular:  Positive for leg swelling. Negative for chest pain.   Gastrointestinal:  Negative for abdominal pain, nausea and vomiting.   Genitourinary:  Negative for difficulty urinating.   Musculoskeletal:  Negative for gait problem and myalgias.   Skin:  Negative for color change, rash and wound.   Neurological:  Negative for syncope, facial asymmetry, speech difficulty, weakness and numbness.   Objective:     Vital Signs (Most Recent):    Vital Signs (24h Range):  [unfilled]     Weight: 95.8 kg (211 lb 3.2 oz)  Body mass index is 34.09 kg/m².            [unfilled]    Physical Exam  Constitutional:       Appearance: Normal appearance. She is normal weight.   HENT:      Head: Normocephalic and atraumatic.      Mouth/Throat:      Mouth: Mucous membranes are moist.   Eyes:      Extraocular Movements: Extraocular movements intact.      Conjunctiva/sclera: Conjunctivae normal.      Pupils: Pupils are equal, round, and reactive to light.   Neck:      Vascular: No carotid bruit.   Cardiovascular:      Rate and Rhythm: Normal rate and regular rhythm.      Pulses: Normal pulses.           Femoral pulses are 2+ on the right side and 2+ on the left side.       Dorsalis pedis pulses are 2+ on the right side and 2+ on the left side.   Pulmonary:      Effort: Pulmonary effort is normal.      Breath sounds: Normal breath sounds.   Abdominal:      General: Abdomen is flat. Bowel sounds are normal.      Palpations: Abdomen is soft.   Musculoskeletal:      Cervical back: Neck supple.      Right lower le+ Edema present.      Left lower le+ Edema present.   Skin:     General: Skin is warm.      Capillary Refill: Capillary refill takes less than 2  seconds.   Neurological:      General: No focal deficit present.      Mental Status: She is alert and oriented to person, place, and time. Mental status is at baseline.      Cranial Nerves: No cranial nerve deficit.      Sensory: No sensory deficit.      Motor: No weakness.   Psychiatric:         Mood and Affect: Mood normal.         Behavior: Behavior normal.       Significant Labs:  I have reviewed all pertinent lab results within the past 24 hours.    Significant Diagnostics:  I have reviewed all pertinent imaging results/findings within the past 24 hours.  U/S: I have reviewed all pertinent results/findings within the past 24 hours and my personal findings are:  No evidence of arterial insufficiency.  No DVTs.    Assessment/Plan:     There are no hospital problems to display for this patient.      Symptoms seemed consistent with chronic venous insufficiency.  Will obtain chronic venous ultrasound to evaluate for reflux.  Patient instructed to continue compression therapy and leg elevation as tolerated.    Thank you for your consult. I will follow-up with patient. Please contact us if you have any additional questions.    Garret Tate IV, MD  Vascular Surgery  [facility] Vascular Surgery

## 2023-01-27 ENCOUNTER — TELEPHONE (OUTPATIENT)
Dept: ADMINISTRATIVE | Facility: HOSPITAL | Age: 83
End: 2023-01-27

## 2023-02-01 ENCOUNTER — OFFICE VISIT (OUTPATIENT)
Dept: CARDIOLOGY | Facility: CLINIC | Age: 83
End: 2023-02-01
Payer: MEDICARE

## 2023-02-01 ENCOUNTER — TELEPHONE (OUTPATIENT)
Dept: INTERNAL MEDICINE | Facility: CLINIC | Age: 83
End: 2023-02-01
Payer: MEDICARE

## 2023-02-01 VITALS
BODY MASS INDEX: 34.41 KG/M2 | WEIGHT: 213.19 LBS | SYSTOLIC BLOOD PRESSURE: 131 MMHG | DIASTOLIC BLOOD PRESSURE: 61 MMHG | HEART RATE: 75 BPM

## 2023-02-01 DIAGNOSIS — I15.2 HYPERTENSION ASSOCIATED WITH DIABETES: ICD-10-CM

## 2023-02-01 DIAGNOSIS — E11.42 TYPE 2 DIABETES MELLITUS WITH DIABETIC POLYNEUROPATHY, WITHOUT LONG-TERM CURRENT USE OF INSULIN: ICD-10-CM

## 2023-02-01 DIAGNOSIS — E78.5 HYPERLIPIDEMIA, UNSPECIFIED HYPERLIPIDEMIA TYPE: ICD-10-CM

## 2023-02-01 DIAGNOSIS — I50.42 CHRONIC COMBINED SYSTOLIC AND DIASTOLIC HEART FAILURE: ICD-10-CM

## 2023-02-01 DIAGNOSIS — M1A.9XX0 CHRONIC GOUT WITHOUT TOPHUS, UNSPECIFIED CAUSE, UNSPECIFIED SITE: ICD-10-CM

## 2023-02-01 DIAGNOSIS — R06.09 DOE (DYSPNEA ON EXERTION): ICD-10-CM

## 2023-02-01 DIAGNOSIS — R60.9 DEPENDENT EDEMA: ICD-10-CM

## 2023-02-01 DIAGNOSIS — I77.9 CAROTID ARTERY DISEASE, UNSPECIFIED LATERALITY, UNSPECIFIED TYPE: ICD-10-CM

## 2023-02-01 DIAGNOSIS — I25.10 CORONARY ARTERY DISEASE INVOLVING NATIVE CORONARY ARTERY OF NATIVE HEART WITHOUT ANGINA PECTORIS: ICD-10-CM

## 2023-02-01 DIAGNOSIS — E78.2 MIXED HYPERLIPIDEMIA: Chronic | ICD-10-CM

## 2023-02-01 DIAGNOSIS — I35.0 NONRHEUMATIC AORTIC (VALVE) STENOSIS: ICD-10-CM

## 2023-02-01 DIAGNOSIS — I73.9 ARTERIAL INSUFFICIENCY OF LOWER EXTREMITY: ICD-10-CM

## 2023-02-01 DIAGNOSIS — I50.32 CHRONIC DIASTOLIC HEART FAILURE: Primary | ICD-10-CM

## 2023-02-01 DIAGNOSIS — E11.59 HYPERTENSION ASSOCIATED WITH DIABETES: ICD-10-CM

## 2023-02-01 DIAGNOSIS — I70.0 AORTIC ATHEROSCLEROSIS: ICD-10-CM

## 2023-02-01 PROCEDURE — 99214 OFFICE O/P EST MOD 30 MIN: CPT | Mod: S$GLB,,, | Performed by: INTERNAL MEDICINE

## 2023-02-01 PROCEDURE — 1159F MED LIST DOCD IN RCRD: CPT | Mod: CPTII,S$GLB,, | Performed by: INTERNAL MEDICINE

## 2023-02-01 PROCEDURE — 3075F PR MOST RECENT SYSTOLIC BLOOD PRESS GE 130-139MM HG: ICD-10-PCS | Mod: CPTII,S$GLB,, | Performed by: INTERNAL MEDICINE

## 2023-02-01 PROCEDURE — 3072F PR LOW RISK FOR RETINOPATHY: ICD-10-PCS | Mod: CPTII,S$GLB,, | Performed by: INTERNAL MEDICINE

## 2023-02-01 PROCEDURE — 99214 PR OFFICE/OUTPT VISIT, EST, LEVL IV, 30-39 MIN: ICD-10-PCS | Mod: S$GLB,,, | Performed by: INTERNAL MEDICINE

## 2023-02-01 PROCEDURE — 1125F PR PAIN SEVERITY QUANTIFIED, PAIN PRESENT: ICD-10-PCS | Mod: CPTII,S$GLB,, | Performed by: INTERNAL MEDICINE

## 2023-02-01 PROCEDURE — 3288F PR FALLS RISK ASSESSMENT DOCUMENTED: ICD-10-PCS | Mod: CPTII,S$GLB,, | Performed by: INTERNAL MEDICINE

## 2023-02-01 PROCEDURE — 3288F FALL RISK ASSESSMENT DOCD: CPT | Mod: CPTII,S$GLB,, | Performed by: INTERNAL MEDICINE

## 2023-02-01 PROCEDURE — 1159F PR MEDICATION LIST DOCUMENTED IN MEDICAL RECORD: ICD-10-PCS | Mod: CPTII,S$GLB,, | Performed by: INTERNAL MEDICINE

## 2023-02-01 PROCEDURE — 1125F AMNT PAIN NOTED PAIN PRSNT: CPT | Mod: CPTII,S$GLB,, | Performed by: INTERNAL MEDICINE

## 2023-02-01 PROCEDURE — 3072F LOW RISK FOR RETINOPATHY: CPT | Mod: CPTII,S$GLB,, | Performed by: INTERNAL MEDICINE

## 2023-02-01 PROCEDURE — 3078F PR MOST RECENT DIASTOLIC BLOOD PRESSURE < 80 MM HG: ICD-10-PCS | Mod: CPTII,S$GLB,, | Performed by: INTERNAL MEDICINE

## 2023-02-01 PROCEDURE — 1101F PT FALLS ASSESS-DOCD LE1/YR: CPT | Mod: CPTII,S$GLB,, | Performed by: INTERNAL MEDICINE

## 2023-02-01 PROCEDURE — 3078F DIAST BP <80 MM HG: CPT | Mod: CPTII,S$GLB,, | Performed by: INTERNAL MEDICINE

## 2023-02-01 PROCEDURE — 99999 PR PBB SHADOW E&M-EST. PATIENT-LVL III: CPT | Mod: PBBFAC,,, | Performed by: INTERNAL MEDICINE

## 2023-02-01 PROCEDURE — 3075F SYST BP GE 130 - 139MM HG: CPT | Mod: CPTII,S$GLB,, | Performed by: INTERNAL MEDICINE

## 2023-02-01 PROCEDURE — 99999 PR PBB SHADOW E&M-EST. PATIENT-LVL III: ICD-10-PCS | Mod: PBBFAC,,, | Performed by: INTERNAL MEDICINE

## 2023-02-01 PROCEDURE — 1101F PR PT FALLS ASSESS DOC 0-1 FALLS W/OUT INJ PAST YR: ICD-10-PCS | Mod: CPTII,S$GLB,, | Performed by: INTERNAL MEDICINE

## 2023-02-01 RX ORDER — SIMVASTATIN 20 MG/1
20 TABLET, FILM COATED ORAL NIGHTLY
Qty: 90 TABLET | Refills: 3 | Status: SHIPPED | OUTPATIENT
Start: 2023-02-01 | End: 2024-01-26

## 2023-02-01 RX ORDER — FUROSEMIDE 40 MG/1
40 TABLET ORAL DAILY
Qty: 90 TABLET | Refills: 3 | Status: SHIPPED | OUTPATIENT
Start: 2023-02-01 | End: 2024-01-29

## 2023-02-01 RX ORDER — BISOPROLOL FUMARATE 5 MG/1
5 TABLET, FILM COATED ORAL DAILY
Qty: 90 TABLET | Refills: 3 | Status: SHIPPED | OUTPATIENT
Start: 2023-02-01 | End: 2024-01-26

## 2023-02-01 RX ORDER — ALBUTEROL SULFATE 90 UG/1
2 AEROSOL, METERED RESPIRATORY (INHALATION) EVERY 6 HOURS PRN
Qty: 18 G | Refills: 11 | Status: SHIPPED | OUTPATIENT
Start: 2023-02-01 | End: 2023-02-15

## 2023-02-01 RX ORDER — ALLOPURINOL 100 MG/1
100 TABLET ORAL DAILY
Qty: 90 TABLET | Refills: 3 | Status: SHIPPED | OUTPATIENT
Start: 2023-02-01 | End: 2024-02-15 | Stop reason: SDUPTHER

## 2023-02-01 NOTE — PROGRESS NOTES
Subjective:   Patient ID:  Kaleigh Nieves is a 82 y.o. female who presents for evaluation of No chief complaint on file.    2.1.2023  Comes in for a 6 months follow up   Denies any worsening or unusual mayberry   She states the lasix is not helping with her chronic lower ext swelling   She follows with [facility] for venous insufficiency   She denies any orthopnea   Bnp normal multiple times       6.2022  Improved symptoms after increasing lasix and adding bisoprolol last visit  Still has NYHA BUT 1 Now  No chest pain   Trace edema, wearing compression stocking   Sometimes has wheezing and uses ventoling   Normal BNP multiple times   Mild AS AI last year     12.2021  Comes in for 6 months follow   Denies any CP, reports MAYBERRY NYHA 1-2, Increased leg swelling, states lasix didn't make a big difference on 20 mg only   BP uncontrolled   Denies PND, orthopnea  No palpitaitons  No syncope or LOC       Interval hx: 5/26/2021   Went to the ED last month with sharp, LUQ pain, underneath her left breast, sharp few seconds only, across her belly, felt like gas as per patient and she felt better once she started to pass gas   Denies any exertional chest pain   Has stable mayberry, nyha1 , had normal BNP , TROP AND EKG in the ED, had very elevated  SBP   Today bp under good control   No more stomach pain   See ros     =================================================  HPI 2/1/2021  80-year-old female with history of diastolic heart failure, diabetes, hypertension, hyperlipidemia, gout, carotid artery disease and normal angiogram 2010  Comes in for follow up   Continues to have MAYBERRY , NYHA 2, Orthopnea, mild bilateral LE edema R>L   Compliant with low salt diet   Denies any chest pain   /750 at home     ==================================================================================================================================    2018: seen by Dr Jane   79 yo female, referred for CHF. Prior cardiologist Dr. Crawford at [facility].due to  f/h CHF  PMH DM, HTN, HLD, gout, carotid artery Dz and had clear LHC in 2010.  No chest pain, palpitation, dizziness, orthopnea.  Some leg swelling,   RIVAS stable  .ECHO showed normal EF and LVH; MPI showed no ischemia  : BNP 34; BARRON wnl  ekg NSR    Shortness of Breath  Associated symptoms include leg swelling. Pertinent negatives include no abdominal pain, chest pain, claudication, fever, headaches, neck pain, orthopnea, PND, rash, sputum production, syncope, vomiting or wheezing.     Current Outpatient Medications:     aspirin 81 MG Chew, Take 81 mg by mouth once daily., Disp: , Rfl:     blood glucose control high,low Soln, 1 each by Misc.(Non-Drug; Combo Route) route once a week., Disp: 1 each, Rfl: 11    blood sugar diagnostic Strp, 1 each by Misc.(Non-Drug; Combo Route) route 2 (two) times a day., Disp: 200 each, Rfl: 11    blood-glucose meter (ACCU-CHEK DARSHAN PLUS METER) Misc, USE AS DIRECTED, Disp: 100 each, Rfl: 11    lancets (FREESTYLE LANCETS) 28 gauge Misc, USE AS DIRECTED BY YOUR PHYSICIAN TWO TIMES A DAY Strength: 28 gauge, Disp: 200 each, Rfl: 11    latanoprost 0.005 % ophthalmic solution, Place 1 drop into both eyes once daily., Disp: 7.5 mL, Rfl: 3    timolol maleate 0.5% (TIMOPTIC) 0.5 % Drop, Place 1 drop into both eyes 2 (two) times daily., Disp: 30 mL, Rfl: 4    timoloL maleate, PF, 0.5 % Dpet, Place 1 drop into both eyes 2 (two) times a day., Disp: 60 each, Rfl: 3    triamcinolone acetonide 0.025% (KENALOG) 0.025 % cream, , Disp: , Rfl:     albuterol (PROVENTIL/VENTOLIN HFA) 90 mcg/actuation inhaler, Inhale 2 puffs into the lungs every 6 (six) hours as needed for Wheezing. Rescue, Disp: 18 g, Rfl: 11    allopurinoL (ZYLOPRIM) 100 MG tablet, Take 1 tablet (100 mg total) by mouth once daily., Disp: 90 tablet, Rfl: 3    bisoprolol (ZEBETA) 5 MG tablet, Take 1 tablet (5 mg total) by mouth once daily., Disp: 90 tablet, Rfl: 3    furosemide (LASIX) 40 MG tablet, Take 1 tablet (40 mg  total) by mouth once daily., Disp: 90 tablet, Rfl: 3    simvastatin (ZOCOR) 20 MG tablet, Take 1 tablet (20 mg total) by mouth nightly., Disp: 90 tablet, Rfl: 3    Past Medical History:   Diagnosis Date    Anemia     Angina pectoris     Arthritis     Asthma     Back pain     Bronchitis     [facility] (coronary [facility]) [facility]    Carotid artery stenosis     Chronic bronchitis     Chronic gout     Colon polyp     CTS (carpal tunnel syndrome)     Diabetes mellitus, type 2     Diabetes with neurologic complications     Diverticulosis     RIVAS (dyspnea on exertion)     chronic; eval pulm dr natarajan    Dyslipidemia     Ex-smoker     quit in her 30s    GERD (gastroesophageal reflux disease)     Heart failure     Hyperlipidemia     Hypertension     Neuropathy, lower extremity     Obesity     Open-angle glaucoma     dr avel kaur    Peripheral vascular disease     Polyneuropathy     Tobacco dependence        Past Surgical History:   Procedure Laterality Date    APPENDECTOMY      CARPAL TUNNEL RELEASE Right     CATARACT EXTRACTION, BILATERAL      CHOLECYSTECTOMY      CYST REMOVAL  2018    DILATION AND CURETTAGE OF UTERUS      EYE SURGERY      HEMORRHOID SURGERY      HYSTERECTOMY      fibroids    INCISION AND DRAINAGE PERIRECTAL ABSCESS      JOINT REPLACEMENT Bilateral     knee    KNEE ARTHROSCOPY Right     MYOMECTOMY         Social History     Tobacco Use    Smoking status: Former     Packs/day: 0.25     Years: 24.00     Pack years: 6.00     Types: Cigarettes     Quit date: 1976     Years since quittin.6    Smokeless tobacco: Never   Substance Use Topics    Alcohol use: No    Drug use: No       Family History   Problem Relation Age of Onset    Hypertension Mother     Diabetes Mother     Leukemia Sister     Hypertension Sister     Cancer Sister         leukemia    Heart disease Maternal Aunt     Cancer Maternal Uncle         gastric, pacreatic    Heart disease Maternal Uncle     Miscarriages /  Stillbirths Maternal Uncle     Diabetes Maternal Grandmother     Asthma Maternal Grandfather     Breast cancer Neg Hx     Colon cancer Neg Hx     Ovarian cancer Neg Hx     COPD Neg Hx     Hyperlipidemia Neg Hx     Kidney disease Neg Hx        Review of Systems   Constitutional: Negative for decreased appetite, diaphoresis, fever, malaise/fatigue and night sweats.   HENT:  Negative for nosebleeds.    Eyes:  Negative for blurred vision and double vision.   Cardiovascular:  Positive for leg swelling. Negative for chest pain, claudication, dyspnea on exertion, irregular heartbeat, near-syncope, orthopnea, palpitations, paroxysmal nocturnal dyspnea and syncope.   Respiratory:  Positive for shortness of breath. Negative for cough, sleep disturbances due to breathing, snoring, sputum production and wheezing.    Endocrine: Negative for cold intolerance and polyuria.   Hematologic/Lymphatic: Does not bruise/bleed easily.   Skin:  Negative for rash.   Musculoskeletal:  Negative for back pain, falls, joint pain, joint swelling and neck pain.   Gastrointestinal:  Negative for abdominal pain, heartburn, nausea and vomiting.   Genitourinary:  Negative for dysuria, frequency and hematuria.   Neurological:  Negative for difficulty with concentration, dizziness, focal weakness, headaches, light-headedness, numbness, seizures and weakness.   Psychiatric/Behavioral:  Negative for depression, memory loss and substance abuse. The patient does not have insomnia.    Allergic/Immunologic: Negative for HIV exposure and hives.     Objective:  Last 3 sets of Vitals    Vitals - 1 value per visit 1/25/2023 2/1/2023 2/1/2023   SYSTOLIC - - 131   DIASTOLIC - - 61   Pulse - - 75   Temp - - -   Resp - - -   SPO2 - - -   Weight (lb) 211.2 - 213.19   Weight (kg) 95.8 - 96.7   Height - - -   BMI (Calculated) - - -   VISIT REPORT - - -   Pain Score  - 6 -   Some recent data might be hidden        Physical Exam  HENT:      Head: Normocephalic.   Eyes:       Pupils: Pupils are equal, round, and reactive to light.   Neck:      Thyroid: No thyromegaly.      Vascular: Normal carotid pulses. No carotid bruit or JVD.   Cardiovascular:      Rate and Rhythm: Normal rate and regular rhythm. No extrasystoles are present.     Chest Wall: PMI is not displaced.      Pulses: Normal pulses.      Heart sounds: Murmur heard.     No gallop. No S3 sounds.      Comments: ESM 3/6 on URSB and along LSB  Pulmonary:      Effort: No respiratory distress.      Breath sounds: Normal breath sounds. No stridor.   Abdominal:      General: Bowel sounds are normal.      Palpations: Abdomen is soft.      Tenderness: There is no abdominal tenderness. There is no rebound.   Musculoskeletal:         General: Swelling present. Normal range of motion.      Comments: Trace edema at ankle   Skin:     Findings: No rash.   Neurological:      Mental Status: She is alert and oriented to person, place, and time.   Psychiatric:         Behavior: Behavior normal.   2017   -------  Real-time, color flow and Doppler imaging performed.    Atherosclerotic plaque noted within the bilateral bulb regions and RIGHT proximal ICA and ECA.                     ICA                                    R                               L  Peak systolic velocity:          88  Cm/sec               133 cm/sec  Peak diastolic velocity:        18 Cm/sec                   20 cm/sec  Systolic velocity ratio:          1.1                             1.2  Diastolic velocity ratio:         1.6                             1.8  Vertebral artery flow:            Antegrade                             antegrade  ECA flow:                                 Antegrade                             antegrade  IMPRESSION:     1.  Stable exam.   2.  No hemodynamically significant plaque formation or stenosis on the RIGHT.   3.  Stable findings on the LEFT with minimally elevated to PSV within the ICA.  Stenosis is in the less than 50 percent range.    4.  Followup and/or further evaluation as warranted.  ==============================================================================   Impression: NORMAL MYOCARDIAL PERFUSION 2016  1. The perfusion scan is free of evidence for myocardial ischemia or injury.   2. There is a mild intensity fixed defect in the anterior wall of the left ventricle, secondary to breast attenuation.   3. Resting wall motion is physiologic.   4. Resting LV function is normal.   5. The ventricular volumes are normal at rest and stress.   6. The extracardiac distribution of radioactivity is normal.   ==============================================================================  2017  The right ankle brachial index was 1.24 which is normal.   The left ankle brachial index was 0.99 which suggests minimal left lower extremity arterial disease.   The right TBI is 0.45.   The left TBI is 0.49.   ==============================================================================  CONCLUSIONS 2018    1 - Hyperdynamic left ventricular systolic function (EF >70%).     2 - Indeterminate LV diastolic function.     3 - Normal right ventricular systolic function .     4 - Concentric remodeling.     5 - No wall motion abnormalities.  Lab Results   Component Value Date    CHOL 136 03/21/2022    CHOL 147 01/27/2021    CHOL 149 12/06/2019     Lab Results   Component Value Date    HDL 33 (L) 03/21/2022    HDL 39 (L) 01/27/2021    HDL 39 (L) 12/06/2019     Lab Results   Component Value Date    LDLCALC 84.8 03/21/2022    LDLCALC 89.8 01/27/2021    LDLCALC 95.6 12/06/2019     Lab Results   Component Value Date    TRIG 91 03/21/2022    TRIG 91 01/27/2021    TRIG 72 12/06/2019     Lab Results   Component Value Date    CHOLHDL 24.3 03/21/2022    CHOLHDL 26.5 01/27/2021    CHOLHDL 26.2 12/06/2019       Chemistry        Component Value Date/Time     08/31/2022 0927    K 5.2 (H) 08/31/2022 0927    CL 96 08/31/2022 0927    CO2 35 (H) 08/31/2022 0927    BUN 10  08/31/2022 0927    CREATININE 0.7 08/31/2022 0927     (H) 08/31/2022 0927        Component Value Date/Time    CALCIUM 10.0 08/31/2022 0927    ALKPHOS 25 (L) 08/31/2022 0927    AST 17 08/31/2022 0927    ALT 18 08/31/2022 0927    BILITOT 0.6 08/31/2022 0927    ESTGFRAFRICA >60.0 03/21/2022 1435    EGFRNONAA >60.0 03/21/2022 1435          Lab Results   Component Value Date    HGBA1C 5.7 (H) 08/31/2022     Lab Results   Component Value Date    TSH 1.091 06/01/2022     No results found for: INR, PROTIME  Lab Results   Component Value Date    WBC 4.47 08/31/2022    HGB 14.6 08/31/2022    HCT 46.2 08/31/2022    MCV 90 08/31/2022     08/31/2022     BNP  @LABRCNTIP(BNP,BNPTRIAGEBLO)@  CrCl cannot be calculated (Patient's most recent lab result is older than the maximum 7 days allowed.).  No results found in the last 24 hours.  No results found in the last 24 hours.  No results found in the last 24 hours.      Conclusion 2/2021    The left ventricle is small with concentric remodeling and hyperdynamic systolic function. The estimated ejection fraction is 75%  Indeterminate left ventricular diastolic function.  Normal right ventricular size with low normal right ventricular systolic function.  There is mild aortic valve stenosis.  Aortic valve area is 3.17 cm2; peak velocity is 2.49 m/s; mean gradient is 13 mmHg.  Mild-to-moderate aortic regurgitation.  Normal central venous pressure (3 mmHg).  The estimated PA systolic pressure is 19 mmHg.     Conclusion    There is 40-49% right Internal Carotid Stenosis.  There is 50-59% left Internal Carotid Stenosis.        Impression:     1. Atherosclerosis with no evidence of flow-limiting carotid stenosis greater than 50% on the right  2. Atherosclerosis with mildly elevated velocities suggesting 50-69% stenosis of the left ICA.  Velocities are similar to prior.  .        Electronically signed by: Liam Espinoza MD  Date:                                             06/01/2022  Time:                                           10:12    ECG reviewed January 25, 2021  Normal sinus rhythm   Normal ECG  Assessment:     Chronic diastolic heart failure    Aortic atherosclerosis    Arterial insufficiency of lower extremity    Dependent edema    Hypertension associated with diabetes    Coronary artery disease involving native coronary artery of native heart without angina pectoris    Carotid artery disease, unspecified laterality, unspecified type    Type 2 diabetes mellitus with diabetic polyneuropathy, without long-term current use of insulin    Chronic gout without tophus, unspecified cause, unspecified site  -     allopurinoL (ZYLOPRIM) 100 MG tablet; Take 1 tablet (100 mg total) by mouth once daily.  Dispense: 90 tablet; Refill: 3    Mixed hyperlipidemia    Nonrheumatic aortic (valve) stenosis     Chronic combined systolic and diastolic heart failure  -     bisoprolol (ZEBETA) 5 MG tablet; Take 1 tablet (5 mg total) by mouth once daily.  Dispense: 90 tablet; Refill: 3  -     furosemide (LASIX) 40 MG tablet; Take 1 tablet (40 mg total) by mouth once daily.  Dispense: 90 tablet; Refill: 3    RIVAS (dyspnea on exertion)  -     bisoprolol (ZEBETA) 5 MG tablet; Take 1 tablet (5 mg total) by mouth once daily.  Dispense: 90 tablet; Refill: 3  -     furosemide (LASIX) 40 MG tablet; Take 1 tablet (40 mg total) by mouth once daily.  Dispense: 90 tablet; Refill: 3  -     albuterol (PROVENTIL/VENTOLIN HFA) 90 mcg/actuation inhaler; Inhale 2 puffs into the lungs every 6 (six) hours as needed for Wheezing. Rescue  Dispense: 18 g; Refill: 11    Hyperlipidemia, unspecified hyperlipidemia type  -     simvastatin (ZOCOR) 20 MG tablet; Take 1 tablet (20 mg total) by mouth nightly.  Dispense: 90 tablet; Refill: 3           Continue with current medical management.   cw lasix to 40 mg daily , will not increase  Cw compression stocks and leg elevation, she does not do it properly and explained how to do it    cw asa, zocor,   Low salt diet   cw bisoprolol for better BP control   Exercise in the form of walking 30 min+ a day   Mild AS , echo q3-5 years   BP GOOD control   Carotid us reviewed   ldl 85 , less than half of baseline , given age will continue with same dose of statin  Reviewed all tests and above medical conditions with patient in detail and formulated treatment plan.  Risk factor modification discussed.   Cardiac low salt diet discussed.  Maintaining healthy weight and weight loss goals were discussed in clinic.  rtc in 6 months

## 2023-03-03 ENCOUNTER — LAB VISIT (OUTPATIENT)
Dept: LAB | Facility: HOSPITAL | Age: 83
End: 2023-03-03
Attending: NURSE PRACTITIONER
Payer: MEDICARE

## 2023-03-03 DIAGNOSIS — E78.2 MIXED HYPERLIPIDEMIA: ICD-10-CM

## 2023-03-03 DIAGNOSIS — E11.42 TYPE 2 DIABETES MELLITUS WITH DIABETIC POLYNEUROPATHY, WITHOUT LONG-TERM CURRENT USE OF INSULIN: ICD-10-CM

## 2023-03-03 LAB
ALBUMIN SERPL BCP-MCNC: 3.7 G/DL (ref 3.5–5.2)
ALP SERPL-CCNC: 23 U/L (ref 55–135)
ALT SERPL W/O P-5'-P-CCNC: 15 U/L (ref 10–44)
ANION GAP SERPL CALC-SCNC: 8 MMOL/L (ref 8–16)
AST SERPL-CCNC: 16 U/L (ref 10–40)
BILIRUB SERPL-MCNC: 0.5 MG/DL (ref 0.1–1)
BUN SERPL-MCNC: 6 MG/DL (ref 8–23)
CALCIUM SERPL-MCNC: 9.3 MG/DL (ref 8.7–10.5)
CHLORIDE SERPL-SCNC: 96 MMOL/L (ref 95–110)
CHOLEST SERPL-MCNC: 140 MG/DL (ref 120–199)
CHOLEST/HDLC SERPL: 3.6 {RATIO} (ref 2–5)
CO2 SERPL-SCNC: 34 MMOL/L (ref 23–29)
CREAT SERPL-MCNC: 0.7 MG/DL (ref 0.5–1.4)
EST. GFR  (NO RACE VARIABLE): >60 ML/MIN/1.73 M^2
ESTIMATED AVG GLUCOSE: 126 MG/DL (ref 68–131)
GLUCOSE SERPL-MCNC: 114 MG/DL (ref 70–110)
HBA1C MFR BLD: 6 % (ref 4–5.6)
HDLC SERPL-MCNC: 39 MG/DL (ref 40–75)
HDLC SERPL: 27.9 % (ref 20–50)
LDLC SERPL CALC-MCNC: 82.6 MG/DL (ref 63–159)
NONHDLC SERPL-MCNC: 101 MG/DL
POTASSIUM SERPL-SCNC: 5.3 MMOL/L (ref 3.5–5.1)
PROT SERPL-MCNC: 7.2 G/DL (ref 6–8.4)
SODIUM SERPL-SCNC: 138 MMOL/L (ref 136–145)
TRIGL SERPL-MCNC: 92 MG/DL (ref 30–150)

## 2023-03-03 PROCEDURE — 83036 HEMOGLOBIN GLYCOSYLATED A1C: CPT | Performed by: NURSE PRACTITIONER

## 2023-03-03 PROCEDURE — 80061 LIPID PANEL: CPT | Performed by: NURSE PRACTITIONER

## 2023-03-03 PROCEDURE — 36415 COLL VENOUS BLD VENIPUNCTURE: CPT | Mod: PO | Performed by: NURSE PRACTITIONER

## 2023-03-03 PROCEDURE — 80053 COMPREHEN METABOLIC PANEL: CPT | Mod: PO | Performed by: NURSE PRACTITIONER

## 2023-03-16 ENCOUNTER — OFFICE VISIT (OUTPATIENT)
Dept: PODIATRY | Facility: CLINIC | Age: 83
End: 2023-03-16
Payer: MEDICARE

## 2023-03-16 VITALS — HEIGHT: 66 IN | BODY MASS INDEX: 34.23 KG/M2 | WEIGHT: 213 LBS

## 2023-03-16 DIAGNOSIS — E11.9 ENCOUNTER FOR COMPREHENSIVE DIABETIC FOOT EXAMINATION, TYPE 2 DIABETES MELLITUS: Primary | ICD-10-CM

## 2023-03-16 DIAGNOSIS — E11.51 TYPE II DIABETES MELLITUS WITH PERIPHERAL CIRCULATORY DISORDER: ICD-10-CM

## 2023-03-16 DIAGNOSIS — B35.1 DERMATOPHYTOSIS OF NAIL: ICD-10-CM

## 2023-03-16 PROCEDURE — 3072F LOW RISK FOR RETINOPATHY: CPT | Mod: CPTII,S$GLB,, | Performed by: PODIATRIST

## 2023-03-16 PROCEDURE — 1126F AMNT PAIN NOTED NONE PRSNT: CPT | Mod: CPTII,S$GLB,, | Performed by: PODIATRIST

## 2023-03-16 PROCEDURE — 99999 PR PBB SHADOW E&M-EST. PATIENT-LVL III: CPT | Mod: PBBFAC,,, | Performed by: PODIATRIST

## 2023-03-16 PROCEDURE — 99213 OFFICE O/P EST LOW 20 MIN: CPT | Mod: 25,S$GLB,, | Performed by: PODIATRIST

## 2023-03-16 PROCEDURE — 1101F PR PT FALLS ASSESS DOC 0-1 FALLS W/OUT INJ PAST YR: ICD-10-PCS | Mod: CPTII,S$GLB,, | Performed by: PODIATRIST

## 2023-03-16 PROCEDURE — 11721 PR DEBRIDEMENT OF NAILS, 6 OR MORE: ICD-10-PCS | Mod: Q8,S$GLB,, | Performed by: PODIATRIST

## 2023-03-16 PROCEDURE — 11721 DEBRIDE NAIL 6 OR MORE: CPT | Mod: Q8,S$GLB,, | Performed by: PODIATRIST

## 2023-03-16 PROCEDURE — 1159F PR MEDICATION LIST DOCUMENTED IN MEDICAL RECORD: ICD-10-PCS | Mod: CPTII,S$GLB,, | Performed by: PODIATRIST

## 2023-03-16 PROCEDURE — 1101F PT FALLS ASSESS-DOCD LE1/YR: CPT | Mod: CPTII,S$GLB,, | Performed by: PODIATRIST

## 2023-03-16 PROCEDURE — 1160F PR REVIEW ALL MEDS BY PRESCRIBER/CLIN PHARMACIST DOCUMENTED: ICD-10-PCS | Mod: CPTII,S$GLB,, | Performed by: PODIATRIST

## 2023-03-16 PROCEDURE — 3072F PR LOW RISK FOR RETINOPATHY: ICD-10-PCS | Mod: CPTII,S$GLB,, | Performed by: PODIATRIST

## 2023-03-16 PROCEDURE — 99999 PR PBB SHADOW E&M-EST. PATIENT-LVL III: ICD-10-PCS | Mod: PBBFAC,,, | Performed by: PODIATRIST

## 2023-03-16 PROCEDURE — 1160F RVW MEDS BY RX/DR IN RCRD: CPT | Mod: CPTII,S$GLB,, | Performed by: PODIATRIST

## 2023-03-16 PROCEDURE — 99213 PR OFFICE/OUTPT VISIT, EST, LEVL III, 20-29 MIN: ICD-10-PCS | Mod: 25,S$GLB,, | Performed by: PODIATRIST

## 2023-03-16 PROCEDURE — 3288F PR FALLS RISK ASSESSMENT DOCUMENTED: ICD-10-PCS | Mod: CPTII,S$GLB,, | Performed by: PODIATRIST

## 2023-03-16 PROCEDURE — 1159F MED LIST DOCD IN RCRD: CPT | Mod: CPTII,S$GLB,, | Performed by: PODIATRIST

## 2023-03-16 PROCEDURE — 3288F FALL RISK ASSESSMENT DOCD: CPT | Mod: CPTII,S$GLB,, | Performed by: PODIATRIST

## 2023-03-16 PROCEDURE — 1126F PR PAIN SEVERITY QUANTIFIED, NO PAIN PRESENT: ICD-10-PCS | Mod: CPTII,S$GLB,, | Performed by: PODIATRIST

## 2023-03-16 NOTE — PROGRESS NOTES
Subjective:     Patient ID: Kaleigh Nieves is a 82 y.o. female.    Chief Complaint: Nail Care (No c/o pain, Diabetic Pt, wears sandals with compression stockings, last seen on 01/10/23 with PCP Dr. Morris)      Kaleigh is a 82 y.o. female who presents to the clinic for evaluation and treatment of high risk feet. Kaleigh has a past medical history of Anemia, Angina pectoris, Arthritis, Asthma, Back pain, Bronchitis, CAD (coronary artery disease) (07/2015), Carotid artery stenosis, Chronic bronchitis, Chronic gout, Colon polyp, CTS (carpal tunnel syndrome), Diabetes mellitus, type 2, Diabetes with neurologic complications, Diverticulosis, RIVAS (dyspnea on exertion), Dyslipidemia, Ex-smoker, GERD (gastroesophageal reflux disease), Heart failure, Hyperlipidemia, Hypertension, Neuropathy, lower extremity, Obesity, Open-angle glaucoma, Peripheral vascular disease, Polyneuropathy, and Tobacco dependence. The patient's chief complaint is long, thick toenails. This patient has documented high risk feet requiring routine maintenance secondary to diabetes mellitis and those secondary complications of diabetes, as mentioned.     PCP: Lucio Morris MD    Date Last Seen by PCP: 01/10/2023    Current shoe gear:  Affected Foot: Rx diabetic extra depth shoes and custom accommodative insoles     Unaffected Foot: Rx diabetic extra depth shoes and custom accommodative insoles    Hemoglobin A1C   Date Value Ref Range Status   03/03/2023 6.0 (H) 4.0 - 5.6 % Final     Comment:     ADA Screening Guidelines:  5.7-6.4%  Consistent with prediabetes  >or=6.5%  Consistent with diabetes    High levels of fetal hemoglobin interfere with the HbA1C  assay. Heterozygous hemoglobin variants (HbS, HgC, etc)do  not significantly interfere with this assay.   However, presence of multiple variants may affect accuracy.     08/31/2022 5.7 (H) 4.0 - 5.6 % Final     Comment:     ADA Screening Guidelines:  5.7-6.4%  Consistent with prediabetes  >or=6.5%   Consistent with diabetes    High levels of fetal hemoglobin interfere with the HbA1C  assay. Heterozygous hemoglobin variants (HbS, HgC, etc)do  not significantly interfere with this assay.   However, presence of multiple variants may affect accuracy.     03/21/2022 5.9 (H) 4.0 - 5.6 % Final     Comment:     ADA Screening Guidelines:  5.7-6.4%  Consistent with prediabetes  >or=6.5%  Consistent with diabetes    High levels of fetal hemoglobin interfere with the HbA1C  assay. Heterozygous hemoglobin variants (HbS, HgC, etc)do  not significantly interfere with this assay.   However, presence of multiple variants may affect accuracy.         Patient Active Problem List   Diagnosis    Type 2 diabetes mellitus with diabetic polyneuropathy, without long-term current use of insulin    Multiple joint pain    Esophageal reflux    Hyperlipidemia    Chronic gout    Lichen sclerosus    Carotid artery disease    Coronary artery disease involving native coronary artery without angina pectoris    Colon polyp    Hypertension associated with diabetes    Aortic atherosclerosis    Arterial insufficiency of lower extremity    Ganglion cyst of flexor tendon sheath of finger of right hand    Bilateral carpal tunnel syndrome    Hyperkalemia    Chronic bronchitis    Dependent edema    Diverticulosis    Primary open-angle glaucoma, bilateral, indeterminate stage    RIVAS (dyspnea on exertion)    Chronic diastolic heart failure    Low vitamin D level    Body mass index (BMI) 34.0-34.9, adult       Medication List with Changes/Refills   Current Medications    ALBUTEROL (PROVENTIL/VENTOLIN HFA) 90 MCG/ACTUATION INHALER    INHALE 2 PUFFS INTO THE LUNGS EVERY SIX HOURS AS NEEDED FOR WHEEZING    ALLOPURINOL (ZYLOPRIM) 100 MG TABLET    Take 1 tablet (100 mg total) by mouth once daily.    ASPIRIN 81 MG CHEW    Take 81 mg by mouth once daily.    BISOPROLOL (ZEBETA) 5 MG TABLET    Take 1 tablet (5 mg total) by mouth once daily.    BLOOD GLUCOSE CONTROL  HIGH,LOW SOLN    1 each by Misc.(Non-Drug; Combo Route) route once a week.    BLOOD SUGAR DIAGNOSTIC STRP    1 each by Misc.(Non-Drug; Combo Route) route 2 (two) times a day.    BLOOD-GLUCOSE METER (ACCU-CHEK DARSHAN PLUS METER) MISC    USE AS DIRECTED    FUROSEMIDE (LASIX) 40 MG TABLET    Take 1 tablet (40 mg total) by mouth once daily.    LANCETS (FREESTYLE LANCETS) 28 GAUGE MISC    USE AS DIRECTED BY YOUR PHYSICIAN TWO TIMES A DAY Strength: 28 gauge    LATANOPROST 0.005 % OPHTHALMIC SOLUTION    Place 1 drop into both eyes once daily.    SIMVASTATIN (ZOCOR) 20 MG TABLET    Take 1 tablet (20 mg total) by mouth nightly.    TIMOLOL MALEATE 0.5% (TIMOPTIC) 0.5 % DROP    Place 1 drop into both eyes 2 (two) times daily.    TIMOLOL MALEATE, PF, 0.5 % DPET    Place 1 drop into both eyes 2 (two) times a day.    TRIAMCINOLONE ACETONIDE 0.025% (KENALOG) 0.025 % CREAM           Review of patient's allergies indicates:   Allergen Reactions    Iodine and iodide containing products Nausea And Vomiting    Penicillins Itching    Ultram [tramadol] Hives and Itching     Light headness, itiching    Sulfa (sulfonamide antibiotics) Hives and Itching    Amlodipine      edema    Bactrim [sulfamethoxazole-trimethoprim] Itching and Rash       Past Surgical History:   Procedure Laterality Date    APPENDECTOMY      CARPAL TUNNEL RELEASE Right     CATARACT EXTRACTION, BILATERAL      CHOLECYSTECTOMY      CYST REMOVAL  2018    DILATION AND CURETTAGE OF UTERUS      EYE SURGERY      HEMORRHOID SURGERY      HYSTERECTOMY      fibroids    INCISION AND DRAINAGE PERIRECTAL ABSCESS      JOINT REPLACEMENT Bilateral     knee    KNEE ARTHROSCOPY Right     MYOMECTOMY         Family History   Problem Relation Age of Onset    Hypertension Mother     Diabetes Mother     Leukemia Sister     Hypertension Sister     Cancer Sister         leukemia    Heart disease Maternal Aunt     Cancer Maternal Uncle         gastric, pacreatic    Heart disease Maternal Uncle      "Miscarriages / Stillbirths Maternal Uncle     Diabetes Maternal Grandmother     Asthma Maternal Grandfather     Breast cancer Neg Hx     Colon cancer Neg Hx     Ovarian cancer Neg Hx     COPD Neg Hx     Hyperlipidemia Neg Hx     Kidney disease Neg Hx        Social History     Socioeconomic History    Marital status: Single    Number of children: 0   Tobacco Use    Smoking status: Former     Packs/day: 0.25     Years: 24.00     Pack years: 6.00     Types: Cigarettes     Quit date: 1976     Years since quittin.7    Smokeless tobacco: Never   Substance and Sexual Activity    Alcohol use: No    Drug use: No    Sexual activity: Not Currently     Social Determinants of Health     Financial Resource Strain: Low Risk     Difficulty of Paying Living Expenses: Not hard at all   Food Insecurity: No Food Insecurity    Worried About Running Out of Food in the Last Year: Never true    Ran Out of Food in the Last Year: Never true   Transportation Needs: No Transportation Needs    Lack of Transportation (Medical): No    Lack of Transportation (Non-Medical): No   Physical Activity: Inactive    Days of Exercise per Week: 0 days    Minutes of Exercise per Session: 0 min   Stress: No Stress Concern Present    Feeling of Stress : Not at all   Social Connections: Socially Isolated    Frequency of Communication with Friends and Family: Once a week    Frequency of Social Gatherings with Friends and Family: Once a week    Attends Rastafari Services: More than 4 times per year    Active Member of Clubs or Organizations: No    Attends Club or Organization Meetings: Never    Marital Status:    Housing Stability: Low Risk     Unable to Pay for Housing in the Last Year: No    Number of Places Lived in the Last Year: 1    Unstable Housing in the Last Year: No       Vitals:    23 1102   Weight: 96.6 kg (213 lb)   Height: 5' 6" (1.676 m)   PainSc: 0-No pain   PainLoc: Foot       Hemoglobin A1C   Date Value Ref Range Status "   03/03/2023 6.0 (H) 4.0 - 5.6 % Final     Comment:     ADA Screening Guidelines:  5.7-6.4%  Consistent with prediabetes  >or=6.5%  Consistent with diabetes    High levels of fetal hemoglobin interfere with the HbA1C  assay. Heterozygous hemoglobin variants (HbS, HgC, etc)do  not significantly interfere with this assay.   However, presence of multiple variants may affect accuracy.     08/31/2022 5.7 (H) 4.0 - 5.6 % Final     Comment:     ADA Screening Guidelines:  5.7-6.4%  Consistent with prediabetes  >or=6.5%  Consistent with diabetes    High levels of fetal hemoglobin interfere with the HbA1C  assay. Heterozygous hemoglobin variants (HbS, HgC, etc)do  not significantly interfere with this assay.   However, presence of multiple variants may affect accuracy.     03/21/2022 5.9 (H) 4.0 - 5.6 % Final     Comment:     ADA Screening Guidelines:  5.7-6.4%  Consistent with prediabetes  >or=6.5%  Consistent with diabetes    High levels of fetal hemoglobin interfere with the HbA1C  assay. Heterozygous hemoglobin variants (HbS, HgC, etc)do  not significantly interfere with this assay.   However, presence of multiple variants may affect accuracy.         Review of Systems   Constitutional:  Negative for chills and fever.   Respiratory:  Negative for shortness of breath.    Cardiovascular:  Positive for leg swelling (improved). Negative for chest pain, palpitations, orthopnea and claudication.   Gastrointestinal:  Negative for diarrhea, nausea and vomiting.   Musculoskeletal:  Negative for joint pain.   Skin:  Negative for rash.   Neurological:  Negative for dizziness, tingling, sensory change, focal weakness and weakness.   Psychiatric/Behavioral: Negative.             Objective:      PHYSICAL EXAM: Apperance: Alert and orient in no distress,well developed, and with good attention to grooming and body habits  Patient presents ambulating in diabetic shoes and inserts.   LOWER EXTREMITY EXAM:  VASCULAR: Dorsalis pedis pulses  0/4 bilateral (monophasic with doppler) and Posterior Tibial pulses 0/4 bilateral (biphasic with doppler). Capillary fill time <blanched bilateral. Mild edema observed bilateral. Varicosities present bilateral. Skin temperature of the lower extremities is warm to cool, proximal to distal. Hair growth absent bilateral.  DERMATOLOGICAL: No skin rashes, subcutaneous nodules, lesions, or ulcers observed bilateral. Nails 1,2,3,4,5 bilateral elongated, thickened, and discolored with subungual debris. Webspaces 1,2,3,4clean, dry and without evidence of break in skin integrity bilateral.   NEUROLOGICAL: Light touch, sharp-dull, proprioception all present and equal bilaterally.  Vibratory sensation intact on left hallux and diminished at right hallux. Protective sensation intact at all 10 sites as tested with a Adrian-Jimy 5.07 monofilament.   MUSCULOSKELETAL: Muscle strength is 5/5 for foot inverters, everters, plantarflexors, and dorsiflexors. Muscle tone is normal. No pain on palpation of right dorsal foot/ankle. Pes planus noted bilateral. Flexible hammertoes noted bilateral.         Assessment:       ICD-10-CM ICD-9-CM   1. Encounter for comprehensive diabetic foot examination, type 2 diabetes mellitus  E11.9 250.00   2. Type II diabetes mellitus with peripheral circulatory disorder  E11.51 250.70     443.81   3. Dermatophytosis of nail  B35.1 110.1       Plan:   Encounter for comprehensive diabetic foot examination, type 2 diabetes mellitus    Type II diabetes mellitus with peripheral circulatory disorder    Dermatophytosis of nail    I counseled the patient on her conditions, regarding findings of my examination, my impressions, and usual treatment plan.   This visit spent on counseling and coordination of care.  Appointment spent on education about the diabetic foot, neuropathy, and prevention of limb loss.  Shoe inspection. Diabetic Foot Education. Patient reminded of the importance of good nutrition and blood  sugar control to help prevent podiatric complications of diabetes. Patient instructed on proper foot hygeine. We discussed wearing proper shoe gear, daily foot inspections, never walking without protective shoe gear, never putting sharp instruments to feet.    With patient's permission, nails 1-5 bilateral were debrided/trimmed in length and thickness aggressively to their soft tissue attachment mechanically and with electric , removing all offending nail and debris. Patient relates relief following the procedure.  Patient  will continue to monitor the areas daily, inspect feet, wear protective shoe gear when ambulatory, moisturizer to maintain skin integrity. Patient reminded of the importance of good nutrition and blood sugar control to help prevent podiatric complications of diabetes.  Patient to return 4 months or sooner if needed.          Ino Harmon DPM  [facility]

## 2023-04-10 ENCOUNTER — OFFICE VISIT (OUTPATIENT)
Dept: PRIMARY CARE CLINIC | Facility: CLINIC | Age: 83
End: 2023-04-10
Payer: MEDICARE

## 2023-04-10 VITALS
DIASTOLIC BLOOD PRESSURE: 78 MMHG | TEMPERATURE: 98 F | BODY MASS INDEX: 32.38 KG/M2 | HEART RATE: 70 BPM | SYSTOLIC BLOOD PRESSURE: 132 MMHG | OXYGEN SATURATION: 98 % | HEIGHT: 66 IN | RESPIRATION RATE: 15 BRPM | WEIGHT: 201.5 LBS

## 2023-04-10 DIAGNOSIS — R60.0 BILATERAL LOWER EXTREMITY EDEMA: Primary | ICD-10-CM

## 2023-04-10 DIAGNOSIS — I50.32 CHRONIC DIASTOLIC HEART FAILURE: ICD-10-CM

## 2023-04-10 DIAGNOSIS — Z12.31 BREAST CANCER SCREENING BY MAMMOGRAM: ICD-10-CM

## 2023-04-10 DIAGNOSIS — E11.42 TYPE 2 DIABETES MELLITUS WITH DIABETIC POLYNEUROPATHY, WITHOUT LONG-TERM CURRENT USE OF INSULIN: ICD-10-CM

## 2023-04-10 PROCEDURE — 99999 PR PBB SHADOW E&M-EST. PATIENT-LVL IV: ICD-10-PCS | Mod: PBBFAC,,, | Performed by: FAMILY MEDICINE

## 2023-04-10 PROCEDURE — 1159F PR MEDICATION LIST DOCUMENTED IN MEDICAL RECORD: ICD-10-PCS | Mod: CPTII,S$GLB,, | Performed by: FAMILY MEDICINE

## 2023-04-10 PROCEDURE — 3072F PR LOW RISK FOR RETINOPATHY: ICD-10-PCS | Mod: CPTII,S$GLB,, | Performed by: FAMILY MEDICINE

## 2023-04-10 PROCEDURE — 99215 PR OFFICE/OUTPT VISIT, EST, LEVL V, 40-54 MIN: ICD-10-PCS | Mod: S$GLB,,, | Performed by: FAMILY MEDICINE

## 2023-04-10 PROCEDURE — 1159F MED LIST DOCD IN RCRD: CPT | Mod: CPTII,S$GLB,, | Performed by: FAMILY MEDICINE

## 2023-04-10 PROCEDURE — 3075F PR MOST RECENT SYSTOLIC BLOOD PRESS GE 130-139MM HG: ICD-10-PCS | Mod: CPTII,S$GLB,, | Performed by: FAMILY MEDICINE

## 2023-04-10 PROCEDURE — 99999 PR PBB SHADOW E&M-EST. PATIENT-LVL IV: CPT | Mod: PBBFAC,,, | Performed by: FAMILY MEDICINE

## 2023-04-10 PROCEDURE — 99215 OFFICE O/P EST HI 40 MIN: CPT | Mod: S$GLB,,, | Performed by: FAMILY MEDICINE

## 2023-04-10 PROCEDURE — 3072F LOW RISK FOR RETINOPATHY: CPT | Mod: CPTII,S$GLB,, | Performed by: FAMILY MEDICINE

## 2023-04-10 PROCEDURE — 3075F SYST BP GE 130 - 139MM HG: CPT | Mod: CPTII,S$GLB,, | Performed by: FAMILY MEDICINE

## 2023-04-10 PROCEDURE — 3078F PR MOST RECENT DIASTOLIC BLOOD PRESSURE < 80 MM HG: ICD-10-PCS | Mod: CPTII,S$GLB,, | Performed by: FAMILY MEDICINE

## 2023-04-10 PROCEDURE — 3078F DIAST BP <80 MM HG: CPT | Mod: CPTII,S$GLB,, | Performed by: FAMILY MEDICINE

## 2023-04-10 RX ORDER — INSULIN PUMP SYRINGE, 3 ML
EACH MISCELLANEOUS
Qty: 1 EACH | Refills: 0 | Status: SHIPPED | OUTPATIENT
Start: 2023-04-10 | End: 2024-04-09

## 2023-04-10 RX ORDER — LANCETS
EACH MISCELLANEOUS
Qty: 200 EACH | Refills: 3 | Status: SHIPPED | OUTPATIENT
Start: 2023-04-10

## 2023-04-10 NOTE — PATIENT INSTRUCTIONS
Physically, everything looks great today!     New orders/prescriptions for sugar meter, strips, lancets, sent to [facility] pharmacy today.    Feel free to check your sugar once a week, or as needed.  If you feel bad, take a reading.  If the numbers are low (less than 60) be sure to eat something right away.  If the numbers are high (over 200), drink some water, rest, let us know.      Otherwise, continue to eat a healthy diet.  Be careful with portion sizes.  Includes lots of fresh fruits, vegetables, whole grains, lean proteins.  See info below.    Keep hydrated.  Be sure to drink at least 8-10, 8 oz, glasses of water every day.    Stay active.  Try to do some sort of physical activity every day.  Nothing outrageous, just try walking for 10-15 minutes each day.     Keep elevating your feet/legs daily, as instructed by Cardiology.  Glad to hear that it is helping!     If you need refills on any of your medicines feel free to let us know.  We will be happy to send them to the pharmacy.

## 2023-04-10 NOTE — PROGRESS NOTES
"    [facility]       [address] [place] [address]      Phone: [phone]      Fax: [phone]    Lucio Morris MD                Office Visit  04/10/2023        Subjective      HPI:  Kaleigh Nieves is a 82 y.o. female presents today in clinic for "Follow-up (3 month follow up)  ."     82-year-old female presents today to follow-up on multiple issues.      Patient states she feels pretty good today.  No acute complaints.    Legs are still swelling.  Since last visit, had carotid ultrasound, arterial ultrasound of the legs.  No flow issues in the legs.  She has also seen vascular.  He ordered a chronic venous ultrasound, but she could not do it in [place] because of her insurance.  They discussed setting it up in [place], but she also saw her cardiologist who said she could not undergo up procedure anyway.  Stay as her heart is not strong enough to support surgery.  Instead, they recommended she continue to use compression socks daily.  He also recommended she elevate her feet 2-4 hours every day.  She has been doing this and finds that it is helping extremely well.  If she is on her feet, the swelling returns, but elevating them above her heart definitely makes ago down.    When she saw Cardiology, he instructed her to continue the Lasix 40 mg daily, for now.  She states that he points to wean her off of this, possibly at next visit.  He also wanted her to continue her aspirin, Zocor, bisoprolol.    She states she has a history of gout.  Has been taking allopurinol 100 mg daily for years.  Has not had severe gout flare up in a couple of years.  Last August, had some blood work done, uric acid level was 4.8.    She also has diabetes.  Not on any medication for it.  A1c was 6% recently.  Watches diet.    Has hypertension.  Takes bisoprolol 5 mg daily, along with Lasix 40 mg daily.  Also takes simvastatin 20 mg nightly for cholesterol.      PMH: HTN, dm, " gout, GERD, diverticulosis, osteoarthritis, glaucoma   PSH:  Torn meniscus in knee.  Bilateral knee replacement.  Hemorrhoids.  Perirectal abscess.  Cataracts in both eyes.  D& C.  Fibroid/hysterectomy.  Gallbladder.  Appendectomy.  Carpal tunnel.  Left toe corn removal.    Allergies:  Iodine.  Penicillin.  Sulfa drugs.  Tramadol.  Amlodipine.    Social: Lives alone.  Nephew occasionally stays there.    T: Denies   A:  Denies   D:  Denies     Exercise:  No regular exercise program.  Use to bowel frequently.  Limited now due to foot swelling and pain.      Podiatry: Faustino Reyes)      The following were updated and reviewed by myself in the chart: medications, past medical history, past surgical history, family history, social history, and allergies.     Medications:  Current Outpatient Medications on File Prior to Visit   Medication Sig Dispense Refill    albuterol (PROVENTIL/VENTOLIN HFA) 90 mcg/actuation inhaler INHALE 2 PUFFS INTO THE LUNGS EVERY SIX HOURS AS NEEDED FOR WHEEZING 18 g 11    allopurinoL (ZYLOPRIM) 100 MG tablet Take 1 tablet (100 mg total) by mouth once daily. 90 tablet 3    aspirin 81 MG Chew Take 81 mg by mouth once daily.      bisoprolol (ZEBETA) 5 MG tablet Take 1 tablet (5 mg total) by mouth once daily. 90 tablet 3    blood glucose control high,low Soln 1 each by Misc.(Non-Drug; Combo Route) route once a week. 1 each 11    furosemide (LASIX) 40 MG tablet Take 1 tablet (40 mg total) by mouth once daily. 90 tablet 3    latanoprost 0.005 % ophthalmic solution Place 1 drop into both eyes once daily. 7.5 mL 3    simvastatin (ZOCOR) 20 MG tablet Take 1 tablet (20 mg total) by mouth nightly. 90 tablet 3    timolol maleate 0.5% (TIMOPTIC) 0.5 % Drop Place 1 drop into both eyes 2 (two) times daily. 30 mL 4    [DISCONTINUED] blood sugar diagnostic Strp 1 each by Misc.(Non-Drug; Combo Route) route 2 (two) times a day. 200 each 11    [DISCONTINUED] blood-glucose meter (ACCU-CHEK DARSHAN PLUS METER) Misc USE  AS DIRECTED 100 each 11    [DISCONTINUED] lancets (FREESTYLE LANCETS) 28 gauge Misc USE AS DIRECTED BY YOUR PHYSICIAN TWO TIMES A DAY Strength: 28 gauge 200 each 11    [DISCONTINUED] timoloL maleate, PF, 0.5 % Dpet Place 1 drop into both eyes 2 (two) times a day. 60 each 3    [DISCONTINUED] triamcinolone acetonide 0.025% (KENALOG) 0.025 % cream        No current facility-administered medications on file prior to visit.        PMHx:  Past Medical History:   Diagnosis Date    Anemia     Angina pectoris     Arthritis     Asthma     Back pain     Bronchitis     [facility]    Carotid artery stenosis     Chronic bronchitis     Chronic gout     Colon polyp     CTS (carpal tunnel syndrome)     Diabetes mellitus, type 2     Diabetes with neurologic complications     Diverticulosis     RIVAS (dyspnea on exertion)     chronic; eval pulm dr natarajan    Dyslipidemia     Ex-smoker     quit in her 30s    GERD (gastroesophageal reflux disease)     Heart failure     Hyperlipidemia     Hypertension     Neuropathy, lower extremity     Obesity     Open-angle glaucoma     dr avel kaur    Peripheral vascular disease     Polyneuropathy     Tobacco dependence       Patient Active Problem List    Diagnosis Date Noted    Body mass index (BMI) 34.0-34.9, adult 09/07/2022    Low vitamin D level 07/12/2021    Chronic diastolic heart failure 05/14/2021    RIVAS (dyspnea on exertion) 01/29/2021    Diverticulosis 11/11/2019    Primary open-angle glaucoma, bilateral, indeterminate stage 11/11/2019    Dependent edema 08/23/2018    Chronic bronchitis 06/25/2018    Hyperkalemia 03/21/2018    Bilateral carpal tunnel syndrome 03/07/2018    Ganglion cyst of flexor tendon sheath of finger of right hand 02/01/2018    Aortic atherosclerosis 06/16/2017    Arterial insufficiency of lower extremity 06/16/2017    Hypertension associated with diabetes 10/25/2016    Colon polyp     Coronary artery disease involving native  coronary artery without angina pectoris     Carotid artery disease     Lichen sclerosus 2015    Hyperlipidemia 2014    Chronic gout 2014    Esophageal reflux 2013    Multiple joint pain 2013    Type 2 diabetes mellitus with diabetic polyneuropathy, without long-term current use of insulin 2013        PSHx:  Past Surgical History:   Procedure Laterality Date    APPENDECTOMY      CARPAL TUNNEL RELEASE Right     CATARACT EXTRACTION, BILATERAL      CHOLECYSTECTOMY      CYST REMOVAL  2018    DILATION AND CURETTAGE OF UTERUS      EYE SURGERY      HEMORRHOID SURGERY      HYSTERECTOMY      fibroids    INCISION AND DRAINAGE PERIRECTAL ABSCESS      JOINT REPLACEMENT Bilateral     knee    KNEE ARTHROSCOPY Right     MYOMECTOMY          FHx:  Family History   Problem Relation Age of Onset    Hypertension Mother     Diabetes Mother     Leukemia Sister     Hypertension Sister     Cancer Sister         leukemia    Heart disease Maternal Aunt     Cancer Maternal Uncle         gastric, pacreatic    Heart disease Maternal Uncle     Miscarriages / Stillbirths Maternal Uncle     Diabetes Maternal Grandmother     Asthma Maternal Grandfather     Breast cancer Neg Hx     Colon cancer Neg Hx     Ovarian cancer Neg Hx     COPD Neg Hx     Hyperlipidemia Neg Hx     Kidney disease Neg Hx         Social:  Social History     Socioeconomic History    Marital status: Single    Number of children: 0   Tobacco Use    Smoking status: Former     Packs/day: 0.25     Years: 24.00     Pack years: 6.00     Types: Cigarettes     Quit date: 1976     Years since quittin.8    Smokeless tobacco: Never   Substance and Sexual Activity    Alcohol use: No    Drug use: No    Sexual activity: Not Currently     Social Determinants of Health     Financial Resource Strain: Low Risk     Difficulty of Paying Living Expenses: Not hard at all   Food Insecurity: No Food Insecurity    Worried About Running Out of Food in the Last  Year: Never true    Ran Out of Food in the Last Year: Never true   Transportation Needs: No Transportation Needs    Lack of Transportation (Medical): No    Lack of Transportation (Non-Medical): No   Physical Activity: Inactive    Days of Exercise per Week: 0 days    Minutes of Exercise per Session: 0 min   Stress: No Stress Concern Present    Feeling of Stress : Not at all   Social Connections: Socially Isolated    Frequency of Communication with Friends and Family: Once a week    Frequency of Social Gatherings with Friends and Family: Once a week    Attends [employer] Services: More than 4 times per year    Active Member of Clubs or Organizations: No    Attends Club or Organization Meetings: Never    Marital Status:    Housing Stability: Low Risk     Unable to Pay for Housing in the Last Year: No    Number of Places Lived in the Last Year: 1    Unstable Housing in the Last Year: No        Allergies:  Review of patient's allergies indicates:   Allergen Reactions    Iodine and iodide containing products Nausea And Vomiting    Penicillins Itching    Ultram [tramadol] Hives and Itching     Light headness, itiching    Sulfa (sulfonamide antibiotics) Hives and Itching    Amlodipine      edema    Bactrim [sulfamethoxazole-trimethoprim] Itching and Rash        ROS:  Review of Systems   Constitutional:  Negative for activity change, appetite change, chills and fever.   HENT:  Negative for congestion, postnasal drip, rhinorrhea, sore throat and trouble swallowing.    Respiratory:  Positive for shortness of breath. Negative for cough and wheezing.    Cardiovascular:  Positive for leg swelling. Negative for chest pain and palpitations.   Gastrointestinal:  Negative for abdominal pain, constipation, diarrhea, nausea and vomiting.   Genitourinary:  Negative for difficulty urinating.   Musculoskeletal:  Negative for arthralgias and myalgias.   Skin:  Negative for color change and rash.   Neurological:  Negative for speech  "difficulty and headaches.   All other systems reviewed and are negative.       Objective      /78   Pulse 70   Temp 98 °F (36.7 °C) (Temporal)   Resp 15   Ht 5' 6" (1.676 m)   Wt 91.4 kg (201 lb 8 oz)   SpO2 98%   BMI 32.52 kg/m²   Ht Readings from Last 3 Encounters:   04/10/23 5' 6" (1.676 m)   23 5' 6" (1.676 m)   01/10/23 5' 6" (1.676 m)     Wt Readings from Last 3 Encounters:   04/10/23 91.4 kg (201 lb 8 oz)   23 96.6 kg (213 lb)   23 96.7 kg (213 lb 3 oz)       PHYSICAL EXAM:  Physical Exam  Vitals and nursing note reviewed.   Constitutional:       General: She is not in acute distress.     Appearance: Normal appearance.   HENT:      Head: Normocephalic and atraumatic.      Right Ear: Tympanic membrane, ear canal and external ear normal.      Left Ear: Tympanic membrane, ear canal and external ear normal.      Nose: Nose normal. No congestion or rhinorrhea.      Mouth/Throat:      Mouth: Mucous membranes are moist.      Pharynx: Oropharynx is clear. No oropharyngeal exudate or posterior oropharyngeal erythema.   Eyes:      Extraocular Movements: Extraocular movements intact.      Conjunctiva/sclera: Conjunctivae normal.      Pupils: Pupils are equal, round, and reactive to light.   Cardiovascular:      Rate and Rhythm: Normal rate and regular rhythm.   Pulmonary:      Effort: Pulmonary effort is normal. No respiratory distress.      Breath sounds: No wheezing, rhonchi or rales.   Musculoskeletal:         General: Normal range of motion.      Cervical back: Normal range of motion.      Right lower le+ Pitting Edema present.      Left lower le+ Pitting Edema present.   Lymphadenopathy:      Cervical: No cervical adenopathy.   Skin:     General: Skin is warm and dry.      Findings: No rash.   Neurological:      Mental Status: She is alert.            LABS / IMAGING:  Recent Results (from the past 4368 hour(s))   COMPREHENSIVE METABOLIC PANEL    Collection Time: 23  9:29 " AM   Result Value Ref Range    Sodium 138 136 - 145 mmol/L    Potassium 5.3 (H) 3.5 - 5.1 mmol/L    Chloride 96 95 - 110 mmol/L    CO2 34 (H) 23 - 29 mmol/L    Glucose 114 (H) 70 - 110 mg/dL    BUN 6 (L) 8 - 23 mg/dL    Creatinine 0.7 0.5 - 1.4 mg/dL    Calcium 9.3 8.7 - 10.5 mg/dL    Total Protein 7.2 6.0 - 8.4 g/dL    Albumin 3.7 3.5 - 5.2 g/dL    Total Bilirubin 0.5 0.1 - 1.0 mg/dL    Alkaline Phosphatase 23 (L) 55 - 135 U/L    AST 16 10 - 40 U/L    ALT 15 10 - 44 U/L    Anion Gap 8 8 - 16 mmol/L    eGFR >60.0 >60 mL/min/1.73 m^2   LIPID PANEL    Collection Time: 03/03/23  9:30 AM   Result Value Ref Range    Cholesterol 140 120 - 199 mg/dL    Triglycerides 92 30 - 150 mg/dL    HDL 39 (L) 40 - 75 mg/dL    LDL Cholesterol 82.6 63.0 - 159.0 mg/dL    HDL/Cholesterol Ratio 27.9 20.0 - 50.0 %    Total Cholesterol/HDL Ratio 3.6 2.0 - 5.0    Non-HDL Cholesterol 101 mg/dL   HEMOGLOBIN A1C    Collection Time: 03/03/23  9:30 AM   Result Value Ref Range    Hemoglobin A1C 6.0 (H) 4.0 - 5.6 %    Estimated Avg Glucose 126 68 - 131 mg/dL         Assessment    1. Bilateral lower extremity edema    2. Chronic diastolic heart failure    3. Type 2 diabetes mellitus with diabetic polyneuropathy, without long-term current use of insulin    4. Breast cancer screening by mammogram          Plan    Kaleigh was seen today for follow-up.    Diagnoses and all orders for this visit:    Bilateral lower extremity edema    Chronic diastolic heart failure    Type 2 diabetes mellitus with diabetic polyneuropathy, without long-term current use of insulin  -     blood-glucose meter kit; To check BG 3 times daily, to use with insurance preferred meter  -     lancets Misc; To check BG 3 times daily, to use with insurance preferred meter  -     blood sugar diagnostic Strp; To check BG 3 times daily, to use with insurance preferred meter    Breast cancer screening by mammogram  -     Mammo Digital Screening Bilat w/ [facility]; Future      Physically, looks  good today.      Glad to hear that elevation of the legs is helping.  Recommended she needs used it to do this daily.      She needs a new meter, lancets, test strips.  Once them sent to [place].  Will do that today.  Since she is not on any medication in diabetes is well controlled, recommended she only check it, as needed if she feels bad.    Recommended she keep follow-up appointments with Cardiology, as scheduled.  Also with Podiatry, as scheduled.    FOLLOW-UP:  Follow up in about 6 months (around 10/10/2023) for check up.    I spent a total of 45 minutes face to face and non-face to face on the date of this visit.This includes time preparing to see the patient (eg, review of tests, notes), obtaining and/or reviewing additional history from an independent historian and/or outside medical records, documenting clinical information in the electronic health record, independently interpreting results and/or communicating results to the patient/family/caregiver, or care coordinator.    Signed by:  Lucio Morris MD

## 2023-04-21 ENCOUNTER — HOSPITAL ENCOUNTER (OUTPATIENT)
Dept: RADIOLOGY | Facility: HOSPITAL | Age: 83
Discharge: HOME | End: 2023-04-21
Attending: FAMILY MEDICINE
Payer: MEDICARE

## 2023-04-21 VITALS — HEIGHT: 66 IN | BODY MASS INDEX: 32.38 KG/M2 | WEIGHT: 201.5 LBS

## 2023-04-21 DIAGNOSIS — Z12.31 BREAST CANCER SCREENING BY MAMMOGRAM: ICD-10-CM

## 2023-04-21 PROCEDURE — 77067 MAMMO DIGITAL SCREENING BILAT WITH TOMO: ICD-10-PCS | Mod: 26,,, | Performed by: RADIOLOGY

## 2023-04-21 PROCEDURE — 77067 SCR MAMMO BI INCL CAD: CPT | Mod: TC,PO

## 2023-04-21 PROCEDURE — 77063 BREAST TOMOSYNTHESIS BI: CPT | Mod: 26,,, | Performed by: RADIOLOGY

## 2023-04-21 PROCEDURE — 77067 SCR MAMMO BI INCL CAD: CPT | Mod: 26,,, | Performed by: RADIOLOGY

## 2023-04-21 PROCEDURE — 77063 MAMMO DIGITAL SCREENING BILAT WITH TOMO: ICD-10-PCS | Mod: 26,,, | Performed by: RADIOLOGY

## 2023-04-26 NOTE — PROGRESS NOTES
Ms. Nieves,     Looks like there were a couple of spots on your mammogram that they want to take a closer look at.  It appears you are already scheduled for the diagnostic mammogram and ultrasound.  We will contact you with those results as soon as they are available.

## 2023-06-02 ENCOUNTER — HOSPITAL ENCOUNTER (OUTPATIENT)
Dept: RADIOLOGY | Facility: HOSPITAL | Age: 83
Discharge: HOME | End: 2023-06-02
Attending: FAMILY MEDICINE
Payer: MEDICARE

## 2023-06-02 ENCOUNTER — TELEPHONE (OUTPATIENT)
Dept: RADIOLOGY | Facility: HOSPITAL | Age: 83
End: 2023-06-02

## 2023-06-02 DIAGNOSIS — R92.8 ABNORMAL MAMMOGRAM: ICD-10-CM

## 2023-06-02 DIAGNOSIS — R92.8 ABNORMAL MAMMOGRAM: Primary | ICD-10-CM

## 2023-06-02 PROCEDURE — 76642 ULTRASOUND BREAST LIMITED: CPT | Mod: 26,LT,, | Performed by: RADIOLOGY

## 2023-06-02 PROCEDURE — 77061 BREAST TOMOSYNTHESIS UNI: CPT | Mod: 26,LT,, | Performed by: RADIOLOGY

## 2023-06-02 PROCEDURE — 76642 ULTRASOUND BREAST LIMITED: CPT | Mod: TC,LT

## 2023-06-02 PROCEDURE — 77061 MAMMO DIGITAL DIAGNOSTIC LEFT WITH TOMO: ICD-10-PCS | Mod: 26,LT,, | Performed by: RADIOLOGY

## 2023-06-02 PROCEDURE — 77065 DX MAMMO INCL CAD UNI: CPT | Mod: 26,LT,, | Performed by: RADIOLOGY

## 2023-06-02 PROCEDURE — 77065 MAMMO DIGITAL DIAGNOSTIC LEFT WITH TOMO: ICD-10-PCS | Mod: 26,LT,, | Performed by: RADIOLOGY

## 2023-06-02 PROCEDURE — 76642 US BREAST LEFT LIMITED: ICD-10-PCS | Mod: 26,LT,, | Performed by: RADIOLOGY

## 2023-06-02 PROCEDURE — 77061 BREAST TOMOSYNTHESIS UNI: CPT | Mod: TC,LT

## 2023-06-02 NOTE — TELEPHONE ENCOUNTER
Ultrasound guided biopsy scheduled for 6/26/23 at 10am, arrival time 9:30am.  Biopsy instructions given with understandings verbalized. Patient has my contact information.

## 2023-06-06 DIAGNOSIS — N63.0 MASS OF BREAST, UNSPECIFIED LATERALITY: Primary | ICD-10-CM

## 2023-06-06 NOTE — PROGRESS NOTES
MsLizzie Nieves,    Attached is the result of your diagnostic mammogram.  There is a mass in that left breast that appears to be suggestive of breast cancer.  I see that you are already scheduled for a biopsy of the site on June 26.  This will give us much more information.    At the same time I am going to forward a copy of these results to my breast specialist, Dr. Pulliam.  I am going to ask her office should reach out to you to schedule an appointment after the biopsy so you guys can discuss the results and next steps.

## 2023-06-07 NOTE — ASSESSMENT & PLAN NOTE
Asymptomatic.  Continues to be on statin medication with a focus on diabetes and hypertension control.  
Blood pressure and diabetes continues to be well controlled.  Continue on same medication along with lifestyle modifications.  Plan to get labs prior to the next visit.  
Counseled on importance on diet and exercise to decrease risk of comorbid conditions and for improved quality of life.    
07-Jun-2023 15:32

## 2023-06-20 ENCOUNTER — PROCEDURE VISIT (OUTPATIENT)
Dept: SURGERY | Facility: CLINIC | Age: 83
End: 2023-06-20
Payer: MEDICARE

## 2023-06-20 DIAGNOSIS — N63.22 UNSPECIFIED LUMP IN THE LEFT BREAST, UPPER INNER QUADRANT: ICD-10-CM

## 2023-06-20 DIAGNOSIS — R92.8 ABNORMAL MAMMOGRAM OF LEFT BREAST: Primary | ICD-10-CM

## 2023-06-20 PROCEDURE — 76642 PR US BREAST UNILAT LIMITED: ICD-10-PCS | Mod: LT,S$GLB,, | Performed by: SURGERY

## 2023-06-20 PROCEDURE — 88342 IMHCHEM/IMCYTCHM 1ST ANTB: CPT | Mod: 26,59,, | Performed by: PATHOLOGY

## 2023-06-20 PROCEDURE — 88305 TISSUE EXAM BY PATHOLOGIST: CPT | Performed by: PATHOLOGY

## 2023-06-20 PROCEDURE — 88305 TISSUE EXAM BY PATHOLOGIST: ICD-10-PCS | Mod: 26,,, | Performed by: PATHOLOGY

## 2023-06-20 PROCEDURE — 19083 BX BREAST 1ST LESION US IMAG: CPT | Mod: LT,S$GLB,, | Performed by: SURGERY

## 2023-06-20 PROCEDURE — 88305 TISSUE EXAM BY PATHOLOGIST: CPT | Mod: 26,,, | Performed by: PATHOLOGY

## 2023-06-20 PROCEDURE — 88342 CHG IMMUNOCYTOCHEMISTRY: ICD-10-PCS | Mod: 26,59,, | Performed by: PATHOLOGY

## 2023-06-20 PROCEDURE — 19083 PR BX BRST, 1ST LESION, US GUIDANCE: ICD-10-PCS | Mod: LT,S$GLB,, | Performed by: SURGERY

## 2023-06-20 PROCEDURE — 88360 PR  TUMOR IMMUNOHISTOCHEM/MANUAL: ICD-10-PCS | Mod: 26,,, | Performed by: PATHOLOGY

## 2023-06-20 PROCEDURE — 88342 IMHCHEM/IMCYTCHM 1ST ANTB: CPT | Performed by: PATHOLOGY

## 2023-06-20 PROCEDURE — 88360 TUMOR IMMUNOHISTOCHEM/MANUAL: CPT | Mod: 26,,, | Performed by: PATHOLOGY

## 2023-06-20 PROCEDURE — 88360 TUMOR IMMUNOHISTOCHEM/MANUAL: CPT | Performed by: PATHOLOGY

## 2023-06-20 PROCEDURE — 76642 ULTRASOUND BREAST LIMITED: CPT | Mod: LT,S$GLB,, | Performed by: SURGERY

## 2023-06-20 NOTE — PATIENT INSTRUCTIONS
INSTRUCTIONS AFTER CORE NEEDLE BREAST BIOPSY    1. Apply an ice pack to your breast once you get home. Do not place ice pack directly on the skin.   2. Apply a fresh ice pack for 10 minutes every hour until bedtime. You can discontinue the ice  packs at bedtime.  3. Each time you change the ice pack, look at the bandage (which may be easiest to do by looking in a mirror). If significant fresh blood has appeared, apply 10 more minutes of pressure. By applying firm, steady pressure to the biopsy site, you can stop almost all bleeding. In the rare instance you cant stop the bleeding, have someone drive you to a local urgent care or emergency room.  4. It is normal to see up to a quarter-sized blood spot on the bandage and variable-sized bruising.  5. Wear a tight brassiere to bed the night of the biopsy.  6. Infection is rare after a needle biopsy. If you develop significant redness, swelling, heat, tenderness, and/or pus draining at the biopsy site, contact your physician.  7.Do not soak  in water (e.g., bathing, swimming, hot tub, etc.) until 7 days after the biopsy.  8. Remove the outer dressing in 72 hours.         Thank you for choosing [facility] and Dr. Susan Pulliam for your breast surgery needs. It is our goal to provide you with exceptional care.    If you felt that you received exemplary care today, please consider leaving us feedback on [employer] or [employer] at:      Susan Pulliam MD - [employer] Review     Susan Pulliam MD - [employer] Review

## 2023-06-20 NOTE — PROCEDURES
BREAST ULTRASOUND PROCEDURE    Focused sonography of the upper inner quadrant of the LEFT breast was performed in 2 views, radial and antiradial.  A(n) hypoechoic irregular lesion with spiculated borders was noted in the 11:00 10 cm from the nipple position. There was no posterior shadowing. The lesion measured 1.25 x 1.15 x 1.10 cm and is taller than wide/not parallel to the skin.  The lesion correlates with the palpable abnormality. The findings are most consistent with a malignancy.  BI-RADS 5.    Ultrasound Guided Core Needle Biopsy Procedure - Left Breast    The risks, benefits and alternatives to the procedure were explained and Written consent was obtained.  The patient's left breast was prepped using betadine.  Using ultrasound guidance, 10 ccs of 1% Lidocaine was injected around the lesion.  A small skin incision was made with the 11 blade.  Using the 14 gauge BigDoor Rashaun biopsy device, 3 samples were obtained.  A Zedmo TrArtsyk marker clip was then inserted into the lesion.  Images were stored demonstrating the biopsy needle and clip placement within the target lesion.  The incision was closed with steri strips.  The patient tolerated the procedure well and no complications were noted.  Post-biopsy instructions were given.  I will call with the results when they return.

## 2023-06-26 ENCOUNTER — TELEPHONE (OUTPATIENT)
Dept: SURGERY | Facility: CLINIC | Age: 83
End: 2023-06-26
Payer: MEDICARE

## 2023-06-26 PROBLEM — Z17.0 MALIGNANT NEOPLASM OF UPPER-INNER QUADRANT OF LEFT BREAST IN FEMALE, ESTROGEN RECEPTOR POSITIVE: Status: ACTIVE | Noted: 2023-06-26

## 2023-06-26 PROBLEM — C50.212 MALIGNANT NEOPLASM OF UPPER-INNER QUADRANT OF LEFT BREAST IN FEMALE, ESTROGEN RECEPTOR POSITIVE: Status: ACTIVE | Noted: 2023-06-26

## 2023-06-26 LAB
COMMENT: NORMAL
FINAL PATHOLOGIC DIAGNOSIS: NORMAL
GROSS: NORMAL
Lab: NORMAL

## 2023-06-26 NOTE — TELEPHONE ENCOUNTER
Called patient to discuss breast biopsy results- reviewed pathology report and changed the time of her appt tomorrow with Dr. Pulliam to 12:00pm. Patient coming to appt with her sister. Denied any other questions at this time.   ----- Message from Susan Pulliam MD sent at 6/26/2023 11:19 AM CDT -----  She came back positive. Needs to come see me  ----- Message -----  From: Tony, [employer] [facility]  Sent: 6/26/2023  11:15 AM CDT  To: Susan Pulliam MD

## 2023-06-27 ENCOUNTER — DOCUMENTATION ONLY (OUTPATIENT)
Dept: SURGERY | Facility: CLINIC | Age: 83
End: 2023-06-27

## 2023-06-27 ENCOUNTER — OFFICE VISIT (OUTPATIENT)
Dept: SURGERY | Facility: CLINIC | Age: 83
End: 2023-06-27
Payer: MEDICARE

## 2023-06-27 DIAGNOSIS — Z17.0 MALIGNANT NEOPLASM OF UPPER-INNER QUADRANT OF LEFT BREAST IN FEMALE, ESTROGEN RECEPTOR POSITIVE: Primary | ICD-10-CM

## 2023-06-27 DIAGNOSIS — C50.212 MALIGNANT NEOPLASM OF UPPER-INNER QUADRANT OF LEFT BREAST IN FEMALE, ESTROGEN RECEPTOR POSITIVE: Primary | ICD-10-CM

## 2023-06-27 PROCEDURE — 99215 OFFICE O/P EST HI 40 MIN: CPT | Mod: S$GLB,,, | Performed by: SURGERY

## 2023-06-27 PROCEDURE — 99215 PR OFFICE/OUTPT VISIT, EST, LEVL V, 40-54 MIN: ICD-10-PCS | Mod: S$GLB,,, | Performed by: SURGERY

## 2023-06-27 PROCEDURE — 3072F PR LOW RISK FOR RETINOPATHY: ICD-10-PCS | Mod: CPTII,S$GLB,, | Performed by: SURGERY

## 2023-06-27 PROCEDURE — 3072F LOW RISK FOR RETINOPATHY: CPT | Mod: CPTII,S$GLB,, | Performed by: SURGERY

## 2023-06-27 NOTE — PROGRESS NOTES
Breast Surgical Oncology  [place]    Date of Service: 2023    SUBJECTIVE:   Chief complaint: left breast cancer    HISTORY OF PRESENT ILLNESS:   Kaleigh Nieves is a 82 y.o. female who is kindly referred by Dr. Lucio Morris for left breast cancer.    Screening mammography detected a left breast upper inner quadrant mass. Diagnostic imaging with mammogram showed a 18 mm mass at the upper inner posterior position deemed BIRADS 5. Focused sonographic evaluation confirmed a a 13 mm x 13 mm x 12 mm non-parallel mass with angular margins seen in the left breast at 11 o'clock, 13 cm from the nipple. 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 She denies breast concerns such as pain, skin changes, nipple discharge, nipple retraction or lumps under the arm. She reports no family history of breast cancer.     Her breast cancer risk factor profile is as follows: Menarche at unknown age, Menopause at unknown age.  She is . Age at first live birth was 33. Family history of cancer is as follows:no breast cancer of note, a 3rd cousin with ovarian cancer is reported. See below for other cancers within the family.    FAMILY HISTORY:     Family History   Problem Relation Age of Onset    Hypertension Mother     Diabetes Mother     Leukemia Sister     Hypertension Sister     Cancer Sister         leukemia    Heart disease Maternal Aunt     Cancer Maternal Uncle         gastric, pacreatic    Heart disease Maternal Uncle     Miscarriages / Stillbirths Maternal Uncle     Diabetes Maternal Grandmother     Asthma Maternal Grandfather     Breast cancer Neg Hx     Colon cancer Neg Hx     Ovarian cancer Neg Hx     COPD Neg Hx     Hyperlipidemia Neg Hx     Kidney disease Neg Hx         PAST MEDICAL HISTORY:     Past Medical History:   Diagnosis Date    Anemia     Angina pectoris     Arthritis     Asthma     Back pain     Bronchitis     CAD (coronary artery disease) 2015    via  [facility]    Carotid artery stenosis     Chronic bronchitis     Chronic gout     Colon polyp     CTS (carpal tunnel syndrome)     Diabetes mellitus, type 2     Diabetes with neurologic complications     Diverticulosis     RIVAS (dyspnea on exertion)     chronic; eval pulm dr natarajan    Dyslipidemia     Ex-smoker     quit in her 30s    GERD (gastroesophageal reflux disease)     Heart failure     Hyperlipidemia     Hypertension     Neuropathy, lower extremity     Obesity     Open-angle glaucoma     dr avel kaur    Peripheral vascular disease     Polyneuropathy     Tobacco dependence        SURGICAL HISTORY:     Past Surgical History:   Procedure Laterality Date    APPENDECTOMY      CARPAL TUNNEL RELEASE Right     CATARACT EXTRACTION, BILATERAL      CHOLECYSTECTOMY      CYST REMOVAL      DILATION AND CURETTAGE OF UTERUS      EYE SURGERY      HEMORRHOID SURGERY      HYSTERECTOMY      fibroids    INCISION AND DRAINAGE PERIRECTAL ABSCESS      JOINT REPLACEMENT Bilateral     knee    KNEE ARTHROSCOPY Right     MYOMECTOMY         SOCIAL HISTORY:     Social History     Tobacco Use    Smoking status: Former     Packs/day: 0.25     Years: 24.00     Pack years: 6.00     Types: Cigarettes     Quit date: 1976     Years since quittin.0    Smokeless tobacco: Never   Substance Use Topics    Alcohol use: No    Drug use: No        MEDICATIONS/ALLERGIES:     Current Outpatient Medications:     albuterol (PROVENTIL/VENTOLIN HFA) 90 mcg/actuation inhaler, INHALE 2 PUFFS INTO THE LUNGS EVERY SIX HOURS AS NEEDED FOR WHEEZING, Disp: 18 g, Rfl: 11    allopurinoL (ZYLOPRIM) 100 MG tablet, Take 1 tablet (100 mg total) by mouth once daily., Disp: 90 tablet, Rfl: 3    aspirin 81 MG Chew, Take 81 mg by mouth once daily., Disp: , Rfl:     bisoprolol (ZEBETA) 5 MG tablet, Take 1 tablet (5 mg total) by mouth once daily., Disp: 90 tablet, Rfl: 3    blood glucose control high,low Soln, 1 each by Misc.(Non-Drug; Combo Route) route once a  week., Disp: 1 each, Rfl: 11    blood sugar diagnostic Strp, To check BG 3 times daily, to use with insurance preferred meter, Disp: 200 each, Rfl: 0    blood-glucose meter kit, To check BG 3 times daily, to use with insurance preferred meter, Disp: 1 each, Rfl: 0    furosemide (LASIX) 40 MG tablet, Take 1 tablet (40 mg total) by mouth once daily., Disp: 90 tablet, Rfl: 3    lancets Misc, To check BG 3 times daily, to use with insurance preferred meter, Disp: 200 each, Rfl: 3    latanoprost 0.005 % ophthalmic solution, Place 1 drop into both eyes once daily., Disp: 7.5 mL, Rfl: 3    simvastatin (ZOCOR) 20 MG tablet, Take 1 tablet (20 mg total) by mouth nightly., Disp: 90 tablet, Rfl: 3    timolol maleate 0.5% (TIMOPTIC) 0.5 % Drop, Place 1 drop into both eyes 2 (two) times daily., Disp: 30 mL, Rfl: 4  Review of patient's allergies indicates:   Allergen Reactions    Iodine and iodide containing products Nausea And Vomiting    Penicillins Itching    Ultram [tramadol] Hives and Itching     Light headness, itiching    Sulfa (sulfonamide antibiotics) Hives and Itching    Amlodipine      edema    Bactrim [sulfamethoxazole-trimethoprim] Itching and Rash       REVIEW OF SYSTEMS:   I have reviewed 12 systems, including 2 points per system. Pertinent reported positives are: shortness of breath, lower extremity swelling    PHYSICAL EXAM:   General: The patient appears well and is in no acute distress.     Chaperon present for examination.   BREAST EXAM  No Asymmetry  Macromastia with grade III ptosis  Right:  - Mass: No  - Skin change: No  - Nipple Discharge: No  - Nipple retraction: No  - Axillary LAD: No  Left:   - Mass: yes 2 cm mass 11:00 13 CMFN  - Skin change: No  - Nipple Discharge: No  - Nipple retraction: No  - Axillary LAD: No    IMAGING:   Images were personally reviewed.     Results for orders placed during the hospital encounter of 06/02/23    Mammo Digital Diagnostic Left with Bertrand    Narrative  Result:  Mammo  Digital Diagnostic Left with Bertrand  US Breast Left Limited    History:  Patient is 82 y.o. and is seen for diagnostic imaging.    Films Compared:  Compared to: 04/21/2023 Mammo Digital Screening Bilat w/ Bertrand    Findings:  This procedure was performed using tomosynthesis. Computer-aided detection was utilized in the interpretation of this examination.  The left breast is heterogeneously dense, which may obscure small masses.    Mammo Digital Diagnostic Left with Bertrand  Left  Mass: There is an 18 mm irregularly shaped mass with spiculated margins seen in the upper inner quadrant of the left breast in the posterior depth.    US Breast Left Limited  Left  Mass: There is a 13 mm x 13 mm x 12 mm non-parallel mass with angular margins seen in the left breast at 11 o'clock, 13 cm from the nipple.    Ultrasound evaluation of the ipsilateral left axilla was performed and demonstrates no lymph nodes which meet sonographic criteria for biopsy.    Impression  Left  Mass: Left breast 18 mm mass at the upper inner posterior position. Assessment: 5 - Highly suggestive of malignancy. Ultrasound-guided biopsy is recommended.    BI-RADS Category:  Overall: 5 - Highly Suggestive of Malignancy    Recommendation:  Ultrasound-guided biopsy is recommended.      Your estimated lifetime risk of breast cancer (to age 85) based on Tyrer-Cuzick risk assessment model is 0.26 %.  According to the [employer] Society, patients with a lifetime breast cancer risk of 20% or higher might benefit from supplemental screening tests. ??    PATHOLOGY:     Lab Results   Component Value Date    FPATHDX  06/20/2023     1. 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  (ductal)  HISTOLOGIC GRADE:  Grade 1 of 3         Tubule formation:  2         Pleomorphism:  1         Mitotic rate:  1  TUMOR SIZE:  Up to 1.5 cm in greatest linear dimension  DUCTAL CARCINOMA IN SITU (DCIS):  Not identified  LYMPHOVASCULAR INVASION:  Not identified  MICROCALCIFICATIONS:  Not identified  BREAST BIOMARKERS (performed with appropriate controls):          ER:  Positive (intermediate, greater than 90%)          UT:  Positive (strong, 1-11%)          Her2:  Negative (1+)          Ki67:  20%         ASSESSMENT:     1. Malignant neoplasm of upper-inner quadrant of left breast in female, estrogen receptor positive          PLAN:     Kaleigh Nieves is a 82 y.o. female who is new diagnosis of Stage IA (T1c N0 Mx) LEFT Breast Invasive Ductal  Carcinoma, Grade 1, ER >90%, UT 1-11%, Abe1hxw 1+ with a ki67 of 20%. I have reviewed her imaging and pathology reports with her and I have provided her with copies. Today, we reviewed reviewed the guidelines of the National Comprehensive Cancer Network for her diagnosis.    I am ordering a breast MRI to exclude other lesions in either breast.  We discussed genetic testing, which is optional as she does not meet standard criteria due to distant family history. She will require cardiology clearance prior to surgery.       We have discussed the surgical choices of lumpectomy with radiation and mastectomy with or without radiation.  I have explained that the survival is the same, regardless of the surgery chosen.  We have discussed that the local recurrence rate following mastectomy is 2-5%.  I have explained that the local recurrence rate was slightly higher following breast conservation in the large trials that compared mastectomy and breast conservation. However, with current medical therapies, local recurrence has been reduced to as low as 6%. I did review with her the general schedule and side effects of radiation. She will be referred to radiation oncology. We briefly  discussed reconstruction options, and the [facility] breast cancer treatment brochure was provided.    We reviewed the need for sentinel lymph node biopsy to further stage the axilla.       Because her cancer is hormone receptor positive, she will be recommended for endocrine therapy.  The decision for or against adjuvant chemotherapy will be made following surgery. She understands that she will be referred to a medical oncologist to discuss these points further.    I have provided her with general information regarding the supportive services available here including oncology social work, patient navigation, nutritional services, preoperative and postoperative physical therapy programs, and genetic counseling.      I spent a total of 60 minutes on this visit. This includes face to face time and non-face to face time preparing to see the patient (eg, review of tests), obtaining and/or reviewing separately obtained history, documenting clinical information in the electronic or other health record, independently interpreting results and communicating results to the patient/family/caregiver, or care coordinator.          Susan Pulliam M.D.

## 2023-06-27 NOTE — NURSING
Nurse Navigator Note:     Met with patient and her sister during her consult with Dr. Pulliam.  Patient and I reviewed the information she discussed with Dr. Pulliam, including treatment options, referrals, diagnosis, and future plans for workup. Patient and I went through the new patient booklet, explained some of the information and why it is provided.   Specifically discussed shared services listed in booklet and how to request referral. Discussed the role of the nurse navigator and how I can help her through her breast cancer journey.     Patient was given a copy of her appointments, Dr. Pulliam's card, and my card. Encouraged her to call me if she has any questions or concerns or would like to schedule any additional appointments. Verbalized understanding of all information.      Pt is not interested in genetic testing at this time- genetic counseling FAQs handout given if she changes her mind at any point. We discussed that her surgery is dependent on cardiac clearance and that her cardiologist may need to see her in their clinic for further testing if they deem that appropriate- she verbalized understanding. Dr. CARLOS Partida messaged requesting cardiac clearance for this patient.     Oncology Navigation   Intake  Date of Diagnosis: 06/20/23  Cancer Type: Breast  Internal / External Referral: Internal  Date of Referral: 06/26/23  Initial Nurse Navigator Contact: 06/26/23  Referral to Initial Contact Timeline (days): 0  Date Worked: 06/27/23  First Appointment Available: 06/27/23  Appointment Date: 06/27/23  First Available Date vs. Scheduled Date (days): 0  Multiple appointments: No     Treatment  Current Status: Staging work-up  Pending: Other    Surgery: Planned  Surgical Oncologist: Susan Pulliam MD                ER: Positive  OK: Positive  Her2: Negative       Support Systems: Family members  Barriers of Care: Transportation; Financial concerns  Transportation Barriers: Pt only able to make appts between  10am-12pm  Concerns: Pt concerned about transportation and financial burden of treatment- pt will be referred to social work.     Acuity  Stage: 1  Surgical Procedure Complexity: 1  Treatment Tolerability: Has not started treatment yet/treatment fully completed and side effects resolved  Comorbidities in Medical History: 2  Hospitalization Within the Past Month: 0   Needed: 0  Support: 1  Verbalizes Financial Concerns: 1  Transportation: 1  Psychological Factors (+1 each): Emotional during conversation  History of noncompliance/frequent no shows and cancellations: 0  Verbalizes the need for more education: 1  Other Factors (+1 for Each): 0  Navigation Acuity: 9     Follow Up  No follow-ups on file.

## 2023-06-28 ENCOUNTER — OFFICE VISIT (OUTPATIENT)
Dept: PODIATRY | Facility: CLINIC | Age: 83
End: 2023-06-28
Payer: MEDICARE

## 2023-06-28 VITALS — WEIGHT: 201 LBS | BODY MASS INDEX: 32.3 KG/M2 | HEIGHT: 66 IN

## 2023-06-28 DIAGNOSIS — E11.51 TYPE II DIABETES MELLITUS WITH PERIPHERAL CIRCULATORY DISORDER: Primary | ICD-10-CM

## 2023-06-28 DIAGNOSIS — B35.1 DERMATOPHYTOSIS OF NAIL: ICD-10-CM

## 2023-06-28 DIAGNOSIS — Z76.89 ENCOUNTER TO ESTABLISH CARE: Primary | ICD-10-CM

## 2023-06-28 DIAGNOSIS — Z00.00 ROUTINE ADULT HEALTH MAINTENANCE: ICD-10-CM

## 2023-06-28 PROCEDURE — 99499 NO LOS: ICD-10-PCS | Mod: S$GLB,,, | Performed by: PODIATRIST

## 2023-06-28 PROCEDURE — 99499 UNLISTED E&M SERVICE: CPT | Mod: S$GLB,,, | Performed by: PODIATRIST

## 2023-06-28 PROCEDURE — 11721 PR DEBRIDEMENT OF NAILS, 6 OR MORE: ICD-10-PCS | Mod: Q8,S$GLB,, | Performed by: PODIATRIST

## 2023-06-28 PROCEDURE — 99999 PR PBB SHADOW E&M-EST. PATIENT-LVL III: CPT | Mod: PBBFAC,,, | Performed by: PODIATRIST

## 2023-06-28 PROCEDURE — 11721 DEBRIDE NAIL 6 OR MORE: CPT | Mod: Q8,S$GLB,, | Performed by: PODIATRIST

## 2023-06-28 PROCEDURE — 99999 PR PBB SHADOW E&M-EST. PATIENT-LVL III: ICD-10-PCS | Mod: PBBFAC,,, | Performed by: PODIATRIST

## 2023-06-29 ENCOUNTER — TUMOR BOARD CONFERENCE (OUTPATIENT)
Dept: SURGERY | Facility: CLINIC | Age: 83
End: 2023-06-29
Payer: MEDICARE

## 2023-06-29 ENCOUNTER — PATIENT OUTREACH (OUTPATIENT)
Dept: SURGERY | Facility: CLINIC | Age: 83
End: 2023-06-29
Payer: MEDICARE

## 2023-06-29 DIAGNOSIS — Z17.0 MALIGNANT NEOPLASM OF UPPER-INNER QUADRANT OF LEFT BREAST IN FEMALE, ESTROGEN RECEPTOR POSITIVE: Primary | ICD-10-CM

## 2023-06-29 DIAGNOSIS — C50.212 MALIGNANT NEOPLASM OF UPPER-INNER QUADRANT OF LEFT BREAST IN FEMALE, ESTROGEN RECEPTOR POSITIVE: Primary | ICD-10-CM

## 2023-06-29 PROCEDURE — 99358 PROLONG SERVICE W/O CONTACT: CPT | Mod: S$GLB,,, | Performed by: SURGERY

## 2023-06-29 PROCEDURE — 99358 PR PROLONGED SERV,NO CONTACT,1ST HR: ICD-10-PCS | Mod: S$GLB,,, | Performed by: SURGERY

## 2023-06-30 ENCOUNTER — TELEPHONE (OUTPATIENT)
Dept: PULMONOLOGY | Facility: CLINIC | Age: 83
End: 2023-06-30
Payer: MEDICARE

## 2023-06-30 NOTE — TELEPHONE ENCOUNTER
----- Message from Aster Concepcion sent at 6/30/2023  1:42 PM CDT -----  Name of Who is Calling:Patient           What is the request in detail: Patient has appointment scheduled with provider and ekg for surgery clearance to remove a mass in her breast but she can not drive to the [place]. Patient states she always goes to [facility].           Can the clinic reply by [facility]:no           What Number to Call Back if not in [facility]: [phone]

## 2023-07-03 ENCOUNTER — HOSPITAL ENCOUNTER (OUTPATIENT)
Dept: CARDIOLOGY | Facility: HOSPITAL | Age: 83
Discharge: HOME | End: 2023-07-03
Attending: INTERNAL MEDICINE
Payer: MEDICARE

## 2023-07-03 ENCOUNTER — OFFICE VISIT (OUTPATIENT)
Dept: CARDIOLOGY | Facility: CLINIC | Age: 83
End: 2023-07-03
Payer: MEDICARE

## 2023-07-03 VITALS
BODY MASS INDEX: 32.34 KG/M2 | DIASTOLIC BLOOD PRESSURE: 76 MMHG | HEIGHT: 66 IN | SYSTOLIC BLOOD PRESSURE: 138 MMHG | WEIGHT: 201.25 LBS | OXYGEN SATURATION: 94 % | HEART RATE: 72 BPM

## 2023-07-03 DIAGNOSIS — E11.42 TYPE 2 DIABETES MELLITUS WITH DIABETIC POLYNEUROPATHY, WITHOUT LONG-TERM CURRENT USE OF INSULIN: ICD-10-CM

## 2023-07-03 DIAGNOSIS — Z01.810 PREOP CARDIOVASCULAR EXAM: ICD-10-CM

## 2023-07-03 DIAGNOSIS — Z76.89 ENCOUNTER TO ESTABLISH CARE: ICD-10-CM

## 2023-07-03 DIAGNOSIS — Z00.00 ROUTINE ADULT HEALTH MAINTENANCE: ICD-10-CM

## 2023-07-03 DIAGNOSIS — I50.32 CHRONIC DIASTOLIC HEART FAILURE: ICD-10-CM

## 2023-07-03 DIAGNOSIS — I25.10 CORONARY ARTERY DISEASE INVOLVING NATIVE CORONARY ARTERY OF NATIVE HEART WITHOUT ANGINA PECTORIS: ICD-10-CM

## 2023-07-03 DIAGNOSIS — E78.2 MIXED HYPERLIPIDEMIA: Chronic | ICD-10-CM

## 2023-07-03 DIAGNOSIS — R06.09 DOE (DYSPNEA ON EXERTION): Primary | ICD-10-CM

## 2023-07-03 PROCEDURE — 93005 ELECTROCARDIOGRAM TRACING: CPT

## 2023-07-03 PROCEDURE — 3072F PR LOW RISK FOR RETINOPATHY: ICD-10-PCS | Mod: CPTII,S$GLB,, | Performed by: INTERNAL MEDICINE

## 2023-07-03 PROCEDURE — 1101F PT FALLS ASSESS-DOCD LE1/YR: CPT | Mod: CPTII,S$GLB,, | Performed by: INTERNAL MEDICINE

## 2023-07-03 PROCEDURE — 1159F PR MEDICATION LIST DOCUMENTED IN MEDICAL RECORD: ICD-10-PCS | Mod: CPTII,S$GLB,, | Performed by: INTERNAL MEDICINE

## 2023-07-03 PROCEDURE — 3288F FALL RISK ASSESSMENT DOCD: CPT | Mod: CPTII,S$GLB,, | Performed by: INTERNAL MEDICINE

## 2023-07-03 PROCEDURE — 93010 ELECTROCARDIOGRAM REPORT: CPT | Mod: ,,, | Performed by: INTERNAL MEDICINE

## 2023-07-03 PROCEDURE — 1101F PR PT FALLS ASSESS DOC 0-1 FALLS W/OUT INJ PAST YR: ICD-10-PCS | Mod: CPTII,S$GLB,, | Performed by: INTERNAL MEDICINE

## 2023-07-03 PROCEDURE — 93010 EKG 12-LEAD: ICD-10-PCS | Mod: ,,, | Performed by: INTERNAL MEDICINE

## 2023-07-03 PROCEDURE — 1126F PR PAIN SEVERITY QUANTIFIED, NO PAIN PRESENT: ICD-10-PCS | Mod: CPTII,S$GLB,, | Performed by: INTERNAL MEDICINE

## 2023-07-03 PROCEDURE — 3072F LOW RISK FOR RETINOPATHY: CPT | Mod: CPTII,S$GLB,, | Performed by: INTERNAL MEDICINE

## 2023-07-03 PROCEDURE — 99214 PR OFFICE/OUTPT VISIT, EST, LEVL IV, 30-39 MIN: ICD-10-PCS | Mod: 25,S$GLB,, | Performed by: INTERNAL MEDICINE

## 2023-07-03 PROCEDURE — 99214 OFFICE O/P EST MOD 30 MIN: CPT | Mod: 25,S$GLB,, | Performed by: INTERNAL MEDICINE

## 2023-07-03 PROCEDURE — 3078F DIAST BP <80 MM HG: CPT | Mod: CPTII,S$GLB,, | Performed by: INTERNAL MEDICINE

## 2023-07-03 PROCEDURE — 3078F PR MOST RECENT DIASTOLIC BLOOD PRESSURE < 80 MM HG: ICD-10-PCS | Mod: CPTII,S$GLB,, | Performed by: INTERNAL MEDICINE

## 2023-07-03 PROCEDURE — 3288F PR FALLS RISK ASSESSMENT DOCUMENTED: ICD-10-PCS | Mod: CPTII,S$GLB,, | Performed by: INTERNAL MEDICINE

## 2023-07-03 PROCEDURE — 3075F SYST BP GE 130 - 139MM HG: CPT | Mod: CPTII,S$GLB,, | Performed by: INTERNAL MEDICINE

## 2023-07-03 PROCEDURE — 1126F AMNT PAIN NOTED NONE PRSNT: CPT | Mod: CPTII,S$GLB,, | Performed by: INTERNAL MEDICINE

## 2023-07-03 PROCEDURE — 99999 PR PBB SHADOW E&M-EST. PATIENT-LVL III: CPT | Mod: PBBFAC,,, | Performed by: INTERNAL MEDICINE

## 2023-07-03 PROCEDURE — 99999 PR PBB SHADOW E&M-EST. PATIENT-LVL III: ICD-10-PCS | Mod: PBBFAC,,, | Performed by: INTERNAL MEDICINE

## 2023-07-03 PROCEDURE — 1159F MED LIST DOCD IN RCRD: CPT | Mod: CPTII,S$GLB,, | Performed by: INTERNAL MEDICINE

## 2023-07-03 PROCEDURE — 3075F PR MOST RECENT SYSTOLIC BLOOD PRESS GE 130-139MM HG: ICD-10-PCS | Mod: CPTII,S$GLB,, | Performed by: INTERNAL MEDICINE

## 2023-07-03 NOTE — PROGRESS NOTES
Subjective:   Patient ID:  Kaleigh Nieves is a 82 y.o. female who presents for evaluation of No chief complaint on file.    7.3.2023  Found to have breast cancer.  He is to go for surgery.    She continues to complain of same level of dyspnea on exertion.    She does have chronic lower extremity swelling from venous insufficiency.    She denies any chest pain.    Her EKG looks normal.        2.1.2023  Comes in for a 6 months follow up   Denies any worsening or unusual rivas   She states the lasix is not helping with her chronic lower ext swelling   She follows with samina for venous insufficiency   She denies any orthopnea   Bnp normal multiple times       6.2022  Improved symptoms after increasing lasix and adding bisoprolol last visit  Still has NYHA BUT 1 Now  No chest pain   Trace edema, wearing compression stocking   Sometimes has wheezing and uses ventoling   Normal BNP multiple times   Mild AS AI last year     12.2021  Comes in for 6 months follow   Denies any CP, reports RIVAS NYHA 1-2, Increased leg swelling, states lasix didn't make a big difference on 20 mg only   BP uncontrolled   Denies PND, orthopnea  No palpitaitons  No syncope or LOC       Interval hx: 5/26/2021   Went to the ED last month with sharp, LUQ pain, underneath her left breast, sharp few seconds only, across her belly, felt like gas as per patient and she felt better once she started to pass gas   Denies any exertional chest pain   Has stable rivas, nyha1 , had normal BNP , TROP AND EKG in the ED, had very elevated  SBP   Today bp under good control   No more stomach pain   See ros     =================================================  HPI 2/1/2021  80-year-old female with history of diastolic heart failure, diabetes, hypertension, hyperlipidemia, gout, carotid artery disease and normal angiogram 2010  Comes in for follow up   Continues to have RIVAS , NYHA 2, Orthopnea, mild bilateral LE edema R>L   Compliant with low salt diet   Denies any  chest pain   /750 at home     ==================================================================================================================================    2018: seen by Dr Jane   79 yo female, referred for CHF. Prior cardiologist Dr. Crawford at [facility].due to f/h CHF  PMH DM, HTN, HLD, gout, carotid artery Dz and had clear LHC in 2010.  No chest pain, palpitation, dizziness, orthopnea.  Some leg swelling,   RIVAS stable  .ECHO showed normal EF and LVH; MPI showed no ischemia  : BNP 34; BARRON wnl  ekg NSR    Shortness of Breath  Associated symptoms include leg swelling. Pertinent negatives include no chest pain or syncope.     Current Outpatient Medications:     albuterol (PROVENTIL/VENTOLIN HFA) 90 mcg/actuation inhaler, INHALE 2 PUFFS INTO THE LUNGS EVERY SIX HOURS AS NEEDED FOR WHEEZING, Disp: 18 g, Rfl: 11    allopurinoL (ZYLOPRIM) 100 MG tablet, Take 1 tablet (100 mg total) by mouth once daily., Disp: 90 tablet, Rfl: 3    aspirin 81 MG Chew, Take 81 mg by mouth once daily., Disp: , Rfl:     bisoprolol (ZEBETA) 5 MG tablet, Take 1 tablet (5 mg total) by mouth once daily., Disp: 90 tablet, Rfl: 3    blood glucose control high,low Soln, 1 each by Misc.(Non-Drug; Combo Route) route once a week., Disp: 1 each, Rfl: 11    blood sugar diagnostic Strp, To check BG 3 times daily, to use with insurance preferred meter, Disp: 200 each, Rfl: 0    blood-glucose meter kit, To check BG 3 times daily, to use with insurance preferred meter, Disp: 1 each, Rfl: 0    furosemide (LASIX) 40 MG tablet, Take 1 tablet (40 mg total) by mouth once daily., Disp: 90 tablet, Rfl: 3    lancets Misc, To check BG 3 times daily, to use with insurance preferred meter, Disp: 200 each, Rfl: 3    latanoprost 0.005 % ophthalmic solution, Place 1 drop into both eyes once daily., Disp: 7.5 mL, Rfl: 3    simvastatin (ZOCOR) 20 MG tablet, Take 1 tablet (20 mg total) by mouth nightly., Disp: 90 tablet, Rfl: 3    timolol maleate 0.5%  (TIMOPTIC) 0.5 % Drop, Place 1 drop into both eyes 2 (two) times daily., Disp: 30 mL, Rfl: 4    Past Medical History:   Diagnosis Date    Anemia     Angina pectoris     Arthritis     Asthma     Back pain     Bronchitis     [facility]    Carotid artery stenosis     Chronic bronchitis     Chronic gout     Colon polyp     CTS (carpal tunnel syndrome)     Diabetes mellitus, type 2     Diabetes with neurologic complications     Diverticulosis     RIVAS (dyspnea on exertion)     chronic; eval pulm dr natarajan    Dyslipidemia     Ex-smoker     quit in her 30s    GERD (gastroesophageal reflux disease)     Heart failure     Hyperlipidemia     Hypertension     Neuropathy, lower extremity     Obesity     Open-angle glaucoma     dr avel kaur    Peripheral vascular disease     Polyneuropathy     Tobacco dependence        Past Surgical History:   Procedure Laterality Date    APPENDECTOMY      CARPAL TUNNEL RELEASE Right     CATARACT EXTRACTION, BILATERAL      CHOLECYSTECTOMY      CYST REMOVAL  2018    DILATION AND CURETTAGE OF UTERUS      EYE SURGERY      HEMORRHOID SURGERY      HYSTERECTOMY      fibroids    INCISION AND DRAINAGE PERIRECTAL ABSCESS      JOINT REPLACEMENT Bilateral     knee    KNEE ARTHROSCOPY Right     MYOMECTOMY         Social History     Tobacco Use    Smoking status: Former     Packs/day: 0.25     Years: 24.00     Pack years: 6.00     Types: Cigarettes     Quit date: 1976     Years since quittin.0    Smokeless tobacco: Never   Substance Use Topics    Alcohol use: No    Drug use: No       Family History   Problem Relation Age of Onset    Hypertension Mother     Diabetes Mother     Leukemia Sister     Hypertension Sister     Cancer Sister         leukemia    Heart disease Maternal Aunt     Cancer Maternal Uncle         gastric, pacreatic    Heart disease Maternal Uncle     Miscarriages / Stillbirths Maternal Uncle     Diabetes Maternal Grandmother     Asthma  Maternal Grandfather     Breast cancer Neg Hx     Colon cancer Neg Hx     Ovarian cancer Neg Hx     COPD Neg Hx     Hyperlipidemia Neg Hx     Kidney disease Neg Hx        Review of Systems   Cardiovascular:  Positive for leg swelling. Negative for chest pain, dyspnea on exertion, palpitations and syncope.   Respiratory:  Positive for shortness of breath.    Genitourinary: Negative.    Neurological: Negative.      Objective:  Last 3 sets of Vitals    Vitals - 1 value per visit 6/28/2023 7/3/2023 7/3/2023   SYSTOLIC - - 138   DIASTOLIC - - 76   Pulse - - 72   Temp - - -   Resp - - -   SPO2 - - 94   Weight (lb) 201 - 201.28   Weight (kg) 91.173 - 91.3   Height 66 - 66   BMI (Calculated) 32.5 - 32.5   VISIT REPORT - - -   Pain Score  - 0 -   Some recent data might be hidden        Physical Exam  Vitals reviewed.   Constitutional:       Appearance: She is well-developed.   Neck:      Vascular: No carotid bruit.   Cardiovascular:      Rate and Rhythm: Normal rate and regular rhythm.      Pulses: Intact distal pulses.      Heart sounds: Murmur heard.   Pulmonary:      Breath sounds: Normal breath sounds.   Musculoskeletal:         General: Swelling present.   Neurological:      Mental Status: She is oriented to person, place, and time.   2017   -------  Real-time, color flow and Doppler imaging performed.    Atherosclerotic plaque noted within the bilateral bulb regions and RIGHT proximal ICA and ECA.                     ICA                                    R                               L  Peak systolic velocity:          88  Cm/sec               133 cm/sec  Peak diastolic velocity:        18 Cm/sec                   20 cm/sec  Systolic velocity ratio:          1.1                             1.2  Diastolic velocity ratio:         1.6                             1.8  Vertebral artery flow:            Antegrade                             antegrade  ECA flow:                                 Antegrade                              antegrade  IMPRESSION:     1.  Stable exam.   2.  No hemodynamically significant plaque formation or stenosis on the RIGHT.   3.  Stable findings on the LEFT with minimally elevated to PSV within the ICA.  Stenosis is in the less than 50 percent range.   4.  Followup and/or further evaluation as warranted.  ==============================================================================   Impression: NORMAL MYOCARDIAL PERFUSION 2016  1. The perfusion scan is free of evidence for myocardial ischemia or injury.   2. There is a mild intensity fixed defect in the anterior wall of the left ventricle, secondary to breast attenuation.   3. Resting wall motion is physiologic.   4. Resting LV function is normal.   5. The ventricular volumes are normal at rest and stress.   6. The extracardiac distribution of radioactivity is normal.   ==============================================================================  2017  The right ankle brachial index was 1.24 which is normal.   The left ankle brachial index was 0.99 which suggests minimal left lower extremity arterial disease.   The right TBI is 0.45.   The left TBI is 0.49.   ==============================================================================  CONCLUSIONS 2018    1 - Hyperdynamic left ventricular systolic function (EF >70%).     2 - Indeterminate LV diastolic function.     3 - Normal right ventricular systolic function .     4 - Concentric remodeling.     5 - No wall motion abnormalities.  Lab Results   Component Value Date    CHOL 140 03/03/2023    CHOL 136 03/21/2022    CHOL 147 01/27/2021     Lab Results   Component Value Date    HDL 39 (L) 03/03/2023    HDL 33 (L) 03/21/2022    HDL 39 (L) 01/27/2021     Lab Results   Component Value Date    LDLCALC 82.6 03/03/2023    LDLCALC 84.8 03/21/2022    LDLCALC 89.8 01/27/2021     Lab Results   Component Value Date    TRIG 92 03/03/2023    TRIG 91 03/21/2022    TRIG 91 01/27/2021     Lab Results   Component Value  Date    CHOLHDL 27.9 03/03/2023    CHOLHDL 24.3 03/21/2022    CHOLHDL 26.5 01/27/2021       Chemistry        Component Value Date/Time     03/03/2023 0929    K 5.3 (H) 03/03/2023 0929    CL 96 03/03/2023 0929    CO2 34 (H) 03/03/2023 0929    BUN 6 (L) 03/03/2023 0929    CREATININE 0.7 03/03/2023 0929     (H) 03/03/2023 0929        Component Value Date/Time    CALCIUM 9.3 03/03/2023 0929    ALKPHOS 23 (L) 03/03/2023 0929    AST 16 03/03/2023 0929    ALT 15 03/03/2023 0929    BILITOT 0.5 03/03/2023 0929    ESTGFRAFRICA >60.0 03/21/2022 1435    EGFRNONAA >60.0 03/21/2022 1435          Lab Results   Component Value Date    HGBA1C 6.0 (H) 03/03/2023     Lab Results   Component Value Date    TSH 1.091 06/01/2022     No results found for: INR, PROTIME  Lab Results   Component Value Date    WBC 4.47 08/31/2022    HGB 14.6 08/31/2022    HCT 46.2 08/31/2022    MCV 90 08/31/2022     08/31/2022     BNP  @LABRCNTIP(BNP,BNPTRIAGEBLO)@  CrCl cannot be calculated (Patient's most recent lab result is older than the maximum 7 days allowed.).  No results found in the last 24 hours.  No results found in the last 24 hours.  No results found in the last 24 hours.      Conclusion 2/2021    The left ventricle is small with concentric remodeling and hyperdynamic systolic function. The estimated ejection fraction is 75%  Indeterminate left ventricular diastolic function.  Normal right ventricular size with low normal right ventricular systolic function.  There is mild aortic valve stenosis.  Aortic valve area is 3.17 cm2; peak velocity is 2.49 m/s; mean gradient is 13 mmHg.  Mild-to-moderate aortic regurgitation.  Normal central venous pressure (3 mmHg).  The estimated PA systolic pressure is 19 mmHg.     Conclusion    There is 40-49% right Internal Carotid Stenosis.  There is 50-59% left Internal Carotid Stenosis.        Impression:     1. Atherosclerosis with no evidence of flow-limiting carotid stenosis greater than  50% on the right  2. Atherosclerosis with mildly elevated velocities suggesting 50-69% stenosis of the left ICA.  Velocities are similar to prior.  .        Electronically signed by: Liam Espinoza MD  Date:                                            06/01/2022  Time:                                           10:12    ECG reviewed January 25, 2021  Normal sinus rhythm   Normal ECG  Assessment:     RIVAS (dyspnea on exertion)  -     B-TYPE NATRIURETIC PEPTIDE; Future; Expected date: 07/03/2023  -     Cancel: Echo; Future  -     Echo; Future    Preop cardiovascular exam  -     Echo; Future    Mixed hyperlipidemia    Coronary artery disease involving native coronary artery of native heart without angina pectoris    Type 2 diabetes mellitus with diabetic polyneuropathy, without long-term current use of insulin    Chronic diastolic heart failure        We will get an echocardiogram and a BNP.  To better stratify her risk for her surgery and see if she needs more Lasix. However overall  she needs an urgent/time sensitive surgery.  Her symptoms have been chronic.    Continue with beta-blocker perioperatively.    We will get an urgent echocardiogram.  Reviewed all tests and above medical conditions with patient in detail and formulated treatment plan.  Risk factor modification discussed.   Cardiac low salt diet discussed.  Maintaining healthy weight and weight loss goals were discussed in clinic.  rtc in 6 months

## 2023-07-05 ENCOUNTER — TELEPHONE (OUTPATIENT)
Dept: CARDIOLOGY | Facility: CLINIC | Age: 83
End: 2023-07-05
Payer: MEDICARE

## 2023-07-05 ENCOUNTER — PATIENT MESSAGE (OUTPATIENT)
Dept: CARDIOLOGY | Facility: CLINIC | Age: 83
End: 2023-07-05
Payer: MEDICARE

## 2023-07-05 NOTE — TELEPHONE ENCOUNTER
Pt was contacted about results:     Please have her know that her BNP, fluid retention marker was mildly elevated but more than before.     I would like her to take the Lasix twice a day for 3 days and then go back to once a day.   Thanks         Pt verbalized understanding with no questions or concerns.        ----- Message from Hung Partida MD sent at 7/5/2023  1:51 PM CDT -----  Please have her know that her BNP, fluid retention marker was mildly elevated but more than before.    I would like her to take the Lasix twice a day for 3 days and then go back to once a day.  Thanks  ----- Message -----  From: Tony, [facility] Lab Interface  Sent: 7/3/2023  10:00 PM CDT  To: Hung Partida MD

## 2023-07-09 NOTE — PROGRESS NOTES
Subjective:     Patient ID: Kaleigh Nieves is a 82 y.o. female.    Chief Complaint: Nail Care (No c/o pain, Diabetic Pt, last seen on 04/10/23 with  PCP Dr. Morris)    Kaleigh is a 82 y.o. female who presents to the clinic for evaluation and treatment of high risk feet. Kaleigh has a past medical history of Anemia, Angina pectoris, Arthritis, Asthma, Back pain, Bronchitis, CAD (coronary artery disease) (07/2015), Carotid artery stenosis, Chronic bronchitis, Chronic gout, Colon polyp, CTS (carpal tunnel syndrome), Diabetes mellitus, type 2, Diabetes with neurologic complications, Diverticulosis, RIVAS (dyspnea on exertion), Dyslipidemia, Ex-smoker, GERD (gastroesophageal reflux disease), Heart failure, Hyperlipidemia, Hypertension, Neuropathy, lower extremity, Obesity, Open-angle glaucoma, Peripheral vascular disease, Polyneuropathy, and Tobacco dependence. The patient's chief complaint is long, thick toenails. This patient has documented high risk feet requiring routine maintenance secondary to diabetes mellitis and those secondary complications of diabetes, as mentioned.     PCP: Lucio Morris MD    Date Last Seen by PCP: 04/10/2023    Current shoe gear:  Affected Foot: Rx diabetic extra depth shoes and custom accommodative insoles     Unaffected Foot: Rx diabetic extra depth shoes and custom accommodative insoles    Hemoglobin A1C   Date Value Ref Range Status   03/03/2023 6.0 (H) 4.0 - 5.6 % Final     Comment:     ADA Screening Guidelines:  5.7-6.4%  Consistent with prediabetes  >or=6.5%  Consistent with diabetes    High levels of fetal hemoglobin interfere with the HbA1C  assay. Heterozygous hemoglobin variants (HbS, HgC, etc)do  not significantly interfere with this assay.   However, presence of multiple variants may affect accuracy.     08/31/2022 5.7 (H) 4.0 - 5.6 % Final     Comment:     ADA Screening Guidelines:  5.7-6.4%  Consistent with prediabetes  >or=6.5%  Consistent with diabetes    High levels of  fetal hemoglobin interfere with the HbA1C  assay. Heterozygous hemoglobin variants (HbS, HgC, etc)do  not significantly interfere with this assay.   However, presence of multiple variants may affect accuracy.     03/21/2022 5.9 (H) 4.0 - 5.6 % Final     Comment:     ADA Screening Guidelines:  5.7-6.4%  Consistent with prediabetes  >or=6.5%  Consistent with diabetes    High levels of fetal hemoglobin interfere with the HbA1C  assay. Heterozygous hemoglobin variants (HbS, HgC, etc)do  not significantly interfere with this assay.   However, presence of multiple variants may affect accuracy.         Patient Active Problem List   Diagnosis    Type 2 diabetes mellitus with diabetic polyneuropathy, without long-term current use of insulin    Multiple joint pain    Esophageal reflux    Hyperlipidemia    Chronic gout    Lichen sclerosus    Carotid artery disease    Coronary artery disease involving native coronary artery without angina pectoris    Colon polyp    Hypertension associated with diabetes    Aortic atherosclerosis    Arterial insufficiency of lower extremity    Ganglion cyst of flexor tendon sheath of finger of right hand    Bilateral carpal tunnel syndrome    Hyperkalemia    Chronic bronchitis    Dependent edema    Diverticulosis    Primary open-angle glaucoma, bilateral, indeterminate stage    RIVAS (dyspnea on exertion)    Chronic diastolic heart failure    Low vitamin D level    Body mass index (BMI) 34.0-34.9, adult    Unspecified lump in the left breast, upper inner quadrant    Malignant neoplasm of upper-inner quadrant of left breast in female, estrogen receptor positive       Medication List with Changes/Refills   Current Medications    ALBUTEROL (PROVENTIL/VENTOLIN HFA) 90 MCG/ACTUATION INHALER    INHALE 2 PUFFS INTO THE LUNGS EVERY SIX HOURS AS NEEDED FOR WHEEZING    ALLOPURINOL (ZYLOPRIM) 100 MG TABLET    Take 1 tablet (100 mg total) by mouth once daily.    ASPIRIN 81 MG CHEW    Take 81 mg by mouth once  daily.    BISOPROLOL (ZEBETA) 5 MG TABLET    Take 1 tablet (5 mg total) by mouth once daily.    BLOOD GLUCOSE CONTROL HIGH,LOW SOLN    1 each by Misc.(Non-Drug; Combo Route) route once a week.    BLOOD SUGAR DIAGNOSTIC STRP    To check BG 3 times daily, to use with insurance preferred meter    BLOOD-GLUCOSE METER KIT    To check BG 3 times daily, to use with insurance preferred meter    FUROSEMIDE (LASIX) 40 MG TABLET    Take 1 tablet (40 mg total) by mouth once daily.    LANCETS MISC    To check BG 3 times daily, to use with insurance preferred meter    LATANOPROST 0.005 % OPHTHALMIC SOLUTION    Place 1 drop into both eyes once daily.    SIMVASTATIN (ZOCOR) 20 MG TABLET    Take 1 tablet (20 mg total) by mouth nightly.    TIMOLOL MALEATE 0.5% (TIMOPTIC) 0.5 % DROP    Place 1 drop into both eyes 2 (two) times daily.       Review of patient's allergies indicates:   Allergen Reactions    Iodine and iodide containing products Nausea And Vomiting    Penicillins Itching    Ultram [tramadol] Hives and Itching     Light headness, itiching    Sulfa (sulfonamide antibiotics) Hives and Itching    Amlodipine      edema    Bactrim [sulfamethoxazole-trimethoprim] Itching and Rash       Past Surgical History:   Procedure Laterality Date    APPENDECTOMY      CARPAL TUNNEL RELEASE Right     CATARACT EXTRACTION, BILATERAL      CHOLECYSTECTOMY      CYST REMOVAL  2018    DILATION AND CURETTAGE OF UTERUS      EYE SURGERY      HEMORRHOID SURGERY      HYSTERECTOMY      fibroids    INCISION AND DRAINAGE PERIRECTAL ABSCESS      JOINT REPLACEMENT Bilateral     knee    KNEE ARTHROSCOPY Right     MYOMECTOMY         Family History   Problem Relation Age of Onset    Hypertension Mother     Diabetes Mother     Leukemia Sister     Hypertension Sister     Cancer Sister         leukemia    Heart disease Maternal Aunt     Cancer Maternal Uncle         gastric, pacreatic    Heart disease Maternal Uncle     Miscarriages / Stillbirths Maternal Uncle      "Diabetes Maternal Grandmother     Asthma Maternal Grandfather     Breast cancer Neg Hx     Colon cancer Neg Hx     Ovarian cancer Neg Hx     COPD Neg Hx     Hyperlipidemia Neg Hx     Kidney disease Neg Hx        Social History     Socioeconomic History    Marital status: Single    Number of children: 0   Tobacco Use    Smoking status: Former     Packs/day: 0.25     Years: 24.00     Pack years: 6.00     Types: Cigarettes     Quit date: 1976     Years since quittin.0    Smokeless tobacco: Never   Substance and Sexual Activity    Alcohol use: No    Drug use: No    Sexual activity: Not Currently     Social Determinants of Health     Financial Resource Strain: Low Risk     Difficulty of Paying Living Expenses: Not hard at all   Food Insecurity: No Food Insecurity    Worried About Running Out of Food in the Last Year: Never true    Ran Out of Food in the Last Year: Never true   Transportation Needs: No Transportation Needs    Lack of Transportation (Medical): No    Lack of Transportation (Non-Medical): No   Physical Activity: Inactive    Days of Exercise per Week: 0 days    Minutes of Exercise per Session: 0 min   Stress: No Stress Concern Present    Feeling of Stress : Not at all   Social Connections: Socially Isolated    Frequency of Communication with Friends and Family: Once a week    Frequency of Social Gatherings with Friends and Family: Once a week    Attends Muslim Services: More than 4 times per year    Active Member of Clubs or Organizations: No    Attends Club or Organization Meetings: Never    Marital Status:    Housing Stability: Low Risk     Unable to Pay for Housing in the Last Year: No    Number of Places Lived in the Last Year: 1    Unstable Housing in the Last Year: No       Vitals:    23 1049   Weight: 91.2 kg (201 lb)   Height: 5' 6" (1.676 m)   PainSc: 0-No pain   PainLoc: Foot       Hemoglobin A1C   Date Value Ref Range Status   2023 6.0 (H) 4.0 - 5.6 % Final     " Comment:     ADA Screening Guidelines:  5.7-6.4%  Consistent with prediabetes  >or=6.5%  Consistent with diabetes    High levels of fetal hemoglobin interfere with the HbA1C  assay. Heterozygous hemoglobin variants (HbS, HgC, etc)do  not significantly interfere with this assay.   However, presence of multiple variants may affect accuracy.     08/31/2022 5.7 (H) 4.0 - 5.6 % Final     Comment:     ADA Screening Guidelines:  5.7-6.4%  Consistent with prediabetes  >or=6.5%  Consistent with diabetes    High levels of fetal hemoglobin interfere with the HbA1C  assay. Heterozygous hemoglobin variants (HbS, HgC, etc)do  not significantly interfere with this assay.   However, presence of multiple variants may affect accuracy.     03/21/2022 5.9 (H) 4.0 - 5.6 % Final     Comment:     ADA Screening Guidelines:  5.7-6.4%  Consistent with prediabetes  >or=6.5%  Consistent with diabetes    High levels of fetal hemoglobin interfere with the HbA1C  assay. Heterozygous hemoglobin variants (HbS, HgC, etc)do  not significantly interfere with this assay.   However, presence of multiple variants may affect accuracy.         Review of Systems   Constitutional:  Negative for chills and fever.   Respiratory:  Negative for shortness of breath.    Cardiovascular:  Positive for leg swelling (improved). Negative for chest pain, palpitations, orthopnea and claudication.   Gastrointestinal:  Negative for diarrhea, nausea and vomiting.   Musculoskeletal:  Negative for joint pain.   Skin:  Negative for rash.   Neurological:  Negative for dizziness, tingling, sensory change, focal weakness and weakness.   Psychiatric/Behavioral: Negative.             Objective:      PHYSICAL EXAM: Apperance: Alert and orient in no distress,well developed, and with good attention to grooming and body habits  Patient presents ambulating in diabetic shoes and inserts.   LOWER EXTREMITY EXAM:  VASCULAR: Dorsalis pedis pulses 0/4 bilateral (monophasic with doppler) and  Posterior Tibial pulses 0/4 bilateral (biphasic with doppler). Capillary fill time <blanched bilateral. Mild edema observed bilateral. Varicosities present bilateral. Skin temperature of the lower extremities is warm to cool, proximal to distal. Hair growth absent bilateral.  DERMATOLOGICAL: No skin rashes, subcutaneous nodules, lesions, or ulcers observed bilateral. Nails 1,2,3,4,5 bilateral elongated, thickened, and discolored with subungual debris. Webspaces 1,2,3,4clean, dry and without evidence of break in skin integrity bilateral.   NEUROLOGICAL: Light touch, sharp-dull, proprioception all present and equal bilaterally.  Vibratory sensation intact on left hallux and diminished at right hallux. Protective sensation intact at all 10 sites as tested with a [place] 5.07 monofilament.   MUSCULOSKELETAL: Muscle strength is 5/5 for foot inverters, everters, plantarflexors, and dorsiflexors. Muscle tone is normal. No pain on palpation of right dorsal foot/ankle. Pes planus noted bilateral. Flexible hammertoes noted bilateral.         Assessment:       ICD-10-CM ICD-9-CM   1. Type II diabetes mellitus with peripheral circulatory disorder  E11.51 250.70     443.81   2. Dermatophytosis of nail  B35.1 110.1       Plan:   Type II diabetes mellitus with peripheral circulatory disorder    Dermatophytosis of nail    I counseled the patient on her conditions, regarding findings of my examination, my impressions, and usual treatment plan.   Appointment spent on education about the diabetic foot, neuropathy, and prevention of limb loss.  Shoe inspection. Diabetic Foot Education. Patient reminded of the importance of good nutrition and blood sugar control to help prevent podiatric complications of diabetes. Patient instructed on proper foot hygeine. We discussed wearing proper shoe gear, daily foot inspections, never walking without protective shoe gear, never putting sharp instruments to feet.    With patient's  permission, nails 1-5 bilateral were debrided/trimmed in length and thickness aggressively to their soft tissue attachment mechanically and with electric , removing all offending nail and debris. Patient relates relief following the procedure.  Patient  will continue to monitor the areas daily, inspect feet, wear protective shoe gear when ambulatory, moisturizer to maintain skin integrity. Patient reminded of the importance of good nutrition and blood sugar control to help prevent podiatric complications of diabetes.  Patient to return 4 months or sooner if needed.          Ino Harmon DPM  [facility]

## 2023-07-12 NOTE — PROGRESS NOTES
Interdisciplinary Breast Cancer Conference    Kaleigh Nieves    Female    Date Presented to Tumor Board: 06/29/23    Presenting Hospital / Clinic: [facility] [place]    Tumor Laterality: Left    Breast Tumor Site: UIQ    Presenter: Susan Pulliam MD    Reason for Consultation: Initial Presentation    Specialties Present: Medical Oncology;Hematology;Radiation Oncology;Surgical Oncology;Pathology;Navigation;Genetics;Radiology    Patient Status: a new patient    Treatment to Date: None    Clinical Trial Eligibility: Not discussed    ER: Positive    NC: Positive    Her2: Negative    Cancer Staging   Malignant neoplasm of upper-inner quadrant of left breast in female, estrogen receptor positive  Staging form: Breast, AJCC 8th Edition  - Clinical stage from 6/26/2023: Stage IA (cT1c, cN0, cM0, G1, ER+, NC+, HER2-) - Signed by Susan Pulliam MD on 6/26/2023      Recommended Plan: Surgery;Post-operative radiation therapy;Hormonal Therapy;Genetic Testing;Additional Observation  Pt with extensive cardiac history, to see Dr. Partida for cardiac clearance. Pending cardiac clearence, pt to proceed to surgery first and have post operative radiation therapy. Pt to consult with medical oncology post operatively to discuss need for any further treatment. Pt declined genetic testing.  Metastatic Site(s): None    Presentation at Cancer Conference: Prospective

## 2023-07-24 NOTE — PROGRESS NOTES
The patient was submitted for evaluation in multidisciplinary breast cancer conference ([facility]). Total time spent on preparation for [facility]CC was 35 minutes, which included review of the past medical history from the PCP, review of the mammogram and other imaging reports, care coordination with medical and radiation oncology, and documentation in the medical record.      She has an early stage hormone receptor positive, her2 negative breast cancer. She was evaluated by her cardiologist and her BNP was elevated. She is also scheduled for an echo on 8/16/23. These are required to stratify her surgical risk. There has been significant delay I her diagnosis. From abnormal mammogram to biopsy was extended due to transportation issues. I abbreviated that timeline by accommodating the biopsy in my clinic. Since her diagnosis, her cardiology workup has been difficult due to transportation issues. I have reached out to cardiology in an attempt to expedite things. I have also called Ms. Farris who is willing to travel for sooner appointments if they are in the morning.

## 2023-07-25 ENCOUNTER — HOSPITAL ENCOUNTER (OUTPATIENT)
Dept: CARDIOLOGY | Facility: HOSPITAL | Age: 83
Discharge: HOME | End: 2023-07-25
Attending: INTERNAL MEDICINE
Payer: MEDICARE

## 2023-07-25 VITALS
DIASTOLIC BLOOD PRESSURE: 76 MMHG | HEIGHT: 66 IN | BODY MASS INDEX: 32.3 KG/M2 | WEIGHT: 201 LBS | SYSTOLIC BLOOD PRESSURE: 138 MMHG

## 2023-07-25 DIAGNOSIS — R06.09 DOE (DYSPNEA ON EXERTION): ICD-10-CM

## 2023-07-25 DIAGNOSIS — Z01.810 PREOP CARDIOVASCULAR EXAM: ICD-10-CM

## 2023-07-25 DIAGNOSIS — Z17.0 MALIGNANT NEOPLASM OF UPPER-INNER QUADRANT OF LEFT BREAST IN FEMALE, ESTROGEN RECEPTOR POSITIVE: Primary | ICD-10-CM

## 2023-07-25 DIAGNOSIS — C50.212 MALIGNANT NEOPLASM OF UPPER-INNER QUADRANT OF LEFT BREAST IN FEMALE, ESTROGEN RECEPTOR POSITIVE: Primary | ICD-10-CM

## 2023-07-25 PROCEDURE — 93306 ECHO (CUPID ONLY): ICD-10-PCS | Mod: 26,,, | Performed by: INTERNAL MEDICINE

## 2023-07-25 PROCEDURE — 93306 TTE W/DOPPLER COMPLETE: CPT | Mod: 26,,, | Performed by: INTERNAL MEDICINE

## 2023-07-25 PROCEDURE — 93306 TTE W/DOPPLER COMPLETE: CPT

## 2023-07-26 ENCOUNTER — TELEPHONE (OUTPATIENT)
Dept: HEMATOLOGY/ONCOLOGY | Facility: CLINIC | Age: 83
End: 2023-07-26
Payer: MEDICARE

## 2023-07-26 LAB
AORTIC ROOT ANNULUS: 3.28 CM
AV INDEX (PROSTH): 1.05
AV MEAN GRADIENT: 4 MMHG
AV PEAK GRADIENT: 7 MMHG
AV VALVE AREA: 3.19 CM2
AV VELOCITY RATIO: 1.07
BSA FOR ECHO PROCEDURE: 2.06 M2
CV ECHO LV RWT: 0.9 CM
DOP CALC AO PEAK VEL: 1.28 M/S
DOP CALC AO VTI: 33.3 CM
DOP CALC LVOT AREA: 3 CM2
DOP CALC LVOT DIAMETER: 1.97 CM
DOP CALC LVOT PEAK VEL: 1.37 M/S
DOP CALC LVOT STROKE VOLUME: 106.32 CM3
DOP CALC RVOT PEAK VEL: 0.99 M/S
DOP CALC RVOT VTI: 25.4 CM
DOP CALCLVOT PEAK VEL VTI: 34.9 CM
E WAVE DECELERATION TIME: 243.33 MSEC
E/A RATIO: 1.08
E/E' RATIO: 20.33 M/S
ECHO LV POSTERIOR WALL: 1.27 CM (ref 0.6–1.1)
EJECTION FRACTION: 60 %
FRACTIONAL SHORTENING: 31 % (ref 28–44)
INTERVENTRICULAR SEPTUM: 1.28 CM (ref 0.6–1.1)
IVRT: 74.22 MSEC
LA MAJOR: 5.75 CM
LA MINOR: 5.83 CM
LA WIDTH: 4.4 CM
LEFT ATRIUM SIZE: 2.53 CM
LEFT ATRIUM VOLUME INDEX MOD: 47.6 ML/M2
LEFT ATRIUM VOLUME INDEX: 27.4 ML/M2
LEFT ATRIUM VOLUME MOD: 95.27 CM3
LEFT ATRIUM VOLUME: 54.78 CM3
LEFT INTERNAL DIMENSION IN SYSTOLE: 1.95 CM (ref 2.1–4)
LEFT VENTRICLE DIASTOLIC VOLUME INDEX: 15.21 ML/M2
LEFT VENTRICLE DIASTOLIC VOLUME: 30.41 ML
LEFT VENTRICLE MASS INDEX: 56 G/M2
LEFT VENTRICLE SYSTOLIC VOLUME INDEX: 5.9 ML/M2
LEFT VENTRICLE SYSTOLIC VOLUME: 11.87 ML
LEFT VENTRICULAR INTERNAL DIMENSION IN DIASTOLE: 2.83 CM (ref 3.5–6)
LEFT VENTRICULAR MASS: 111.3 G
LV LATERAL E/E' RATIO: 17.43 M/S
LV SEPTAL E/E' RATIO: 24.4 M/S
LVOT MG: 4.53 MMHG
LVOT MV: 1.01 CM/S
MV PEAK A VEL: 1.13 M/S
MV PEAK E VEL: 1.22 M/S
PISA TR MAX VEL: 2.49 M/S
PULM VEIN S/D RATIO: 1.18
PV MEAN GRADIENT: 2.02 MMHG
PV PEAK D VEL: 0.66 M/S
PV PEAK S VEL: 0.78 M/S
PV PEAK VELOCITY: 1.07 CM/S
RA MAJOR: 4.22 CM
RA PRESSURE: 3 MMHG
RA WIDTH: 3.13 CM
RIGHT VENTRICULAR END-DIASTOLIC DIMENSION: 3.21 CM
TDI LATERAL: 0.07 M/S
TDI SEPTAL: 0.05 M/S
TDI: 0.06 M/S
TR MAX PG: 25 MMHG
TRICUSPID ANNULAR PLANE SYSTOLIC EXCURSION: 2.13 CM
TV REST PULMONARY ARTERY PRESSURE: 28 MMHG

## 2023-07-26 NOTE — PROGRESS NOTES
Recently seen in the clinic.  Reviewed echocardiogram results.    Normal EF.  No severe valvular disease.    Okay to undergo her breast surgery.  Stable from cardiac standpoint.    Continue with same cardiac medications.  Including beta-blockers perioperatively.  Okay to stop aspirin for 5 days prior to her surgery    Results for orders placed during the hospital encounter of 07/25/23    Echo    Interpretation Summary  · The left ventricle is normal in size with concentric remodeling and normal systolic function.  · The estimated ejection fraction is 60%.  · Indeterminate left ventricular diastolic function.  · Normal right ventricular size with normal right ventricular systolic function.  · Normal central venous pressure (3 mmHg).  · The estimated PA systolic pressure is 28 mmHg.

## 2023-07-26 NOTE — NURSING
0932am: Outgoing call to pt regarding [facility] referral request per Dr. Pulliam for Breast cancer. Spoke with pt. Pt aware of refer and dx. Pt scheduled for new Oncology appt on 8/9 at 1120 am at  with Dr. Monroe. Pt given location instructions. Pt verbalized understanding. Pt plan to report to appt as scheduled.   Oncology Navigation   Intake  Date of Diagnosis: 06/20/23  Cancer Type: Breast  Internal / External Referral: Internal  Date of Referral: 07/25/23  Initial Nurse Navigator Contact: 07/26/23  Referral to Initial Contact Timeline (days): 1  Date Worked: 07/26/23  First Appointment Available: 06/27/23  Appointment Date: 08/09/23  Schedule to Appointment Timeline (days): 14  First Available Date vs. Scheduled Date (days): 0  Multiple appointments: No  Reason if booked > 7 days after scheduling: Specific provider / access; Transportation coordination     Treatment  Current Status: Staging work-up  Pending: Other  Date Presented to Tumor Board: 06/29/23    Surgery: Planned  Surgical Oncologist: Susan Pulliam MD    Medical Oncologist: Dr. Jose Carlos Monroe  Consult Date: 08/09/23       Procedures: Biopsy  Biopsy Schedule Date: 06/20/23 (left breast)       ER: Positive  DC: Positive  Her2: Negative       Support Systems: Family members  Barriers of Care: Transportation; Financial concerns  Transportation Barriers: Pt only able to make appts between 10am-12pm  Concerns: Pt concerned about transportation and financial burden of treatment- pt will be referred to social work.     Acuity  Stage: 1  Surgical Procedure Complexity: 1  Treatment Tolerability: Has not started treatment yet/treatment fully completed and side effects resolved  Comorbidities in Medical History: 2  Hospitalization Within the Past Month: 0   Needed: 0  Support: 1  Verbalizes Financial Concerns: 1  Transportation: 1  Psychological Factors (+1 each): Emotional during conversation  History of noncompliance/frequent no shows and cancellations:  0  Verbalizes the need for more education: 1  Other Factors (+1 for Each): 0  Navigation Acuity: 9     Follow Up  No follow-ups on file.

## 2023-07-27 NOTE — TELEPHONE ENCOUNTER
----- Message from Viktoria Rogers sent at 9/16/2020 12:51 PM CDT -----  Contact: Rx Outreach  States the pt needs a refill on her Econacole medication, no additional info given and can be reached at [phone]///[email or web address]     26.6

## 2023-07-31 ENCOUNTER — TELEPHONE (OUTPATIENT)
Dept: PRIMARY CARE CLINIC | Facility: CLINIC | Age: 83
End: 2023-07-31

## 2023-07-31 ENCOUNTER — OFFICE VISIT (OUTPATIENT)
Dept: PRIMARY CARE CLINIC | Facility: CLINIC | Age: 83
End: 2023-07-31
Payer: MEDICARE

## 2023-07-31 VITALS
HEIGHT: 66 IN | OXYGEN SATURATION: 98 % | WEIGHT: 196.88 LBS | DIASTOLIC BLOOD PRESSURE: 78 MMHG | BODY MASS INDEX: 31.64 KG/M2 | TEMPERATURE: 98 F | SYSTOLIC BLOOD PRESSURE: 140 MMHG | HEART RATE: 66 BPM

## 2023-07-31 DIAGNOSIS — C50.212 MALIGNANT NEOPLASM OF UPPER-INNER QUADRANT OF LEFT BREAST IN FEMALE, ESTROGEN RECEPTOR POSITIVE: ICD-10-CM

## 2023-07-31 DIAGNOSIS — J44.89 COPD (CHRONIC OBSTRUCTIVE PULMONARY DISEASE) WITH CHRONIC BRONCHITIS: ICD-10-CM

## 2023-07-31 DIAGNOSIS — E11.42 TYPE 2 DIABETES MELLITUS WITH DIABETIC POLYNEUROPATHY, WITHOUT LONG-TERM CURRENT USE OF INSULIN: ICD-10-CM

## 2023-07-31 DIAGNOSIS — R06.09 DOE (DYSPNEA ON EXERTION): ICD-10-CM

## 2023-07-31 DIAGNOSIS — R60.0 BILATERAL LOWER EXTREMITY EDEMA: Primary | ICD-10-CM

## 2023-07-31 DIAGNOSIS — I50.32 CHRONIC DIASTOLIC HEART FAILURE: ICD-10-CM

## 2023-07-31 DIAGNOSIS — Z17.0 MALIGNANT NEOPLASM OF UPPER-INNER QUADRANT OF LEFT BREAST IN FEMALE, ESTROGEN RECEPTOR POSITIVE: ICD-10-CM

## 2023-07-31 PROCEDURE — 3072F LOW RISK FOR RETINOPATHY: CPT | Mod: CPTII,S$GLB,, | Performed by: FAMILY MEDICINE

## 2023-07-31 PROCEDURE — 1126F AMNT PAIN NOTED NONE PRSNT: CPT | Mod: CPTII,S$GLB,, | Performed by: FAMILY MEDICINE

## 2023-07-31 PROCEDURE — 1126F PR PAIN SEVERITY QUANTIFIED, NO PAIN PRESENT: ICD-10-PCS | Mod: CPTII,S$GLB,, | Performed by: FAMILY MEDICINE

## 2023-07-31 PROCEDURE — 99999 PR PBB SHADOW E&M-EST. PATIENT-LVL IV: CPT | Mod: PBBFAC,,, | Performed by: FAMILY MEDICINE

## 2023-07-31 PROCEDURE — 3078F DIAST BP <80 MM HG: CPT | Mod: CPTII,S$GLB,, | Performed by: FAMILY MEDICINE

## 2023-07-31 PROCEDURE — 1159F PR MEDICATION LIST DOCUMENTED IN MEDICAL RECORD: ICD-10-PCS | Mod: CPTII,S$GLB,, | Performed by: FAMILY MEDICINE

## 2023-07-31 PROCEDURE — 3288F PR FALLS RISK ASSESSMENT DOCUMENTED: ICD-10-PCS | Mod: CPTII,S$GLB,, | Performed by: FAMILY MEDICINE

## 2023-07-31 PROCEDURE — 3288F FALL RISK ASSESSMENT DOCD: CPT | Mod: CPTII,S$GLB,, | Performed by: FAMILY MEDICINE

## 2023-07-31 PROCEDURE — 99215 OFFICE O/P EST HI 40 MIN: CPT | Mod: S$GLB,,, | Performed by: FAMILY MEDICINE

## 2023-07-31 PROCEDURE — 3078F PR MOST RECENT DIASTOLIC BLOOD PRESSURE < 80 MM HG: ICD-10-PCS | Mod: CPTII,S$GLB,, | Performed by: FAMILY MEDICINE

## 2023-07-31 PROCEDURE — 3072F PR LOW RISK FOR RETINOPATHY: ICD-10-PCS | Mod: CPTII,S$GLB,, | Performed by: FAMILY MEDICINE

## 2023-07-31 PROCEDURE — 1159F MED LIST DOCD IN RCRD: CPT | Mod: CPTII,S$GLB,, | Performed by: FAMILY MEDICINE

## 2023-07-31 PROCEDURE — 99215 PR OFFICE/OUTPT VISIT, EST, LEVL V, 40-54 MIN: ICD-10-PCS | Mod: S$GLB,,, | Performed by: FAMILY MEDICINE

## 2023-07-31 PROCEDURE — 3077F SYST BP >= 140 MM HG: CPT | Mod: CPTII,S$GLB,, | Performed by: FAMILY MEDICINE

## 2023-07-31 PROCEDURE — 1101F PR PT FALLS ASSESS DOC 0-1 FALLS W/OUT INJ PAST YR: ICD-10-PCS | Mod: CPTII,S$GLB,, | Performed by: FAMILY MEDICINE

## 2023-07-31 PROCEDURE — 99999 PR PBB SHADOW E&M-EST. PATIENT-LVL IV: ICD-10-PCS | Mod: PBBFAC,,, | Performed by: FAMILY MEDICINE

## 2023-07-31 PROCEDURE — 3077F PR MOST RECENT SYSTOLIC BLOOD PRESSURE >= 140 MM HG: ICD-10-PCS | Mod: CPTII,S$GLB,, | Performed by: FAMILY MEDICINE

## 2023-07-31 PROCEDURE — 1101F PT FALLS ASSESS-DOCD LE1/YR: CPT | Mod: CPTII,S$GLB,, | Performed by: FAMILY MEDICINE

## 2023-07-31 RX ORDER — FLUTICASONE PROPIONATE AND SALMETEROL 250; 50 UG/1; UG/1
1 POWDER RESPIRATORY (INHALATION) 2 TIMES DAILY
Qty: 180 EACH | Refills: 3 | Status: SHIPPED | OUTPATIENT
Start: 2023-07-31 | End: 2024-07-30

## 2023-07-31 NOTE — PROGRESS NOTES
"    [facility]       [address] [place] [address]      Phone: [phone]      Fax: [phone]    Lucio Morris MD                Office Visit  07/31/2023        Subjective      HPI:  Kaleigh Nieves is a 82 y.o. female presents today in clinic for "Foot Swelling and Shortness of Breath  ."     82-year-old female presents today to follow-up on multiple issues.      Patient states she feels okay today.  Has been through a lot in the last three months.      Had her mammogram done.  Spot was seen on it that required additional views.  Ultimately, was suspicious for cancer.  Had biopsy done.  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  We will be having a procedure done soon to have the lump removed.  Patient states that nothing has spread to the lymph nodes.  Will also be seeing Oncology in August to discuss chemo, radiation.    Recently saw cardiology.  They ordered an echo.  Had that done last week.  Had some blood drawn, as well.  BNP showed slight elevation.  Cardiologist wanted her to increase her Lasix to twice a day for three days, then decrease to daily.  She did this and some of her leg swelling improved.  If she takes Lasix every day, she gets too dehydrated and develops cramps in her legs.  Because of this, she only takes it every other day.  Has seen vascular in the past for the leg swelling.  Was recommended to use compression socks, elevate feet.    She gets short of breath quite frequently.  Even slightly exerting herself causes her to feel short winded.  Frequently has to stop and rest.  States she has COPD, emphysema.  Uses albuterol inhaler every night before bed.  Has trouble lying flat.  Has to sleep on at least two pillows otherwise she can not breathe.  Lately, has been sleeping in her recliner instead.  This feels extremely comfortable because she can not roll over, nor change position.  She would really like to get a hospital bed so " that she can elevate the head, and the feet.    She states she has a history of gout.  Has been taking allopurinol 100 mg daily for years.  Has not had severe gout flare up in a couple of years.  Last August, had some blood work done, uric acid level was 4.8.    She also has diabetes.  Not on any medication for it.  A1c was 6% recently.  Watches diet.  Checks sugar weekly at home, everything is always good.    Has hypertension.  Takes bisoprolol 5 mg daily, along with Lasix 40 mg daily.  Also takes simvastatin 20 mg nightly for cholesterol.      PMH: HTN, dm, gout, GERD, diverticulosis, osteoarthritis, glaucoma   PSH:  Torn meniscus in knee.  Bilateral knee replacement.  Hemorrhoids.  Perirectal abscess.  Cataracts in both eyes.  D& C.  Fibroid/hysterectomy.  Gallbladder.  Appendectomy.  Carpal tunnel.  Left toe corn removal.    Allergies:  Iodine.  Penicillin.  Sulfa drugs.  Tramadol.  Amlodipine.    Social: Lives alone.  Nephew occasionally stays there.    T: Denies   A:  Denies   D:  Denies     Exercise:  No regular exercise program.  Use to bowl frequently.  Limited now due to foot swelling and pain.      Podiatry: Faustino Reyes)        The following were updated and reviewed by myself in the chart: medications, past medical history, past surgical history, family history, social history, and allergies.     Medications:  Current Outpatient Medications on File Prior to Visit   Medication Sig Dispense Refill    albuterol (PROVENTIL/VENTOLIN HFA) 90 mcg/actuation inhaler INHALE 2 PUFFS INTO THE LUNGS EVERY SIX HOURS AS NEEDED FOR WHEEZING 18 g 11    allopurinoL (ZYLOPRIM) 100 MG tablet Take 1 tablet (100 mg total) by mouth once daily. 90 tablet 3    bisoprolol (ZEBETA) 5 MG tablet Take 1 tablet (5 mg total) by mouth once daily. 90 tablet 3    blood glucose control high,low Soln 1 each by Misc.(Non-Drug; Combo Route) route once a week. 1 each 11    blood sugar diagnostic Strp To check BG 3 times daily, to use with  insurance preferred meter 200 each 0    blood-glucose meter kit To check BG 3 times daily, to use with insurance preferred meter 1 each 0    furosemide (LASIX) 40 MG tablet Take 1 tablet (40 mg total) by mouth once daily. 90 tablet 3    lancets Misc To check BG 3 times daily, to use with insurance preferred meter 200 each 3    simvastatin (ZOCOR) 20 MG tablet Take 1 tablet (20 mg total) by mouth nightly. 90 tablet 3    timolol maleate 0.5% (TIMOPTIC) 0.5 % Drop Place 1 drop into both eyes 2 (two) times daily. 30 mL 4    aspirin 81 MG Chew Take 81 mg by mouth once daily.      latanoprost 0.005 % ophthalmic solution Place 1 drop into both eyes once daily. 7.5 mL 3     No current facility-administered medications on file prior to visit.        PMHx:  Past Medical History:   Diagnosis Date    Anemia     Angina pectoris     Arthritis     Asthma     Back pain     Bronchitis     [facility]    Carotid artery stenosis     Chronic bronchitis     Chronic gout     Colon polyp     CTS (carpal tunnel syndrome)     Diabetes mellitus, type 2     Diabetes with neurologic complications     Diverticulosis     RIVAS (dyspnea on exertion)     chronic; eval pulm dr natarajna    Dyslipidemia     Ex-smoker     quit in her 30s    GERD (gastroesophageal reflux disease)     Heart failure     Hyperlipidemia     Hypertension     Neuropathy, lower extremity     Obesity     Open-angle glaucoma     dr avel kaur    Peripheral vascular disease     Polyneuropathy     Tobacco dependence       Patient Active Problem List    Diagnosis Date Noted    Malignant neoplasm of upper-inner quadrant of left breast in female, estrogen receptor positive 06/26/2023    Unspecified lump in the left breast, upper inner quadrant 06/20/2023    Body mass index (BMI) 34.0-34.9, adult 09/07/2022    Low vitamin D level 07/12/2021    Chronic diastolic heart failure 05/14/2021    RIVAS (dyspnea on exertion) 01/29/2021    Diverticulosis  11/11/2019    Primary open-angle glaucoma, bilateral, indeterminate stage 11/11/2019    Dependent edema 08/23/2018    Chronic bronchitis 06/25/2018    Hyperkalemia 03/21/2018    Bilateral carpal tunnel syndrome 03/07/2018    Ganglion cyst of flexor tendon sheath of finger of right hand 02/01/2018    Aortic atherosclerosis 06/16/2017    Arterial insufficiency of lower extremity 06/16/2017    Hypertension associated with diabetes 10/25/2016    Colon polyp     Coronary artery disease involving native coronary artery without angina pectoris     Carotid artery disease     Lichen sclerosus 05/26/2015    Hyperlipidemia 08/11/2014    Chronic gout 08/11/2014    Esophageal reflux 07/08/2013    Multiple joint pain 07/03/2013    Type 2 diabetes mellitus with diabetic polyneuropathy, without long-term current use of insulin 06/18/2013        PSHx:  Past Surgical History:   Procedure Laterality Date    APPENDECTOMY      CARPAL TUNNEL RELEASE Right     CATARACT EXTRACTION, BILATERAL      CHOLECYSTECTOMY      CYST REMOVAL  2018    DILATION AND CURETTAGE OF UTERUS      EYE SURGERY      HEMORRHOID SURGERY      HYSTERECTOMY      fibroids    INCISION AND DRAINAGE PERIRECTAL ABSCESS      JOINT REPLACEMENT Bilateral     knee    KNEE ARTHROSCOPY Right     MYOMECTOMY          FHx:  Family History   Problem Relation Age of Onset    Hypertension Mother     Diabetes Mother     Leukemia Sister     Hypertension Sister     Cancer Sister         leukemia    Heart disease Maternal Aunt     Cancer Maternal Uncle         gastric, pacreatic    Heart disease Maternal Uncle     Miscarriages / Stillbirths Maternal Uncle     Diabetes Maternal Grandmother     Asthma Maternal Grandfather     Breast cancer Neg Hx     Colon cancer Neg Hx     Ovarian cancer Neg Hx     COPD Neg Hx     Hyperlipidemia Neg Hx     Kidney disease Neg Hx         Social:  Social History     Socioeconomic History    Marital status: Single    Number of children: 0   Tobacco Use     Smoking status: Former     Current packs/day: 0.00     Average packs/day: 0.3 packs/day for 24.0 years (6.0 ttl pk-yrs)     Types: Cigarettes     Start date: 1952     Quit date: 1976     Years since quittin.1    Smokeless tobacco: Never   Substance and Sexual Activity    Alcohol use: No    Drug use: No    Sexual activity: Not Currently     Social Determinants of Health     Financial Resource Strain: Low Risk  (2022)    Overall Financial Resource Strain (CARDIA)     Difficulty of Paying Living Expenses: Not hard at all   Food Insecurity: No Food Insecurity (2022)    Hunger Vital Sign     Worried About Running Out of Food in the Last Year: Never true     Ran Out of Food in the Last Year: Never true   Transportation Needs: No Transportation Needs (2022)    PRAPARE - Transportation     Lack of Transportation (Medical): No     Lack of Transportation (Non-Medical): No   Physical Activity: Inactive (2022)    Exercise Vital Sign     Days of Exercise per Week: 0 days     Minutes of Exercise per Session: 0 min   Stress: No Stress Concern Present (2022)    [place] [employer] - Occupational Stress Questionnaire     Feeling of Stress : Not at all   Social Connections: Socially Isolated (2022)    Social Connection and Isolation Panel [NHANES]     Frequency of Communication with Friends and Family: Once a week     Frequency of Social Gatherings with Friends and Family: Once a week     Attends [employer] Services: More than 4 times per year     Active Member of Clubs or Organizations: No     Attends Club or Organization Meetings: Never     Marital Status:    Housing Stability: Low Risk  (2022)    Housing Stability Vital Sign     Unable to Pay for Housing in the Last Year: No     Number of Places Lived in the Last Year: 1     Unstable Housing in the Last Year: No        Allergies:  Review of patient's allergies indicates:   Allergen Reactions    Iodine and  "iodide containing products Nausea And Vomiting    Penicillins Itching    Ultram [tramadol] Hives and Itching     Light headness, itiching    Sulfa (sulfonamide antibiotics) Hives and Itching    Amlodipine      edema    Sulfamethoxazole-trimethoprim Itching, Rash and Nausea And Vomiting        ROS:  Review of Systems   Constitutional:  Positive for appetite change. Negative for activity change, chills and fever.   HENT:  Negative for congestion, postnasal drip, rhinorrhea, sore throat and trouble swallowing.    Respiratory:  Positive for shortness of breath. Negative for cough and wheezing.    Cardiovascular:  Positive for leg swelling. Negative for chest pain and palpitations.   Gastrointestinal:  Negative for abdominal pain, constipation, diarrhea, nausea and vomiting.   Genitourinary:  Negative for difficulty urinating.   Musculoskeletal:  Negative for arthralgias and myalgias.   Skin:  Negative for color change and rash.   Neurological:  Negative for speech difficulty and headaches.   All other systems reviewed and are negative.         Objective      BP (!) 140/78   Pulse 66   Temp 97.6 °F (36.4 °C)   Ht 5' 6" (1.676 m)   Wt 89.3 kg (196 lb 13.9 oz)   SpO2 98%   BMI 31.78 kg/m²   Ht Readings from Last 3 Encounters:   07/31/23 5' 6" (1.676 m)   07/25/23 5' 6" (1.676 m)   07/03/23 5' 6" (1.676 m)     Wt Readings from Last 3 Encounters:   07/31/23 89.3 kg (196 lb 13.9 oz)   07/25/23 91.2 kg (201 lb)   07/03/23 91.3 kg (201 lb 4.5 oz)       PHYSICAL EXAM:  Physical Exam  Vitals and nursing note reviewed.   Constitutional:       General: She is not in acute distress.     Appearance: Normal appearance.   HENT:      Head: Normocephalic and atraumatic.      Right Ear: Tympanic membrane, ear canal and external ear normal.      Left Ear: Tympanic membrane, ear canal and external ear normal.      Nose: Nose normal. No congestion or rhinorrhea.      Mouth/Throat:      Mouth: Mucous membranes are moist.      Pharynx: " Oropharynx is clear. No oropharyngeal exudate or posterior oropharyngeal erythema.   Eyes:      Extraocular Movements: Extraocular movements intact.      Conjunctiva/sclera: Conjunctivae normal.      Pupils: Pupils are equal, round, and reactive to light.   Cardiovascular:      Rate and Rhythm: Normal rate and regular rhythm.   Pulmonary:      Effort: Pulmonary effort is normal. No respiratory distress.      Breath sounds: No wheezing, rhonchi or rales.   Musculoskeletal:         General: Normal range of motion.      Cervical back: Normal range of motion.      Right lower le+ Pitting Edema present.      Left lower le+ Pitting Edema present.   Lymphadenopathy:      Cervical: No cervical adenopathy.   Skin:     General: Skin is warm and dry.      Findings: No rash.   Neurological:      Mental Status: She is alert.              LABS / IMAGING:  Recent Results (from the past 4368 hour(s))   COMPREHENSIVE METABOLIC PANEL    Collection Time: 23  9:29 AM   Result Value Ref Range    Sodium 138 136 - 145 mmol/L    Potassium 5.3 (H) 3.5 - 5.1 mmol/L    Chloride 96 95 - 110 mmol/L    CO2 34 (H) 23 - 29 mmol/L    Glucose 114 (H) 70 - 110 mg/dL    BUN 6 (L) 8 - 23 mg/dL    Creatinine 0.7 0.5 - 1.4 mg/dL    Calcium 9.3 8.7 - 10.5 mg/dL    Total Protein 7.2 6.0 - 8.4 g/dL    Albumin 3.7 3.5 - 5.2 g/dL    Total Bilirubin 0.5 0.1 - 1.0 mg/dL    Alkaline Phosphatase 23 (L) 55 - 135 U/L    AST 16 10 - 40 U/L    ALT 15 10 - 44 U/L    Anion Gap 8 8 - 16 mmol/L    eGFR >60.0 >60 mL/min/1.73 m^2   LIPID PANEL    Collection Time: 23  9:30 AM   Result Value Ref Range    Cholesterol 140 120 - 199 mg/dL    Triglycerides 92 30 - 150 mg/dL    HDL 39 (L) 40 - 75 mg/dL    LDL Cholesterol 82.6 63.0 - 159.0 mg/dL    HDL/Cholesterol Ratio 27.9 20.0 - 50.0 %    Total Cholesterol/HDL Ratio 3.6 2.0 - 5.0    Non-HDL Cholesterol 101 mg/dL   HEMOGLOBIN A1C    Collection Time: 23  9:30 AM   Result Value Ref Range    Hemoglobin A1C  6.0 (H) 4.0 - 5.6 %    Estimated Avg Glucose 126 68 - 131 mg/dL   Specimen to Pathology Breast    Collection Time: 06/20/23 12:40 PM   Result Value Ref Range    Final Pathologic Diagnosis       1. 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 (ductal)  HISTOLOGIC GRADE:  Grade 1 of 3         Tubule formation:  2         Pleomorphism:  1         Mitotic rate:  1  TUMOR SIZE:  Up to 1.5 cm in greatest linear dimension  DUCTAL CARCINOMA IN SITU (DCIS):  Not identified  LYMPHOVASCULAR INVASION:  Not identified  MICROCALCIFICATIONS:  Not identified  BREAST BIOMARKERS (performed with appropriate controls):          ER:  Positive (intermediate, greater than 90%)          MT:  Positive (strong, 1-11%)          Her2:  Negative (1+)          Ki67:  20%      Gross       Pathology ID:  [id number]  Patient ID:  [id number]    The specimen is received in formalin labeled &quot;left breast 11:00, 10 cfmn cnb&quot;.  The specimen consists of 3 yellow needle biopsy fragments measuring 1.6 x 0.5 x 0.1 cm in aggregate.  The specimen is submitted entirely in cassette   [id number].      Specimen has been in formalin for more than 6 hours and less than 72 hours.  Ischemic time is not provided.  Dwight Paez      Comment       All stains were performed with adequate positive and negative controls.  An immunohistochemical stain for E-cadherin is positive in tumor cells, confirming ductal origin.    This case was seen in consultation with Dr. Arias who agrees with the above diagnosis.      Disclaimer       Unless the case is a 'gross only' or additional testing only, the final diagnosis for each specimen is based on a microscopic examination of  appropriate tissue sections.  ER immunohistochemical staining (clone SP1, DAB detection method) is performed on formalin-fixed, paraffin embedded tissue sections. The percentage of cell nuclei stained and the strength of staining is reported (weak, moderate, strong), using the 2010   ACSO/CAP scoring guidelines. Tumors used for determing predictive markers are fixed in10% neutral buffered formalin for 6-72 hours. It has been cleared by the [place] FDA for use as an IVD test. This assay has not been validated on decalcified tissues.   Results should be interpreted with caution given the likelihood of false negativity on decalcified specimens.  HER-2/marlena IHC (4B5) clone, DAB detection method) is done on 10% buffered formalin-fixed (for 6-72 hrs), paraffin embedded tissue sections. The scoring is completed on a 4-tiered scoring system of membrane staini ng using the 2014 ASCO/CAP scoring   guidelines. It has been cleared by the [place] FDA for use as an IVD test. This assay has not been validated on decalcified tissues. Results should be interpreted with caution given the likelihood of false negativity on decalcified specimens.    PgR immunohistochemical staining (clone 1E2, DAB detection method) is performed on formalin-fixed, paraffin embedded tissue sections. The percentage of cell nuclei stained and the strength of staining is report (weak, moderate, strong), using 2010   ASCO/CAP scoring guidelines. Tumors used for determing predictive markers are fixed in 10% neutral buffered formalin for 6-72 hours. It has been cleared by the [place] FDA for use as an IVD test. This assay has not been validated on decalcified tissues.   Results should be interpreted with caution given the likelihood of false negativity on decalcified specimens.      B-TYPE NATRIURETIC PEPTIDE    Collection Time: 07/03/23 11:18 AM   Result Value Ref Range     (H) 0 - 99 pg/mL   Echo    Collection Time: 07/25/23 12:15 PM   Result Value Ref Range     BSA 2.06 m2    TDI SEPTAL 0.05 m/s    LV LATERAL E/E' RATIO 17.43 m/s    LV SEPTAL E/E' RATIO 24.40 m/s    Left Ventricular Outflow Tract Mean Velocity 1.01 cm/s    Left Ventricular Outflow Tract Mean Gradient 4.53 mmHg    TDI LATERAL 0.07 m/s    PV PEAK VELOCITY 1.07 cm/s    LVIDd 2.83 (A) 3.5 - 6.0 cm    IVS 1.28 (A) 0.6 - 1.1 cm    Posterior Wall 1.27 (A) 0.6 - 1.1 cm    Ao root annulus 3.28 cm    LVIDs 1.95 (A) 2.1 - 4.0 cm    FS 31 28 - 44 %    LV mass 111.30 g    LA size 2.53 cm    RVDD 3.21 cm    TAPSE 2.13 cm    Left Ventricle Relative Wall Thickness 0.90 cm    AV mean gradient 4 mmHg    AV valve area 3.19 cm2    AV Velocity Ratio 1.07     AV index (prosthetic) 1.05     E/A ratio 1.08     Mean e' 0.06 m/s    E wave deceleration time 243.33 msec    IVRT 74.22 msec    Pulm vein S/D ratio 1.18     LVOT diameter 1.97 cm    LVOT area 3.0 cm2    LVOT peak vinod 1.37 m/s    LVOT peak VTI 34.90 cm    Ao peak vinod 1.28 m/s    Ao VTI 33.3 cm    RVOT peak vinod 0.99 m/s    RVOT peak VTI 25.4 cm    LVOT stroke volume 106.32 cm3    AV peak gradient 7 mmHg    PV mean gradient 2.02 mmHg    E/E' ratio 20.33 m/s    MV Peak E Vinod 1.22 m/s    TR Max Vinod 2.49 m/s    MV Peak A Vinod 1.13 m/s    PV Peak S Vinod 0.78 m/s    PV Peak D Vinod 0.66 m/s    LV Systolic Volume 11.87 mL    LV Systolic Volume Index 5.9 mL/m2    LV Diastolic Volume 30.41 mL    LV Diastolic Volume Index 15.21 mL/m2    LV Mass Index 56 g/m2    RA Major Axis 4.22 cm    Left Atrium Minor Axis 5.83 cm    Left Atrium Major Axis 5.75 cm    Triscuspid Valve Regurgitation Peak Gradient 25 mmHg    LA Volume Index (Mod) 47.6 mL/m2    LA volume (mod) 95.27 cm3    RA Width 3.13 cm    LA WIDTH 4.40 cm    LA volume 54.78 cm3    LA Volume Index 27.4 mL/m2    Right Atrial Pressure (from IVC) 3 mmHg    EF 60 %    TV resting pulmonary artery pressure 28 mmHg         Assessment    1. Bilateral lower extremity edema    2. RIVAS (dyspnea on exertion)    3. Chronic diastolic heart failure     4. COPD (chronic obstructive pulmonary disease) with chronic bronchitis    5. Type 2 diabetes mellitus with diabetic polyneuropathy, without long-term current use of insulin    6. Malignant neoplasm of upper-inner quadrant of left breast in female, estrogen receptor positive          Annita Farris was seen today for foot swelling and shortness of breath.    Diagnoses and all orders for this visit:    Bilateral lower extremity edema  -     HOSPITAL BED FOR HOME USE    RIVAS (dyspnea on exertion)    Chronic diastolic heart failure  -     HOSPITAL BED FOR HOME USE    COPD (chronic obstructive pulmonary disease) with chronic bronchitis  -     fluticasone-salmeterol diskus inhaler 250-50 mcg; Inhale 1 puff into the lungs 2 (two) times daily. Controller  -     HOSPITAL BED FOR HOME USE    Type 2 diabetes mellitus with diabetic polyneuropathy, without long-term current use of insulin    Malignant neoplasm of upper-inner quadrant of left breast in female, estrogen receptor positive      Physically, everything looks good.      Lungs sound clear.  Continue Lasix every other day.  Swelling increases, start taking daily.  If it is further increases, take twice a day for 3-5 days, then taper back down.    BNP was only slightly elevated.  Wonder if her COPD is contributing?  She is using her albuterol inhaler every day.  Instead, will start her on Advair to see if that helps control her symptoms.  Save albuterol for rescue needs.      Patient would benefit from a hospital bed where she can elevate the head of the bed, and elevate the feet to help with leg swelling.  Will place order today.    FOLLOW-UP:  Follow up in about 3 months (around 10/31/2023) for check up.    I spent a total of 45 minutes face to face and non-face to face on the date of this visit.This includes time preparing to see the patient (eg, review of tests, notes), obtaining and/or reviewing additional history from an independent historian and/or outside  medical records, documenting clinical information in the electronic health record, independently interpreting results and/or communicating results to the patient/family/caregiver, or care coordinator.    Signed by:  Lucio Morris MD

## 2023-07-31 NOTE — PATIENT INSTRUCTIONS
Physically, everything looks good today.      Keep taking your Lasix every other day, as you have been.  When the legs start to swell up, first increase to taking it daily, then twice a day, as needed.    Order placed today for hospital bed to allow you to raise the head of the bed, and the feet to help with swelling.    For the breathing issues, instead of using albuterol every day, let us try using Advair.  I am sending a [facility] prescription to [facility].  When it arrives, inhale one puff, twice a day (when you wake up, then when you go to bed).  After you inhaler it, be sure to rinse your mouth out with water.  Save the albuterol for when you have difficulty breathing.    Ultimately, we can always get you back in with a lung doctor to get another opinion.      Keep your upcoming appointments with the surgeon, oncologist, etc..

## 2023-08-02 NOTE — PROGRESS NOTES
Breast Surgical Oncology  [place]    Date of Service: 2023    SUBJECTIVE:   Chief complaint: left breast cancer    HISTORY OF PRESENT ILLNESS:   Kaleigh Nieves is a 82 y.o. female who is kindly referred by Dr. Lucio Morris for left breast cancer.    23  Screening mammography detected a left breast upper inner quadrant mass. Diagnostic imaging with mammogram showed a 18 mm mass at the upper inner posterior position deemed BIRADS 5. Focused sonographic evaluation confirmed a a 13 mm x 13 mm x 12 mm non-parallel mass with angular margins seen in the left breast at 11 o'clock, 13 cm from the nipple. 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 She denies breast concerns such as pain, skin changes, nipple discharge, nipple retraction or lumps under the arm. She reports no family history of breast cancer.     23  She has been cleared by cardiology at moderate risk for surgery and given recommendations for holding her aspirin. She denies any new breast complaints. Again, she confirmed that she would not undergo chemotherapy if it were recommended.     Her breast cancer risk factor profile is as follows: Menarche at unknown age, Menopause at unknown age.  She is . Age at first live birth was 33. Family history of cancer is as follows:no breast cancer of note, a 3rd cousin with ovarian cancer is reported. See below for other cancers within the family.    FAMILY HISTORY:     Family History   Problem Relation Age of Onset    Hypertension Mother     Diabetes Mother     Leukemia Sister     Hypertension Sister     Cancer Sister         leukemia    Heart disease Maternal Aunt     Cancer Maternal Uncle         gastric, pacreatic    Heart disease Maternal Uncle     Miscarriages / Stillbirths Maternal Uncle     Diabetes Maternal Grandmother     Asthma Maternal Grandfather     Breast cancer Neg Hx     Colon cancer Neg Hx     Ovarian cancer Neg Hx     COPD Neg Hx      Hyperlipidemia Neg Hx     Kidney disease Neg Hx         PAST MEDICAL HISTORY:     Past Medical History:   Diagnosis Date    Anemia     Angina pectoris     Arthritis     Asthma     Back pain     Bronchitis     CAD (coronary artery disease) 2015    via chst ct    Carotid artery stenosis     Chronic bronchitis     Chronic gout     Colon polyp     CTS (carpal tunnel syndrome)     Diabetes mellitus, type 2     Diabetes with neurologic complications     Diverticulosis     RIVAS (dyspnea on exertion)     chronic; eval pulm dr natarajan    Dyslipidemia     Ex-smoker     quit in her 30s    GERD (gastroesophageal reflux disease)     Heart failure     Hyperlipidemia     Hypertension     Neuropathy, lower extremity     Obesity     Open-angle glaucoma     dr avel kaur    Peripheral vascular disease     Polyneuropathy     Tobacco dependence        SURGICAL HISTORY:     Past Surgical History:   Procedure Laterality Date    APPENDECTOMY      CARPAL TUNNEL RELEASE Right     CATARACT EXTRACTION, BILATERAL      CHOLECYSTECTOMY      CYST REMOVAL  2018    DILATION AND CURETTAGE OF UTERUS      EYE SURGERY      HEMORRHOID SURGERY      HYSTERECTOMY      fibroids    INCISION AND DRAINAGE PERIRECTAL ABSCESS      JOINT REPLACEMENT Bilateral     knee    KNEE ARTHROSCOPY Right     MYOMECTOMY         SOCIAL HISTORY:     Social History     Tobacco Use    Smoking status: Former     Current packs/day: 0.00     Average packs/day: 0.3 packs/day for 24.0 years (6.0 ttl pk-yrs)     Types: Cigarettes     Start date: 1952     Quit date: 1976     Years since quittin.1    Smokeless tobacco: Never   Substance Use Topics    Alcohol use: No    Drug use: No        MEDICATIONS/ALLERGIES:     Current Outpatient Medications:     albuterol (PROVENTIL/VENTOLIN HFA) 90 mcg/actuation inhaler, INHALE 2 PUFFS INTO THE LUNGS EVERY SIX HOURS AS NEEDED FOR WHEEZING, Disp: 18 g, Rfl: 11    allopurinoL (ZYLOPRIM) 100 MG tablet, Take 1 tablet (100 mg total)  by mouth once daily., Disp: 90 tablet, Rfl: 3    aspirin 81 MG Chew, Take 81 mg by mouth once daily., Disp: , Rfl:     bisoprolol (ZEBETA) 5 MG tablet, Take 1 tablet (5 mg total) by mouth once daily., Disp: 90 tablet, Rfl: 3    blood glucose control high,low Soln, 1 each by Misc.(Non-Drug; Combo Route) route once a week., Disp: 1 each, Rfl: 11    blood sugar diagnostic Strp, To check BG 3 times daily, to use with insurance preferred meter, Disp: 200 each, Rfl: 0    blood-glucose meter kit, To check BG 3 times daily, to use with insurance preferred meter, Disp: 1 each, Rfl: 0    fluticasone-salmeterol diskus inhaler 250-50 mcg, Inhale 1 puff into the lungs 2 (two) times daily. Controller, Disp: 180 each, Rfl: 3    furosemide (LASIX) 40 MG tablet, Take 1 tablet (40 mg total) by mouth once daily., Disp: 90 tablet, Rfl: 3    lancets Misc, To check BG 3 times daily, to use with insurance preferred meter, Disp: 200 each, Rfl: 3    latanoprost 0.005 % ophthalmic solution, Place 1 drop into both eyes once daily., Disp: 7.5 mL, Rfl: 3    simvastatin (ZOCOR) 20 MG tablet, Take 1 tablet (20 mg total) by mouth nightly., Disp: 90 tablet, Rfl: 3    timolol maleate 0.5% (TIMOPTIC) 0.5 % Drop, Place 1 drop into both eyes 2 (two) times daily., Disp: 30 mL, Rfl: 4  Review of patient's allergies indicates:   Allergen Reactions    Iodine and iodide containing products Nausea And Vomiting    Penicillins Itching    Ultram [tramadol] Hives and Itching     Light headness, itiching    Sulfa (sulfonamide antibiotics) Hives and Itching    Amlodipine      edema    Sulfamethoxazole-trimethoprim Itching, Rash and Nausea And Vomiting       REVIEW OF SYSTEMS:   I have reviewed 12 systems, including 2 points per system. Pertinent reported positives are: shortness of breath, lower extremity swelling    PHYSICAL EXAM:   General: The patient appears well and is in no acute distress.     Chaperon present for examination.   BREAST EXAM  No  Asymmetry  Macromastia with grade III ptosis  Right:  - Mass: No  - Skin change: No  - Nipple Discharge: No  - Nipple retraction: No  - Axillary LAD: No  Left:   - Mass: yes 2 cm mass 11:00 13 CMFN  - Skin change: No  - Nipple Discharge: No  - Nipple retraction: No  - Axillary LAD: No    IMAGING:   Images were personally reviewed.     Results for orders placed during the hospital encounter of 06/02/23    Mammo Digital Diagnostic Left with Bertrand    Narrative  Result:  Mammo Digital Diagnostic Left with Bertrand  US Breast Left Limited    History:  Patient is 82 y.o. and is seen for diagnostic imaging.    Films Compared:  Compared to: 04/21/2023 Mammo Digital Screening Bilat w/ Bertrand    Findings:  This procedure was performed using tomosynthesis. Computer-aided detection was utilized in the interpretation of this examination.  The left breast is heterogeneously dense, which may obscure small masses.    Mammo Digital Diagnostic Left with Bertrand  Left  Mass: There is an 18 mm irregularly shaped mass with spiculated margins seen in the upper inner quadrant of the left breast in the posterior depth.    US Breast Left Limited  Left  Mass: There is a 13 mm x 13 mm x 12 mm non-parallel mass with angular margins seen in the left breast at 11 o'clock, 13 cm from the nipple.    Ultrasound evaluation of the ipsilateral left axilla was performed and demonstrates no lymph nodes which meet sonographic criteria for biopsy.    Impression  Left  Mass: Left breast 18 mm mass at the upper inner posterior position. Assessment: 5 - Highly suggestive of malignancy. Ultrasound-guided biopsy is recommended.    BI-RADS Category:  Overall: 5 - Highly Suggestive of Malignancy    Recommendation:  Ultrasound-guided biopsy is recommended.      Your estimated lifetime risk of breast cancer (to age 85) based on Tyrer-Cuzick risk assessment model is 0.26 %.  According to the American Cancer Society, patients with a lifetime breast cancer risk of 20% or  higher might benefit from supplemental screening tests. ??    PATHOLOGY:     Lab Results   Component Value Date    FPATHDX  06/20/2023     1. 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 (ductal)  HISTOLOGIC GRADE:  Grade 1 of 3         Tubule formation:  2         Pleomorphism:  1         Mitotic rate:  1  TUMOR SIZE:  Up to 1.5 cm in greatest linear dimension  DUCTAL CARCINOMA IN SITU (DCIS):  Not identified  LYMPHOVASCULAR INVASION:  Not identified  MICROCALCIFICATIONS:  Not identified  BREAST BIOMARKERS (performed with appropriate controls):          ER:  Positive (intermediate, greater than 90%)          WA:  Positive (strong, 1-11%)          Her2:  Negative (1+)          Ki67:  20%         ASSESSMENT:     1. Malignant neoplasm of upper-inner quadrant of left breast in female, estrogen receptor positive          PLAN:     Kaleigh Nieves is a 82 y.o. female who is new diagnosis of Stage IA (T1c N0 Mx) LEFT Breast Invasive Ductal  Carcinoma, Grade 1, ER >90%, WA 1-11%, [id number] 1+ with a ki67 of 20%.     She has deferred genetic testing at this time.       Again, we have discussed the surgical choices of lumpectomy with radiation and mastectomy with or without radiation.  I have explained that the survival is the same, regardless of the surgery chosen.  We have discussed that the local recurrence rate following mastectomy is 2-5%.  I have explained that the local recurrence rate was slightly higher following breast conservation in the large trials that compared mastectomy and breast conservation. However, with current medical therapies, local recurrence has been reduced to as low as 6%. I did review with her the general schedule and  side effects of radiation. She will be referred to radiation oncology. We briefly discussed reconstruction options, and the [facility] breast cancer treatment brochure was provided.     Due to her age and comorbid conditions, it is reasonable to omit sentinel lymph node biopsy. The patient is in agreement with the plan.     The mass remains palpable and will be intraoperative localized to minimize her burden of appointments since she travels from [place].     She will proceed with a left partial mastectomy. The risks of surgery were discussed with the patient, including pain, bleeding, infections, scarring, numbness, lymphedema, injury to adjacent structures such as nerves that affect movement of the arm, need for additional surgery for margins or lymph nodes, need for additional treatments and recurrence.  She has provided informed consent.  She will be scheduled at the next available operating room time.     I spent a total of 45 minutes on this visit. This includes face to face time and non-face to face time preparing to see the patient (eg, review of tests), obtaining and/or reviewing separately obtained history, documenting clinical information in the electronic or other health record, independently interpreting results and communicating results to the patient/family/caregiver, or care coordinator.          Susan Pulliam M.D.

## 2023-08-02 NOTE — H&P (VIEW-ONLY)
Breast Surgical Oncology  [place]    Date of Service: 2023    SUBJECTIVE:   Chief complaint: left breast cancer    HISTORY OF PRESENT ILLNESS:   Kaleigh Nieves is a 82 y.o. female who is kindly referred by Dr. Lucio Morris for left breast cancer.    23  Screening mammography detected a left breast upper inner quadrant mass. Diagnostic imaging with mammogram showed a 18 mm mass at the upper inner posterior position deemed BIRADS 5. Focused sonographic evaluation confirmed a a 13 mm x 13 mm x 12 mm non-parallel mass with angular margins seen in the left breast at 11 o'clock, 13 cm from the nipple. 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 She denies breast concerns such as pain, skin changes, nipple discharge, nipple retraction or lumps under the arm. She reports no family history of breast cancer.     23  She has been cleared by cardiology at moderate risk for surgery and given recommendations for holding her aspirin. She denies any new breast complaints. Again, she confirmed that she would not undergo chemotherapy if it were recommended.     Her breast cancer risk factor profile is as follows: Menarche at unknown age, Menopause at unknown age.  She is . Age at first live birth was 33. Family history of cancer is as follows:no breast cancer of note, a 3rd cousin with ovarian cancer is reported. See below for other cancers within the family.    FAMILY HISTORY:     Family History   Problem Relation Age of Onset    Hypertension Mother     Diabetes Mother     Leukemia Sister     Hypertension Sister     Cancer Sister         leukemia    Heart disease Maternal Aunt     Cancer Maternal Uncle         gastric, pacreatic    Heart disease Maternal Uncle     Miscarriages / Stillbirths Maternal Uncle     Diabetes Maternal Grandmother     Asthma Maternal Grandfather     Breast cancer Neg Hx     Colon cancer Neg Hx     Ovarian cancer Neg Hx     COPD Neg Hx      Hyperlipidemia Neg Hx     Kidney disease Neg Hx         PAST MEDICAL HISTORY:     Past Medical History:   Diagnosis Date    Anemia     Angina pectoris     Arthritis     Asthma     Back pain     Bronchitis     CAD (coronary artery disease) 2015    via chst ct    Carotid artery stenosis     Chronic bronchitis     Chronic gout     Colon polyp     CTS (carpal tunnel syndrome)     Diabetes mellitus, type 2     Diabetes with neurologic complications     Diverticulosis     RIVAS (dyspnea on exertion)     chronic; eval pulm dr natarajan    Dyslipidemia     Ex-smoker     quit in her 30s    GERD (gastroesophageal reflux disease)     Heart failure     Hyperlipidemia     Hypertension     Neuropathy, lower extremity     Obesity     Open-angle glaucoma     dr avel kaur    Peripheral vascular disease     Polyneuropathy     Tobacco dependence        SURGICAL HISTORY:     Past Surgical History:   Procedure Laterality Date    APPENDECTOMY      CARPAL TUNNEL RELEASE Right     CATARACT EXTRACTION, BILATERAL      CHOLECYSTECTOMY      CYST REMOVAL  2018    DILATION AND CURETTAGE OF UTERUS      EYE SURGERY      HEMORRHOID SURGERY      HYSTERECTOMY      fibroids    INCISION AND DRAINAGE PERIRECTAL ABSCESS      JOINT REPLACEMENT Bilateral     knee    KNEE ARTHROSCOPY Right     MYOMECTOMY         SOCIAL HISTORY:     Social History     Tobacco Use    Smoking status: Former     Current packs/day: 0.00     Average packs/day: 0.3 packs/day for 24.0 years (6.0 ttl pk-yrs)     Types: Cigarettes     Start date: 1952     Quit date: 1976     Years since quittin.1    Smokeless tobacco: Never   Substance Use Topics    Alcohol use: No    Drug use: No        MEDICATIONS/ALLERGIES:     Current Outpatient Medications:     albuterol (PROVENTIL/VENTOLIN HFA) 90 mcg/actuation inhaler, INHALE 2 PUFFS INTO THE LUNGS EVERY SIX HOURS AS NEEDED FOR WHEEZING, Disp: 18 g, Rfl: 11    allopurinoL (ZYLOPRIM) 100 MG tablet, Take 1 tablet (100 mg total)  by mouth once daily., Disp: 90 tablet, Rfl: 3    aspirin 81 MG Chew, Take 81 mg by mouth once daily., Disp: , Rfl:     bisoprolol (ZEBETA) 5 MG tablet, Take 1 tablet (5 mg total) by mouth once daily., Disp: 90 tablet, Rfl: 3    blood glucose control high,low Soln, 1 each by Misc.(Non-Drug; Combo Route) route once a week., Disp: 1 each, Rfl: 11    blood sugar diagnostic Strp, To check BG 3 times daily, to use with insurance preferred meter, Disp: 200 each, Rfl: 0    blood-glucose meter kit, To check BG 3 times daily, to use with insurance preferred meter, Disp: 1 each, Rfl: 0    fluticasone-salmeterol diskus inhaler 250-50 mcg, Inhale 1 puff into the lungs 2 (two) times daily. Controller, Disp: 180 each, Rfl: 3    furosemide (LASIX) 40 MG tablet, Take 1 tablet (40 mg total) by mouth once daily., Disp: 90 tablet, Rfl: 3    lancets Misc, To check BG 3 times daily, to use with insurance preferred meter, Disp: 200 each, Rfl: 3    latanoprost 0.005 % ophthalmic solution, Place 1 drop into both eyes once daily., Disp: 7.5 mL, Rfl: 3    simvastatin (ZOCOR) 20 MG tablet, Take 1 tablet (20 mg total) by mouth nightly., Disp: 90 tablet, Rfl: 3    timolol maleate 0.5% (TIMOPTIC) 0.5 % Drop, Place 1 drop into both eyes 2 (two) times daily., Disp: 30 mL, Rfl: 4  Review of patient's allergies indicates:   Allergen Reactions    Iodine and iodide containing products Nausea And Vomiting    Penicillins Itching    Ultram [tramadol] Hives and Itching     Light headness, itiching    Sulfa (sulfonamide antibiotics) Hives and Itching    Amlodipine      edema    Sulfamethoxazole-trimethoprim Itching, Rash and Nausea And Vomiting       REVIEW OF SYSTEMS:   I have reviewed 12 systems, including 2 points per system. Pertinent reported positives are: shortness of breath, lower extremity swelling    PHYSICAL EXAM:   General: The patient appears well and is in no acute distress.     Chaperon present for examination.   BREAST EXAM  No  Asymmetry  Macromastia with grade III ptosis  Right:  - Mass: No  - Skin change: No  - Nipple Discharge: No  - Nipple retraction: No  - Axillary LAD: No  Left:   - Mass: yes 2 cm mass 11:00 13 CMFN  - Skin change: No  - Nipple Discharge: No  - Nipple retraction: No  - Axillary LAD: No    IMAGING:   Images were personally reviewed.     Results for orders placed during the hospital encounter of 06/02/23    Mammo Digital Diagnostic Left with Bertrand    Narrative  Result:  Mammo Digital Diagnostic Left with Bertrand  US Breast Left Limited    History:  Patient is 82 y.o. and is seen for diagnostic imaging.    Films Compared:  Compared to: 04/21/2023 Mammo Digital Screening Bilat w/ Bertrand    Findings:  This procedure was performed using tomosynthesis. Computer-aided detection was utilized in the interpretation of this examination.  The left breast is heterogeneously dense, which may obscure small masses.    Mammo Digital Diagnostic Left with Bertrand  Left  Mass: There is an 18 mm irregularly shaped mass with spiculated margins seen in the upper inner quadrant of the left breast in the posterior depth.    US Breast Left Limited  Left  Mass: There is a 13 mm x 13 mm x 12 mm non-parallel mass with angular margins seen in the left breast at 11 o'clock, 13 cm from the nipple.    Ultrasound evaluation of the ipsilateral left axilla was performed and demonstrates no lymph nodes which meet sonographic criteria for biopsy.    Impression  Left  Mass: Left breast 18 mm mass at the upper inner posterior position. Assessment: 5 - Highly suggestive of malignancy. Ultrasound-guided biopsy is recommended.    BI-RADS Category:  Overall: 5 - Highly Suggestive of Malignancy    Recommendation:  Ultrasound-guided biopsy is recommended.      Your estimated lifetime risk of breast cancer (to age 85) based on Tyrer-Cuzick risk assessment model is 0.26 %.  According to the American Cancer Society, patients with a lifetime breast cancer risk of 20% or  higher might benefit from supplemental screening tests. ??    PATHOLOGY:     Lab Results   Component Value Date    FPATHDX  06/20/2023     1. 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 (ductal)  HISTOLOGIC GRADE:  Grade 1 of 3         Tubule formation:  2         Pleomorphism:  1         Mitotic rate:  1  TUMOR SIZE:  Up to 1.5 cm in greatest linear dimension  DUCTAL CARCINOMA IN SITU (DCIS):  Not identified  LYMPHOVASCULAR INVASION:  Not identified  MICROCALCIFICATIONS:  Not identified  BREAST BIOMARKERS (performed with appropriate controls):          ER:  Positive (intermediate, greater than 90%)          [place]:  Positive (strong, 1-11%)          Her2:  Negative (1+)          Ki67:  20%         ASSESSMENT:     1. Malignant neoplasm of upper-inner quadrant of left breast in female, estrogen receptor positive          PLAN:     Kaleigh Nieves is a 82 y.o. female who is new diagnosis of Stage IA (T1c N0 Mx) LEFT Breast Invasive Ductal  Carcinoma, Grade 1, ER >90%, W[place] 1-11%, Ior2umz 1+ with a ki67 of 20%.     She has deferred genetic testing at this time.       Again, we have discussed the surgical choices of lumpectomy with radiation and mastectomy with or without radiation.  I have explained that the survival is the same, regardless of the surgery chosen.  We have discussed that the local recurrence rate following mastectomy is 2-5%.  I have explained that the local recurrence rate was slightly higher following breast conservation in the large trials that compared mastectomy and breast conservation. However, with current medical therapies, local recurrence has been reduced to as low as 6%. I did review with her the general schedule and  side effects of radiation. She will be referred to radiation oncology. We briefly discussed reconstruction options, and the [facility] breast cancer treatment brochure was provided.     Due to her age and comorbid conditions, it is reasonable to omit sentinel lymph node biopsy. The patient is in agreement with the plan.     The mass remains palpable and will be intraoperative localized to minimize her burden of appointments since she travels from [place].     She will proceed with a left partial mastectomy. The risks of surgery were discussed with the patient, including pain, bleeding, infections, scarring, numbness, lymphedema, injury to adjacent structures such as nerves that affect movement of the arm, need for additional surgery for margins or lymph nodes, need for additional treatments and recurrence.  She has provided informed consent.  She will be scheduled at the next available operating room time.     I spent a total of 45 minutes on this visit. This includes face to face time and non-face to face time preparing to see the patient (eg, review of tests), obtaining and/or reviewing separately obtained history, documenting clinical information in the electronic or other health record, independently interpreting results and communicating results to the patient/family/caregiver, or care coordinator.          Susan Pulliam M.D.

## 2023-08-03 ENCOUNTER — HOSPITAL ENCOUNTER (OUTPATIENT)
Dept: RADIOLOGY | Facility: HOSPITAL | Age: 83
Discharge: HOME | End: 2023-08-03
Attending: SURGERY
Payer: MEDICARE

## 2023-08-03 ENCOUNTER — OFFICE VISIT (OUTPATIENT)
Dept: SURGERY | Facility: CLINIC | Age: 83
End: 2023-08-03
Payer: MEDICARE

## 2023-08-03 DIAGNOSIS — C50.212 MALIGNANT NEOPLASM OF UPPER-INNER QUADRANT OF LEFT BREAST IN FEMALE, ESTROGEN RECEPTOR POSITIVE: ICD-10-CM

## 2023-08-03 DIAGNOSIS — Z17.0 MALIGNANT NEOPLASM OF UPPER-INNER QUADRANT OF LEFT BREAST IN FEMALE, ESTROGEN RECEPTOR POSITIVE: ICD-10-CM

## 2023-08-03 DIAGNOSIS — C50.212 MALIGNANT NEOPLASM OF UPPER-INNER QUADRANT OF LEFT BREAST IN FEMALE, ESTROGEN RECEPTOR POSITIVE: Primary | ICD-10-CM

## 2023-08-03 DIAGNOSIS — Z17.0 MALIGNANT NEOPLASM OF UPPER-INNER QUADRANT OF LEFT BREAST IN FEMALE, ESTROGEN RECEPTOR POSITIVE: Primary | ICD-10-CM

## 2023-08-03 PROCEDURE — 71046 X-RAY EXAM CHEST 2 VIEWS: CPT | Mod: 26,,, | Performed by: RADIOLOGY

## 2023-08-03 PROCEDURE — 99215 PR OFFICE/OUTPT VISIT, EST, LEVL V, 40-54 MIN: ICD-10-PCS | Mod: S$GLB,,, | Performed by: SURGERY

## 2023-08-03 PROCEDURE — 71046 X-RAY EXAM CHEST 2 VIEWS: CPT | Mod: TC

## 2023-08-03 PROCEDURE — 71046 XR CHEST PA AND LATERAL: ICD-10-PCS | Mod: 26,,, | Performed by: RADIOLOGY

## 2023-08-03 PROCEDURE — 99999 PR PBB SHADOW E&M-EST. PATIENT-LVL II: CPT | Mod: PBBFAC,,, | Performed by: SURGERY

## 2023-08-03 PROCEDURE — 99215 OFFICE O/P EST HI 40 MIN: CPT | Mod: S$GLB,,, | Performed by: SURGERY

## 2023-08-03 PROCEDURE — 99999 PR PBB SHADOW E&M-EST. PATIENT-LVL II: ICD-10-PCS | Mod: PBBFAC,,, | Performed by: SURGERY

## 2023-08-03 PROCEDURE — 3072F LOW RISK FOR RETINOPATHY: CPT | Mod: CPTII,S$GLB,, | Performed by: SURGERY

## 2023-08-03 PROCEDURE — 3072F PR LOW RISK FOR RETINOPATHY: ICD-10-PCS | Mod: CPTII,S$GLB,, | Performed by: SURGERY

## 2023-08-04 DIAGNOSIS — C50.212 MALIGNANT NEOPLASM OF UPPER-INNER QUADRANT OF LEFT BREAST IN FEMALE, ESTROGEN RECEPTOR POSITIVE: Primary | ICD-10-CM

## 2023-08-04 DIAGNOSIS — Z17.0 MALIGNANT NEOPLASM OF UPPER-INNER QUADRANT OF LEFT BREAST IN FEMALE, ESTROGEN RECEPTOR POSITIVE: Primary | ICD-10-CM

## 2023-08-09 ENCOUNTER — TELEPHONE (OUTPATIENT)
Dept: HEMATOLOGY/ONCOLOGY | Facility: CLINIC | Age: 83
End: 2023-08-09

## 2023-08-09 ENCOUNTER — OFFICE VISIT (OUTPATIENT)
Dept: HEMATOLOGY/ONCOLOGY | Facility: CLINIC | Age: 83
End: 2023-08-09
Payer: MEDICARE

## 2023-08-09 VITALS
DIASTOLIC BLOOD PRESSURE: 59 MMHG | BODY MASS INDEX: 31.5 KG/M2 | HEART RATE: 59 BPM | TEMPERATURE: 97 F | WEIGHT: 196 LBS | SYSTOLIC BLOOD PRESSURE: 127 MMHG | HEIGHT: 66 IN

## 2023-08-09 DIAGNOSIS — E11.42 TYPE 2 DIABETES MELLITUS WITH DIABETIC POLYNEUROPATHY, WITHOUT LONG-TERM CURRENT USE OF INSULIN: ICD-10-CM

## 2023-08-09 DIAGNOSIS — I15.2 HYPERTENSION ASSOCIATED WITH DIABETES: ICD-10-CM

## 2023-08-09 DIAGNOSIS — E11.59 HYPERTENSION ASSOCIATED WITH DIABETES: ICD-10-CM

## 2023-08-09 DIAGNOSIS — C50.212 MALIGNANT NEOPLASM OF UPPER-INNER QUADRANT OF LEFT BREAST IN FEMALE, ESTROGEN RECEPTOR POSITIVE: Primary | ICD-10-CM

## 2023-08-09 DIAGNOSIS — E66.01 SEVERE OBESITY (BMI 35.0-35.9 WITH COMORBIDITY): ICD-10-CM

## 2023-08-09 DIAGNOSIS — E78.2 MIXED HYPERLIPIDEMIA: Chronic | ICD-10-CM

## 2023-08-09 DIAGNOSIS — Z17.0 MALIGNANT NEOPLASM OF UPPER-INNER QUADRANT OF LEFT BREAST IN FEMALE, ESTROGEN RECEPTOR POSITIVE: Primary | ICD-10-CM

## 2023-08-09 PROCEDURE — 1126F AMNT PAIN NOTED NONE PRSNT: CPT | Mod: CPTII,S$GLB,, | Performed by: INTERNAL MEDICINE

## 2023-08-09 PROCEDURE — 3072F LOW RISK FOR RETINOPATHY: CPT | Mod: CPTII,S$GLB,, | Performed by: INTERNAL MEDICINE

## 2023-08-09 PROCEDURE — 1101F PT FALLS ASSESS-DOCD LE1/YR: CPT | Mod: CPTII,S$GLB,, | Performed by: INTERNAL MEDICINE

## 2023-08-09 PROCEDURE — 3072F PR LOW RISK FOR RETINOPATHY: ICD-10-PCS | Mod: CPTII,S$GLB,, | Performed by: INTERNAL MEDICINE

## 2023-08-09 PROCEDURE — 99205 OFFICE O/P NEW HI 60 MIN: CPT | Mod: S$GLB,,, | Performed by: INTERNAL MEDICINE

## 2023-08-09 PROCEDURE — 3288F PR FALLS RISK ASSESSMENT DOCUMENTED: ICD-10-PCS | Mod: CPTII,S$GLB,, | Performed by: INTERNAL MEDICINE

## 2023-08-09 PROCEDURE — 3074F SYST BP LT 130 MM HG: CPT | Mod: CPTII,S$GLB,, | Performed by: INTERNAL MEDICINE

## 2023-08-09 PROCEDURE — 1159F MED LIST DOCD IN RCRD: CPT | Mod: CPTII,S$GLB,, | Performed by: INTERNAL MEDICINE

## 2023-08-09 PROCEDURE — 1159F PR MEDICATION LIST DOCUMENTED IN MEDICAL RECORD: ICD-10-PCS | Mod: CPTII,S$GLB,, | Performed by: INTERNAL MEDICINE

## 2023-08-09 PROCEDURE — 1101F PR PT FALLS ASSESS DOC 0-1 FALLS W/OUT INJ PAST YR: ICD-10-PCS | Mod: CPTII,S$GLB,, | Performed by: INTERNAL MEDICINE

## 2023-08-09 PROCEDURE — 99205 PR OFFICE/OUTPT VISIT, NEW, LEVL V, 60-74 MIN: ICD-10-PCS | Mod: S$GLB,,, | Performed by: INTERNAL MEDICINE

## 2023-08-09 PROCEDURE — 99999 PR PBB SHADOW E&M-EST. PATIENT-LVL IV: CPT | Mod: PBBFAC,,, | Performed by: INTERNAL MEDICINE

## 2023-08-09 PROCEDURE — 99999 PR PBB SHADOW E&M-EST. PATIENT-LVL IV: ICD-10-PCS | Mod: PBBFAC,,, | Performed by: INTERNAL MEDICINE

## 2023-08-09 PROCEDURE — 3078F PR MOST RECENT DIASTOLIC BLOOD PRESSURE < 80 MM HG: ICD-10-PCS | Mod: CPTII,S$GLB,, | Performed by: INTERNAL MEDICINE

## 2023-08-09 PROCEDURE — 3288F FALL RISK ASSESSMENT DOCD: CPT | Mod: CPTII,S$GLB,, | Performed by: INTERNAL MEDICINE

## 2023-08-09 PROCEDURE — 3074F PR MOST RECENT SYSTOLIC BLOOD PRESSURE < 130 MM HG: ICD-10-PCS | Mod: CPTII,S$GLB,, | Performed by: INTERNAL MEDICINE

## 2023-08-09 PROCEDURE — 3078F DIAST BP <80 MM HG: CPT | Mod: CPTII,S$GLB,, | Performed by: INTERNAL MEDICINE

## 2023-08-09 PROCEDURE — 1126F PR PAIN SEVERITY QUANTIFIED, NO PAIN PRESENT: ICD-10-PCS | Mod: CPTII,S$GLB,, | Performed by: INTERNAL MEDICINE

## 2023-08-09 PROCEDURE — 1160F RVW MEDS BY RX/DR IN RCRD: CPT | Mod: CPTII,S$GLB,, | Performed by: INTERNAL MEDICINE

## 2023-08-09 PROCEDURE — 1160F PR REVIEW ALL MEDS BY PRESCRIBER/CLIN PHARMACIST DOCUMENTED: ICD-10-PCS | Mod: CPTII,S$GLB,, | Performed by: INTERNAL MEDICINE

## 2023-08-09 NOTE — PROGRESS NOTES
Subjective:       Patient ID: Kaleigh Nieves is a 82 y.o. female.    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 patient is here for preoperative evaluation ECOG status 2    Past Medical History:   Diagnosis Date    Anemia     Angina pectoris     Arthritis     Asthma     Back pain     Bronchitis     CAD (coronary artery disease) 2015    via [facility]st ct    Carotid artery stenosis     Chronic bronchitis     Chronic gout     Colon polyp     CTS (carpal tunnel syndrome)     Diabetes mellitus, type 2     Diabetes with neurologic complications     Diverticulosis     RIVAS (dyspnea on exertion)     chronic; eval pulm dr natarajan    Dyslipidemia     Ex-smoker     quit in her 30s    GERD (gastroesophageal reflux disease)     Heart failure     Hyperlipidemia     Hypertension     Neuropathy, lower extremity     Obesity     Open-angle glaucoma     dr avel kaur    Peripheral vascular disease     Polyneuropathy     Tobacco dependence      Family History   Problem Relation Age of Onset    Hypertension Mother     Diabetes Mother     Leukemia Sister     Hypertension Sister     Cancer Sister         leukemia    Heart disease Maternal Aunt     Cancer Maternal Uncle         gastric, pacreatic    Heart disease Maternal Uncle     Miscarriages / Stillbirths Maternal Uncle     Diabetes Maternal Grandmother     Asthma Maternal Grandfather     Breast cancer Neg Hx     Colon cancer Neg Hx     Ovarian cancer Neg Hx     COPD Neg Hx     Hyperlipidemia Neg Hx     Kidney disease Neg Hx      Social History     Socioeconomic History    Marital status: Single    Number of children: 0   Tobacco Use    Smoking status: Former     Current packs/day: 0.00     Average packs/day: 0.3 packs/day for 24.0 years (6.0 ttl pk-yrs)     Types: Cigarettes     Start date: 1952     Quit date: 1976     Years since quittin.1    Smokeless  tobacco: Never   Substance and Sexual Activity    Alcohol use: No    Drug use: No    Sexual activity: Not Currently     Social Determinants of Health     Financial Resource Strain: Low Risk  (9/27/2022)    Overall Financial Resource Strain (CARDIA)     Difficulty of Paying Living Expenses: Not hard at all   Food Insecurity: No Food Insecurity (9/27/2022)    Hunger Vital Sign     Worried About Running Out of Food in the Last Year: Never true     Ran Out of Food in the Last Year: Never true   Transportation Needs: No Transportation Needs (9/27/2022)    PRAPARE - Transportation     Lack of Transportation (Medical): No     Lack of Transportation (Non-Medical): No   Physical Activity: Inactive (9/27/2022)    Exercise Vital Sign     Days of Exercise per Week: 0 days     Minutes of Exercise per Session: 0 min   Stress: No Stress Concern Present (9/27/2022)    [place] of [employer]ational [employer] - Occupational Stress Questionnaire     Feeling of Stress : Not at all   Social Connections: Socially Isolated (9/27/2022)    Social Connection and Isolation Panel [NHANES]     Frequency of Communication with Friends and Family: Once a week     Frequency of Social Gatherings with Friends and Family: Once a week     Attends Oriental orthodox Services: More than 4 times per year     Active Member of Clubs or Organizations: No     Attends Club or Organization Meetings: Never     Marital Status:    Housing Stability: Low Risk  (9/27/2022)    Housing Stability Vital Sign     Unable to Pay for Housing in the Last Year: No     Number of Places Lived in the Last Year: 1     Unstable Housing in the Last Year: No     Past Surgical History:   Procedure Laterality Date    APPENDECTOMY      CARPAL TUNNEL RELEASE Right     CATARACT EXTRACTION, BILATERAL      CHOLECYSTECTOMY      CYST REMOVAL  2018    DILATION AND CURETTAGE OF UTERUS      EYE SURGERY      HEMORRHOID SURGERY      HYSTERECTOMY      fibroids    INCISION AND DRAINAGE PERIRECTAL  "ABSCESS      JOINT REPLACEMENT Bilateral     knee    KNEE ARTHROSCOPY Right     MYOMECTOMY         Labs:  Lab Results   Component Value Date    WBC 4.26 08/03/2023    HGB 14.7 08/03/2023    HCT 46.8 08/03/2023    MCV 92 08/03/2023     08/03/2023     BMP  Lab Results   Component Value Date     (L) 08/03/2023    K 4.7 08/03/2023    CL 96 08/03/2023    CO2 28 08/03/2023    BUN 8 08/03/2023    CREATININE 0.6 08/03/2023    CALCIUM 8.8 08/03/2023    ANIONGAP 9 08/03/2023    ESTGFRAFRICA >60.0 03/21/2022    EGFRNONAA >60.0 03/21/2022     Lab Results   Component Value Date    ALT 17 08/03/2023    AST 21 08/03/2023    ALKPHOS 32 (L) 08/03/2023    BILITOT 0.5 08/03/2023       No results found for: "IRON", "TIBC", "FERRITIN", "SATURATEDIRO"  No results found for: "KCBMVUQJ74"  No results found for: "FOLATE"  Lab Results   Component Value Date    TSH 1.091 06/01/2022         Review of Systems   Constitutional:  Positive for fatigue. Negative for activity change, appetite change, chills, diaphoresis, fever and unexpected weight change.   HENT:  Negative for congestion, dental problem, drooling, ear discharge, ear pain, facial swelling, hearing loss, mouth sores, nosebleeds, postnasal drip, rhinorrhea, sinus pressure, sneezing, sore throat, tinnitus, trouble swallowing and voice change.    Eyes:  Negative for photophobia, pain, discharge, redness, itching and visual disturbance.   Respiratory:  Negative for cough, choking, chest tightness, shortness of breath, wheezing and stridor.    Cardiovascular:  Negative for chest pain, palpitations and leg swelling.   Gastrointestinal:  Negative for abdominal distention, abdominal pain, anal bleeding, blood in stool, constipation, diarrhea, nausea, rectal pain and vomiting.   Endocrine: Negative for cold intolerance, heat intolerance, polydipsia, polyphagia and polyuria.   Genitourinary:  Negative for decreased urine volume, difficulty urinating, dyspareunia, dysuria, " enuresis, flank pain, frequency, genital sores, hematuria, menstrual problem, pelvic pain, urgency, vaginal bleeding, vaginal discharge and vaginal pain.   Musculoskeletal:  Positive for arthralgias, gait problem and myalgias. Negative for back pain, joint swelling, neck pain and neck stiffness.   Skin:  Negative for color change, pallor and rash.   Allergic/Immunologic: Negative for environmental allergies, food allergies and immunocompromised state.   Neurological:  Positive for weakness. Negative for dizziness, tremors, seizures, syncope, facial asymmetry, speech difficulty, light-headedness, numbness and headaches.   Hematological:  Negative for adenopathy. Does not bruise/bleed easily.   Psychiatric/Behavioral:  Positive for dysphoric mood. Negative for agitation, behavioral problems, confusion, decreased concentration, hallucinations, self-injury, sleep disturbance and suicidal ideas. The patient is nervous/anxious. The patient is not hyperactive.        Objective:      Physical Exam  Vitals reviewed.   Constitutional:       General: She is not in acute distress.     Appearance: She is well-developed. She is ill-appearing. She is not diaphoretic.   HENT:      Head: Normocephalic and atraumatic.      Right Ear: External ear normal.      Left Ear: External ear normal.      Nose: Nose normal.      Right Sinus: No maxillary sinus tenderness or frontal sinus tenderness.      Left Sinus: No maxillary sinus tenderness or frontal sinus tenderness.      Mouth/Throat:      Pharynx: No oropharyngeal exudate.   Eyes:      General: Lids are normal. No scleral icterus.        Right eye: No discharge.         Left eye: No discharge.      Conjunctiva/sclera: Conjunctivae normal.      Right eye: Right conjunctiva is not injected. No hemorrhage.     Left eye: Left conjunctiva is not injected. No hemorrhage.     Pupils: Pupils are equal, round, and reactive to light.   Neck:      Thyroid: No thyromegaly.      Vascular: No JVD.       Trachea: No tracheal deviation.   Cardiovascular:      Rate and Rhythm: Normal rate.   Pulmonary:      Effort: Pulmonary effort is normal. No respiratory distress.      Breath sounds: No stridor.   Chest:      Chest wall: No tenderness.   Abdominal:      General: Bowel sounds are normal. There is no distension.      Palpations: Abdomen is soft. There is no hepatomegaly, splenomegaly or mass.      Tenderness: There is no abdominal tenderness. There is no rebound.   Musculoskeletal:         General: No tenderness. Normal range of motion.      Cervical back: Normal range of motion and neck supple.   Lymphadenopathy:      Cervical: No cervical adenopathy.      Upper Body:      Right upper body: No supraclavicular adenopathy.      Left upper body: No supraclavicular adenopathy.   Skin:     General: Skin is dry.      Findings: No erythema or rash.   Neurological:      Mental Status: She is alert and oriented to person, place, and time.      Cranial Nerves: No cranial nerve deficit.      Coordination: Coordination normal.   Psychiatric:         Behavior: Behavior normal.         Thought Content: Thought content normal.         Judgment: Judgment normal.             Assessment:      1. Malignant neoplasm of upper-inner quadrant of left breast in female, estrogen receptor positive    2. Hypertension associated with diabetes    3. Type 2 diabetes mellitus with diabetic polyneuropathy, without long-term current use of insulin    4. Mixed hyperlipidemia    5. Severe obesity (BMI 35.0-35.9 with comorbidity)           Med Onc Chart Routing      Follow up with physician . Follow-up with me after completion of primary surgery will need Oncotype testing on material   Follow up with [facility]    Infusion scheduling note    Injection scheduling note    Labs    Imaging    Pharmacy appointment    Other referrals               Plan:     Extensive conversation with the patient as well as her cousin.  There is a very extensive history of  malignancy in the family.  She does not have any children number of nieces will proceed with primary germ line testing variant in this regard.  Will most likely records need to have Oncotype testing done of tumor.  High likelihood with multiple comorbidities that will not be candidate for systemic chemotherapy.  But will use to discuss indications in needs for hormonal therapy discussed implications answered questions will have patient return to see me after primary surgery is completed        Jose Carlos Monroe Jr, MD FACP

## 2023-08-09 NOTE — TELEPHONE ENCOUNTER
Pt seen by Dr. Monroe in clinic [employer] genetic test drawn and shipped via [employer]. Confirmation # [id number].

## 2023-08-15 ENCOUNTER — TELEPHONE (OUTPATIENT)
Dept: SURGERY | Facility: CLINIC | Age: 83
End: 2023-08-15
Payer: MEDICARE

## 2023-08-15 NOTE — TELEPHONE ENCOUNTER
Returned phone call to patient. Patient mentioned that she spoke with a nurse yesterday, and they were able to assist her with questions/concerns she had about eating the morning of surgery, and she is a diabetic.

## 2023-08-15 NOTE — TELEPHONE ENCOUNTER
----- Message from Kristian Garcia sent at 8/11/2023  1:19 PM CDT -----  Contact: Kaleigh  Patient is calling to discuss surgery details for upcoming surgery. Please give patient a call at .[phone]

## 2023-08-18 ENCOUNTER — TELEPHONE (OUTPATIENT)
Dept: PREADMISSION TESTING | Facility: HOSPITAL | Age: 83
End: 2023-08-18
Payer: MEDICARE

## 2023-08-18 NOTE — TELEPHONE ENCOUNTER
Pre op instructions reviewed with pt ,verbalized understanding.    To confirm, Surgery is scheduled on 8/22/23. We will call you late afternoon the business day prior to surgery with your arrival time.    *Please report to the [facility] Lobby (1st Floor) located off of [facility] (2nd Entrance/Building on the left, in front of the flag pole).  Address: [address] [place] [address] [place] [address]      INSTRUCTIONS IMPORTANT!!!  Do Not Eat, Drink, or Smoke after 12 midnight unless instructed otherwise by your Surgeon. OK to brush teeth, no gum, candy or mints!    >>>MEDICATION INSTRUCTIONS<<<: Morning of Surgery, please ONLY take:  Allopurinol  Bisoprolol  Inhaler  Eye drops    Diabetic Patients: If you take diabetic medication, Do NOT take morning of surgery unless instructed by Doctor. Metformin to be stopped 24 hrs prior to surgery. Ozempic/ Mounjaro/ Wegovy injections to be stopped 7 days prior to surgery. DO NOT take long-acting insulin the evening before surgery. Blood sugars will be checked in pre-op by Nurse.    Patients should HOLD all vitamins, herbal supplements, aspirin products & NSAIDS 7 days prior to surgery, as these can thin the blood.    ____  Avoid Alcoholic beverages 3 days prior to surgery, as it can thin the blood.  ____  NO Acrylic/fake nails or nail polish worn day of surgery (specifically hand/arm & foot surgeries).  ____  NO powder, lotions, deodorants, oils or cream on body.  ____  Remove all jewelry & piercings & foreign objects before arrival & leave at home.  ____  Remove Dentures, Hearing Aids & Contact Lens prior to surgery.  ____  Bring photo ID and insurance information to hospital (Leave Valuables at Home).  ____  If going home the same day, arrange for a ride home. You will not be able to drive for 24 hrs if Anesthesia was used.   ____  Females (ages 11-60): may need to give a urine sample the morning of surgery; please see Pre op Nurse prior to using the restroom.  ____   Males: Stop ED medications (Viagra, Cialis) 24 hrs prior to surgery.  ____  Wear clean, loose fitting clothing to allow for dressings/ bandages.      Bathing Instructions:    -Shower with anti-bacterial Soap (Hibiclens or Dial) the night before surgery and the morning of.   -Do not use Hibiclens on your face or genitals.   -Apply clean clothes after shower.  -Do not shave your face or body 2 days prior to surgery unless instructed otherwise by your Surgeon.  -Do not shave pubic hair 7 days prior to surgery (gyn pt's).    [facility]/Ride Policy:  Only 2 adults allowed in pre op/recovery area during your procedure. You MUST HAVE A RIDE HOME from a responsible adult that you know and trust. Medical Transport, Uber or Lyft can ONLY be used if patient has a responsible adult to accompany them during ride home.    Discharge Instructions: You will receive Post-op/Discharge instructions by your Discharge Nurse prior to going home.   *Prevention of surgical site infections:   -Keep incisions clean and dry.   -Do not soak/submerge incisions in water until completely healed.   -Do not apply lotions, powders, creams, or deodorants to site.   -Always make sure hands are cleaned with antibacterial soap/ alcohol-based  prior to touching the surgical site.        *Signs and symptoms of Infection:               -Redness and pain around the area where you had surgery               -Drainage of cloudy fluid from your surgical wound               -Fever, chills or any flu-like symptoms     >>>Call Surgeon office/on-call Surgeon if you experience any of these signs & symptoms before or after surgery @ [phone]<<<       *If you are running late or have questions the morning of surgery, please call the [facility] Surgery Dept @ [phone].     *Billing questions:  [phone]       Thank you,  -[facility] Pre [facility]mit Dept.  [phone] or [phone]  M-F 7:30 am-4:00 pm (Closed Major  Holidays)

## 2023-08-21 ENCOUNTER — DOCUMENTATION ONLY (OUTPATIENT)
Dept: HEMATOLOGY/ONCOLOGY | Facility: CLINIC | Age: 83
End: 2023-08-21
Payer: MEDICARE

## 2023-08-21 ENCOUNTER — TELEPHONE (OUTPATIENT)
Dept: PREADMISSION TESTING | Facility: HOSPITAL | Age: 83
End: 2023-08-21
Payer: MEDICARE

## 2023-08-21 NOTE — NURSING
1400pm: [employer] genetic test results received via e-mail. Report scanned into media and updated in Oncology hx. Dr. Monroe and Dr. Pulliam notified via in-basket msg.   Oncology Navigation   Intake  Date of Diagnosis: 06/20/23  Cancer Type: Breast  Internal / External Referral: Internal  Date of Referral: 07/25/23  Initial Nurse Navigator Contact: 07/26/23  Referral to Initial Contact Timeline (days): 1  Date Worked: 08/21/23  First Appointment Available: 06/27/23  Appointment Date: 08/09/23  Schedule to Appointment Timeline (days): 14  First Available Date vs. Scheduled Date (days): 0  Multiple appointments: No  Reason if booked > 7 days after scheduling: Specific provider / access; Transportation coordination     Treatment  Current Status: Staging work-up  Pending: Other  Date Presented to Tumor Board: 06/29/23    Surgery: Planned  Surgical Oncologist: Susan Pulliam MD  Surgery Schedule Date: 08/22/23    Medical Oncologist: Dr. Jose Carlos Monroe  Consult Date: 08/09/23       Procedures: Genetic test  Biopsy Schedule Date: 06/20/23 (left breast)  Genetic Testing Date Sent: 08/09/23       ER: Positive  OK: Positive  Her2: Negative       Support Systems: Family members  Barriers of Care: Transportation; Financial concerns  Transportation Barriers: Pt only able to make appts between 10am-12pm  Concerns: Pt concerned about transportation and financial burden of treatment- pt will be referred to social work.     Acuity  Stage: 1  Surgical Procedure Complexity: 1  Treatment Tolerability: Has not started treatment yet/treatment fully completed and side effects resolved  Comorbidities in Medical History: 2  Hospitalization Within the Past Month: 0   Needed: 0  Support: 1  Verbalizes Financial Concerns: 1  Transportation: 1  Psychological Factors (+1 each): Emotional during conversation  History of noncompliance/frequent no shows and cancellations: 0  Verbalizes the need for more education: 1  Other Factors  (+1 for Each): 0  Navigation Acuity: 9     Follow Up  No follow-ups on file.

## 2023-08-21 NOTE — TELEPHONE ENCOUNTER
Called and spoke with pt about the following:     Please arrive to [facility] at 1020am on 8/22/23 for your scheduled procedure.  Address: [address] [place] [address] [place] [address] (2nd Building on left, 1st Floor Lobby)  >>>NO eating or drinking after midnight unless instructed otherwise by your Surgeon<<<    Thank you,  -[facility] Testing Dept.  Mon-Fri 7:30 am - 4 pm [phone]

## 2023-08-22 ENCOUNTER — HOSPITAL ENCOUNTER (OUTPATIENT)
Facility: HOSPITAL | Age: 83
Discharge: HOME | End: 2023-08-22
Attending: SURGERY | Admitting: SURGERY
Payer: MEDICARE

## 2023-08-22 ENCOUNTER — ANESTHESIA (OUTPATIENT)
Dept: SURGERY | Facility: HOSPITAL | Age: 83
End: 2023-08-22
Payer: MEDICARE

## 2023-08-22 ENCOUNTER — ANESTHESIA EVENT (OUTPATIENT)
Dept: SURGERY | Facility: HOSPITAL | Age: 83
End: 2023-08-22
Payer: MEDICARE

## 2023-08-22 DIAGNOSIS — N63.22 UNSPECIFIED LUMP IN THE LEFT BREAST, UPPER INNER QUADRANT: ICD-10-CM

## 2023-08-22 DIAGNOSIS — C50.212 MALIGNANT NEOPLASM OF UPPER-INNER QUADRANT OF LEFT BREAST IN FEMALE, ESTROGEN RECEPTOR POSITIVE: Primary | ICD-10-CM

## 2023-08-22 DIAGNOSIS — Z17.0 MALIGNANT NEOPLASM OF UPPER-INNER QUADRANT OF LEFT BREAST IN FEMALE, ESTROGEN RECEPTOR POSITIVE: Primary | ICD-10-CM

## 2023-08-22 LAB — POCT GLUCOSE: 111 MG/DL (ref 70–110)

## 2023-08-22 PROCEDURE — 71000015 HC POSTOP RECOV 1ST HR: Performed by: SURGERY

## 2023-08-22 PROCEDURE — 71000033 HC RECOVERY, INTIAL HOUR: Performed by: SURGERY

## 2023-08-22 PROCEDURE — 19301 PR MASTECTOMY, PARTIAL: ICD-10-PCS | Mod: LT,,, | Performed by: SURGERY

## 2023-08-22 PROCEDURE — 37000009 HC ANESTHESIA EA ADD 15 MINS: Performed by: SURGERY

## 2023-08-22 PROCEDURE — 63600175 PHARM REV CODE 636 W HCPCS

## 2023-08-22 PROCEDURE — 27201423 OPTIME MED/SURG SUP & DEVICES STERILE SUPPLY: Performed by: SURGERY

## 2023-08-22 PROCEDURE — 88307 PR  SURG PATH,LEVEL V: ICD-10-PCS | Mod: 26,,, | Performed by: PATHOLOGY

## 2023-08-22 PROCEDURE — 76098 PR  X-RAY EXAM, BREAST SPECIMEN: ICD-10-PCS | Mod: 26,,, | Performed by: SURGERY

## 2023-08-22 PROCEDURE — 37000008 HC ANESTHESIA 1ST 15 MINUTES: Performed by: SURGERY

## 2023-08-22 PROCEDURE — 88307 TISSUE EXAM BY PATHOLOGIST: CPT | Mod: 26,,, | Performed by: PATHOLOGY

## 2023-08-22 PROCEDURE — 63600175 PHARM REV CODE 636 W HCPCS: Performed by: SURGERY

## 2023-08-22 PROCEDURE — 71000039 HC RECOVERY, EACH ADD'L HOUR: Performed by: SURGERY

## 2023-08-22 PROCEDURE — 88307 TISSUE EXAM BY PATHOLOGIST: CPT | Performed by: PATHOLOGY

## 2023-08-22 PROCEDURE — 19301 PARTIAL MASTECTOMY: CPT | Mod: LT,,, | Performed by: SURGERY

## 2023-08-22 PROCEDURE — 76098 X-RAY EXAM SURGICAL SPECIMEN: CPT | Mod: 26,,, | Performed by: SURGERY

## 2023-08-22 PROCEDURE — C1819 TISSUE LOCALIZATION-EXCISION: HCPCS | Performed by: SURGERY

## 2023-08-22 PROCEDURE — 25000003 PHARM REV CODE 250

## 2023-08-22 PROCEDURE — 36000706: Performed by: SURGERY

## 2023-08-22 PROCEDURE — 36000707: Performed by: SURGERY

## 2023-08-22 RX ORDER — PROCHLORPERAZINE EDISYLATE 5 MG/ML
5 INJECTION INTRAMUSCULAR; INTRAVENOUS EVERY 30 MIN PRN
Status: DISCONTINUED | OUTPATIENT
Start: 2023-08-22 | End: 2023-08-22 | Stop reason: HOSPADM

## 2023-08-22 RX ORDER — OXYCODONE HYDROCHLORIDE 5 MG/1
5 TABLET ORAL
Status: DISCONTINUED | OUTPATIENT
Start: 2023-08-22 | End: 2023-08-22 | Stop reason: HOSPADM

## 2023-08-22 RX ORDER — PROPOFOL 10 MG/ML
VIAL (ML) INTRAVENOUS
Status: DISCONTINUED | OUTPATIENT
Start: 2023-08-22 | End: 2023-08-22

## 2023-08-22 RX ORDER — SODIUM CHLORIDE 0.9 % (FLUSH) 0.9 %
3 SYRINGE (ML) INJECTION
Status: DISCONTINUED | OUTPATIENT
Start: 2023-08-22 | End: 2023-08-22 | Stop reason: HOSPADM

## 2023-08-22 RX ORDER — ONDANSETRON 8 MG/1
8 TABLET, ORALLY DISINTEGRATING ORAL EVERY 8 HOURS PRN
Qty: 10 TABLET | Refills: 0 | Status: SHIPPED | OUTPATIENT
Start: 2023-08-22

## 2023-08-22 RX ORDER — DIPHENHYDRAMINE HYDROCHLORIDE 50 MG/ML
25 INJECTION INTRAMUSCULAR; INTRAVENOUS EVERY 6 HOURS PRN
Status: DISCONTINUED | OUTPATIENT
Start: 2023-08-22 | End: 2023-08-22 | Stop reason: HOSPADM

## 2023-08-22 RX ORDER — ONDANSETRON 2 MG/ML
4 INJECTION INTRAMUSCULAR; INTRAVENOUS DAILY PRN
Status: DISCONTINUED | OUTPATIENT
Start: 2023-08-22 | End: 2023-08-22 | Stop reason: HOSPADM

## 2023-08-22 RX ORDER — ROCURONIUM BROMIDE 10 MG/ML
INJECTION, SOLUTION INTRAVENOUS
Status: DISCONTINUED | OUTPATIENT
Start: 2023-08-22 | End: 2023-08-22

## 2023-08-22 RX ORDER — HYDROMORPHONE HYDROCHLORIDE 2 MG/ML
0.2 INJECTION, SOLUTION INTRAMUSCULAR; INTRAVENOUS; SUBCUTANEOUS EVERY 5 MIN PRN
Status: DISCONTINUED | OUTPATIENT
Start: 2023-08-22 | End: 2023-08-22 | Stop reason: HOSPADM

## 2023-08-22 RX ORDER — ACETAMINOPHEN 10 MG/ML
INJECTION, SOLUTION INTRAVENOUS
Status: DISCONTINUED | OUTPATIENT
Start: 2023-08-22 | End: 2023-08-22

## 2023-08-22 RX ORDER — CEFAZOLIN SODIUM 2 G/50ML
2 SOLUTION INTRAVENOUS
Status: DISCONTINUED | OUTPATIENT
Start: 2023-08-22 | End: 2023-08-22 | Stop reason: HOSPADM

## 2023-08-22 RX ORDER — SODIUM CHLORIDE, SODIUM LACTATE, POTASSIUM CHLORIDE, CALCIUM CHLORIDE 600; 310; 30; 20 MG/100ML; MG/100ML; MG/100ML; MG/100ML
INJECTION, SOLUTION INTRAVENOUS CONTINUOUS PRN
Status: DISCONTINUED | OUTPATIENT
Start: 2023-08-22 | End: 2023-08-22

## 2023-08-22 RX ORDER — ACETAMINOPHEN AND CODEINE PHOSPHATE 300; 30 MG/1; MG/1
1 TABLET ORAL EVERY 6 HOURS PRN
Qty: 15 TABLET | Refills: 0 | Status: SHIPPED | OUTPATIENT
Start: 2023-08-22 | End: 2023-08-27

## 2023-08-22 RX ORDER — MEPERIDINE HYDROCHLORIDE 25 MG/ML
12.5 INJECTION INTRAMUSCULAR; INTRAVENOUS; SUBCUTANEOUS ONCE
Status: DISCONTINUED | OUTPATIENT
Start: 2023-08-22 | End: 2023-08-22 | Stop reason: HOSPADM

## 2023-08-22 RX ORDER — KETOROLAC TROMETHAMINE 30 MG/ML
15 INJECTION, SOLUTION INTRAMUSCULAR; INTRAVENOUS EVERY 8 HOURS PRN
Status: DISCONTINUED | OUTPATIENT
Start: 2023-08-22 | End: 2023-08-22 | Stop reason: HOSPADM

## 2023-08-22 RX ORDER — ALBUTEROL SULFATE 0.83 MG/ML
2.5 SOLUTION RESPIRATORY (INHALATION) EVERY 4 HOURS PRN
Status: DISCONTINUED | OUTPATIENT
Start: 2023-08-22 | End: 2023-08-22 | Stop reason: HOSPADM

## 2023-08-22 RX ORDER — BUPIVACAINE HYDROCHLORIDE 2.5 MG/ML
INJECTION, SOLUTION EPIDURAL; INFILTRATION; INTRACAUDAL
Status: DISCONTINUED | OUTPATIENT
Start: 2023-08-22 | End: 2023-08-22 | Stop reason: HOSPADM

## 2023-08-22 RX ORDER — ONDANSETRON 2 MG/ML
INJECTION INTRAMUSCULAR; INTRAVENOUS
Status: DISCONTINUED | OUTPATIENT
Start: 2023-08-22 | End: 2023-08-22

## 2023-08-22 RX ORDER — LIDOCAINE HYDROCHLORIDE 10 MG/ML
INJECTION, SOLUTION EPIDURAL; INFILTRATION; INTRACAUDAL; PERINEURAL
Status: DISCONTINUED | OUTPATIENT
Start: 2023-08-22 | End: 2023-08-22

## 2023-08-22 RX ADMIN — ONDANSETRON 4 MG: 2 INJECTION INTRAMUSCULAR; INTRAVENOUS at 01:08

## 2023-08-22 RX ADMIN — LIDOCAINE HYDROCHLORIDE 80 MG: 10 SOLUTION INTRAVENOUS at 12:08

## 2023-08-22 RX ADMIN — ROCURONIUM BROMIDE 30 MG: 10 SOLUTION INTRAVENOUS at 12:08

## 2023-08-22 RX ADMIN — ROCURONIUM BROMIDE 10 MG: 10 SOLUTION INTRAVENOUS at 12:08

## 2023-08-22 RX ADMIN — CEFAZOLIN SODIUM 2 G: 2 SOLUTION INTRAVENOUS at 12:08

## 2023-08-22 RX ADMIN — PROPOFOL 80 MG: 10 INJECTION, EMULSION INTRAVENOUS at 12:08

## 2023-08-22 RX ADMIN — SUGAMMADEX 200 MG: 100 INJECTION, SOLUTION INTRAVENOUS at 01:08

## 2023-08-22 RX ADMIN — SODIUM CHLORIDE, SODIUM LACTATE, POTASSIUM CHLORIDE, AND CALCIUM CHLORIDE: 600; 310; 30; 20 INJECTION, SOLUTION INTRAVENOUS at 12:08

## 2023-08-22 RX ADMIN — ACETAMINOPHEN 1000 MG: 10 INJECTION INTRAVENOUS at 01:08

## 2023-08-22 NOTE — ANESTHESIA POSTPROCEDURE EVALUATION
Anesthesia Post Evaluation    Patient: Kaleigh Nieves    Procedure(s) Performed: Procedure(s) (LRB):  MASTECTOMY, PARTIAL (Left)  LOCALIZATION, MASS, BREAST, WITH US GUIDANCE (Left)    Final Anesthesia Type: general      Patient location during evaluation: PACU  Patient participation: Yes- Able to Participate  Level of consciousness: awake  Post-procedure vital signs: reviewed and stable  Pain management: adequate  Airway patency: patent    PONV status at discharge: No PONV  Anesthetic complications: no      Cardiovascular status: hemodynamically stable  Respiratory status: unassisted  Hydration status: euvolemic  Follow-up not needed.          Vitals Value Taken Time   /70 08/22/23 1431   Temp 36.1 °C (97 °F) 08/22/23 1345   Pulse 64 08/22/23 1443   Resp 15 08/22/23 1443   SpO2 96 % 08/22/23 1443   Vitals shown include unvalidated device data.      Event Time   Out of Recovery 14:46:00         Pain/Oskar Score: Oskar Score: 10 (8/22/2023  2:45 PM)

## 2023-08-22 NOTE — PATIENT INSTRUCTIONS
POSTOPERATIVE INSTRUCTIONS FOLLOWING BIOPSY OR LUMPECTOMY     The following are post-operative instructions that will help you to recover from your surgery.  Please read over these instructions carefully and contact us if we can answer any of your questions or concerns.     Dressing/breast binder (surgi-bra)  A surgical bra may be placed around your chest after your surgery.  If you are given the bra, please wear it as close to 24 hours a day as possible until your post-operative clinic appointment.  If the elastic around the bra irritates your skin, you may wear a soft t-shirt underneath the bra.  You may go without wearing the bra long enough to bath, to launder and dry the bra.  If the bra is extremely uncomfortable, you may wear a supportive sports bra instead after 2 days.  You may shower after 2 days.  Do not take a tub bath and do not soak the surgical site. lease do not remove the surgical glue that covers your incision.  This will peel off in 2-3 weeks naturally.      Activity   You should be able to return to your regular activities 2 days after your surgery.  However, do not engage in strenuous activities in which you use your upper body such as:  golf, tennis, aerobics, washing windows, raking the yard, mopping, vacuuming, heavy lifting (e.g children) until you are seen for your follow-up appointment in clinic.     Medication for pain  You may find that over the counter pain medications may be sufficient for your pain.  You will be given a prescription for pain medication for more severe pain.  You should not drive or operate machinery while taking these.  Please take narcotics with food.  Narcotics can cause, or worse, constipation.  You will need to increase your fluid intake, eat high fiber foods (such as fruits and bran) and make sure that you are up and walking. You may need to take an over the counter stool softener for constipation.     After surgery at home  Ultram will be prescribed to take  every 6 hr as needed for breakthrough pain  2.  Wear compressive bra at all times for 2 weeks after surgery.    3.  May apply ice on the breast to help with decreasing pain  4.  Keep wound dry 48 hours after the operation. After this time, you are able to shower. No soaking in baths for 1 week. After this time, you are free to shower or bathe.   5.  No driving if you are taking prescribed Ultram. You can get a DUI on these medications.   6.  Avoid touching the wound or surrounding area as much as possible until your postoperative visit.             Please report the following:  Temperature greater than 101 degrees  Discharge or bad odor from the wound  Excessive bleeding, such as bloody dressing or extreme bruising  Redness at incision and/or drain sites  Swelling or buildup of fluid around incision    Additional information  I will see you approximately 2 weeks following your surgery.  If this follow-up appointment has not been made, please call the office.     If you have any questions or problems, please call my office or my nurse.     Dr. Susan Pulliam     [phone]

## 2023-08-22 NOTE — DISCHARGE SUMMARY
[facility] - Surgery (Hospital)  Discharge Note  Short Stay    Procedure(s) (LRB):  MASTECTOMY, PARTIAL (Left)  NEEDLE LOCALIZATION (Left)      OUTCOME: Patient tolerated treatment/procedure well without complication and is now ready for discharge.    DISPOSITION: Home or Self Care    FINAL DIAGNOSIS:  Malignant neoplasm of upper-inner quadrant of left breast in female, estrogen receptor positive    FOLLOWUP: In clinic    DISCHARGE INSTRUCTIONS:    Discharge Procedure Orders   Diet diabetic     Notify your health care provider if you experience any of the following:  temperature >100.4     Notify your health care provider if you experience any of the following:  persistent nausea and vomiting or diarrhea     Notify your health care provider if you experience any of the following:  severe uncontrolled pain     Notify your health care provider if you experience any of the following:  redness, tenderness, or signs of infection (pain, swelling, redness, odor or green/yellow discharge around incision site)     Remove dressing in 48 hours     Activity as tolerated         Clinical Reference Documents Added to Patient Instructions         Document    PARTIAL MASTECTOMY DISCHARGE INSTRUCTIONS (ENGLISH)            TIME SPENT ON DISCHARGE: 15 minutes

## 2023-08-22 NOTE — OP NOTE
Operative Report    08/22/2023    Preoperative Diagnosis:   Left Breast Cancer, upper inner quadrant quadrant    Postoperative Diagnosis:  Same    Procedures Performed:  Left Lumpectomy Using Intra-operatively Placed Radiographic Marker (Wire)  Interpretation of Specimen Mammogram     Surgeon: Susan Pulliam MD    Anesthesia: General    Drains: None    Estimated Blood Loss: Minimal    Complications: None apparent    Disposition: PACU in good condition    Specimens:  Left Breast, Short Suture Superior, Long Suture Lateral    Intra-operative Ultrasound Procedure and Findings:   Focused sonography of the upper inner quadrant of the left breast was performed, identifying the mass and the associated biopsy clip.  The extent of the mass was marked on the skin, in order to guide the extent of dissection.      Statement of Medical Necessity:  This patient is a 82 year old woman who was recently diagnosed with left breast cancer.  After undergoing a thorough preoperative evaluation and considering all of her options, she has elected for breast conservation.  The risks, benefits and alternatives to surgery have been discussed and she has provided informed consent.     Description of Operative Procedures and Findings:  The patient was identified and transported to the operating room and placed on the operating table.  All pressure points were padded.  General Anesthesia was administered.  After the skin was cleansed with an alcohol prep, blue dye was injected beneath the nipple-areolar complex and the breast was massaged for 5 minutes.  The patient's left breast and axilla was prepped and draped in the usual sterile fashion.  A time out was performed.    Attention was then turned to the left breast. The mass was visualized by ultrasound and a wire was placed through the mass for localization. A rhomboid incision was made using the 15 blade scalpel overlying the mass.   Using a combination of the wire and skin markings made  under ultrasound guidance, the target was circumferentially dissected to include both overlying skin and posterior pectoralis fascia.  Once the mass was confirmed to be within the mobilized tissue, the posterior margin was transected, allowing for removal of the specimen.  The specimen was oriented using a short suture superiorly and a long suture laterally.  A specimen mammogram confirmed inclusion of the malignancy, wire and the biopsy clip.  I interpreted this image myself.      The wounds were irrigated, suctioned dry and hemostasis was obtained. Local anesthetic was injected about the wounds.  The lumpectomy cavity was marked using clips.  The breast tissue was re-approximated using interrupted 3-0 vicryl sutures.  The clavipectoral fascia was closed using interrupted 3-0 vicryl sutures.     The instrument, needle and sponge counts were reported to be correct.  The skin incision was closed in 3 layers using numerous 3-0 vicryl interrupted deep parenchymal sutures, 4-0 vicryl running deep dermal suture, and running 4-0 monocryl subcuticular suture.  The incisions were dressed with surgical glue dressings, fluffs and a surgical bra. The patient was awoken from anesthesia and transported to the PACU in good condition.  No complications were noted. I was present for and performed the entire operation.     Procedure-specific Information - Partial Mastectomy/Lumpectomy    Procedure Intent Excision of primary tumor with grossly negative margins   Tumor Localization Method Wire or needle   Skin, Pectoralis Fascia or Both Excised with Specimen Both skin and fascia excised   Confirmation of Removal of Target Lesion Confirmed by specimen imaging   Cavity Margins Re-excised No    Not applicable    Not applicable   Placement of Clips or Other Fiducial Markers to Aid Radiation Targeting Yes

## 2023-08-22 NOTE — INTERVAL H&P NOTE
The patient has been examined and the H&P has been reviewed:    I concur with the findings and no changes have occurred since H&P was written.    Surgery risks, benefits and alternative options discussed and understood by patient/family.          Active Hospital Problems    Diagnosis  POA    *Malignant neoplasm of upper-inner quadrant of left breast in female, estrogen receptor positive [C50.212, Z17.0]  Not Applicable      Resolved Hospital Problems   No resolved problems to display.

## 2023-08-22 NOTE — ANESTHESIA PREPROCEDURE EVALUATION
08/22/2023  Kaleigh Nieves is a 82 y.o., female.    Patient Active Problem List   Diagnosis    Type 2 diabetes mellitus with diabetic polyneuropathy, without long-term current use of insulin    Multiple joint pain    Esophageal reflux    Hyperlipidemia    Chronic gout    Lichen sclerosus    Carotid artery disease    Coronary artery disease involving native coronary artery without angina pectoris    Colon polyp    Hypertension associated with diabetes    Aortic atherosclerosis    Arterial insufficiency of lower extremity    Ganglion cyst of flexor tendon sheath of finger of right hand    Bilateral carpal tunnel syndrome    Hyperkalemia    Chronic bronchitis    Dependent edema    Diverticulosis    Primary open-angle glaucoma, bilateral, indeterminate stage    RIVAS (dyspnea on exertion)    Chronic diastolic heart failure    Low vitamin D level    Body mass index (BMI) 34.0-34.9, adult    Unspecified lump in the left breast, upper inner quadrant    Malignant neoplasm of upper-inner quadrant of left breast in female, estrogen receptor positive     Pre-op Assessment    I have reviewed the Patient Summary Reports.     I have reviewed the Nursing Notes. I have reviewed the NPO Status.   I have reviewed the Medications.     Review of Systems  Anesthesia Hx:  Denies Family Hx of Anesthesia complications.   Denies Personal Hx of Anesthesia complications.   Social:  Former Smoker    Hematology/Oncology:  Hematology Normal      Current/Recent Cancer. Breast left   EENT/Dental:EENT/Dental Normal   Cardiovascular:   Hypertension CAD   Angina CHF (Diastolic) Orthopnea PVD RIVAS ECG has been reviewed. Patient cleared by cardiologist  Bilateral non-hemodynamically significant carotid stenosis  Well compensated diastolic CHF with minimally elevated BNP  However patient still cannot lie flat without becoming  SOB   Pulmonary:   Asthma Shortness of breath    Renal/:  Renal/ Normal     Hepatic/GI:   GERD    Musculoskeletal:  Musculoskeletal Normal    Neurological:   Peripheral Neuropathy    Endocrine:   Diabetes    Dermatological:  Skin Normal    Psych:  Psychiatric Normal           Physical Exam  General: Alert and Oriented    Airway:  Mallampati: II   Mouth Opening: Normal  TM Distance: Normal  Tongue: Normal  Neck ROM: Normal ROM    Dental:  Edentulous    ECHO   The left ventricle is normal in size with concentric remodeling and normal systolic function.   The estimated ejection fraction is 60%.   Indeterminate left ventricular diastolic function.   Normal right ventricular size with normal right ventricular systolic function.   Normal central venous pressure (3 mmHg).   The estimated PA systolic pressure is 28 mmHg.         Anesthesia Plan  Type of Anesthesia, risks & benefits discussed:    Anesthesia Type: Gen ETT  Intra-op Monitoring Plan: Standard ASA Monitors  Induction:  IV  Informed Consent: Informed consent signed with the Patient and all parties understand the risks and agree with anesthesia plan.  All questions answered.   ASA Score: 3  Day of Surgery Review of History & Physical: I have interviewed and examined the patient. I have reviewed the patient's H&P dated:   Anesthesia Plan Notes: Patient took her Bisoprolol and Albuterol this morning    Ready For Surgery From Anesthesia Perspective.     .

## 2023-08-22 NOTE — TRANSFER OF CARE
"Anesthesia Transfer of Care Note    Patient: Kaleigh Nieves    Procedure(s) Performed: Procedure(s) (LRB):  MASTECTOMY, PARTIAL (Left)  NEEDLE LOCALIZATION (Left)    Patient location: PACU    Anesthesia Type: general    Transport from OR: Transported from OR on room air with adequate spontaneous ventilation    Post pain: adequate analgesia    Post assessment: no apparent anesthetic complications and tolerated procedure well    Post vital signs: stable    Level of consciousness: awake    Nausea/Vomiting: no nausea/vomiting    Complications: none    Transfer of care protocol was followed      Last vitals:   Visit Vitals  BP (!) 187/78   Pulse 65   Temp 36.6 °C (97.9 °F) (Temporal)   Resp 16   Ht 5' 6" (1.676 m)   Wt 88.3 kg (194 lb 10.7 oz)   SpO2 96%   Breastfeeding No   BMI 31.42 kg/m²     "

## 2023-08-22 NOTE — ANESTHESIA PROCEDURE NOTES
Intubation    Date/Time: 8/22/2023 12:27 PM    Performed by: Santos Villanueva CRNA  Authorized by: Peyman Yost MD    Intubation:     Induction:  Intravenous    Intubated:  Postinduction    Mask Ventilation:  Easy mask    Attempts:  1    Attempted By:  CRNA    Method of Intubation:  Direct    Blade:  Joshua 3    Laryngeal View Grade: Grade I - full view of cords      Difficult Airway Encountered?: No      Complications:  None    Airway Device:  Oral endotracheal tube    Airway Device Size:  7.5    Style/Cuff Inflation:  Cuffed (inflated to minimal occlusive pressure)    Tube secured:  20    Secured at:  The lips    Placement Verified By:  Capnometry    Complicating Factors:  None    Findings Post-Intubation:  BS equal bilateral

## 2023-08-23 VITALS
BODY MASS INDEX: 31.29 KG/M2 | HEART RATE: 62 BPM | DIASTOLIC BLOOD PRESSURE: 70 MMHG | HEIGHT: 66 IN | WEIGHT: 194.69 LBS | TEMPERATURE: 97 F | SYSTOLIC BLOOD PRESSURE: 161 MMHG | OXYGEN SATURATION: 97 % | RESPIRATION RATE: 21 BRPM

## 2023-08-23 PROCEDURE — G0180 MD CERTIFICATION HHA PATIENT: HCPCS | Mod: ,,, | Performed by: SURGERY

## 2023-08-23 PROCEDURE — G0180 PR HOME HEALTH MD CERTIFICATION: ICD-10-PCS | Mod: ,,, | Performed by: SURGERY

## 2023-08-29 LAB
COMMENT: NORMAL
FINAL PATHOLOGIC DIAGNOSIS: NORMAL
GROSS: NORMAL
Lab: NORMAL

## 2023-08-30 ENCOUNTER — TELEPHONE (OUTPATIENT)
Dept: SURGERY | Facility: CLINIC | Age: 83
End: 2023-08-30
Payer: MEDICARE

## 2023-08-31 ENCOUNTER — DOCUMENTATION ONLY (OUTPATIENT)
Dept: HEMATOLOGY/ONCOLOGY | Facility: CLINIC | Age: 83
End: 2023-08-31
Payer: MEDICARE

## 2023-08-31 DIAGNOSIS — C50.212 MALIGNANT NEOPLASM OF UPPER-INNER QUADRANT OF LEFT BREAST IN FEMALE, ESTROGEN RECEPTOR POSITIVE: Primary | ICD-10-CM

## 2023-08-31 DIAGNOSIS — Z17.0 MALIGNANT NEOPLASM OF UPPER-INNER QUADRANT OF LEFT BREAST IN FEMALE, ESTROGEN RECEPTOR POSITIVE: Primary | ICD-10-CM

## 2023-08-31 NOTE — NURSING
1500pm: Oncotype Dx requested per Dr. Monroe. Path20 order placed and specimen checked in. [employer] to request tissue specimen on Accession#: [id number] Collection date: 8/22/2023 for Oncotype Dx. [employer] PA form required.   Oncology Navigation   Intake  Date of Diagnosis: 06/20/23  Cancer Type: Breast  Internal / External Referral: Internal  Date of Referral: 07/25/23  Initial Nurse Navigator Contact: 07/26/23  Referral to Initial Contact Timeline (days): 1  Date Worked: 08/31/23  First Appointment Available: 06/27/23  Appointment Date: 08/09/23  Schedule to Appointment Timeline (days): 14  First Available Date vs. Scheduled Date (days): 0  Multiple appointments: No  Reason if booked > 7 days after scheduling: Specific provider / access; Transportation coordination     Treatment  Current Status: Staging work-up  Pending: Other  Date Presented to Tumor Board: 06/29/23    Surgery: Planned  Surgical Oncologist: Susan Pulliam MD  Surgery Schedule Date: 08/22/23    Medical Oncologist: Dr. Jose Carlos Monroe  Consult Date: 08/09/23       Procedures: Genetic test  Biopsy Schedule Date: 06/20/23 (left breast)  Genetic Testing Date Sent: 08/09/23       ER: Positive  NJ: Positive  Her2: Negative       Support Systems: Family members  Barriers of Care: Transportation; Financial concerns  Transportation Barriers: Pt only able to make appts between 10am-12pm  Concerns: Pt concerned about transportation and financial burden of treatment- pt will be referred to social work.     Acuity  Stage: 1  Surgical Procedure Complexity: 1  Treatment Tolerability: Has not started treatment yet/treatment fully completed and side effects resolved  Comorbidities in Medical History: 2  Hospitalization Within the Past Month: 0   Needed: 0  Support: 1  Verbalizes Financial Concerns: 1  Transportation: 1  Psychological Factors (+1 each): Emotional during conversation  History of noncompliance/frequent no shows and cancellations:  0  Verbalizes the need for more education: 1  Other Factors (+1 for Each): 0  Navigation Acuity: 9     Follow Up  No follow-ups on file.

## 2023-09-05 ENCOUNTER — TELEPHONE (OUTPATIENT)
Dept: SURGERY | Facility: CLINIC | Age: 83
End: 2023-09-05

## 2023-09-05 ENCOUNTER — DOCUMENTATION ONLY (OUTPATIENT)
Dept: HEMATOLOGY/ONCOLOGY | Facility: CLINIC | Age: 83
End: 2023-09-05
Payer: MEDICARE

## 2023-09-05 NOTE — NURSING
0810am: Outgoing fax to [facility] with pt completed and signed [employer] PA form per request.   Oncology Navigation   Intake  Date of Diagnosis: 06/20/23  Cancer Type: Breast  Internal / External Referral: Internal  Date of Referral: 07/25/23  Initial Nurse Navigator Contact: 07/26/23  Referral to Initial Contact Timeline (days): 1  Date Worked: 09/05/23  First Appointment Available: 06/27/23  Appointment Date: 08/09/23  Schedule to Appointment Timeline (days): 14  First Available Date vs. Scheduled Date (days): 0  Multiple appointments: No  Reason if booked > 7 days after scheduling: Specific provider / access; Transportation coordination     Treatment  Current Status: Staging work-up  Pending: Other  Date Presented to Tumor Board: 06/29/23    Surgery: Planned  Surgical Oncologist: Susan Pulliam MD  Surgery Schedule Date: 08/22/23    Medical Oncologist: Dr. Jose Carlos Monroe  Consult Date: 08/09/23       Procedures: Genetic test  Biopsy Schedule Date: 06/20/23 (left breast)  Genetic Testing Date Sent: 08/09/23       ER: Positive  FL: Positive  Her2: Negative       Support Systems: Family members  Barriers of Care: Transportation; Financial concerns  Transportation Barriers: Pt only able to make appts between 10am-12pm  Concerns: Pt concerned about transportation and financial burden of treatment- pt will be referred to social work.     Acuity  Stage: 1  Surgical Procedure Complexity: 1  Treatment Tolerability: Has not started treatment yet/treatment fully completed and side effects resolved  Comorbidities in Medical History: 2  Hospitalization Within the Past Month: 0   Needed: 0  Support: 1  Verbalizes Financial Concerns: 1  Transportation: 1  Psychological Factors (+1 each): Emotional during conversation  History of noncompliance/frequent no shows and cancellations: 0  Verbalizes the need for more education: 1  Other Factors (+1 for Each): 0  Navigation Acuity: 9     Follow Up  No follow-ups on file.

## 2023-09-05 NOTE — TELEPHONE ENCOUNTER
Advised PT her appointment location has been moved to the [facility], 2nd Floor. Advised PT her appointment time/date has not changed just the location. Provided address and phone number for conerns/questions. PT verbally understood communication.

## 2023-09-06 ENCOUNTER — TELEPHONE (OUTPATIENT)
Dept: HEMATOLOGY/ONCOLOGY | Facility: CLINIC | Age: 83
End: 2023-09-06
Payer: MEDICARE

## 2023-09-06 NOTE — TELEPHONE ENCOUNTER
----- Message from Joyce Whalen sent at 9/6/2023 10:30 AM CDT -----  Contact: [place]  Patient is requesting a call to discuss other options for treatment locations. Please call her back at [phone].      Thanks  DD

## 2023-09-07 ENCOUNTER — DOCUMENTATION ONLY (OUTPATIENT)
Dept: HEMATOLOGY/ONCOLOGY | Facility: CLINIC | Age: 83
End: 2023-09-07
Payer: MEDICARE

## 2023-09-07 ENCOUNTER — OFFICE VISIT (OUTPATIENT)
Dept: SURGERY | Facility: CLINIC | Age: 83
End: 2023-09-07
Payer: MEDICARE

## 2023-09-07 DIAGNOSIS — C50.212 MALIGNANT NEOPLASM OF UPPER-INNER QUADRANT OF LEFT BREAST IN FEMALE, ESTROGEN RECEPTOR POSITIVE: Primary | ICD-10-CM

## 2023-09-07 DIAGNOSIS — Z17.0 MALIGNANT NEOPLASM OF UPPER-INNER QUADRANT OF LEFT BREAST IN FEMALE, ESTROGEN RECEPTOR POSITIVE: Primary | ICD-10-CM

## 2023-09-07 PROCEDURE — 99024 PR POST-OP FOLLOW-UP VISIT: ICD-10-PCS | Mod: S$GLB,,, | Performed by: SURGERY

## 2023-09-07 PROCEDURE — 99024 POSTOP FOLLOW-UP VISIT: CPT | Mod: S$GLB,,, | Performed by: SURGERY

## 2023-09-07 NOTE — NURSING
1440pm: Outgoing fax to Oncotype Dx PA dept regarding requested documents for Oncotype Dx. Requested medical records faxed.   Oncology Navigation   Intake  Date of Diagnosis: 06/20/23  Cancer Type: Breast  Internal / External Referral: Internal  Date of Referral: 07/25/23  Initial Nurse Navigator Contact: 07/26/23  Referral to Initial Contact Timeline (days): 1  Date Worked: 09/07/23  First Appointment Available: 06/27/23  Appointment Date: 08/09/23  Schedule to Appointment Timeline (days): 14  First Available Date vs. Scheduled Date (days): 0  Multiple appointments: No  Reason if booked > 7 days after scheduling: Specific provider / access; Transportation coordination     Treatment  Current Status: Staging work-up  Pending: Other  Date Presented to Tumor Board: 06/29/23    Surgery: Planned  Surgical Oncologist: Susan Pulliam MD  Surgery Schedule Date: 08/22/23    Medical Oncologist: Dr. Jose Carlos Monroe  Consult Date: 08/09/23       Procedures: Genetic test  Biopsy Schedule Date: 06/20/23 (left breast)  Genetic Testing Date Sent: 08/09/23       ER: Positive  LA: Positive  Her2: Negative       Support Systems: Family members  Barriers of Care: Transportation; Financial concerns  Transportation Barriers: Pt only able to make appts between 10am-12pm  Concerns: Pt concerned about transportation and financial burden of treatment- pt will be referred to social work.     Acuity  Stage: 1  Surgical Procedure Complexity: 1  Treatment Tolerability: Has not started treatment yet/treatment fully completed and side effects resolved  Comorbidities in Medical History: 2  Hospitalization Within the Past Month: 0   Needed: 0  Support: 1  Verbalizes Financial Concerns: 1  Transportation: 1  Psychological Factors (+1 each): Emotional during conversation  History of noncompliance/frequent no shows and cancellations: 0  Verbalizes the need for more education: 1  Other Factors (+1 for Each): 0  Navigation Acuity: 9     Follow  Up  No follow-ups on file.

## 2023-09-07 NOTE — PROGRESS NOTES
Breast Surgical Oncology  Post-operative Visit      REFERRING PHYSICIAN:  Lucio Morris MD    MEDICAL ONCOLOGIST:    Jose Carlos Monroe M.D.   RADIATION ONCOLOGIST:   DANII    Date of Service: 09/07/2023    DIAGNOSIS:   This is a 82 y.o. female with Stage IA (T2 Nx Mx) LEFT Breast Invasive Ductal  Carcinoma, Grade 1, ER >90%, NJ 1-11%, [id number] 1+ with a ki67 of 20%    TREATMENT SUMMARY:   The patient is status post left partial mastectomy on 8/22/23.  Final pathology showed 2.1 cm T2 tumor with negative margins. Again, sentinel lymph node omitted because patient will not proceed with chemotherapy and meets choosing wisely guidelines.    Lab Results   Component Value Date    FPATHDX  08/22/2023     1. 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 (ductal)  HISTOLOGIC GRADE:  Grade 1 of 3       Tubule formation:  3        Pleomorphism: 1        Mitotic rate:  1  TUMOR SIZE:  2.1 x 1.5 x 1.2 cm (measured grossly)  TUMOR FOCALITY:  Single focus of invasive carcinoma  DUCTAL CARCINOMA IN-SITU (DCIS):  Not identified  LOBULAR CARCINOMA IN-SITU (LCIS):  Not identified  TUMOR EXTENT:  Skin is present is NOT involved by tumor  LYMPHOVASCULAR INVASION:  Not identified  DERMAL LYMPHOVASCULAR INVASION:  Not identified  MICROCALCIFICATIONS:  Not identified  TREATMENT EFFECT:  No known pre-surgical therapy  MARGINS:  All margins are negative for invasive carcinoma.           Deep: 5 mm                 Medial:  8 mm           Lateral:  Greater than 10 mm           Anterior: Greater than 10 mm           Lateral:  Greater than 10 mm            Inferior:  Greater than 10 mm  REGIONAL LYMPH NODES:  Not applicable (no lymph nodes submitted or found)  BREAST BIOMARKERS (performed on patient's previous biopsy specimen [facility]-):            ER:   Positive (intermediate, greater than 90%)            AK:   Positive (strong, 1-11%)            Her2:  Negative (1+)            Ki67:  20%      PATHOLOGIC STAGE CLASSIFICATION:  pT2  pN not assigned (no lymph nodes submitted found)         INTERVAL HISTORY:   Kaleigh Nieves comes in for a post-op check.  She denies fever, chills, chest pain or shortness of breath.  Her pain is well controlled.      MEDICATIONS:     Current Outpatient Medications   Medication Sig Dispense Refill    albuterol (PROVENTIL/VENTOLIN HFA) 90 mcg/actuation inhaler INHALE 2 PUFFS INTO THE LUNGS EVERY SIX HOURS AS NEEDED FOR WHEEZING 18 g 11    allopurinoL (ZYLOPRIM) 100 MG tablet Take 1 tablet (100 mg total) by mouth once daily. 90 tablet 3    aspirin 81 MG Chew Take 81 mg by mouth once daily.      bisoprolol (ZEBETA) 5 MG tablet Take 1 tablet (5 mg total) by mouth once daily. 90 tablet 3    blood glucose control high,low Soln 1 each by Misc.(Non-Drug; Combo Route) route once a week. 1 each 11    blood sugar diagnostic Strp To check BG 3 times daily, to use with insurance preferred meter 200 each 0    blood-glucose meter kit To check BG 3 times daily, to use with insurance preferred meter 1 each 0    fluticasone-salmeterol diskus inhaler 250-50 mcg Inhale 1 puff into the lungs 2 (two) times daily. Controller 180 each 3    furosemide (LASIX) 40 MG tablet Take 1 tablet (40 mg total) by mouth once daily. 90 tablet 3    lancets Misc To check BG 3 times daily, to use with insurance preferred meter 200 each 3    latanoprost 0.005 % ophthalmic solution Place 1 drop into both eyes once daily. 7.5 mL 3    ondansetron (ZOFRAN-ODT) 8 MG TbDL Dissolve 1 tablet (8 mg total) by mouth every 8 (eight) hours as needed (Nausea). 10 tablet 0    simvastatin  (ZOCOR) 20 MG tablet Take 1 tablet (20 mg total) by mouth nightly. 90 tablet 3    timolol maleate 0.5% (TIMOPTIC) 0.5 % Drop Place 1 drop into both eyes 2 (two) times daily. 30 mL 4     No current facility-administered medications for this visit.       ALLERGIES:     Review of patient's allergies indicates:   Allergen Reactions    Iodine and iodide containing products Nausea And Vomiting    Penicillins Itching    Ultram [tramadol] Hives and Itching     Light headness, itiching    Sulfa (sulfonamide antibiotics) Hives and Itching    Adhesive Other (See Comments)    Amlodipine      edema    Sulfamethoxazole-trimethoprim Itching, Rash and Nausea And Vomiting       PHYSICAL EXAMINATION:   General:  This is a well appearing female with appropriate speech, affect and gait.     Breast:  left UIQ incision clean, dry, and intact    ASSESSMENT:   The patient has had an uneventful postoperative course.    PLAN:   1. Return in 3 months for a follow up office visit and breast exam  2. Surveillance imaging timing to be determined at the next visit  3. The patient is advised in continued exam of the breast chest wall and to report to this office sooner should she note any areas of abnormality or concern.   4.  She has been instructed to meet with med onc and rad onc for discussion of adjuvant treatment recommendations    Susan Pulliam M.D.

## 2023-09-11 ENCOUNTER — DOCUMENTATION ONLY (OUTPATIENT)
Dept: HEMATOLOGY/ONCOLOGY | Facility: CLINIC | Age: 83
End: 2023-09-11
Payer: MEDICARE

## 2023-09-11 ENCOUNTER — OFFICE VISIT (OUTPATIENT)
Dept: HEMATOLOGY/ONCOLOGY | Facility: CLINIC | Age: 83
End: 2023-09-11
Payer: MEDICARE

## 2023-09-11 VITALS
HEART RATE: 63 BPM | RESPIRATION RATE: 18 BRPM | SYSTOLIC BLOOD PRESSURE: 120 MMHG | WEIGHT: 194.69 LBS | HEIGHT: 66 IN | OXYGEN SATURATION: 95 % | TEMPERATURE: 99 F | DIASTOLIC BLOOD PRESSURE: 57 MMHG | BODY MASS INDEX: 31.29 KG/M2

## 2023-09-11 DIAGNOSIS — M81.0 AGE-RELATED OSTEOPOROSIS WITHOUT CURRENT PATHOLOGICAL FRACTURE: ICD-10-CM

## 2023-09-11 DIAGNOSIS — Z17.0 MALIGNANT NEOPLASM OF UPPER-INNER QUADRANT OF LEFT BREAST IN FEMALE, ESTROGEN RECEPTOR POSITIVE: Primary | ICD-10-CM

## 2023-09-11 DIAGNOSIS — T45.1X5A IMMUNODEFICIENCY DUE TO CHEMOTHERAPY: ICD-10-CM

## 2023-09-11 DIAGNOSIS — I70.0 AORTIC ATHEROSCLEROSIS: ICD-10-CM

## 2023-09-11 DIAGNOSIS — D84.821 IMMUNODEFICIENCY DUE TO CHEMOTHERAPY: ICD-10-CM

## 2023-09-11 DIAGNOSIS — Z79.899 IMMUNODEFICIENCY DUE TO CHEMOTHERAPY: ICD-10-CM

## 2023-09-11 DIAGNOSIS — I50.32 CHRONIC DIASTOLIC HEART FAILURE: ICD-10-CM

## 2023-09-11 DIAGNOSIS — C50.212 MALIGNANT NEOPLASM OF UPPER-INNER QUADRANT OF LEFT BREAST IN FEMALE, ESTROGEN RECEPTOR POSITIVE: Primary | ICD-10-CM

## 2023-09-11 PROBLEM — Z79.69 IMMUNODEFICIENCY DUE TO CHEMOTHERAPY: Status: ACTIVE | Noted: 2023-09-11

## 2023-09-11 PROCEDURE — 3074F SYST BP LT 130 MM HG: CPT | Mod: CPTII,S$GLB,, | Performed by: INTERNAL MEDICINE

## 2023-09-11 PROCEDURE — 3078F DIAST BP <80 MM HG: CPT | Mod: CPTII,S$GLB,, | Performed by: INTERNAL MEDICINE

## 2023-09-11 PROCEDURE — 99999 PR PBB SHADOW E&M-EST. PATIENT-LVL V: ICD-10-PCS | Mod: PBBFAC,,, | Performed by: INTERNAL MEDICINE

## 2023-09-11 PROCEDURE — 3288F FALL RISK ASSESSMENT DOCD: CPT | Mod: CPTII,S$GLB,, | Performed by: INTERNAL MEDICINE

## 2023-09-11 PROCEDURE — 1101F PR PT FALLS ASSESS DOC 0-1 FALLS W/OUT INJ PAST YR: ICD-10-PCS | Mod: CPTII,S$GLB,, | Performed by: INTERNAL MEDICINE

## 2023-09-11 PROCEDURE — 1159F PR MEDICATION LIST DOCUMENTED IN MEDICAL RECORD: ICD-10-PCS | Mod: CPTII,S$GLB,, | Performed by: INTERNAL MEDICINE

## 2023-09-11 PROCEDURE — 99999 PR PBB SHADOW E&M-EST. PATIENT-LVL V: CPT | Mod: PBBFAC,,, | Performed by: INTERNAL MEDICINE

## 2023-09-11 PROCEDURE — 99214 PR OFFICE/OUTPT VISIT, EST, LEVL IV, 30-39 MIN: ICD-10-PCS | Mod: S$GLB,,, | Performed by: INTERNAL MEDICINE

## 2023-09-11 PROCEDURE — 1159F MED LIST DOCD IN RCRD: CPT | Mod: CPTII,S$GLB,, | Performed by: INTERNAL MEDICINE

## 2023-09-11 PROCEDURE — 3288F PR FALLS RISK ASSESSMENT DOCUMENTED: ICD-10-PCS | Mod: CPTII,S$GLB,, | Performed by: INTERNAL MEDICINE

## 2023-09-11 PROCEDURE — 1160F RVW MEDS BY RX/DR IN RCRD: CPT | Mod: CPTII,S$GLB,, | Performed by: INTERNAL MEDICINE

## 2023-09-11 PROCEDURE — 1126F PR PAIN SEVERITY QUANTIFIED, NO PAIN PRESENT: ICD-10-PCS | Mod: CPTII,S$GLB,, | Performed by: INTERNAL MEDICINE

## 2023-09-11 PROCEDURE — 1160F PR REVIEW ALL MEDS BY PRESCRIBER/CLIN PHARMACIST DOCUMENTED: ICD-10-PCS | Mod: CPTII,S$GLB,, | Performed by: INTERNAL MEDICINE

## 2023-09-11 PROCEDURE — 99214 OFFICE O/P EST MOD 30 MIN: CPT | Mod: S$GLB,,, | Performed by: INTERNAL MEDICINE

## 2023-09-11 PROCEDURE — 3074F PR MOST RECENT SYSTOLIC BLOOD PRESSURE < 130 MM HG: ICD-10-PCS | Mod: CPTII,S$GLB,, | Performed by: INTERNAL MEDICINE

## 2023-09-11 PROCEDURE — 1126F AMNT PAIN NOTED NONE PRSNT: CPT | Mod: CPTII,S$GLB,, | Performed by: INTERNAL MEDICINE

## 2023-09-11 PROCEDURE — 1101F PT FALLS ASSESS-DOCD LE1/YR: CPT | Mod: CPTII,S$GLB,, | Performed by: INTERNAL MEDICINE

## 2023-09-11 PROCEDURE — 3078F PR MOST RECENT DIASTOLIC BLOOD PRESSURE < 80 MM HG: ICD-10-PCS | Mod: CPTII,S$GLB,, | Performed by: INTERNAL MEDICINE

## 2023-09-11 RX ORDER — ANASTROZOLE 1 MG/1
1 TABLET ORAL DAILY
Qty: 90 TABLET | Refills: 3 | Status: SHIPPED | OUTPATIENT
Start: 2023-09-11 | End: 2024-09-10

## 2023-09-11 RX ORDER — TIMOLOL 5 MG/ML
SOLUTION/ DROPS OPHTHALMIC
COMMUNITY
Start: 2023-08-30

## 2023-09-11 NOTE — PROGRESS NOTES
Subjective:       Patient ID: Kaleigh Nieves is a 82 y.o. female.    Chief Complaint: Results, Breast Cancer, and Chemotherapy    HPI:  82-year-old female history of T2 N0 Oncotype recurrence score 26 breast cancer patient returns for review in clinical follow-up ECOG status 2    Past Medical History:   Diagnosis Date    Anemia     Angina pectoris     Arthritis     Asthma     Back pain     Bronchitis     CAD (coronary artery disease) 07/2015    via [facility] ct    Carotid artery stenosis     Chronic bronchitis     Chronic gout     Colon polyp     CTS (carpal tunnel syndrome)     Diabetes mellitus, type 2     Diabetes with neurologic complications     Diverticulosis     RIVAS (dyspnea on exertion)     chronic; eval pulm dr natarajan    Dyslipidemia     Ex-smoker     quit in her 30s    GERD (gastroesophageal reflux disease)     Heart failure     Hyperlipidemia     Hypertension     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    Neuropathy, lower extremity     Obesity     Open-angle glaucoma     dr avel kaur    Peripheral vascular disease     Polyneuropathy     Tobacco dependence      Family History   Problem Relation Age of Onset    Hypertension Mother     Diabetes Mother     Leukemia Sister     Hypertension Sister     Cancer Sister         leukemia    Heart disease Maternal Aunt     Cancer Maternal Uncle         gastric, pacreatic    Heart disease Maternal Uncle     Miscarriages / Stillbirths Maternal Uncle     Diabetes Maternal Grandmother     Asthma Maternal Grandfather     Breast cancer Neg Hx     Colon cancer Neg Hx     Ovarian cancer Neg Hx     COPD Neg Hx     Hyperlipidemia Neg Hx     Kidney disease Neg Hx      Social History     Socioeconomic History    Marital status: Single    Number of children: 0   Tobacco Use    Smoking status: Former     Current packs/day: 0.00     Average packs/day: 0.3 packs/day for 24.0 years (6.0 ttl pk-yrs)     Types: Cigarettes     Start date: 6/25/1952     Quit date: 6/25/1976     Years  since quittin.2    Smokeless tobacco: Never   Substance and Sexual Activity    Alcohol use: No    Drug use: No    Sexual activity: Not Currently     Social Determinants of Health     Financial Resource Strain: Low Risk  (2022)    Overall Financial Resource Strain (CARDIA)     Difficulty of Paying Living Expenses: Not hard at all   Food Insecurity: No Food Insecurity (2022)    Hunger Vital Sign     Worried About Running Out of Food in the Last Year: Never true     Ran Out of Food in the Last Year: Never true   Transportation Needs: No Transportation Needs (2022)    PRAPARE - Transportation     Lack of Transportation (Medical): No     Lack of Transportation (Non-Medical): No   Physical Activity: Inactive (2022)    Exercise Vital Sign     Days of Exercise per Week: 0 days     Minutes of Exercise per Session: 0 min   Stress: No Stress Concern Present (2022)    [employer] - Occupational Stress Questionnaire     Feeling of Stress : Not at all   Social Connections: Socially Isolated (2022)    Social Connection and Isolation Panel [NHANES]     Frequency of Communication with Friends and Family: Once a week     Frequency of Social Gatherings with Friends and Family: Once a week     Attends Rastafari Services: More than 4 times per year     Active Member of Clubs or Organizations: No     Attends Club or Organization Meetings: Never     Marital Status:    Housing Stability: Low Risk  (2022)    Housing Stability Vital Sign     Unable to Pay for Housing in the Last Year: No     Number of Places Lived in the Last Year: 1     Unstable Housing in the Last Year: No     Past Surgical History:   Procedure Laterality Date    APPENDECTOMY      CARPAL TUNNEL RELEASE Right     CATARACT EXTRACTION, BILATERAL      CHOLECYSTECTOMY      CYST REMOVAL  2018    DILATION AND CURETTAGE OF UTERUS      EYE SURGERY      HEMORRHOID SURGERY      HYSTERECTOMY      fibroids     "INCISION AND DRAINAGE PERIRECTAL ABSCESS      JOINT REPLACEMENT Bilateral     knee    KNEE ARTHROSCOPY Right     LOCALIZATION OF MASS OF BREAST WITH ULTRASOUND GUIDANCE Left 8/22/2023    Procedure: LOCALIZATION, MASS, BREAST, WITH US GUIDANCE;  Surgeon: Susan Pulliam MD;  Location: [facility] OR;  Service: General;  Laterality: Left;  Intra-operatively Placed Radiographic Marker (Wire)    MASTECTOMY, PARTIAL Left 8/22/2023    Procedure: MASTECTOMY, PARTIAL;  Surgeon: Susan Pulliam MD;  Location: [facility] OR;  Service: General;  Laterality: Left;  with Interpretation of Specimen Mammogram    MYOMECTOMY         Labs:  Lab Results   Component Value Date    WBC 4.26 08/03/2023    HGB 14.7 08/03/2023    HCT 46.8 08/03/2023    MCV 92 08/03/2023     08/03/2023     BMP  Lab Results   Component Value Date     (L) 08/03/2023    K 4.7 08/03/2023    CL 96 08/03/2023    CO2 28 08/03/2023    BUN 8 08/03/2023    CREATININE 0.6 08/03/2023    CALCIUM 8.8 08/03/2023    ANIONGAP 9 08/03/2023    ESTGFRAFRICA >60.0 03/21/2022    EGFRNONAA >60.0 03/21/2022     Lab Results   Component Value Date    ALT 17 08/03/2023    AST 21 08/03/2023    ALKPHOS 32 (L) 08/03/2023    BILITOT 0.5 08/03/2023       No results found for: "IRON", "TIBC", "FERRITIN", "SATURATEDIRO"  No results found for: "IAIUCSPN94"  No results found for: "FOLATE"  Lab Results   Component Value Date    TSH 1.091 06/01/2022         Review of Systems   Constitutional:  Negative for activity change, appetite change, chills, diaphoresis, fatigue, fever and unexpected weight change.   HENT:  Negative for congestion, dental problem, drooling, ear discharge, ear pain, facial swelling, hearing loss, mouth sores, nosebleeds, postnasal drip, rhinorrhea, sinus pressure, sneezing, sore throat, tinnitus, trouble swallowing and voice change.    Eyes:  Negative for photophobia, pain, discharge, redness, itching and visual disturbance.   Respiratory:  Negative for cough, " choking, chest tightness, shortness of breath, wheezing and stridor.    Cardiovascular:  Negative for chest pain, palpitations and leg swelling.   Gastrointestinal:  Negative for abdominal distention, abdominal pain, anal bleeding, blood in stool, constipation, diarrhea, nausea, rectal pain and vomiting.   Endocrine: Negative for cold intolerance, heat intolerance, polydipsia, polyphagia and polyuria.   Genitourinary:  Negative for decreased urine volume, difficulty urinating, dyspareunia, dysuria, enuresis, flank pain, frequency, genital sores, hematuria, menstrual problem, pelvic pain, urgency, vaginal bleeding, vaginal discharge and vaginal pain.   Musculoskeletal:  Positive for arthralgias, gait problem and myalgias. Negative for back pain, joint swelling, neck pain and neck stiffness.   Skin:  Negative for color change, pallor and rash.   Allergic/Immunologic: Negative for environmental allergies, food allergies and immunocompromised state.   Neurological:  Negative for dizziness, tremors, seizures, syncope, facial asymmetry, speech difficulty, weakness, light-headedness, numbness and headaches.   Hematological:  Negative for adenopathy. Does not bruise/bleed easily.   Psychiatric/Behavioral:  Positive for dysphoric mood. Negative for agitation, behavioral problems, confusion, decreased concentration, hallucinations, self-injury, sleep disturbance and suicidal ideas. The patient is nervous/anxious. The patient is not hyperactive.        Objective:      Physical Exam  Vitals reviewed.   Constitutional:       General: She is not in acute distress.     Appearance: She is well-developed. She is not diaphoretic.   HENT:      Head: Normocephalic and atraumatic.      Right Ear: External ear normal.      Left Ear: External ear normal.      Nose: Nose normal.      Right Sinus: No maxillary sinus tenderness or frontal sinus tenderness.      Left Sinus: No maxillary sinus tenderness or frontal sinus tenderness.       Mouth/Throat:      Pharynx: No oropharyngeal exudate.   Eyes:      General: Lids are normal. No scleral icterus.        Right eye: No discharge.         Left eye: No discharge.      Conjunctiva/sclera: Conjunctivae normal.      Right eye: Right conjunctiva is not injected. No hemorrhage.     Left eye: Left conjunctiva is not injected. No hemorrhage.     Pupils: Pupils are equal, round, and reactive to light.   Neck:      Thyroid: No thyromegaly.      Vascular: No JVD.      Trachea: No tracheal deviation.   Cardiovascular:      Rate and Rhythm: Normal rate.   Pulmonary:      Effort: Pulmonary effort is normal. No respiratory distress.      Breath sounds: No stridor.   Chest:      Chest wall: No tenderness.   Abdominal:      General: Bowel sounds are normal. There is no distension.      Palpations: Abdomen is soft. There is no hepatomegaly, splenomegaly or mass.      Tenderness: There is no abdominal tenderness. There is no rebound.   Musculoskeletal:         General: No tenderness. Normal range of motion.      Cervical back: Normal range of motion and neck supple.   Lymphadenopathy:      Cervical: No cervical adenopathy.      Upper Body:      Right upper body: No supraclavicular adenopathy.      Left upper body: No supraclavicular adenopathy.   Skin:     General: Skin is dry.      Findings: No erythema or rash.   Neurological:      Mental Status: She is alert and oriented to person, place, and time.      Cranial Nerves: No cranial nerve deficit.      Motor: Weakness present.      Coordination: Coordination abnormal.      Gait: Gait abnormal.   Psychiatric:         Behavior: Behavior normal.         Thought Content: Thought content normal.         Judgment: Judgment normal.             Assessment:      1. Malignant neoplasm of upper-inner quadrant of left breast in female, estrogen receptor positive    2. Age-related osteoporosis without current pathological fracture    3. Immunodeficiency due to chemotherapy    4.  Aortic atherosclerosis    5. Chronic diastolic heart failure           Med Onc Chart Routing      Follow up with physician    Follow up with CHRISTIAN . Return to clinical follow-up with [facility] in approximately 2 months with bone density prior   Infusion scheduling note    Injection scheduling note    Labs    Imaging    Pharmacy appointment    Other referrals               Plan:     Reviewed information with her Oncotype recurrence score of 26.  Would consider systemic adjuvant chemotherapy but patient with ECOG status 2 in discussed a risk benefit ratio has decided not to proceed with this and will start on Arimidex 1 mg daily after completion of radiation therapy.  Will see back in approximately 2 months can be seen by [facility] with bone density prior for follow-up.  Increased recurrence because of Oncotype recurrence score but do not feel systemic chemotherapy at this point is best interest this patient discussed implications and answered questions with her prescription sent to her pharmacy and told to start Arimidex 1 mg dailyAFTER radiation is completed        Jose Carlos Monroe Jr, MD FACP

## 2023-09-11 NOTE — NURSING
0925am: Oncotype Dx Breast Genomic test results received. Report scanned into media and updated in Oncology hx. Dr. Monroe and Dr. Pulliam notified via in-basket msg.   Oncology Navigation   Intake  Date of Diagnosis: 06/20/23  Cancer Type: Breast  Internal / External Referral: Internal  Date of Referral: 07/25/23  Initial Nurse Navigator Contact: 07/26/23  Referral to Initial Contact Timeline (days): 1  Date Worked: 09/11/23  First Appointment Available: 06/27/23  Appointment Date: 08/09/23  Schedule to Appointment Timeline (days): 14  First Available Date vs. Scheduled Date (days): 0  Multiple appointments: No  Reason if booked > 7 days after scheduling: Specific provider / access; Transportation coordination     Treatment  Current Status: Staging work-up  Pending: Other  Date Presented to Tumor Board: 06/29/23    Surgery: Planned  Surgical Oncologist: Susan Pulliam MD  Surgery Schedule Date: 08/22/23    Medical Oncologist: Dr. Jose Carlos Monroe  Consult Date: 08/09/23       Procedures: Genomic testing  Biopsy Schedule Date: 06/20/23 (left breast)  Genetic Testing Date Sent: 08/09/23  Genomic Testing Date Sent: 08/31/23       ER: Positive  PA: Positive  Her2: Negative       Support Systems: Family members  Barriers of Care: Transportation; Financial concerns  Transportation Barriers: Pt only able to make appts between 10am-12pm  Concerns: Pt concerned about transportation and financial burden of treatment- pt will be referred to social work.     Acuity  Stage: 1  Surgical Procedure Complexity: 1  Treatment Tolerability: Has not started treatment yet/treatment fully completed and side effects resolved  Comorbidities in Medical History: 2  Hospitalization Within the Past Month: 0   Needed: 0  Support: 1  Verbalizes Financial Concerns: 1  Transportation: 1  Psychological Factors (+1 each): Emotional during conversation  History of noncompliance/frequent no shows and cancellations: 0  Verbalizes the need for  more education: 1  Other Factors (+1 for Each): 0  Navigation Acuity: 9     Follow Up  No follow-ups on file.

## 2023-09-25 ENCOUNTER — OFFICE VISIT (OUTPATIENT)
Dept: PODIATRY | Facility: CLINIC | Age: 83
End: 2023-09-25
Payer: MEDICARE

## 2023-09-25 VITALS — BODY MASS INDEX: 31.18 KG/M2 | WEIGHT: 194 LBS | HEIGHT: 66 IN

## 2023-09-25 DIAGNOSIS — B35.1 DERMATOPHYTOSIS OF NAIL: ICD-10-CM

## 2023-09-25 DIAGNOSIS — E11.51 TYPE II DIABETES MELLITUS WITH PERIPHERAL CIRCULATORY DISORDER: Primary | ICD-10-CM

## 2023-09-25 PROCEDURE — 11721 DEBRIDE NAIL 6 OR MORE: CPT | Mod: Q9,S$GLB,, | Performed by: PODIATRIST

## 2023-09-25 PROCEDURE — 11721 PR DEBRIDEMENT OF NAILS, 6 OR MORE: ICD-10-PCS | Mod: Q9,S$GLB,, | Performed by: PODIATRIST

## 2023-09-25 PROCEDURE — 99499 NO LOS: ICD-10-PCS | Mod: S$GLB,,, | Performed by: PODIATRIST

## 2023-09-25 PROCEDURE — 99999 PR PBB SHADOW E&M-EST. PATIENT-LVL III: ICD-10-PCS | Mod: PBBFAC,,, | Performed by: PODIATRIST

## 2023-09-25 PROCEDURE — 99499 UNLISTED E&M SERVICE: CPT | Mod: S$GLB,,, | Performed by: PODIATRIST

## 2023-09-25 PROCEDURE — 99999 PR PBB SHADOW E&M-EST. PATIENT-LVL III: CPT | Mod: PBBFAC,,, | Performed by: PODIATRIST

## 2023-09-25 NOTE — PROGRESS NOTES
Subjective:     Patient ID: Kaleigh Nieves is a 83 y.o. female.    Chief Complaint: Nail Care (Nail care, (No flashing pain, rates pain 7/10. (Diabetic Pt, last seen on 0/31/23 with  PCP Dr. Morris))    Kaleigh is a 83 y.o. female who presents to the clinic for evaluation and treatment of high risk feet. 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 The patient's chief complaint is long, thick toenails. This patient has documented high risk feet requiring routine maintenance secondary to diabetes mellitis and those secondary complications of diabetes, as mentioned.     PCP: Lucio Morris MD    Date Last Seen by PCP: 04/10/2023    Current shoe gear:  Affected Foot: Rx diabetic extra depth shoes and custom accommodative insoles     Unaffected Foot: Rx diabetic extra depth shoes and custom accommodative insoles    Hemoglobin A1C   Date Value Ref Range Status   03/03/2023 6.0 (H) 4.0 - 5.6 % Final     Comment:     ADA Screening Guidelines:  5.7-6.4%  Consistent with prediabetes  >or=6.5%  Consistent with diabetes    High levels of fetal hemoglobin interfere with the HbA1C  assay. Heterozygous hemoglobin variants (HbS, HgC, etc)do  not significantly interfere with this assay.   However, presence of multiple variants may affect accuracy.     08/31/2022 5.7 (H) 4.0 - 5.6 % Final     Comment:     ADA Screening Guidelines:  5.7-6.4%   Consistent with prediabetes  >or=6.5%  Consistent with diabetes    High levels of fetal hemoglobin interfere with the HbA1C  assay. Heterozygous hemoglobin variants (HbS, HgC, etc)do  not significantly interfere with this assay.   However, presence of multiple variants may affect accuracy.     03/21/2022 5.9 (H) 4.0 - 5.6 % Final     Comment:     ADA Screening Guidelines:  5.7-6.4%  Consistent with prediabetes  >or=6.5%  Consistent with diabetes    High levels of fetal hemoglobin interfere with the HbA1C  assay. Heterozygous hemoglobin variants (HbS, HgC, etc)do  not significantly interfere with this assay.   However, presence of multiple variants may affect accuracy.         Patient Active Problem List   Diagnosis    Type 2 diabetes mellitus with diabetic polyneuropathy, without long-term current use of insulin    Multiple joint pain    Esophageal reflux    Hyperlipidemia    Chronic gout    Lichen sclerosus    Carotid artery disease    Coronary artery disease involving native coronary artery without angina pectoris    Colon polyp    Hypertension associated with diabetes    Aortic atherosclerosis    Arterial insufficiency of lower extremity    Ganglion cyst of flexor tendon sheath of finger of right hand    Bilateral carpal tunnel syndrome    Hyperkalemia    Chronic bronchitis    Dependent edema    Diverticulosis    Primary open-angle glaucoma, bilateral, indeterminate stage    RIVAS (dyspnea on exertion)    Chronic diastolic heart failure    Low vitamin D level    Body mass index (BMI) 34.0-34.9, adult    Unspecified lump in the left breast, upper inner quadrant    Malignant neoplasm of upper-inner quadrant of left breast in female, estrogen receptor positive    Immunodeficiency due to chemotherapy       Medication List with Changes/Refills   Current Medications    ALBUTEROL (PROVENTIL/VENTOLIN HFA) 90 MCG/ACTUATION INHALER    INHALE 2 PUFFS INTO THE LUNGS EVERY SIX HOURS AS NEEDED FOR WHEEZING    ALLOPURINOL  (ZYLOPRIM) 100 MG TABLET    Take 1 tablet (100 mg total) by mouth once daily.    ANASTROZOLE (ARIMIDEX) 1 MG TAB    Take 1 tablet (1 mg total) by mouth once daily.    ASPIRIN 81 MG CHEW    Take 81 mg by mouth once daily.    BISOPROLOL (ZEBETA) 5 MG TABLET    Take 1 tablet (5 mg total) by mouth once daily.    BLOOD GLUCOSE CONTROL HIGH,LOW SOLN    1 each by Misc.(Non-Drug; Combo Route) route once a week.    BLOOD SUGAR DIAGNOSTIC STRP    To check BG 3 times daily, to use with insurance preferred meter    BLOOD-GLUCOSE METER KIT    To check BG 3 times daily, to use with insurance preferred meter    FLUTICASONE-SALMETEROL DISKUS INHALER 250-50 MCG    Inhale 1 puff into the lungs 2 (two) times daily. Controller    FUROSEMIDE (LASIX) 40 MG TABLET    Take 1 tablet (40 mg total) by mouth once daily.    LANCETS MISC    To check BG 3 times daily, to use with insurance preferred meter    LATANOPROST 0.005 % OPHTHALMIC SOLUTION    Place 1 drop into both eyes once daily.    ONDANSETRON (ZOFRAN-ODT) 8 MG TBDL    Dissolve 1 tablet (8 mg total) by mouth every 8 (eight) hours as needed (Nausea).    SIMVASTATIN (ZOCOR) 20 MG TABLET    Take 1 tablet (20 mg total) by mouth nightly.    TIMOLOL MALEATE 0.5% (TIMOPTIC) 0.5 % DROP    Place 1 drop into both eyes 2 (two) times daily.    TIMOLOL MALEATE, PF, 0.5 % DPET    Place into both eyes.       Review of patient's allergies indicates:   Allergen Reactions    Iodine and iodide containing products Nausea And Vomiting    Penicillins Itching    Ultram [tramadol] Hives and Itching     Light headness, itiching    Sulfa (sulfonamide antibiotics) Hives and Itching    Adhesive Other (See Comments)    Amlodipine      edema    Sulfamethoxazole-trimethoprim Itching, Rash and Nausea And Vomiting       Past Surgical History:   Procedure Laterality Date    APPENDECTOMY      CARPAL TUNNEL RELEASE Right     CATARACT EXTRACTION, BILATERAL      CHOLECYSTECTOMY      CYST REMOVAL  2018    DILATION AND  CURETTAGE OF UTERUS      EYE SURGERY      HEMORRHOID SURGERY      HYSTERECTOMY      fibroids    INCISION AND DRAINAGE PERIRECTAL ABSCESS      JOINT REPLACEMENT Bilateral     knee    KNEE ARTHROSCOPY Right     LOCALIZATION OF MASS OF BREAST WITH ULTRASOUND GUIDANCE Left 2023    Procedure: LOCALIZATION, MASS, BREAST, WITH US GUIDANCE;  Surgeon: Susan Pulliam MD;  Location: [facility] OR;  Service: General;  Laterality: Left;  Intra-operatively Placed Radiographic Marker (Wire)    MASTECTOMY, PARTIAL Left 2023    Procedure: MASTECTOMY, PARTIAL;  Surgeon: Susan Pulliam MD;  Location: [facility] OR;  Service: General;  Laterality: Left;  with Interpretation of Specimen Mammogram    MYOMECTOMY         Family History   Problem Relation Age of Onset    Hypertension Mother     Diabetes Mother     Leukemia Sister     Hypertension Sister     Cancer Sister         leukemia    Heart disease Maternal Aunt     Cancer Maternal Uncle         gastric, pacreatic    Heart disease Maternal Uncle     Miscarriages / Stillbirths Maternal Uncle     Diabetes Maternal Grandmother     Asthma Maternal Grandfather     Breast cancer Neg Hx     Colon cancer Neg Hx     Ovarian cancer Neg Hx     COPD Neg Hx     Hyperlipidemia Neg Hx     Kidney disease Neg Hx        Social History     Socioeconomic History    Marital status: Single    Number of children: 0   Tobacco Use    Smoking status: Former     Current packs/day: 0.00     Average packs/day: 0.3 packs/day for 24.0 years (6.0 ttl pk-yrs)     Types: Cigarettes     Start date: 1952     Quit date: 1976     Years since quittin.2    Smokeless tobacco: Never   Substance and Sexual Activity    Alcohol use: No    Drug use: No    Sexual activity: Not Currently     Social Determinants of Health     Financial Resource Strain: Low Risk  (2022)    Overall Financial Resource Strain (CARDIA)     Difficulty of Paying Living Expenses: Not hard at all   Food Insecurity: No Food  "Insecurity (9/27/2022)    Hunger Vital Sign     Worried About Running Out of Food in the Last Year: Never true     Ran Out of Food in the Last Year: Never true   Transportation Needs: No Transportation Needs (9/27/2022)    PRAPARE - Transportation     Lack of Transportation (Medical): No     Lack of Transportation (Non-Medical): No   Physical Activity: Inactive (9/27/2022)    Exercise Vital Sign     Days of Exercise per Week: 0 days     Minutes of Exercise per Session: 0 min   Stress: No Stress Concern Present (9/27/2022)    [employer] - Occupational Stress Questionnaire     Feeling of Stress : Not at all   Social Connections: Socially Isolated (9/27/2022)    Social Connection and Isolation Panel [NHANES]     Frequency of Communication with Friends and Family: Once a week     Frequency of Social Gatherings with Friends and Family: Once a week     Attends Christianity Services: More than 4 times per year     Active Member of Clubs or Organizations: No     Attends Club or Organization Meetings: Never     Marital Status:    Housing Stability: Low Risk  (9/27/2022)    Housing Stability Vital Sign     Unable to Pay for Housing in the Last Year: No     Number of Places Lived in the Last Year: 1     Unstable Housing in the Last Year: No       Vitals:    09/25/23 1032   Weight: 88 kg (194 lb)   Height: 5' 6" (1.676 m)   PainSc:   7       Hemoglobin A1C   Date Value Ref Range Status   03/03/2023 6.0 (H) 4.0 - 5.6 % Final     Comment:     ADA Screening Guidelines:  5.7-6.4%  Consistent with prediabetes  >or=6.5%  Consistent with diabetes    High levels of fetal hemoglobin interfere with the HbA1C  assay. Heterozygous hemoglobin variants (HbS, HgC, etc)do  not significantly interfere with this assay.   However, presence of multiple variants may affect accuracy.     08/31/2022 5.7 (H) 4.0 - 5.6 % Final     Comment:     ADA Screening Guidelines:  5.7-6.4%  Consistent with prediabetes  >or=6.5%  " Consistent with diabetes    High levels of fetal hemoglobin interfere with the HbA1C  assay. Heterozygous hemoglobin variants (HbS, HgC, etc)do  not significantly interfere with this assay.   However, presence of multiple variants may affect accuracy.     03/21/2022 5.9 (H) 4.0 - 5.6 % Final     Comment:     ADA Screening Guidelines:  5.7-6.4%  Consistent with prediabetes  >or=6.5%  Consistent with diabetes    High levels of fetal hemoglobin interfere with the HbA1C  assay. Heterozygous hemoglobin variants (HbS, HgC, etc)do  not significantly interfere with this assay.   However, presence of multiple variants may affect accuracy.         Review of Systems   Constitutional:  Negative for chills and fever.   Respiratory:  Negative for shortness of breath.    Cardiovascular:  Positive for leg swelling (improved). Negative for chest pain, palpitations, orthopnea and claudication.   Gastrointestinal:  Negative for diarrhea, nausea and vomiting.   Musculoskeletal:  Negative for joint pain.   Skin:  Negative for rash.   Neurological:  Negative for dizziness, tingling, sensory change, focal weakness and weakness.   Psychiatric/Behavioral: Negative.               Objective:      PHYSICAL EXAM: Apperance: Alert and orient in no distress,well developed, and with good attention to grooming and body habits  Patient presents ambulating in diabetic shoes and inserts.   LOWER EXTREMITY EXAM:  VASCULAR: Dorsalis pedis pulses 0/4 bilateral (monophasic with doppler) and Posterior Tibial pulses 0/4 bilateral (biphasic with doppler). Capillary fill time <blanched bilateral. Mild edema observed bilateral. Varicosities present bilateral. Skin temperature of the lower extremities is warm to cool, proximal to distal. Hair growth absent bilateral.  DERMATOLOGICAL: No skin rashes, subcutaneous nodules, lesions, or ulcers observed bilateral. Nails 1,2,3,4,5 bilateral elongated, thickened, and discolored with subungual debris. Webspaces  1,2,3,4clean, dry and without evidence of break in skin integrity bilateral.   NEUROLOGICAL: Light touch, sharp-dull, proprioception all present and equal bilaterally.  Vibratory sensation intact on left hallux and diminished at right hallux. Protective sensation intact at all 10 sites as tested with a [place] 5.07 monofilament.   MUSCULOSKELETAL: Muscle strength is 5/5 for foot inverters, everters, plantarflexors, and dorsiflexors. Muscle tone is normal. No pain on palpation of right dorsal foot/ankle. Pes planus noted bilateral. Flexible hammertoes noted bilateral.         Assessment:       ICD-10-CM ICD-9-CM   1. Type II diabetes mellitus with peripheral circulatory disorder  E11.51 250.70     443.81   2. Dermatophytosis of nail  B35.1 110.1       Plan:   Type II diabetes mellitus with peripheral circulatory disorder    Dermatophytosis of nail    I counseled the patient on her conditions, regarding findings of my examination, my impressions, and usual treatment plan.   Appointment spent on education about the diabetic foot, neuropathy, and prevention of limb loss.  Shoe inspection. Diabetic Foot Education. Patient reminded of the importance of good nutrition and blood sugar control to help prevent podiatric complications of diabetes. Patient instructed on proper foot hygeine. We discussed wearing proper shoe gear, daily foot inspections, never walking without protective shoe gear, never putting sharp instruments to feet.    With patient's permission, nails 1-5 bilateral were debrided/trimmed in length and thickness aggressively to their soft tissue attachment mechanically and with electric , removing all offending nail and debris. Patient relates relief following the procedure.  Patient  will continue to monitor the areas daily, inspect feet, wear protective shoe gear when ambulatory, moisturizer to maintain skin integrity. Patient reminded of the importance of good nutrition and blood sugar  control to help prevent podiatric complications of diabetes.  Patient to return 4 months or sooner if needed.          Ino Harmon DPM  [facility]

## 2023-09-27 ENCOUNTER — EXTERNAL HOME HEALTH (OUTPATIENT)
Dept: HOME HEALTH SERVICES | Facility: HOSPITAL | Age: 83
End: 2023-09-27
Payer: MEDICARE

## 2023-09-28 ENCOUNTER — OFFICE VISIT (OUTPATIENT)
Dept: RADIATION ONCOLOGY | Facility: CLINIC | Age: 83
End: 2023-09-28
Payer: MEDICARE

## 2023-09-28 VITALS
RESPIRATION RATE: 18 BRPM | WEIGHT: 193.56 LBS | SYSTOLIC BLOOD PRESSURE: 145 MMHG | TEMPERATURE: 98 F | HEIGHT: 66 IN | OXYGEN SATURATION: 96 % | HEART RATE: 67 BPM | DIASTOLIC BLOOD PRESSURE: 56 MMHG | BODY MASS INDEX: 31.11 KG/M2

## 2023-09-28 DIAGNOSIS — Z17.0 MALIGNANT NEOPLASM OF UPPER-INNER QUADRANT OF LEFT BREAST IN FEMALE, ESTROGEN RECEPTOR POSITIVE: Primary | ICD-10-CM

## 2023-09-28 DIAGNOSIS — C50.212 MALIGNANT NEOPLASM OF UPPER-INNER QUADRANT OF LEFT BREAST IN FEMALE, ESTROGEN RECEPTOR POSITIVE: Primary | ICD-10-CM

## 2023-09-28 PROCEDURE — 99205 OFFICE O/P NEW HI 60 MIN: CPT | Mod: S$GLB,,, | Performed by: RADIOLOGY

## 2023-09-28 PROCEDURE — 99999 PR PBB SHADOW E&M-EST. PATIENT-LVL IV: CPT | Mod: PBBFAC,,, | Performed by: RADIOLOGY

## 2023-09-28 PROCEDURE — 3078F DIAST BP <80 MM HG: CPT | Mod: CPTII,S$GLB,, | Performed by: RADIOLOGY

## 2023-09-28 PROCEDURE — 3288F PR FALLS RISK ASSESSMENT DOCUMENTED: ICD-10-PCS | Mod: CPTII,S$GLB,, | Performed by: RADIOLOGY

## 2023-09-28 PROCEDURE — 3077F PR MOST RECENT SYSTOLIC BLOOD PRESSURE >= 140 MM HG: ICD-10-PCS | Mod: CPTII,S$GLB,, | Performed by: RADIOLOGY

## 2023-09-28 PROCEDURE — 3078F PR MOST RECENT DIASTOLIC BLOOD PRESSURE < 80 MM HG: ICD-10-PCS | Mod: CPTII,S$GLB,, | Performed by: RADIOLOGY

## 2023-09-28 PROCEDURE — 1159F PR MEDICATION LIST DOCUMENTED IN MEDICAL RECORD: ICD-10-PCS | Mod: CPTII,S$GLB,, | Performed by: RADIOLOGY

## 2023-09-28 PROCEDURE — 99205 PR OFFICE/OUTPT VISIT, NEW, LEVL V, 60-74 MIN: ICD-10-PCS | Mod: S$GLB,,, | Performed by: RADIOLOGY

## 2023-09-28 PROCEDURE — 3288F FALL RISK ASSESSMENT DOCD: CPT | Mod: CPTII,S$GLB,, | Performed by: RADIOLOGY

## 2023-09-28 PROCEDURE — 3077F SYST BP >= 140 MM HG: CPT | Mod: CPTII,S$GLB,, | Performed by: RADIOLOGY

## 2023-09-28 PROCEDURE — 1126F AMNT PAIN NOTED NONE PRSNT: CPT | Mod: CPTII,S$GLB,, | Performed by: RADIOLOGY

## 2023-09-28 PROCEDURE — 99999 PR PBB SHADOW E&M-EST. PATIENT-LVL IV: ICD-10-PCS | Mod: PBBFAC,,, | Performed by: RADIOLOGY

## 2023-09-28 PROCEDURE — 1101F PR PT FALLS ASSESS DOC 0-1 FALLS W/OUT INJ PAST YR: ICD-10-PCS | Mod: CPTII,S$GLB,, | Performed by: RADIOLOGY

## 2023-09-28 PROCEDURE — 1101F PT FALLS ASSESS-DOCD LE1/YR: CPT | Mod: CPTII,S$GLB,, | Performed by: RADIOLOGY

## 2023-09-28 PROCEDURE — 1159F MED LIST DOCD IN RCRD: CPT | Mod: CPTII,S$GLB,, | Performed by: RADIOLOGY

## 2023-09-28 PROCEDURE — 1126F PR PAIN SEVERITY QUANTIFIED, NO PAIN PRESENT: ICD-10-PCS | Mod: CPTII,S$GLB,, | Performed by: RADIOLOGY

## 2023-09-28 NOTE — PROGRESS NOTES
PATIENT IDENTIFICATION:  Patient Name: Kaleigh Nieves  MRN: [id number]  : 1940    DIAGNOSIS:  Cancer Staging   Malignant neoplasm of upper-inner quadrant of left breast in female, estrogen receptor positive  Staging form: Breast, AJCC 8th Edition  - Clinical stage from 2023: Stage IA (cT1c, cN0, cM0, G1, ER+, AL+, HER2-) - Signed by Susan Pulliam MD on 2023  - Pathologic stage from 2023: Stage IA (pT2, pN0, cM0, G1, ER+, AL+, HER2-, Oncotype DX score: 26) - Signed by Jose Carlos Monroe MD on 2023      HISTORY OF PRESENT ILLNESS:   The patient is an 83-year-old woman with a prognostic stage IA invasive carcinoma of the left breast.  She is status post breast conserving surgery and has been referred for consideration of adjuvant radiation therapy.    The patient underwent biopsy of the lesion in the left breast at the 11 o'clock position which revealed a grade 1 invasive ductal carcinoma of the breast.  On immunohistochemistry, tumor cells were ER positive, AL positive in 1-11% of cells and HER2 Liza negative.  The Ki-67 proliferative index was 20%.    The patient was referred to Dr. Davis for O for surgical management.  She was taken to the operating room on 2023 for left breast partial mastectomy.  Final pathology revealed a grade 1 invasive ductal carcinoma measuring 2.1 cm in greatest dimension.  The margins of resection were negative for invasive carcinoma with the deep margin being the closest at 5 mm.  Oncotype dx score of 26.    Oncology History   Malignant neoplasm of upper-inner quadrant of left breast in female, estrogen receptor positive   2023 Initial Diagnosis    Malignant neoplasm of upper-inner quadrant of left breast in female, estrogen receptor positive     2023 Cancer Staged    Staging form: Breast, AJCC 8th Edition  - Clinical stage from 2023: Stage IA (cT1c, cN0, cM0, G1, ER+, AL+, HER2-)     2023 Genetic Testing    Patient has genetic testing  done for [employer].  Report date: 8/17/2023  Accession#: [id number]                                              Results: Negative-No clinically significant mutation identified   VUS findings: gene AXIN2 and SDHB   Interpretation: Uncertain clinical significance     9/7/2023 Cancer Staged    Staging form: Breast, AJCC 8th Edition  - Pathologic stage from 9/7/2023: Stage IA (pT2, pN0, cM0, G1, ER+, MN+, HER2-, Oncotype DX score: 26)     9/11/2023 Genomic Testing    Oncotype Dx Breast Recurrence Score Report   Report#: [id number]  Report date: 9/8/2023  Recurrence Score Result: 26   Distant recurrence Risk at 9 years: 16%   Group Average Absolute Chemotherapy Benefit: >15%          REVIEW OF SYSTEMS:   Review of Systems   Constitutional:  Positive for malaise/fatigue and weight loss. Negative for fever.   HENT:  Negative for ear pain, hearing loss, sinus pain and sore throat.    Eyes:  Positive for blurred vision. Negative for double vision and pain.   Respiratory:  Negative for cough, hemoptysis, shortness of breath and wheezing.    Cardiovascular:  Positive for leg swelling. Negative for chest pain and palpitations.   Gastrointestinal:  Positive for abdominal pain, constipation, diarrhea and heartburn. Negative for blood in stool, nausea and vomiting.   Genitourinary:  Negative for dysuria, frequency, hematuria and urgency.   Musculoskeletal:  Positive for joint pain. Negative for back pain.   Skin:  Negative for itching and rash.   Neurological:  Positive for tingling and sensory change. Negative for focal weakness, seizures and headaches.   Psychiatric/Behavioral:  Negative for depression. The patient is not nervous/anxious.        PAST MEDICAL HISTORY:  Past Medical History:   Diagnosis Date    Anemia     Angina pectoris     Arthritis     Asthma     Back pain     Bronchitis     CAD (coronary artery disease) 07/2015    via [facility]    Carotid artery stenosis     Chronic bronchitis     Chronic gout      Colon polyp     CTS (carpal tunnel syndrome)     Diabetes mellitus, type 2     Diabetes with neurologic complications     Diverticulosis     RIVAS (dyspnea on exertion)     chronic; eval pulm dr natarajan    Dyslipidemia     Ex-smoker     quit in her 30s    GERD (gastroesophageal reflux disease)     Heart failure     Hyperlipidemia     Hypertension     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    Neuropathy, lower extremity     Obesity     Open-angle glaucoma     dr avel kaur    Peripheral vascular disease     Polyneuropathy     Tobacco dependence        PAST SURGICAL HISTORY:  Past Surgical History:   Procedure Laterality Date    APPENDECTOMY      CARPAL TUNNEL RELEASE Right     CATARACT EXTRACTION, BILATERAL      CHOLECYSTECTOMY      CYST REMOVAL  2018    DILATION AND CURETTAGE OF UTERUS      EYE SURGERY      HEMORRHOID SURGERY      HYSTERECTOMY      fibroids    INCISION AND DRAINAGE PERIRECTAL ABSCESS      JOINT REPLACEMENT Bilateral     knee    KNEE ARTHROSCOPY Right     LOCALIZATION OF MASS OF BREAST WITH ULTRASOUND GUIDANCE Left 8/22/2023    Procedure: LOCALIZATION, MASS, BREAST, WITH US GUIDANCE;  Surgeon: Susan Pulliam MD;  Location: [facility] OR;  Service: General;  Laterality: Left;  Intra-operatively Placed Radiographic Marker (Wire)    MASTECTOMY, PARTIAL Left 8/22/2023    Procedure: MASTECTOMY, PARTIAL;  Surgeon: Susan Pulliam MD;  Location: [facility] OR;  Service: General;  Laterality: Left;  with Interpretation of Specimen Mammogram    MYOMECTOMY         ALLERGIES:   Review of patient's allergies indicates:   Allergen Reactions    Iodine and iodide containing products Nausea And Vomiting    Penicillins Itching    Ultram [tramadol] Hives and Itching     Light headness, itiching    Sulfa (sulfonamide antibiotics) Hives and Itching    Adhesive Other (See Comments)    Amlodipine      edema    Sulfamethoxazole-trimethoprim Itching, Rash and Nausea And Vomiting       MEDICATIONS:  Current Outpatient  Medications   Medication Sig    albuterol (PROVENTIL/VENTOLIN HFA) 90 mcg/actuation inhaler INHALE 2 PUFFS INTO THE LUNGS EVERY SIX HOURS AS NEEDED FOR WHEEZING    allopurinoL (ZYLOPRIM) 100 MG tablet Take 1 tablet (100 mg total) by mouth once daily.    anastrozole (ARIMIDEX) 1 mg Tab Take 1 tablet (1 mg total) by mouth once daily.    aspirin 81 MG Chew Take 81 mg by mouth once daily.    bisoprolol (ZEBETA) 5 MG tablet Take 1 tablet (5 mg total) by mouth once daily.    blood glucose control high,low Soln 1 each by Misc.(Non-Drug; Combo Route) route once a week.    blood sugar diagnostic Strp To check BG 3 times daily, to use with insurance preferred meter    blood-glucose meter kit To check BG 3 times daily, to use with insurance preferred meter    fluticasone-salmeterol diskus inhaler 250-50 mcg Inhale 1 puff into the lungs 2 (two) times daily. Controller    furosemide (LASIX) 40 MG tablet Take 1 tablet (40 mg total) by mouth once daily.    lancets [facility] To check BG 3 times daily, to use with insurance preferred meter    latanoprost 0.005 % ophthalmic solution Place 1 drop into both eyes once daily.    ondansetron (ZOFRAN-ODT) 8 MG TbDL Dissolve 1 tablet (8 mg total) by mouth every 8 (eight) hours as needed (Nausea).    simvastatin (ZOCOR) 20 MG tablet Take 1 tablet (20 mg total) by mouth nightly.    timolol maleate 0.5% (TIMOPTIC) 0.5 % Drop Place 1 drop into both eyes 2 (two) times daily.    timoloL maleate, PF, 0.5 % Dpet Place into both eyes.     No current facility-administered medications for this visit.       SOCIAL HISTORY:  Social History     Socioeconomic History    Marital status: Single    Number of children: 0   Tobacco Use    Smoking status: Former     Current packs/day: 0.00     Average packs/day: 0.3 packs/day for 24.0 years (6.0 ttl pk-yrs)     Types: Cigarettes     Start date: 1952     Quit date: 1976     Years since quittin.2    Smokeless tobacco: Never   Substance and Sexual  Activity    Alcohol use: No    Drug use: No    Sexual activity: Not Currently     Social Determinants of Health     Financial Resource Strain: Low Risk  (9/27/2022)    Overall Financial Resource Strain (CARDIA)     Difficulty of Paying Living Expenses: Not hard at all   Food Insecurity: No Food Insecurity (9/27/2022)    Hunger Vital Sign     Worried About Running Out of Food in the Last Year: Never true     Ran Out of Food in the Last Year: Never true   Transportation Needs: No Transportation Needs (9/27/2022)    PRAPARE - Transportation     Lack of Transportation (Medical): No     Lack of Transportation (Non-Medical): No   Physical Activity: Inactive (9/27/2022)    Exercise Vital Sign     Days of Exercise per Week: 0 days     Minutes of Exercise per Session: 0 min   Stress: No Stress Concern Present (9/27/2022)    [place] [employer] - Occupational Stress Questionnaire     Feeling of Stress : Not at all   Social Connections: Socially Isolated (9/27/2022)    Social Connection and Isolation Panel [NHANES]     Frequency of Communication with Friends and Family: Once a week     Frequency of Social Gatherings with Friends and Family: Once a week     Attends Voodoo Services: More than 4 times per year     Active Member of Clubs or Organizations: No     Attends Club or Organization Meetings: Never     Marital Status:    Housing Stability: Low Risk  (9/27/2022)    Housing Stability Vital Sign     Unable to Pay for Housing in the Last Year: No     Number of Places Lived in the Last Year: 1     Unstable Housing in the Last Year: No       FAMILY HISTORY:  Family History   Problem Relation Age of Onset    Hypertension Mother     Diabetes Mother     Leukemia Sister     Hypertension Sister     Cancer Sister         leukemia    Heart disease Maternal Aunt     Cancer Maternal Uncle         gastric, pacreatic    Heart disease Maternal Uncle     Miscarriages / Stillbirths Maternal Uncle     Diabetes  Maternal Grandmother     Asthma Maternal Grandfather     Breast cancer Neg Hx     Colon cancer Neg Hx     Ovarian cancer Neg Hx     COPD Neg Hx     Hyperlipidemia Neg Hx     Kidney disease Neg Hx          PHYSICAL EXAMINATION:  Vitals:    23 1045   BP: (!) 145/56   Pulse: 67   Resp: 18   Temp: 97.7 °F (36.5 °C)     Body mass index is 31.24 kg/m².    ECO  Physical Exam  Constitutional:       Appearance: Normal appearance.   HENT:      Head: Normocephalic and atraumatic.      Nose: Nose normal.      Mouth/Throat:      Mouth: Mucous membranes are moist.   Cardiovascular:      Rate and Rhythm: Normal rate.   Pulmonary:      Effort: Pulmonary effort is normal.   Abdominal:      General: Abdomen is flat.      Palpations: Abdomen is soft.   Musculoskeletal:         General: Swelling present. Normal range of motion.      Cervical back: Normal range of motion.      Comments: Ambulates with rolling walker, bilateral pedal edema   Skin:     General: Skin is warm and dry.   Neurological:      General: No focal deficit present.      Mental Status: She is alert. Mental status is at baseline.             ASSESSMENT/PLAN:  Kaleigh was seen today for consult.    Diagnoses and all orders for this visit:    Malignant neoplasm of upper-inner quadrant of left breast in female, estrogen receptor positive  -     Ambulatory referral/consult to Radiation Oncology      The patient was recommended adjuvant radiation therapy to the left breast to decrease risk of recurrence.  Given age and performance status she is recommended an accelerated hypofractionated approach of whole breast radiation to a dose of 26 Gy in 5 fractions over 1 week.    The patient declined radiation indicating that she has limited transportation and does not want to deal with any potential adverse effects of radiation.  I explained to her that breast radiation is well tolerated and she would be given an abbreviated course of treatment.  She stated that she  would prefer to take endocrine therapy alone.  Advised her that she can start her endocrine therapy now if she does not want radiation.

## 2023-10-10 ENCOUNTER — TELEPHONE (OUTPATIENT)
Dept: PRIMARY CARE CLINIC | Facility: CLINIC | Age: 83
End: 2023-10-10
Payer: MEDICARE

## 2023-10-10 ENCOUNTER — E-CONSULT (OUTPATIENT)
Dept: HEMATOLOGY/ONCOLOGY | Facility: CLINIC | Age: 83
End: 2023-10-10
Payer: MEDICARE

## 2023-10-10 ENCOUNTER — OFFICE VISIT (OUTPATIENT)
Dept: PRIMARY CARE CLINIC | Facility: CLINIC | Age: 83
End: 2023-10-10
Payer: MEDICARE

## 2023-10-10 VITALS
OXYGEN SATURATION: 99 % | SYSTOLIC BLOOD PRESSURE: 132 MMHG | DIASTOLIC BLOOD PRESSURE: 52 MMHG | WEIGHT: 194.25 LBS | HEART RATE: 65 BPM | HEIGHT: 66 IN | BODY MASS INDEX: 31.22 KG/M2 | TEMPERATURE: 98 F

## 2023-10-10 DIAGNOSIS — Z17.0 MALIGNANT NEOPLASM OF UPPER-INNER QUADRANT OF LEFT BREAST IN FEMALE, ESTROGEN RECEPTOR POSITIVE: Primary | ICD-10-CM

## 2023-10-10 DIAGNOSIS — J44.89 COPD (CHRONIC OBSTRUCTIVE PULMONARY DISEASE) WITH CHRONIC BRONCHITIS: ICD-10-CM

## 2023-10-10 DIAGNOSIS — C50.212 MALIGNANT NEOPLASM OF UPPER-INNER QUADRANT OF LEFT BREAST IN FEMALE, ESTROGEN RECEPTOR POSITIVE: Primary | ICD-10-CM

## 2023-10-10 DIAGNOSIS — I50.32 CHRONIC DIASTOLIC HEART FAILURE: ICD-10-CM

## 2023-10-10 DIAGNOSIS — Z17.0 MALIGNANT NEOPLASM OF UPPER-INNER QUADRANT OF LEFT BREAST IN FEMALE, ESTROGEN RECEPTOR POSITIVE: ICD-10-CM

## 2023-10-10 DIAGNOSIS — E11.42 TYPE 2 DIABETES MELLITUS WITH DIABETIC POLYNEUROPATHY, WITHOUT LONG-TERM CURRENT USE OF INSULIN: Primary | ICD-10-CM

## 2023-10-10 DIAGNOSIS — C50.212 MALIGNANT NEOPLASM OF UPPER-INNER QUADRANT OF LEFT BREAST IN FEMALE, ESTROGEN RECEPTOR POSITIVE: ICD-10-CM

## 2023-10-10 PROCEDURE — 1101F PR PT FALLS ASSESS DOC 0-1 FALLS W/OUT INJ PAST YR: ICD-10-PCS | Mod: CPTII,S$GLB,, | Performed by: FAMILY MEDICINE

## 2023-10-10 PROCEDURE — 1101F PT FALLS ASSESS-DOCD LE1/YR: CPT | Mod: CPTII,S$GLB,, | Performed by: FAMILY MEDICINE

## 2023-10-10 PROCEDURE — 99999 PR PBB SHADOW E&M-EST. PATIENT-LVL V: CPT | Mod: PBBFAC,,, | Performed by: FAMILY MEDICINE

## 2023-10-10 PROCEDURE — 90694 VACC AIIV4 NO PRSRV 0.5ML IM: CPT | Mod: S$GLB,,, | Performed by: FAMILY MEDICINE

## 2023-10-10 PROCEDURE — 1126F PR PAIN SEVERITY QUANTIFIED, NO PAIN PRESENT: ICD-10-PCS | Mod: CPTII,S$GLB,, | Performed by: FAMILY MEDICINE

## 2023-10-10 PROCEDURE — 3075F PR MOST RECENT SYSTOLIC BLOOD PRESS GE 130-139MM HG: ICD-10-PCS | Mod: CPTII,S$GLB,, | Performed by: FAMILY MEDICINE

## 2023-10-10 PROCEDURE — 99451 NTRPROF PH1/NTRNET/EHR 5/>: CPT | Mod: S$GLB,,, | Performed by: INTERNAL MEDICINE

## 2023-10-10 PROCEDURE — G0008 ADMIN INFLUENZA VIRUS VAC: HCPCS | Mod: S$GLB,,, | Performed by: FAMILY MEDICINE

## 2023-10-10 PROCEDURE — 3078F DIAST BP <80 MM HG: CPT | Mod: CPTII,S$GLB,, | Performed by: FAMILY MEDICINE

## 2023-10-10 PROCEDURE — 3288F PR FALLS RISK ASSESSMENT DOCUMENTED: ICD-10-PCS | Mod: CPTII,S$GLB,, | Performed by: FAMILY MEDICINE

## 2023-10-10 PROCEDURE — 3075F SYST BP GE 130 - 139MM HG: CPT | Mod: CPTII,S$GLB,, | Performed by: FAMILY MEDICINE

## 2023-10-10 PROCEDURE — 90694 FLU VACCINE - QUADRIVALENT - ADJUVANTED: ICD-10-PCS | Mod: S$GLB,,, | Performed by: FAMILY MEDICINE

## 2023-10-10 PROCEDURE — G0008 FLU VACCINE - QUADRIVALENT - ADJUVANTED: ICD-10-PCS | Mod: S$GLB,,, | Performed by: FAMILY MEDICINE

## 2023-10-10 PROCEDURE — 99215 OFFICE O/P EST HI 40 MIN: CPT | Mod: 25,S$GLB,, | Performed by: FAMILY MEDICINE

## 2023-10-10 PROCEDURE — 1126F AMNT PAIN NOTED NONE PRSNT: CPT | Mod: CPTII,S$GLB,, | Performed by: FAMILY MEDICINE

## 2023-10-10 PROCEDURE — 99999 PR PBB SHADOW E&M-EST. PATIENT-LVL V: ICD-10-PCS | Mod: PBBFAC,,, | Performed by: FAMILY MEDICINE

## 2023-10-10 PROCEDURE — 99215 PR OFFICE/OUTPT VISIT, EST, LEVL V, 40-54 MIN: ICD-10-PCS | Mod: 25,S$GLB,, | Performed by: FAMILY MEDICINE

## 2023-10-10 PROCEDURE — 99451 PR INTERPROF, PHONE/INTERNET/EHR, CONSULT, >= 5MINS: ICD-10-PCS | Mod: S$GLB,,, | Performed by: INTERNAL MEDICINE

## 2023-10-10 PROCEDURE — 3288F FALL RISK ASSESSMENT DOCD: CPT | Mod: CPTII,S$GLB,, | Performed by: FAMILY MEDICINE

## 2023-10-10 PROCEDURE — 3078F PR MOST RECENT DIASTOLIC BLOOD PRESSURE < 80 MM HG: ICD-10-PCS | Mod: CPTII,S$GLB,, | Performed by: FAMILY MEDICINE

## 2023-10-10 NOTE — TELEPHONE ENCOUNTER
----- Message from Osminwendy Shipley sent at 10/10/2023  2:34 PM CDT -----  Contact: [phone]  Type:  Patient Returning Call    Who Called:Kaleigh   Who Left Message for Patient:nurse   Does the patient know what this is regarding?:yes   Would the patient rather a call back or a response via Many[employer]? Call back   Best Call Back Number:[phone]  Additional Information:Her landline automatic goes into voicemail after 3 call so please call back if voicemail .       Thanks KB

## 2023-10-10 NOTE — PROGRESS NOTES
"    [facility]       [address] [place] [address]      Phone: [phone]      Fax: [phone]    Lucio Morris MD                Office Visit  10/10/2023        Subjective      HPI:  Kaleigh Nieves is a 83 y.o. female presents today in clinic for "Follow-up  ."     83-year-old female presents today to follow-up on multiple issues.      Patient states she feels okay today.  Since last visit, she is had a lumpectomy on the left breast.  Came back as breast cancer.  The breast surgeon recommended against the radiation given her age.  The hematologist/oncologist recommended against doing chemotherapy, again because of her age.  Instead, they have elected to just observe.  Oncology did recommend she start anastrozole approximally one month after radiation.  Since she is not doing chemo/radiation, she still has not started it yet, but wants to know if it is okay.    States that her feet have been a bit swollen lately.  Uses compression socks.  Takes Lasix stays.  Took some last night, but does not seem to have affected her urination.  Still gets short of breath from time to time.  Laying down flat makes her extremely uncomfortable, can not breathe.  Was able to get a hospital bed so she can elevate the head and make it more comfortable.  Follows with cardiology regularly.    She states she has a history of gout.  Has been taking allopurinol 100 mg daily for years.  Has not had severe gout flare up in a couple of years.  Last August, had some blood work done, uric acid level was 4.8.    She also has diabetes.  Not on any medication for it.  A1c was 6%.  Watches diet.  Checks sugar weekly at home, everything is always good.    Has hypertension.  Takes bisoprolol 5 mg daily, along with Lasix 40 mg daily.  Also takes simvastatin 20 mg nightly for cholesterol.      PMH: HTN, dm, gout, GERD, diverticulosis, osteoarthritis, glaucoma   PSH:  Torn meniscus in knee.  " Bilateral knee replacement.  Hemorrhoids.  Perirectal abscess.  Cataracts in both eyes.  D& C.  Fibroid/hysterectomy.  Gallbladder.  Appendectomy.  Carpal tunnel.  Left toe corn removal.    Allergies:  Iodine.  Penicillin.  Sulfa drugs.  Tramadol.  Amlodipine.    Social: Lives alone.  Nephew occasionally stays there.    T: Denies   A:  Denies   D:  Denies     Exercise:  No regular exercise program.  Use to bowl frequently.  Limited now due to foot swelling and pain.      Podiatry: Faustino Reyes)        The following were updated and reviewed by myself in the chart: medications, past medical history, past surgical history, family history, social history, and allergies.     Medications:  Current Outpatient Medications on File Prior to Visit   Medication Sig Dispense Refill    albuterol (PROVENTIL/VENTOLIN HFA) 90 mcg/actuation inhaler INHALE 2 PUFFS INTO THE LUNGS EVERY SIX HOURS AS NEEDED FOR WHEEZING 18 g 11    allopurinoL (ZYLOPRIM) 100 MG tablet Take 1 tablet (100 mg total) by mouth once daily. 90 tablet 3    anastrozole (ARIMIDEX) 1 mg Tab Take 1 tablet (1 mg total) by mouth once daily. 90 tablet 3    aspirin 81 MG Chew Take 81 mg by mouth once daily.      bisoprolol (ZEBETA) 5 MG tablet Take 1 tablet (5 mg total) by mouth once daily. 90 tablet 3    blood glucose control high,low Soln 1 each by Misc.(Non-Drug; Combo Route) route once a week. 1 each 11    blood sugar diagnostic Strp To check BG 3 times daily, to use with insurance preferred meter 200 each 0    blood-glucose meter kit To check BG 3 times daily, to use with insurance preferred meter 1 each 0    fluticasone-salmeterol diskus inhaler 250-50 mcg Inhale 1 puff into the lungs 2 (two) times daily. Controller 180 each 3    furosemide (LASIX) 40 MG tablet Take 1 tablet (40 mg total) by mouth once daily. 90 tablet 3    lancets Misc To check BG 3 times daily, to use with insurance preferred meter 200 each 3    ondansetron (ZOFRAN-ODT) 8 MG TbDL Dissolve 1  tablet (8 mg total) by mouth every 8 (eight) hours as needed (Nausea). 10 tablet 0    simvastatin (ZOCOR) 20 MG tablet Take 1 tablet (20 mg total) by mouth nightly. 90 tablet 3    timoloL maleate, PF, 0.5 % Dpet Place into both eyes.      latanoprost 0.005 % ophthalmic solution Place 1 drop into both eyes once daily. 7.5 mL 3    timolol maleate 0.5% (TIMOPTIC) 0.5 % Drop Place 1 drop into both eyes 2 (two) times daily. 30 mL 4     No current facility-administered medications on file prior to visit.        PMHx:  Past Medical History:   Diagnosis Date    Anemia     Angina pectoris     Arthritis     Asthma     Back pain     Bronchitis     CAD (coronary artery [facility]) [facility]    Carotid artery stenosis     Chronic bronchitis     Chronic gout     Colon polyp     CTS (carpal tunnel syndrome)     Diabetes mellitus, type 2     Diabetes with neurologic complications     Diverticulosis     RIVAS (dyspnea on exertion)     chronic; eval pulm dr natarajan    Dyslipidemia     Ex-smoker     quit in her 30s    GERD (gastroesophageal reflux disease)     Heart failure     Hyperlipidemia     Hypertension     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    Neuropathy, lower extremity     Obesity     Open-angle glaucoma     dr avel kaur    Peripheral vascular disease     Polyneuropathy     Tobacco dependence       Patient Active Problem List    Diagnosis Date Noted    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    Malignant neoplasm of upper-inner quadrant of left breast in female, estrogen receptor positive 06/26/2023    Unspecified lump in the left breast, upper inner quadrant 06/20/2023    Body mass index (BMI) 34.0-34.9, adult 09/07/2022    Low vitamin D level 07/12/2021    Chronic diastolic heart failure 05/14/2021    RIVAS (dyspnea on exertion) 01/29/2021    Diverticulosis 11/11/2019    Primary open-angle glaucoma, bilateral, indeterminate stage 11/11/2019    Dependent edema 08/23/2018    Chronic bronchitis  06/25/2018    Hyperkalemia 03/21/2018    Bilateral carpal tunnel syndrome 03/07/2018    Ganglion cyst of flexor tendon sheath of finger of right hand 02/01/2018    Aortic atherosclerosis 06/16/2017    Arterial insufficiency of lower extremity 06/16/2017    Hypertension associated with diabetes 10/25/2016    Colon polyp     Coronary artery disease involving native coronary artery without angina pectoris     Carotid artery disease     Lichen sclerosus 05/26/2015    Hyperlipidemia 08/11/2014    Chronic gout 08/11/2014    Esophageal reflux 07/08/2013    Multiple joint pain 07/03/2013    Type 2 diabetes mellitus with diabetic polyneuropathy, without long-term current use of insulin 06/18/2013        PSHx:  Past Surgical History:   Procedure Laterality Date    APPENDECTOMY      CARPAL TUNNEL RELEASE Right     CATARACT EXTRACTION, BILATERAL      CHOLECYSTECTOMY      CYST REMOVAL  2018    DILATION AND CURETTAGE OF UTERUS      EYE SURGERY      HEMORRHOID SURGERY      HYSTERECTOMY      fibroids    INCISION AND DRAINAGE PERIRECTAL ABSCESS      JOINT REPLACEMENT Bilateral     knee    KNEE ARTHROSCOPY Right     LOCALIZATION OF MASS OF BREAST WITH ULTRASOUND GUIDANCE Left 8/22/2023    Procedure: LOCALIZATION, MASS, BREAST, WITH US GUIDANCE;  Surgeon: Susan Pulliam MD;  Location: [facility] OR;  Service: General;  Laterality: Left;  Intra-operatively Placed Radiographic Marker (Wire)    MASTECTOMY, PARTIAL Left 8/22/2023    Procedure: MASTECTOMY, PARTIAL;  Surgeon: Susan Pulliam MD;  Location: [facility] OR;  Service: General;  Laterality: Left;  with Interpretation of Specimen Mammogram    MYOMECTOMY          FHx:  Family History   Problem Relation Age of Onset    Hypertension Mother     Diabetes Mother     Leukemia Sister     Hypertension Sister     Cancer Sister         leukemia    Heart disease Maternal Aunt     Cancer Maternal Uncle         gastric, pacreatic    Heart disease Maternal Uncle     Miscarriages / Stillbirths  Maternal Uncle     Diabetes Maternal Grandmother     Asthma Maternal Grandfather     Breast cancer Neg Hx     Colon cancer Neg Hx     Ovarian cancer Neg Hx     COPD Neg Hx     Hyperlipidemia Neg Hx     Kidney disease Neg Hx         Social:  Social History     Socioeconomic History    Marital status: Single    Number of children: 0   Tobacco Use    Smoking status: Former     Current packs/day: 0.00     Average packs/day: 0.3 packs/day for 24.0 years (6.0 ttl pk-yrs)     Types: Cigarettes     Start date: 1952     Quit date: 1976     Years since quittin.3    Smokeless tobacco: Never   Substance and Sexual Activity    Alcohol use: No    Drug use: No    Sexual activity: Not Currently     Social Determinants of Health     Financial Resource Strain: Low Risk  (2022)    Overall Financial Resource Strain (CARDIA)     Difficulty of Paying Living Expenses: Not hard at all   Food Insecurity: No Food Insecurity (2022)    Hunger Vital Sign     Worried About Running Out of Food in the Last Year: Never true     Ran Out of Food in the Last Year: Never true   Transportation Needs: No Transportation Needs (2022)    PRAPARE - Transportation     Lack of Transportation (Medical): No     Lack of Transportation (Non-Medical): No   Physical Activity: Inactive (2022)    Exercise Vital Sign     Days of Exercise per Week: 0 days     Minutes of Exercise per Session: 0 min   Stress: No Stress Concern Present (2022)    [place] citizen [place] [employer] - Occupational Stress Questionnaire     Feeling of Stress : Not at all   Social Connections: Socially Isolated (2022)    Social Connection and Isolation Panel [NHANES]     Frequency of Communication with Friends and Family: Once a week     Frequency of Social Gatherings with Friends and Family: Once a week     Attends Church Services: More than 4 times per year     Active Member of Clubs or Organizations: No     Attends Club or Organization  "Meetings: Never     Marital Status:    Housing Stability: Low Risk  (9/27/2022)    Housing Stability Vital Sign     Unable to Pay for Housing in the Last Year: No     Number of Places Lived in the Last Year: 1     Unstable Housing in the Last Year: No        Allergies:  Review of patient's allergies indicates:   Allergen Reactions    Iodine and iodide containing products Nausea And Vomiting    Penicillins Itching    Ultram [tramadol] Hives and Itching     Light headness, itiching    Sulfa (sulfonamide antibiotics) Hives and Itching    Adhesive Other (See Comments)    Amlodipine      edema    Sulfamethoxazole-trimethoprim Itching, Rash and Nausea And Vomiting        ROS:  Review of Systems   Constitutional:  Negative for activity change, appetite change, chills and fever.   HENT:  Negative for congestion, postnasal drip, rhinorrhea, sore throat and trouble swallowing.    Respiratory:  Positive for shortness of breath. Negative for cough and wheezing.    Cardiovascular:  Positive for leg swelling. Negative for chest pain and palpitations.   Gastrointestinal:  Negative for abdominal pain, constipation, diarrhea, nausea and vomiting.   Genitourinary:  Negative for difficulty urinating.   Musculoskeletal:  Negative for arthralgias and myalgias.   Skin:  Negative for color change and rash.   Neurological:  Negative for speech difficulty and headaches.   All other systems reviewed and are negative.         Objective      BP (!) 132/52   Pulse 65   Temp 97.6 °F (36.4 °C)   Ht 5' 6" (1.676 m)   Wt 88.1 kg (194 lb 3.6 oz)   SpO2 99%   BMI 31.35 kg/m²   Ht Readings from Last 3 Encounters:   10/10/23 5' 6" (1.676 m)   09/28/23 5' 6" (1.676 m)   09/25/23 5' 6" (1.676 m)     Wt Readings from Last 3 Encounters:   10/10/23 88.1 kg (194 lb 3.6 oz)   09/28/23 87.8 kg (193 lb 9 oz)   09/25/23 88 kg (194 lb)       PHYSICAL EXAM:  Physical Exam  Vitals and nursing note reviewed.   Constitutional:       General: She is not " in acute distress.     Appearance: Normal appearance.   HENT:      Head: Normocephalic and atraumatic.      Right Ear: Tympanic membrane, ear canal and external ear normal.      Left Ear: Tympanic membrane, ear canal and external ear normal.      Nose: Nose normal. No congestion or rhinorrhea.      Mouth/Throat:      Mouth: Mucous membranes are moist.      Pharynx: Oropharynx is clear. No oropharyngeal exudate or posterior oropharyngeal erythema.   Eyes:      Extraocular Movements: Extraocular movements intact.      Conjunctiva/sclera: Conjunctivae normal.      Pupils: Pupils are equal, round, and reactive to light.   Cardiovascular:      Rate and Rhythm: Normal rate and regular rhythm.   Pulmonary:      Effort: Pulmonary effort is normal. No respiratory distress.      Breath sounds: No wheezing, rhonchi or rales.   Musculoskeletal:         General: Normal range of motion.      Cervical back: Normal range of motion.      Right lower le+ Pitting Edema present.      Left lower le+ Pitting Edema present.   Lymphadenopathy:      Cervical: No cervical adenopathy.   Skin:     General: Skin is warm and dry.      Findings: No rash.   Neurological:      Mental Status: She is alert.              LABS / IMAGING:  Recent Results (from the past 4368 hour(s))   Specimen to Pathology Breast    Collection Time: 23 12:40 PM   Result Value Ref Range    Final Pathologic Diagnosis       1. 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 (ductal)  HISTOLOGIC GRADE:  Grade 1 of 3         Tubule formation:  2         Pleomorphism:  1         Mitotic rate:  1  TUMOR SIZE:  Up to 1.5 cm in greatest linear dimension  DUCTAL CARCINOMA IN SITU  (DCIS):  Not identified  LYMPHOVASCULAR INVASION:  Not identified  MICROCALCIFICATIONS:  Not identified  BREAST BIOMARKERS (performed with appropriate controls):          ER:  Positive (intermediate, greater than 90%)          FL:  Positive (strong, 1-11%)          Her2:  Negative (1+)          Ki67:  20%      Gross       Pathology ID:  [id number]  Patient ID:  [id number]    The specimen is received in formalin labeled &quot;left breast 11:00, 10 cfmn cnb&quot;.  The specimen consists of 3 yellow needle biopsy fragments measuring 1.6 x 0.5 x 0.1 cm in aggregate.  The specimen is submitted entirely in cassette   NVP--1-A.      Specimen has been in formalin for more than 6 hours and less than 72 hours.  Ischemic time is not provided.  Dwight Paez      Comment       All stains were performed with adequate positive and negative controls.  An immunohistochemical stain for E-cadherin is positive in tumor cells, confirming ductal origin.    This case was seen in consultation with Dr. Arias who agrees with the above diagnosis.      Disclaimer       Unless the case is a 'gross only' or additional testing only, the final diagnosis for each specimen is based on a microscopic examination of appropriate tissue sections.  ER immunohistochemical staining (clone SP1, DAB detection method) is performed on formalin-fixed, paraffin embedded tissue sections. The percentage of cell nuclei stained and the strength of staining is reported (weak, moderate, strong), using the 2010   ACSO/CAP scoring guidelines. Tumors used for determing predictive markers are fixed in10% neutral buffered formalin for 6-72 hours. It has been cleared by the [place] FDA for use as an IVD test. This assay has not been validated on decalcified tissues.   Results should be interpreted with caution given the likelihood of false negativity on decalcified specimens.  HER-2/marlena IHC (4B5) clone, DAB detection method) is done on 10% buffered formalin-fixed (for  6-72 hrs), paraffin embedded tissue sections. The scoring is completed on a 4-tiered scoring system of membrane staini ng using the 2014 ASCO/CAP scoring   guidelines. It has been cleared by the [place] FDA for use as an IVD test. This assay has not been validated on decalcified tissues. Results should be interpreted with caution given the likelihood of false negativity on decalcified specimens.    PgR immunohistochemical staining (clone 1E2, DAB detection method) is performed on formalin-fixed, paraffin embedded tissue sections. The percentage of cell nuclei stained and the strength of staining is report (weak, moderate, strong), using 2010   ASCO/CAP scoring guidelines. Tumors used for determing predictive markers are fixed in 10% neutral buffered formalin for 6-72 hours. It has been cleared by the [place].S[place] FDA for use as an IVD test. This assay has not been validated on decalcified tissues.   Results should be interpreted with caution given the likelihood of false negativity on decalcified specimens.      B-TYPE NATRIURETIC PEPTIDE    Collection Time: 07/03/23 11:18 AM   Result Value Ref Range     (H) 0 - 99 pg/mL   Echo    Collection Time: 07/25/23 12:15 PM   Result Value Ref Range    BSA 2.06 m2    TDI SEPTAL 0.05 m/s    LV LATERAL E/E' RATIO 17.43 m/s    LV SEPTAL E/E' RATIO 24.40 m/s    Left Ventricular Outflow Tract Mean Velocity 1.01 cm/s    Left Ventricular Outflow Tract Mean Gradient 4.53 mmHg    TDI LATERAL 0.07 m/s    PV PEAK VELOCITY 1.07 cm/s    LVIDd 2.83 (A) 3.5 - 6.0 cm    IVS 1.28 (A) 0.6 - 1.1 cm    Posterior Wall 1.27 (A) 0.6 - 1.1 cm    Ao root annulus 3.28 cm    LVIDs 1.95 (A) 2.1 - 4.0 cm    FS 31 28 - 44 %    LV mass 111.30 g    LA size 2.53 cm    RVDD 3.21 cm    TAPSE 2.13 cm    Left Ventricle Relative Wall Thickness 0.90 cm    AV mean gradient 4 mmHg    AV valve area 3.19 cm2    AV Velocity Ratio 1.07     AV index (prosthetic) 1.05     E/A ratio 1.08     Mean e' 0.06 m/s    E wave  deceleration time 243.33 msec    IVRT 74.22 msec    Pulm vein S/D ratio 1.18     LVOT diameter 1.97 cm    LVOT area 3.0 cm2    LVOT peak vinod 1.37 m/s    LVOT peak VTI 34.90 cm    Ao peak vinod 1.28 m/s    Ao VTI 33.3 cm    RVOT peak vinod 0.99 m/s    RVOT peak VTI 25.4 cm    LVOT stroke volume 106.32 cm3    AV peak gradient 7 mmHg    PV mean gradient 2.02 mmHg    E/E' ratio 20.33 m/s    MV Peak E Vinod 1.22 m/s    TR Max Vinod 2.49 m/s    MV Peak A Vinod 1.13 m/s    PV Peak S Vinod 0.78 m/s    PV Peak D Vinod 0.66 m/s    LV Systolic Volume 11.87 mL    LV Systolic Volume Index 5.9 mL/m2    LV Diastolic Volume 30.41 mL    LV Diastolic Volume Index 15.21 mL/m2    LV Mass Index 56 g/m2    RA Major Axis 4.22 cm    Left Atrium Minor Axis 5.83 cm    Left Atrium Major Axis 5.75 cm    Triscuspid Valve Regurgitation Peak Gradient 25 mmHg    LA Volume Index (Mod) 47.6 mL/m2    LA volume (mod) 95.27 cm3    RA Width 3.13 cm    LA WIDTH 4.40 cm    LA volume 54.78 cm3    LA Volume Index 27.4 mL/m2    Right Atrial Pressure (from IVC) 3 mmHg    EF 60 %    TV resting pulmonary artery pressure 28 mmHg   CBC Auto Differential    Collection Time: 08/03/23 12:12 PM   Result Value Ref Range    WBC 4.26 3.90 - 12.70 K/uL    RBC 5.11 4.00 - 5.40 M/uL    Hemoglobin 14.7 12.0 - 16.0 g/dL    Hematocrit 46.8 37.0 - 48.5 %    MCV 92 82 - 98 fL    MCH 28.8 27.0 - 31.0 pg    MCHC 31.4 (L) 32.0 - 36.0 g/dL    RDW 13.2 11.5 - 14.5 %    Platelets 314 150 - 450 K/uL    MPV 9.9 9.2 - 12.9 fL    Immature Granulocytes 0.2 0.0 - 0.5 %    Gran # (ANC) 2.6 1.8 - 7.7 K/uL    Immature Grans (Abs) 0.01 0.00 - 0.04 K/uL    Lymph # 1.1 1.0 - 4.8 K/uL    Mono # 0.5 0.3 - 1.0 K/uL    Eos # 0.1 0.0 - 0.5 K/uL    Baso # 0.02 0.00 - 0.20 K/uL    nRBC 0 0 /100 WBC    Gran % 60.1 38.0 - 73.0 %    Lymph % 25.8 18.0 - 48.0 %    Mono % 12.2 4.0 - 15.0 %    Eosinophil % 1.2 0.0 - 8.0 %    Basophil % 0.5 0.0 - 1.9 %    Differential Method Automated    COMPREHENSIVE METABOLIC PANEL     Collection Time: 08/03/23 12:12 PM   Result Value Ref Range    Sodium 133 (L) 136 - 145 mmol/L    Potassium 4.7 3.5 - 5.1 mmol/L    Chloride 96 95 - 110 mmol/L    CO2 28 23 - 29 mmol/L    Glucose 82 70 - 110 mg/dL    BUN 8 8 - 23 mg/dL    Creatinine 0.6 0.5 - 1.4 mg/dL    Calcium 8.8 8.7 - 10.5 mg/dL    Total Protein 7.2 6.0 - 8.4 g/dL    Albumin 3.6 3.5 - 5.2 g/dL    Total Bilirubin 0.5 0.1 - 1.0 mg/dL    Alkaline Phosphatase 32 (L) 55 - 135 U/L    AST 21 10 - 40 U/L    ALT 17 10 - 44 U/L    eGFR >60.0 >60 mL/min/1.73 m^2    Anion Gap 9 8 - 16 mmol/L   POCT glucose    Collection Time: 08/22/23 10:56 AM   Result Value Ref Range    POCT Glucose 111 (H) 70 - 110 mg/dL   Specimen to Pathology, Surgery Breast    Collection Time: 08/22/23  1:49 PM   Result Value Ref Range    Final Pathologic Diagnosis       1. 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 (ductal)  HISTOLOGIC GRADE:  Grade 1 of 3       Tubule formation:  3        Pleomorphism: 1        Mitotic rate:  1  TUMOR SIZE:  2.1 x 1.5 x 1.2 cm (measured grossly)  TUMOR FOCALITY:  Single focus of invasive carcinoma  DUCTAL CARCINOMA IN-SITU (DCIS):  Not identified  LOBULAR CARCINOMA IN-SITU (LCIS):  Not identified  TUMOR EXTENT:  Skin is present is NOT involved by tumor  LYMPHOVASCULAR INVASION:  Not identified  DERMAL LYMPHOVASCULAR INVASION:  Not identified  MICROCALCIFICATIONS:  Not identified  TREATMENT EFFECT:  No known pre-surgical thera py  MARGINS:  All margins are negative for invasive carcinoma.           Deep: 5 mm                 Medial:  8 mm           Lateral:   Greater than 10 mm           Anterior: Greater than 10 mm           Lateral:  Greater than 10 mm           Inferior:  Greater than 10 mm  REGIONAL LYMPH NODES:  Not applicable (no lymph nodes submitted or found)  BREAST BIOMARKERS (performed on patient's previous biopsy specimen [facility]-):            ER:   Positive (intermediate, greater than 90%)            OR:   Positive (strong, 1-11%)            Her2:  Negative (1+)            Ki67:  20%      PATHOLOGIC STAGE CLASSIFICATION:  pT2  pN not assigned (no lymph nodes submitted found)      Comment       TUMOR BLOCK: 1D  TISSUE TYPE:  Breast, invasive ductal carcinoma      Gross       Surgery ID:  [id number]   Pathology ID:  [id number]  1. Received in formalin labeled &quot;left partial mastectomy&quot; is a 38 g, 5.3 cm anterior to posterior by 4.1 cm medial to lateral by 5.2 cm superior to inferior portion of breast tissue received oriented by the surgeon as short stitch superior and   long stitch lateral and with a needle localization wire extending from the anterior margin.  The specimen is surfaced by a 3.7 cm superior to inferior by 1.4 cm medial to lateral unremarkable ellipse of skin.  The specimen is sectioned from superior to   inferior into 10 slices averaging 0.5 cm in thickness.  Sectioning reveals a 2.1 cm superior to inferior by 1.2 cm medial to 1 5 cm anterior to posterior well circumscribed, firm, tan-white mass within slices 4-8.  The mass measures 0.4 cm from the   medial margin 0.9 cm from the lateral margin 0.7 cm from the posterior margin, 1.2 cm from the anterior margin, 1 cm from the inferior margin, 1.5 cm from the superior margin, and 1.9 cm from  the skin.  There is a vision biopsy clip identified within the   mass in slice 6.  The remaining cut surface is mixed fibrofatty tissue.  The specimen is inked as follows:  Superior-blue, inferior-green, medial-red, lateral-orange, anterior-yellow, and posterior-black.  The specimen is placed in  formalin at   approximately 1:49 p.m. on 08/22/2023.  Representative sections are submitted as follows:  OWL--1-A:  Slice 1, superior margin, perpendicular  QZD--1-B:  Slice 3, tissue adjacent to mass to include lateral and superior margins   XWF--1-C:  Slice 4, mass to anterior margin, medial margin, and skin  TTO--1-D:  Slice 6, mass to medial and posterior margins   CZZ--1-E:  Slice 8, mass to posterior and lateral margins  CSN--1-F:  Slice 9, tissue adjacent to mass to include lateral and posterior margins   QSB--1-G:  Slice 10, inferior margin, perpendicular        Grossed by: Chaz Blount MS, PA([facility])      Disclaimer       Unless the case is a 'gross only' or additional testing only, the final diagnosis for each specimen is based on a microscopic examination of appropriate tissue sections.         Assessment    1. Type 2 diabetes mellitus with diabetic polyneuropathy, without long-term current use of insulin    2. COPD (chronic obstructive pulmonary disease) with chronic bronchitis    3. Chronic diastolic heart failure    4. Malignant neoplasm of upper-inner quadrant of left breast in female, estrogen receptor positive          Plan    Kaleigh was seen today for follow-up.    Diagnoses and all orders for this visit:    Type 2 diabetes mellitus with diabetic polyneuropathy, without long-term current use of insulin  -     [facility]; Future    COPD (chronic obstructive pulmonary disease) with chronic bronchitis  -     Ambulatory referral/consult to [facility] - Medical & Palliative; Future    Chronic diastolic heart failure  -     [facility]; Future    Malignant neoplasm of upper-inner quadrant of left breast in female, estrogen receptor positive  -     E-Consult to [facility]    Other orders  -     Influenza - Quadrivalent  (Adjuvanted)    Physically, she looks pretty decent today.      Flu shot updated today.      EConsult to Hematology/Oncology to ask about whether she should go ahead and start the anastrozole or not.  They recommended she go ahead and start it.  Patient notified.    Leg swelling, recommended she take a double dose of Lasix (80 mg) when she gets extremely swollen.  Take this for 2-3 days, then resume single dose (40 mg).  Continue compression socks.  Prop up legs.  Avoid salt.  Follow with Cardiology.    Given her age, multiple medical conditions, difficulty with transportation, will ask care at home to come check on her in about three months for follow-up visit.  Then, we can see her in clinic in about six months.      FOLLOW-UP:  Follow up in about 6 months (around 4/10/2024) for check up.    I spent a total of 45 minutes face to face and non-face to face on the date of this visit.This includes time preparing to see the patient (eg, review of tests, notes), obtaining and/or reviewing additional history from an independent historian and/or outside medical records, documenting clinical information in the electronic health record, independently interpreting results and/or communicating results to the patient/family/caregiver, or care coordinator.    Signed by:  Lucio Morris MD

## 2023-10-10 NOTE — TELEPHONE ENCOUNTER
Called and spoke with the pt . Informed the pt can start taking her medication. Pt verbalized and understands.

## 2023-10-10 NOTE — CONSULTS
[facility] 2nd [facility]  Response for E-Consult     Patient Name: Kaleigh Nieves  MRN: [id number]  Primary Care Provider: Lucio Morris MD   Requesting Provider: Lucio Morris MD  Consults    Recommendation:  Start Arimidex now for T2 N0 Oncotype recurrence score breast carcinoma of 26.  She will need a follow-up in approximately 2-3 months from now we will make arrangements to do so    Contingency:  As above    Total time of Consultation: 5 minute    I did not speak to the requesting provider verbally about this.     *This eConsult is based on the clinical data available to me and is furnished without benefit of a physical examination. The eConsult will need to be interpreted in light of any clinical issues or changes in patient status not available to me at the time of filing this eConsults. Significant changes in patient condition or level of acuity should result in immediate formal consultation and reevaluation. Please alert me if you have further questions.    Thank you for this eConsult referral.     Jose Carlos Monroe MD  [facility]

## 2023-10-10 NOTE — PATIENT INSTRUCTIONS
Overall, everything looks pretty good today.      Flu shot updated today.      I sent a message to Dr. Monroe asking if you should go ahead and start the anastrozole.  As soon as I hear back from him, we will let you know.      Okay to restart the baby aspirin daily.      For your heartburn/reflux, instead of Tums, you may want to try using OTC Pepcid (famotidine).  This works quite well to relieve heartburn.      For the swelling in your legs, for the next 2-3 days, try taking two tablets of Lasix instead of just one.  This will help take off some of the fluid.    If it makes it easier on you, I am going to place a referral to our care at home program.  This way, we can have our nurse practitioner come out to see you at home in about three months.  This way, you do not have to find a ride to come all the way here.

## 2023-10-10 NOTE — TELEPHONE ENCOUNTER
----- Message from Lucio Morris MD sent at 10/10/2023 12:42 PM CDT -----  Regarding: Anastrozole  Would you please let her know that I heard back from Dr. Monroe.  He says to go ahead and start the anastrozole today.

## 2023-11-06 ENCOUNTER — APPOINTMENT (OUTPATIENT)
Dept: RADIOLOGY | Facility: HOSPITAL | Age: 83
End: 2023-11-06
Attending: INTERNAL MEDICINE
Payer: MEDICARE

## 2023-11-06 DIAGNOSIS — Z17.0 MALIGNANT NEOPLASM OF UPPER-INNER QUADRANT OF LEFT BREAST IN FEMALE, ESTROGEN RECEPTOR POSITIVE: ICD-10-CM

## 2023-11-06 DIAGNOSIS — C50.212 MALIGNANT NEOPLASM OF UPPER-INNER QUADRANT OF LEFT BREAST IN FEMALE, ESTROGEN RECEPTOR POSITIVE: ICD-10-CM

## 2023-11-06 DIAGNOSIS — M81.0 AGE-RELATED OSTEOPOROSIS WITHOUT CURRENT PATHOLOGICAL FRACTURE: ICD-10-CM

## 2023-11-06 PROCEDURE — 77080 DXA BONE DENSITY AXIAL: CPT | Mod: TC

## 2023-11-06 PROCEDURE — 77080 DXA BONE DENSITY AXIAL SKELETON 1 OR MORE SITES: ICD-10-PCS | Mod: 26,,, | Performed by: RADIOLOGY

## 2023-11-06 PROCEDURE — 77080 DXA BONE DENSITY AXIAL: CPT | Mod: 26,,, | Performed by: RADIOLOGY

## 2023-12-01 ENCOUNTER — PATIENT MESSAGE (OUTPATIENT)
Dept: SURGERY | Facility: CLINIC | Age: 83
End: 2023-12-01
Payer: MEDICARE

## 2023-12-02 ENCOUNTER — PATIENT MESSAGE (OUTPATIENT)
Dept: ADMINISTRATIVE | Facility: HOSPITAL | Age: 83
End: 2023-12-02
Payer: MEDICARE

## 2023-12-26 ENCOUNTER — OFFICE VISIT (OUTPATIENT)
Dept: PODIATRY | Facility: CLINIC | Age: 83
End: 2023-12-26
Payer: MEDICARE

## 2023-12-26 ENCOUNTER — OFFICE VISIT (OUTPATIENT)
Dept: SURGERY | Facility: CLINIC | Age: 83
End: 2023-12-26
Payer: MEDICARE

## 2023-12-26 VITALS — HEIGHT: 66 IN | WEIGHT: 194.25 LBS | BODY MASS INDEX: 31.22 KG/M2

## 2023-12-26 DIAGNOSIS — Z17.0 MALIGNANT NEOPLASM OF UPPER-INNER QUADRANT OF LEFT BREAST IN FEMALE, ESTROGEN RECEPTOR POSITIVE: Primary | ICD-10-CM

## 2023-12-26 DIAGNOSIS — E11.51 TYPE II DIABETES MELLITUS WITH PERIPHERAL CIRCULATORY DISORDER: Primary | ICD-10-CM

## 2023-12-26 DIAGNOSIS — C50.212 MALIGNANT NEOPLASM OF UPPER-INNER QUADRANT OF LEFT BREAST IN FEMALE, ESTROGEN RECEPTOR POSITIVE: Primary | ICD-10-CM

## 2023-12-26 DIAGNOSIS — B35.1 DERMATOPHYTOSIS OF NAIL: ICD-10-CM

## 2023-12-26 PROCEDURE — 3072F PR LOW RISK FOR RETINOPATHY: ICD-10-PCS | Mod: CPTII,S$GLB,, | Performed by: SURGERY

## 2023-12-26 PROCEDURE — 99999 PR PBB SHADOW E&M-EST. PATIENT-LVL III: CPT | Mod: PBBFAC,,, | Performed by: PODIATRIST

## 2023-12-26 PROCEDURE — 99214 OFFICE O/P EST MOD 30 MIN: CPT | Mod: S$GLB,,, | Performed by: SURGERY

## 2023-12-26 PROCEDURE — 11721 DEBRIDE NAIL 6 OR MORE: CPT | Mod: Q8,S$GLB,, | Performed by: PODIATRIST

## 2023-12-26 PROCEDURE — 99499 UNLISTED E&M SERVICE: CPT | Mod: S$GLB,,, | Performed by: PODIATRIST

## 2023-12-26 PROCEDURE — 99214 PR OFFICE/OUTPT VISIT, EST, LEVL IV, 30-39 MIN: ICD-10-PCS | Mod: S$GLB,,, | Performed by: SURGERY

## 2023-12-26 PROCEDURE — 3072F LOW RISK FOR RETINOPATHY: CPT | Mod: CPTII,S$GLB,, | Performed by: SURGERY

## 2023-12-26 NOTE — PROGRESS NOTES
Breast Surgical Oncology  [place]    Date of Service: 12/26/2023    DIAGNOSIS:   83 y.o. female with a history of left breast cancer.    Date of Diagnosis: 6/20/11  Pathology: IDC, G1, ER >90%, WA 1-11%, HER2 1+, Ki67 20%  Clinical Stage: IA (cT2 cN0 Mx)  Pathologic Stage: IA (pT2 pNx Mx)    TREATMENT:   Surgery: Left partial mastectomy on 8/22/23. Susan Pulliam M.D. Breast Surgical Oncology  Systemic Therapy: Endocrine therapy with Arimidex from 9/11/23 to ongoing, Oncotype DX score 26  Medical Oncologist: Jose Carlos Monroe M.D.     Radiation Treatment Summary: patient deferred  Radiation Oncologist: N/A  Genetics: negative MYRIAD  Physical Therapy: N/A, no axillary surgery    HISTORY OF PRESENT ILLNESS:   Kaleigh Nieves is here for oncological follow up.  Today, she denies new breast concerns such as pain, masses, skin changes, nipple discharge, nipple retraction or lumps under the arm.  She also denies bone pain, shortness of breath, abdominal pain and neurologic symptoms.     She is experiencing hot flashes from the Arimidex    IMAGING:   No new     MEDICATIONS/ALLERGIES:     Current Outpatient Medications:     albuterol (PROVENTIL/VENTOLIN HFA) 90 mcg/actuation inhaler, INHALE 2 PUFFS INTO THE LUNGS EVERY SIX HOURS AS NEEDED FOR WHEEZING, Disp: 18 g, Rfl: 11    allopurinoL (ZYLOPRIM) 100 MG tablet, Take 1 tablet (100 mg total) by mouth once daily., Disp: 90 tablet, Rfl: 3    anastrozole (ARIMIDEX) 1 mg Tab, Take 1 tablet (1 mg total) by mouth once daily., Disp: 90 tablet, Rfl: 3    aspirin 81 MG Chew, Take 81 mg by mouth once daily., Disp: , Rfl:     bisoprolol (ZEBETA) 5 MG tablet, Take 1 tablet (5 mg total) by mouth once daily., Disp: 90 tablet, Rfl: 3    blood glucose control high,low Soln, 1 each by Misc.(Non-Drug; Combo Route) route once a week., Disp: 1 each, Rfl: 11    blood sugar diagnostic Strp, To check BG 3 times daily, to use with insurance preferred meter, Disp: 200 each, Rfl: 0     blood-glucose meter kit, To check BG 3 times daily, to use with insurance preferred meter, Disp: 1 each, Rfl: 0    fluticasone-salmeterol diskus inhaler 250-50 mcg, Inhale 1 puff into the lungs 2 (two) times daily. Controller, Disp: 180 each, Rfl: 3    furosemide (LASIX) 40 MG tablet, Take 1 tablet (40 mg total) by mouth once daily., Disp: 90 tablet, Rfl: 3    lancets Misc, To check BG 3 times daily, to use with insurance preferred meter, Disp: 200 each, Rfl: 3    latanoprost 0.005 % ophthalmic solution, Place 1 drop into both eyes once daily., Disp: 7.5 mL, Rfl: 3    ondansetron (ZOFRAN-ODT) 8 MG TbDL, Dissolve 1 tablet (8 mg total) by mouth every 8 (eight) hours as needed (Nausea)., Disp: 10 tablet, Rfl: 0    simvastatin (ZOCOR) 20 MG tablet, Take 1 tablet (20 mg total) by mouth nightly., Disp: 90 tablet, Rfl: 3    timolol maleate 0.5% (TIMOPTIC) 0.5 % Drop, Place 1 drop into both eyes 2 (two) times daily., Disp: 30 mL, Rfl: 4    timoloL maleate, PF, 0.5 % Dpet, Place into both eyes., Disp: , Rfl:   Review of patient's allergies indicates:   Allergen Reactions    Iodine and iodide containing products Nausea And Vomiting    Penicillins Itching    Ultram [tramadol] Hives and Itching     Light headness, itiching    Sulfa (sulfonamide antibiotics) Hives and Itching    Adhesive Other (See Comments)    Amlodipine      edema    Sulfamethoxazole-trimethoprim Itching, Rash and Nausea And Vomiting       PHYSICAL EXAM:   General: The patient appears well and is in no acute distress.     Chaperone is present for examination.   BREAST EXAM  No Asymmetry  Right:  - Mass: No  - Skin change: No  - Nipple Discharge: No  - Nipple retraction: No  - Axillary LAD: No  Left:   - Mass: No  - Skin change: No  - Nipple Discharge: No  - Nipple retraction: No  - Axillary LAD: No      ASSESSMENT:   Diagnoses and all orders for this visit:    Malignant neoplasm of upper-inner quadrant of left breast in female, estrogen receptor positive        PLAN:   Kaleigh has a benign exam today without evidence of local or distant relapse.    She will return in April for her 6 month follow up visit, or sooner should she develop breast concerns.    Bilateral diagnostic mammogram is scheduled upon return.     The patient is in agreement with the plan. Questions were encouraged and answered to patient's satisfaction. Kaleigh will call our office with any questions or concerns.     I spent a total of 30 minutes on this visit. This includes face to face time and non-face to face time preparing to see the patient (eg, review of tests), obtaining and/or reviewing separately obtained history, documenting clinical information in the electronic or other health record, independently interpreting results and communicating results to the patient/family/caregiver, or care coordinator.       Susan Pulliam M.D.

## 2024-01-06 NOTE — PROGRESS NOTES
Subjective:     Patient ID: Kaleigh Nieves is a 83 y.o. female.    Chief Complaint: Foot Pain (Patient complains of occasional bilateral foot pain/swelling due to gout. Patient is diabetic, currently no pain. Last seen PCP 10/2023.)    Kaleigh is a 83 y.o. female who presents to the clinic for evaluation and treatment of high risk feet. 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 The patient's chief complaint is long, thick toenails. This patient has documented high risk feet requiring routine maintenance secondary to diabetes mellitis and those secondary complications of diabetes, as mentioned.     PCP: Lucio Morris MD    Date Last Seen by PCP: 10/10/2023    Current shoe gear:  Affected Foot: Rx diabetic extra depth shoes and custom accommodative insoles     Unaffected Foot: Rx diabetic extra depth shoes and custom accommodative insoles    Hemoglobin A1C   Date Value Ref Range Status   03/03/2023 6.0 (H) 4.0 - 5.6 % Final     Comment:     ADA Screening Guidelines:  5.7-6.4%  Consistent with prediabetes  >or=6.5%  Consistent with diabetes    High levels of fetal hemoglobin interfere with the HbA1C  assay. Heterozygous hemoglobin variants (HbS, HgC, etc)do  not significantly interfere with this assay.   However, presence of multiple variants may affect accuracy.     08/31/2022 5.7 (H) 4.0 - 5.6 % Final     Comment:     ADA  Screening Guidelines:  5.7-6.4%  Consistent with prediabetes  >or=6.5%  Consistent with diabetes    High levels of fetal hemoglobin interfere with the HbA1C  assay. Heterozygous hemoglobin variants (HbS, HgC, etc)do  not significantly interfere with this assay.   However, presence of multiple variants may affect accuracy.     03/21/2022 5.9 (H) 4.0 - 5.6 % Final     Comment:     ADA Screening Guidelines:  5.7-6.4%  Consistent with prediabetes  >or=6.5%  Consistent with diabetes    High levels of fetal hemoglobin interfere with the HbA1C  assay. Heterozygous hemoglobin variants (HbS, HgC, etc)do  not significantly interfere with this assay.   However, presence of multiple variants may affect accuracy.         Patient Active Problem List   Diagnosis    Type 2 diabetes mellitus with diabetic polyneuropathy, without long-term current use of insulin    Multiple joint pain    Esophageal reflux    Hyperlipidemia    Chronic gout    Lichen sclerosus    Carotid artery disease    Coronary artery disease involving native coronary artery without angina pectoris    Colon polyp    Hypertension associated with diabetes    Aortic atherosclerosis    Arterial insufficiency of lower extremity    Ganglion cyst of flexor tendon sheath of finger of right hand    Bilateral carpal tunnel syndrome    Hyperkalemia    Chronic bronchitis    Dependent edema    Diverticulosis    Primary open-angle glaucoma, bilateral, indeterminate stage    RIVAS (dyspnea on exertion)    Chronic diastolic heart failure    Low vitamin D level    Body mass index (BMI) 34.0-34.9, adult    Unspecified lump in the left breast, upper inner quadrant    Malignant neoplasm of upper-inner quadrant of left breast in female, estrogen receptor positive    Immunodeficiency due to chemotherapy       Medication List with Changes/Refills   Current Medications    ALBUTEROL (PROVENTIL/VENTOLIN HFA) 90 MCG/ACTUATION INHALER    INHALE 2 PUFFS INTO THE LUNGS EVERY SIX HOURS AS NEEDED  FOR WHEEZING    ALLOPURINOL (ZYLOPRIM) 100 MG TABLET    Take 1 tablet (100 mg total) by mouth once daily.    ANASTROZOLE (ARIMIDEX) 1 MG TAB    Take 1 tablet (1 mg total) by mouth once daily.    ASPIRIN 81 MG CHEW    Take 81 mg by mouth once daily.    BISOPROLOL (ZEBETA) 5 MG TABLET    Take 1 tablet (5 mg total) by mouth once daily.    BLOOD GLUCOSE CONTROL HIGH,LOW SOLN    1 each by Misc.(Non-Drug; Combo Route) route once a week.    BLOOD SUGAR DIAGNOSTIC STRP    To check BG 3 times daily, to use with insurance preferred meter    BLOOD-GLUCOSE METER KIT    To check BG 3 times daily, to use with insurance preferred meter    FLUTICASONE-SALMETEROL DISKUS INHALER 250-50 MCG    Inhale 1 puff into the lungs 2 (two) times daily. Controller    FUROSEMIDE (LASIX) 40 MG TABLET    Take 1 tablet (40 mg total) by mouth once daily.    LANCETS MISC    To check BG 3 times daily, to use with insurance preferred meter    LATANOPROST 0.005 % OPHTHALMIC SOLUTION    Place 1 drop into both eyes once daily.    ONDANSETRON (ZOFRAN-ODT) 8 MG TBDL    Dissolve 1 tablet (8 mg total) by mouth every 8 (eight) hours as needed (Nausea).    SIMVASTATIN (ZOCOR) 20 MG TABLET    Take 1 tablet (20 mg total) by mouth nightly.    TIMOLOL MALEATE 0.5% (TIMOPTIC) 0.5 % DROP    Place 1 drop into both eyes 2 (two) times daily.    TIMOLOL MALEATE, PF, 0.5 % DPET    Place into both eyes.       Review of patient's allergies indicates:   Allergen Reactions    Iodine and iodide containing products Nausea And Vomiting    Penicillins Itching    Ultram [tramadol] Hives and Itching     Light headness, itiching    Sulfa (sulfonamide antibiotics) Hives and Itching    Adhesive Other (See Comments)    Amlodipine      edema    Sulfamethoxazole-trimethoprim Itching, Rash and Nausea And Vomiting       Past Surgical History:   Procedure Laterality Date    APPENDECTOMY      CARPAL TUNNEL RELEASE Right     CATARACT EXTRACTION, BILATERAL      CHOLECYSTECTOMY      CYST REMOVAL   2018    DILATION AND CURETTAGE OF UTERUS      EYE SURGERY      HEMORRHOID SURGERY      HYSTERECTOMY      fibroids    INCISION AND DRAINAGE PERIRECTAL ABSCESS      JOINT REPLACEMENT Bilateral     knee    KNEE ARTHROSCOPY Right     LOCALIZATION OF MASS OF BREAST WITH ULTRASOUND GUIDANCE Left 2023    Procedure: LOCALIZATION, MASS, BREAST, WITH US GUIDANCE;  Surgeon: Susan Pulliam MD;  Location: [facility] OR;  Service: General;  Laterality: Left;  Intra-operatively Placed Radiographic Marker (Wire)    MASTECTOMY, PARTIAL Left 2023    Procedure: MASTECTOMY, PARTIAL;  Surgeon: Susan Pulliam MD;  Location: [facility] OR;  Service: General;  Laterality: Left;  with Interpretation of Specimen Mammogram    MYOMECTOMY         Family History   Problem Relation Age of Onset    Hypertension Mother     Diabetes Mother     Leukemia Sister     Hypertension Sister     Cancer Sister         leukemia    Heart disease Maternal Aunt     Cancer Maternal Uncle         gastric, pacreatic    Heart disease Maternal Uncle     Miscarriages / Stillbirths Maternal Uncle     Diabetes Maternal Grandmother     Asthma Maternal Grandfather     Breast cancer Neg Hx     Colon cancer Neg Hx     Ovarian cancer Neg Hx     COPD Neg Hx     Hyperlipidemia Neg Hx     Kidney disease Neg Hx        Social History     Socioeconomic History    Marital status: Single    Number of children: 0   Tobacco Use    Smoking status: Former     Current packs/day: 0.00     Average packs/day: 0.3 packs/day for 24.0 years (6.0 ttl pk-yrs)     Types: Cigarettes     Start date: 1952     Quit date: 1976     Years since quittin.5    Smokeless tobacco: Never   Substance and Sexual Activity    Alcohol use: No    Drug use: No    Sexual activity: Not Currently     Social Determinants of Health     Financial Resource Strain: Low Risk  (2022)    Overall Financial Resource Strain (CARDIA)     Difficulty of Paying Living Expenses: Not hard at all   Food  "Insecurity: No Food Insecurity (9/27/2022)    Hunger Vital Sign     Worried About Running Out of Food in the Last Year: Never true     Ran Out of Food in the Last Year: Never true   Transportation Needs: No Transportation Needs (9/27/2022)    PRAPARE - Transportation     Lack of Transportation (Medical): No     Lack of Transportation (Non-Medical): No   Physical Activity: Inactive (9/27/2022)    Exercise Vital Sign     Days of Exercise per Week: 0 days     Minutes of Exercise per Session: 0 min   Stress: No Stress Concern Present (9/27/2022)    [employer] [place] [employer] - Occupational Stress Questionnaire     Feeling of Stress : Not at all   Social Connections: Socially Isolated (9/27/2022)    Social Connection and Isolation Panel [NHANES]     Frequency of Communication with Friends and Family: Once a week     Frequency of Social Gatherings with Friends and Family: Once a week     Attends Anabaptism Services: More than 4 times per year     Active Member of Clubs or Organizations: No     Attends Club or Organization Meetings: Never     Marital Status:    Housing Stability: Low Risk  (9/27/2022)    Housing Stability Vital Sign     Unable to Pay for Housing in the Last Year: No     Number of Places Lived in the Last Year: 1     Unstable Housing in the Last Year: No       Vitals:    12/26/23 1102   Weight: 88.1 kg (194 lb 3.6 oz)   Height: 5' 6" (1.676 m)   PainSc: 0-No pain       Hemoglobin A1C   Date Value Ref Range Status   03/03/2023 6.0 (H) 4.0 - 5.6 % Final     Comment:     ADA Screening Guidelines:  5.7-6.4%  Consistent with prediabetes  >or=6.5%  Consistent with diabetes    High levels of fetal hemoglobin interfere with the HbA1C  assay. Heterozygous hemoglobin variants (HbS, HgC, etc)do  not significantly interfere with this assay.   However, presence of multiple variants may affect accuracy.     08/31/2022 5.7 (H) 4.0 - 5.6 % Final     Comment:     ADA Screening Guidelines:  5.7-6.4%  " Consistent with prediabetes  >or=6.5%  Consistent with diabetes    High levels of fetal hemoglobin interfere with the HbA1C  assay. Heterozygous hemoglobin variants (HbS, HgC, etc)do  not significantly interfere with this assay.   However, presence of multiple variants may affect accuracy.     03/21/2022 5.9 (H) 4.0 - 5.6 % Final     Comment:     ADA Screening Guidelines:  5.7-6.4%  Consistent with prediabetes  >or=6.5%  Consistent with diabetes    High levels of fetal hemoglobin interfere with the HbA1C  assay. Heterozygous hemoglobin variants (HbS, HgC, etc)do  not significantly interfere with this assay.   However, presence of multiple variants may affect accuracy.         Review of Systems   Constitutional:  Negative for chills and fever.   Respiratory:  Negative for shortness of breath.    Cardiovascular:  Positive for leg swelling (improved). Negative for chest pain, palpitations, orthopnea and claudication.   Gastrointestinal:  Negative for diarrhea, nausea and vomiting.   Musculoskeletal:  Negative for joint pain.   Skin:  Negative for rash.   Neurological:  Negative for dizziness, tingling, sensory change, focal weakness and weakness.   Psychiatric/Behavioral: Negative.               Objective:      PHYSICAL EXAM: Apperance: Alert and orient in no distress,well developed, and with good attention to grooming and body habits  Patient presents ambulating in diabetic shoes and inserts.   LOWER EXTREMITY EXAM:  VASCULAR: Dorsalis pedis pulses 0/4 bilateral (monophasic with doppler) and Posterior Tibial pulses 0/4 bilateral (biphasic with doppler). Capillary fill time <blanched bilateral. Mild edema observed bilateral. Varicosities present bilateral. Skin temperature of the lower extremities is warm to cool, proximal to distal. Hair growth absent bilateral.  DERMATOLOGICAL: No skin rashes, subcutaneous nodules, lesions, or ulcers observed bilateral. Nails 1,2,3,4,5 bilateral elongated, thickened, and discolored  with subungual debris. Webspaces 1,2,3,4clean, dry and without evidence of break in skin integrity bilateral.   NEUROLOGICAL: Light touch, sharp-dull, proprioception all present and equal bilaterally.  Vibratory sensation intact on left hallux and diminished at right hallux. Protective sensation intact at all 10 sites as tested with a [employer] 5.07 monofilament.   MUSCULOSKELETAL: Muscle strength is 5/5 for foot inverters, everters, plantarflexors, and dorsiflexors. Muscle tone is normal. No pain on palpation of right dorsal foot/ankle. Pes planus noted bilateral. Flexible hammertoes noted bilateral.         Assessment:       ICD-10-CM ICD-9-CM   1. Type II diabetes mellitus with peripheral circulatory disorder  E11.51 250.70     443.81   2. Dermatophytosis of nail  B35.1 110.1       Plan:   Type II diabetes mellitus with peripheral circulatory disorder    Dermatophytosis of nail    I counseled the patient on her conditions, regarding findings of my examination, my impressions, and usual treatment plan.   Appointment spent on education about the diabetic foot, neuropathy, and prevention of limb loss.  Shoe inspection. Diabetic Foot Education. Patient reminded of the importance of good nutrition and blood sugar control to help prevent podiatric complications of diabetes. Patient instructed on proper foot hygeine. We discussed wearing proper shoe gear, daily foot inspections, never walking without protective shoe gear, never putting sharp instruments to feet.    With patient's permission, nails 1-5 bilateral were debrided/trimmed in length and thickness aggressively to their soft tissue attachment mechanically and with electric , removing all offending nail and debris. Patient relates relief following the procedure.  Patient  will continue to monitor the areas daily, inspect feet, wear protective shoe gear when ambulatory, moisturizer to maintain skin integrity. Patient reminded of the importance of  good nutrition and blood sugar control to help prevent podiatric complications of diabetes.  Patient to return 4 months or sooner if needed.          Ino Harmon DPM  [facility]

## 2024-01-16 DIAGNOSIS — E11.42 TYPE 2 DIABETES MELLITUS WITH DIABETIC POLYNEUROPATHY, WITHOUT LONG-TERM CURRENT USE OF INSULIN: ICD-10-CM

## 2024-01-16 NOTE — TELEPHONE ENCOUNTER
No care due was identified.  Health [facility] Embedded Care Due Messages. Reference number: [id number].   1/16/2024 4:10:21 PM CST

## 2024-01-17 RX ORDER — BLOOD SUGAR DIAGNOSTIC
STRIP MISCELLANEOUS
Qty: 300 STRIP | Refills: 3 | Status: SHIPPED | OUTPATIENT
Start: 2024-01-17

## 2024-01-17 NOTE — TELEPHONE ENCOUNTER
Refill Routing Note   Medication(s) are not appropriate for processing by [facility] for the following reason(s):        New or recently adjusted medication    [facility] action(s):  Defer               Appointments  past 12m or future 3m with PCP    Date Provider   Last Visit   10/10/2023 Lucio Morris MD   Next Visit   4/10/2024 Lucio Morris MD   ED visits in past 90 days: 0        Note composed:9:34 AM 01/17/2024

## 2024-01-24 DIAGNOSIS — R06.09 DOE (DYSPNEA ON EXERTION): ICD-10-CM

## 2024-01-24 DIAGNOSIS — E78.5 HYPERLIPIDEMIA, UNSPECIFIED HYPERLIPIDEMIA TYPE: ICD-10-CM

## 2024-01-24 DIAGNOSIS — I50.42 CHRONIC COMBINED SYSTOLIC AND DIASTOLIC HEART FAILURE: ICD-10-CM

## 2024-01-26 RX ORDER — BISOPROLOL FUMARATE 5 MG/1
5 TABLET, FILM COATED ORAL DAILY
Qty: 90 TABLET | Refills: 3 | Status: SHIPPED | OUTPATIENT
Start: 2024-01-26 | End: 2025-01-25

## 2024-01-26 RX ORDER — SIMVASTATIN 20 MG/1
20 TABLET, FILM COATED ORAL NIGHTLY
Qty: 90 TABLET | Refills: 3 | Status: SHIPPED | OUTPATIENT
Start: 2024-01-26

## 2024-01-29 ENCOUNTER — OFFICE VISIT (OUTPATIENT)
Dept: CARDIOLOGY | Facility: CLINIC | Age: 84
End: 2024-01-29
Payer: MEDICARE

## 2024-01-29 VITALS
WEIGHT: 200.81 LBS | DIASTOLIC BLOOD PRESSURE: 64 MMHG | OXYGEN SATURATION: 96 % | SYSTOLIC BLOOD PRESSURE: 146 MMHG | HEART RATE: 59 BPM | BODY MASS INDEX: 32.42 KG/M2

## 2024-01-29 DIAGNOSIS — I50.32 CHRONIC DIASTOLIC HEART FAILURE: Primary | ICD-10-CM

## 2024-01-29 DIAGNOSIS — E78.2 MIXED HYPERLIPIDEMIA: ICD-10-CM

## 2024-01-29 DIAGNOSIS — E66.01 SEVERE OBESITY (BMI 35.0-35.9 WITH COMORBIDITY): ICD-10-CM

## 2024-01-29 DIAGNOSIS — E11.42 TYPE 2 DIABETES MELLITUS WITH DIABETIC POLYNEUROPATHY, WITHOUT LONG-TERM CURRENT USE OF INSULIN: ICD-10-CM

## 2024-01-29 DIAGNOSIS — E11.51 TYPE II DIABETES MELLITUS WITH PERIPHERAL CIRCULATORY DISORDER: ICD-10-CM

## 2024-01-29 PROCEDURE — 3077F SYST BP >= 140 MM HG: CPT | Mod: CPTII,S$GLB,, | Performed by: INTERNAL MEDICINE

## 2024-01-29 PROCEDURE — 1101F PT FALLS ASSESS-DOCD LE1/YR: CPT | Mod: CPTII,S$GLB,, | Performed by: INTERNAL MEDICINE

## 2024-01-29 PROCEDURE — 1159F MED LIST DOCD IN RCRD: CPT | Mod: CPTII,S$GLB,, | Performed by: INTERNAL MEDICINE

## 2024-01-29 PROCEDURE — 3288F FALL RISK ASSESSMENT DOCD: CPT | Mod: CPTII,S$GLB,, | Performed by: INTERNAL MEDICINE

## 2024-01-29 PROCEDURE — 1126F AMNT PAIN NOTED NONE PRSNT: CPT | Mod: CPTII,S$GLB,, | Performed by: INTERNAL MEDICINE

## 2024-01-29 PROCEDURE — 3078F DIAST BP <80 MM HG: CPT | Mod: CPTII,S$GLB,, | Performed by: INTERNAL MEDICINE

## 2024-01-29 PROCEDURE — 99214 OFFICE O/P EST MOD 30 MIN: CPT | Mod: S$GLB,,, | Performed by: INTERNAL MEDICINE

## 2024-01-29 PROCEDURE — 99999 PR PBB SHADOW E&M-EST. PATIENT-LVL IV: CPT | Mod: PBBFAC,,, | Performed by: INTERNAL MEDICINE

## 2024-01-29 RX ORDER — BUMETANIDE 1 MG/1
1 TABLET ORAL DAILY
Qty: 30 TABLET | Refills: 11 | Status: SHIPPED | OUTPATIENT
Start: 2024-01-29 | End: 2025-01-28

## 2024-01-29 NOTE — PROGRESS NOTES
Subjective:   Patient ID:  Kaleigh Nieves is a 83 y.o. female who presents for evaluation of Shortness of Breath and Leg Swelling  1.29.2024  Comes in for six-month follow-up.    She states that her swelling has increased lately.  No inflammatory findings of her joints on exam today to suggest gout attack.    She also has dyspnea on exertion and orthopnea.  She states that when she raises her legs her dyspnea gets worse.    Denies any chest pain palpitations syncope or presyncope.    She takes the Lasix every other day due to the fact she sees her pressure be in the 100s.    However she states that the Lasix is not working for her.    Her BNP has been elevated in the past.      7.3.2023  Found to have breast cancer.  He is to go for surgery.    She continues to complain of same level of dyspnea on exertion.    She does have chronic lower extremity swelling from venous insufficiency.    She denies any chest pain.    Her EKG looks normal.        2.1.2023  Comes in for a 6 months follow up   Denies any worsening or unusual rivas   She states the lasix is not helping with her chronic lower ext swelling   She follows with samina for venous insufficiency   She denies any orthopnea   Bnp normal multiple times       6.2022  Improved symptoms after increasing lasix and adding bisoprolol last visit  Still has NYHA BUT 1 Now  No chest pain   Trace edema, wearing compression stocking   Sometimes has wheezing and uses ventoling   Normal BNP multiple times   Mild AS AI last year     12.2021  Comes in for 6 months follow   Denies any CP, reports RIVAS NYHA 1-2, Increased leg swelling, states lasix didn't make a big difference on 20 mg only   BP uncontrolled   Denies PND, orthopnea  No palpitaitons  No syncope or LOC       Interval hx: 5/26/2021   Went to the ED last month with sharp, LUQ pain, underneath her left breast, sharp few seconds only, across her belly, felt like gas as per patient and she felt better once she started to pass  gas   Denies any exertional chest pain   Has stable rivas, nyha1 , had normal BNP , TROP AND EKG in the ED, had very elevated  SBP   Today bp under good control   No more stomach pain   See ros     =================================================  HPI 2/1/2021  80-year-old female with history of diastolic heart failure, diabetes, hypertension, hyperlipidemia, gout, carotid artery disease and normal angiogram 2010  Comes in for follow up   Continues to have RIVAS , NYHA 2, Orthopnea, mild bilateral LE edema R>L   Compliant with low salt diet   Denies any chest pain   /750 at home     ==================================================================================================================================    2018: seen by Dr Jane   79 yo female, referred for CHF. Prior cardiologist Dr. Crawford at [facility].due to f/h CHF  PMH DM, HTN, HLD, gout, carotid artery Dz and had clear LHC in 2010.  No chest pain, palpitation, dizziness, orthopnea.  Some leg swelling,   RIVAS stable  .ECHO showed normal EF and LVH; MPI showed no ischemia  : BNP 34; BARRON wnl  ekg NSR    Shortness of Breath  Associated symptoms include leg swelling. Pertinent negatives include no chest pain or syncope.       Current Outpatient Medications:     ACCU-CHEK GUIDE TEST STRIPS Strp, TEST 3 TIMES DAILY, Disp: 300 strip, Rfl: 3    albuterol (PROVENTIL/VENTOLIN HFA) 90 mcg/actuation inhaler, INHALE 2 PUFFS INTO THE LUNGS EVERY SIX HOURS AS NEEDED FOR WHEEZING, Disp: 18 g, Rfl: 11    allopurinoL (ZYLOPRIM) 100 MG tablet, Take 1 tablet (100 mg total) by mouth once daily., Disp: 90 tablet, Rfl: 3    anastrozole (ARIMIDEX) 1 mg Tab, Take 1 tablet (1 mg total) by mouth once daily., Disp: 90 tablet, Rfl: 3    aspirin 81 MG Chew, Take 81 mg by mouth once daily., Disp: , Rfl:     bisoprolol (ZEBETA) 5 MG tablet, TAKE 1 TABLET (5 MG TOTAL) BY MOUTH ONCE DAILY., Disp: 90 tablet, Rfl: 3    blood glucose control high,low Soln, 1 each by  Misc.(Non-Drug; Combo Route) route once a week., Disp: 1 each, Rfl: 11    blood-glucose meter kit, To check BG 3 times daily, to use with insurance preferred meter, Disp: 1 each, Rfl: 0    fluticasone-salmeterol diskus inhaler 250-50 mcg, Inhale 1 puff into the lungs 2 (two) times daily. Controller, Disp: 180 each, Rfl: 3    lancets Misc, To check BG 3 times daily, to use with insurance preferred meter, Disp: 200 each, Rfl: 3    ondansetron (ZOFRAN-ODT) 8 MG TbDL, Dissolve 1 tablet (8 mg total) by mouth every 8 (eight) hours as needed (Nausea)., Disp: 10 tablet, Rfl: 0    simvastatin (ZOCOR) 20 MG tablet, TAKE 1 TABLET (20 MG TOTAL) BY MOUTH NIGHTLY., Disp: 90 tablet, Rfl: 3    timoloL maleate, PF, 0.5 % Dpet, Place into both eyes., Disp: , Rfl:     bumetanide (BUMEX) 1 MG tablet, Take 1 tablet (1 mg total) by mouth once daily., Disp: 30 tablet, Rfl: 11    latanoprost 0.005 % ophthalmic solution, Place 1 drop into both eyes once daily., Disp: 7.5 mL, Rfl: 3    timolol maleate 0.5% (TIMOPTIC) 0.5 % Drop, Place 1 drop into both eyes 2 (two) times daily., Disp: 30 mL, Rfl: 4    Past Medical History:   Diagnosis Date    Anemia     Angina pectoris     Arthritis     Asthma     Back pain     Bronchitis     CAD (coronary artery disease) 07/2015    via [facility]    Carotid artery stenosis     Chronic bronchitis     Chronic gout     Colon polyp     CTS (carpal tunnel syndrome)     Diabetes mellitus, type 2     Diabetes with neurologic complications     Diverticulosis     RIVAS (dyspnea on exertion)     chronic; eval pulm dr natarajan    Dyslipidemia     Ex-smoker     quit in her 30s    GERD (gastroesophageal reflux disease)     Heart failure     Hyperlipidemia     Hypertension     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    Neuropathy, lower extremity     Obesity     Open-angle glaucoma     dr avel kaur    Peripheral vascular disease     Polyneuropathy     Tobacco dependence        Past Surgical History:   Procedure  Laterality Date    APPENDECTOMY      CARPAL TUNNEL RELEASE Right     CATARACT EXTRACTION, BILATERAL      CHOLECYSTECTOMY      CYST REMOVAL      DILATION AND CURETTAGE OF UTERUS      EYE SURGERY      HEMORRHOID SURGERY      HYSTERECTOMY      fibroids    INCISION AND DRAINAGE PERIRECTAL ABSCESS      JOINT REPLACEMENT Bilateral     knee    KNEE ARTHROSCOPY Right     LOCALIZATION OF MASS OF BREAST WITH ULTRASOUND GUIDANCE Left 2023    Procedure: LOCALIZATION, MASS, BREAST, WITH US GUIDANCE;  Surgeon: Susan Pulliam MD;  Location: [facility] OR;  Service: General;  Laterality: Left;  Intra-operatively Placed Radiographic Marker (Wire)    MASTECTOMY, PARTIAL Left 2023    Procedure: MASTECTOMY, PARTIAL;  Surgeon: Susan Pulliam MD;  Location: [facility] OR;  Service: General;  Laterality: Left;  with Interpretation of Specimen Mammogram    MYOMECTOMY         Social History     Tobacco Use    Smoking status: Former     Current packs/day: 0.00     Average packs/day: 0.3 packs/day for 24.0 years (6.0 ttl pk-yrs)     Types: Cigarettes     Start date: 1952     Quit date: 1976     Years since quittin.6    Smokeless tobacco: Never   Substance Use Topics    Alcohol use: No    Drug use: No       Family History   Problem Relation Age of Onset    Hypertension Mother     Diabetes Mother     Leukemia Sister     Hypertension Sister     Cancer Sister         leukemia    Heart disease Maternal Aunt     Cancer Maternal Uncle         gastric, pacreatic    Heart disease Maternal Uncle     Miscarriages / Stillbirths Maternal Uncle     Diabetes Maternal Grandmother     Asthma Maternal Grandfather     Breast cancer Neg Hx     Colon cancer Neg Hx     Ovarian cancer Neg Hx     COPD Neg Hx     Hyperlipidemia Neg Hx     Kidney disease Neg Hx        Review of Systems   Cardiovascular:  Positive for leg swelling. Negative for chest pain, dyspnea on exertion, palpitations and syncope.   Respiratory:  Positive for shortness  "of breath.    Genitourinary: Negative.    Neurological: Negative.        Objective:  Last 3 sets of Vitals        10/10/2023    11:33 AM 12/26/2023    11:02 AM 1/29/2024    10:46 AM   Vitals - 1 value per visit   SYSTOLIC 132  146   DIASTOLIC 52  64   Pulse 65  59   Temp 97.6 °F (36.4 °C)     SPO2 99 %  96 %   Weight (lb) 194.23 194.23 200.84   Weight (kg) 88.1 88.1 91.1   Height 5' 6" (1.676 m) 5' 6" (1.676 m)    BMI (Calculated) 31.4 31.4    Pain Score Zero Zero Zero        Physical Exam  Vitals reviewed.   Constitutional:       Appearance: She is well-developed.   Neck:      Vascular: No carotid bruit.   Cardiovascular:      Rate and Rhythm: Normal rate and regular rhythm.      Pulses: Intact distal pulses.      Heart sounds: Murmur heard.   Pulmonary:      Breath sounds: Normal breath sounds.   Musculoskeletal:         General: Swelling present.   Neurological:      Mental Status: She is oriented to person, place, and time.     2017   -------  Real-time, color flow and Doppler imaging performed.    Atherosclerotic plaque noted within the bilateral bulb regions and RIGHT proximal ICA and ECA.                     ICA                                    R                               L  Peak systolic velocity:          88  Cm/sec               133 cm/sec  Peak diastolic velocity:        18 Cm/sec                   20 cm/sec  Systolic velocity ratio:          1.1                             1.2  Diastolic velocity ratio:         1.6                             1.8  Vertebral artery flow:            Antegrade                             antegrade  ECA flow:                                 Antegrade                             antegrade  IMPRESSION:     1.  Stable exam.   2.  No hemodynamically significant plaque formation or stenosis on the RIGHT.   3.  Stable findings on the LEFT with minimally elevated to PSV within the ICA.  Stenosis is in the less than 50 percent range.   4.  Followup and/or further evaluation as " warranted.  ==============================================================================   Impression: NORMAL MYOCARDIAL PERFUSION 2016  1. The perfusion scan is free of evidence for myocardial ischemia or injury.   2. There is a mild intensity fixed defect in the anterior wall of the left ventricle, secondary to breast attenuation.   3. Resting wall motion is physiologic.   4. Resting LV function is normal.   5. The ventricular volumes are normal at rest and stress.   6. The extracardiac distribution of radioactivity is normal.   ==============================================================================  2017  The right ankle brachial index was 1.24 which is normal.   The left ankle brachial index was 0.99 which suggests minimal left lower extremity arterial disease.   The right TBI is 0.45.   The left TBI is 0.49.   ==============================================================================  CONCLUSIONS 2018    1 - Hyperdynamic left ventricular systolic function (EF >70%).     2 - Indeterminate LV diastolic function.     3 - Normal right ventricular systolic function .     4 - Concentric remodeling.     5 - No wall motion abnormalities.  Lab Results   Component Value Date    CHOL 140 03/03/2023    CHOL 136 03/21/2022    CHOL 147 01/27/2021     Lab Results   Component Value Date    HDL 39 (L) 03/03/2023    HDL 33 (L) 03/21/2022    HDL 39 (L) 01/27/2021     Lab Results   Component Value Date    LDLCALC 82.6 03/03/2023    LDLCALC 84.8 03/21/2022    LDLCALC 89.8 01/27/2021     Lab Results   Component Value Date    TRIG 92 03/03/2023    TRIG 91 03/21/2022    TRIG 91 01/27/2021     Lab Results   Component Value Date    CHOLHDL 27.9 03/03/2023    CHOLHDL 24.3 03/21/2022    CHOLHDL 26.5 01/27/2021       Chemistry        Component Value Date/Time     (L) 08/03/2023 1212    K 4.7 08/03/2023 1212    CL 96 08/03/2023 1212    CO2 28 08/03/2023 1212    BUN 8 08/03/2023 1212    CREATININE 0.6 08/03/2023 1212     "GLU 82 08/03/2023 1212        Component Value Date/Time    CALCIUM 8.8 08/03/2023 1212    ALKPHOS 32 (L) 08/03/2023 1212    AST 21 08/03/2023 1212    ALT 17 08/03/2023 1212    BILITOT 0.5 08/03/2023 1212    ESTGFRAFRICA >60.0 03/21/2022 1435    EGFRNONAA >60.0 03/21/2022 1435          Lab Results   Component Value Date    HGBA1C 6.0 (H) 03/03/2023     Lab Results   Component Value Date    TSH 1.091 06/01/2022     No results found for: "INR", "PROTIME"  Lab Results   Component Value Date    WBC 4.26 08/03/2023    HGB 14.7 08/03/2023    HCT 46.8 08/03/2023    MCV 92 08/03/2023     08/03/2023     BNP  @LABRCNTIP(BNP,BNPTRIAGEBLO)@  CrCl cannot be calculated (Patient's most recent lab result is older than the maximum 7 days allowed.).  No results found in the last 24 hours.  No results found in the last 24 hours.  No results found in the last 24 hours.      Conclusion 2/2021    The left ventricle is small with concentric remodeling and hyperdynamic systolic function. The estimated ejection fraction is 75%  Indeterminate left ventricular diastolic function.  Normal right ventricular size with low normal right ventricular systolic function.  There is mild aortic valve stenosis.  Aortic valve area is 3.17 cm2; peak velocity is 2.49 m/s; mean gradient is 13 mmHg.  Mild-to-moderate aortic regurgitation.  Normal central venous pressure (3 mmHg).  The estimated PA systolic pressure is 19 mmHg.     Conclusion    There is 40-49% right Internal Carotid Stenosis.  There is 50-59% left Internal Carotid Stenosis.        Impression:     1. Atherosclerosis with no evidence of flow-limiting carotid stenosis greater than 50% on the right  2. Atherosclerosis with mildly elevated velocities suggesting 50-69% stenosis of the left ICA.  Velocities are similar to prior.  .        Electronically signed by: Liam Espinoza MD  Date:                                            06/01/2022  Time:                                           " 10:12    ECG reviewed January 25, 2021  Normal sinus rhythm   Normal ECG  Assessment:     Chronic diastolic heart failure  -     bumetanide (BUMEX) 1 MG tablet; Take 1 tablet (1 mg total) by mouth once daily.  Dispense: 30 tablet; Refill: 11    Severe obesity (BMI 35.0-35.9 with comorbidity)    Type II diabetes mellitus with peripheral circulatory disorder    Mixed hyperlipidemia    Type 2 diabetes mellitus with diabetic polyneuropathy, without long-term current use of insulin      We will change Lasix to Bumex to take every day.  Patient also educated to look for improvement in symptoms.    Continue with her antihypertensives.    Reviewed all tests and above medical conditions with patient in detail and formulated treatment plan.  Risk factor modification discussed.   Cardiac low salt diet discussed.  Maintaining healthy weight and weight loss goals were discussed in clinic.  rtc in 6 months

## 2024-02-03 DIAGNOSIS — M1A.9XX0 CHRONIC GOUT WITHOUT TOPHUS, UNSPECIFIED CAUSE, UNSPECIFIED SITE: ICD-10-CM

## 2024-02-03 NOTE — TELEPHONE ENCOUNTER
No care due was identified.  Health [facility] Embedded Care Due Messages. Reference number: [id number].   2/03/2024 12:49:07 PM CST

## 2024-02-04 RX ORDER — ALLOPURINOL 100 MG/1
TABLET ORAL
Qty: 90 TABLET | Refills: 0 | OUTPATIENT
Start: 2024-02-04

## 2024-02-04 NOTE — TELEPHONE ENCOUNTER
Refill Decision Note   Kaleigh Nieves  is requesting a refill authorization.  Brief Assessment and Rationale for Refill:  Quick Discontinue     Medication Therapy Plan:   Pharmacy is requesting new scripts for the following medications without required information, (sig/ frequency/qty/etc)         Comments: Pharmacies have been requesting medications for patients without required information, (sig, frequency, qty, etc.). In addition, requests are sent for medication(s) pt. are currently not taking, and medications patients have never taken.    We have spoken to the pharmacies about these request types and advised their teams previously that we are unable to assess these New Script requests and require all details for these requests. This is a known issue and has been reported.      Note composed:10:15 AM 02/04/2024

## 2024-02-05 DIAGNOSIS — M1A.9XX0 CHRONIC GOUT WITHOUT TOPHUS, UNSPECIFIED CAUSE, UNSPECIFIED SITE: ICD-10-CM

## 2024-02-05 RX ORDER — ALLOPURINOL 100 MG/1
TABLET ORAL
Qty: 90 TABLET | Refills: 0 | OUTPATIENT
Start: 2024-02-05

## 2024-02-05 NOTE — TELEPHONE ENCOUNTER
No care due was identified.  [facility] Embedded Care Due Messages. Reference number: [id number].   2/05/2024 3:39:35 PM CST

## 2024-02-06 NOTE — TELEPHONE ENCOUNTER
Refill Decision Note   Kaleigh Nieves  is requesting a refill authorization.  Brief Assessment and Rationale for Refill:  Quick Discontinue     Medication Therapy Plan:    Pharmacy is requesting new scripts for the following medications without required information, (sig/ frequency/qty/etc)      Medication Reconciliation Completed: No     Comments: Pharmacies have been requesting medications for patients without required information, (sig, frequency, qty, etc.). In addition, requests are sent for medication(s) pt. are currently not taking, and medications patients have never taken.    We have spoken to the pharmacies about these request types and advised their teams previously that we are unable to assess these New Script requests and require all details for these requests. This is a known issue and has been reported.     Note composed:8:33 PM 02/05/2024

## 2024-02-15 DIAGNOSIS — M1A.9XX0 CHRONIC GOUT WITHOUT TOPHUS, UNSPECIFIED CAUSE, UNSPECIFIED SITE: ICD-10-CM

## 2024-02-15 NOTE — TELEPHONE ENCOUNTER
----- Message from Kenneth Shipley sent at 2/15/2024  4:19 PM CST -----  Contact: [phone]  Patient needs a refill on allopurinoL (ZYLOPRIM) 100 MG tablet  called into [facility] pharmacy at [phone].  Please call patient at [phone]  if you have any questions.           [facility] [place] [facility] [address] [facility]  [address] [address] [place] [address]  Phone: [phone] Fax: [phone]        Thanks KB

## 2024-02-15 NOTE — TELEPHONE ENCOUNTER
No care due was identified.  [facility] Embedded Care Due Messages. Reference number: [id number].   2/15/2024 4:41:31 PM CST

## 2024-02-16 ENCOUNTER — TELEPHONE (OUTPATIENT)
Dept: PRIMARY CARE CLINIC | Facility: CLINIC | Age: 84
End: 2024-02-16
Payer: MEDICARE

## 2024-02-16 DIAGNOSIS — M1A.9XX0 CHRONIC GOUT WITHOUT TOPHUS, UNSPECIFIED CAUSE, UNSPECIFIED SITE: ICD-10-CM

## 2024-02-16 RX ORDER — ALLOPURINOL 100 MG/1
100 TABLET ORAL DAILY
Qty: 90 TABLET | Refills: 1 | Status: CANCELLED | OUTPATIENT
Start: 2024-02-16

## 2024-02-16 RX ORDER — ALLOPURINOL 100 MG/1
100 TABLET ORAL DAILY
Qty: 90 TABLET | Refills: 1 | Status: SHIPPED | OUTPATIENT
Start: 2024-02-16

## 2024-02-16 NOTE — TELEPHONE ENCOUNTER
----- Message from Padma Barragan sent at 2/16/2024 11:35 AM CST -----  Please refill the medication(s) listed below.Please call the patient when the prescription(s) is ready for  at this phone number        Medication #1 allopurinoL (ZYLOPRIM) 100 MG tablet  Medication #2  Medication #3      [facility]:  [facility] [id number] [facility] [address]  Phone: [phone] Fax: [phone]    Would the patient rather a call back or a response via [facility]? CALL    Best Call Back Number:.Telephone Information:  Mobile          [phone]        Additional Information: Pt requesting refill currently out of medication. Please advise thank you

## 2024-02-16 NOTE — TELEPHONE ENCOUNTER
Refill Routing Note   Medication(s) are not appropriate for processing by [facility] for the following reason(s):        Required labs outdated  No active prescription written by provider    [facility] action(s):  Defer               Appointments  past 12m or future 3m with PCP    Date Provider   Last Visit   10/10/2023 Lucio Morris MD   Next Visit   4/10/2024 Lucio Morris MD   ED visits in past 90 days: 0        Note composed:2:12 PM 02/16/2024

## 2024-02-16 NOTE — TELEPHONE ENCOUNTER
----- Message from Aster Concepcion sent at 2/16/2024  3:26 PM CST -----  Name of Who is Calling:  pt         What is the request in detail:patient requesting a refill for gout medication asap as she has a flare up and has requested this for several days.    allopurinoL (ZYLOPRIM) 100 MG tablet 90 tablet 3 2/1/2023 - No  Sig - Route: Take 1 tablet (100 mg total) by mouth once daily. - Oral  Sent to pharmacy as: allopurinoL (ZYLOPRIM) 100 MG tablet  Class: Normal  Order: [id number]  Date/Time Signed: 2/1/2023 11:32      E-Prescribing Status: Receipt confirmed by pharmacy (2/1/2023 11:33 AM CST)      [facility] [id number] [facility] [address]  Phone: [phone] Fax: [phone]      Can the clinic reply by [facility]:no           What Number to Call Back if not in [facility]: [phone]

## 2024-02-21 RX ORDER — ALLOPURINOL 100 MG/1
100 TABLET ORAL DAILY
Qty: 90 TABLET | Refills: 1 | Status: CANCELLED | OUTPATIENT
Start: 2024-02-21

## 2024-03-26 ENCOUNTER — OFFICE VISIT (OUTPATIENT)
Dept: PODIATRY | Facility: CLINIC | Age: 84
End: 2024-03-26
Payer: MEDICARE

## 2024-03-26 VITALS — HEIGHT: 66 IN | BODY MASS INDEX: 32.13 KG/M2 | WEIGHT: 199.94 LBS

## 2024-03-26 DIAGNOSIS — E11.51 TYPE II DIABETES MELLITUS WITH PERIPHERAL CIRCULATORY DISORDER: ICD-10-CM

## 2024-03-26 DIAGNOSIS — E11.9 ENCOUNTER FOR COMPREHENSIVE DIABETIC FOOT EXAMINATION, TYPE 2 DIABETES MELLITUS: Primary | ICD-10-CM

## 2024-03-26 DIAGNOSIS — B35.1 DERMATOPHYTOSIS OF NAIL: ICD-10-CM

## 2024-03-26 PROCEDURE — 99213 OFFICE O/P EST LOW 20 MIN: CPT | Mod: 25,S$GLB,, | Performed by: PODIATRIST

## 2024-03-26 PROCEDURE — 1125F AMNT PAIN NOTED PAIN PRSNT: CPT | Mod: CPTII,S$GLB,, | Performed by: PODIATRIST

## 2024-03-26 PROCEDURE — 1160F RVW MEDS BY RX/DR IN RCRD: CPT | Mod: CPTII,S$GLB,, | Performed by: PODIATRIST

## 2024-03-26 PROCEDURE — 3288F FALL RISK ASSESSMENT DOCD: CPT | Mod: CPTII,S$GLB,, | Performed by: PODIATRIST

## 2024-03-26 PROCEDURE — 1101F PT FALLS ASSESS-DOCD LE1/YR: CPT | Mod: CPTII,S$GLB,, | Performed by: PODIATRIST

## 2024-03-26 PROCEDURE — 11721 DEBRIDE NAIL 6 OR MORE: CPT | Mod: Q8,S$GLB,, | Performed by: PODIATRIST

## 2024-03-26 PROCEDURE — 1159F MED LIST DOCD IN RCRD: CPT | Mod: CPTII,S$GLB,, | Performed by: PODIATRIST

## 2024-03-26 PROCEDURE — 99999 PR PBB SHADOW E&M-EST. PATIENT-LVL III: CPT | Mod: PBBFAC,,, | Performed by: PODIATRIST

## 2024-03-26 NOTE — PROGRESS NOTES
Subjective:     Patient ID: Kaleigh Nieves is a 83 y.o. female.    Chief Complaint: Nail Care (Nail care (diabetic pt, wearing sandals, BL foot pain, sharp burning pain, rates pain 5/10. Last seen Pcp Dr. Morris 10/10/23.))    Kaleigh is a 83 y.o. female who presents to the clinic for evaluation and treatment of high risk feet. 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 The patient's chief complaint is long, thick toenails. Patient complains of shrp burning pain in both feet. Patient rates pain 5/10. This patient has documented high risk feet requiring routine maintenance secondary to diabetes mellitis and those secondary complications of diabetes, as mentioned.     PCP: Lucio Morris MD    Date Last Seen by PCP: 10/10/2023    Current shoe gear:  Affected Foot: Rx diabetic extra depth shoes and custom accommodative insoles     Unaffected Foot: Rx diabetic extra depth shoes and custom accommodative insoles    Hemoglobin A1C   Date Value Ref Range Status   03/03/2023 6.0 (H) 4.0 - 5.6 % Final     Comment:     ADA Screening Guidelines:  5.7-6.4%  Consistent with prediabetes  >or=6.5%  Consistent with diabetes    High levels of fetal hemoglobin interfere with the HbA1C  assay. Heterozygous hemoglobin variants (HbS, HgC, etc)do  not significantly interfere with this assay.   However, presence of multiple variants may affect  accuracy.     08/31/2022 5.7 (H) 4.0 - 5.6 % Final     Comment:     ADA Screening Guidelines:  5.7-6.4%  Consistent with prediabetes  >or=6.5%  Consistent with diabetes    High levels of fetal hemoglobin interfere with the HbA1C  assay. Heterozygous hemoglobin variants (HbS, HgC, etc)do  not significantly interfere with this assay.   However, presence of multiple variants may affect accuracy.     03/21/2022 5.9 (H) 4.0 - 5.6 % Final     Comment:     ADA Screening Guidelines:  5.7-6.4%  Consistent with prediabetes  >or=6.5%  Consistent with diabetes    High levels of fetal hemoglobin interfere with the HbA1C  assay. Heterozygous hemoglobin variants (HbS, HgC, etc)do  not significantly interfere with this assay.   However, presence of multiple variants may affect accuracy.         Patient Active Problem List   Diagnosis    Type 2 diabetes mellitus with diabetic polyneuropathy, without long-term current use of insulin    Multiple joint pain    Esophageal reflux    Hyperlipidemia    Chronic gout    Lichen sclerosus    Carotid artery disease    Coronary artery disease involving native coronary artery without angina pectoris    Colon polyp    Hypertension associated with diabetes    Aortic atherosclerosis    Arterial insufficiency of lower extremity    Ganglion cyst of flexor tendon sheath of finger of right hand    Bilateral carpal tunnel syndrome    Hyperkalemia    Chronic bronchitis    Dependent edema    Diverticulosis    Primary open-angle glaucoma, bilateral, indeterminate stage    RIVAS (dyspnea on exertion)    Chronic diastolic heart failure    Low vitamin D level    Body mass index (BMI) 34.0-34.9, adult    Unspecified lump in the left breast, upper inner quadrant    Malignant neoplasm of upper-inner quadrant of left breast in female, estrogen receptor positive    Immunodeficiency due to chemotherapy    Severe obesity (BMI 35.0-35.9 with comorbidity)       Medication List with Changes/Refills   Current Medications     ACCU-CHEK GUIDE TEST STRIPS STRP    TEST 3 TIMES DAILY    ALBUTEROL (PROVENTIL/VENTOLIN HFA) 90 MCG/ACTUATION INHALER    INHALE 2 PUFFS INTO THE LUNGS EVERY SIX HOURS AS NEEDED FOR WHEEZING    ALLOPURINOL (ZYLOPRIM) 100 MG TABLET    Take 1 tablet (100 mg total) by mouth once daily.    ANASTROZOLE (ARIMIDEX) 1 MG TAB    Take 1 tablet (1 mg total) by mouth once daily.    ASPIRIN 81 MG CHEW    Take 81 mg by mouth once daily.    BISOPROLOL (ZEBETA) 5 MG TABLET    TAKE 1 TABLET (5 MG TOTAL) BY MOUTH ONCE DAILY.    BLOOD GLUCOSE CONTROL HIGH,LOW SOLN    1 each by Misc.(Non-Drug; Combo Route) route once a week.    BLOOD-GLUCOSE METER KIT    To check BG 3 times daily, to use with insurance preferred meter    BUMETANIDE (BUMEX) 1 MG TABLET    Take 1 tablet (1 mg total) by mouth once daily.    FLUTICASONE-SALMETEROL DISKUS INHALER 250-50 MCG    Inhale 1 puff into the lungs 2 (two) times daily. Controller    LANCETS MISC    To check BG 3 times daily, to use with insurance preferred meter    LATANOPROST 0.005 % OPHTHALMIC SOLUTION    Place 1 drop into both eyes once daily.    ONDANSETRON (ZOFRAN-ODT) 8 MG TBDL    Dissolve 1 tablet (8 mg total) by mouth every 8 (eight) hours as needed (Nausea).    SIMVASTATIN (ZOCOR) 20 MG TABLET    TAKE 1 TABLET (20 MG TOTAL) BY MOUTH NIGHTLY.    TIMOLOL MALEATE 0.5% (TIMOPTIC) 0.5 % DROP    Place 1 drop into both eyes 2 (two) times daily.    TIMOLOL MALEATE, PF, 0.5 % DPET    Place into both eyes.       Review of patient's allergies indicates:   Allergen Reactions    Iodine and iodide containing products Nausea And Vomiting    Penicillins Itching    Ultram [tramadol] Hives and Itching     Light headness, itiching    Sulfa (sulfonamide antibiotics) Hives and Itching    Adhesive Other (See Comments)    Amlodipine      edema    Sulfamethoxazole-trimethoprim Itching, Rash and Nausea And Vomiting       Past Surgical History:   Procedure Laterality Date    APPENDECTOMY      CARPAL TUNNEL RELEASE  Right     CATARACT EXTRACTION, BILATERAL      CHOLECYSTECTOMY      CYST REMOVAL  2018    DILATION AND CURETTAGE OF UTERUS      EYE SURGERY      HEMORRHOID SURGERY      HYSTERECTOMY      fibroids    INCISION AND DRAINAGE PERIRECTAL ABSCESS      JOINT REPLACEMENT Bilateral     knee    KNEE ARTHROSCOPY Right     LOCALIZATION OF MASS OF BREAST WITH ULTRASOUND GUIDANCE Left 2023    Procedure: LOCALIZATION, MASS, BREAST, WITH US GUIDANCE;  Surgeon: Susan Pulliam MD;  Location: [facility] OR;  Service: General;  Laterality: Left;  Intra-operatively Placed Radiographic Marker (Wire)    MASTECTOMY, PARTIAL Left 2023    Procedure: MASTECTOMY, PARTIAL;  Surgeon: Susan Pulliam MD;  Location: [facility] OR;  Service: General;  Laterality: Left;  with Interpretation of Specimen Mammogram    MYOMECTOMY         Family History   Problem Relation Age of Onset    Hypertension Mother     Diabetes Mother     Leukemia Sister     Hypertension Sister     Cancer Sister         leukemia    Heart disease Maternal Aunt     Cancer Maternal Uncle         gastric, pacreatic    Heart disease Maternal Uncle     Miscarriages / Stillbirths Maternal Uncle     Diabetes Maternal Grandmother     Asthma Maternal Grandfather     Breast cancer Neg Hx     Colon cancer Neg Hx     Ovarian cancer Neg Hx     COPD Neg Hx     Hyperlipidemia Neg Hx     Kidney disease Neg Hx        Social History     Socioeconomic History    Marital status: Single    Number of children: 0   Tobacco Use    Smoking status: Former     Current packs/day: 0.00     Average packs/day: 0.3 packs/day for 24.0 years (6.0 ttl pk-yrs)     Types: Cigarettes     Start date: 1952     Quit date: 1976     Years since quittin.7    Smokeless tobacco: Never   Substance and Sexual Activity    Alcohol use: No    Drug use: No    Sexual activity: Not Currently     Social Determinants of Health     Financial Resource Strain: Low Risk  (2022)    Overall Financial Resource  "Strain (CARDIA)     Difficulty of Paying Living Expenses: Not hard at all   Food Insecurity: No Food Insecurity (9/27/2022)    Hunger Vital Sign     Worried About Running Out of Food in the Last Year: Never true     Ran Out of Food in the Last Year: Never true   Transportation Needs: No Transportation Needs (9/27/2022)    PRAPARE - Transportation     Lack of Transportation (Medical): No     Lack of Transportation (Non-Medical): No   Physical Activity: Inactive (9/27/2022)    Exercise Vital Sign     Days of Exercise per Week: 0 days     Minutes of Exercise per Session: 0 min   Stress: No Stress Concern Present (9/27/2022)    [employer] - Occupational Stress Questionnaire     Feeling of Stress : Not at all   Social Connections: Socially Isolated (9/27/2022)    Social Connection and Isolation Panel [NHANES]     Frequency of Communication with Friends and Family: Once a week     Frequency of Social Gatherings with Friends and Family: Once a week     Attends [employer] Services: More than 4 times per year     Active Member of Clubs or Organizations: No     Attends Club or Organization Meetings: Never     Marital Status:    Housing Stability: Low Risk  (9/27/2022)    Housing Stability Vital Sign     Unable to Pay for Housing in the Last Year: No     Number of Places Lived in the Last Year: 1     Unstable Housing in the Last Year: No       Vitals:    03/26/24 1047   Weight: 90.7 kg (199 lb 15.3 oz)   Height: 5' 6" (1.676 m)   PainSc:   5       Hemoglobin A1C   Date Value Ref Range Status   03/03/2023 6.0 (H) 4.0 - 5.6 % Final     Comment:     ADA Screening Guidelines:  5.7-6.4%  Consistent with prediabetes  >or=6.5%  Consistent with diabetes    High levels of fetal hemoglobin interfere with the HbA1C  assay. Heterozygous hemoglobin variants (HbS, HgC, etc)do  not significantly interfere with this assay.   However, presence of multiple variants may affect accuracy.     08/31/2022 5.7 (H) " 4.0 - 5.6 % Final     Comment:     ADA Screening Guidelines:  5.7-6.4%  Consistent with prediabetes  >or=6.5%  Consistent with diabetes    High levels of fetal hemoglobin interfere with the HbA1C  assay. Heterozygous hemoglobin variants (HbS, HgC, etc)do  not significantly interfere with this assay.   However, presence of multiple variants may affect accuracy.     03/21/2022 5.9 (H) 4.0 - 5.6 % Final     Comment:     ADA Screening Guidelines:  5.7-6.4%  Consistent with prediabetes  >or=6.5%  Consistent with diabetes    High levels of fetal hemoglobin interfere with the HbA1C  assay. Heterozygous hemoglobin variants (HbS, HgC, etc)do  not significantly interfere with this assay.   However, presence of multiple variants may affect accuracy.         Review of Systems   Constitutional:  Negative for chills and fever.   Respiratory:  Negative for shortness of breath.    Cardiovascular:  Positive for leg swelling (improved). Negative for chest pain, palpitations, orthopnea and claudication.   Gastrointestinal:  Negative for diarrhea, nausea and vomiting.   Musculoskeletal:  Negative for joint pain.   Skin:  Negative for rash.   Neurological:  Negative for dizziness, tingling, sensory change, focal weakness and weakness.   Psychiatric/Behavioral: Negative.               Objective:      PHYSICAL EXAM: Apperance: Alert and orient in no distress,well developed, and with good attention to grooming and body habits  Patient presents ambulating in diabetic shoes and inserts.   LOWER EXTREMITY EXAM:  VASCULAR: Dorsalis pedis pulses 0/4 bilateral (monophasic with doppler) and Posterior Tibial pulses 0/4 bilateral (biphasic with doppler). Capillary fill time <blanched bilateral. Mild edema observed bilateral. Varicosities present bilateral. Skin temperature of the lower extremities is warm to cool, proximal to distal. Hair growth absent bilateral.  DERMATOLOGICAL: No skin rashes, subcutaneous nodules, lesions, or ulcers observed  bilateral. Nails 1,2,3,4,5 bilateral elongated, thickened, and discolored with subungual debris. Webspaces 1,2,3,4clean, dry and without evidence of break in skin integrity bilateral.   NEUROLOGICAL: Light touch, sharp-dull, proprioception all present and equal bilaterally.  Vibratory sensation intact on left hallux and diminished at right hallux. Protective sensation intact at all 10 sites as tested with a Eva-Jimy 5.07 monofilament.   MUSCULOSKELETAL: Muscle strength is 5/5 for foot inverters, everters, plantarflexors, and dorsiflexors. Muscle tone is normal. No pain on palpation of right dorsal foot/ankle. Pes planus noted bilateral. Flexible hammertoes noted bilateral.         Assessment:       ICD-10-CM ICD-9-CM   1. Encounter for comprehensive diabetic foot examination, type 2 diabetes mellitus  E11.9 250.00   2. Type II diabetes mellitus with peripheral circulatory disorder  E11.51 250.70     443.81   3. Dermatophytosis of nail  B35.1 110.1       Plan:   Encounter for comprehensive diabetic foot examination, type 2 diabetes mellitus    Type II diabetes mellitus with peripheral circulatory disorder    Dermatophytosis of nail    I counseled the patient on her conditions, regarding findings of my examination, my impressions, and usual treatment plan.   This visit spent on counseling and coordination of care.  Appointment spent on education about the diabetic foot, neuropathy, and prevention of limb loss.  Shoe inspection. Diabetic Foot Education. Patient reminded of the importance of good nutrition and blood sugar control to help prevent podiatric complications of diabetes. Patient instructed on proper foot hygeine. We discussed wearing proper shoe gear, daily foot inspections, never walking without protective shoe gear, never putting sharp instruments to feet.    With patient's permission, nails 1-5 bilateral were debrided/trimmed in length and thickness aggressively to their soft tissue attachment  mechanically and with electric , removing all offending nail and debris. Patient relates relief following the procedure.  Patient  will continue to monitor the areas daily, inspect feet, wear protective shoe gear when ambulatory, moisturizer to maintain skin integrity. Patient reminded of the importance of good nutrition and blood sugar control to help prevent podiatric complications of diabetes.  Patient to return 4 months or sooner if needed.          Ino Harmon DPM  [facility]

## 2024-04-10 ENCOUNTER — OFFICE VISIT (OUTPATIENT)
Dept: PRIMARY CARE CLINIC | Facility: CLINIC | Age: 84
End: 2024-04-10
Payer: MEDICARE

## 2024-04-10 VITALS
HEART RATE: 80 BPM | HEIGHT: 66 IN | DIASTOLIC BLOOD PRESSURE: 78 MMHG | SYSTOLIC BLOOD PRESSURE: 138 MMHG | BODY MASS INDEX: 31.92 KG/M2 | WEIGHT: 198.63 LBS

## 2024-04-10 DIAGNOSIS — M1A.9XX0 CHRONIC GOUT WITHOUT TOPHUS, UNSPECIFIED CAUSE, UNSPECIFIED SITE: ICD-10-CM

## 2024-04-10 DIAGNOSIS — K61.1 PERIRECTAL ABSCESS: ICD-10-CM

## 2024-04-10 DIAGNOSIS — C50.212 MALIGNANT NEOPLASM OF UPPER-INNER QUADRANT OF LEFT BREAST IN FEMALE, ESTROGEN RECEPTOR POSITIVE: ICD-10-CM

## 2024-04-10 DIAGNOSIS — I50.32 CHRONIC DIASTOLIC HEART FAILURE: ICD-10-CM

## 2024-04-10 DIAGNOSIS — D84.821 IMMUNODEFICIENCY DUE TO CHEMOTHERAPY: ICD-10-CM

## 2024-04-10 DIAGNOSIS — Z79.899 IMMUNODEFICIENCY DUE TO CHEMOTHERAPY: ICD-10-CM

## 2024-04-10 DIAGNOSIS — J44.9 CHRONIC OBSTRUCTIVE PULMONARY DISEASE, UNSPECIFIED COPD TYPE: ICD-10-CM

## 2024-04-10 DIAGNOSIS — E11.42 TYPE 2 DIABETES MELLITUS WITH DIABETIC POLYNEUROPATHY, WITHOUT LONG-TERM CURRENT USE OF INSULIN: Primary | ICD-10-CM

## 2024-04-10 DIAGNOSIS — I15.2 HYPERTENSION ASSOCIATED WITH DIABETES: ICD-10-CM

## 2024-04-10 DIAGNOSIS — Z17.0 MALIGNANT NEOPLASM OF UPPER-INNER QUADRANT OF LEFT BREAST IN FEMALE, ESTROGEN RECEPTOR POSITIVE: ICD-10-CM

## 2024-04-10 DIAGNOSIS — E11.59 HYPERTENSION ASSOCIATED WITH DIABETES: ICD-10-CM

## 2024-04-10 DIAGNOSIS — T45.1X5A IMMUNODEFICIENCY DUE TO CHEMOTHERAPY: ICD-10-CM

## 2024-04-10 DIAGNOSIS — L02.31 ABSCESS OF RIGHT BUTTOCK: ICD-10-CM

## 2024-04-10 DIAGNOSIS — J44.89 COPD (CHRONIC OBSTRUCTIVE PULMONARY DISEASE) WITH CHRONIC BRONCHITIS: ICD-10-CM

## 2024-04-10 PROCEDURE — 1159F MED LIST DOCD IN RCRD: CPT | Mod: CPTII,S$GLB,, | Performed by: FAMILY MEDICINE

## 2024-04-10 PROCEDURE — 3078F DIAST BP <80 MM HG: CPT | Mod: CPTII,S$GLB,, | Performed by: FAMILY MEDICINE

## 2024-04-10 PROCEDURE — G2211 COMPLEX E/M VISIT ADD ON: HCPCS | Mod: S$GLB,,, | Performed by: FAMILY MEDICINE

## 2024-04-10 PROCEDURE — 3288F FALL RISK ASSESSMENT DOCD: CPT | Mod: CPTII,S$GLB,, | Performed by: FAMILY MEDICINE

## 2024-04-10 PROCEDURE — 3075F SYST BP GE 130 - 139MM HG: CPT | Mod: CPTII,S$GLB,, | Performed by: FAMILY MEDICINE

## 2024-04-10 PROCEDURE — 1126F AMNT PAIN NOTED NONE PRSNT: CPT | Mod: CPTII,S$GLB,, | Performed by: FAMILY MEDICINE

## 2024-04-10 PROCEDURE — 99215 OFFICE O/P EST HI 40 MIN: CPT | Mod: S$GLB,,, | Performed by: FAMILY MEDICINE

## 2024-04-10 PROCEDURE — 1101F PT FALLS ASSESS-DOCD LE1/YR: CPT | Mod: CPTII,S$GLB,, | Performed by: FAMILY MEDICINE

## 2024-04-10 PROCEDURE — 99999 PR PBB SHADOW E&M-EST. PATIENT-LVL IV: CPT | Mod: PBBFAC,,, | Performed by: FAMILY MEDICINE

## 2024-04-10 RX ORDER — MUPIROCIN 20 MG/G
OINTMENT TOPICAL 2 TIMES DAILY
Qty: 45 G | Refills: 3 | Status: SHIPPED | OUTPATIENT
Start: 2024-04-10 | End: 2024-04-20

## 2024-04-10 RX ORDER — CLINDAMYCIN HYDROCHLORIDE 300 MG/1
300 CAPSULE ORAL 3 TIMES DAILY
Qty: 30 CAPSULE | Refills: 0 | Status: SHIPPED | OUTPATIENT
Start: 2024-04-10 | End: 2024-04-20

## 2024-04-10 NOTE — PATIENT INSTRUCTIONS
Physically, everything looks pretty good today.      Would like to get some blood and urine samples to check things on the inside.  Orders placed today.  We can get that set up for you in [place] whenever it is convenient.      For the abscesses on your buttock, be sure to soak in a warm bath tub with some Epson salts for 20-30 minutes, ideally twice a day.  After soaking, clean gently with Dial soap and water.  Apply my Bactroban (mupirocin) ointment to the area.  This should help kill the bacteria from the outside.  Supplement with my clindamycin for the next 10 days.  This will help kill the bacteria from the inside.      If it does not go away, or if it gets worse, please let me know.  We can always get you in with the colorectal specialist to see about getting it drained and permanently fixed.      If the gout continues to flare up, and the pain persist more than a few minutes, please let me know.  We can always send a prescription for colchicine to use to help with it.    Keep your upcoming appointments with the eye doctor, breast specialist, cardiologist, etc..    Continue to eat a healthy diet.  Be careful with portion sizes.  Includes lots of fresh fruits, vegetables, whole grains, lean proteins.  See info below.    Keep hydrated.  Be sure to drink at least 8-10, 8 oz, glasses of water every day.    Stay active.  Try to do some sort of physical activity every day.  Nothing outrageous, just try walking for 10-15 minutes each day.

## 2024-04-10 NOTE — PROGRESS NOTES
"    [facility]       [address] [place] [address]      Phone: [phone]      Fax: [phone]    Lucio Morris MD                Office Visit  04/10/2024        Subjective      HPI:  Kaleigh Nieves is a 83 y.o. female presents today in clinic for "Follow-up  ."     83-year-old female presents today to follow-up on multiple issues.      Overall, doing pretty good today.    Was diagnosed with breast cancer.  Following with Oncology, breast specialist.  Started taking anastrozole.  Feels like it is giving her hot flashes.  Along with the hot flashes, she gets a burning pain in her legs.  Lasts about 10 minutes, then self resolves.  She has an appointment with her breast specialist in a couple of weeks.  Wants to discuss an alternative medication.    Has chronic heart failure.  Follows with Cardiology.  Legs tend to swell all the time.  They changed her Lasix to Bumex, which does seem to help, but if she takes it daily then she starts getting Charley horses in her legs.  As result, she started taking it every other day.    She states she has a history of gout.  Has been taking allopurinol 100 mg daily for years.  Has not had severe gout flare up in a couple of years, but her big toes do send to hurt sometimes.    She also has diabetes.  Controlled with diet.  Check sugars at home.  Tend to be okay.  Follows with the eye doctor regularly.  Recently started going to see Dr. Camacho instead.    Has hypertension.  Takes bisoprolol 5 mg daily, along with Bumex one mg every other day.  Also takes simvastatin 20 mg nightly for cholesterol.      States she has multiple abscesses/boils on her right buttock.  Also has perirectal abscess.  Has had it drained in the ER, but that was extremely painful.  States it is draining now.    PMH: HTN, dm, gout, GERD, diverticulosis, osteoarthritis, glaucoma   PSH:  Torn meniscus in knee.  Bilateral knee replacement.  Hemorrhoids.  " Perirectal abscess.  Cataracts in both eyes.  D& C.  Fibroid/hysterectomy.  Gallbladder.  Appendectomy.  Carpal tunnel.  Left toe corn removal.    Allergies:  Iodine.  Penicillin.  Sulfa drugs.  Tramadol.  Amlodipine.    Social: Lives alone.  Nephew occasionally stays there.    T: Denies   A:  Denies   D:  Denies     Exercise:  No regular exercise program.  Use to bowl frequently.  Limited now due to foot swelling and pain.      Podiatry: Faustino Reyes)  Eyes:  Dr. Camacho at [facility]        The following were updated and reviewed by myself in the chart: medications, past medical history, past surgical history, family history, social history, and allergies.     Medications:  Current Outpatient Medications on File Prior to Visit   Medication Sig Dispense Refill    ACCU-CHEK GUIDE TEST STRIPS Strp TEST 3 TIMES DAILY 300 strip 3    albuterol (PROVENTIL/VENTOLIN HFA) 90 mcg/actuation inhaler INHALE 2 PUFFS INTO THE LUNGS EVERY SIX HOURS AS NEEDED FOR WHEEZING 18 g 11    allopurinoL (ZYLOPRIM) 100 MG tablet Take 1 tablet (100 mg total) by mouth once daily. 90 tablet 1    anastrozole (ARIMIDEX) 1 mg Tab Take 1 tablet (1 mg total) by mouth once daily. 90 tablet 3    aspirin 81 MG Chew Take 81 mg by mouth once daily.      bisoprolol (ZEBETA) 5 MG tablet TAKE 1 TABLET (5 MG TOTAL) BY MOUTH ONCE DAILY. 90 tablet 3    blood glucose control high,low Soln 1 each by Misc.(Non-Drug; Combo Route) route once a week. 1 each 11    bumetanide (BUMEX) 1 MG tablet Take 1 tablet (1 mg total) by mouth once daily. 30 tablet 11    fluticasone-salmeterol diskus inhaler 250-50 mcg Inhale 1 puff into the lungs 2 (two) times daily. Controller 180 each 3    lancets Misc To check BG 3 times daily, to use with insurance preferred meter 200 each 3    ondansetron (ZOFRAN-ODT) 8 MG TbDL Dissolve 1 tablet (8 mg total) by mouth every 8 (eight) hours as needed (Nausea). 10 tablet 0    simvastatin (ZOCOR) 20 MG tablet TAKE 1 TABLET (20 MG TOTAL) BY MOUTH  NIGHTLY. 90 tablet 3    timoloL maleate, PF, 0.5 % Dpet Place into both eyes.      blood-glucose meter kit To check BG 3 times daily, to use with insurance preferred meter 1 each 0    latanoprost 0.005 % ophthalmic solution Place 1 drop into both eyes once daily. 7.5 mL 3    timolol maleate 0.5% (TIMOPTIC) 0.5 % Drop Place 1 drop into both eyes 2 (two) times daily. 30 mL 4     No current facility-administered medications on file prior to visit.        PMHx:  Past Medical History:   Diagnosis Date    Anemia     Angina pectoris     Arthritis     Asthma     Back pain     Bronchitis     [facility]    Carotid artery stenosis     Chronic bronchitis     Chronic gout     Colon polyp     CTS (carpal tunnel syndrome)     Diabetes mellitus, type 2     Diabetes with neurologic complications     Diverticulosis     RIVAS (dyspnea on exertion)     chronic; eval pulcelia natarajan    Dyslipidemia     Ex-smoker     quit in her 30s    GERD (gastroesophageal reflux disease)     Heart failure     Hyperlipidemia     Hypertension     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    Neuropathy, lower extremity     Obesity     Open-angle glaucoma     dr avel kaur    Peripheral vascular disease     Polyneuropathy     Tobacco dependence       Patient Active Problem List    Diagnosis Date Noted    Severe obesity (BMI 35.0-35.9 with comorbidity) 01/29/2024    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    Malignant neoplasm of upper-inner quadrant of left breast in female, estrogen receptor positive 06/26/2023    Unspecified lump in the left breast, upper inner quadrant 06/20/2023    Body mass index (BMI) 34.0-34.9, adult 09/07/2022    Low vitamin D level 07/12/2021    Chronic diastolic heart failure 05/14/2021    RIVAS (dyspnea on exertion) 01/29/2021    Diverticulosis 11/11/2019    Primary open-angle glaucoma, bilateral, indeterminate stage 11/11/2019    Dependent edema 08/23/2018    Chronic  bronchitis 06/25/2018    Hyperkalemia 03/21/2018    Bilateral carpal tunnel syndrome 03/07/2018    Ganglion cyst of flexor tendon sheath of finger of right hand 02/01/2018    Aortic atherosclerosis 06/16/2017    Arterial insufficiency of lower extremity 06/16/2017    Hypertension associated with diabetes 10/25/2016    Colon polyp     Coronary artery disease involving native coronary artery without angina pectoris     Carotid artery disease     Lichen sclerosus 05/26/2015    Hyperlipidemia 08/11/2014    Chronic gout 08/11/2014    Esophageal reflux 07/08/2013    Multiple joint pain 07/03/2013    Type 2 diabetes mellitus with diabetic polyneuropathy, without long-term current use of insulin 06/18/2013        PSHx:  Past Surgical History:   Procedure Laterality Date    APPENDECTOMY      CARPAL TUNNEL RELEASE Right     CATARACT EXTRACTION, BILATERAL      CHOLECYSTECTOMY      CYST REMOVAL  2018    DILATION AND CURETTAGE OF UTERUS      EYE SURGERY      HEMORRHOID SURGERY      HYSTERECTOMY      fibroids    INCISION AND DRAINAGE PERIRECTAL ABSCESS      JOINT REPLACEMENT Bilateral     knee    KNEE ARTHROSCOPY Right     LOCALIZATION OF MASS OF BREAST WITH ULTRASOUND GUIDANCE Left 8/22/2023    Procedure: LOCALIZATION, MASS, BREAST, WITH US GUIDANCE;  Surgeon: Susan Pulliam MD;  Location: [facility] OR;  Service: General;  Laterality: Left;  Intra-operatively Placed Radiographic Marker (Wire)    MASTECTOMY, PARTIAL Left 8/22/2023    Procedure: MASTECTOMY, PARTIAL;  Surgeon: Susan Pulliam MD;  Location: [facility] OR;  Service: General;  Laterality: Left;  with Interpretation of Specimen Mammogram    MYOMECTOMY          FHx:  Family History   Problem Relation Age of Onset    Hypertension Mother     Diabetes Mother     Leukemia Sister     Hypertension Sister     Cancer Sister         leukemia    Heart disease Maternal Aunt     Cancer Maternal Uncle         gastric, pacreatic    Heart disease Maternal Uncle     Miscarriages /  Stillbirths Maternal Uncle     Diabetes Maternal Grandmother     Asthma Maternal Grandfather     Breast cancer Neg Hx     Colon cancer Neg Hx     Ovarian cancer Neg Hx     COPD Neg Hx     Hyperlipidemia Neg Hx     Kidney disease Neg Hx         Social:  Social History     Socioeconomic History    Marital status: Single    Number of children: 0   Tobacco Use    Smoking status: Former     Current packs/day: 0.00     Average packs/day: 0.3 packs/day for 24.0 years (6.0 ttl pk-yrs)     Types: Cigarettes     Start date: 1952     Quit date: 1976     Years since quittin.8    Smokeless tobacco: Never   Substance and Sexual Activity    Alcohol use: No    Drug use: No    Sexual activity: Not Currently     Social Determinants of Health     Financial Resource Strain: Low Risk  (2022)    Overall Financial Resource Strain (CARDIA)     Difficulty of Paying Living Expenses: Not hard at all   Food Insecurity: No Food Insecurity (2022)    Hunger Vital Sign     Worried About Running Out of Food in the Last Year: Never true     Ran Out of Food in the Last Year: Never true   Transportation Needs: No Transportation Needs (2022)    PRAPARE - Transportation     Lack of Transportation (Medical): No     Lack of Transportation (Non-Medical): No   Physical Activity: Inactive (2022)    Exercise Vital Sign     Days of Exercise per Week: 0 days     Minutes of Exercise per Session: 0 min   Stress: No Stress Concern Present (2022)    [employer] [place] [employer] - Occupational Stress Questionnaire     Feeling of Stress : Not at all   Social Connections: Socially Isolated (2022)    Social Connection and Isolation Panel [NHANES]     Frequency of Communication with Friends and Family: Once a week     Frequency of Social Gatherings with Friends and Family: Once a week     Attends Jainism Services: More than 4 times per year     Active Member of Clubs or Organizations: No     Attends Club or  "Organization Meetings: Never     Marital Status:    Housing Stability: Low Risk  (9/27/2022)    Housing Stability Vital Sign     Unable to Pay for Housing in the Last Year: No     Number of Places Lived in the Last Year: 1     Unstable Housing in the Last Year: No        Allergies:  Review of patient's allergies indicates:   Allergen Reactions    Iodine and iodide containing products Nausea And Vomiting    Penicillins Itching    Ultram [tramadol] Hives and Itching     Light headness, itiching    Sulfa (sulfonamide antibiotics) Hives and Itching    Adhesive Other (See Comments)    Amlodipine      edema    Sulfamethoxazole-trimethoprim Itching, Rash and Nausea And Vomiting        ROS:  Review of Systems   Constitutional:  Negative for activity change, appetite change, chills and fever.   HENT:  Negative for congestion, postnasal drip, rhinorrhea, sore throat and trouble swallowing.    Respiratory:  Negative for cough, shortness of breath and wheezing.    Cardiovascular:  Positive for leg swelling. Negative for chest pain and palpitations.   Gastrointestinal:  Negative for abdominal pain, constipation, diarrhea, nausea and vomiting.   Genitourinary:  Negative for difficulty urinating.   Musculoskeletal:  Positive for myalgias.   Skin:  Positive for wound.   Neurological:  Negative for speech difficulty and headaches.   All other systems reviewed and are negative.         Objective      /78   Pulse 80   Ht 5' 6" (1.676 m)   Wt 90.1 kg (198 lb 10.2 oz)   BMI 32.06 kg/m²   Ht Readings from Last 3 Encounters:   04/10/24 5' 6" (1.676 m)   03/26/24 5' 6" (1.676 m)   02/07/24 5' 6" (1.676 m)     Wt Readings from Last 3 Encounters:   04/10/24 90.1 kg (198 lb 10.2 oz)   03/26/24 90.7 kg (199 lb 15.3 oz)   02/07/24 90.7 kg (200 lb)       PHYSICAL EXAM:  Physical Exam  Vitals and nursing note reviewed.   Constitutional:       General: She is not in acute distress.     Appearance: Normal appearance.   HENT:      " Head: Normocephalic and atraumatic.      Right Ear: Tympanic membrane, ear canal and external ear normal.      Left Ear: Tympanic membrane, ear canal and external ear normal.      Nose: Nose normal. No congestion or rhinorrhea.      Mouth/Throat:      Mouth: Mucous membranes are moist.      Pharynx: Oropharynx is clear. No oropharyngeal exudate or posterior oropharyngeal erythema.   Eyes:      Extraocular Movements: Extraocular movements intact.      Conjunctiva/sclera: Conjunctivae normal.      Pupils: Pupils are equal, round, and reactive to light.   Cardiovascular:      Rate and Rhythm: Normal rate and regular rhythm.   Pulmonary:      Effort: Pulmonary effort is normal. No respiratory distress.      Breath sounds: No wheezing, rhonchi or rales.   Musculoskeletal:         General: Normal range of motion.      Cervical back: Normal range of motion.   Lymphadenopathy:      Cervical: No cervical adenopathy.   Skin:     General: Skin is warm and dry.      Findings: Abscess present.             Comments: Right buttock has multiple areas with trace drainage.  Fluctuance.  Tender to touch.   Neurological:      Mental Status: She is alert.              LABS / IMAGING:  Recent Results (from the past 4368 hour(s))   POCT Lipid Profile w/ Glucose    Collection Time: 02/07/24 10:48 AM   Result Value Ref Range    POC Cholesterol, Total 144 <240 MG/DL    POC Cholesterol, HDL 30 (L) MG/DL    POC Glucose 105 60 - 140 MG/DL         Assessment    1. Type 2 diabetes mellitus with diabetic polyneuropathy, without long-term current use of insulin    2. Chronic gout without tophus, unspecified cause, unspecified site    3. COPD (chronic obstructive pulmonary disease) with chronic bronchitis    4. Chronic diastolic heart failure    5. Abscess of right buttock    6. Perirectal abscess    7. Hypertension associated with diabetes    8. Chronic obstructive pulmonary disease, unspecified COPD type    9. Malignant neoplasm of upper-inner  quadrant of left breast in female, estrogen receptor positive    10. Immunodeficiency due to chemotherapy          Plan    Kaleigh was seen today for follow-up.    Diagnoses and all orders for this visit:    Type 2 diabetes mellitus with diabetic polyneuropathy, without long-term current use of insulin  -     Hemoglobin A1C; Future  -     Microalbumin/Creatinine Ratio, Urine; Future    Chronic gout without tophus, unspecified cause, unspecified site  -     URIC ACID; Future    COPD (chronic obstructive pulmonary disease) with chronic bronchitis    Chronic diastolic heart failure  -     B-TYPE NATRIURETIC PEPTIDE; Future    Abscess of right buttock  -     clindamycin (CLEOCIN) 300 MG capsule; Take 1 capsule (300 mg total) by mouth 3 (three) times daily. for 10 days  -     mupirocin (BACTROBAN) 2 % ointment; Apply topically 2 (two) times daily. for 10 days    Perirectal abscess  -     clindamycin (CLEOCIN) 300 MG capsule; Take 1 capsule (300 mg total) by mouth 3 (three) times daily. for 10 days  -     mupirocin (BACTROBAN) 2 % ointment; Apply topically 2 (two) times daily. for 10 days    Hypertension associated with diabetes  -     CBC Auto Differential; Future  -     Comprehensive Metabolic Panel; Future  -     TSH; Future  -     Lipid Panel; Future    Chronic obstructive pulmonary disease, unspecified COPD type    Malignant neoplasm of upper-inner quadrant of left breast in female, estrogen receptor positive    Immunodeficiency due to chemotherapy      For the abscess on the buttock, will have her do clindamycin, Sitz baths, topical Bactroban.  Offered referral to surgery (either general or colorectal) to have the area drained.  She would like to avoid that, if possible.  She will let us know if it gets worse.    Orders for screening labs placed today.  She would like to get those done in [place].    Gets occasional aches in her toes, but states not enough to take any medication.  If her gout flares up, we can  always try colchicine.    FOLLOW-UP:  Follow up in about 6 months (around 10/10/2024) for check up.    I spent a total of 45 minutes face to face and non-face to face on the date of this visit.This includes time preparing to see the patient (eg, review of tests, notes), obtaining and/or reviewing additional history from an independent historian and/or outside medical records, documenting clinical information in the electronic health record, independently interpreting results and/or communicating results to the patient/family/caregiver, or care coordinator.  Visit today included increased complexity associated with the care of the episodic problem addressed and managing the longitudinal care of the patient due to the serious and/or complex managed problem(s).    Signed by:  Lucio Morris MD

## 2024-04-17 ENCOUNTER — LAB VISIT (OUTPATIENT)
Dept: LAB | Facility: HOSPITAL | Age: 84
End: 2024-04-17
Attending: FAMILY MEDICINE
Payer: MEDICARE

## 2024-04-17 DIAGNOSIS — M1A.9XX0 CHRONIC GOUT WITHOUT TOPHUS, UNSPECIFIED CAUSE, UNSPECIFIED SITE: ICD-10-CM

## 2024-04-17 DIAGNOSIS — I15.2 HYPERTENSION ASSOCIATED WITH DIABETES: ICD-10-CM

## 2024-04-17 DIAGNOSIS — E11.42 TYPE 2 DIABETES MELLITUS WITH DIABETIC POLYNEUROPATHY, WITHOUT LONG-TERM CURRENT USE OF INSULIN: ICD-10-CM

## 2024-04-17 DIAGNOSIS — E11.59 HYPERTENSION ASSOCIATED WITH DIABETES: ICD-10-CM

## 2024-04-17 DIAGNOSIS — I50.32 CHRONIC DIASTOLIC HEART FAILURE: ICD-10-CM

## 2024-04-17 LAB
ALBUMIN SERPL BCP-MCNC: 3.8 G/DL (ref 3.5–5.2)
ALBUMIN/CREAT UR: 8.4 UG/MG (ref 0–30)
ALP SERPL-CCNC: 25 U/L (ref 55–135)
ALT SERPL W/O P-5'-P-CCNC: 19 U/L (ref 10–44)
ANION GAP SERPL CALC-SCNC: 11 MMOL/L (ref 8–16)
AST SERPL-CCNC: 18 U/L (ref 10–40)
BASOPHILS # BLD AUTO: 0.01 K/UL (ref 0–0.2)
BASOPHILS NFR BLD: 0.3 % (ref 0–1.9)
BILIRUB SERPL-MCNC: 0.5 MG/DL (ref 0.1–1)
BNP SERPL-MCNC: 201 PG/ML (ref 0–99)
BUN SERPL-MCNC: 7 MG/DL (ref 8–23)
CALCIUM SERPL-MCNC: 9.5 MG/DL (ref 8.7–10.5)
CHLORIDE SERPL-SCNC: 94 MMOL/L (ref 95–110)
CHOLEST SERPL-MCNC: 135 MG/DL (ref 120–199)
CHOLEST/HDLC SERPL: 2.9 {RATIO} (ref 2–5)
CO2 SERPL-SCNC: 33 MMOL/L (ref 23–29)
CREAT SERPL-MCNC: 0.7 MG/DL (ref 0.5–1.4)
CREAT UR-MCNC: 119 MG/DL (ref 15–325)
DIFFERENTIAL METHOD BLD: ABNORMAL
EOSINOPHIL # BLD AUTO: 0.1 K/UL (ref 0–0.5)
EOSINOPHIL NFR BLD: 1.6 % (ref 0–8)
ERYTHROCYTE [DISTWIDTH] IN BLOOD BY AUTOMATED COUNT: 13.6 % (ref 11.5–14.5)
EST. GFR  (NO RACE VARIABLE): >60 ML/MIN/1.73 M^2
ESTIMATED AVG GLUCOSE: 123 MG/DL (ref 68–131)
GLUCOSE SERPL-MCNC: 118 MG/DL (ref 70–110)
HBA1C MFR BLD: 5.9 % (ref 4–5.6)
HCT VFR BLD AUTO: 46.3 % (ref 37–48.5)
HDLC SERPL-MCNC: 46 MG/DL (ref 40–75)
HDLC SERPL: 34.1 % (ref 20–50)
HGB BLD-MCNC: 14.8 G/DL (ref 12–16)
IMM GRANULOCYTES # BLD AUTO: 0.01 K/UL (ref 0–0.04)
IMM GRANULOCYTES NFR BLD AUTO: 0.3 % (ref 0–0.5)
LDLC SERPL CALC-MCNC: 78.2 MG/DL (ref 63–159)
LYMPHOCYTES # BLD AUTO: 1.3 K/UL (ref 1–4.8)
LYMPHOCYTES NFR BLD: 34 % (ref 18–48)
MCH RBC QN AUTO: 28.7 PG (ref 27–31)
MCHC RBC AUTO-ENTMCNC: 32 G/DL (ref 32–36)
MCV RBC AUTO: 90 FL (ref 82–98)
MICROALBUMIN UR DL<=1MG/L-MCNC: 10 UG/ML
MONOCYTES # BLD AUTO: 0.6 K/UL (ref 0.3–1)
MONOCYTES NFR BLD: 14.4 % (ref 4–15)
NEUTROPHILS # BLD AUTO: 1.9 K/UL (ref 1.8–7.7)
NEUTROPHILS NFR BLD: 49.4 % (ref 38–73)
NONHDLC SERPL-MCNC: 89 MG/DL
NRBC BLD-RTO: 0 /100 WBC
PLATELET # BLD AUTO: 304 K/UL (ref 150–450)
PMV BLD AUTO: 9.1 FL (ref 9.2–12.9)
POTASSIUM SERPL-SCNC: 5 MMOL/L (ref 3.5–5.1)
PROT SERPL-MCNC: 7.4 G/DL (ref 6–8.4)
RBC # BLD AUTO: 5.16 M/UL (ref 4–5.4)
SODIUM SERPL-SCNC: 138 MMOL/L (ref 136–145)
TRIGL SERPL-MCNC: 54 MG/DL (ref 30–150)
TSH SERPL DL<=0.005 MIU/L-ACNC: 2.16 UIU/ML (ref 0.4–4)
URATE SERPL-MCNC: 4.4 MG/DL (ref 2.4–5.7)
WBC # BLD AUTO: 3.82 K/UL (ref 3.9–12.7)

## 2024-04-17 PROCEDURE — 36415 COLL VENOUS BLD VENIPUNCTURE: CPT | Mod: PO | Performed by: FAMILY MEDICINE

## 2024-04-17 PROCEDURE — 85025 COMPLETE CBC W/AUTO DIFF WBC: CPT | Mod: PO | Performed by: FAMILY MEDICINE

## 2024-04-17 PROCEDURE — 80061 LIPID PANEL: CPT | Performed by: FAMILY MEDICINE

## 2024-04-17 PROCEDURE — 83036 HEMOGLOBIN GLYCOSYLATED A1C: CPT | Performed by: FAMILY MEDICINE

## 2024-04-17 PROCEDURE — 83880 ASSAY OF NATRIURETIC PEPTIDE: CPT | Mod: PO | Performed by: FAMILY MEDICINE

## 2024-04-17 PROCEDURE — 84550 ASSAY OF BLOOD/URIC ACID: CPT | Mod: PO | Performed by: FAMILY MEDICINE

## 2024-04-17 PROCEDURE — 80053 COMPREHEN METABOLIC PANEL: CPT | Mod: PO | Performed by: FAMILY MEDICINE

## 2024-04-17 PROCEDURE — 84443 ASSAY THYROID STIM HORMONE: CPT | Mod: PO | Performed by: FAMILY MEDICINE

## 2024-04-17 PROCEDURE — 82043 UR ALBUMIN QUANTITATIVE: CPT | Performed by: FAMILY MEDICINE

## 2024-05-13 ENCOUNTER — TELEPHONE (OUTPATIENT)
Dept: PRIMARY CARE CLINIC | Facility: CLINIC | Age: 84
End: 2024-05-13
Payer: MEDICARE

## 2024-05-13 NOTE — TELEPHONE ENCOUNTER
----- Message from Celena Carrasco sent at 5/13/2024 10:24 AM CDT -----  Contact: Kaleigh  Caller is requesting a sooner appointment.  Caller declined first available appointment listed below.  Caller will not accept being placed on the waitlist and is requesting a message be sent to doctor.  Name of Caller:Oneida Ezequiel  When is the first available appointment?Thursday   Symptoms:severe arm pain   Would the patient rather a call back or a response via [facility]? Call back   Best Call Back Number:.[phone] (home)   Additional Information: Symptom: Arm Pain - Not From Injury  Outcome: Talk to a nurse or provider within 15 minutes.  Reason: Severe pain now      Please call pt home number [phone]

## 2024-05-13 NOTE — TELEPHONE ENCOUNTER
----- Message from Consuelo Gandhi sent at 5/13/2024  7:24 AM CDT -----  Contact: self  .Type:  Sooner Apoointment Request    Caller is requesting a sooner appointment.  Caller declined first available appointment listed below.  Caller will not accept being placed on the waitlist and is requesting a message be sent to doctor.  Name of Caller:Oneida Ezequiel  When is the first available appointment?Thursday   Symptoms:severe arm pain   Would the patient rather a call back or a response via [facility]? Call back   Best Call Back Number:.[phone] (home)   Additional Information: Symptom: Arm Pain - Not From Injury  Outcome: Talk to a nurse or provider within 15 minutes.  Reason: Severe pain now

## 2024-05-13 NOTE — TELEPHONE ENCOUNTER
Spoke with pt and she advised me that her legs are swollen and she is having arm pain. Pt wanted to see Dr. Morris. I advised her that Dr. Morris is booked and I offered to schedule pt with a NP at another location. Pt decline I advised pt to go to Er pt decline. Pt advised me that she will go to one of her other doctors

## 2024-05-14 ENCOUNTER — HOSPITAL ENCOUNTER (EMERGENCY)
Facility: HOSPITAL | Age: 84
Discharge: HOME | End: 2024-05-14
Attending: EMERGENCY MEDICINE
Payer: MEDICARE

## 2024-05-14 VITALS
HEART RATE: 82 BPM | HEIGHT: 66 IN | DIASTOLIC BLOOD PRESSURE: 67 MMHG | RESPIRATION RATE: 20 BRPM | BODY MASS INDEX: 32.24 KG/M2 | TEMPERATURE: 98 F | WEIGHT: 200.63 LBS | OXYGEN SATURATION: 97 % | SYSTOLIC BLOOD PRESSURE: 153 MMHG

## 2024-05-14 DIAGNOSIS — M79.622 LEFT UPPER ARM PAIN: ICD-10-CM

## 2024-05-14 DIAGNOSIS — M25.512 LEFT SHOULDER PAIN: ICD-10-CM

## 2024-05-14 PROCEDURE — 99283 EMERGENCY DEPT VISIT LOW MDM: CPT | Mod: 25,ER

## 2024-05-14 NOTE — ED PROVIDER NOTES
Encounter Date: 5/14/2024       History     Chief Complaint   Patient presents with    Arm Pain     Left arm pain for 1 week. Elbow to shoulder denies any injury       Patient presents to ER for left upper arm and left shoulder pain, onset 1 week ago.  She denies any associated symptoms.  Pain is worse with movement.  She denies any known injury.  She has been taking over-the-counter  Tylenol which does provide pain relief.  She denies chest pain, shortness of breath, numbness, tingling, joint immobility, joint instability, joint erythema.    The history is provided by the patient.     Review of patient's allergies indicates:   Allergen Reactions    Iodine and iodide containing products Nausea And Vomiting    Penicillins Itching    Ultram [tramadol] Hives and Itching     Light headness, itiching    Sulfa (sulfonamide antibiotics) Hives and Itching    Adhesive Other (See Comments)    Amlodipine      edema    Sulfamethoxazole-trimethoprim Itching, Rash and Nausea And Vomiting     Past Medical History:   Diagnosis Date    Anemia     Angina pectoris     Arthritis     Asthma     Back pain     Bronchitis     CAD (coronary artery disease) 07/2015    via [facility] ct    Carotid artery stenosis     Chronic bronchitis     Chronic gout     Colon polyp     CTS (carpal tunnel syndrome)     Diabetes mellitus, type 2     Diabetes with neurologic complications     Diverticulosis     RIVAS (dyspnea on exertion)     chronic; eval pulm dr natarajan    Dyslipidemia     Ex-smoker     quit in her 30s    GERD (gastroesophageal reflux disease)     Heart failure     Hyperlipidemia     Hypertension     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    Neuropathy, lower extremity     Obesity     Open-angle glaucoma     dr avel kaur    Peripheral vascular disease     Polyneuropathy     Tobacco dependence      Past Surgical History:   Procedure Laterality Date    APPENDECTOMY      CARPAL TUNNEL RELEASE Right     CATARACT EXTRACTION, BILATERAL       CHOLECYSTECTOMY      CYST REMOVAL  2018    DILATION AND CURETTAGE OF UTERUS      EYE SURGERY      HEMORRHOID SURGERY      HYSTERECTOMY      fibroids    INCISION AND DRAINAGE PERIRECTAL ABSCESS      JOINT REPLACEMENT Bilateral     knee    KNEE ARTHROSCOPY Right     LOCALIZATION OF MASS OF BREAST WITH ULTRASOUND GUIDANCE Left 2023    Procedure: LOCALIZATION, MASS, BREAST, WITH US GUIDANCE;  Surgeon: Susan Pulliam MD;  Location: [facility] OR;  Service: General;  Laterality: Left;  Intra-operatively Placed Radiographic Marker (Wire)    MASTECTOMY, PARTIAL Left 2023    Procedure: MASTECTOMY, PARTIAL;  Surgeon: Susan Pulliam MD;  Location: [facility] OR;  Service: General;  Laterality: Left;  with Interpretation of Specimen Mammogram    MYOMECTOMY       Family History   Problem Relation Name Age of Onset    Hypertension Mother      Diabetes Mother      Leukemia Sister      Hypertension Sister      Cancer Sister          leukemia    Heart disease Maternal Aunt      Cancer Maternal Uncle          gastric, pacreatic    Heart disease Maternal Uncle      Miscarriages / Stillbirths Maternal Uncle      Diabetes Maternal Grandmother      Asthma Maternal Grandfather      Breast cancer Neg Hx      Colon cancer Neg Hx      Ovarian cancer Neg Hx      COPD Neg Hx      Hyperlipidemia Neg Hx      Kidney disease Neg Hx       Social History     Tobacco Use    Smoking status: Former     Current packs/day: 0.00     Average packs/day: 0.3 packs/day for 24.0 years (6.0 ttl pk-yrs)     Types: Cigarettes     Start date: 1952     Quit date: 1976     Years since quittin.9    Smokeless tobacco: Never   Substance Use Topics    Alcohol use: No    Drug use: No     Review of Systems   Constitutional:  Negative for chills, fatigue and fever.   Respiratory:  Negative for cough and shortness of breath.    Cardiovascular:  Negative for chest pain and palpitations.   Gastrointestinal:  Negative for abdominal pain, nausea and  vomiting.   Musculoskeletal:  Negative for back pain, myalgias and neck pain.          +Left shoulder pain, +left upper arm pain   Skin:  Negative for rash and wound.   Neurological:  Negative for weakness, numbness and headaches.   All other systems reviewed and are negative.      Physical Exam     Initial Vitals [05/14/24 1354]   BP Pulse Resp Temp SpO2   (!) 153/67 82 20 97.8 °F (36.6 °C) 97 %      MAP       --         Physical Exam    Nursing note and vitals reviewed.  Constitutional: She is not diaphoretic. She is cooperative.  Non-toxic appearance. She does not have a sickly appearance. She does not appear ill. No distress.   HENT:   Head: Normocephalic and atraumatic.   Neck: Neck supple.   Normal range of motion.  Cardiovascular:  Normal rate, regular rhythm and intact distal pulses.           Pulmonary/Chest: Breath sounds normal. No respiratory distress.   Musculoskeletal:         General: Normal range of motion.      Right shoulder: Normal.      Left shoulder: Tenderness present. No swelling or deformity.      Right upper arm: Normal.      Left upper arm: Tenderness and bony tenderness present. No swelling, edema or deformity.      Left elbow: Normal.      Cervical back: Normal range of motion and neck supple.     Neurological: She is alert and oriented to person, place, and time. She has normal strength. No sensory deficit. GCS score is 15. GCS eye subscore is 4. GCS verbal subscore is 5. GCS motor subscore is 6.   Skin: Skin is warm and dry. Capillary refill takes less than 2 seconds.         ED Course   Procedures  Labs Reviewed - No data to display       Imaging Results              X-Ray Shoulder Trauma Left (Final result)  Result time 05/14/24 14:37:10      Final result by Bobby Eric MD (05/14/24 14:37:10)                   Impression:      1.  Negative for acute process.    2.  Stable and incidental findings as noted above.      Electronically signed by: Bobby Eric  MD  Date:    05/14/2024  Time:    14:37               Narrative:    EXAMINATION:  XR SHOULDER TRAUMA 3 VIEW LEFT; XR HUMERUS 2 VIEW LEFT    CLINICAL HISTORY:  Pain in left shoulder; Pain in left upper arm    TECHNIQUE:  Three views of the left shoulder were performed.    COMPARISON  August 3, 2023    FINDINGS:  The glenohumeral joint and acromioclavicular joint are well aligned.  The coracoclavicular distance is normal.  Moderate hypertrophy of the acromioclavicular joint and undersurface of the acromion process noted.  Mild glenoid rim spurring.  Cystic degenerative changes of the greater tuberosity with overlying calcific tendinitis.    The visualized portions of the elbow joint are grossly well maintained.  No obvious elbow joint effusion seen.  Medial epicondyle and coronoid process spurring.    Vascular calcifications are present within the subclavian, axillary and proximal brachial arteries.  Stable distension of a loop of bowel in the left upper quadrant.    The left lung is clear.  Tortuous aorta with calcifications of the aortic knob.                                       X-Ray Humerus 2 View Left (Final result)  Result time 05/14/24 14:37:10      Final result by Bobby Eric MD (05/14/24 14:37:10)                   Impression:      1.  Negative for acute process.    2.  Stable and incidental findings as noted above.      Electronically signed by: Bobby Eric MD  Date:    05/14/2024  Time:    14:37               Narrative:    EXAMINATION:  XR SHOULDER TRAUMA 3 VIEW LEFT; XR HUMERUS 2 VIEW LEFT    CLINICAL HISTORY:  Pain in left shoulder; Pain in left upper arm    TECHNIQUE:  Three views of the left shoulder were performed.    COMPARISON  August 3, 2023    FINDINGS:  The glenohumeral joint and acromioclavicular joint are well aligned.  The coracoclavicular distance is normal.  Moderate hypertrophy of the acromioclavicular joint and undersurface of the acromion process noted.  Mild glenoid rim spurring.   Cystic degenerative changes of the greater tuberosity with overlying calcific tendinitis.    The visualized portions of the elbow joint are grossly well maintained.  No obvious elbow joint effusion seen.  Medial epicondyle and coronoid process spurring.    Vascular calcifications are present within the subclavian, axillary and proximal brachial arteries.  Stable distension of a loop of bowel in the left upper quadrant.    The left lung is clear.  Tortuous aorta with calcifications of the aortic knob.                                       Medications - No data to display  Medical Decision Making  Amount and/or Complexity of Data Reviewed  Radiology: ordered.                    Results reviewed and discussed with patient and she verbalized understanding with no further concerns.  Patient states she has been taking over-the-counter Tylenol which does provide pain relief.  Instructed patient to continue taking as directed.  Discussed outpatient follow-up with her PCP.  Patient is neurovascularly intact distally.  Discussed signs and symptoms to return to ER.  Patient agrees with this plan and voices no further concerns.    I discussed with patient  that evaluation in the ED does not suggest any emergent or life threatening medical conditions requiring immediate intervention beyond what was provided in the ED, and I believe patient is safe for discharge. Regardless, an unremarkable evaluation in the ED does not preclude the development or presence of a serious of life threatening condition. As such, patient was instructed to return immediately for any worsening or change in current symptoms.                     Clinical Impression:  Final diagnoses:  [M79.622] Left upper arm pain  [M25.512] Left shoulder pain          ED Disposition Condition    Discharge Stable          ED Prescriptions    None       Follow-up Information       Follow up With Specialties Details Why Contact Info    Lucio Morris MD Family  Medicine In 2 days  [address]  [place] [address]  [phone]      [facility] - Emergency Dept Emergency Medicine  As needed, If symptoms worsen [address] [address] [id number] [phone]             Jose Patel, NP  05/14/24 3014

## 2024-05-15 ENCOUNTER — TELEPHONE (OUTPATIENT)
Dept: PRIMARY CARE CLINIC | Facility: CLINIC | Age: 84
End: 2024-05-15
Payer: MEDICARE

## 2024-05-15 NOTE — TELEPHONE ENCOUNTER
----- Message from Tana Cowan sent at 5/15/2024  1:41 PM CDT -----  .Type:  Sooner Apoointment Request    Caller is requesting a sooner appointment.  Caller declined first available appointment listed below.  Caller will not accept being placed on the waitlist and is requesting a message be sent to doctor.  Name of Caller:Oneida Nieves   When is the first available appointment?09/2024  Symptoms:severe severe arm pain and ER follow up  Would the patient rather a call back or a response via [facility]? Call back  Best Call Back Number:.[phone]    Additional Information: patient was advised to follow up in 2 days

## 2024-05-15 NOTE — TELEPHONE ENCOUNTER
Spoke with pt and advised her that Dr. Morris is booked and I can get her scheduled with a NP. Pt declined and advised me that she would wait until her next scheduled appt.

## 2024-05-27 NOTE — PROGRESS NOTES
Breast Surgical Oncology  [place]    Date of Service: 05/27/2024    DIAGNOSIS:   83 y.o. female with a history of left breast cancer.    Date of Diagnosis: 6/20/11  Pathology: IDC, G1, ER >90%, NY 1-11%, HER2 1+, Ki67 20%  Clinical Stage: IA (cT2 cN0 Mx)  Pathologic Stage: IA (pT2 pNx Mx)    TREATMENT:   Surgery: Left partial mastectomy on 8/22/23. Susan Pulliam M.D. Breast Surgical Oncology  Systemic Therapy: Endocrine therapy with Arimidex from 9/11/23 to 5/14/24 - stopped due to intolerance, Oncotype DX score 26  Medical Oncologist: Jose Carlos Monroe M.D.     Radiation Treatment Summary: patient deferred  Radiation Oncologist: N/A  Genetics: negative MYRIAD  Physical Therapy: N/A, no axillary surgery    HISTORY OF PRESENT ILLNESS:   Kaleigh Nieves is here for oncological follow up.  Today, she denies new breast concerns such as pain, masses, skin changes, nipple discharge, nipple retraction or lumps under the arm.  She also denies bone pain, shortness of breath, abdominal pain and neurologic symptoms.     IMAGING:   Results for orders placed during the hospital encounter of 05/28/24    Mammo Digital Diagnostic Bilat with Bertrand    Narrative  Result:  Mammo Digital Diagnostic Bilat with Bertrand    History:  Patient is 83 y.o. and is seen for diagnostic imaging.    Films Compared:  Compared to: 06/02/2023 Mammo Digital Diagnostic Left with Bertrand, 06/02/2023 US Breast Left Limited, 04/21/2023 Mammo Digital Screening Bilat w/ Bertrand, and 04/28/2017 Mammo Digital Screening Bilateral With CAD, 4/25/2016 Mammo Digital Screening Bilateral With CAD    Findings:  This procedure was performed using tomosynthesis. Computer-aided detection was utilized in the interpretation of this examination.  The breasts are heterogeneously dense, which may obscure small masses.    Left  There are post-surgical findings from a previous lumpectomy seen in the upper inner quadrant of the left breast in the posterior depth. There has been no  interval development of a suspicious mass, microcalcification, or architectural distortion.    There is no evidence of suspicious masses, calcifications, or other abnormal findings in the left breast.    Right  There is no evidence of suspicious masses, calcifications, or other abnormal findings in the right breast.    Impression  Bilateral  There is no mammographic evidence of malignancy.    BI-RADS Category:  Overall: 2 - Benign      Recommendation:  Routine screening mammogram in 1 year, or as clinically indicated.       MEDICATIONS/ALLERGIES:     Current Outpatient Medications:     ACCU-CHEK GUIDE TEST STRIPS Strp, TEST 3 TIMES DAILY, Disp: 300 strip, Rfl: 3    albuterol (PROVENTIL/VENTOLIN HFA) 90 mcg/actuation inhaler, INHALE 2 PUFFS INTO THE LUNGS EVERY SIX HOURS AS NEEDED FOR WHEEZING, Disp: 18 g, Rfl: 11    allopurinoL (ZYLOPRIM) 100 MG tablet, Take 1 tablet (100 mg total) by mouth once daily., Disp: 90 tablet, Rfl: 1    anastrozole (ARIMIDEX) 1 mg Tab, Take 1 tablet (1 mg total) by mouth once daily., Disp: 90 tablet, Rfl: 3    aspirin 81 MG Chew, Take 81 mg by mouth once daily., Disp: , Rfl:     bisoprolol (ZEBETA) 5 MG tablet, TAKE 1 TABLET (5 MG TOTAL) BY MOUTH ONCE DAILY., Disp: 90 tablet, Rfl: 3    blood glucose control high,low Soln, 1 each by Misc.(Non-Drug; Combo Route) route once a week., Disp: 1 each, Rfl: 11    blood-glucose meter kit, To check BG 3 times daily, to use with insurance preferred meter, Disp: 1 each, Rfl: 0    bumetanide (BUMEX) 1 MG tablet, Take 1 tablet (1 mg total) by mouth once daily., Disp: 30 tablet, Rfl: 11    fluticasone-salmeterol diskus inhaler 250-50 mcg, Inhale 1 puff into the lungs 2 (two) times daily. Controller, Disp: 180 each, Rfl: 3    lancets Misc, To check BG 3 times daily, to use with insurance preferred meter, Disp: 200 each, Rfl: 3    latanoprost 0.005 % ophthalmic solution, Place 1 drop into both eyes once daily., Disp: 7.5 mL, Rfl: 3    ondansetron  (ZOFRAN-ODT) 8 MG TbDL, Dissolve 1 tablet (8 mg total) by mouth every 8 (eight) hours as needed (Nausea)., Disp: 10 tablet, Rfl: 0    simvastatin (ZOCOR) 20 MG tablet, TAKE 1 TABLET (20 MG TOTAL) BY MOUTH NIGHTLY., Disp: 90 tablet, Rfl: 3    timolol maleate 0.5% (TIMOPTIC) 0.5 % Drop, Place 1 drop into both eyes 2 (two) times daily., Disp: 30 mL, Rfl: 4    timoloL maleate, PF, 0.5 % Dpet, Place into both eyes., Disp: , Rfl:   Review of patient's allergies indicates:   Allergen Reactions    Iodine and iodide containing products Nausea And Vomiting    Penicillins Itching    Ultram [tramadol] Hives and Itching     Light headness, itiching    Sulfa (sulfonamide antibiotics) Hives and Itching    Adhesive Other (See Comments)    Amlodipine      edema    Sulfamethoxazole-trimethoprim Itching, Rash and Nausea And Vomiting       PHYSICAL EXAM:   General: The patient appears well and is in no acute distress.     Chaperone is present for examination.   BREAST EXAM  No Asymmetry  Right:  - Mass: No  - Skin change: No  - Nipple Discharge: No  - Nipple retraction: No  - Axillary LAD: No  Left:   - Mass: No  - Skin change: No  - Nipple Discharge: No  - Nipple retraction: No  - Axillary LAD: No      ASSESSMENT:   Diagnoses and all orders for this visit:    Malignant neoplasm of upper-inner quadrant of left breast in female, estrogen receptor positive       PLAN:   Kaleigh has a benign exam today without evidence of local or distant relapse.    She will return in November for her 1 year follow up visit, or sooner should she develop breast concerns.    Bilateral diagnostic mammogram due each May    The patient is in agreement with the plan. Questions were encouraged and answered to patient's satisfaction. Kaleigh will call our office with any questions or concerns.     I spent a total of 30 minutes on this visit. This includes face to face time and non-face to face time preparing to see the patient (eg, review of tests), obtaining  and/or reviewing separately obtained history, documenting clinical information in the electronic or other health record, independently interpreting results and communicating results to the patient/family/caregiver, or care coordinator.       Susan Pulliam M.D.

## 2024-05-28 ENCOUNTER — OFFICE VISIT (OUTPATIENT)
Dept: SURGERY | Facility: CLINIC | Age: 84
End: 2024-05-28
Payer: MEDICARE

## 2024-05-28 ENCOUNTER — HOSPITAL ENCOUNTER (OUTPATIENT)
Dept: RADIOLOGY | Facility: HOSPITAL | Age: 84
Discharge: HOME | End: 2024-05-28
Attending: SURGERY
Payer: MEDICARE

## 2024-05-28 VITALS — BODY MASS INDEX: 32.24 KG/M2 | HEIGHT: 66 IN | WEIGHT: 200.63 LBS

## 2024-05-28 DIAGNOSIS — Z17.0 MALIGNANT NEOPLASM OF UPPER-INNER QUADRANT OF LEFT BREAST IN FEMALE, ESTROGEN RECEPTOR POSITIVE: ICD-10-CM

## 2024-05-28 DIAGNOSIS — C50.212 MALIGNANT NEOPLASM OF UPPER-INNER QUADRANT OF LEFT BREAST IN FEMALE, ESTROGEN RECEPTOR POSITIVE: Primary | ICD-10-CM

## 2024-05-28 DIAGNOSIS — Z17.0 MALIGNANT NEOPLASM OF UPPER-INNER QUADRANT OF LEFT BREAST IN FEMALE, ESTROGEN RECEPTOR POSITIVE: Primary | ICD-10-CM

## 2024-05-28 DIAGNOSIS — C50.212 MALIGNANT NEOPLASM OF UPPER-INNER QUADRANT OF LEFT BREAST IN FEMALE, ESTROGEN RECEPTOR POSITIVE: ICD-10-CM

## 2024-05-28 PROCEDURE — 77062 BREAST TOMOSYNTHESIS BI: CPT | Mod: 26,,, | Performed by: STUDENT IN AN ORGANIZED HEALTH CARE EDUCATION/TRAINING PROGRAM

## 2024-05-28 PROCEDURE — 77062 BREAST TOMOSYNTHESIS BI: CPT | Mod: TC

## 2024-05-28 PROCEDURE — 77066 DX MAMMO INCL CAD BI: CPT | Mod: 26,,, | Performed by: STUDENT IN AN ORGANIZED HEALTH CARE EDUCATION/TRAINING PROGRAM

## 2024-05-28 PROCEDURE — 99214 OFFICE O/P EST MOD 30 MIN: CPT | Mod: S$GLB,,, | Performed by: SURGERY

## 2024-06-26 ENCOUNTER — OFFICE VISIT (OUTPATIENT)
Dept: PODIATRY | Facility: CLINIC | Age: 84
End: 2024-06-26
Payer: MEDICARE

## 2024-06-26 VITALS — HEIGHT: 66 IN | WEIGHT: 200.63 LBS | BODY MASS INDEX: 32.24 KG/M2

## 2024-06-26 DIAGNOSIS — E11.51 TYPE II DIABETES MELLITUS WITH PERIPHERAL CIRCULATORY DISORDER: Primary | ICD-10-CM

## 2024-06-26 DIAGNOSIS — B35.1 DERMATOPHYTOSIS OF NAIL: ICD-10-CM

## 2024-06-26 PROCEDURE — 99999 PR PBB SHADOW E&M-EST. PATIENT-LVL III: CPT | Mod: PBBFAC,,, | Performed by: PODIATRIST

## 2024-06-26 PROCEDURE — 99499 UNLISTED E&M SERVICE: CPT | Mod: S$GLB,,, | Performed by: PODIATRIST

## 2024-06-26 PROCEDURE — 11721 DEBRIDE NAIL 6 OR MORE: CPT | Mod: Q8,S$GLB,, | Performed by: PODIATRIST

## 2024-07-07 NOTE — PROGRESS NOTES
Subjective:     Patient ID: Kaleigh Nieves is a 83 y.o. female.    Chief Complaint: Nail Care (Pt c/o BL foot pain 8/10, Diabetic pt wears tennis shoes, PCP Dr. Morris last seen 4-10-24) and Foot Pain    Kaleigh is a 83 y.o. female who presents to the clinic for evaluation and treatment of high risk feet. 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 The patient's chief complaint is long, thick toenails. Patient complains of shrp burning pain in both feet. Patient rates pain 8/10. This patient has documented high risk feet requiring routine maintenance secondary to diabetes mellitis and those secondary complications of diabetes, as mentioned.     PCP: Lucio Morris MD    Date Last Seen by PCP: 04/10/2024    Current shoe gear:  Affected Foot: Rx diabetic extra depth shoes and custom accommodative insoles     Unaffected Foot: Rx diabetic extra depth shoes and custom accommodative insoles    Hemoglobin A1C   Date Value Ref Range Status   04/17/2024 5.9 (H) 4.0 - 5.6 % Final     Comment:     ADA Screening Guidelines:  5.7-6.4%  Consistent with prediabetes  >or=6.5%  Consistent with diabetes    High levels of fetal hemoglobin interfere with the HbA1C  assay. Heterozygous hemoglobin variants (HbS, HgC, etc)do  not significantly interfere with this assay.   However, presence of multiple variants may affect accuracy.     03/03/2023  6.0 (H) 4.0 - 5.6 % Final     Comment:     ADA Screening Guidelines:  5.7-6.4%  Consistent with prediabetes  >or=6.5%  Consistent with diabetes    High levels of fetal hemoglobin interfere with the HbA1C  assay. Heterozygous hemoglobin variants (HbS, HgC, etc)do  not significantly interfere with this assay.   However, presence of multiple variants may affect accuracy.     08/31/2022 5.7 (H) 4.0 - 5.6 % Final     Comment:     ADA Screening Guidelines:  5.7-6.4%  Consistent with prediabetes  >or=6.5%  Consistent with diabetes    High levels of fetal hemoglobin interfere with the HbA1C  assay. Heterozygous hemoglobin variants (HbS, HgC, etc)do  not significantly interfere with this assay.   However, presence of multiple variants may affect accuracy.         Patient Active Problem List   Diagnosis    Type 2 diabetes mellitus with diabetic polyneuropathy, without long-term current use of insulin    Multiple joint pain    Esophageal reflux    Hyperlipidemia    Chronic gout    Lichen sclerosus    Carotid artery disease    Coronary artery disease involving native coronary artery without angina pectoris    Colon polyp    Hypertension associated with diabetes    Aortic atherosclerosis    Arterial insufficiency of lower extremity    Ganglion cyst of flexor tendon sheath of finger of right hand    Bilateral carpal tunnel syndrome    Hyperkalemia    Chronic bronchitis    Dependent edema    Diverticulosis    Primary open-angle glaucoma, bilateral, indeterminate stage    RIVAS (dyspnea on exertion)    Chronic diastolic heart failure    Low vitamin D level    Body mass index (BMI) 34.0-34.9, adult    Unspecified lump in the left breast, upper inner quadrant    Malignant neoplasm of upper-inner quadrant of left breast in female, estrogen receptor positive    Immunodeficiency due to chemotherapy    Severe obesity (BMI 35.0-35.9 with comorbidity)       Medication List with Changes/Refills   Current Medications    ACCU-CHEK GUIDE TEST  STRIPS STRP    TEST 3 TIMES DAILY    ALBUTEROL (PROVENTIL/VENTOLIN HFA) 90 MCG/ACTUATION INHALER    INHALE 2 PUFFS INTO THE LUNGS EVERY SIX HOURS AS NEEDED FOR WHEEZING    ALLOPURINOL (ZYLOPRIM) 100 MG TABLET    Take 1 tablet (100 mg total) by mouth once daily.    ANASTROZOLE (ARIMIDEX) 1 MG TAB    Take 1 tablet (1 mg total) by mouth once daily.    ASPIRIN 81 MG CHEW    Take 81 mg by mouth once daily.    BISOPROLOL (ZEBETA) 5 MG TABLET    TAKE 1 TABLET (5 MG TOTAL) BY MOUTH ONCE DAILY.    BLOOD GLUCOSE CONTROL HIGH,LOW SOLN    1 each by Misc.(Non-Drug; Combo Route) route once a week.    BLOOD-GLUCOSE METER KIT    To check BG 3 times daily, to use with insurance preferred meter    BUMETANIDE (BUMEX) 1 MG TABLET    Take 1 tablet (1 mg total) by mouth once daily.    FLUTICASONE-SALMETEROL DISKUS INHALER 250-50 MCG    Inhale 1 puff into the lungs 2 (two) times daily. Controller    LANCETS MISC    To check BG 3 times daily, to use with insurance preferred meter    LATANOPROST 0.005 % OPHTHALMIC SOLUTION    Place 1 drop into both eyes once daily.    ONDANSETRON (ZOFRAN-ODT) 8 MG TBDL    Dissolve 1 tablet (8 mg total) by mouth every 8 (eight) hours as needed (Nausea).    SIMVASTATIN (ZOCOR) 20 MG TABLET    TAKE 1 TABLET (20 MG TOTAL) BY MOUTH NIGHTLY.    TIMOLOL MALEATE 0.5% (TIMOPTIC) 0.5 % DROP    Place 1 drop into both eyes 2 (two) times daily.    TIMOLOL MALEATE, PF, 0.5 % DPET    Place into both eyes.       Review of patient's allergies indicates:   Allergen Reactions    Iodine and iodide containing products Nausea And Vomiting    Penicillins Itching    Ultram [tramadol] Hives and Itching     Light headness, itiching    Sulfa (sulfonamide antibiotics) Hives and Itching    Adhesive Other (See Comments)    Amlodipine      edema    Sulfamethoxazole-trimethoprim Itching, Rash and Nausea And Vomiting       Past Surgical History:   Procedure Laterality Date    APPENDECTOMY      CARPAL TUNNEL RELEASE Right     CATARACT  EXTRACTION, BILATERAL      CHOLECYSTECTOMY      CYST REMOVAL  2018    DILATION AND CURETTAGE OF UTERUS      EYE SURGERY      HEMORRHOID SURGERY      HYSTERECTOMY      fibroids    INCISION AND DRAINAGE PERIRECTAL ABSCESS      JOINT REPLACEMENT Bilateral     knee    KNEE ARTHROSCOPY Right     LOCALIZATION OF MASS OF BREAST WITH ULTRASOUND GUIDANCE Left 2023    Procedure: LOCALIZATION, MASS, BREAST, WITH US GUIDANCE;  Surgeon: Susan Pulliam MD;  Location: [facility] OR;  Service: General;  Laterality: Left;  Intra-operatively Placed Radiographic Marker (Wire)    MASTECTOMY, PARTIAL Left 2023    Procedure: MASTECTOMY, PARTIAL;  Surgeon: Susan Pulliam MD;  Location: [facility] OR;  Service: General;  Laterality: Left;  with Interpretation of Specimen Mammogram    MYOMECTOMY         Family History   Problem Relation Name Age of Onset    Hypertension Mother      Diabetes Mother      Leukemia Sister      Hypertension Sister      Cancer Sister          leukemia    Heart disease Maternal Aunt      Cancer Maternal Uncle          gastric, pacreatic    Heart disease Maternal Uncle      Miscarriages / Stillbirths Maternal Uncle      Diabetes Maternal Grandmother      Asthma Maternal Grandfather      Breast cancer Neg Hx      Colon cancer Neg Hx      Ovarian cancer Neg Hx      COPD Neg Hx      Hyperlipidemia Neg Hx      Kidney disease Neg Hx         Social History     Socioeconomic History    Marital status: Single    Number of children: 0   Tobacco Use    Smoking status: Former     Current packs/day: 0.00     Average packs/day: 0.3 packs/day for 24.0 years (6.0 ttl pk-yrs)     Types: Cigarettes     Start date: 1952     Quit date: 1976     Years since quittin.0    Smokeless tobacco: Never   Substance and Sexual Activity    Alcohol use: No    Drug use: No    Sexual activity: Not Currently     Social Determinants of Health     Financial Resource Strain: Low Risk  (2022)    Overall Financial Resource  "Strain (CARDIA)     Difficulty of Paying Living Expenses: Not hard at all   Food Insecurity: No Food Insecurity (9/27/2022)    Hunger Vital Sign     Worried About Running Out of Food in the Last Year: Never true     Ran Out of Food in the Last Year: Never true   Transportation Needs: No Transportation Needs (9/27/2022)    PRAPARE - Transportation     Lack of Transportation (Medical): No     Lack of Transportation (Non-Medical): No   Physical Activity: Inactive (9/27/2022)    Exercise Vital Sign     Days of Exercise per Week: 0 days     Minutes of Exercise per Session: 0 min   Stress: No Stress Concern Present (9/27/2022)    Citizen of [place] [employer] - Occupational Stress Questionnaire     Feeling of Stress : Not at all   Housing Stability: Low Risk  (9/27/2022)    Housing Stability Vital Sign     Unable to Pay for Housing in the Last Year: No     Number of Places Lived in the Last Year: 1     Unstable Housing in the Last Year: No       Vitals:    06/26/24 1051   Weight: 91 kg (200 lb 9.9 oz)   Height: 5' 6" (1.676 m)       Hemoglobin A1C   Date Value Ref Range Status   04/17/2024 5.9 (H) 4.0 - 5.6 % Final     Comment:     ADA Screening Guidelines:  5.7-6.4%  Consistent with prediabetes  >or=6.5%  Consistent with diabetes    High levels of fetal hemoglobin interfere with the HbA1C  assay. Heterozygous hemoglobin variants (HbS, HgC, etc)do  not significantly interfere with this assay.   However, presence of multiple variants may affect accuracy.     03/03/2023 6.0 (H) 4.0 - 5.6 % Final     Comment:     ADA Screening Guidelines:  5.7-6.4%  Consistent with prediabetes  >or=6.5%  Consistent with diabetes    High levels of fetal hemoglobin interfere with the HbA1C  assay. Heterozygous hemoglobin variants (HbS, HgC, etc)do  not significantly interfere with this assay.   However, presence of multiple variants may affect accuracy.     08/31/2022 5.7 (H) 4.0 - 5.6 % Final     Comment:     ADA Screening " Guidelines:  5.7-6.4%  Consistent with prediabetes  >or=6.5%  Consistent with diabetes    High levels of fetal hemoglobin interfere with the HbA1C  assay. Heterozygous hemoglobin variants (HbS, HgC, etc)do  not significantly interfere with this assay.   However, presence of multiple variants may affect accuracy.         Review of Systems   Constitutional:  Negative for chills and fever.   Respiratory:  Negative for shortness of breath.    Cardiovascular:  Positive for leg swelling (improved). Negative for chest pain, palpitations, orthopnea and claudication.   Gastrointestinal:  Negative for diarrhea, nausea and vomiting.   Musculoskeletal:  Negative for joint pain.   Skin:  Negative for rash.   Neurological:  Negative for dizziness, tingling, sensory change, focal weakness and weakness.   Psychiatric/Behavioral: Negative.               Objective:      PHYSICAL EXAM: Apperance: Alert and orient in no distress,well developed, and with good attention to grooming and body habits  Patient presents ambulating in diabetic shoes and inserts.   LOWER EXTREMITY EXAM:  VASCULAR: Dorsalis pedis pulses 0/4 bilateral (monophasic with doppler) and Posterior Tibial pulses 0/4 bilateral (biphasic with doppler). Capillary fill time <blanched bilateral. Mild edema observed bilateral. Varicosities present bilateral. Skin temperature of the lower extremities is warm to cool, proximal to distal. Hair growth absent bilateral.  DERMATOLOGICAL: No skin rashes, subcutaneous nodules, lesions, or ulcers observed bilateral. Nails 1,2,3,4,5 bilateral elongated, thickened, and discolored with subungual debris. Webspaces 1,2,3,4clean, dry and without evidence of break in skin integrity bilateral.   NEUROLOGICAL: Light touch, sharp-dull, proprioception all present and equal bilaterally.  Vibratory sensation intact on left hallux and diminished at right hallux. Protective sensation intact at all 10 sites as tested with a Hudgins-Jimy 5.07  monofilament.   MUSCULOSKELETAL: Muscle strength is 5/5 for foot inverters, everters, plantarflexors, and dorsiflexors. Muscle tone is normal. No pain on palpation of right dorsal foot/ankle. Pes planus noted bilateral. Flexible hammertoes noted bilateral.         Assessment:       ICD-10-CM ICD-9-CM   1. Type II diabetes mellitus with peripheral circulatory disorder  E11.51 250.70     443.81   2. Dermatophytosis of nail  B35.1 110.1       Plan:   Type II diabetes mellitus with peripheral circulatory disorder    Dermatophytosis of nail    I counseled the patient on her conditions, regarding findings of my examination, my impressions, and usual treatment plan.   Appointment spent on education about the diabetic foot, neuropathy, and prevention of limb loss.  Shoe inspection. Diabetic Foot Education. Patient reminded of the importance of good nutrition and blood sugar control to help prevent podiatric complications of diabetes. Patient instructed on proper foot hygeine. We discussed wearing proper shoe gear, daily foot inspections, never walking without protective shoe gear, never putting sharp instruments to feet.    With patient's permission, nails 1-5 bilateral were debrided/trimmed in length and thickness aggressively to their soft tissue attachment mechanically and with electric , removing all offending nail and debris. Patient relates relief following the procedure.  Patient  will continue to monitor the areas daily, inspect feet, wear protective shoe gear when ambulatory, moisturizer to maintain skin integrity. Patient reminded of the importance of good nutrition and blood sugar control to help prevent podiatric complications of diabetes.  Patient to return 4 months or sooner if needed.          Ino Harmon DPM  [facility]

## 2024-08-05 DIAGNOSIS — E11.42 TYPE 2 DIABETES MELLITUS WITH DIABETIC POLYNEUROPATHY, WITHOUT LONG-TERM CURRENT USE OF INSULIN: ICD-10-CM

## 2024-08-05 DIAGNOSIS — M1A.9XX0 CHRONIC GOUT WITHOUT TOPHUS, UNSPECIFIED CAUSE, UNSPECIFIED SITE: ICD-10-CM

## 2024-08-05 RX ORDER — ALLOPURINOL 100 MG/1
100 TABLET ORAL DAILY
Qty: 90 TABLET | Refills: 2 | Status: SHIPPED | OUTPATIENT
Start: 2024-08-05

## 2024-08-21 ENCOUNTER — OFFICE VISIT (OUTPATIENT)
Dept: CARDIOLOGY | Facility: CLINIC | Age: 84
End: 2024-08-21
Payer: MEDICARE

## 2024-08-21 ENCOUNTER — PATIENT MESSAGE (OUTPATIENT)
Dept: CARDIOLOGY | Facility: HOSPITAL | Age: 84
End: 2024-08-21
Payer: MEDICARE

## 2024-08-21 VITALS
HEART RATE: 63 BPM | WEIGHT: 198.63 LBS | DIASTOLIC BLOOD PRESSURE: 60 MMHG | SYSTOLIC BLOOD PRESSURE: 140 MMHG | BODY MASS INDEX: 32.06 KG/M2 | OXYGEN SATURATION: 97 %

## 2024-08-21 DIAGNOSIS — I50.32 CHRONIC DIASTOLIC HEART FAILURE: ICD-10-CM

## 2024-08-21 DIAGNOSIS — I65.23 CAROTID ARTERY STENOSIS, ASYMPTOMATIC, BILATERAL: ICD-10-CM

## 2024-08-21 DIAGNOSIS — R09.89 BRUIT OF RIGHT CAROTID ARTERY: ICD-10-CM

## 2024-08-21 DIAGNOSIS — R60.9 SWELLING: ICD-10-CM

## 2024-08-21 DIAGNOSIS — I50.42 CHRONIC COMBINED SYSTOLIC AND DIASTOLIC HEART FAILURE: ICD-10-CM

## 2024-08-21 DIAGNOSIS — I77.9 CAROTID ARTERY DISEASE, UNSPECIFIED LATERALITY, UNSPECIFIED TYPE: Primary | ICD-10-CM

## 2024-08-21 DIAGNOSIS — M79.604 RIGHT LEG PAIN: ICD-10-CM

## 2024-08-21 PROCEDURE — 99214 OFFICE O/P EST MOD 30 MIN: CPT | Mod: S$GLB,,, | Performed by: INTERNAL MEDICINE

## 2024-08-21 PROCEDURE — 1101F PT FALLS ASSESS-DOCD LE1/YR: CPT | Mod: CPTII,S$GLB,, | Performed by: INTERNAL MEDICINE

## 2024-08-21 PROCEDURE — 3077F SYST BP >= 140 MM HG: CPT | Mod: CPTII,S$GLB,, | Performed by: INTERNAL MEDICINE

## 2024-08-21 PROCEDURE — 3078F DIAST BP <80 MM HG: CPT | Mod: CPTII,S$GLB,, | Performed by: INTERNAL MEDICINE

## 2024-08-21 PROCEDURE — 1159F MED LIST DOCD IN RCRD: CPT | Mod: CPTII,S$GLB,, | Performed by: INTERNAL MEDICINE

## 2024-08-21 PROCEDURE — 99999 PR PBB SHADOW E&M-EST. PATIENT-LVL III: CPT | Mod: PBBFAC,,, | Performed by: INTERNAL MEDICINE

## 2024-08-21 PROCEDURE — 3288F FALL RISK ASSESSMENT DOCD: CPT | Mod: CPTII,S$GLB,, | Performed by: INTERNAL MEDICINE

## 2024-08-21 RX ORDER — BUMETANIDE 1 MG/1
2 TABLET ORAL DAILY
Qty: 60 TABLET | Refills: 11 | Status: SHIPPED | OUTPATIENT
Start: 2024-08-21 | End: 2025-08-21

## 2024-08-21 NOTE — PROGRESS NOTES
Subjective:   Patient ID:  Kaleigh Nieves is a 83 y.o. female who presents for evaluation of No chief complaint on file.  8.21.204  More swelling and dyspnea since last visit   No syncope   Takes bumex in am   States compression stocking not helping .  Complains of lower extremity swelling and pain to her right calf  Does not appear to be a gout attack   + carotid bruit right carotid       1.29.2024  Comes in for six-month follow-up.    She states that her swelling has increased lately.  No inflammatory findings of her joints on exam today to suggest gout attack.    She also has dyspnea on exertion and orthopnea.  She states that when she raises her legs her dyspnea gets worse.    Denies any chest pain palpitations syncope or presyncope.    She takes the Lasix every other day due to the fact she sees her pressure be in the 100s.    However she states that the Lasix is not working for her.    Her BNP has been elevated in the past.      7.3.2023  Found to have breast cancer.  He is to go for surgery.    She continues to complain of same level of dyspnea on exertion.    She does have chronic lower extremity swelling from venous insufficiency.    She denies any chest pain.    Her EKG looks normal.        2.1.2023  Comes in for a 6 months follow up   Denies any worsening or unusual rivas   She states the lasix is not helping with her chronic lower ext swelling   She follows with [facility] for venous insufficiency   She denies any orthopnea   Bnp normal multiple times       6.2022  Improved symptoms after increasing lasix and adding bisoprolol last visit  Still has NYHA BUT 1 Now  No chest pain   Trace edema, wearing compression stocking   Sometimes has wheezing and uses ventoling   Normal BNP multiple times   Mild AS AI last year     12.2021  Comes in for 6 months follow   Denies any CP, reports RIVAS NYHA 1-2, Increased leg swelling, states lasix didn't make a big difference on 20 mg only   BP uncontrolled   Denies PND,  orthopnea  No palpitaitons  No syncope or LOC       Interval hx: 5/26/2021   Went to the ED last month with sharp, LUQ pain, underneath her left breast, sharp few seconds only, across her belly, felt like gas as per patient and she felt better once she started to pass gas   Denies any exertional chest pain   Has stable rivas, nyha1 , had normal BNP , TROP AND EKG in the ED, had very elevated  SBP   Today bp under good control   No more stomach pain   See ros     =================================================  HPI 2/1/2021  80-year-old female with history of diastolic heart failure, diabetes, hypertension, hyperlipidemia, gout, carotid artery disease and normal angiogram 2010  Comes in for follow up   Continues to have RIVAS , NYHA 2, Orthopnea, mild bilateral LE edema R>L   Compliant with low salt diet   Denies any chest pain   /750 at home     ==================================================================================================================================    2018: seen by Dr Jane   77 yo female, referred for CHF. Prior cardiologist Dr. Crawford at [facility].due to f/h CHF  PMH DM, HTN, HLD, gout, carotid artery Dz and had clear LHC in 2010.  No chest pain, palpitation, dizziness, orthopnea.  Some leg swelling,   RIVAS stable  .ECHO showed normal EF and LVH; MPI showed no ischemia  : BNP 34; BARRON wnl  ekg NSR    Shortness of Breath  Associated symptoms include leg swelling. Pertinent negatives include no chest pain or syncope.       Current Outpatient Medications:     ACCU-CHEK GUIDE TEST STRIPS Strp, TEST 3 TIMES DAILY, Disp: 300 strip, Rfl: 3    albuterol (PROVENTIL/VENTOLIN HFA) 90 mcg/actuation inhaler, INHALE 2 PUFFS INTO THE LUNGS EVERY SIX HOURS AS NEEDED FOR WHEEZING, Disp: 18 g, Rfl: 11    allopurinoL (ZYLOPRIM) 100 MG tablet, Take 1 tablet (100 mg total) by mouth once daily., Disp: 90 tablet, Rfl: 2    anastrozole (ARIMIDEX) 1 mg Tab, Take 1 tablet (1 mg total) by mouth  once daily., Disp: 90 tablet, Rfl: 3    aspirin 81 MG Chew, Take 81 mg by mouth once daily., Disp: , Rfl:     bisoprolol (ZEBETA) 5 MG tablet, TAKE 1 TABLET (5 MG TOTAL) BY MOUTH ONCE DAILY., Disp: 90 tablet, Rfl: 3    blood glucose control high,low Soln, 1 each by Misc.(Non-Drug; Combo Route) route once a week., Disp: 1 each, Rfl: 3    lancets Misc, To check BG 3 times daily, to use with insurance preferred meter, Disp: 200 each, Rfl: 3    ondansetron (ZOFRAN-ODT) 8 MG TbDL, Dissolve 1 tablet (8 mg total) by mouth every 8 (eight) hours as needed (Nausea)., Disp: 10 tablet, Rfl: 0    simvastatin (ZOCOR) 20 MG tablet, TAKE 1 TABLET (20 MG TOTAL) BY MOUTH NIGHTLY., Disp: 90 tablet, Rfl: 3    timoloL maleate, PF, 0.5 % Dpet, Place into both eyes., Disp: , Rfl:     blood-glucose meter kit, To check BG 3 times daily, to use with insurance preferred meter, Disp: 1 each, Rfl: 0    bumetanide (BUMEX) 1 MG tablet, Take 2 tablets (2 mg total) by mouth once daily., Disp: 60 tablet, Rfl: 11    fluticasone-salmeterol diskus inhaler 250-50 mcg, Inhale 1 puff into the lungs 2 (two) times daily. Controller, Disp: 180 each, Rfl: 3    latanoprost 0.005 % ophthalmic solution, Place 1 drop into both eyes once daily., Disp: 7.5 mL, Rfl: 3    timolol maleate 0.5% (TIMOPTIC) 0.5 % Drop, Place 1 drop into both eyes 2 (two) times daily., Disp: 30 mL, Rfl: 4    Past Medical History:   Diagnosis Date    Anemia     Angina pectoris     Arthritis     Asthma     Back pain     Bronchitis     [facility]    Carotid artery stenosis     Chronic bronchitis     Chronic gout     Colon polyp     CTS (carpal tunnel syndrome)     Diabetes mellitus, type 2     Diabetes with neurologic complications     Diverticulosis     RIVAS (dyspnea on exertion)     chronic; eval pulm dr natarajan    Dyslipidemia     Ex-smoker     quit in her 30s    GERD (gastroesophageal reflux disease)     Heart failure     Hyperlipidemia      Hypertension     Immunodeficiency due to chemotherapy 2023    Neuropathy, lower extremity     Obesity     Open-angle glaucoma     dr avel kaur    Peripheral vascular disease     Polyneuropathy     Tobacco dependence        Past Surgical History:   Procedure Laterality Date    APPENDECTOMY      CARPAL TUNNEL RELEASE Right     CATARACT EXTRACTION, BILATERAL      CHOLECYSTECTOMY      CYST REMOVAL  2018    DILATION AND CURETTAGE OF UTERUS      EYE SURGERY      HEMORRHOID SURGERY      HYSTERECTOMY      fibroids    INCISION AND DRAINAGE PERIRECTAL ABSCESS      JOINT REPLACEMENT Bilateral     knee    KNEE ARTHROSCOPY Right     LOCALIZATION OF MASS OF BREAST WITH ULTRASOUND GUIDANCE Left 2023    Procedure: LOCALIZATION, MASS, BREAST, WITH US GUIDANCE;  Surgeon: Susan Pulliam MD;  Location: [facility];  Service: General;  Laterality: Left;  Intra-operatively Placed Radiographic Marker (Wire)    MASTECTOMY, PARTIAL Left 2023    Procedure: MASTECTOMY, PARTIAL;  Surgeon: Susan Pulliam MD;  Location: [facility];  Service: General;  Laterality: Left;  with Interpretation of Specimen Mammogram    MYOMECTOMY         Social History     Tobacco Use    Smoking status: Former     Current packs/day: 0.00     Average packs/day: 0.3 packs/day for 24.0 years (6.0 ttl pk-yrs)     Types: Cigarettes     Start date: 1952     Quit date: 1976     Years since quittin.1    Smokeless tobacco: Never   Substance Use Topics    Alcohol use: No    Drug use: No       Family History   Problem Relation Name Age of Onset    Hypertension Mother      Diabetes Mother      Leukemia Sister      Hypertension Sister      Cancer Sister          leukemia    Heart disease Maternal Aunt      Cancer Maternal Uncle          gastric, pacreatic    Heart disease Maternal Uncle      Miscarriages / Stillbirths Maternal Uncle      Diabetes Maternal Grandmother      Asthma Maternal Grandfather      Breast cancer Neg Hx      Colon cancer  "Neg Hx      Ovarian cancer Neg Hx      COPD Neg Hx      Hyperlipidemia Neg Hx      Kidney disease Neg Hx         Review of Systems   Cardiovascular:  Positive for dyspnea on exertion and leg swelling. Negative for chest pain, palpitations and syncope.   Respiratory:  Positive for shortness of breath.    Genitourinary: Negative.    Neurological: Negative.        Objective:  Last 3 sets of Vitals        5/28/2024    10:46 AM 6/26/2024    10:51 AM 8/21/2024    10:26 AM   Vitals - 1 value per visit   SYSTOLIC   140   DIASTOLIC   60   Pulse   63   SPO2   97 %   Weight (lb) 200.62 200.62 198.63   Weight (kg) 91 91 90.1   Height 5' 6" (1.676 m) 5' 6" (1.676 m)    BMI (Calculated) 32.4 32.4         Physical Exam  Vitals reviewed.   Constitutional:       Appearance: She is well-developed.   Neck:      Vascular: Carotid bruit present.   Cardiovascular:      Rate and Rhythm: Normal rate and regular rhythm.      Pulses: Intact distal pulses.      Heart sounds: Murmur heard.   Pulmonary:      Breath sounds: Normal breath sounds.   Musculoskeletal:         General: Swelling present.   Neurological:      Mental Status: She is oriented to person, place, and time.     2017   -------  Real-time, color flow and Doppler imaging performed.    Atherosclerotic plaque noted within the bilateral bulb regions and RIGHT proximal ICA and ECA.                     ICA                                    R                               L  Peak systolic velocity:          88  Cm/sec               133 cm/sec  Peak diastolic velocity:        18 Cm/sec                   20 cm/sec  Systolic velocity ratio:          1.1                             1.2  Diastolic velocity ratio:         1.6                             1.8  Vertebral artery flow:            Antegrade                             antegrade  ECA flow:                                 Antegrade                             antegrade  IMPRESSION:     1.  Stable exam.   2.  No hemodynamically " significant plaque formation or stenosis on the RIGHT.   3.  Stable findings on the LEFT with minimally elevated to PSV within the ICA.  Stenosis is in the less than 50 percent range.   4.  Followup and/or further evaluation as warranted.  ==============================================================================   Impression: NORMAL MYOCARDIAL PERFUSION 2016  1. The perfusion scan is free of evidence for myocardial ischemia or injury.   2. There is a mild intensity fixed defect in the anterior wall of the left ventricle, secondary to breast attenuation.   3. Resting wall motion is physiologic.   4. Resting LV function is normal.   5. The ventricular volumes are normal at rest and stress.   6. The extracardiac distribution of radioactivity is normal.   ==============================================================================  2017  The right ankle brachial index was 1.24 which is normal.   The left ankle brachial index was 0.99 which suggests minimal left lower extremity arterial disease.   The right TBI is 0.45.   The left TBI is 0.49.   ==============================================================================  CONCLUSIONS 2018    1 - Hyperdynamic left ventricular systolic function (EF >70%).     2 - Indeterminate LV diastolic function.     3 - Normal right ventricular systolic function .     4 - Concentric remodeling.     5 - No wall motion abnormalities.  Lab Results   Component Value Date    CHOL 135 04/17/2024    CHOL 140 03/03/2023    CHOL 136 03/21/2022     Lab Results   Component Value Date    HDL 46 04/17/2024    HDL 39 (L) 03/03/2023    HDL 33 (L) 03/21/2022     Lab Results   Component Value Date    LDLCALC 78.2 04/17/2024    LDLCALC 82.6 03/03/2023    LDLCALC 84.8 03/21/2022     Lab Results   Component Value Date    TRIG 54 04/17/2024    TRIG 92 03/03/2023    TRIG 91 03/21/2022     Lab Results   Component Value Date    CHOLHDL 34.1 04/17/2024    CHOLHDL 27.9 03/03/2023    CHOLHDL 24.3  "03/21/2022       Chemistry        Component Value Date/Time     04/17/2024 0830    K 5.0 04/17/2024 0830    CL 94 (L) 04/17/2024 0830    CO2 33 (H) 04/17/2024 0830    BUN 7 (L) 04/17/2024 0830    CREATININE 0.7 04/17/2024 0830     (H) 04/17/2024 0830        Component Value Date/Time    CALCIUM 9.5 04/17/2024 0830    ALKPHOS 25 (L) 04/17/2024 0830    AST 18 04/17/2024 0830    ALT 19 04/17/2024 0830    BILITOT 0.5 04/17/2024 0830    ESTGFRAFRICA >60.0 03/21/2022 1435    EGFRNONAA >60.0 03/21/2022 1435          Lab Results   Component Value Date    HGBA1C 5.9 (H) 04/17/2024     Lab Results   Component Value Date    TSH 2.159 04/17/2024     No results found for: "INR", "PROTIME"  Lab Results   Component Value Date    WBC 3.82 (L) 04/17/2024    HGB 14.8 04/17/2024    HCT 46.3 04/17/2024    MCV 90 04/17/2024     04/17/2024     BNP  @LABRCNTIP(BNP,BNPTRIAGEBLO)@  CrCl cannot be calculated (Patient's most recent lab result is older than the maximum 7 days allowed.).  No results found in the last 24 hours.  No results found in the last 24 hours.  No results found in the last 24 hours.      Conclusion 2/2021    The left ventricle is small with concentric remodeling and hyperdynamic systolic function. The estimated ejection fraction is 75%  Indeterminate left ventricular diastolic function.  Normal right ventricular size with low normal right ventricular systolic function.  There is mild aortic valve stenosis.  Aortic valve area is 3.17 cm2; peak velocity is 2.49 m/s; mean gradient is 13 mmHg.  Mild-to-moderate aortic regurgitation.  Normal central venous pressure (3 mmHg).  The estimated PA systolic pressure is 19 mmHg.     Conclusion    There is 40-49% right Internal Carotid Stenosis.  There is 50-59% left Internal Carotid Stenosis.        Impression:     1. Atherosclerosis with no evidence of flow-limiting carotid stenosis greater than 50% on the right  2. Atherosclerosis with mildly elevated velocities " suggesting 50-69% stenosis of the left ICA.  Velocities are similar to prior.  .        Electronically signed by: Liam Espinoza MD  Date:                                            06/01/2022  Time:                                           10:12    ECG reviewed January 25, 2021  Normal sinus rhythm   Normal ECG  Assessment:     Carotid artery disease, unspecified laterality, unspecified type  -     CV Ultrasound Bilateral Doppler Carotid; Future    Chronic diastolic heart failure  -     bumetanide (BUMEX) 1 MG tablet; Take 2 tablets (2 mg total) by mouth once daily.  Dispense: 60 tablet; Refill: 11    Chronic combined systolic and diastolic heart failure    Right leg pain  -     CV Ultrasound doppler venous legs bilat; Future    Swelling  -     CV Ultrasound doppler venous legs bilat; Future    Carotid artery stenosis, asymptomatic, bilateral  -     CV Ultrasound Bilateral Doppler Carotid; Future    Bruit of right carotid artery    She has a loud carotid bruit on the right side today.  Known to have 50-60% bilaterally from before.  We will repeat carotid ultrasound.  CW bumex , increase prn to 2mg daily  Blood pressure at goal and acceptable for age but higher than her baseline, States did not take bp meds today  Continue with her antihypertensives.    Reviewed all tests and above medical conditions with patient in detail and formulated treatment plan.  Risk factor modification discussed.   Cardiac low salt diet discussed.  Maintaining healthy weight and weight loss goals were discussed in clinic.  rtc in 3 months

## 2024-09-16 ENCOUNTER — HOSPITAL ENCOUNTER (OUTPATIENT)
Dept: CARDIOLOGY | Facility: HOSPITAL | Age: 84
Discharge: HOME | End: 2024-09-16
Attending: INTERNAL MEDICINE
Payer: MEDICARE

## 2024-09-16 VITALS
SYSTOLIC BLOOD PRESSURE: 118 MMHG | HEIGHT: 66 IN | WEIGHT: 194 LBS | SYSTOLIC BLOOD PRESSURE: 118 MMHG | HEIGHT: 66 IN | BODY MASS INDEX: 31.18 KG/M2 | DIASTOLIC BLOOD PRESSURE: 60 MMHG | BODY MASS INDEX: 31.18 KG/M2 | DIASTOLIC BLOOD PRESSURE: 60 MMHG | WEIGHT: 194 LBS

## 2024-09-16 DIAGNOSIS — I77.9 CAROTID ARTERY DISEASE, UNSPECIFIED LATERALITY, UNSPECIFIED TYPE: ICD-10-CM

## 2024-09-16 DIAGNOSIS — I65.23 CAROTID ARTERY STENOSIS, ASYMPTOMATIC, BILATERAL: ICD-10-CM

## 2024-09-16 DIAGNOSIS — R60.9 SWELLING: ICD-10-CM

## 2024-09-16 DIAGNOSIS — M79.604 RIGHT LEG PAIN: ICD-10-CM

## 2024-09-16 PROCEDURE — 93880 EXTRACRANIAL BILAT STUDY: CPT | Mod: 26,,, | Performed by: INTERNAL MEDICINE

## 2024-09-16 PROCEDURE — 93970 EXTREMITY STUDY: CPT | Mod: 26,,, | Performed by: INTERNAL MEDICINE

## 2024-09-16 PROCEDURE — 93880 EXTRACRANIAL BILAT STUDY: CPT | Mod: PO

## 2024-09-16 PROCEDURE — 93970 EXTREMITY STUDY: CPT | Mod: PO

## 2024-09-17 LAB
LEFT ARM DIASTOLIC BLOOD PRESSURE: 70 MMHG
LEFT ARM SYSTOLIC BLOOD PRESSURE: 120 MMHG
LEFT CBA DIAS: 6 CM/S
LEFT CBA SYS: 104 CM/S
LEFT CCA DIST DIAS: 4 CM/S
LEFT CCA DIST SYS: 117 CM/S
LEFT CCA MID DIAS: 6 CM/S
LEFT CCA MID SYS: 145 CM/S
LEFT CCA PROX DIAS: 5 CM/S
LEFT CCA PROX SYS: 142 CM/S
LEFT ECA SYS: 143 CM/S
LEFT ICA DIST DIAS: 17 CM/S
LEFT ICA DIST SYS: 176 CM/S
LEFT ICA MID DIAS: 12 CM/S
LEFT ICA MID SYS: 129 CM/S
LEFT ICA PROX DIAS: 8 CM/S
LEFT ICA PROX SYS: 121 CM/S
LEFT VERTEBRAL DIAS: 3 CM/S
LEFT VERTEBRAL SYS: 49 CM/S
OHS CV CAROTID RIGHT ICA EDV HIGHEST: 18
OHS CV CAROTID ULTRASOUND LEFT ICA/CCA RATIO: 1.5
OHS CV CAROTID ULTRASOUND RIGHT ICA/CCA RATIO: 1.3
OHS CV PV CAROTID LEFT HIGHEST CCA: 145
OHS CV PV CAROTID LEFT HIGHEST ICA: 176
OHS CV PV CAROTID RIGHT HIGHEST CCA: 105
OHS CV PV CAROTID RIGHT HIGHEST ICA: 125
OHS CV US CAROTID LEFT HIGHEST EDV: 17
RIGHT ARM DIASTOLIC BLOOD PRESSURE: 60 MMHG
RIGHT ARM SYSTOLIC BLOOD PRESSURE: 118 MMHG
RIGHT CBA DIAS: 3 CM/S
RIGHT CBA SYS: 98 CM/S
RIGHT CCA DIST DIAS: 4 CM/S
RIGHT CCA DIST SYS: 96 CM/S
RIGHT CCA MID DIAS: 6 CM/S
RIGHT CCA MID SYS: 105 CM/S
RIGHT CCA PROX DIAS: 5 CM/S
RIGHT CCA PROX SYS: 103 CM/S
RIGHT ECA SYS: 111 CM/S
RIGHT ICA DIST DIAS: 18 CM/S
RIGHT ICA DIST SYS: 125 CM/S
RIGHT ICA MID DIAS: 13 CM/S
RIGHT ICA MID SYS: 123 CM/S
RIGHT ICA PROX DIAS: 8 CM/S
RIGHT ICA PROX SYS: 99 CM/S
RIGHT VERTEBRAL DIAS: 7 CM/S
RIGHT VERTEBRAL SYS: 67 CM/S

## 2024-09-25 ENCOUNTER — PATIENT MESSAGE (OUTPATIENT)
Dept: PRIMARY CARE CLINIC | Facility: CLINIC | Age: 84
End: 2024-09-25
Payer: MEDICARE

## 2024-10-08 ENCOUNTER — OFFICE VISIT (OUTPATIENT)
Dept: PODIATRY | Facility: CLINIC | Age: 84
End: 2024-10-08
Payer: MEDICARE

## 2024-10-08 VITALS — BODY MASS INDEX: 31.18 KG/M2 | WEIGHT: 194 LBS | HEIGHT: 66 IN

## 2024-10-08 DIAGNOSIS — E11.51 TYPE II DIABETES MELLITUS WITH PERIPHERAL CIRCULATORY DISORDER: Primary | ICD-10-CM

## 2024-10-08 DIAGNOSIS — B35.1 DERMATOPHYTOSIS OF NAIL: ICD-10-CM

## 2024-10-08 PROCEDURE — 99999 PR PBB SHADOW E&M-EST. PATIENT-LVL III: CPT | Mod: PBBFAC,,, | Performed by: PODIATRIST

## 2024-10-08 PROCEDURE — 11721 DEBRIDE NAIL 6 OR MORE: CPT | Mod: Q8,S$GLB,, | Performed by: PODIATRIST

## 2024-10-08 PROCEDURE — 99499 UNLISTED E&M SERVICE: CPT | Mod: S$GLB,,, | Performed by: PODIATRIST

## 2024-10-08 NOTE — PROGRESS NOTES
Subjective:     Patient ID: Kaleigh Nieves is a 84 y.o. female.    Chief Complaint: Nail Care (Diabetic pt c/o foot pain, pt rates 8/10 pain, pt wears sandals, PCP Lucio Morris last seen 4/10/2024)    Kaleigh is a 84 y.o. female who presents to the clinic for evaluation and treatment of high risk feet. 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 The patient's chief complaint is long, thick toenails. Patient complains of shrp burning pain in both feet. Patient rates pain 8/10. Patient states she had ultrasound completed and it may be lymphedema but she is following up with PCP next week.  This patient has documented high risk feet requiring routine maintenance secondary to diabetes mellitis and those secondary complications of diabetes, as mentioned.     PCP: Lucio Morris MD    Date Last Seen by PCP: 04/10/2024    Current shoe gear:  Affected Foot: Rx diabetic extra depth shoes and custom accommodative insoles     Unaffected Foot: Rx diabetic extra depth shoes and custom accommodative insoles    Hemoglobin A1C   Date Value Ref Range Status   04/17/2024 5.9 (H) 4.0 - 5.6 % Final     Comment:     ADA Screening Guidelines:  5.7-6.4%  Consistent with prediabetes  >or=6.5%  Consistent with diabetes    High levels of fetal hemoglobin interfere with the HbA1C  assay. Heterozygous hemoglobin variants (HbS, HgC, etc)do  not  significantly interfere with this assay.   However, presence of multiple variants may affect accuracy.     03/03/2023 6.0 (H) 4.0 - 5.6 % Final     Comment:     ADA Screening Guidelines:  5.7-6.4%  Consistent with prediabetes  >or=6.5%  Consistent with diabetes    High levels of fetal hemoglobin interfere with the HbA1C  assay. Heterozygous hemoglobin variants (HbS, HgC, etc)do  not significantly interfere with this assay.   However, presence of multiple variants may affect accuracy.     08/31/2022 5.7 (H) 4.0 - 5.6 % Final     Comment:     ADA Screening Guidelines:  5.7-6.4%  Consistent with prediabetes  >or=6.5%  Consistent with diabetes    High levels of fetal hemoglobin interfere with the HbA1C  assay. Heterozygous hemoglobin variants (HbS, HgC, etc)do  not significantly interfere with this assay.   However, presence of multiple variants may affect accuracy.         Patient Active Problem List   Diagnosis    Type 2 diabetes mellitus with diabetic polyneuropathy, without long-term current use of insulin    Multiple joint pain    Esophageal reflux    Hyperlipidemia    Chronic gout    Lichen sclerosus    Carotid artery disease    Coronary artery disease involving native coronary artery without angina pectoris    Colon polyp    Hypertension associated with diabetes    Aortic atherosclerosis    Arterial insufficiency of lower extremity    Ganglion cyst of flexor tendon sheath of finger of right hand    Bilateral carpal tunnel syndrome    Hyperkalemia    Chronic bronchitis    Dependent edema    Diverticulosis    Primary open-angle glaucoma, bilateral, indeterminate stage    RIVAS (dyspnea on exertion)    Chronic diastolic heart failure    Low vitamin D level    Body mass index (BMI) 34.0-34.9, adult    Unspecified lump in the left breast, upper inner quadrant    Malignant neoplasm of upper-inner quadrant of left breast in female, estrogen receptor positive    Immunodeficiency due to chemotherapy    Severe obesity (BMI  35.0-35.9 with comorbidity)       Medication List with Changes/Refills   Current Medications    ACCU-CHEK GUIDE TEST STRIPS STRP    TEST 3 TIMES DAILY    ALBUTEROL (PROVENTIL/VENTOLIN HFA) 90 MCG/ACTUATION INHALER    INHALE 2 PUFFS INTO THE LUNGS EVERY SIX HOURS AS NEEDED FOR WHEEZING    ALLOPURINOL (ZYLOPRIM) 100 MG TABLET    Take 1 tablet (100 mg total) by mouth once daily.    ASPIRIN 81 MG CHEW    Take 81 mg by mouth once daily.    BISOPROLOL (ZEBETA) 5 MG TABLET    TAKE 1 TABLET (5 MG TOTAL) BY MOUTH ONCE DAILY.    BLOOD GLUCOSE CONTROL HIGH,LOW SOLN    1 each by Misc.(Non-Drug; Combo Route) route once a week.    BLOOD-GLUCOSE METER KIT    To check BG 3 times daily, to use with insurance preferred meter    BUMETANIDE (BUMEX) 1 MG TABLET    Take 2 tablets (2 mg total) by mouth once daily.    FLUTICASONE-SALMETEROL DISKUS INHALER 250-50 MCG    Inhale 1 puff into the lungs 2 (two) times daily. Controller    LANCETS MISC    To check BG 3 times daily, to use with insurance preferred meter    LATANOPROST 0.005 % OPHTHALMIC SOLUTION    Place 1 drop into both eyes once daily.    ONDANSETRON (ZOFRAN-ODT) 8 MG TBDL    Dissolve 1 tablet (8 mg total) by mouth every 8 (eight) hours as needed (Nausea).    SIMVASTATIN (ZOCOR) 20 MG TABLET    TAKE 1 TABLET (20 MG TOTAL) BY MOUTH NIGHTLY.    TIMOLOL MALEATE 0.5% (TIMOPTIC) 0.5 % DROP    Place 1 drop into both eyes 2 (two) times daily.    TIMOLOL MALEATE, PF, 0.5 % DPET    Place into both eyes.       Review of patient's allergies indicates:   Allergen Reactions    Iodine and iodide containing products Nausea And Vomiting    Penicillins Itching    Ultram [tramadol] Hives and Itching     Light headness, itiching    Sulfa (sulfonamide antibiotics) Hives and Itching    Adhesive Other (See Comments)    Amlodipine      edema    Sulfamethoxazole-trimethoprim Itching, Rash and Nausea And Vomiting       Past Surgical History:   Procedure Laterality Date    APPENDECTOMY      CARPAL TUNNEL  RELEASE Right     CATARACT EXTRACTION, BILATERAL      CHOLECYSTECTOMY      CYST REMOVAL      DILATION AND CURETTAGE OF UTERUS      EYE SURGERY      HEMORRHOID SURGERY      HYSTERECTOMY      fibroids    INCISION AND DRAINAGE PERIRECTAL ABSCESS      JOINT REPLACEMENT Bilateral     knee    KNEE ARTHROSCOPY Right     LOCALIZATION OF MASS OF BREAST WITH ULTRASOUND GUIDANCE Left 2023    Procedure: LOCALIZATION, MASS, BREAST, WITH US GUIDANCE;  Surgeon: Susan Pulliam MD;  Location: [facility] OR;  Service: General;  Laterality: Left;  Intra-operatively Placed Radiographic Marker (Wire)    MASTECTOMY, PARTIAL Left 2023    Procedure: MASTECTOMY, PARTIAL;  Surgeon: Susan Pulliam MD;  Location: [facility] OR;  Service: General;  Laterality: Left;  with Interpretation of Specimen Mammogram    MYOMECTOMY         Family History   Problem Relation Name Age of Onset    Hypertension Mother      Diabetes Mother      Leukemia Sister      Hypertension Sister      Cancer Sister          leukemia    Heart disease Maternal Aunt      Cancer Maternal Uncle          gastric, pacreatic    Heart disease Maternal Uncle      Miscarriages / Stillbirths Maternal Uncle      Diabetes Maternal Grandmother      Asthma Maternal Grandfather      Breast cancer Neg Hx      Colon cancer Neg Hx      Ovarian cancer Neg Hx      COPD Neg Hx      Hyperlipidemia Neg Hx      Kidney disease Neg Hx         Social History     Socioeconomic History    Marital status: Single    Number of children: 0   Tobacco Use    Smoking status: Former     Current packs/day: 0.00     Average packs/day: 0.3 packs/day for 24.0 years (6.0 ttl pk-yrs)     Types: Cigarettes     Start date: 1952     Quit date: 1976     Years since quittin.3    Smokeless tobacco: Never   Substance and Sexual Activity    Alcohol use: No    Drug use: No    Sexual activity: Not Currently     Social Drivers of Health     Financial Resource Strain: Low Risk  (2022)     "Overall Financial Resource Strain (CARDIA)     Difficulty of Paying Living Expenses: Not hard at all   Food Insecurity: No Food Insecurity (9/27/2022)    Hunger Vital Sign     Worried About Running Out of Food in the Last Year: Never true     Ran Out of Food in the Last Year: Never true   Transportation Needs: No Transportation Needs (9/27/2022)    PRAPARE - Transportation     Lack of Transportation (Medical): No     Lack of Transportation (Non-Medical): No   Physical Activity: Inactive (9/27/2022)    Exercise Vital Sign     Days of Exercise per Week: 0 days     Minutes of Exercise per Session: 0 min   Stress: No Stress Concern Present (9/27/2022)    [employer] - Occupational Stress Questionnaire     Feeling of Stress : Not at all   Housing Stability: Low Risk  (9/27/2022)    Housing Stability Vital Sign     Unable to Pay for Housing in the Last Year: No     Number of Places Lived in the Last Year: 1     Unstable Housing in the Last Year: No       Vitals:    10/08/24 1110   Weight: 88 kg (194 lb 0.1 oz)   Height: 5' 6" (1.676 m)   PainSc:   8       Hemoglobin A1C   Date Value Ref Range Status   04/17/2024 5.9 (H) 4.0 - 5.6 % Final     Comment:     ADA Screening Guidelines:  5.7-6.4%  Consistent with prediabetes  >or=6.5%  Consistent with diabetes    High levels of fetal hemoglobin interfere with the HbA1C  assay. Heterozygous hemoglobin variants (HbS, HgC, etc)do  not significantly interfere with this assay.   However, presence of multiple variants may affect accuracy.     03/03/2023 6.0 (H) 4.0 - 5.6 % Final     Comment:     ADA Screening Guidelines:  5.7-6.4%  Consistent with prediabetes  >or=6.5%  Consistent with diabetes    High levels of fetal hemoglobin interfere with the HbA1C  assay. Heterozygous hemoglobin variants (HbS, HgC, etc)do  not significantly interfere with this assay.   However, presence of multiple variants may affect accuracy.     08/31/2022 5.7 (H) 4.0 - 5.6 % " Final     Comment:     ADA Screening Guidelines:  5.7-6.4%  Consistent with prediabetes  >or=6.5%  Consistent with diabetes    High levels of fetal hemoglobin interfere with the HbA1C  assay. Heterozygous hemoglobin variants (HbS, HgC, etc)do  not significantly interfere with this assay.   However, presence of multiple variants may affect accuracy.         Review of Systems   Constitutional:  Negative for chills and fever.   Respiratory:  Negative for shortness of breath.    Cardiovascular:  Positive for leg swelling (improved). Negative for chest pain, palpitations, orthopnea and claudication.   Gastrointestinal:  Negative for diarrhea, nausea and vomiting.   Musculoskeletal:  Negative for joint pain.   Skin:  Negative for rash.   Neurological:  Negative for dizziness, tingling, sensory change, focal weakness and weakness.   Psychiatric/Behavioral: Negative.               Objective:      PHYSICAL EXAM: Apperance: Alert and orient in no distress,well developed, and with good attention to grooming and body habits  Patient presents ambulating in diabetic shoes and inserts.   LOWER EXTREMITY EXAM:  VASCULAR: Dorsalis pedis pulses 0/4 bilateral (monophasic with doppler) and Posterior Tibial pulses 0/4 bilateral (biphasic with doppler). Capillary fill time <blanched bilateral. Mild edema observed bilateral. Varicosities present bilateral. Skin temperature of the lower extremities is warm to cool, proximal to distal. Hair growth absent bilateral.  DERMATOLOGICAL: No skin rashes, subcutaneous nodules, lesions, or ulcers observed bilateral. Nails 1,2,3,4,5 bilateral elongated, thickened, and discolored with subungual debris. Webspaces 1,2,3,4clean, dry and without evidence of break in skin integrity bilateral.   NEUROLOGICAL: Light touch, sharp-dull, proprioception all present and equal bilaterally.  Vibratory sensation intact on left hallux and diminished at right hallux. Protective sensation intact at all 10 sites as  tested with a [place] 5.07 monofilament.   MUSCULOSKELETAL: Muscle strength is 5/5 for foot inverters, everters, plantarflexors, and dorsiflexors. Muscle tone is normal. No pain on palpation of right dorsal foot/ankle. Pes planus noted bilateral. Flexible hammertoes noted bilateral.         Assessment:       ICD-10-CM ICD-9-CM   1. Type II diabetes mellitus with peripheral circulatory disorder  E11.51 250.70     443.81   2. Dermatophytosis of nail  B35.1 110.1       Plan:   Type II diabetes mellitus with peripheral circulatory disorder    Dermatophytosis of nail    I counseled the patient on her conditions, regarding findings of my examination, my impressions, and usual treatment plan.   Appointment spent on education about the diabetic foot, neuropathy, and prevention of limb loss.  Shoe inspection. Diabetic Foot Education. Patient reminded of the importance of good nutrition and blood sugar control to help prevent podiatric complications of diabetes. Patient instructed on proper foot hygeine. We discussed wearing proper shoe gear, daily foot inspections, never walking without protective shoe gear, never putting sharp instruments to feet.    With patient's permission, nails 1-5 bilateral were debrided/trimmed in length and thickness aggressively to their soft tissue attachment mechanically and with electric , removing all offending nail and debris. Patient relates relief following the procedure.  Patient  will continue to monitor the areas daily, inspect feet, wear protective shoe gear when ambulatory, moisturizer to maintain skin integrity. Patient reminded of the importance of good nutrition and blood sugar control to help prevent podiatric complications of diabetes.  Patient to return 4 months or sooner if needed.          Ino Harmon DPM  [facility]

## 2024-10-14 ENCOUNTER — TELEPHONE (OUTPATIENT)
Dept: PRIMARY CARE CLINIC | Facility: CLINIC | Age: 84
End: 2024-10-14
Payer: MEDICARE

## 2024-10-14 ENCOUNTER — OFFICE VISIT (OUTPATIENT)
Dept: PRIMARY CARE CLINIC | Facility: CLINIC | Age: 84
End: 2024-10-14
Payer: MEDICARE

## 2024-10-14 VITALS
WEIGHT: 195.31 LBS | RESPIRATION RATE: 20 BRPM | SYSTOLIC BLOOD PRESSURE: 108 MMHG | BODY MASS INDEX: 31.39 KG/M2 | HEIGHT: 66 IN | OXYGEN SATURATION: 95 % | HEART RATE: 72 BPM | TEMPERATURE: 98 F | DIASTOLIC BLOOD PRESSURE: 62 MMHG

## 2024-10-14 DIAGNOSIS — J44.89 COPD (CHRONIC OBSTRUCTIVE PULMONARY DISEASE) WITH CHRONIC BRONCHITIS: ICD-10-CM

## 2024-10-14 DIAGNOSIS — M79.604 BILATERAL LEG AND FOOT PAIN: Primary | ICD-10-CM

## 2024-10-14 DIAGNOSIS — I15.2 HYPERTENSION ASSOCIATED WITH DIABETES: ICD-10-CM

## 2024-10-14 DIAGNOSIS — I73.9 CLAUDICATION: ICD-10-CM

## 2024-10-14 DIAGNOSIS — M79.671 BILATERAL LEG AND FOOT PAIN: Primary | ICD-10-CM

## 2024-10-14 DIAGNOSIS — M79.605 BILATERAL LEG AND FOOT PAIN: Primary | ICD-10-CM

## 2024-10-14 DIAGNOSIS — Z23 NEED FOR VACCINATION: ICD-10-CM

## 2024-10-14 DIAGNOSIS — M79.672 BILATERAL LEG AND FOOT PAIN: Primary | ICD-10-CM

## 2024-10-14 DIAGNOSIS — E11.42 TYPE 2 DIABETES MELLITUS WITH DIABETIC POLYNEUROPATHY, WITHOUT LONG-TERM CURRENT USE OF INSULIN: ICD-10-CM

## 2024-10-14 DIAGNOSIS — I50.32 CHRONIC DIASTOLIC HEART FAILURE: ICD-10-CM

## 2024-10-14 DIAGNOSIS — M1A.9XX0 CHRONIC GOUT WITHOUT TOPHUS, UNSPECIFIED CAUSE, UNSPECIFIED SITE: ICD-10-CM

## 2024-10-14 DIAGNOSIS — E78.5 HYPERLIPIDEMIA, UNSPECIFIED HYPERLIPIDEMIA TYPE: ICD-10-CM

## 2024-10-14 DIAGNOSIS — E11.59 HYPERTENSION ASSOCIATED WITH DIABETES: ICD-10-CM

## 2024-10-14 PROCEDURE — 1159F MED LIST DOCD IN RCRD: CPT | Mod: CPTII,S$GLB,, | Performed by: FAMILY MEDICINE

## 2024-10-14 PROCEDURE — 90653 IIV ADJUVANT VACCINE IM: CPT | Mod: S$GLB,,, | Performed by: FAMILY MEDICINE

## 2024-10-14 PROCEDURE — 99999 PR PBB SHADOW E&M-EST. PATIENT-LVL V: CPT | Mod: PBBFAC,,, | Performed by: FAMILY MEDICINE

## 2024-10-14 PROCEDURE — G0008 ADMIN INFLUENZA VIRUS VAC: HCPCS | Mod: S$GLB,,, | Performed by: FAMILY MEDICINE

## 2024-10-14 PROCEDURE — 99215 OFFICE O/P EST HI 40 MIN: CPT | Mod: S$GLB,,, | Performed by: FAMILY MEDICINE

## 2024-10-14 PROCEDURE — 1125F AMNT PAIN NOTED PAIN PRSNT: CPT | Mod: CPTII,S$GLB,, | Performed by: FAMILY MEDICINE

## 2024-10-14 PROCEDURE — G2211 COMPLEX E/M VISIT ADD ON: HCPCS | Mod: S$GLB,,, | Performed by: FAMILY MEDICINE

## 2024-10-14 RX ORDER — GABAPENTIN 100 MG/1
100 CAPSULE ORAL 3 TIMES DAILY
Qty: 270 CAPSULE | Refills: 3 | Status: SHIPPED | OUTPATIENT
Start: 2024-10-14 | End: 2025-10-14

## 2024-10-14 RX ORDER — DICLOFENAC SODIUM 75 MG/1
75 TABLET, DELAYED RELEASE ORAL 2 TIMES DAILY PRN
Qty: 180 TABLET | Refills: 3 | Status: SHIPPED | OUTPATIENT
Start: 2024-10-14 | End: 2025-10-14

## 2024-10-14 NOTE — TELEPHONE ENCOUNTER
----- Message from Michelle sent at 10/14/2024  2:55 PM CDT -----  Contact: Kaleigh  Type:  Sooner Apoointment Request    Caller is requesting a sooner appointment. Caller will not accept being placed on the waitlist and is requesting a message be sent to doctor.  Name of Caller:Kaleigh  When is the first available appointment? unknown  Symptoms:6 month follow-up  Would the patient rather a call back or a response via [facility]? call  Best Call Back Number:[phone]   Additional Information: Patient reports receiving a note to schedule follow-up visit and arrive for labs and request to receive time for visit and schedule lab at the [place] location. Please give patient a call back to assist.  Thank you,  GH

## 2024-10-14 NOTE — PATIENT INSTRUCTIONS
Physically, you look okay.      I worry about the swelling in the legs, especially with the pain.  The cardiologist did a ultrasound of the veins in the legs, but I would like to get one of the arteries.  Hopefully we can do it while you were here.  If not, we can set it up closer to home.  We will contact you with those results when they are available.      Start off by using OTC Tylenol, as needed, for pain.  You can take two, 500 mg tablets, every 8 hours.    You can also supplement with my prescription diclofenac, as needed.  You can take one tablet every 12 hours.    Also try supplementing with gabapentin.  Take one capsule, every 8 hours, as needed.    Definitely think you should be taking your fluid pill every day.  If your pressure is over 110/80, take two tablets of Bumex (bumetanide).  If you are pressure is below these numbers, just take one tablet.    Continue to follow with Cardiology, as scheduled.    I am going to ask our care at home team to come check on you.  Ideally, they should come out every 4-6 weeks, both for some social interaction and to make sure that you are doing okay physically.

## 2024-10-14 NOTE — PROGRESS NOTES
"    [facility]       [address] [place] [address]      Phone: [phone]      Fax: [phone]    Lucio Morris MD                Office Visit  10/14/2024        Subjective      HPI:  Kaleigh Nieves is a 84 y.o. female presents today in clinic for "Follow-up  ."     84-year-old female presents today to follow-up on multiple issues.      States that her legs are hurting today.  Started hurting her few months ago.  Saw her cardiologist, got a venous ultrasound.  Negative for DVT.  Legs always feel cold.  Feels like ice.  Hurts more when she walks around.  Gets a little better when she rests.  Legs are swollen most of the time.    Tired.  Short of breath.  Walking short distances she has to stop and rest.  Seems to be getting worse.  Saw Cardiology recently.  They increased her Bumex to 2 mg as needed.    She states she has a history of gout.  Has been taking allopurinol 100 mg daily for years.  Has not had severe gout flare up in a couple of years, but her big toes do tend to hurt sometimes.    She also has diabetes.  Controlled with diet.  Checks sugars at home.  Follows with podiatry.    Has hypertension.  Takes bisoprolol 5 mg daily, along with Bumex above.    Also takes simvastatin 20 mg nightly for cholesterol.      PMH: HTN, dm, gout, GERD, diverticulosis, osteoarthritis, glaucoma   PSH:  Torn meniscus in knee.  Bilateral knee replacement.  Hemorrhoids.  Perirectal abscess.  Cataracts in both eyes.  D& C.  Fibroid/hysterectomy.  Gallbladder.  Appendectomy.  Carpal tunnel.  Left toe corn removal.    Allergies:  Iodine.  Penicillin.  Sulfa drugs.  Tramadol.  Amlodipine.    Social: Lives alone.  Nephew occasionally stays there.    T: Denies   A:  Denies   D:  Denies     Exercise:  No regular exercise program.  Use to bowl frequently.  Limited now due to foot swelling and pain.      Podiatry: Faustino Reyes)  Eyes:  Dr. Camacho at [facility]        The following " were updated and reviewed by myself in the chart: medications, past medical history, past surgical history, family history, social history, and allergies.     Medications:  Current Outpatient Medications on File Prior to Visit   Medication Sig Dispense Refill    ACCU-CHEK GUIDE TEST STRIPS Strp TEST 3 TIMES DAILY 300 strip 3    albuterol (PROVENTIL/VENTOLIN HFA) 90 mcg/actuation inhaler INHALE 2 PUFFS INTO THE LUNGS EVERY SIX HOURS AS NEEDED FOR WHEEZING 18 g 11    allopurinoL (ZYLOPRIM) 100 MG tablet Take 1 tablet (100 mg total) by mouth once daily. 90 tablet 2    aspirin 81 MG Chew Take 81 mg by mouth once daily.      bisoprolol (ZEBETA) 5 MG tablet TAKE 1 TABLET (5 MG TOTAL) BY MOUTH ONCE DAILY. 90 tablet 3    blood glucose control high,low Soln 1 each by Misc.(Non-Drug; Combo Route) route once a week. 1 each 3    bumetanide (BUMEX) 1 MG tablet Take 2 tablets (2 mg total) by mouth once daily. 60 tablet 11    lancets Mis To check BG 3 times daily, to use with insurance preferred meter 200 each 3    ondansetron (ZOFRAN-ODT) 8 MG TbDL Dissolve 1 tablet (8 mg total) by mouth every 8 (eight) hours as needed (Nausea). 10 tablet 0    simvastatin (ZOCOR) 20 MG tablet TAKE 1 TABLET (20 MG TOTAL) BY MOUTH NIGHTLY. 90 tablet 3    timoloL maleate, PF, 0.5 % Dpet Place into both eyes.      blood-glucose meter kit To check BG 3 times daily, to use with insurance preferred meter 1 each 0    fluticasone-salmeterol diskus inhaler 250-50 mcg Inhale 1 puff into the lungs 2 (two) times daily. Controller 180 each 3    latanoprost 0.005 % ophthalmic solution Place 1 drop into both eyes once daily. 7.5 mL 3    timolol maleate 0.5% (TIMOPTIC) 0.5 % Drop Place 1 drop into both eyes 2 (two) times daily. 30 mL 4     No current facility-administered medications on file prior to visit.        PMHx:  Past Medical History:   Diagnosis Date    Anemia     Angina pectoris     Arthritis     Asthma     Back pain     Bronchitis     CAD (coronary  artery disease) 07/2015    via [facility] ct    Carotid artery stenosis     Chronic bronchitis     Chronic gout     Colon polyp     CTS (carpal tunnel syndrome)     Diabetes mellitus, type 2     Diabetes with neurologic complications     Diverticulosis     RIVAS (dyspnea on exertion)     chronic; eval pulm dr natarajan    Dyslipidemia     Ex-smoker     quit in her 30s    GERD (gastroesophageal reflux disease)     Heart failure     Hyperlipidemia     Hypertension     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    Neuropathy, lower extremity     Obesity     Open-angle glaucoma     dr avel kaur    Peripheral vascular disease     Polyneuropathy     Tobacco dependence       Patient Active Problem List    Diagnosis Date Noted    Severe obesity (BMI 35.0-35.9 with comorbidity) 01/29/2024    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    Malignant neoplasm of upper-inner quadrant of left breast in female, estrogen receptor positive 06/26/2023    Unspecified lump in the left breast, upper inner quadrant 06/20/2023    Body mass index (BMI) 34.0-34.9, adult 09/07/2022    Low vitamin D level 07/12/2021    Chronic diastolic heart failure 05/14/2021    RIVAS (dyspnea on exertion) 01/29/2021    Diverticulosis 11/11/2019    Primary open-angle glaucoma, bilateral, indeterminate stage 11/11/2019    Dependent edema 08/23/2018    Chronic bronchitis 06/25/2018    Hyperkalemia 03/21/2018    Bilateral carpal tunnel syndrome 03/07/2018    Ganglion cyst of flexor tendon sheath of finger of right hand 02/01/2018    Aortic atherosclerosis 06/16/2017    Arterial insufficiency of lower extremity 06/16/2017    Hypertension associated with diabetes 10/25/2016    Colon polyp     Coronary artery disease involving native coronary artery without angina pectoris     Carotid artery disease     Lichen sclerosus 05/26/2015    Hyperlipidemia 08/11/2014    Chronic gout 08/11/2014    Esophageal reflux 07/08/2013    Multiple joint pain 07/03/2013    Type 2  diabetes mellitus with diabetic polyneuropathy, without long-term current use of insulin 2013        PSHx:  Past Surgical History:   Procedure Laterality Date    APPENDECTOMY      CARPAL TUNNEL RELEASE Right     CATARACT EXTRACTION, BILATERAL      CHOLECYSTECTOMY      CYST REMOVAL  2018    DILATION AND CURETTAGE OF UTERUS      EYE SURGERY      HEMORRHOID SURGERY      HYSTERECTOMY      fibroids    INCISION AND DRAINAGE PERIRECTAL ABSCESS      JOINT REPLACEMENT Bilateral     knee    KNEE ARTHROSCOPY Right     LOCALIZATION OF MASS OF BREAST WITH ULTRASOUND GUIDANCE Left 2023    Procedure: LOCALIZATION, MASS, BREAST, WITH US GUIDANCE;  Surgeon: Susan Pulliam MD;  Location: [facility] OR;  Service: General;  Laterality: Left;  Intra-operatively Placed Radiographic Marker (Wire)    MASTECTOMY, PARTIAL Left 2023    Procedure: MASTECTOMY, PARTIAL;  Surgeon: Susan Pulliam MD;  Location: [facility] OR;  Service: General;  Laterality: Left;  with Interpretation of Specimen Mammogram    MYOMECTOMY          FHx:  Family History   Problem Relation Name Age of Onset    Hypertension Mother      Diabetes Mother      Leukemia Sister      Hypertension Sister      Cancer Sister          leukemia    Heart disease Maternal Aunt      Cancer Maternal Uncle          gastric, pacreatic    Heart disease Maternal Uncle      Miscarriages / Stillbirths Maternal Uncle      Diabetes Maternal Grandmother      Asthma Maternal Grandfather      Breast cancer Neg Hx      Colon cancer Neg Hx      Ovarian cancer Neg Hx      COPD Neg Hx      Hyperlipidemia Neg Hx      Kidney disease Neg Hx          Social:  Social History     Socioeconomic History    Marital status: Single    Number of children: 0   Tobacco Use    Smoking status: Former     Current packs/day: 0.00     Average packs/day: 0.3 packs/day for 24.0 years (6.0 ttl pk-yrs)     Types: Cigarettes     Start date: 1952     Quit date: 1976     Years since quittin.3     Smokeless tobacco: Never   Substance and Sexual Activity    Alcohol use: No    Drug use: No    Sexual activity: Not Currently     Social Drivers of Health     Financial Resource Strain: Low Risk  (9/27/2022)    Overall Financial Resource Strain (CARDIA)     Difficulty of Paying Living Expenses: Not hard at all   Food Insecurity: No Food Insecurity (9/27/2022)    Hunger Vital Sign     Worried About Running Out of Food in the Last Year: Never true     Ran Out of Food in the Last Year: Never true   Transportation Needs: No Transportation Needs (9/27/2022)    PRAPARE - Transportation     Lack of Transportation (Medical): No     Lack of Transportation (Non-Medical): No   Physical Activity: Inactive (9/27/2022)    Exercise Vital Sign     Days of Exercise per Week: 0 days     Minutes of Exercise per Session: 0 min   Stress: No Stress Concern Present (9/27/2022)    [employer] - Occupational Stress Questionnaire     Feeling of Stress : Not at all   Housing Stability: Low Risk  (9/27/2022)    Housing Stability Vital Sign     Unable to Pay for Housing in the Last Year: No     Number of Places Lived in the Last Year: 1     Unstable Housing in the Last Year: No        Allergies:  Review of patient's allergies indicates:   Allergen Reactions    Iodine and iodide containing products Nausea And Vomiting    Penicillins Itching    Ultram [tramadol] Hives and Itching     Light headness, itiching    Sulfa (sulfonamide antibiotics) Hives and Itching    Adhesive Other (See Comments)    Amlodipine      edema    Sulfamethoxazole-trimethoprim Itching, Rash and Nausea And Vomiting        ROS:  Review of Systems   Constitutional:  Positive for activity change. Negative for appetite change, chills and fever.   HENT:  Negative for congestion, postnasal drip, rhinorrhea, sore throat and trouble swallowing.    Respiratory:  Negative for cough, shortness of breath and wheezing.    Cardiovascular:  Positive for leg  "swelling. Negative for chest pain and palpitations.   Gastrointestinal:  Negative for abdominal pain, constipation, diarrhea, nausea and vomiting.   Genitourinary:  Negative for difficulty urinating.   Musculoskeletal:  Positive for arthralgias and myalgias.   Skin:  Negative for color change and rash.   Neurological:  Negative for speech difficulty and headaches.   All other systems reviewed and are negative.         Objective      /62   Pulse 72   Temp 97.5 °F (36.4 °C) (Oral)   Resp 20   Ht 5' 6" (1.676 m)   Wt 88.6 kg (195 lb 5.2 oz)   SpO2 95%   BMI 31.53 kg/m²   Ht Readings from Last 3 Encounters:   10/14/24 5' 6" (1.676 m)   10/08/24 5' 6" (1.676 m)   09/16/24 5' 6" (1.676 m)     Wt Readings from Last 3 Encounters:   10/14/24 88.6 kg (195 lb 5.2 oz)   10/08/24 88 kg (194 lb 0.1 oz)   09/16/24 88 kg (194 lb)       PHYSICAL EXAM:  Physical Exam  Vitals and nursing note reviewed.   Constitutional:       General: She is not in acute distress.     Appearance: Normal appearance.   HENT:      Head: Normocephalic and atraumatic.      Right Ear: Tympanic membrane, ear canal and external ear normal.      Left Ear: Tympanic membrane, ear canal and external ear normal.      Nose: Nose normal. No congestion or rhinorrhea.      Mouth/Throat:      Mouth: Mucous membranes are moist.      Pharynx: Oropharynx is clear. No oropharyngeal exudate or posterior oropharyngeal erythema.   Eyes:      Extraocular Movements: Extraocular movements intact.      Conjunctiva/sclera: Conjunctivae normal.      Pupils: Pupils are equal, round, and reactive to light.   Cardiovascular:      Rate and Rhythm: Normal rate and regular rhythm.      Pulses:           Dorsalis pedis pulses are 0 on the right side and 0 on the left side.   Pulmonary:      Effort: Pulmonary effort is normal. No respiratory distress.      Breath sounds: No wheezing, rhonchi or rales.   Musculoskeletal:         General: Normal range of motion.      Cervical " back: Normal range of motion.      Right lower le+ Pitting Edema present.      Left lower le+ Pitting Edema present.   Lymphadenopathy:      Cervical: No cervical adenopathy.   Skin:     General: Skin is warm and dry.      Findings: No rash.   Neurological:      Mental Status: She is alert.              LABS / IMAGING:  Recent Results (from the past 26 weeks)   CBC Auto Differential    Collection Time: 24  8:30 AM   Result Value Ref Range    WBC 3.82 (L) 3.90 - 12.70 K/uL    RBC 5.16 4.00 - 5.40 M/uL    Hemoglobin 14.8 12.0 - 16.0 g/dL    Hematocrit 46.3 37.0 - 48.5 %    MCV 90 82 - 98 fL    MCH 28.7 27.0 - 31.0 pg    MCHC 32.0 32.0 - 36.0 g/dL    RDW 13.6 11.5 - 14.5 %    Platelets 304 150 - 450 K/uL    MPV 9.1 (L) 9.2 - 12.9 fL    Immature Granulocytes 0.3 0.0 - 0.5 %    Gran # (ANC) 1.9 1.8 - 7.7 K/uL    Immature Grans (Abs) 0.01 0.00 - 0.04 K/uL    Lymph # 1.3 1.0 - 4.8 K/uL    Mono # 0.6 0.3 - 1.0 K/uL    Eos # 0.1 0.0 - 0.5 K/uL    Baso # 0.01 0.00 - 0.20 K/uL    nRBC 0 0 /100 WBC    Gran % 49.4 38.0 - 73.0 %    Lymph % 34.0 18.0 - 48.0 %    Mono % 14.4 4.0 - 15.0 %    Eosinophil % 1.6 0.0 - 8.0 %    Basophil % 0.3 0.0 - 1.9 %    Differential Method Automated    Comprehensive Metabolic Panel    Collection Time: 24  8:30 AM   Result Value Ref Range    Sodium 138 136 - 145 mmol/L    Potassium 5.0 3.5 - 5.1 mmol/L    Chloride 94 (L) 95 - 110 mmol/L    CO2 33 (H) 23 - 29 mmol/L    Glucose 118 (H) 70 - 110 mg/dL    BUN 7 (L) 8 - 23 mg/dL    Creatinine 0.7 0.5 - 1.4 mg/dL    Calcium 9.5 8.7 - 10.5 mg/dL    Total Protein 7.4 6.0 - 8.4 g/dL    Albumin 3.8 3.5 - 5.2 g/dL    Total Bilirubin 0.5 0.1 - 1.0 mg/dL    Alkaline Phosphatase 25 (L) 55 - 135 U/L    AST 18 10 - 40 U/L    ALT 19 10 - 44 U/L    eGFR >60.0 >60 mL/min/1.73 m^2    Anion Gap 11 8 - 16 mmol/L   TSH    Collection Time: 24  8:30 AM   Result Value Ref Range    TSH 2.159 0.400 - 4.000 uIU/mL   Lipid Panel    Collection Time:  04/17/24  8:30 AM   Result Value Ref Range    Cholesterol 135 120 - 199 mg/dL    Triglycerides 54 30 - 150 mg/dL    HDL 46 40 - 75 mg/dL    LDL Cholesterol 78.2 63.0 - 159.0 mg/dL    HDL/Cholesterol Ratio 34.1 20.0 - 50.0 %    Total Cholesterol/HDL Ratio 2.9 2.0 - 5.0    Non-HDL Cholesterol 89 mg/dL   Hemoglobin A1C    Collection Time: 04/17/24  8:30 AM   Result Value Ref Range    Hemoglobin A1C 5.9 (H) 4.0 - 5.6 %    Estimated Avg Glucose 123 68 - 131 mg/dL   URIC ACID    Collection Time: 04/17/24  8:30 AM   Result Value Ref Range    Uric Acid 4.4 2.4 - 5.7 mg/dL   Microalbumin/Creatinine Ratio, Urine    Collection Time: 04/17/24  8:30 AM   Result Value Ref Range    Microalbumin, Urine 10.0 ug/mL    Creatinine, Urine 119.0 15.0 - 325.0 mg/dL    Microalb/Creat Ratio 8.4 0.0 - 30.0 ug/mg   B-TYPE NATRIURETIC PEPTIDE    Collection Time: 04/17/24  8:30 AM   Result Value Ref Range     (H) 0 - 99 pg/mL   POCT Lipid Profile w/ Glucose    Collection Time: 09/05/24 11:09 AM   Result Value Ref Range    POC Cholesterol, Total 111 <240 MG/DL    POC Cholesterol, HDL 36 (L) MG/DL    POC Glucose, Fasting 116 (H) 60 - 110 MG/DL   CV Ultrasound Bilateral Doppler Carotid    Collection Time: 09/16/24 11:18 AM   Result Value Ref Range    Right arm systolic blood pressure 118.00 mmHg    Right arm diastolic blood pressure 60.00 mmHg    Left arm systolic blood pressure 120.00 mmHg    Left arm diastolic blood pressure 70.00 mmHg    Right CCA prox sys 103 cm/s    Right CCA prox silverman 5 cm/s    Right CCA mid sys 105 cm/s    Right CCA mid silverman 6 cm/s    Right CCA dist sys 96 cm/s    Right CCA dist silverman 4 cm/s    Rigth CBA sys 98 cm/s    Right CBA silverman 3 cm/s    Right ICA prox sys 99 cm/s    Right ICA prox silverman 8 cm/s    Right ICA mid sys 123 cm/s    Right ICA mid silverman 13 cm/s    Right ICA dist sys 125 cm/s    Right ICA dist silverman 18 cm/s    Right ECA sys 111 cm/s    Right vertebral sys 67 cm/s    Right vertebral silverman 7 cm/s    Right  ICA/CCA ratio 1.30     Right Highest .00     Right Highest EDV 18.00     Right Highest      Left CCA prox sys 142 cm/s    Left CCA prox silverman 5 cm/s    Left CCA mid sys 145 cm/s    Left CCA mid silverman 6 cm/s    Left CCA dist sys 117 cm/s    Left CCA dist silverman 4 cm/s    Left CBA sys 104 cm/s    Left CBA silverman 6 cm/s    Left ICA prox sys 121 cm/s    Left ICA prox silverman 8 cm/s    Left ICA mid sys 129 cm/s    Left ICA mid silverman 12 cm/s    Left ICA dist sys 176 cm/s    Left ICA dist silverman 17 cm/s    Left ECA sys 143 cm/s    Left vertebral sys 49 cm/s    Left vertebral silverman 3 cm/s    Left ICA/CCA ratio 1.50     Left Highest .00     LT Highest EDV 17.00     Left Highest           Assessment    1. Bilateral leg and foot pain    2. Claudication    3. Type 2 diabetes mellitus with diabetic polyneuropathy, without long-term current use of insulin    4. Chronic gout without tophus, unspecified cause, unspecified site    5. COPD (chronic obstructive pulmonary disease) with chronic bronchitis    6. Chronic diastolic heart failure    7. Hyperlipidemia, unspecified hyperlipidemia type    8. Hypertension associated with diabetes    9. Need for vaccination          Plan    Kaleigh was seen today for follow-up.    Diagnoses and all orders for this visit:    Bilateral leg and foot pain  -     US Lower Extremity Arteries Bilateral; Future  -     Ambulatory referral/consult to [facility]  -     gabapentin (NEURONTIN) 100 MG capsule; Take 1 capsule (100 mg total) by mouth 3 (three) times daily. For leg pains  -     diclofenac (VOLTAREN) 75 MG EC tablet; Take 1 tablet (75 mg total) by mouth 2 (two) times daily as needed (pain).  -     CV Ultrasound doppler arterial legs bilat; Future    Claudication  -     US Lower Extremity Arteries Bilateral; Future  -     CV Ultrasound doppler arterial legs bilat; Future    Type 2 diabetes mellitus with diabetic polyneuropathy, without long-term current use of  insulin  -     Ambulatory referral/consult to [facility]  -     Hemoglobin A1C; Future  -     Microalbumin/Creatinine Ratio, Urine; Future  -     gabapentin (NEURONTIN) 100 MG capsule; Take 1 capsule (100 mg total) by mouth 3 (three) times daily. For leg pains    Chronic gout without tophus, unspecified cause, unspecified site  -     URIC ACID; Future    COPD (chronic obstructive pulmonary disease) with chronic bronchitis    Chronic diastolic heart failure  -     Ambulatory referral/consult to [facility]  -     B-TYPE NATRIURETIC PEPTIDE; Future    Hyperlipidemia, unspecified hyperlipidemia type  -     Lipid Panel; Future    Hypertension associated with diabetes  -     Lipid Panel; Future  -     TSH; Future  -     Comprehensive Metabolic Panel; Future  -     CBC Auto Differential; Future    Need for vaccination  -     Influenza - Trivalent (Adjuvanted)      Recent labs all look okay.      Both legs are definitely swollen.  Recommended she continue taking the Bumex 2 mg daily.  She is worried about her blood pressure being too low and taking it.  Gave parameters.  If BP is greater than 110/70, she can take 2 mg.  If lower, just take one.    Elevate feet as much as possible.  Compression socks.  Minimize sodium intake.      Cardiology did an ultrasound of the venous system.  Challenging to feel pulses in her feet due to the swelling.  Some of her symptoms could be mild claudication?  She has known vascular calcifications in her carotids, brachial arteries.  We will order arterial Doppler ultrasound lower extremities to check for PAD.    In the meantime, she can use Tylenol, Voltaren.  Gabapentin to cover for neuropathy.    She lives alone.  No real family members nearby.  Worry about social interaction.  We will ask care at home to come check on her monthly.    FOLLOW-UP:  Follow up in about 6 months (around 4/14/2025) for check up, labs 1 week prior.    I spent a total of 40  minutes face to face and non-face to face on the date of this visit.This includes time preparing to see the patient (eg, review of tests, notes), obtaining and/or reviewing additional history from an independent historian and/or outside medical records, documenting clinical information in the electronic health record, independently interpreting results and/or communicating results to the patient/family/caregiver, or care coordinator.  Visit today included increased complexity associated with the care of the episodic problem addressed and managing the longitudinal care of the patient due to the serious and/or complex managed problem(s).    Signed by:  Lucio Morris MD

## 2024-10-14 NOTE — TELEPHONE ENCOUNTER
Spoke with pt and advised her that I changed her lab appt to [place]. Pt verbalized understanding

## 2024-10-17 ENCOUNTER — HOSPITAL ENCOUNTER (OUTPATIENT)
Dept: CARDIOLOGY | Facility: HOSPITAL | Age: 84
Discharge: HOME | End: 2024-10-17
Attending: FAMILY MEDICINE
Payer: MEDICARE

## 2024-10-17 VITALS
DIASTOLIC BLOOD PRESSURE: 63 MMHG | HEIGHT: 66 IN | SYSTOLIC BLOOD PRESSURE: 130 MMHG | BODY MASS INDEX: 31.34 KG/M2 | WEIGHT: 195 LBS

## 2024-10-17 DIAGNOSIS — M79.672 BILATERAL LEG AND FOOT PAIN: ICD-10-CM

## 2024-10-17 DIAGNOSIS — M79.605 BILATERAL LEG AND FOOT PAIN: ICD-10-CM

## 2024-10-17 DIAGNOSIS — M79.604 BILATERAL LEG AND FOOT PAIN: ICD-10-CM

## 2024-10-17 DIAGNOSIS — M79.671 BILATERAL LEG AND FOOT PAIN: ICD-10-CM

## 2024-10-17 DIAGNOSIS — I73.9 CLAUDICATION: ICD-10-CM

## 2024-10-17 LAB
LEFT ANT TIBIAL SYS PSV: 88 CM/S
LEFT CFA PSV: 169 CM/S
LEFT PERONEAL SYS PSV: 104 CM/S
LEFT POPLITEAL PSV: 58 CM/S
LEFT POST TIBIAL SYS PSV: 141 CM/S
LEFT PROFUNDA SYS PSV: 110 CM/S
LEFT SUPER FEMORAL DIST SYS PSV: 91 CM/S
LEFT SUPER FEMORAL MID SYS PSV: 121 CM/S
LEFT SUPER FEMORAL OSTIAL SYS PSV: 100 CM/S
LEFT SUPER FEMORAL PROX SYS PSV: 119 CM/S
LEFT TIB/PER TRUNK SYS PSV: 79 CM/S
OHS CV LEFT LOWER EXTREMITY ABI (NO CALC): 0.96
OHS CV RIGHT ABI LOWER EXTREMITY (NO CALC): 0.88
RIGHT ANT TIBIAL SYS PSV: 102 CM/S
RIGHT CFA PSV: 162 CM/S
RIGHT PERONEAL SYS PSV: 69 CM/S
RIGHT POPLITEAL PSV: 75 CM/S
RIGHT POST TIBIAL SYS PSV: 0 CM/S
RIGHT PROFUNDA SYS PSV: 74 CM/S
RIGHT SUPER FEMORAL DIST SYS PSV: 116 CM/S
RIGHT SUPER FEMORAL MID SYS PSV: 100 CM/S
RIGHT SUPER FEMORAL OSTIAL SYS PSV: 85 CM/S
RIGHT SUPER FEMORAL PROX SYS PSV: 103 CM/S
RIGHT TIB/PER TRUNK SYS PSV: 90 CM/S

## 2024-10-17 PROCEDURE — 93925 LOWER EXTREMITY STUDY: CPT | Mod: PO

## 2024-10-22 ENCOUNTER — TELEPHONE (OUTPATIENT)
Dept: PRIMARY CARE CLINIC | Facility: CLINIC | Age: 84
End: 2024-10-22
Payer: MEDICARE

## 2024-10-22 NOTE — TELEPHONE ENCOUNTER
----- Message from Jami sent at 10/22/2024  3:38 PM CDT -----  Regarding: call back  Type: Patient Call Back    Who called: prior [employer] auth rep     What is the request in detail: calling to verify receipt of prior auth form for pt medication     Can the clinic reply by [facility]?no    Would the patient rather a call back or a response via [facility]? call    Best call back number: [phone]    Additional Information:

## 2024-10-24 ENCOUNTER — CARE AT HOME (OUTPATIENT)
Dept: HOME HEALTH SERVICES | Facility: CLINIC | Age: 84
End: 2024-10-24
Payer: MEDICARE

## 2024-10-24 VITALS
TEMPERATURE: 98 F | DIASTOLIC BLOOD PRESSURE: 70 MMHG | HEART RATE: 69 BPM | OXYGEN SATURATION: 97 % | SYSTOLIC BLOOD PRESSURE: 140 MMHG

## 2024-10-24 DIAGNOSIS — C50.212 MALIGNANT NEOPLASM OF UPPER-INNER QUADRANT OF LEFT BREAST IN FEMALE, ESTROGEN RECEPTOR POSITIVE: ICD-10-CM

## 2024-10-24 DIAGNOSIS — R60.9 DEPENDENT EDEMA: Primary | ICD-10-CM

## 2024-10-24 DIAGNOSIS — I15.2 HYPERTENSION ASSOCIATED WITH DIABETES: ICD-10-CM

## 2024-10-24 DIAGNOSIS — E11.59 HYPERTENSION ASSOCIATED WITH DIABETES: ICD-10-CM

## 2024-10-24 DIAGNOSIS — Z17.0 MALIGNANT NEOPLASM OF UPPER-INNER QUADRANT OF LEFT BREAST IN FEMALE, ESTROGEN RECEPTOR POSITIVE: ICD-10-CM

## 2024-10-24 NOTE — PROGRESS NOTES
[facility]  Medical Chronic Care Home Visit    Encounter Provider: Marti Yanez   PCP: Lucio Morris MD  Consult Requested By: Dr. Lucio Morris    HISTORY OF PRESENT ILLNESS      Patient ID: Kaleigh Nieves is a 84 y.o. female is being seen in the home due to physical debility that presents a taxing effort to leave the home, to mitigate high risk of hospital readmission and/or due to the limited availability of reliable or safe options for transportation to the point of access to the provider. Prior to treatment on this visit the chart was reviewed and patient verbal consent was obtained.    Chronic medical conditions synopsis:    1)   2) Recent developments: no new issues  3) Reason for consult and visit: establish care at home    DECISION MAKING TODAY       Assessment & Plan:  1. Dependent edema  Assessment & Plan:  Chronic and improved (2+ edema in the feet but legs are much improved)  Continue Bumex 2mg PO QD      2. Hypertension associated with diabetes  Assessment & Plan:  BP on visit 140/70  Continue bisoprolol 5mg PO QD       3. Malignant neoplasm of upper-inner quadrant of left breast in female, estrogen receptor positive  Assessment & Plan:  Follow up with Dr Ledesma as instructed             ENVIRONMENT OF CARE      Family and/or Caregiver present at visit?  No  Name of Caregiver: N/A  History provided by: patient    4Ms for Medical Decision-Making in Older Adults    Last Completed EAWV: 2022    MOBILITY:  Get Up and Go:      2022    11:16 AM 2021    11:15 AM 2019    11:03 AM 6/15/2018    11:28 AM 2017    11:18 AM   Get Up and Go   Trial 1 15 seconds -- 15 seconds 7 seconds 5 seconds   Trial 2     6 seconds   Trial 3     6 seconds   Average     5.67     Activities of Daily Livin/27/2022    11:21 AM   Activities of Daily Living   Ambulation Assistance Required   Ambulation Assistance Cane   Dressing Independent   Transfers Independent   Toileting  Continent of bladder;Continent of bowel   Feeding Independent   Cleaning home/Chores Independent   Telephone use Independent   Shopping Independent   Paying bills Independent   Taking meds Independent     Whisper Test:      2022    11:16 AM   Whisper Test   Whisper Test Normal     Disability Status:      2022    11:23 AM   Disability Status   Are you deaf or do you have serious difficulty hearing? N   Are you blind or do you have serious difficulty seeing, even when wearing glasses? N   Because of a physical, mental, or emotional condition, do you have serious difficulty concentrating, remembering, or making decisions? N   Do you have serious difficulty walking or climbing stairs? Y   Do you have difficulty dressing or bathing? N   Because of a physical, mental, or emotional condition, do you have difficulty doing errands alone such as visiting a doctor's office or shopping? N     Nutrition Screenin/27/2022    11:19 AM   Nutrition Screening   Has food intake declined over the past three months due to loss of appetite, digestive problems, chewing or swallowing difficulties? No decrease in food intake   Involuntary weight loss during the last 3 months? No weight loss   Mobility? Goes out   Has the patient suffered psychological stress or acute disease in the past three months? No   Neuropsychological problems? No psychological problems   Body Mass Index (BMI)?  BMI 23 or greater   Screening Score 14   Interpretation Normal nutritional status    Screening Score: 0-7 Malnourished, 8-11 At Risk, 12-14 Normal  Fall Risk:      10/8/2024    11:15 AM 2024    10:40 AM 2024    11:00 AM   Fall Risk Assessment - Outpatient   Mobility Status Ambulatory w/ assistance Ambulatory w/ assistance Ambulatory w/ assistance   Number of falls 0 0 0   Identified as fall risk False True True           MENTATION:   Depression Patient Health Questionnaire:      4/10/2024    11:07 AM   Depression Patient Health  Questionnaire   Over the last two weeks how often have you been bothered by little interest or pleasure in doing things Not at all   Over the last two weeks how often have you been bothered by feeling down, depressed or hopeless Not at all   PHQ-2 Total Score 0     Has Dementia Dx: No  Has Anxiety Dx: No    Cognitive Function Screenin/27/2022    11:16 AM   Cognitive Function Screening   Clock Drawing Test 2   Mini-Cog 3 Minute Recall 2   Cognitive Function Screening 4     Cognitive Function Screening Total - Less than 4 = Abnormal,  Greater than or equal to 4 = Normal        MEDICATIONS:  High Risk Medications:  Total Active Medications: 1  gabapentin - 100 MG    WHAT MATTERS MOST:  Advance Care Planning   ACP Status:   Patient has had an ACP conversation  Living Will: No  Power of : No  [place]: No    What is most important right now is to focus on spending time at home and avoiding the hospital    Accordingly, we have decided that the best plan to meet the patient's goals includes continuing with treatment      What matters most to patient today is: Staying in my home           Is patient hospice appropriate: No  (If needed, use PPS <30 or FAST score >7)  Was referral to hospice placed: No    Impression upon entering the home:  Physical Dwelling: single family home   Appearance of home environment: cleaniness: clean, walking pathways: clear, lighting: adequate, and home structure: sound structure  Functional Status: minimal assistance  Mobility: ambulatory  Nutritional access: adequate intake and access  Home Health: No, and does not need it at this time   DME/Supplies: hospital bed     Diagnostic tests reviewed/disposition: No diagnosic tests pending after this hospitalization.  Disease/illness education: CAD and Cancer  Establishment or re-establishment of referral orders for community resources: No other necessary community resources.   Discussion with other health care providers: No discussion  with other health care providers necessary.   Does patient have a PCP at [place]? Yes   Repatriation plan with PCP? follow-up with PCP within 90d   Does patient have an ostomy (ileostomy, colostomy, suprapubic catheter, nephrostomy tube, tracheostomy, PEG tube, pleurex catheter, cholecystostomy, etc)? No  Were BPAs reviewed? Yes    Social History     Socioeconomic History    Marital status: Single    Number of children: 0   Tobacco Use    Smoking status: Former     Current packs/day: 0.00     Average packs/day: 0.3 packs/day for 24.0 years (6.0 ttl pk-yrs)     Types: Cigarettes     Start date: 1952     Quit date: 1976     Years since quittin.3    Smokeless tobacco: Never   Substance and Sexual Activity    Alcohol use: No    Drug use: No    Sexual activity: Not Currently     Social Drivers of Health     Financial Resource Strain: Low Risk  (2022)    Overall Financial Resource Strain (CARDIA)     Difficulty of Paying Living Expenses: Not hard at all   Food Insecurity: No Food Insecurity (2022)    Hunger Vital Sign     Worried About Running Out of Food in the Last Year: Never true     Ran Out of Food in the Last Year: Never true   Transportation Needs: No Transportation Needs (2022)    PRAPARE - Transportation     Lack of Transportation (Medical): No     Lack of Transportation (Non-Medical): No   Physical Activity: Inactive (2022)    Exercise Vital Sign     Days of Exercise per Week: 0 days     Minutes of Exercise per Session: 0 min   Stress: No Stress Concern Present (2022)    [employer] - Occupational Stress Questionnaire     Feeling of Stress : Not at all   Housing Stability: Low Risk  (2022)    Housing Stability Vital Sign     Unable to Pay for Housing in the Last Year: No     Number of Places Lived in the Last Year: 1     Unstable Housing in the Last Year: No         OBJECTIVE:     Vital Signs:  Vitals:    10/24/24 1319   BP: (!) 140/70   Pulse:  69   Temp: 98.4 °F (36.9 °C)       Review of Systems   Constitutional: Negative.    HENT: Negative.     Eyes: Negative.    Respiratory:  Positive for shortness of breath (with exertion).    Cardiovascular:  Positive for leg swelling (improved).   Gastrointestinal: Negative.    Endocrine: Negative.    Genitourinary: Negative.    Musculoskeletal:  Positive for arthralgias.   Skin: Negative.    Neurological:  Positive for dizziness.   Psychiatric/Behavioral: Negative.         Physical Exam:  Physical Exam  Vitals reviewed.   Constitutional:       General: She is not in acute distress.     Appearance: She is not ill-appearing.   HENT:      Head: Normocephalic and atraumatic.      Nose: Nose normal.      Mouth/Throat:      Mouth: Mucous membranes are moist.      Pharynx: Oropharynx is clear.   Cardiovascular:      Rate and Rhythm: Normal rate and regular rhythm.      Heart sounds: Normal heart sounds.   Pulmonary:      Effort: Pulmonary effort is normal.      Breath sounds: Normal breath sounds.   Abdominal:      General: Bowel sounds are normal.      Palpations: Abdomen is soft.   Musculoskeletal:      Cervical back: Neck supple.      Right lower leg: Edema (bilateral feet) present.      Left lower leg: Edema present.   Skin:     General: Skin is warm and dry.   Neurological:      Mental Status: She is alert and oriented to person, place, and time.   Psychiatric:         Mood and Affect: Mood normal.         Behavior: Behavior normal.         INSTRUCTIONS FOR PATIENT:     Scheduled Follow-up, Appts Reviewed with Modifications if Needed: Yes  Future Appointments   Date Time Provider Department Center   11/27/2024 11:00 AM Yahir Cedeno MD [facility] GENSUR [place]   12/5/2024  8:00 AM Marti Fair NP [address]   1/8/2025  9:40 AM Hung Partida MD [facility] CARDIO [place]   1/9/2025 11:00 AM Ino Harmon DPM [facility] POD [place]   4/7/2025  8:10 AM [facility] LAB [place]   4/14/2025 11:00 AM  Lucio Morris MD [facility] [place]         Current Outpatient Medications:     ACCU-CHEK GUIDE TEST STRIPS Strp, TEST 3 TIMES DAILY, Disp: 300 strip, Rfl: 3    albuterol (PROVENTIL/VENTOLIN HFA) 90 mcg/actuation inhaler, INHALE 2 PUFFS INTO THE LUNGS EVERY SIX HOURS AS NEEDED FOR WHEEZING, Disp: 18 g, Rfl: 11    allopurinoL (ZYLOPRIM) 100 MG tablet, Take 1 tablet (100 mg total) by mouth once daily., Disp: 90 tablet, Rfl: 2    aspirin 81 MG Chew, Take 81 mg by mouth once daily., Disp: , Rfl:     bisoprolol (ZEBETA) 5 MG tablet, TAKE 1 TABLET (5 MG TOTAL) BY MOUTH ONCE DAILY., Disp: 90 tablet, Rfl: 3    blood glucose control high,low Soln, 1 each by Misc.(Non-Drug; Combo Route) route once a week., Disp: 1 each, Rfl: 3    blood-glucose meter kit, To check BG 3 times daily, to use with insurance preferred meter, Disp: 1 each, Rfl: 0    bumetanide (BUMEX) 1 MG tablet, Take 2 tablets (2 mg total) by mouth once daily., Disp: 60 tablet, Rfl: 11    diclofenac (VOLTAREN) 75 MG EC tablet, Take 1 tablet (75 mg total) by mouth 2 (two) times daily as needed (pain)., Disp: 180 tablet, Rfl: 3    fluticasone-salmeterol diskus inhaler 250-50 mcg, Inhale 1 puff into the lungs 2 (two) times daily. Controller, Disp: 180 each, Rfl: 3    gabapentin (NEURONTIN) 100 MG capsule, Take 1 capsule (100 mg total) by mouth 3 (three) times daily. For leg pains, Disp: 270 capsule, Rfl: 3    lancets Misc, To check BG 3 times daily, to use with insurance preferred meter, Disp: 200 each, Rfl: 3    latanoprost 0.005 % ophthalmic solution, Place 1 drop into both eyes once daily., Disp: 7.5 mL, Rfl: 3    ondansetron (ZOFRAN-ODT) 8 MG TbDL, Dissolve 1 tablet (8 mg total) by mouth every 8 (eight) hours as needed (Nausea)., Disp: 10 tablet, Rfl: 0    simvastatin (ZOCOR) 20 MG tablet, TAKE 1 TABLET (20 MG TOTAL) BY MOUTH NIGHTLY., Disp: 90 tablet, Rfl: 3    timolol maleate 0.5% (TIMOPTIC) 0.5 % Drop, Place 1 drop into both eyes 2 (two) times  daily., Disp: 30 mL, Rfl: 4    timoloL maleate, PF, 0.5 % Dpet, Place into both eyes., Disp: , Rfl:     Medication Reconciliation:  Were medications changed during this appointment? No  Were medications in the home? Yes  Is the patient taking the medications as directed? Yes  Does the patient/caregiver understand the medications and changes? Yes  Does updated med list accurately reflects meds patient is currently taking? Yes    Encounter for Medical Follow-Up and Medication Review  - [facility] at NP to schedule follow-up visit with patient in 4 weeks or PRN     Patient Instructions Given:  - Continue all medications, treatments and therapies as ordered.   - Follow all instructions, recommendations as discussed.  - Maintain Safety Precautions at all times.  - Attend all medical appointments as scheduled.  - For worsening symptoms: call Primary Care Physician or Nurse Practitioner.  - For emergencies, call 911 or immediately report to the nearest emergency room          Signature: Marti Yanez NP

## 2024-10-25 ENCOUNTER — TELEPHONE (OUTPATIENT)
Dept: PRIMARY CARE CLINIC | Facility: CLINIC | Age: 84
End: 2024-10-25
Payer: MEDICARE

## 2024-10-25 NOTE — TELEPHONE ENCOUNTER
----- Message from Mariza sent at 10/25/2024  9:51 AM CDT -----  Contact: Ms. Roy [phone]  Ms. Jordan from [facility] said that she faxed over paperwork to you ion October eighteenth for you to sign and she would like to know if you have received it?     Ms. Mathias said that she need the paperwork faxed back to he artem today.

## 2024-10-29 ENCOUNTER — TELEPHONE (OUTPATIENT)
Dept: PRIMARY CARE CLINIC | Facility: CLINIC | Age: 84
End: 2024-10-29
Payer: MEDICARE

## 2024-11-16 DIAGNOSIS — E78.5 HYPERLIPIDEMIA, UNSPECIFIED HYPERLIPIDEMIA TYPE: ICD-10-CM

## 2024-11-18 RX ORDER — SIMVASTATIN 20 MG/1
20 TABLET, FILM COATED ORAL NIGHTLY
Qty: 90 TABLET | Refills: 3 | Status: SHIPPED | OUTPATIENT
Start: 2024-11-18

## 2024-11-27 ENCOUNTER — OFFICE VISIT (OUTPATIENT)
Dept: SURGERY | Facility: CLINIC | Age: 84
End: 2024-11-27
Payer: MEDICARE

## 2024-11-27 VITALS
HEIGHT: 66 IN | HEART RATE: 73 BPM | SYSTOLIC BLOOD PRESSURE: 158 MMHG | DIASTOLIC BLOOD PRESSURE: 69 MMHG | BODY MASS INDEX: 31.43 KG/M2 | WEIGHT: 195.56 LBS

## 2024-11-27 DIAGNOSIS — Z17.0 MALIGNANT NEOPLASM OF UPPER-INNER QUADRANT OF LEFT BREAST IN FEMALE, ESTROGEN RECEPTOR POSITIVE: Primary | ICD-10-CM

## 2024-11-27 DIAGNOSIS — Z12.31 ENCOUNTER FOR SCREENING MAMMOGRAM FOR MALIGNANT NEOPLASM OF BREAST: ICD-10-CM

## 2024-11-27 DIAGNOSIS — C50.212 MALIGNANT NEOPLASM OF UPPER-INNER QUADRANT OF LEFT BREAST IN FEMALE, ESTROGEN RECEPTOR POSITIVE: Primary | ICD-10-CM

## 2024-11-27 PROCEDURE — 99999 PR PBB SHADOW E&M-EST. PATIENT-LVL IV: CPT | Mod: PBBFAC,,, | Performed by: SURGERY

## 2024-11-27 NOTE — PROGRESS NOTES
Breast Surgical Oncology  [place]    Date of Service: 11/27/2024    DIAGNOSIS:   84 y.o. female with a history of left breast cancer.    Date of Diagnosis: 6/20/11  Pathology: IDC, G1, ER >90%, KY 1-11%, HER2 1+, Ki67 20%  Clinical Stage: IA (cT2 cN0 Mx)  Pathologic Stage: IA (pT2 pNx Mx)    TREATMENT:   Surgery: Left partial mastectomy on 8/22/23. Susan Pulliam M.D. Breast Surgical Oncology  Systemic Therapy: Endocrine therapy with Arimidex from 9/11/23 to 5/14/24 - stopped due to intolerance, Oncotype DX score 26  Medical Oncologist: Jose Carlos Monroe M.D.     Radiation Treatment Summary: patient deferred  Radiation Oncologist: N/A  Genetics: negative MYRIAD  Physical Therapy: N/A, no axillary surgery    HISTORY OF PRESENT ILLNESS:   Kaleigh Nieves is here for six-month oncological follow up.  In the interim she was doing well with no new complaints she denies new breast concerns such as pain, masses, skin changes, nipple discharge, nipple retraction or lumps under the arm.  She also denies bone pain, shortness of breath, abdominal pain and neurologic symptoms.     IMAGING:   Results for orders placed during the hospital encounter of 05/28/24    Mammo Digital Diagnostic Bilat with Bertrand    Narrative  Result:  Mammo Digital Diagnostic Bilat with Bertrand    History:  Patient is 83 y.o. and is seen for diagnostic imaging.    Films Compared:  Compared to: 06/02/2023 Mammo Digital Diagnostic Left with Bertrand, 06/02/2023 US Breast Left Limited, 04/21/2023 Mammo Digital Screening Bilat w/ Bertrand, and 04/28/2017 Mammo Digital Screening Bilateral With CAD, 4/25/2016 Mammo Digital Screening Bilateral With CAD    Findings:  This procedure was performed using tomosynthesis. Computer-aided detection was utilized in the interpretation of this examination.  The breasts are heterogeneously dense, which may obscure small masses.    Left  There are post-surgical findings from a previous lumpectomy seen in the upper inner quadrant of  the left breast in the posterior depth. There has been no interval development of a suspicious mass, microcalcification, or architectural distortion.    There is no evidence of suspicious masses, calcifications, or other abnormal findings in the left breast.    Right  There is no evidence of suspicious masses, calcifications, or other abnormal findings in the right breast.    Impression  Bilateral  There is no mammographic evidence of malignancy.    BI-RADS Category:  Overall: 2 - Benign      Recommendation:  Routine screening mammogram in 1 year, or as clinically indicated.       MEDICATIONS/ALLERGIES:     Current Outpatient Medications:     ACCU-CHEK GUIDE TEST STRIPS Strp, TEST 3 TIMES DAILY, Disp: 300 strip, Rfl: 3    albuterol (PROVENTIL/VENTOLIN HFA) 90 mcg/actuation inhaler, INHALE 2 PUFFS INTO THE LUNGS EVERY SIX HOURS AS NEEDED FOR WHEEZING, Disp: 18 g, Rfl: 11    allopurinoL (ZYLOPRIM) 100 MG tablet, Take 1 tablet (100 mg total) by mouth once daily., Disp: 90 tablet, Rfl: 2    aspirin 81 MG Chew, Take 81 mg by mouth once daily., Disp: , Rfl:     bisoprolol (ZEBETA) 5 MG tablet, TAKE 1 TABLET (5 MG TOTAL) BY MOUTH ONCE DAILY., Disp: 90 tablet, Rfl: 3    blood glucose control high,low Soln, 1 each by Misc.(Non-Drug; Combo Route) route once a week., Disp: 1 each, Rfl: 3    bumetanide (BUMEX) 1 MG tablet, Take 2 tablets (2 mg total) by mouth once daily., Disp: 60 tablet, Rfl: 11    diclofenac (VOLTAREN) 75 MG EC tablet, Take 1 tablet (75 mg total) by mouth 2 (two) times daily as needed (pain)., Disp: 180 tablet, Rfl: 3    gabapentin (NEURONTIN) 100 MG capsule, Take 1 capsule (100 mg total) by mouth 3 (three) times daily. For leg pains, Disp: 270 capsule, Rfl: 3    lancets Misc, To check BG 3 times daily, to use with insurance preferred meter, Disp: 200 each, Rfl: 3    ondansetron (ZOFRAN-ODT) 8 MG TbDL, Dissolve 1 tablet (8 mg total) by mouth every 8 (eight) hours as needed (Nausea)., Disp: 10 tablet, Rfl: 0     simvastatin (ZOCOR) 20 MG tablet, TAKE 1 TABLET NIGHTLY., Disp: 90 tablet, Rfl: 3    timoloL maleate, PF, 0.5 % Dpet, Place into both eyes., Disp: , Rfl:     blood-glucose meter kit, To check BG 3 times daily, to use with insurance preferred meter, Disp: 1 each, Rfl: 0    fluticasone-salmeterol diskus inhaler 250-50 mcg, Inhale 1 puff into the lungs 2 (two) times daily. Controller, Disp: 180 each, Rfl: 3    latanoprost 0.005 % ophthalmic solution, Place 1 drop into both eyes once daily., Disp: 7.5 mL, Rfl: 3    timolol maleate 0.5% (TIMOPTIC) 0.5 % Drop, Place 1 drop into both eyes 2 (two) times daily., Disp: 30 mL, Rfl: 4  Review of patient's allergies indicates:   Allergen Reactions    Iodine and iodide containing products Nausea And Vomiting    Penicillins Itching    Ultram [tramadol] Hives and Itching     Light headness, itiching    Sulfa (sulfonamide antibiotics) Hives and Itching    Adhesive Other (See Comments)    Amlodipine      edema    Sulfamethoxazole-trimethoprim Itching, Rash and Nausea And Vomiting       PHYSICAL EXAM:   General: The patient appears well and is in no acute distress.     Chaperone is present for examination.   BREAST EXAM  No Asymmetry  Right:  - Mass: No  - Skin change: No  - Nipple Discharge: No  - Nipple retraction: No  - Axillary LAD: No  Left:   - Mass: No  - Skin change: No  - Nipple Discharge: No  - Nipple retraction: No  - Axillary LAD: No      ASSESSMENT:   Kaleigh was seen today for other.    Diagnoses and all orders for this visit:    Malignant neoplasm of upper-inner quadrant of left breast in female, estrogen receptor positive  -     Mammo Digital Screening Bilat w/ Bertrand; Future    Encounter for screening mammogram for malignant neoplasm of breast  -     Mammo Digital Screening Bilat w/ Bertrand; Future       PLAN:   Kaleigh has a benign exam today without evidence of local or distant relapse.    She will return in November for her 1 year follow up visit, or sooner should she  develop breast concerns.    Bilateral diagnostic mammogram due each May    The patient is in agreement with the plan. Questions were encouraged and answered to patient's satisfaction. Kaleigh will call our office with any questions or concerns.     I spent a total of 30 minutes on this visit. This includes face to face time and non-face to face time preparing to see the patient (eg, review of tests), obtaining and/or reviewing separately obtained history, documenting clinical information in the electronic or other health record, independently interpreting results and communicating results to the patient/family/caregiver, or care coordinator.       Yahir Cedeno M.D.

## 2024-11-30 DIAGNOSIS — R06.09 DOE (DYSPNEA ON EXERTION): ICD-10-CM

## 2024-11-30 DIAGNOSIS — I50.42 CHRONIC COMBINED SYSTOLIC AND DIASTOLIC HEART FAILURE: ICD-10-CM

## 2024-12-02 RX ORDER — BISOPROLOL FUMARATE 5 MG/1
5 TABLET, FILM COATED ORAL
Qty: 90 TABLET | Refills: 3 | Status: SHIPPED | OUTPATIENT
Start: 2024-12-02

## 2024-12-05 ENCOUNTER — CARE AT HOME (OUTPATIENT)
Dept: HOME HEALTH SERVICES | Facility: CLINIC | Age: 84
End: 2024-12-05
Payer: MEDICARE

## 2024-12-05 VITALS
DIASTOLIC BLOOD PRESSURE: 58 MMHG | SYSTOLIC BLOOD PRESSURE: 130 MMHG | HEART RATE: 83 BPM | TEMPERATURE: 99 F | RESPIRATION RATE: 18 BRPM | OXYGEN SATURATION: 94 %

## 2024-12-05 DIAGNOSIS — I15.2 HYPERTENSION ASSOCIATED WITH DIABETES: Primary | ICD-10-CM

## 2024-12-05 DIAGNOSIS — E78.2 MIXED HYPERLIPIDEMIA: Chronic | ICD-10-CM

## 2024-12-05 DIAGNOSIS — R60.9 DEPENDENT EDEMA: ICD-10-CM

## 2024-12-05 DIAGNOSIS — E11.59 HYPERTENSION ASSOCIATED WITH DIABETES: Primary | ICD-10-CM

## 2024-12-05 NOTE — PROGRESS NOTES
"[facility]  Medical Chronic Care Home Visit    Encounter Provider: Marti Yanez   PCP: Lucio Morris MD  Consult Requested By: No ref. provider found    HISTORY OF PRESENT ILLNESS      Patient ID: Kaleigh Nieves is a 84 y.o. female is being seen in the home due to physical debility that presents a taxing effort to leave the home, to mitigate high risk of hospital readmission and/or due to the limited availability of reliable or safe options for transportation to the point of access to the provider. Prior to treatment on this visit the chart was reviewed and patient verbal consent was obtained.    Chronic medical conditions synopsis:    1) Patient is an 84 year old female with diagnoses of glaucoma, HTN, T2DM, CAD, CHF, arterial insufficiency of lower extremity, Hx of breast cancer and chronic gout. Patient reports that she has had more pain in the left great toe and she states it is a "gout flare-up". Patient is on allopurinol. She has chronic edema to bilateral lower extremities.   2) Recent developments: No new issues  3) Reason for consult and visit: routine care at home visit    DECISION MAKING TODAY       Assessment & Plan:  1. Hypertension associated with diabetes  Assessment & Plan:  Continue all home medications as instructed      2. Mixed hyperlipidemia  Assessment & Plan:  Continue simvastatin 20mg PO QD       3. Dependent edema  Assessment & Plan:  Chronic  Continue bumex as prescribed  Compression stockings             ENVIRONMENT OF CARE      Family and/or Caregiver present at visit?  No  Name of Caregiver: N/A  History provided by: patient    4Ms for Medical Decision-Making in Older Adults    Last Completed EAWV: 9/27/2022    MOBILITY:  Get Up and Go:      9/27/2022    11:16 AM 5/14/2021    11:15 AM 11/11/2019    11:03 AM 6/15/2018    11:28 AM 6/16/2017    11:18 AM   Get Up and Go   Trial 1 15 seconds -- 15 seconds 7 seconds 5 seconds   Trial 2     6 seconds   Trial 3     6 seconds "   Average     5.67     Activities of Daily Livin/27/2022    11:21 AM   Activities of Daily Living   Ambulation Assistance Required   Ambulation Assistance Cane   Dressing Independent   Transfers Independent   Toileting Continent of bladder;Continent of bowel   Feeding Independent   Cleaning home/Chores Independent   Telephone use Independent   Shopping Independent   Paying bills Independent   Taking meds Independent     Whisper Test:      2022    11:16 AM   Whisper Test   Whisper Test Normal     Disability Status:      2022    11:23 AM   Disability Status   Are you deaf or do you have serious difficulty hearing? N   Are you blind or do you have serious difficulty seeing, even when wearing glasses? N   Because of a physical, mental, or emotional condition, do you have serious difficulty concentrating, remembering, or making decisions? N   Do you have serious difficulty walking or climbing stairs? Y   Do you have difficulty dressing or bathing? N   Because of a physical, mental, or emotional condition, do you have difficulty doing errands alone such as visiting a doctor's office or shopping? N     Nutrition Screenin/27/2022    11:19 AM   Nutrition Screening   Has food intake declined over the past three months due to loss of appetite, digestive problems, chewing or swallowing difficulties? No decrease in food intake   Involuntary weight loss during the last 3 months? No weight loss   Mobility? Goes out   Has the patient suffered psychological stress or acute disease in the past three months? No   Neuropsychological problems? No psychological problems   Body Mass Index (BMI)?  BMI 23 or greater   Screening Score 14   Interpretation Normal nutritional status    Screening Score: 0-7 Malnourished, 8-11 At Risk, 12-14 Normal  Fall Risk:      2025     9:40 AM 10/8/2024    11:15 AM 2024    10:40 AM   Fall Risk Assessment - Outpatient   Mobility Status Ambulatory w/ assistance Ambulatory  w/ assistance Ambulatory w/ assistance   Number of falls 0 0 0   Identified as fall risk True False True           MENTATION:   Depression Patient Health Questionnaire:      4/10/2024    11:07 AM   Depression Patient Health Questionnaire   Over the last two weeks how often have you been bothered by little interest or pleasure in doing things Not at all   Over the last two weeks how often have you been bothered by feeling down, depressed or hopeless Not at all   PHQ-2 Total Score 0     Has Dementia Dx: No  Has Anxiety Dx: No    Cognitive Function Screenin/27/2022    11:16 AM   Cognitive Function Screening   Clock Drawing Test 2   Mini-Cog 3 Minute Recall 2   Cognitive Function Screening 4     Cognitive Function Screening Total - Less than 4 = Abnormal,  Greater than or equal to 4 = Normal        MEDICATIONS:  High Risk Medications:  Total Active Medications: 1  gabapentin - 100 MG    WHAT MATTERS MOST:  Advance Care Planning   ACP Status:   Patient has had an ACP conversation  Living Will: No  Power of : No  [place]: No    What is most important right now is to focus on spending time at homeavoiding the hospital    Accordingly, we have decided that the best plan to meet the patient's goals includes continuing with treatment      What matters most to patient today is: get rid of swelling            Is patient hospice appropriate: No  (If needed, use PPS <30 or FAST score >7)  Was referral to hospice placed: No    Impression upon entering the home:  Physical Dwelling: single family home   Appearance of home environment: cleaniness: clean, walking pathways: clear, lighting: adequate, and home structure: sound structure  Functional Status: independent  Mobility: ambulatory with cane and walker for long distances  Nutritional access: adequate intake and access  Home Health: No, and does not need it at this time   DME/Supplies: none     Diagnostic tests reviewed/disposition: No diagnosic tests pending after  this hospitalization.  Disease/illness education:  Gout  Establishment or re-establishment of referral orders for community resources: No other necessary community resources.   Discussion with other health care providers: No discussion with other health care providers necessary.   Does patient have a PCP at [facility]? Yes   Repatriation plan with PCP? Care at Home reason: mobility   Does patient have an ostomy (ileostomy, colostomy, suprapubic catheter, nephrostomy tube, tracheostomy, PEG tube, pleurex catheter, cholecystostomy, etc)? No  Were BPAs reviewed? Yes    Social History     Socioeconomic History    Marital status: Single    Number of children: 0   Tobacco Use    Smoking status: Former     Current packs/day: 0.00     Average packs/day: 0.3 packs/day for 24.0 years (6.0 ttl pk-yrs)     Types: Cigarettes     Start date: 1952     Quit date: 1976     Years since quittin.5    Smokeless tobacco: Never   Substance and Sexual Activity    Alcohol use: No    Drug use: No    Sexual activity: Not Currently     Social Drivers of Health     Financial Resource Strain: Low Risk  (2022)    Overall Financial Resource Strain (CARDIA)     Difficulty of Paying Living Expenses: Not hard at all   Food Insecurity: No Food Insecurity (2022)    Hunger Vital Sign     Worried About Running Out of Food in the Last Year: Never true     Ran Out of Food in the Last Year: Never true   Transportation Needs: No Transportation Needs (2022)    PRAPARE - Transportation     Lack of Transportation (Medical): No     Lack of Transportation (Non-Medical): No   Physical Activity: Inactive (2022)    Exercise Vital Sign     Days of Exercise per Week: 0 days     Minutes of Exercise per Session: 0 min   Stress: No Stress Concern Present (2022)    [employer] - Occupational Stress Questionnaire     Feeling of Stress : Not at all   Housing Stability: Low Risk  (2022)    Housing Stability  Vital Sign     Unable to Pay for Housing in the Last Year: No     Number of Places Lived in the Last Year: 1     Unstable Housing in the Last Year: No         OBJECTIVE:     Vital Signs:  Vitals:    12/05/24 1218   BP: (!) 130/58   Pulse: 83   Resp: 18   Temp: 98.5 °F (36.9 °C)       Review of Systems   Constitutional: Negative.    HENT: Negative.     Eyes: Negative.    Cardiovascular:  Positive for leg swelling.   Gastrointestinal: Negative.    Endocrine: Negative.    Genitourinary: Negative.    Musculoskeletal:  Positive for arthralgias and joint swelling.   Allergic/Immunologic: Negative.    Neurological: Negative.    Hematological: Negative.    Psychiatric/Behavioral: Negative.         Physical Exam:  Physical Exam  Vitals reviewed.   Constitutional:       General: She is not in acute distress.     Appearance: She is not ill-appearing.   HENT:      Head: Normocephalic and atraumatic.      Nose: Nose normal.      Mouth/Throat:      Mouth: Mucous membranes are moist.      Pharynx: Oropharynx is clear.   Eyes:      Pupils: Pupils are equal, round, and reactive to light.   Cardiovascular:      Rate and Rhythm: Normal rate and regular rhythm.      Heart sounds: Murmur heard.   Pulmonary:      Breath sounds: Examination of the right-lower field reveals decreased breath sounds. Examination of the left-lower field reveals decreased breath sounds. Decreased breath sounds present.   Musculoskeletal:      Cervical back: Neck supple.   Neurological:      Mental Status: She is alert.         INSTRUCTIONS FOR PATIENT:     Scheduled Follow-up, Appts Reviewed with Modifications if Needed: Yes  Future Appointments   Date Time Provider Department Center   1/16/2025  8:00 AM Marti Fair NP [address]   4/7/2025  8:10 AM [facility] [place]   4/14/2025  1:00 PM Lucio Morris MD [facility] [place]   5/28/2025 10:00 AM [facility]1-SCR [facility] [place]   5/28/2025 10:40 AM Yahir Cedeno MD  [facility] [place]   9/3/2025  9:40 AM Hung Partida MD [facility] CARDIO [place]         Current Outpatient Medications:     ACCU-CHEK GUIDE TEST STRIPS Strp, TEST 3 TIMES DAILY, Disp: 300 strip, Rfl: 3    albuterol (PROVENTIL/VENTOLIN HFA) 90 mcg/actuation inhaler, INHALE 2 PUFFS INTO THE LUNGS EVERY SIX HOURS AS NEEDED FOR WHEEZING, Disp: 18 g, Rfl: 11    allopurinoL (ZYLOPRIM) 100 MG tablet, Take 1 tablet (100 mg total) by mouth once daily., Disp: 90 tablet, Rfl: 2    aspirin 81 MG Chew, Take 81 mg by mouth once daily., Disp: , Rfl:     bisoprolol (ZEBETA) 5 MG tablet, TAKE 1 TABLET EVERY DAY, Disp: 90 tablet, Rfl: 3    blood glucose control high,low Soln, 1 each by Misc.(Non-Drug; Combo Route) route once a week., Disp: 1 each, Rfl: 3    blood-glucose meter kit, To check BG 3 times daily, to use with insurance preferred meter, Disp: 1 each, Rfl: 0    bumetanide (BUMEX) 1 MG tablet, Take 2 tablets (2 mg total) by mouth once daily., Disp: 60 tablet, Rfl: 11    diclofenac (VOLTAREN) 75 MG EC tablet, Take 1 tablet (75 mg total) by mouth 2 (two) times daily as needed (pain)., Disp: 180 tablet, Rfl: 3    fluticasone-salmeterol diskus inhaler 250-50 mcg, Inhale 1 puff into the lungs 2 (two) times daily. Controller, Disp: 180 each, Rfl: 3    gabapentin (NEURONTIN) 100 MG capsule, Take 1 capsule (100 mg total) by mouth 3 (three) times daily. For leg pains, Disp: 270 capsule, Rfl: 3    lancets Misc, To check BG 3 times daily, to use with insurance preferred meter, Disp: 200 each, Rfl: 3    latanoprost 0.005 % ophthalmic solution, Place 1 drop into both eyes once daily., Disp: 7.5 mL, Rfl: 3    ondansetron (ZOFRAN-ODT) 8 MG TbDL, Dissolve 1 tablet (8 mg total) by mouth every 8 (eight) hours as needed (Nausea)., Disp: 10 tablet, Rfl: 0    simvastatin (ZOCOR) 20 MG tablet, TAKE 1 TABLET NIGHTLY., Disp: 90 tablet, Rfl: 3    timolol maleate 0.5% (TIMOPTIC) 0.5 % Drop, Place 1 drop into both eyes 2 (two) times daily.,  Disp: 30 mL, Rfl: 4    timoloL maleate, PF, 0.5 % Dpet, Place into both eyes., Disp: , Rfl:     Medication Reconciliation:  Were medications changed during this appointment? No  Were medications in the home? Yes  Is the patient taking the medications as directed? Yes  Does the patient/caregiver understand the medications and changes? Yes  Does updated med list accurately reflects meds patient is currently taking? Yes    Encounter for Medical Follow-Up and Medication Review  - [facility] to schedule follow-up visit with patient in 4 weeks or PRN     Patient Instructions Given:  - Continue all medications, treatments and therapies as ordered.   - Follow all instructions, recommendations as discussed.  - Maintain Safety Precautions at all times.  - Attend all medical appointments as scheduled.  - For worsening symptoms: call Primary Care Physician or Nurse Practitioner.  - For emergencies, call 911 or immediately report to the nearest emergency room          Signature: Marti Yanez NP

## 2025-01-06 ENCOUNTER — TELEPHONE (OUTPATIENT)
Dept: PRIMARY CARE CLINIC | Facility: CLINIC | Age: 85
End: 2025-01-06
Payer: MEDICARE

## 2025-01-06 NOTE — TELEPHONE ENCOUNTER
----- Message from [employer] sent at 1/6/2025  3:13 PM CST -----  Name of Who is Calling: Kathy with [employer] is calling on behalf of [employer] [[id number]]          What is the request in detail: Kathy is calling to have referral to see a podiatrist faxed to [phone]. Please assist.           Can the clinic reply by [facility]: No          What Number to Call Back if not in [facility]: [phone]

## 2025-01-06 NOTE — TELEPHONE ENCOUNTER
Spoke with Misty with [employer] and she advised me that Kathy is out of office and she will give me a call back tomorrow

## 2025-01-08 ENCOUNTER — OFFICE VISIT (OUTPATIENT)
Dept: CARDIOLOGY | Facility: CLINIC | Age: 85
End: 2025-01-08
Payer: MEDICARE

## 2025-01-08 VITALS
WEIGHT: 193.31 LBS | BODY MASS INDEX: 31.21 KG/M2 | DIASTOLIC BLOOD PRESSURE: 58 MMHG | SYSTOLIC BLOOD PRESSURE: 128 MMHG

## 2025-01-08 DIAGNOSIS — M79.672 BILATERAL LEG AND FOOT PAIN: ICD-10-CM

## 2025-01-08 DIAGNOSIS — I50.32 CHRONIC DIASTOLIC HEART FAILURE: ICD-10-CM

## 2025-01-08 DIAGNOSIS — I77.9 CAROTID ARTERY DISEASE, UNSPECIFIED LATERALITY, UNSPECIFIED TYPE: ICD-10-CM

## 2025-01-08 DIAGNOSIS — M79.604 BILATERAL LEG AND FOOT PAIN: ICD-10-CM

## 2025-01-08 DIAGNOSIS — I50.42 CHRONIC COMBINED SYSTOLIC AND DIASTOLIC HEART FAILURE: Primary | ICD-10-CM

## 2025-01-08 DIAGNOSIS — E78.5 HYPERLIPIDEMIA, UNSPECIFIED HYPERLIPIDEMIA TYPE: ICD-10-CM

## 2025-01-08 DIAGNOSIS — R60.9 SWELLING: ICD-10-CM

## 2025-01-08 DIAGNOSIS — R06.09 DOE (DYSPNEA ON EXERTION): ICD-10-CM

## 2025-01-08 DIAGNOSIS — M79.671 BILATERAL LEG AND FOOT PAIN: ICD-10-CM

## 2025-01-08 DIAGNOSIS — M79.605 BILATERAL LEG AND FOOT PAIN: ICD-10-CM

## 2025-01-08 DIAGNOSIS — I73.9 PAD (PERIPHERAL ARTERY DISEASE): ICD-10-CM

## 2025-01-08 PROBLEM — E66.01 SEVERE OBESITY (BMI 35.0-35.9 WITH COMORBIDITY): Status: RESOLVED | Noted: 2024-01-29 | Resolved: 2025-01-08

## 2025-01-08 PROCEDURE — 99214 OFFICE O/P EST MOD 30 MIN: CPT | Mod: HCNC,S$GLB,, | Performed by: INTERNAL MEDICINE

## 2025-01-08 PROCEDURE — 99999 PR PBB SHADOW E&M-EST. PATIENT-LVL III: CPT | Mod: PBBFAC,HCNC,, | Performed by: INTERNAL MEDICINE

## 2025-01-08 PROCEDURE — 1159F MED LIST DOCD IN RCRD: CPT | Mod: HCNC,CPTII,S$GLB, | Performed by: INTERNAL MEDICINE

## 2025-01-08 PROCEDURE — 1125F AMNT PAIN NOTED PAIN PRSNT: CPT | Mod: HCNC,CPTII,S$GLB, | Performed by: INTERNAL MEDICINE

## 2025-01-08 PROCEDURE — 1101F PT FALLS ASSESS-DOCD LE1/YR: CPT | Mod: HCNC,CPTII,S$GLB, | Performed by: INTERNAL MEDICINE

## 2025-01-08 PROCEDURE — 3288F FALL RISK ASSESSMENT DOCD: CPT | Mod: HCNC,CPTII,S$GLB, | Performed by: INTERNAL MEDICINE

## 2025-01-08 NOTE — PROGRESS NOTES
Subjective:   Patient ID:  Kaleigh Nieves is a 84 y.o. female who presents for evaluation of Shortness of Breath and Follow-up    1.8.2025  Comes for 3 months   Has chronic LE swelling   Uses bumex for that and for RIVSA   Sometimes hold bumex due to low bp   No syncope ,  no chest pain, no presyncope   Sleeps in a hospital bed     8.21.204  More swelling and dyspnea since last visit   No syncope   Takes bumex in am   States compression stocking not helping .  Complains of lower extremity swelling and pain to her right calf  Does not appear to be a gout attack   + carotid bruit right carotid       1.29.2024  Comes in for six-month follow-up.    She states that her swelling has increased lately.  No inflammatory findings of her joints on exam today to suggest gout attack.    She also has dyspnea on exertion and orthopnea.  She states that when she raises her legs her dyspnea gets worse.    Denies any chest pain palpitations syncope or presyncope.    She takes the Lasix every other day due to the fact she sees her pressure be in the 100s.    However she states that the Lasix is not working for her.    Her BNP has been elevated in the past.      7.3.2023  Found to have breast cancer.  He is to go for surgery.    She continues to complain of same level of dyspnea on exertion.    She does have chronic lower extremity swelling from venous insufficiency.    She denies any chest pain.    Her EKG looks normal.        2.1.2023  Comes in for a 6 months follow up   Denies any worsening or unusual rivas   She states the lasix is not helping with her chronic lower ext swelling   She follows with samina for venous insufficiency   She denies any orthopnea   Bnp normal multiple times       6.2022  Improved symptoms after increasing lasix and adding bisoprolol last visit  Still has NYHA BUT 1 Now  No chest pain   Trace edema, wearing compression stocking   Sometimes has wheezing and uses ventoling   Normal BNP multiple times   Mild AS AI  last year     12.2021  Comes in for 6 months follow   Denies any CP, reports RIVAS NYHA 1-2, Increased leg swelling, states lasix didn't make a big difference on 20 mg only   BP uncontrolled   Denies PND, orthopnea  No palpitaitons  No syncope or LOC       Interval hx: 5/26/2021   Went to the ED last month with sharp, LUQ pain, underneath her left breast, sharp few seconds only, across her belly, felt like gas as per patient and she felt better once she started to pass gas   Denies any exertional chest pain   Has stable rivas, nyha1 , had normal BNP , TROP AND EKG in the ED, had very elevated  SBP   Today bp under good control   No more stomach pain   See ros     =================================================  HPI 2/1/2021  80-year-old female with history of diastolic heart failure, diabetes, hypertension, hyperlipidemia, gout, carotid artery disease and normal angiogram 2010  Comes in for follow up   Continues to have RIVAS , NYHA 2, Orthopnea, mild bilateral LE edema R>L   Compliant with low salt diet   Denies any chest pain   /750 at home     ==================================================================================================================================    2018: seen by Dr Jane   77 yo female, referred for CHF. Prior cardiologist Dr. Crawford at [facility].due to f/h CHF  PMH DM, HTN, HLD, gout, carotid artery Dz and had clear LHC in 2010.  No chest pain, palpitation, dizziness, orthopnea.  Some leg swelling,   RIVAS stable  .ECHO showed normal EF and LVH; MPI showed no ischemia  : BNP 34; BARRON wnl  ekg NSR    Shortness of Breath  Associated symptoms include leg swelling. Pertinent negatives include no chest pain or syncope.   Follow-up  Pertinent negatives include no chest pain.       Current Outpatient Medications:     ACCU-CHEK GUIDE TEST STRIPS Strp, TEST 3 TIMES DAILY, Disp: 300 strip, Rfl: 3    albuterol (PROVENTIL/VENTOLIN HFA) 90 mcg/actuation inhaler, INHALE 2 PUFFS INTO  THE LUNGS EVERY SIX HOURS AS NEEDED FOR WHEEZING, Disp: 18 g, Rfl: 11    allopurinoL (ZYLOPRIM) 100 MG tablet, Take 1 tablet (100 mg total) by mouth once daily., Disp: 90 tablet, Rfl: 2    aspirin 81 MG Chew, Take 81 mg by mouth once daily., Disp: , Rfl:     bisoprolol (ZEBETA) 5 MG tablet, TAKE 1 TABLET EVERY DAY, Disp: 90 tablet, Rfl: 3    blood glucose control high,low Soln, 1 each by Misc.(Non-Drug; Combo Route) route once a week., Disp: 1 each, Rfl: 3    bumetanide (BUMEX) 1 MG tablet, Take 2 tablets (2 mg total) by mouth once daily., Disp: 60 tablet, Rfl: 11    diclofenac (VOLTAREN) 75 MG EC tablet, Take 1 tablet (75 mg total) by mouth 2 (two) times daily as needed (pain)., Disp: 180 tablet, Rfl: 3    gabapentin (NEURONTIN) 100 MG capsule, Take 1 capsule (100 mg total) by mouth 3 (three) times daily. For leg pains, Disp: 270 capsule, Rfl: 3    lancets Misc, To check BG 3 times daily, to use with insurance preferred meter, Disp: 200 each, Rfl: 3    ondansetron (ZOFRAN-ODT) 8 MG TbDL, Dissolve 1 tablet (8 mg total) by mouth every 8 (eight) hours as needed (Nausea)., Disp: 10 tablet, Rfl: 0    simvastatin (ZOCOR) 20 MG tablet, TAKE 1 TABLET NIGHTLY., Disp: 90 tablet, Rfl: 3    timoloL maleate, PF, 0.5 % Dpet, Place into both eyes., Disp: , Rfl:     blood-glucose meter kit, To check BG 3 times daily, to use with insurance preferred meter, Disp: 1 each, Rfl: 0    fluticasone-salmeterol diskus inhaler 250-50 mcg, Inhale 1 puff into the lungs 2 (two) times daily. Controller, Disp: 180 each, Rfl: 3    latanoprost 0.005 % ophthalmic solution, Place 1 drop into both eyes once daily., Disp: 7.5 mL, Rfl: 3    timolol maleate 0.5% (TIMOPTIC) 0.5 % Drop, Place 1 drop into both eyes 2 (two) times daily., Disp: 30 mL, Rfl: 4    Past Medical History:   Diagnosis Date    Anemia     Angina pectoris     Arthritis     Asthma     Back pain     Bronchitis     CAD (coronary artery disease) 07/2015    [facility]    Carotid artery  stenosis     Chronic bronchitis     Chronic gout     Colon polyp     CTS (carpal tunnel syndrome)     Diabetes mellitus, type 2     Diabetes with neurologic complications     Diverticulosis     RIVAS (dyspnea on exertion)     chronic; eval pulm dr natarajan    Dyslipidemia     Ex-smoker     quit in her 30s    GERD (gastroesophageal reflux disease)     Heart failure     Hyperlipidemia     Hypertension     Immunodeficiency due to chemotherapy 2023    Neuropathy, lower extremity     Obesity     Open-angle glaucoma     dr avel kaur    Peripheral vascular disease     Polyneuropathy     Tobacco dependence        Past Surgical History:   Procedure Laterality Date    APPENDECTOMY      CARPAL TUNNEL RELEASE Right     CATARACT EXTRACTION, BILATERAL      CHOLECYSTECTOMY      CYST REMOVAL      DILATION AND CURETTAGE OF UTERUS      EYE SURGERY      HEMORRHOID SURGERY      HYSTERECTOMY      fibroids    INCISION AND DRAINAGE PERIRECTAL ABSCESS      JOINT REPLACEMENT Bilateral     knee    KNEE ARTHROSCOPY Right     LOCALIZATION OF MASS OF BREAST WITH ULTRASOUND GUIDANCE Left 2023    Procedure: LOCALIZATION, MASS, BREAST, WITH US GUIDANCE;  Surgeon: Susan Pulliam MD;  Location: [facility] OR;  Service: General;  Laterality: Left;  Intra-operatively Placed Radiographic Marker (Wire)    MASTECTOMY, PARTIAL Left 2023    Procedure: MASTECTOMY, PARTIAL;  Surgeon: Susan Pulliam MD;  Location: [facility] OR;  Service: General;  Laterality: Left;  with Interpretation of Specimen Mammogram    MYOMECTOMY         Social History     Tobacco Use    Smoking status: Former     Current packs/day: 0.00     Average packs/day: 0.3 packs/day for 24.0 years (6.0 ttl pk-yrs)     Types: Cigarettes     Start date: 1952     Quit date: 1976     Years since quittin.5    Smokeless tobacco: Never   Substance Use Topics    Alcohol use: No    Drug use: No       Family History   Problem Relation Name Age of Onset    Hypertension  "Mother      Diabetes Mother      Leukemia Sister      Hypertension Sister      Cancer Sister          leukemia    Heart disease Maternal Aunt      Cancer Maternal Uncle          gastric, pacreatic    Heart disease Maternal Uncle      Miscarriages / Stillbirths Maternal Uncle      Diabetes Maternal Grandmother      Asthma Maternal Grandfather      Breast cancer Neg Hx      Colon cancer Neg Hx      Ovarian cancer Neg Hx      COPD Neg Hx      Hyperlipidemia Neg Hx      Kidney disease Neg Hx         Review of Systems   Cardiovascular:  Positive for dyspnea on exertion and leg swelling. Negative for chest pain, palpitations and syncope.   Respiratory:  Positive for shortness of breath.    Genitourinary: Negative.    Neurological: Negative.        Objective:  Last 3 sets of Vitals        11/27/2024    10:19 AM 12/5/2024    12:18 PM 1/8/2025     9:34 AM   Vitals - 1 value per visit   SYSTOLIC 158 130 128   DIASTOLIC 69 58 48   Pulse 73 83    Temp  98.5 °F (36.9 °C)    Resp  18    SPO2  94 %    Weight (lb) 195.55  193.34   Weight (kg) 88.7  87.7   Height 5' 6" (1.676 m)     BMI (Calculated) 31.6     Pain Score Zero Ten Six        Physical Exam  Vitals reviewed.   Constitutional:       Appearance: She is well-developed.   Neck:      Vascular: Carotid bruit present.   Cardiovascular:      Rate and Rhythm: Normal rate and regular rhythm.      Pulses: Intact distal pulses.      Heart sounds: Murmur heard.   Pulmonary:      Breath sounds: Normal breath sounds.   Musculoskeletal:         General: Swelling present.   Neurological:      Mental Status: She is oriented to person, place, and time.     2017   -------  Real-time, color flow and Doppler imaging performed.    Atherosclerotic plaque noted within the bilateral bulb regions and RIGHT proximal ICA and ECA.                     ICA                                    R                               L  Peak systolic velocity:          88  Cm/sec               133 cm/sec  Peak " diastolic velocity:        18 Cm/sec                   20 cm/sec  Systolic velocity ratio:          1.1                             1.2  Diastolic velocity ratio:         1.6                             1.8  Vertebral artery flow:            Antegrade                             antegrade  ECA flow:                                 Antegrade                             antegrade  IMPRESSION:     1.  Stable exam.   2.  No hemodynamically significant plaque formation or stenosis on the RIGHT.   3.  Stable findings on the LEFT with minimally elevated to PSV within the ICA.  Stenosis is in the less than 50 percent range.   4.  Followup and/or further evaluation as warranted.  ==============================================================================   Impression: NORMAL MYOCARDIAL PERFUSION 2016  1. The perfusion scan is free of evidence for myocardial ischemia or injury.   2. There is a mild intensity fixed defect in the anterior wall of the left ventricle, secondary to breast attenuation.   3. Resting wall motion is physiologic.   4. Resting LV function is normal.   5. The ventricular volumes are normal at rest and stress.   6. The extracardiac distribution of radioactivity is normal.   ==============================================================================  2017  The right ankle brachial index was 1.24 which is normal.   The left ankle brachial index was 0.99 which suggests minimal left lower extremity arterial disease.   The right TBI is 0.45.   The left TBI is 0.49.   ==============================================================================  CONCLUSIONS 2018    1 - Hyperdynamic left ventricular systolic function (EF >70%).     2 - Indeterminate LV diastolic function.     3 - Normal right ventricular systolic function .     4 - Concentric remodeling.     5 - No wall motion abnormalities.  Lab Results   Component Value Date    CHOL 135 04/17/2024    CHOL 140 03/03/2023    CHOL 136 03/21/2022     Lab  "Results   Component Value Date    HDL 46 04/17/2024    HDL 39 (L) 03/03/2023    HDL 33 (L) 03/21/2022     Lab Results   Component Value Date    LDLCALC 78.2 04/17/2024    LDLCALC 82.6 03/03/2023    LDLCALC 84.8 03/21/2022     Lab Results   Component Value Date    TRIG 54 04/17/2024    TRIG 92 03/03/2023    TRIG 91 03/21/2022     Lab Results   Component Value Date    CHOLHDL 34.1 04/17/2024    CHOLHDL 27.9 03/03/2023    CHOLHDL 24.3 03/21/2022       Chemistry        Component Value Date/Time     04/17/2024 0830    K 5.0 04/17/2024 0830    CL 94 (L) 04/17/2024 0830    CO2 33 (H) 04/17/2024 0830    BUN 7 (L) 04/17/2024 0830    CREATININE 0.7 04/17/2024 0830     (H) 04/17/2024 0830        Component Value Date/Time    CALCIUM 9.5 04/17/2024 0830    ALKPHOS 25 (L) 04/17/2024 0830    AST 18 04/17/2024 0830    ALT 19 04/17/2024 0830    BILITOT 0.5 04/17/2024 0830    ESTGFRAFRICA >60.0 03/21/2022 1435    EGFRNONAA >60.0 03/21/2022 1435          Lab Results   Component Value Date    HGBA1C 5.9 (H) 04/17/2024     Lab Results   Component Value Date    TSH 2.159 04/17/2024     No results found for: "INR", "PROTIME"  Lab Results   Component Value Date    WBC 3.82 (L) 04/17/2024    HGB 14.8 04/17/2024    HCT 46.3 04/17/2024    MCV 90 04/17/2024     04/17/2024     BNP  @LABRCNTIP(BNP,BNPTRIAGEBLO)@  CrCl cannot be calculated (Patient's most recent lab result is older than the maximum 7 days allowed.).  No results found in the last 24 hours.  No results found in the last 24 hours.  No results found in the last 24 hours.      Conclusion 2/2021    The left ventricle is small with concentric remodeling and hyperdynamic systolic function. The estimated ejection fraction is 75%  Indeterminate left ventricular diastolic function.  Normal right ventricular size with low normal right ventricular systolic function.  There is mild aortic valve stenosis.  Aortic valve area is 3.17 cm2; peak velocity is 2.49 m/s; mean " gradient is 13 mmHg.  Mild-to-moderate aortic regurgitation.  Normal central venous pressure (3 mmHg).  The estimated PA systolic pressure is 19 mmHg.     Conclusion    There is 40-49% right Internal Carotid Stenosis.  There is 50-59% left Internal Carotid Stenosis.        Impression:     1. Atherosclerosis with no evidence of flow-limiting carotid stenosis greater than 50% on the right  2. Atherosclerosis with mildly elevated velocities suggesting 50-69% stenosis of the left ICA.  Velocities are similar to prior.  .        Electronically signed by: Liam Espinoza MD  Date:                                            06/01/2022  Time:                                           10:12    ECG reviewed January 25, 2021  Normal sinus rhythm   Normal ECG    Interpretation Summary 9.2024  Show Result Comparison     There is 20-39% right Internal Carotid Stenosis.    There is 40-49% left Internal Carotid Stenosis.      Interpretation Summary 10.2024  Show Result Comparison     Right barron suggest mild pvd. the rt lower extremity has diffuse plaques w/o any significant stenosis except total occlusion of the PTA.    LEFT BARRON SUGGEST MINIMAL DISEASE. SCATTERED PLAQUES NOTED WITH TRIPHASIC WAVEFORMS W/O ANY SIGNIFICANT STENOSIS.        Assessment:     Chronic combined systolic and diastolic heart failure    Chronic diastolic heart failure    Hyperlipidemia, unspecified hyperlipidemia type    Swelling    Carotid artery disease, unspecified laterality, unspecified type    Bilateral leg and foot pain    RIVAS (dyspnea on exertion)    Reviewed carotid us  CW bumex , PRN 2mg daily  Blood pressure at goal and acceptable for age but higher than her baseline, States did not take bp meds today  Continue with her antihypertensives.    Reviewed all tests and above medical conditions with patient in detail and formulated treatment plan.  Risk factor modification discussed.   Cardiac low salt diet discussed.  Maintaining healthy weight and  weight loss goals were discussed in clinic.  PAD on statin and aspirin, reviewed BARRON and arterial dupplex     rtc in 6 months

## 2025-01-13 DIAGNOSIS — Z00.00 ENCOUNTER FOR MEDICARE ANNUAL WELLNESS EXAM: ICD-10-CM

## 2025-01-16 ENCOUNTER — CARE AT HOME (OUTPATIENT)
Dept: HOME HEALTH SERVICES | Facility: CLINIC | Age: 85
End: 2025-01-16
Payer: MEDICARE

## 2025-01-16 VITALS
DIASTOLIC BLOOD PRESSURE: 56 MMHG | SYSTOLIC BLOOD PRESSURE: 133 MMHG | OXYGEN SATURATION: 95 % | RESPIRATION RATE: 18 BRPM | TEMPERATURE: 98 F | HEART RATE: 76 BPM

## 2025-01-16 DIAGNOSIS — E11.42 TYPE 2 DIABETES MELLITUS WITH DIABETIC POLYNEUROPATHY, WITHOUT LONG-TERM CURRENT USE OF INSULIN: Primary | ICD-10-CM

## 2025-01-16 PROCEDURE — 99348 HOME/RES VST EST LOW MDM 30: CPT | Mod: ,,, | Performed by: NURSE PRACTITIONER

## 2025-01-16 PROCEDURE — 1160F RVW MEDS BY RX/DR IN RCRD: CPT | Mod: CPTII,S$GLB,, | Performed by: NURSE PRACTITIONER

## 2025-01-16 PROCEDURE — 1159F MED LIST DOCD IN RCRD: CPT | Mod: CPTII,S$GLB,, | Performed by: NURSE PRACTITIONER

## 2025-01-16 RX ORDER — INSULIN PUMP SYRINGE, 3 ML
EACH MISCELLANEOUS
Qty: 1 EACH | Refills: 0 | Status: SHIPPED | OUTPATIENT
Start: 2025-01-16 | End: 2026-01-16

## 2025-01-16 RX ORDER — LANCETS
EACH MISCELLANEOUS
Qty: 200 EACH | Refills: 3 | Status: SHIPPED | OUTPATIENT
Start: 2025-01-16

## 2025-01-17 NOTE — PROGRESS NOTES
[facility]  Medical Chronic Care Home Visit    Encounter Provider: Marti Yanez   PCP: Lucio Morris MD  Consult Requested By: No ref. provider found    HISTORY OF PRESENT ILLNESS      Patient ID: Kaleigh Nieves is a 84 y.o. female is being seen in the home due to physical debility that presents a taxing effort to leave the home, to mitigate high risk of hospital readmission and/or due to the limited availability of reliable or safe options for transportation to the point of access to the provider. Prior to treatment on this visit the chart was reviewed and patient verbal consent was obtained.    Chronic medical conditions synopsis:    1) Patient is an 84 year old female with diagnoses of  glaucoma, chronic bronchitis, HTN, Diabetes, HLD, CAD, CHF, arterial insufficiency, malignant neoplasm of the left breast, GERD and dependent edema. Patient reports that she has been doing ok and has seen the cardiologist for her lower extremity swelling and reestablished care with her podiatrist for yearly diabetes foot check and to have her nails trimmed.   2) Recent developments: no new issues  3) Reason for consult and visit: routine care at home visit    DECISION MAKING TODAY       Assessment & Plan:  1. Type 2 diabetes mellitus with diabetic polyneuropathy, without long-term current use of insulin  -     Ambulatory referral/consult to Podiatry; Future; Expected date: 01/23/2025  -     blood sugar diagnostic (ACCU-CHEK GUIDE TEST STRIPS) Strp; Inject 1 strip into the skin 3 (three) times daily.  Dispense: 300 strip; Refill: 3  -     blood-glucose meter kit; To check BG 3 times daily, to use with insurance preferred meter  Dispense: 1 each; Refill: 0  -     lancets Misc; To check BG 3 times daily, to use with insurance preferred meter  Dispense: 200 each; Refill: 3           ENVIRONMENT OF CARE      Family and/or Caregiver present at visit?  Yes  Name of Caregiver:   History provided by: patient    4Ms for  Medical Decision-Making in Older Adults    Last Completed EAWV: 2022    MEDICATIONS:  High Risk Medications:  Total Active Medications: 1  gabapentin - 100 MG    MOBILITY:  Activities of Daily Livin/27/2022    11:21 AM   Activities of Daily Living   Ambulation Assistance Required   Ambulation Assistance Cane   Dressing Independent   Transfers Independent   Toileting Continent of bladder;Continent of bowel   Feeding Independent   Cleaning home/Chores Independent   Telephone use Independent   Shopping Independent   Paying bills Independent   Taking meds Independent     Fall Risk:      2025     9:40 AM 10/8/2024    11:15 AM 2024    10:40 AM   Fall Risk Assessment - Outpatient   Mobility Status Ambulatory w/ assistance Ambulatory w/ assistance Ambulatory w/ assistance   Number of falls 0 0 0   Identified as fall risk True False True     Disability Status:      2022    11:23 AM   Disability Status   Are you deaf or do you have serious difficulty hearing? N   Are you blind or do you have serious difficulty seeing, even when wearing glasses? N   Because of a physical, mental, or emotional condition, do you have serious difficulty concentrating, remembering, or making decisions? N   Do you have serious difficulty walking or climbing stairs? Y   Do you have difficulty dressing or bathing? N   Because of a physical, mental, or emotional condition, do you have difficulty doing errands alone such as visiting a doctor's office or shopping? N     Nutrition Screenin/27/2022    11:19 AM   Nutrition Screening   Has food intake declined over the past three months due to loss of appetite, digestive problems, chewing or swallowing difficulties? No decrease in food intake   Involuntary weight loss during the last 3 months? No weight loss   Mobility? Goes out   Has the patient suffered psychological stress or acute disease in the past three months? No   Neuropsychological problems? No psychological  problems   Body Mass Index (BMI)?  BMI 23 or greater   Screening Score 14   Interpretation Normal nutritional status    Screening Score: 0-7 Malnourished, 8-11 At Risk, 12-14 Normal  Get Up and Go:      2022    11:16 AM 2021    11:15 AM 2019    11:03 AM 6/15/2018    11:28 AM 2017    11:18 AM   Get Up and Go   Trial 1 15 seconds -- 15 seconds 7 seconds 5 seconds   Trial 2     6 seconds   Trial 3     6 seconds   Average     5.67     Whisper Test:      2022    11:16 AM   Whisper Test   Whisper Test Normal           MENTATION:   Has Dementia Dx: No  Has Anxiety Dx: No    Depression Patient Health Questionnaire:      4/10/2024    11:07 AM   Depression Patient Health Questionnaire   Over the last two weeks how often have you been bothered by little interest or pleasure in doing things Not at all   Over the last two weeks how often have you been bothered by feeling down, depressed or hopeless Not at all   PHQ-2 Total Score 0     Cognitive Function Screenin/27/2022    11:16 AM   Cognitive Function Screening   Clock Drawing Test 2   Mini-Cog 3 Minute Recall 2   Cognitive Function Screening 4     Cognitive Function Screening Total - Less than 4 = Abnormal,  Greater than or equal to 4 = Normal        WHAT MATTERS MOST:  Advance Care Planning   ACP Status:   Patient has had an ACP conversation  Living Will: No  Power of : No  [place]: No    What is most important right now is to focus on spending time at homeavoiding the hospital    Accordingly, we have decided that the best plan to meet the patient's goals includes continuing with treatment      What matters most to patient today is: getting better           Is patient hospice appropriate: No  (If needed, use PPS <30 or FAST score >7)  Was referral to hospice placed: No    Impression upon entering the home:  Physical Dwelling: single family home   Appearance of home environment: cleaniness: clean, walking pathways: clear, lighting:  adequate, and home structure: sound structure  Functional Status: minimal assistance  Mobility: ambulatory  Nutritional access: adequate intake and access  Home Health: Yes,  Agency [facility]    DME/Supplies: none     Diagnostic tests reviewed/disposition: No diagnosic tests pending after this hospitalization.  Disease/illness education: Cancer  Establishment or re-establishment of referral orders for community resources: No other necessary community resources.   Discussion with other health care providers: No discussion with other health care providers necessary.   Does patient have a PCP at [facility]? Yes   Repatriation plan with PCP? follow-up with PCP within 90d   Does patient have an ostomy (ileostomy, colostomy, suprapubic catheter, nephrostomy tube, tracheostomy, PEG tube, pleurex catheter, cholecystostomy, etc)? No  Were BPAs reviewed? Yes    Social History     Socioeconomic History    Marital status: Single    Number of children: 0   Tobacco Use    Smoking status: Former     Current packs/day: 0.00     Average packs/day: 0.3 packs/day for 24.0 years (6.0 ttl pk-yrs)     Types: Cigarettes     Start date: 1952     Quit date: 1976     Years since quittin.5    Smokeless tobacco: Never   Substance and Sexual Activity    Alcohol use: No    Drug use: No    Sexual activity: Not Currently     Social Drivers of Health     Financial Resource Strain: Low Risk  (2022)    Overall Financial Resource Strain (CARDIA)     Difficulty of Paying Living Expenses: Not hard at all   Food Insecurity: No Food Insecurity (2022)    Hunger Vital Sign     Worried About Running Out of Food in the Last Year: Never true     Ran Out of Food in the Last Year: Never true   Transportation Needs: No Transportation Needs (2022)    PRAPARE - Transportation     Lack of Transportation (Medical): No     Lack of Transportation (Non-Medical): No   Physical Activity: Inactive (2022)    Exercise Vital Sign      Days of Exercise per Week: 0 days     Minutes of Exercise per Session: 0 min   Stress: No Stress Concern Present (9/27/2022)    [place] [employer] - Occupational Stress Questionnaire     Feeling of Stress : Not at all   Housing Stability: Low Risk  (9/27/2022)    Housing Stability Vital Sign     Unable to Pay for Housing in the Last Year: No     Number of Places Lived in the Last Year: 1     Unstable Housing in the Last Year: No         OBJECTIVE:     Vital Signs:  Vitals:    01/16/25 1237   BP: (!) 133/56   Pulse: 76   Resp: 18   Temp: 98.2 °F (36.8 °C)       Review of Systems   Constitutional: Negative.    HENT: Negative.     Eyes: Negative.    Respiratory: Negative.     Cardiovascular:  Positive for leg swelling.   Gastrointestinal: Negative.    Endocrine: Positive for cold intolerance.   Genitourinary: Negative.    Musculoskeletal:  Positive for arthralgias.   Skin: Negative.    Neurological: Negative.    Psychiatric/Behavioral: Negative.         Physical Exam:  Physical Exam  Vitals reviewed.   Constitutional:       General: She is not in acute distress.     Appearance: She is not ill-appearing.   HENT:      Head: Normocephalic and atraumatic.      Nose: Nose normal.      Mouth/Throat:      Mouth: Mucous membranes are moist.      Pharynx: Oropharynx is clear.   Cardiovascular:      Rate and Rhythm: Normal rate and regular rhythm.      Heart sounds: Murmur heard.   Pulmonary:      Effort: Pulmonary effort is normal.      Breath sounds: Normal breath sounds.   Abdominal:      General: Bowel sounds are normal.      Palpations: Abdomen is soft.   Musculoskeletal:      Cervical back: Neck supple.      Right lower leg: Edema present.      Left lower leg: Edema present.   Skin:     General: Skin is warm and dry.      Capillary Refill: Capillary refill takes less than 2 seconds.   Neurological:      Mental Status: She is alert and oriented to person, place, and time.   Psychiatric:         Mood  and Affect: Mood normal.         Behavior: Behavior normal.         INSTRUCTIONS FOR PATIENT:     Scheduled Follow-up, Appts Reviewed with Modifications if Needed: Yes  Future Appointments   Date Time Provider Department Center   4/7/2025  8:10 AM [facility] LAB [place]   4/14/2025  1:00 PM Lucio Morris MD [facility] ESCOBAR [place]   5/28/2025 10:00 AM [facility] MAMMO1-SCR [facility] MAMMO [place]   5/28/2025 10:40 AM Yahir Cedeno MD [facility] GENSUR [facility]   9/3/2025  9:40 AM Hung Partida MD [facility] CARDIO [place]         Current Outpatient Medications:     albuterol (PROVENTIL/VENTOLIN HFA) 90 mcg/actuation inhaler, INHALE 2 PUFFS INTO THE LUNGS EVERY SIX HOURS AS NEEDED FOR WHEEZING, Disp: 18 g, Rfl: 11    allopurinoL (ZYLOPRIM) 100 MG tablet, Take 1 tablet (100 mg total) by mouth once daily., Disp: 90 tablet, Rfl: 2    aspirin 81 MG Chew, Take 81 mg by mouth once daily., Disp: , Rfl:     bisoprolol (ZEBETA) 5 MG tablet, TAKE 1 TABLET EVERY DAY, Disp: 90 tablet, Rfl: 3    blood glucose control high,low Soln, 1 each by Misc.(Non-Drug; Combo Route) route once a week., Disp: 1 each, Rfl: 3    blood sugar diagnostic (ACCU-CHEK GUIDE TEST STRIPS) Strp, Inject 1 strip into the skin 3 (three) times daily., Disp: 300 strip, Rfl: 3    blood-glucose meter kit, To check BG 3 times daily, to use with insurance preferred meter, Disp: 1 each, Rfl: 0    bumetanide (BUMEX) 1 MG tablet, Take 2 tablets (2 mg total) by mouth once daily., Disp: 60 tablet, Rfl: 11    diclofenac (VOLTAREN) 75 MG EC tablet, Take 1 tablet (75 mg total) by mouth 2 (two) times daily as needed (pain)., Disp: 180 tablet, Rfl: 3    fluticasone-salmeterol diskus inhaler 250-50 mcg, Inhale 1 puff into the lungs 2 (two) times daily. Controller, Disp: 180 each, Rfl: 3    gabapentin (NEURONTIN) 100 MG capsule, Take 1 capsule (100 mg total) by mouth 3 (three) times daily. For leg pains, Disp: 270 capsule, Rfl: 3    lancets Misc, To check BG 3  times daily, to use with insurance preferred meter, Disp: 200 each, Rfl: 3    latanoprost 0.005 % ophthalmic solution, Place 1 drop into both eyes once daily., Disp: 7.5 mL, Rfl: 3    ondansetron (ZOFRAN-ODT) 8 MG TbDL, Dissolve 1 tablet (8 mg total) by mouth every 8 (eight) hours as needed (Nausea)., Disp: 10 tablet, Rfl: 0    simvastatin (ZOCOR) 20 MG tablet, TAKE 1 TABLET NIGHTLY., Disp: 90 tablet, Rfl: 3    timolol maleate 0.5% (TIMOPTIC) 0.5 % Drop, Place 1 drop into both eyes 2 (two) times daily., Disp: 30 mL, Rfl: 4    timoloL maleate, PF, 0.5 % Dpet, Place into both eyes., Disp: , Rfl:     Medication Reconciliation:  Were medications changed during this appointment? No  Were medications in the home? Yes  Is the patient taking the medications as directed? Yes  Does the patient/caregiver understand the medications and changes? Yes  Does updated med list accurately reflects meds patient is currently taking? Yes    Encounter for Medical Follow-Up and Medication Review  - [facility] to schedule follow-up visit with patient in 4 weeks or PRN     Patient Instructions Given:  - Continue all medications, treatments and therapies as ordered.   - Follow all instructions, recommendations as discussed.  - Maintain Safety Precautions at all times.  - Attend all medical appointments as scheduled.  - For worsening symptoms: call Primary Care Physician or Nurse Practitioner.  - For emergencies, call 911 or immediately report to the nearest emergency room        Signature: Marti Yanez NP

## 2025-01-29 ENCOUNTER — TELEPHONE (OUTPATIENT)
Dept: PRIMARY CARE CLINIC | Facility: CLINIC | Age: 85
End: 2025-01-29
Payer: MEDICARE

## 2025-01-29 NOTE — TELEPHONE ENCOUNTER
----- Message from Gunjan sent at 1/29/2025  3:10 PM CST -----  Regarding: Hearing Referral  To whom it may concern:    The patient above advised she has been waiting for a referral for ENT doctor for 3 wks. Could someone contact her to assist?    Thank you  PEDRITO Grimm

## 2025-01-29 NOTE — TELEPHONE ENCOUNTER
Spoke with pt and advised her that she can look on back of her insurance card and call that number to get a list if providers. Pt verbalized understanding

## 2025-01-31 ENCOUNTER — TELEPHONE (OUTPATIENT)
Dept: OTOLARYNGOLOGY | Facility: CLINIC | Age: 85
End: 2025-01-31
Payer: MEDICARE

## 2025-01-31 NOTE — TELEPHONE ENCOUNTER
----- Message from Chaparrita sent at 1/31/2025 11:08 AM CST -----  Contact: Patient, [phone]  Calling to schedule an appointment for can't hear in her left ear. Please call her. Thanks.

## 2025-02-27 ENCOUNTER — CARE AT HOME (OUTPATIENT)
Dept: HOME HEALTH SERVICES | Facility: CLINIC | Age: 85
End: 2025-02-27
Payer: MEDICARE

## 2025-02-27 ENCOUNTER — TELEPHONE (OUTPATIENT)
Dept: PRIMARY CARE CLINIC | Facility: CLINIC | Age: 85
End: 2025-02-27
Payer: MEDICARE

## 2025-02-27 VITALS
DIASTOLIC BLOOD PRESSURE: 60 MMHG | OXYGEN SATURATION: 96 % | SYSTOLIC BLOOD PRESSURE: 130 MMHG | HEART RATE: 66 BPM | TEMPERATURE: 98 F | RESPIRATION RATE: 18 BRPM

## 2025-02-27 DIAGNOSIS — H91.92 HEARING LOSS OF LEFT EAR, UNSPECIFIED HEARING LOSS TYPE: Primary | ICD-10-CM

## 2025-02-27 DIAGNOSIS — H92.02 ACUTE OTALGIA, LEFT: Primary | ICD-10-CM

## 2025-02-27 DIAGNOSIS — J44.89 COPD (CHRONIC OBSTRUCTIVE PULMONARY DISEASE) WITH CHRONIC BRONCHITIS: ICD-10-CM

## 2025-02-27 NOTE — PROGRESS NOTES
[facility]  Medical Chronic Care Home Visit    Encounter Provider: Marti Yanez   PCP: Lucio Morris MD  Consult Requested By: No ref. provider found    HISTORY OF PRESENT ILLNESS      Patient ID: Kaleigh Nieves is a 84 y.o. female is being seen in the home due to physical debility that presents a taxing effort to leave the home, to mitigate high risk of hospital readmission and/or due to the limited availability of reliable or safe options for transportation to the point of access to the provider. Prior to treatment on this visit the chart was reviewed and patient verbal consent was obtained.    Chronic medical conditions synopsis:    1) Patient is an 84 year old female with diagnoses of CAD, HLD, CHF, Arterial Insufficiency, Breast Cancer, T2DM and Glaucoma. Patient is being seen today for a follow up at home care visit. Patient is still having dependent edema to the BLE. Patient reports that she just changed insurances this week and is needing to schedule an appointment to ENT. Patient also stated that she has been having issues hearing out of her left ear.   2) Recent developments: No new issues other than hearing loss in the left ear.  3) Reason for consult and visit: routine care at home follow up    DECISION MAKING TODAY       Assessment & Plan:  1. Hearing loss of left ear, unspecified hearing loss type  Comments:  TM WNL  Patient will follow up with ENT    2. COPD (chronic obstructive pulmonary disease) with chronic bronchitis  Comments:  chronic and stable  patient is not on any medication   continue to monitor for worsening           ENVIRONMENT OF CARE      Family and/or Caregiver present at visit?  No  Name of Caregiver: N/A  History provided by: patient    4Ms for Medical Decision-Making in Older Adults    Last Completed EAWV: 2022    MEDICATIONS:  High Risk Medications:  Total Active Medications: 1  gabapentin - 100 MG    MOBILITY:  Activities of Daily Livin/27/2022     11:21 AM   Activities of Daily Living   Ambulation Assistance Required   Ambulation Assistance Cane   Dressing Independent   Transfers Independent   Toileting Continent of bladder;Continent of bowel   Feeding Independent   Cleaning home/Chores Independent   Telephone use Independent   Shopping Independent   Paying bills Independent   Taking meds Independent     Fall Risk:      2025     9:40 AM 10/8/2024    11:15 AM 2024    10:40 AM   Fall Risk Assessment - Outpatient   Mobility Status Ambulatory w/ assistance Ambulatory w/ assistance Ambulatory w/ assistance   Number of falls 0 0 0   Identified as fall risk True False True     Disability Status:      2022    11:23 AM   Disability Status   Are you deaf or do you have serious difficulty hearing? N   Are you blind or do you have serious difficulty seeing, even when wearing glasses? N   Because of a physical, mental, or emotional condition, do you have serious difficulty concentrating, remembering, or making decisions? N   Do you have serious difficulty walking or climbing stairs? Y   Do you have difficulty dressing or bathing? N   Because of a physical, mental, or emotional condition, do you have difficulty doing errands alone such as visiting a doctor's office or shopping? N     Nutrition Screenin/27/2022    11:19 AM   Nutrition Screening   Has food intake declined over the past three months due to loss of appetite, digestive problems, chewing or swallowing difficulties? No decrease in food intake   Involuntary weight loss during the last 3 months? No weight loss   Mobility? Goes out   Has the patient suffered psychological stress or acute disease in the past three months? No   Neuropsychological problems? No psychological problems   Body Mass Index (BMI)?  BMI 23 or greater   Screening Score 14   Interpretation Normal nutritional status    Screening Score: 0-7 Malnourished, 8-11 At Risk, 12-14 Normal  Get Up and Go:      2022    11:16 AM  2021    11:15 AM 2019    11:03 AM 6/15/2018    11:28 AM 2017    11:18 AM   Get Up and Go   Trial 1 15 seconds -- 15 seconds 7 seconds 5 seconds   Trial 2     6 seconds   Trial 3     6 seconds   Average     5.67     Whisper Test:      2022    11:16 AM   Whisper Test   Whisper Test Normal           MENTATION:   Has Dementia Dx: No  Has Anxiety Dx: No    Depression Patient Health Questionnaire:      4/10/2024    11:07 AM   Depression Patient Health Questionnaire   Over the last two weeks how often have you been bothered by little interest or pleasure in doing things Not at all   Over the last two weeks how often have you been bothered by feeling down, depressed or hopeless Not at all   PHQ-2 Total Score 0     Cognitive Function Screenin/27/2022    11:16 AM   Cognitive Function Screening   Clock Drawing Test 2    Mini-Cog 3 Minute Recall 2   Cognitive Function Screening 4       Data saved with a previous flowsheet row definition     Cognitive Function Screening Total - Less than 4 = Abnormal,  Greater than or equal to 4 = Normal        WHAT MATTERS MOST:  Advance Care Planning   ACP Status:   Patient has had an ACP conversation  Living Will: No  Power of : No  [place]: No    What is most important right now is to focus on spending time at homeavoiding the hospital    Accordingly, we have decided that the best plan to meet the patient's goals includes continuing with treatment      What matters most to patient today is: staying well           Is patient hospice appropriate: No  (If needed, use PPS <30 or FAST score >7)  Was referral to hospice placed: No    Impression upon entering the home:  Physical Dwelling: single family home   Appearance of home environment: cleaniness: clean, walking pathways: clear, lighting: adequate, and home structure: sound structure  Functional Status: minimal assistance  Mobility: ambulatory with device  Nutritional access: adequate intake and  access  Home Health: No, and does not need it at this time   DME/Supplies: cane     Diagnostic tests reviewed/disposition: No diagnosic tests pending after this hospitalization.  Disease/illness education:  Hearing Loss  Establishment or re-establishment of referral orders for community resources: No other necessary community resources.   Discussion with other health care providers: No discussion with other health care providers necessary.   Does patient have a PCP at [facility]? Yes   Repatriation plan with PCP? Care at Home reason: transportation   Does patient have an ostomy (ileostomy, colostomy, suprapubic catheter, nephrostomy tube, tracheostomy, PEG tube, pleurex catheter, cholecystostomy, etc)? No  Were BPAs reviewed? Yes    Social History     Socioeconomic History    Marital status: Single    Number of children: 0   Tobacco Use    Smoking status: Former     Current packs/day: 0.00     Average packs/day: 0.3 packs/day for 24.0 years (6.0 ttl pk-yrs)     Types: Cigarettes     Start date: 1952     Quit date: 1976     Years since quittin.7    Smokeless tobacco: Never   Substance and Sexual Activity    Alcohol use: No    Drug use: No    Sexual activity: Not Currently     Social Drivers of Health     Financial Resource Strain: Low Risk  (2022)    Overall Financial Resource Strain (CARDIA)     Difficulty of Paying Living Expenses: Not hard at all   Food Insecurity: No Food Insecurity (2022)    Hunger Vital Sign     Worried About Running Out of Food in the Last Year: Never true     Ran Out of Food in the Last Year: Never true   Transportation Needs: No Transportation Needs (2022)    PRAPARE - Transportation     Lack of Transportation (Medical): No     Lack of Transportation (Non-Medical): No   Physical Activity: Inactive (2022)    Exercise Vital Sign     Days of Exercise per Week: 0 days     Minutes of Exercise per Session: 0 min   Stress: No Stress Concern Present (2022)    [place]  [place] [employer] - Occupational Stress Questionnaire     Feeling of Stress : Not at all   Housing Stability: Low Risk  (9/27/2022)    Housing Stability Vital Sign     Unable to Pay for Housing in the Last Year: No     Number of Places Lived in the Last Year: 1     Unstable Housing in the Last Year: No         OBJECTIVE:     Vital Signs:  Vitals:    02/27/25 1331   BP: 130/60   Pulse: 66   Resp: 18   Temp: 98.1 °F (36.7 °C)       Review of Systems   Constitutional: Negative.    HENT:  Positive for hearing loss (left ear).    Eyes: Negative.    Respiratory: Negative.     Cardiovascular:  Positive for leg swelling.   Gastrointestinal: Negative.    Endocrine: Negative.    Genitourinary: Negative.    Musculoskeletal:  Positive for joint swelling.   Skin: Negative.    Allergic/Immunologic: Negative.    Neurological: Negative.    Hematological: Negative.        Physical Exam:  Physical Exam  Vitals reviewed.   Constitutional:       General: She is not in acute distress.     Appearance: She is not ill-appearing.   HENT:      Head: Normocephalic and atraumatic.      Left Ear: Tympanic membrane normal. There is no impacted cerumen.      Nose: Nose normal.      Mouth/Throat:      Mouth: Mucous membranes are moist.      Pharynx: Oropharynx is clear.   Eyes:      Pupils: Pupils are equal, round, and reactive to light.   Cardiovascular:      Rate and Rhythm: Normal rate and regular rhythm.      Heart sounds: Murmur heard.   Pulmonary:      Effort: Pulmonary effort is normal.      Breath sounds: Normal breath sounds.   Abdominal:      General: Bowel sounds are normal.      Palpations: Abdomen is soft.   Musculoskeletal:      Cervical back: Neck supple.      Right lower leg: Edema present.      Left lower leg: Edema present.   Skin:     General: Skin is warm and dry.   Neurological:      Mental Status: She is alert and oriented to person, place, and time.   Psychiatric:         Mood and Affect: Mood normal.          Behavior: Behavior normal.         Thought Content: Thought content normal.         INSTRUCTIONS FOR PATIENT:     Scheduled Follow-up, Appts Reviewed with Modifications if Needed: Yes  Future Appointments   Date Time Provider Department Center   3/27/2025  8:00 AM Marti Fair NP [facility] [place]   4/7/2025  8:10 AM [facility] [place]   4/14/2025  1:00 PM Lucio Morris [facility]   5/28/2025 10:40 AM Yahir Cedeno MD [facility] [place]   5/28/2025 11:00 AM HG [id number] [facility] M[facility]MO [place]   9/3/2025  9:40 AM Hung Partida MD IB CARDIO [place]       Current Medications[1]    Medication Reconciliation:  Were medications changed during this appointment? No  Were medications in the home? Yes  Is the patient taking the medications as directed? Yes  Does the patient/caregiver understand the medications and changes? Yes  Does updated med list accurately reflects meds patient is currently taking? Yes    Encounter for Medical Follow-Up and Medication Review  - [facility] to schedule follow-up visit with patient in 4 weeks or PRN     Patient Instructions Given:  - Continue all medications, treatments and therapies as ordered.   - Follow all instructions, recommendations as discussed.  - Maintain Safety Precautions at all times.  - Attend all medical appointments as scheduled.  - For worsening symptoms: call Primary Care Physician or Nurse Practitioner.  - For emergencies, call 911 or immediately report to the nearest emergency room        Signature: Marti Fair NP         [1]   Current Outpatient Medications:     albuterol (PROVENTIL/VENTOLIN HFA) 90 mcg/actuation inhaler, INHALE 2 PUFFS INTO THE LUNGS EVERY SIX HOURS AS NEEDED FOR WHEEZING, Disp: 18 g, Rfl: 11    allopurinoL (ZYLOPRIM) 100 MG tablet, Take 1 tablet (100 mg total) by mouth once daily., Disp: 90 tablet, Rfl: 2    aspirin 81 MG Chew, Take 81 mg by mouth once daily., Disp: , Rfl:      bisoprolol (ZEBETA) 5 MG tablet, TAKE 1 TABLET EVERY DAY, Disp: 90 tablet, Rfl: 3    blood glucose control high,low Soln, 1 each by Misc.(Non-Drug; Combo Route) route once a week., Disp: 1 each, Rfl: 3    blood sugar diagnostic (ACCU-CHEK GUIDE TEST STRIPS) Strp, Inject 1 strip into the skin 3 (three) times daily., Disp: 300 strip, Rfl: 3    blood-glucose meter kit, To check BG 3 times daily, to use with insurance preferred meter, Disp: 1 each, Rfl: 0    bumetanide (BUMEX) 1 MG tablet, Take 2 tablets (2 mg total) by mouth once daily., Disp: 60 tablet, Rfl: 11    diclofenac (VOLTAREN) 75 MG EC tablet, Take 1 tablet (75 mg total) by mouth 2 (two) times daily as needed (pain)., Disp: 180 tablet, Rfl: 3    fluticasone-salmeterol diskus inhaler 250-50 mcg, Inhale 1 puff into the lungs 2 (two) times daily. Controller, Disp: 180 each, Rfl: 3    gabapentin (NEURONTIN) 100 MG capsule, Take 1 capsule (100 mg total) by mouth 3 (three) times daily. For leg pains, Disp: 270 capsule, Rfl: 3    lancets Misc, To check BG 3 times daily, to use with insurance preferred meter, Disp: 200 each, Rfl: 3    latanoprost 0.005 % ophthalmic solution, Place 1 drop into both eyes once daily., Disp: 7.5 mL, Rfl: 3    ondansetron (ZOFRAN-ODT) 8 MG TbDL, Dissolve 1 tablet (8 mg total) by mouth every 8 (eight) hours as needed (Nausea)., Disp: 10 tablet, Rfl: 0    simvastatin (ZOCOR) 20 MG tablet, TAKE 1 TABLET NIGHTLY., Disp: 90 tablet, Rfl: 3    timolol maleate 0.5% (TIMOPTIC) 0.5 % Drop, Place 1 drop into both eyes 2 (two) times daily., Disp: 30 mL, Rfl: 4    timoloL maleate, PF, 0.5 % Dpet, Place into both eyes., Disp: , Rfl:

## 2025-02-27 NOTE — TELEPHONE ENCOUNTER
----- Message from Dennise sent at 2/27/2025  3:35 PM CST -----  Contact: KINSEY REAVES [[id number]]  .Type:  Patient Requesting ReferralWho Called:KINSEY REAVES [[id number]]Does the patient already have the specialty appointment scheduled?:NoReferral to What Specialty:ENTReason for Referral:Patient can't hear out of her leftDoes the patient want the referral with a specific physician?:NoIs the specialist an [facility] or [facility]?:[facility]ner[facility] Requesting a Response?:OchsnerWould the patient rather a call back or a response via [facility]? [facility] Call Back Number:.[phone] (home)   Received page from RN at 12:11 PM  that patient was complaining of worsening shortness of breath. No change to oxygen requirements or HR. Is sating around 95% SpO2 on 4 L NC. Went to patient bedside, patient appears moderately short of breath on exam she continues to have diminished breath sounds to bilateral lower lung fields. Continuous pulse ox at bedside with HR at 70-80, SpO2 at 95% on 4 L. Discussed with patient can get EKG, Chest xray, and repeat thoracentesis. Discussed that she may have a pulmonary embolism in which CTA chest would be ordered. Patient understands and declines CTA at this time, agreeable to other work up. Differential includes increased pulmonary effusions due to IV fluids this AM, pulmonary embolism, anxiety. Ordered STAT EKG, Chest Xray, and US thoracentesis for repeat.     EKG reviewed at 1:16 PM does not appear to have acute infarct.     RN paged that patient was complaining that her SOB is worsening however no new changes in oxygen requirement or vitals. Pending chest xray still. Ordered one time order of ativan to help with anxiety. Monitoring closely.     1:45 PM reviewed chest xray, worsening pleural effusions. Called US tech, plan for US thoracentesis.     Left thoracentesis was performed by IR team. Reevaluated patient at 5 pm, patient is feeling better. Has improved lung movement on the left. Radiology called me to inform me that repeat chest xray indicated right pneumothorax which is new. Discussed this with Glory Avila with IR who is aware of pneumothorax. IR team is to repeat chest xray tomorrow and follow patient.

## 2025-02-27 NOTE — TELEPHONE ENCOUNTER
----- Message from Dennise sent at 2/27/2025  3:35 PM CST -----  Contact: KINSEY REAVES [[id number]]  .Type:  Patient Requesting ReferralWho Called:KINSEY REAVES [[id number]]Does the patient already have the specialty appointment scheduled?:NoReferral to What Specialty:ENTReason for Referral:Patient can't hear out of her leftDoes the patient want the referral with a specific physician?:NoIs the specialist an [facility]?:[facility]nerPatient Requesting a Response?:[facility]chsnerWould the patient rather a call back or a response via [facility]? [facility] Call Back Number:.[phone] (home)

## 2025-03-27 ENCOUNTER — CARE AT HOME (OUTPATIENT)
Dept: HOME HEALTH SERVICES | Facility: CLINIC | Age: 85
End: 2025-03-27
Payer: MEDICARE

## 2025-03-27 VITALS
SYSTOLIC BLOOD PRESSURE: 140 MMHG | DIASTOLIC BLOOD PRESSURE: 60 MMHG | RESPIRATION RATE: 18 BRPM | TEMPERATURE: 98 F | OXYGEN SATURATION: 98 % | HEART RATE: 70 BPM

## 2025-03-27 DIAGNOSIS — C50.212 MALIGNANT NEOPLASM OF UPPER-INNER QUADRANT OF LEFT BREAST IN FEMALE, ESTROGEN RECEPTOR POSITIVE: ICD-10-CM

## 2025-03-27 DIAGNOSIS — Z17.0 MALIGNANT NEOPLASM OF UPPER-INNER QUADRANT OF LEFT BREAST IN FEMALE, ESTROGEN RECEPTOR POSITIVE: ICD-10-CM

## 2025-03-27 DIAGNOSIS — J44.89 COPD (CHRONIC OBSTRUCTIVE PULMONARY DISEASE) WITH CHRONIC BRONCHITIS: Primary | ICD-10-CM

## 2025-03-27 NOTE — PROGRESS NOTES
"[facility]  Medical Chronic Care Home Visit    Encounter Provider: Marti Yanez   PCP: Lucio Morris MD  Consult Requested By: No ref. provider found    HISTORY OF PRESENT ILLNESS      Patient ID: Kaleigh Nieves is a 84 y.o. female is being seen in the home due to physical debility that presents a taxing effort to leave the home, to mitigate high risk of hospital readmission and/or due to the limited availability of reliable or safe options for transportation to the point of access to the provider. Prior to treatment on this visit the chart was reviewed and patient verbal consent was obtained.    Chronic medical conditions synopsis:    1) Patient is a 84 year old female with diagnoses of glaucoma, chronic bronchitis, arterial insufficiency of lower extremity, CHF, CAD, SOB with exertion, HLD, HTN, Breast CA, T2DM, GERD, Gout, and dependent edema. Patient is being seen today for a routine care at home visit. Patient reports that she has been doing "ok". She still has edema to the BLE and she also c/o some allergy issues.   2) Recent developments: no new issues   3) Reason for consult and visit: routine care at home visit    DECISION MAKING TODAY       Assessment & Plan:  1. COPD (chronic obstructive pulmonary disease) with chronic bronchitis  Comments:  chronic and stable  Continue albuterol rescue inhaler PRN and fluticasone-salmeterol 250-50 mcg QD    2. Malignant neoplasm of upper-inner quadrant of left breast in female, estrogen receptor positive  Comments:  Follow up with Dr Ledesma as instructed           ENVIRONMENT OF CARE      Family and/or Caregiver present at visit?  Yes  Name of Caregiver: nephew  History provided by: patient    4Ms for Medical Decision-Making in Older Adults    Last Completed EAWV: 2022    MEDICATIONS:  High Risk Medications:  Total Active Medications: 1  gabapentin - 100 MG    MOBILITY:  Activities of Daily Livin/27/2022    11:21 AM   Activities of Daily " Living   Ambulation Assistance Required   Ambulation Assistance Cane   Dressing Independent   Transfers Independent   Toileting Continent of bladder;Continent of bowel   Feeding Independent   Cleaning home/Chores Independent   Telephone use Independent   Shopping Independent   Paying bills Independent   Taking meds Independent     Fall Risk:      2025     9:40 AM 10/8/2024    11:15 AM 2024    10:40 AM   Fall Risk Assessment - Outpatient   Mobility Status Ambulatory w/ assistance Ambulatory w/ assistance Ambulatory w/ assistance   Number of falls 0 0 0   Identified as fall risk True False True     Disability Status:      2022    11:23 AM   Disability Status   Are you deaf or do you have serious difficulty hearing? N   Are you blind or do you have serious difficulty seeing, even when wearing glasses? N   Because of a physical, mental, or emotional condition, do you have serious difficulty concentrating, remembering, or making decisions? N   Do you have serious difficulty walking or climbing stairs? Y   Do you have difficulty dressing or bathing? N   Because of a physical, mental, or emotional condition, do you have difficulty doing errands alone such as visiting a doctor's office or shopping? N     Nutrition Screenin/27/2022    11:19 AM   Nutrition Screening   Has food intake declined over the past three months due to loss of appetite, digestive problems, chewing or swallowing difficulties? No decrease in food intake   Involuntary weight loss during the last 3 months? No weight loss   Mobility? Goes out   Has the patient suffered psychological stress or acute disease in the past three months? No   Neuropsychological problems? No psychological problems   Body Mass Index (BMI)?  BMI 23 or greater   Screening Score 14   Interpretation Normal nutritional status    Screening Score: 0-7 Malnourished, 8-11 At Risk, 12-14 Normal  Get Up and Go:      2022    11:16 AM 2021    11:15 AM 2019     11:03 AM 6/15/2018    11:28 AM 2017    11:18 AM   Get Up and Go   Trial 1 15 seconds -- 15 seconds 7 seconds 5 seconds   Trial 2     6 seconds   Trial 3     6 seconds   Average     5.67     Whisper Test:      2022    11:16 AM   Whisper Test   Whisper Test Normal           MENTATION:   Has Dementia Dx: No  Has Anxiety Dx: No    Depression Patient Health Questionnaire:      4/10/2024    11:07 AM   Depression Patient Health Questionnaire   Over the last two weeks how often have you been bothered by little interest or pleasure in doing things Not at all   Over the last two weeks how often have you been bothered by feeling down, depressed or hopeless Not at all   PHQ-2 Total Score 0     Cognitive Function Screenin/27/2022    11:16 AM   Cognitive Function Screening   Clock Drawing Test 2    Mini-Cog 3 Minute Recall 2   Cognitive Function Screening 4       Data saved with a previous flowsheet row definition     Cognitive Function Screening Total - Less than 4 = Abnormal,  Greater than or equal to 4 = Normal        WHAT MATTERS MOST:  Advance Care Planning   ACP Status:   Patient has had an ACP conversation  Living Will: No  Power of : No  [place]: No    What is most important right now is to focus on spending time at homeavoiding the hospital    Accordingly, we have decided that the best plan to meet the patient's goals includes continuing with treatment      What matters most to patient today is: stay well at home           Is patient hospice appropriate: No  (If needed, use PPS <30 or FAST score >7)  Was referral to hospice placed: No    Impression upon entering the home:  Physical Dwelling: single family home   Appearance of home environment: cleaniness: clean, walking pathways: clear, lighting: adequate, and home structure: sound structure  Functional Status: minimal assistance  Mobility: ambulatory with device  Nutritional access: adequate intake and access  Home Health: No, and does not  need it at this time   DME/Supplies: rolling walker and cane     Diagnostic tests reviewed/disposition: No diagnosic tests pending after this hospitalization.  Disease/illness education:  lymphedema  Establishment or re-establishment of referral orders for community resources: No other necessary community resources.   Discussion with other health care providers: No discussion with other health care providers necessary.   Does patient have a PCP at [facility]? Yes   Repatriation plan with PCP? Care at Home reason: mobility   Does patient have an ostomy (ileostomy, colostomy, suprapubic catheter, nephrostomy tube, tracheostomy, PEG tube, pleurex catheter, cholecystostomy, etc)? No  Were BPAs reviewed? Yes    Social History     Socioeconomic History    Marital status: Single    Number of children: 0   Tobacco Use    Smoking status: Former     Current packs/day: 0.00     Average packs/day: 0.3 packs/day for 24.0 years (6.0 ttl pk-yrs)     Types: Cigarettes     Start date: 1952     Quit date: 1976     Years since quittin.7    Smokeless tobacco: Never   Substance and Sexual Activity    Alcohol use: No    Drug use: No    Sexual activity: Not Currently     Social Drivers of Health     Financial Resource Strain: Low Risk  (2022)    Overall Financial Resource Strain (CARDIA)     Difficulty of Paying Living Expenses: Not hard at all   Food Insecurity: No Food Insecurity (2022)    Hunger Vital Sign     Worried About Running Out of Food in the Last Year: Never true     Ran Out of Food in the Last Year: Never true   Transportation Needs: No Transportation Needs (2022)    PRAPARE - Transportation     Lack of Transportation (Medical): No     Lack of Transportation (Non-Medical): No   Physical Activity: Inactive (2022)    Exercise Vital Sign     Days of Exercise per Week: 0 days     Minutes of Exercise per Session: 0 min   Stress: No Stress Concern Present (2022)    [employer] - Occupational Stress Questionnaire     Feeling of Stress : Not at all   Housing Stability: Low Risk  (9/27/2022)    Housing Stability Vital Sign     Unable to Pay for Housing in the Last Year: No     Number of Places Lived in the Last Year: 1     Unstable Housing in the Last Year: No         OBJECTIVE:     Vital Signs:  Vitals:    03/27/25 1303   BP: (!) 140/60   Pulse: 70   Resp: 18   Temp: 98.1 °F (36.7 °C)       Review of Systems   Constitutional: Negative.    HENT:  Positive for hearing loss.    Eyes:  Positive for visual disturbance (going to eye doctor soon).   Respiratory: Negative.     Cardiovascular:  Positive for leg swelling.   Gastrointestinal: Negative.    Endocrine: Negative.    Genitourinary: Negative.    Musculoskeletal:  Positive for arthralgias and myalgias.   Skin: Negative.    Allergic/Immunologic: Negative.    Neurological: Negative.    Hematological: Negative.    Psychiatric/Behavioral: Negative.         Physical Exam:  Physical Exam  Vitals reviewed.   Constitutional:       General: She is not in acute distress.     Appearance: She is not ill-appearing.   HENT:      Head: Normocephalic and atraumatic.      Nose: Nose normal.      Mouth/Throat:      Mouth: Mucous membranes are moist.      Pharynx: Oropharynx is clear.   Eyes:      Pupils: Pupils are equal, round, and reactive to light.   Cardiovascular:      Rate and Rhythm: Normal rate and regular rhythm.      Heart sounds: Normal heart sounds.   Pulmonary:      Effort: Pulmonary effort is normal.      Breath sounds: Normal breath sounds.   Abdominal:      General: Abdomen is flat. Bowel sounds are normal.      Palpations: Abdomen is soft.   Musculoskeletal:      Cervical back: Neck supple.      Right lower leg: Edema present.      Left lower leg: Edema present.   Skin:     General: Skin is warm and dry.      Capillary Refill: Capillary refill takes less than 2 seconds.   Neurological:      Mental Status: She is alert and oriented to  person, place, and time.   Psychiatric:         Mood and Affect: Mood normal.         Behavior: Behavior normal.         Thought Content: Thought content normal.         INSTRUCTIONS FOR PATIENT:     Scheduled Follow-up, Appts Reviewed with Modifications if Needed: Yes  Future Appointments   Date Time Provider Department Center   4/7/2025  8:10 AM [facility] [place]   4/14/2025  1:00 PM Lucio Morris MD [facility] PRICAR [place]   5/8/2025  8:00 AM Marti Fair NP [facility] C3HV [place]   5/28/2025 10:40 AM Yahir Cedeno MD [facility] GENSUR [place]   5/28/2025 11:00 AM [facility] [id number]-SCR [facility] MAMMO [place]   9/3/2025  9:40 AM Hung Partida MD [facility] CARDIO [place]       Current Medications[1]    Medication Reconciliation:  Were medications changed during this appointment? No  Were medications in the home? Yes  Is the patient taking the medications as directed? Yes  Does the patient/caregiver understand the medications and changes? Yes  Does updated med list accurately reflects meds patient is currently taking? Yes    Encounter for Medical Follow-Up and Medication Review  - [facility] to schedule follow-up visit with patient in 4 weeks or PRN     Patient Instructions Given:  - Continue all medications, treatments and therapies as ordered.   - Follow all instructions, recommendations as discussed.  - Maintain Safety Precautions at all times.  - Attend all medical appointments as scheduled.  - For worsening symptoms: call Primary Care Physician or Nurse Practitioner.  - For emergencies, call 911 or immediately report to the nearest emergency room          Signature: Marti Fair NPq 12q         [1]   Current Outpatient Medications:     albuterol (PROVENTIL/VENTOLIN HFA) 90 mcg/actuation inhaler, INHALE 2 PUFFS INTO THE LUNGS EVERY SIX HOURS AS NEEDED FOR WHEEZING, Disp: 18 g, Rfl: 11    allopurinoL (ZYLOPRIM) 100 MG tablet, Take 1 tablet (100 mg total) by mouth once daily.,  Disp: 90 tablet, Rfl: 2    aspirin 81 MG Chew, Take 81 mg by mouth once daily., Disp: , Rfl:     bisoprolol (ZEBETA) 5 MG tablet, TAKE 1 TABLET EVERY DAY, Disp: 90 tablet, Rfl: 3    blood glucose control high,low Soln, 1 each by Misc.(Non-Drug; Combo Route) route once a week., Disp: 1 each, Rfl: 3    blood sugar diagnostic (ACCU-CHEK GUIDE TEST STRIPS) Strp, Inject 1 strip into the skin 3 (three) times daily., Disp: 300 strip, Rfl: 3    blood-glucose meter kit, To check BG 3 times daily, to use with insurance preferred meter, Disp: 1 each, Rfl: 0    bumetanide (BUMEX) 1 MG tablet, Take 2 tablets (2 mg total) by mouth once daily., Disp: 60 tablet, Rfl: 11    diclofenac (VOLTAREN) 75 MG EC tablet, Take 1 tablet (75 mg total) by mouth 2 (two) times daily as needed (pain)., Disp: 180 tablet, Rfl: 3    fluticasone-salmeterol diskus inhaler 250-50 mcg, Inhale 1 puff into the lungs 2 (two) times daily. Controller, Disp: 180 each, Rfl: 3    gabapentin (NEURONTIN) 100 MG capsule, Take 1 capsule (100 mg total) by mouth 3 (three) times daily. For leg pains, Disp: 270 capsule, Rfl: 3    lancets Misc, To check BG 3 times daily, to use with insurance preferred meter, Disp: 200 each, Rfl: 3    latanoprost 0.005 % ophthalmic solution, Place 1 drop into both eyes once daily., Disp: 7.5 mL, Rfl: 3    ondansetron (ZOFRAN-ODT) 8 MG TbDL, Dissolve 1 tablet (8 mg total) by mouth every 8 (eight) hours as needed (Nausea)., Disp: 10 tablet, Rfl: 0    simvastatin (ZOCOR) 20 MG tablet, TAKE 1 TABLET NIGHTLY., Disp: 90 tablet, Rfl: 3    timolol maleate 0.5% (TIMOPTIC) 0.5 % Drop, Place 1 drop into both eyes 2 (two) times daily., Disp: 30 mL, Rfl: 4    timoloL maleate, PF, 0.5 % Dpet, Place into both eyes., Disp: , Rfl:

## 2025-04-04 ENCOUNTER — TELEPHONE (OUTPATIENT)
Dept: PRIMARY CARE CLINIC | Facility: CLINIC | Age: 85
End: 2025-04-04
Payer: MEDICARE

## 2025-04-04 NOTE — TELEPHONE ENCOUNTER
----- Message from Tana sent at 4/4/2025  1:33 PM CDT -----  [facility]:[phone] Information: She called regarding diagnosis access to care. Call her back to confirm the diagnosis. Thx. EL

## 2025-04-07 ENCOUNTER — LAB VISIT (OUTPATIENT)
Dept: LAB | Facility: HOSPITAL | Age: 85
End: 2025-04-07
Attending: FAMILY MEDICINE
Payer: MEDICARE

## 2025-04-07 DIAGNOSIS — I15.2 HYPERTENSION ASSOCIATED WITH DIABETES: ICD-10-CM

## 2025-04-07 DIAGNOSIS — E78.5 HYPERLIPIDEMIA, UNSPECIFIED HYPERLIPIDEMIA TYPE: ICD-10-CM

## 2025-04-07 DIAGNOSIS — E11.59 HYPERTENSION ASSOCIATED WITH DIABETES: ICD-10-CM

## 2025-04-07 DIAGNOSIS — E11.42 TYPE 2 DIABETES MELLITUS WITH DIABETIC POLYNEUROPATHY, WITHOUT LONG-TERM CURRENT USE OF INSULIN: ICD-10-CM

## 2025-04-07 DIAGNOSIS — M1A.9XX0 CHRONIC GOUT WITHOUT TOPHUS, UNSPECIFIED CAUSE, UNSPECIFIED SITE: ICD-10-CM

## 2025-04-07 DIAGNOSIS — I50.32 CHRONIC DIASTOLIC HEART FAILURE: ICD-10-CM

## 2025-04-07 LAB
ABSOLUTE EOSINOPHIL (OHS): 0.03 K/UL
ABSOLUTE MONOCYTE (OHS): 0.44 K/UL (ref 0.3–1)
ABSOLUTE NEUTROPHIL COUNT (OHS): 1.94 K/UL (ref 1.8–7.7)
ALBUMIN SERPL BCP-MCNC: 3.8 G/DL (ref 3.5–5.2)
ALBUMIN/CREAT UR: 11.1 UG/MG
ALP SERPL-CCNC: 36 UNIT/L (ref 40–150)
ALT SERPL W/O P-5'-P-CCNC: 14 UNIT/L (ref 10–44)
ANION GAP (OHS): 6 MMOL/L (ref 8–16)
AST SERPL-CCNC: 18 UNIT/L (ref 11–45)
BASOPHILS # BLD AUTO: 0.02 K/UL
BASOPHILS NFR BLD AUTO: 0.6 %
BILIRUB SERPL-MCNC: 0.6 MG/DL (ref 0.1–1)
BNP SERPL-MCNC: 111 PG/ML (ref 0–99)
BUN SERPL-MCNC: 10 MG/DL (ref 8–23)
CALCIUM SERPL-MCNC: 9.1 MG/DL (ref 8.7–10.5)
CHLORIDE SERPL-SCNC: 94 MMOL/L (ref 95–110)
CHOLEST SERPL-MCNC: 138 MG/DL (ref 120–199)
CHOLEST/HDLC SERPL: 3.5 {RATIO} (ref 2–5)
CO2 SERPL-SCNC: 35 MMOL/L (ref 23–29)
CREAT SERPL-MCNC: 0.6 MG/DL (ref 0.5–1.4)
CREAT UR-MCNC: 54 MG/DL (ref 15–325)
EAG (OHS): 117 MG/DL (ref 68–131)
ERYTHROCYTE [DISTWIDTH] IN BLOOD BY AUTOMATED COUNT: 13.1 % (ref 11.5–14.5)
GFR SERPLBLD CREATININE-BSD FMLA CKD-EPI: >60 ML/MIN/1.73/M2
GLUCOSE SERPL-MCNC: 113 MG/DL (ref 70–110)
HBA1C MFR BLD: 5.7 % (ref 4–5.6)
HCT VFR BLD AUTO: 48.9 % (ref 37–48.5)
HDLC SERPL-MCNC: 40 MG/DL (ref 40–75)
HDLC SERPL: 29 % (ref 20–50)
HGB BLD-MCNC: 15.7 GM/DL (ref 12–16)
IMM GRANULOCYTES # BLD AUTO: 0.01 K/UL (ref 0–0.04)
IMM GRANULOCYTES NFR BLD AUTO: 0.3 % (ref 0–0.5)
LDLC SERPL CALC-MCNC: 87.4 MG/DL (ref 63–159)
LYMPHOCYTES # BLD AUTO: 0.99 K/UL (ref 1–4.8)
MCH RBC QN AUTO: 29 PG (ref 27–31)
MCHC RBC AUTO-ENTMCNC: 32.1 G/DL (ref 32–36)
MCV RBC AUTO: 90 FL (ref 82–98)
MICROALBUMIN UR-MCNC: 6 UG/ML (ref ?–5000)
NONHDLC SERPL-MCNC: 98 MG/DL
NUCLEATED RBC (/100WBC) (OHS): 0 /100 WBC
PLATELET # BLD AUTO: 273 K/UL (ref 150–450)
PMV BLD AUTO: 9.5 FL (ref 9.2–12.9)
POTASSIUM SERPL-SCNC: 5 MMOL/L (ref 3.5–5.1)
PROT SERPL-MCNC: 7.8 GM/DL (ref 6–8.4)
RBC # BLD AUTO: 5.42 M/UL (ref 4–5.4)
RELATIVE EOSINOPHIL (OHS): 0.9 %
RELATIVE LYMPHOCYTE (OHS): 28.9 % (ref 18–48)
RELATIVE MONOCYTE (OHS): 12.8 % (ref 4–15)
RELATIVE NEUTROPHIL (OHS): 56.5 % (ref 38–73)
SODIUM SERPL-SCNC: 135 MMOL/L (ref 136–145)
TRIGL SERPL-MCNC: 53 MG/DL (ref 30–150)
TSH SERPL-ACNC: 1.95 UIU/ML (ref 0.4–4)
URATE SERPL-MCNC: 3.6 MG/DL (ref 2.4–5.7)
WBC # BLD AUTO: 3.43 K/UL (ref 3.9–12.7)

## 2025-04-07 PROCEDURE — 84550 ASSAY OF BLOOD/URIC ACID: CPT

## 2025-04-07 PROCEDURE — 85025 COMPLETE CBC W/AUTO DIFF WBC: CPT

## 2025-04-07 PROCEDURE — 84443 ASSAY THYROID STIM HORMONE: CPT

## 2025-04-07 PROCEDURE — 80061 LIPID PANEL: CPT

## 2025-04-07 PROCEDURE — 36415 COLL VENOUS BLD VENIPUNCTURE: CPT | Mod: PO

## 2025-04-07 PROCEDURE — 80053 COMPREHEN METABOLIC PANEL: CPT

## 2025-04-07 PROCEDURE — 83880 ASSAY OF NATRIURETIC PEPTIDE: CPT

## 2025-04-07 PROCEDURE — 83036 HEMOGLOBIN GLYCOSYLATED A1C: CPT

## 2025-04-07 PROCEDURE — 82043 UR ALBUMIN QUANTITATIVE: CPT

## 2025-04-14 ENCOUNTER — OFFICE VISIT (OUTPATIENT)
Dept: PRIMARY CARE CLINIC | Facility: CLINIC | Age: 85
End: 2025-04-14
Payer: MEDICARE

## 2025-04-14 VITALS
HEART RATE: 86 BPM | SYSTOLIC BLOOD PRESSURE: 132 MMHG | DIASTOLIC BLOOD PRESSURE: 70 MMHG | WEIGHT: 189.38 LBS | BODY MASS INDEX: 30.44 KG/M2 | HEIGHT: 66 IN

## 2025-04-14 DIAGNOSIS — M79.671 BILATERAL LEG AND FOOT PAIN: ICD-10-CM

## 2025-04-14 DIAGNOSIS — M1A.9XX0 CHRONIC GOUT WITHOUT TOPHUS, UNSPECIFIED CAUSE, UNSPECIFIED SITE: ICD-10-CM

## 2025-04-14 DIAGNOSIS — Z17.0 MALIGNANT NEOPLASM OF UPPER-INNER QUADRANT OF LEFT BREAST IN FEMALE, ESTROGEN RECEPTOR POSITIVE: ICD-10-CM

## 2025-04-14 DIAGNOSIS — E11.42 TYPE 2 DIABETES MELLITUS WITH DIABETIC POLYNEUROPATHY, WITHOUT LONG-TERM CURRENT USE OF INSULIN: Primary | ICD-10-CM

## 2025-04-14 DIAGNOSIS — C50.212 MALIGNANT NEOPLASM OF UPPER-INNER QUADRANT OF LEFT BREAST IN FEMALE, ESTROGEN RECEPTOR POSITIVE: ICD-10-CM

## 2025-04-14 DIAGNOSIS — M79.605 BILATERAL LEG AND FOOT PAIN: ICD-10-CM

## 2025-04-14 DIAGNOSIS — E78.5 HYPERLIPIDEMIA, UNSPECIFIED HYPERLIPIDEMIA TYPE: ICD-10-CM

## 2025-04-14 DIAGNOSIS — I50.32 CHRONIC DIASTOLIC HEART FAILURE: ICD-10-CM

## 2025-04-14 DIAGNOSIS — M79.672 BILATERAL LEG AND FOOT PAIN: ICD-10-CM

## 2025-04-14 DIAGNOSIS — M79.604 BILATERAL LEG AND FOOT PAIN: ICD-10-CM

## 2025-04-14 DIAGNOSIS — J44.89 COPD (CHRONIC OBSTRUCTIVE PULMONARY DISEASE) WITH CHRONIC BRONCHITIS: ICD-10-CM

## 2025-04-14 PROCEDURE — G2211 COMPLEX E/M VISIT ADD ON: HCPCS | Mod: S$GLB,,, | Performed by: FAMILY MEDICINE

## 2025-04-14 PROCEDURE — 99215 OFFICE O/P EST HI 40 MIN: CPT | Mod: S$GLB,,, | Performed by: FAMILY MEDICINE

## 2025-04-14 PROCEDURE — 1159F MED LIST DOCD IN RCRD: CPT | Mod: CPTII,S$GLB,, | Performed by: FAMILY MEDICINE

## 2025-04-14 PROCEDURE — 3288F FALL RISK ASSESSMENT DOCD: CPT | Mod: CPTII,S$GLB,, | Performed by: FAMILY MEDICINE

## 2025-04-14 PROCEDURE — 99999 PR PBB SHADOW E&M-EST. PATIENT-LVL V: CPT | Mod: PBBFAC,,, | Performed by: FAMILY MEDICINE

## 2025-04-14 PROCEDURE — 1101F PT FALLS ASSESS-DOCD LE1/YR: CPT | Mod: CPTII,S$GLB,, | Performed by: FAMILY MEDICINE

## 2025-04-14 PROCEDURE — 1126F AMNT PAIN NOTED NONE PRSNT: CPT | Mod: CPTII,S$GLB,, | Performed by: FAMILY MEDICINE

## 2025-04-14 RX ORDER — FLUTICASONE PROPIONATE AND SALMETEROL 250; 50 UG/1; UG/1
1 POWDER RESPIRATORY (INHALATION) 2 TIMES DAILY
Qty: 180 EACH | Refills: 3 | Status: SHIPPED | OUTPATIENT
Start: 2025-04-14 | End: 2026-04-14

## 2025-04-14 RX ORDER — ALLOPURINOL 100 MG/1
100 TABLET ORAL DAILY
Qty: 90 TABLET | Refills: 3 | Status: SHIPPED | OUTPATIENT
Start: 2025-04-14

## 2025-04-14 RX ORDER — IPRATROPIUM BROMIDE 21 UG/1
2 SPRAY, METERED NASAL
COMMUNITY
Start: 2025-03-18 | End: 2026-03-18

## 2025-04-14 RX ORDER — DICLOFENAC SODIUM 75 MG/1
75 TABLET, DELAYED RELEASE ORAL 2 TIMES DAILY PRN
Qty: 180 TABLET | Refills: 3 | Status: SHIPPED | OUTPATIENT
Start: 2025-04-14 | End: 2026-04-14

## 2025-04-14 RX ORDER — GABAPENTIN 100 MG/1
100 CAPSULE ORAL 3 TIMES DAILY
Qty: 270 CAPSULE | Refills: 3 | Status: SHIPPED | OUTPATIENT
Start: 2025-04-14 | End: 2026-04-14

## 2025-04-14 NOTE — PATIENT INSTRUCTIONS
Physically, everything looks pretty good.      Refills of your medicines have been sent to the pharmacy.  Please continue taking them, as directed.      Bridget should keep coming out to see you once a month to make sure you are doing okay.    I am also going to ask a home health nurse to come out once a week to check on you.  Ideally, we would like them to have you wrap your legs with an Ace wrap this should help with some of the swelling.      Continue using your Bumex, as needed.  I agree, if your top number of your blood pressure is under 100, or if the bottom number is under 60, you probably should not take the Bumex that day.    Definitely try to prop them up as much as possible.  Watch the salt in your diet.    I spoke with Dr. Cedeno.  They are going to move your breast cancer appointment from [place] to the [facility].  They should reach out to you with a day and time.    Continue to eat a healthy diet.  Be careful with portion sizes.  Includes lots of fresh fruits, vegetables, whole grains, lean proteins.  See info below.    Keep hydrated.  Be sure to drink at least 8-10, 8 oz, glasses of water every day.    Stay active.  Try to do some sort of physical activity every day.  Nothing outrageous, just try walking for 10-15 minutes each day.

## 2025-04-14 NOTE — PROGRESS NOTES
"    [facility]       [address] [place] [address]      Phone: [phone]      Fax: [phone]    Lucio Morris MD                Office Visit  04/14/2025        Subjective      HPI:  Kaleigh Nieves is a 84 y.o. female presents today in clinic for "Annual Exam  ."     84-year-old female presents today to follow-up on multiple issues.      Overall, doing okay.      Still gets short of breath.  Vision gets blurry.  Can not hear out of both ears.  Saw ENT.  The outside, ears looked okay.  Had hearing test, showed both ears have hearing loss.  Spoke with someone about hearing aids, but unfortunately insurance does not pay for them.  They cost around $3000 for each ear.  They put her on a wait list to see if she can get free ones from the government.    Gets short of breath when walking through the house.  Cardiology has her on Bumex 2 mg as needed.  She does not take it if her blood pressure is low.    Blood pressure at home really good.  Numbers range anywhere from 100-130/50-65.  Takes Bystolic 5 mg daily.    Tried using compression socks to help with the leg swelling.  They get to be too tight around the knee area, so she does not wear them.    She states she has a history of gout.  Has been taking allopurinol 100 mg daily for years.  Big toe tends to hurt from time to time.    Has diabetes.  No medication, just controlled with diet.  Blood sugar, A1c is excellent.    Takes simvastatin 20 mg nightly for cholesterol.      She has an appointment with the breast surgeon in May.  States she needs to find a new surgeon because she can not go to [place] anymore.  Too complicated to drive there.  When she has to rely on medical transportation, it gets even more complicated.    PMH: HTN, dm, gout, GERD, diverticulosis, osteoarthritis, glaucoma   PSH:  Torn meniscus in knee.  Bilateral knee replacement.  Hemorrhoids.  Perirectal abscess.  Cataracts in both eyes.  " D& C.  Fibroid/hysterectomy.  Gallbladder.  Appendectomy.  Carpal tunnel.  Left toe corn removal.    Allergies:  Iodine.  Penicillin.  Sulfa drugs.  Tramadol.  Amlodipine.    Social: Lives alone.  Nephew occasionally stays there.    T: Denies   A:  Denies   D:  Denies     Exercise:  No regular exercise program.  Use to bowl frequently.  Limited now due to foot swelling and pain.      Podiatry: Faustino Reyes)  Eyes:  Dr. Kaur at [facility]        The following were updated and reviewed by myself in the chart: medications, past medical history, past surgical history, family history, social history, and allergies.     Medications:  Medications Ordered Prior to Encounter[1]     PMHx:  Past Medical History:   Diagnosis Date    Anemia     Angina pectoris     Arthritis     Asthma     Back pain     Bronchitis     CAD (coronary artery 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    Neuropathy, lower extremity     Obesity     Open-angle glaucoma     dr avel kaur    Peripheral vascular disease     Polyneuropathy     Tobacco dependence       Patient Active Problem List    Diagnosis Date Noted    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    Malignant neoplasm of upper-inner quadrant of left breast in female, estrogen receptor positive 06/26/2023    Unspecified lump in the left breast, upper inner quadrant 06/20/2023    Body mass index (BMI) 34.0-34.9, adult 09/07/2022    Low vitamin D level 07/12/2021    Chronic diastolic heart failure 05/14/2021    RIVAS (dyspnea on exertion)  01/29/2021    Diverticulosis 11/11/2019    Primary open-angle glaucoma, bilateral, indeterminate stage 11/11/2019    Dependent edema 08/23/2018    Chronic bronchitis 06/25/2018    Hyperkalemia 03/21/2018    Bilateral carpal tunnel syndrome 03/07/2018    Ganglion cyst of flexor tendon sheath of finger of right hand 02/01/2018    Aortic atherosclerosis 06/16/2017    Arterial insufficiency of lower extremity 06/16/2017    Hypertension associated with diabetes 10/25/2016    Colon polyp     Coronary artery disease involving native coronary artery without angina pectoris     Lichen sclerosus 05/26/2015    Hyperlipidemia 08/11/2014    Chronic gout 08/11/2014    Esophageal reflux 07/08/2013    Multiple joint pain 07/03/2013    Type 2 diabetes mellitus with diabetic polyneuropathy, without long-term current use of insulin 06/18/2013        PSHx:  Past Surgical History:   Procedure Laterality Date    APPENDECTOMY      CARPAL TUNNEL RELEASE Right     CATARACT EXTRACTION, BILATERAL      CHOLECYSTECTOMY      CYST REMOVAL  2018    DILATION AND CURETTAGE OF UTERUS      EYE SURGERY      HEMORRHOID SURGERY      HYSTERECTOMY      fibroids    INCISION AND DRAINAGE PERIRECTAL ABSCESS      JOINT REPLACEMENT Bilateral     knee    KNEE ARTHROSCOPY Right     LOCALIZATION OF MASS OF BREAST WITH ULTRASOUND GUIDANCE Left 8/22/2023    Procedure: LOCALIZATION, MASS, BREAST, WITH US GUIDANCE;  Surgeon: Susan Pulliam MD;  Location: [facility] OR;  Service: General;  Laterality: Left;  Intra-operatively Placed Radiographic Marker (Wire)    MASTECTOMY, PARTIAL Left 8/22/2023    Procedure: MASTECTOMY, PARTIAL;  Surgeon: Susan Pulliam MD;  Location: [facility] OR;  Service: General;  Laterality: Left;  with Interpretation of Specimen Mammogram    MYOMECTOMY          FHx:  Family History   Problem Relation Name Age of Onset    Hypertension Mother      Diabetes Mother      Leukemia Sister      Hypertension Sister      Cancer Sister          leukemia     Heart disease Maternal Aunt      Cancer Maternal Uncle          gastric, pacreatic    Heart disease Maternal Uncle      Miscarriages / Stillbirths Maternal Uncle      Diabetes Maternal Grandmother      Asthma Maternal Grandfather      Breast cancer Neg Hx      Colon cancer Neg Hx      Ovarian cancer Neg Hx      COPD Neg Hx      Hyperlipidemia Neg Hx      Kidney disease Neg Hx          Social:  Social History     Socioeconomic History    Marital status: Single    Number of children: 0   Tobacco Use    Smoking status: Former     Current packs/day: 0.00     Average packs/day: 0.3 packs/day for 24.0 years (6.0 ttl pk-yrs)     Types: Cigarettes     Start date: 1952     Quit date: 1976     Years since quittin.8    Smokeless tobacco: Never   Substance and Sexual Activity    Alcohol use: No    Drug use: No    Sexual activity: Not Currently     Social Drivers of Health     Financial Resource Strain: Low Risk  (2022)    Overall Financial Resource Strain (CARDIA)     Difficulty of Paying Living Expenses: Not hard at all   Food Insecurity: No Food Insecurity (2022)    Hunger Vital Sign     Worried About Running Out of Food in the Last Year: Never true     Ran Out of Food in the Last Year: Never true   Transportation Needs: No Transportation Needs (2022)    PRAPARE - Transportation     Lack of Transportation (Medical): No     Lack of Transportation (Non-Medical): No   Physical Activity: Inactive (2022)    Exercise Vital Sign     Days of Exercise per Week: 0 days     Minutes of Exercise per Session: 0 min   Stress: No Stress Concern Present (2022)    [employer] [place] [employer] - Occupational Stress Questionnaire     Feeling of Stress : Not at all   Housing Stability: Low Risk  (2022)    Housing Stability Vital Sign     Unable to Pay for Housing in the Last Year: No     Number of Places Lived in the Last Year: 1     Unstable Housing in the Last Year: No     "    Allergies:  Review of patient's allergies indicates:   Allergen Reactions    Iodine Nausea And Vomiting    Iodine and iodide containing products Nausea And Vomiting    Penicillins Itching    Ultram [tramadol] Hives and Itching     Light headness, itiching    Sulfa (sulfonamide antibiotics) Hives and Itching    Adhesive Other (See Comments)    Amlodipine      edema    Iodinated contrast media     Sulfamethoxazole-trimethoprim Itching, Rash and Nausea And Vomiting        ROS:  Review of Systems   Constitutional:  Positive for activity change. Negative for appetite change, chills and fever.   HENT:  Negative for congestion, postnasal drip, rhinorrhea, sore throat and trouble swallowing.    Respiratory:  Positive for shortness of breath. Negative for cough and wheezing.    Cardiovascular:  Positive for leg swelling. Negative for chest pain and palpitations.   Gastrointestinal:  Negative for abdominal pain, constipation, diarrhea, nausea and vomiting.   Genitourinary:  Negative for difficulty urinating.   Musculoskeletal:  Positive for arthralgias and myalgias.   Skin:  Negative for color change and rash.   Neurological:  Negative for speech difficulty and headaches.   All other systems reviewed and are negative.         Objective      /70   Pulse 86   Ht 5' 6" (1.676 m)   Wt 85.9 kg (189 lb 6 oz)   BMI 30.57 kg/m²   Ht Readings from Last 3 Encounters:   04/14/25 5' 6" (1.676 m)   11/27/24 5' 6" (1.676 m)   10/17/24 5' 6" (1.676 m)     Wt Readings from Last 3 Encounters:   04/14/25 85.9 kg (189 lb 6 oz)   01/08/25 87.7 kg (193 lb 5.5 oz)   11/27/24 88.7 kg (195 lb 8.8 oz)       PHYSICAL EXAM:  Physical Exam  Vitals and nursing note reviewed.   Constitutional:       General: She is not in acute distress.     Appearance: Normal appearance.   HENT:      Head: Normocephalic and atraumatic.      Right Ear: Tympanic membrane, ear canal and external ear normal.      Left Ear: Tympanic membrane, ear canal and " external ear normal.      Nose: Nose normal. No congestion or rhinorrhea.      Mouth/Throat:      Mouth: Mucous membranes are moist.      Pharynx: Oropharynx is clear. No oropharyngeal exudate or posterior oropharyngeal erythema.   Eyes:      Extraocular Movements: Extraocular movements intact.      Conjunctiva/sclera: Conjunctivae normal.      Pupils: Pupils are equal, round, and reactive to light.   Cardiovascular:      Rate and Rhythm: Normal rate and regular rhythm.      Heart sounds: Murmur heard.   Pulmonary:      Effort: Pulmonary effort is normal. No respiratory distress.      Breath sounds: No wheezing, rhonchi or rales.   Musculoskeletal:         General: Normal range of motion.      Cervical back: Normal range of motion.      Right lower le+ Pitting Edema present.      Left lower le+ Pitting Edema present.   Lymphadenopathy:      Cervical: No cervical adenopathy.   Skin:     General: Skin is warm and dry.      Findings: No rash.   Neurological:      Mental Status: She is alert.              LABS / IMAGING:  Recent Results (from the past 26 weeks)   CV Ultrasound doppler arterial legs bilat    Collection Time: 10/17/24  1:55 PM   Result Value Ref Range    Right CFA  cm/s    Right profunda sys PSV 74 cm/s    Right super femoral prox sys  cm/s    Right super femoral mid sys  cm/s    Right super femoral dist sys  cm/s    Right popliteal PSV 75 cm/s    Right ant tibial sys  cm/s    Right tib/per trunk sys PSV 90 cm/s    Right peroneal sys PSV 69 cm/s    Left CFA  cm/s    Left profunda sys  cm/s    Left super femoral prox sys  cm/s    Left super femoral mid sys  cm/s    Left super femoral dist sys PSV 91 cm/s    Left popliteal PSV 58 cm/s    Left ant tibial sys PSV 88 cm/s    Left tib/per trunk sys PSV 79 cm/s    Left post tibial sys  cm/s    Left peroneal sys  cm/s    Right BARRON 0.88     Right super femoral ostial sys PSV 85.00  cm/s    Right post tibial sys PSV 0.00 cm/s    Left BARRON 0.96     Left super femoral ostial sys .00 cm/s   Lipid Panel    Collection Time: 04/07/25  9:11 AM   Result Value Ref Range    Cholesterol Total 138 120 - 199 mg/dL    Triglyceride 53 30 - 150 mg/dL    HDL Cholesterol 40 40 - 75 mg/dL    LDL Cholesterol 87.4 63.0 - 159.0 mg/dL    HDL/Cholesterol Ratio 29.0 20.0 - 50.0 %    Cholesterol/HDL Ratio 3.5 2.0 - 5.0    Non HDL Cholesterol 98 mg/dL   TSH    Collection Time: 04/07/25  9:11 AM   Result Value Ref Range    TSH 1.946 0.400 - 4.000 uIU/mL   Comprehensive Metabolic Panel    Collection Time: 04/07/25  9:11 AM   Result Value Ref Range    Sodium 135 (L) 136 - 145 mmol/L    Potassium 5.0 3.5 - 5.1 mmol/L    Chloride 94 (L) 95 - 110 mmol/L    CO2 35 (H) 23 - 29 mmol/L    Glucose 113 (H) 70 - 110 mg/dL    BUN 10 8 - 23 mg/dL    Creatinine 0.6 0.5 - 1.4 mg/dL    Calcium 9.1 8.7 - 10.5 mg/dL    Protein Total 7.8 6.0 - 8.4 gm/dL    Albumin 3.8 3.5 - 5.2 g/dL    Bilirubin Total 0.6 0.1 - 1.0 mg/dL    ALP 36 (L) 40 - 150 unit/L    AST 18 11 - 45 unit/L    ALT 14 10 - 44 unit/L    Anion Gap 6 (L) 8 - 16 mmol/L    eGFR >60 >60 mL/min/1.73/m2   Hemoglobin A1C    Collection Time: 04/07/25  9:11 AM   Result Value Ref Range    Hemoglobin A1c 5.7 (H) 4.0 - 5.6 %    Estimated Average Glucose 117 68 - 131 mg/dL   Microalbumin/Creatinine Ratio, Urine    Collection Time: 04/07/25  9:11 AM   Result Value Ref Range    Urine Microalbumin 6.0   ug/mL    Urine Creatinine 54.0 15.0 - 325.0 mg/dL    Microalbumin/Creatinine Ratio Urine 11.1 <=30.0 ug/mg   URIC ACID    Collection Time: 04/07/25  9:11 AM   Result Value Ref Range    Uric Acid 3.6 2.4 - 5.7 mg/dL   B-TYPE NATRIURETIC PEPTIDE    Collection Time: 04/07/25  9:11 AM   Result Value Ref Range     (H) 0 - 99 pg/mL   CBC with Differential    Collection Time: 04/07/25  9:11 AM   Result Value Ref Range    WBC 3.43 (L) 3.90 - 12.70 K/uL    RBC 5.42 (H) 4.00 - 5.40 M/uL     HGB 15.7 12.0 - 16.0 gm/dL    HCT 48.9 (H) 37.0 - 48.5 %    MCV 90 82 - 98 fL    MCH 29.0 27.0 - 31.0 pg    MCHC 32.1 32.0 - 36.0 g/dL    RDW 13.1 11.5 - 14.5 %    Platelet Count 273 150 - 450 K/uL    MPV 9.5 9.2 - 12.9 fL    Nucleated RBC 0 <=0 /100 WBC    Neut % 56.5 38 - 73 %    Lymph % 28.9 18 - 48 %    Mono % 12.8 4 - 15 %    Eos % 0.9 <=8 %    Basophil % 0.6 <=1.9 %    Imm Grans % 0.3 0.0 - 0.5 %    Neut # 1.94 1.8 - 7.7 K/uL    Lymph # 0.99 (L) 1 - 4.8 K/uL    Mono # 0.44 0.3 - 1 K/uL    Eos # 0.03 <=0.5 K/uL    Baso # 0.02 <=0.2 K/uL    Imm Grans # 0.01 0.00 - 0.04 K/uL         Assessment    1. Type 2 diabetes mellitus with diabetic polyneuropathy, without long-term current use of insulin    2. Chronic gout without tophus, unspecified cause, unspecified site    3. COPD (chronic obstructive pulmonary disease) with chronic bronchitis    4. Chronic diastolic heart failure    5. Hyperlipidemia, unspecified hyperlipidemia type    6. Malignant neoplasm of upper-inner quadrant of left breast in female, estrogen receptor positive    7. Bilateral leg and foot pain          Plan    Kaleigh was seen today for annual exam.    Diagnoses and all orders for this visit:    Type 2 diabetes mellitus with diabetic polyneuropathy, without long-term current use of insulin  -     gabapentin (NEURONTIN) 100 MG capsule; Take 1 capsule (100 mg total) by mouth 3 (three) times daily. For leg pains    Chronic gout without tophus, unspecified cause, unspecified site  -     allopurinoL (ZYLOPRIM) 100 MG tablet; Take 1 tablet (100 mg total) by mouth once daily.    COPD (chronic obstructive pulmonary disease) with chronic bronchitis  -     fluticasone-salmeterol diskus inhaler 250-50 mcg; Inhale 1 puff into the lungs 2 (two) times daily. Controller  -     Ambulatory referral/consult to Home Health; Future    Chronic diastolic heart failure  -     Ambulatory referral/consult to Home Health; Future    Hyperlipidemia, unspecified  hyperlipidemia type    Malignant neoplasm of upper-inner quadrant of left breast in female, estrogen receptor positive    Bilateral leg and foot pain  -     diclofenac (VOLTAREN) 75 MG EC tablet; Take 1 tablet (75 mg total) by mouth 2 (two) times daily as needed (pain).  -     gabapentin (NEURONTIN) 100 MG capsule; Take 1 capsule (100 mg total) by mouth 3 (three) times daily. For leg pains  -     Ambulatory referral/consult to Home Health; Future    Physically, she looks okay.      Spoke with Dr. Cedeno.  He still comes to [place] twice a month.  His office will try and move her upcoming appointment to that office instead.  Should be much more convenient.  They will lost some of the mammogram.      Blood pressure looks fine today.  Continue Bumex, as needed, for swelling.  Do not take if SBP less than 100, DBP less than 60.      Mobility is limited.  We will ask home health to come out weekly to check blood pressure, check leg swelling.  Ideally, we would like them to show her how to wrap her feet with an Ace wrap so we can get some degree of compression along with elevation.    Care at home coming out monthly to check on her.  Like this to continue.    Med refills as above.    FOLLOW-UP:  Follow up in about 1 year (around 4/14/2026) for check up.    I spent a total of 40 minutes face to face and non-face to face on the date of this visit.This includes time preparing to see the patient (eg, review of tests, notes), obtaining and/or reviewing additional history from an independent historian and/or outside medical records, documenting clinical information in the electronic health record, independently interpreting results and/or communicating results to the patient/family/caregiver, or care coordinator.  Visit today included increased complexity associated with the care of the episodic problem addressed and managing the longitudinal care of the patient due to the serious and/or complex managed  problem(s).    Signed by:  Lucio Morris MD       [1]   Current Outpatient Medications on File Prior to Visit   Medication Sig Dispense Refill    albuterol (PROVENTIL/VENTOLIN HFA) 90 mcg/actuation inhaler INHALE 2 PUFFS INTO THE LUNGS EVERY SIX HOURS AS NEEDED FOR WHEEZING 18 g 11    aspirin 81 MG Chew Take 81 mg by mouth once daily.      bisoprolol (ZEBETA) 5 MG tablet TAKE 1 TABLET EVERY DAY 90 tablet 3    blood glucose control high,low Soln 1 each by Misc.(Non-Drug; Combo Route) route once a week. 1 each 3    blood sugar diagnostic (ACCU-CHEK GUIDE TEST STRIPS) Strp Inject 1 strip into the skin 3 (three) times daily. 300 strip 3    blood-glucose meter kit To check BG 3 times daily, to use with insurance preferred meter 1 each 0    bumetanide (BUMEX) 1 MG tablet Take 2 tablets (2 mg total) by mouth once daily. 60 tablet 11    ipratropium (ATROVENT) 21 mcg (0.03 %) nasal spray 2 sprays by Nasal route.      lancets [facility] To check BG 3 times daily, to use with insurance preferred meter 200 each 3    ondansetron (ZOFRAN-ODT) 8 MG TbDL Dissolve 1 tablet (8 mg total) by mouth every 8 (eight) hours as needed (Nausea). 10 tablet 0    simvastatin (ZOCOR) 20 MG tablet TAKE 1 TABLET NIGHTLY. 90 tablet 3    timoloL maleate, PF, 0.5 % Dpet Place into both eyes.      [DISCONTINUED] allopurinoL (ZYLOPRIM) 100 MG tablet Take 1 tablet (100 mg total) by mouth once daily. 90 tablet 2    [DISCONTINUED] diclofenac (VOLTAREN) 75 MG EC tablet Take 1 tablet (75 mg total) by mouth 2 (two) times daily as needed (pain). 180 tablet 3    [DISCONTINUED] gabapentin (NEURONTIN) 100 MG capsule Take 1 capsule (100 mg total) by mouth 3 (three) times daily. For leg pains 270 capsule 3    latanoprost 0.005 % ophthalmic solution Place 1 drop into both eyes once daily. 7.5 mL 3    timolol maleate 0.5% (TIMOPTIC) 0.5 % Drop Place 1 drop into both eyes 2 (two) times daily. 30 mL 4    [DISCONTINUED] fluticasone-salmeterol diskus inhaler 250-50 mcg  Inhale 1 puff into the lungs 2 (two) times daily. Controller 180 each 3     No current facility-administered medications on file prior to visit.

## 2025-04-16 PROCEDURE — G0180 MD CERTIFICATION HHA PATIENT: HCPCS | Mod: ,,, | Performed by: FAMILY MEDICINE

## 2025-04-17 DIAGNOSIS — E78.5 HYPERLIPIDEMIA, UNSPECIFIED HYPERLIPIDEMIA TYPE: ICD-10-CM

## 2025-04-17 DIAGNOSIS — R06.09 DOE (DYSPNEA ON EXERTION): ICD-10-CM

## 2025-04-17 DIAGNOSIS — E11.42 TYPE 2 DIABETES MELLITUS WITH DIABETIC POLYNEUROPATHY, WITHOUT LONG-TERM CURRENT USE OF INSULIN: ICD-10-CM

## 2025-04-17 DIAGNOSIS — I50.42 CHRONIC COMBINED SYSTOLIC AND DIASTOLIC HEART FAILURE: ICD-10-CM

## 2025-04-17 DIAGNOSIS — I50.32 CHRONIC DIASTOLIC HEART FAILURE: ICD-10-CM

## 2025-04-17 RX ORDER — LANCETS
EACH MISCELLANEOUS
Qty: 300 EACH | Refills: 3 | Status: SHIPPED | OUTPATIENT
Start: 2025-04-17

## 2025-04-17 NOTE — TELEPHONE ENCOUNTER
No care due was identified.  [facility] Embedded Care Due Messages. Reference number: [id number].   4/17/2025 3:32:47 PM CDT

## 2025-04-17 NOTE — TELEPHONE ENCOUNTER
----- Message from Tana sent at 4/17/2025  3:16 PM CDT -----  .Type:  RX Refill RequestWho Called: Oneida Nieves Refill or New Rx:refillRX Name and Strength: bisoprolol (ZEBETA) 5 MG tablet, bumetanide (BUMEX) 1 MG tablet and diabetes medication, diabetic monitor, accu guide, test strips, lantus, [id number] mgHow is the patient currently taking it? (ex. 1XDay):dailyIs this a 30 day or 90 day RX:[facility] phone number[phone] [place] [phone] or Mail Order:localOrdering Provider:Would the patient rather a call back or a response via [facility]? Call Sean Call Back Number:.[phone]  Additional Information:

## 2025-04-17 NOTE — TELEPHONE ENCOUNTER
Refill Routing Note   Medication(s) are not appropriate for processing by [facility] for the following reason(s):        No active prescription written by provider    [facility] action(s):  Defer  Approve               Appointments  past 12m or future 3m with PCP    Date Provider   Last Visit   4/14/2025 Lucio Morris MD   Next Visit   Visit date not found Lucio Morris MD   ED visits in past 90 days: 0        Note composed:5:35 PM 04/17/2025

## 2025-04-21 RX ORDER — BISOPROLOL FUMARATE 5 MG/1
5 TABLET, FILM COATED ORAL DAILY
Qty: 90 TABLET | Refills: 3 | Status: SHIPPED | OUTPATIENT
Start: 2025-04-21

## 2025-04-21 RX ORDER — BUMETANIDE 1 MG/1
2 TABLET ORAL DAILY
Qty: 180 TABLET | Refills: 3 | Status: SHIPPED | OUTPATIENT
Start: 2025-04-21 | End: 2026-04-21

## 2025-04-21 RX ORDER — SIMVASTATIN 20 MG/1
20 TABLET, FILM COATED ORAL NIGHTLY
Qty: 90 TABLET | Refills: 3 | Status: SHIPPED | OUTPATIENT
Start: 2025-04-21

## 2025-05-08 ENCOUNTER — CARE AT HOME (OUTPATIENT)
Dept: HOME HEALTH SERVICES | Facility: CLINIC | Age: 85
End: 2025-05-08
Payer: MEDICARE

## 2025-05-08 VITALS
OXYGEN SATURATION: 94 % | HEART RATE: 84 BPM | SYSTOLIC BLOOD PRESSURE: 134 MMHG | TEMPERATURE: 98 F | RESPIRATION RATE: 18 BRPM | DIASTOLIC BLOOD PRESSURE: 58 MMHG

## 2025-05-08 DIAGNOSIS — M25.562 ACUTE PAIN OF BOTH KNEES: Primary | ICD-10-CM

## 2025-05-08 DIAGNOSIS — M25.561 ACUTE PAIN OF BOTH KNEES: Primary | ICD-10-CM

## 2025-05-08 DIAGNOSIS — R60.9 DEPENDENT EDEMA: ICD-10-CM

## 2025-05-08 RX ORDER — DICLOFENAC SODIUM 10 MG/G
2 GEL TOPICAL 2 TIMES DAILY
Qty: 1 EACH | Refills: 3 | Status: SHIPPED | OUTPATIENT
Start: 2025-05-08 | End: 2025-05-18

## 2025-05-08 NOTE — ASSESSMENT & PLAN NOTE
Chronic improved since last visit  Continue bumetanide 2mg PO QD   Patient reports unable to tolerate compression stocking.   Elevate lower extremities while sitting

## 2025-05-08 NOTE — PROGRESS NOTES
[facility]  Medical Chronic Care Home Visit    Encounter Provider: Marti Yanez   PCP: Lucio Morris MD  Consult Requested By: No ref. provider found    HISTORY OF PRESENT ILLNESS      Patient ID: Kaleigh Nieves is a 84 y.o. female is being seen in the home due to physical debility that presents a taxing effort to leave the home, to mitigate high risk of hospital readmission and/or due to the limited availability of reliable or safe options for transportation to the point of access to the provider. Prior to treatment on this visit the chart was reviewed and patient verbal consent was obtained.    Chronic medical conditions synopsis:    1) Patient is a 84 year old female with diagnoses of glaucoma, chronic bronchitis, arterial insufficiency of lower extremity, CHF, CAD, SOB with exertion, HLD, HTN, Breast CA, T2DM, GERD, Gout, and dependent edema. Patient reports that she has been doing ok overall. She states that her knees have been bothering her for the past day. She has diclofenac to take by mouth but does not take it. She states that the edema in her legs has gone down but her feet are still swollen. She is still short of breath with exertion.  2) Recent developments: bilateral knee pain x 1 day   3) Reason for consult and visit: routine care at home visit    DECISION MAKING TODAY       Assessment & Plan:  1. Acute pain of both knees  -     diclofenac sodium (VOLTAREN) 1 % Gel; Apply 2 g topically 2 (two) times daily. for 10 days  Dispense: 1 each; Refill: 3    2. Dependent edema  Assessment & Plan:  Chronic improved since last visit  Continue bumetanide 2mg PO QD   Patient reports unable to tolerate compression stocking.   Elevate lower extremities while sitting             ENVIRONMENT OF CARE      Family and/or Caregiver present at visit?  No  Name of Caregiver: None  History provided by: patient    4Ms for Medical Decision-Making in Older Adults    Last Completed EAWV:  2022    MEDICATIONS:  High Risk Medications:  Total Active Medications: 1  gabapentin - 100 MG    MOBILITY:  Activities of Daily Livin/27/2022    11:21 AM   Activities of Daily Living   Ambulation Assistance Required   Ambulation Assistance Cane   Dressing Independent   Transfers Independent   Toileting Continent of bladder;Continent of bowel   Feeding Independent   Cleaning home/Chores Independent   Telephone use Independent   Shopping Independent   Paying bills Independent   Taking meds Independent     Fall Risk:      2025     1:00 PM 2025     9:40 AM 10/8/2024    11:15 AM   Fall Risk Assessment - Outpatient   Mobility Status Ambulatory w/ assistance Ambulatory w/ assistance Ambulatory w/ assistance   Number of falls 0 0 0   Identified as fall risk True True False     Disability Status:      2022    11:23 AM   Disability Status   Are you deaf or do you have serious difficulty hearing? N   Are you blind or do you have serious difficulty seeing, even when wearing glasses? N   Because of a physical, mental, or emotional condition, do you have serious difficulty concentrating, remembering, or making decisions? N   Do you have serious difficulty walking or climbing stairs? Y   Do you have difficulty dressing or bathing? N   Because of a physical, mental, or emotional condition, do you have difficulty doing errands alone such as visiting a doctor's office or shopping? N     Nutrition Screenin/27/2022    11:19 AM   Nutrition Screening   Has food intake declined over the past three months due to loss of appetite, digestive problems, chewing or swallowing difficulties? No decrease in food intake   Involuntary weight loss during the last 3 months? No weight loss   Mobility? Goes out   Has the patient suffered psychological stress or acute disease in the past three months? No   Neuropsychological problems? No psychological problems   Body Mass Index (BMI)?  BMI 23 or greater   Screening  Score 14   Interpretation Normal nutritional status    Screening Score: 0-7 Malnourished, 8-11 At Risk, 12-14 Normal  Get Up and Go:      2022    11:16 AM 2021    11:15 AM 2019    11:03 AM 6/15/2018    11:28 AM 2017    11:18 AM   Get Up and Go   Trial 1 15 seconds -- 15 seconds 7 seconds 5 seconds   Trial 2     6 seconds   Trial 3     6 seconds   Average     5.67     Whisper Test:      2022    11:16 AM   Whisper Test   Whisper Test Normal           MENTATION:   Has Dementia Dx: No  Has Anxiety Dx: No    Depression Patient Health Questionnaire:      2025    12:00 PM   Depression Patient Health Questionnaire   Over the last two weeks how often have you been bothered by little interest or pleasure in doing things Not at all   Over the last two weeks how often have you been bothered by feeling down, depressed or hopeless Not at all   PHQ-2 Total Score 0     Cognitive Function Screenin/27/2022    11:16 AM   Cognitive Function Screening   Clock Drawing Test 2    Mini-Cog 3 Minute Recall 2   Cognitive Function Screening 4       Data saved with a previous flowsheet row definition     Cognitive Function Screening Total - Less than 4 = Abnormal,  Greater than or equal to 4 = Normal        WHAT MATTERS MOST:  Advance Care Planning   ACP Status:   Patient has had an ACP conversation  Living Will: No  Power of : No  [place]: No    What is most important right now is to focus on spending time at homeavoiding the hospital    Accordingly, we have decided that the best plan to meet the patient's goals includes continuing with treatment      What matters most to patient today is: knee pain           Is patient hospice appropriate: No  (If needed, use PPS <30 or FAST score >7)  Was referral to hospice placed: No    Impression upon entering the home:  Physical Dwelling: single family home   Appearance of home environment: cleaniness: clean, walking pathways: clear, lighting: adequate, and  home structure: sound structure  Functional Status: independent  Mobility: ambulatory with device  Nutritional access: adequate intake and access  Home Health: No, and does not need it at this time   DME/Supplies: cane     Diagnostic tests reviewed/disposition: No diagnosic tests pending after this hospitalization.  Disease/illness education: Arthritis  Establishment or re-establishment of referral orders for community resources: No other necessary community resources.   Discussion with other health care providers: No discussion with other health care providers necessary.   Does patient have a PCP at [place]? Yes   Repatriation plan with PCP? Care at Home reason: transportation   Does patient have an ostomy (ileostomy, colostomy, suprapubic catheter, nephrostomy tube, tracheostomy, PEG tube, pleurex catheter, cholecystostomy, etc)? No  Were BPAs reviewed? Yes    Social History     Socioeconomic History    Marital status: Single    Number of children: 0   Tobacco Use    Smoking status: Former     Current packs/day: 0.00     Average packs/day: 0.3 packs/day for 24.0 years (6.0 ttl pk-yrs)     Types: Cigarettes     Start date: 1952     Quit date: 1976     Years since quittin.9    Smokeless tobacco: Never   Substance and Sexual Activity    Alcohol use: No    Drug use: No    Sexual activity: Not Currently     Social Drivers of Health     Financial Resource Strain: Low Risk  (2022)    Overall Financial Resource Strain (CARDIA)     Difficulty of Paying Living Expenses: Not hard at all   Food Insecurity: No Food Insecurity (2022)    Hunger Vital Sign     Worried About Running Out of Food in the Last Year: Never true     Ran Out of Food in the Last Year: Never true   Transportation Needs: No Transportation Needs (2022)    PRAPARE - Transportation     Lack of Transportation (Medical): No     Lack of Transportation (Non-Medical): No   Physical Activity: Inactive (2022)    Exercise Vital Sign      Days of Exercise per Week: 0 days     Minutes of Exercise per Session: 0 min   Stress: No Stress Concern Present (9/27/2022)    [employer] - Occupational Stress Questionnaire     Feeling of Stress : Not at all   Housing Stability: Low Risk  (9/27/2022)    Housing Stability Vital Sign     Unable to Pay for Housing in the Last Year: No     Number of Places Lived in the Last Year: 1     Unstable Housing in the Last Year: No         OBJECTIVE:     Vital Signs:  Vitals:    05/08/25 1133   BP: (!) 134/58   Pulse: 84   Resp: 18   Temp: 97.5 °F (36.4 °C)       Review of Systems   Constitutional: Negative.    HENT: Negative.     Eyes: Negative.    Respiratory:  Positive for shortness of breath (with exertion).    Cardiovascular:  Positive for leg swelling.   Gastrointestinal: Negative.    Endocrine: Negative.    Genitourinary: Negative.    Musculoskeletal:  Positive for arthralgias.   Skin: Negative.    Allergic/Immunologic: Negative.    Neurological: Negative.    Hematological: Negative.    Psychiatric/Behavioral: Negative.         Physical Exam:  Physical Exam  Vitals reviewed.   Constitutional:       General: She is not in acute distress.     Appearance: She is not ill-appearing.   HENT:      Head: Normocephalic and atraumatic.      Nose: Nose normal.      Mouth/Throat:      Mouth: Mucous membranes are moist.      Pharynx: Oropharynx is clear.   Eyes:      Pupils: Pupils are equal, round, and reactive to light.   Pulmonary:      Effort: Pulmonary effort is normal.      Breath sounds: Examination of the right-lower field reveals decreased breath sounds. Examination of the left-lower field reveals decreased breath sounds. Decreased breath sounds present.   Abdominal:      General: Bowel sounds are normal.      Palpations: Abdomen is soft.   Musculoskeletal:         General: Normal range of motion.      Cervical back: Neck supple.      Right lower leg: Edema present.      Left lower leg: Edema  present.   Skin:     General: Skin is warm and dry.      Capillary Refill: Capillary refill takes less than 2 seconds.   Neurological:      Mental Status: She is alert and oriented to person, place, and time.   Psychiatric:         Mood and Affect: Mood normal.         Behavior: Behavior normal.         Thought Content: Thought content normal.         Judgment: Judgment normal.         INSTRUCTIONS FOR PATIENT:     Scheduled Follow-up, Appts Reviewed with Modifications if Needed: Yes  Future Appointments   Date Time Provider Department Center   5/28/2025  1:20 PM [id number] [place]   5/28/2025  2:00 PM Yahir Cedeno MD [facility] GENSRG [place]   6/12/2025  8:00 AM Marti Fair NP [facility] C3HV [place]   9/3/2025  9:40 AM Hung Partida MD [facility] CARDIO [place]   4/14/2026  9:30 AM Lucio Morris MD [facility] ESCOBAR [place]       Current Medications[1]    Medication Reconciliation:  Were medications changed during this appointment? Yes  Were medications in the home? Yes  Is the patient taking the medications as directed? Yes  Does the patient/caregiver understand the medications and changes? Yes  Does updated med list accurately reflects meds patient is currently taking? Yes    Signature: Marti Fair NP         [1]   Current Outpatient Medications:     albuterol (PROVENTIL/VENTOLIN HFA) 90 mcg/actuation inhaler, INHALE 2 PUFFS INTO THE LUNGS EVERY SIX HOURS AS NEEDED FOR WHEEZING, Disp: 18 g, Rfl: 11    allopurinoL (ZYLOPRIM) 100 MG tablet, Take 1 tablet (100 mg total) by mouth once daily., Disp: 90 tablet, Rfl: 3    aspirin 81 MG Chew, Take 81 mg by mouth once daily., Disp: , Rfl:     bisoprolol (ZEBETA) 5 MG tablet, Take 1 tablet (5 mg total) by mouth once daily., Disp: 90 tablet, Rfl: 3    blood glucose control high,low Soln, 1 each by Misc.(Non-Drug; Combo Route) route once a week., Disp: 1 each, Rfl: 3    blood sugar diagnostic (ACCU-CHEK GUIDE TEST STRIPS) Strp, Inject 1 strip  into the skin 3 (three) times daily., Disp: 300 strip, Rfl: 3    blood-glucose meter kit, To check BG 3 times daily, to use with insurance preferred meter, Disp: 1 each, Rfl: 0    bumetanide (BUMEX) 1 MG tablet, Take 2 tablets (2 mg total) by mouth once daily., Disp: 180 tablet, Rfl: 3    diclofenac (VOLTAREN) 75 MG EC tablet, Take 1 tablet (75 mg total) by mouth 2 (two) times daily as needed (pain)., Disp: 180 tablet, Rfl: 3    diclofenac sodium (VOLTAREN) 1 % Gel, Apply 2 g topically 2 (two) times daily. for 10 days, Disp: 1 each, Rfl: 3    fluticasone-salmeterol diskus inhaler 250-50 mcg, Inhale 1 puff into the lungs 2 (two) times daily. Controller, Disp: 180 each, Rfl: 3    gabapentin (NEURONTIN) 100 MG capsule, Take 1 capsule (100 mg total) by mouth 3 (three) times daily. For leg pains, Disp: 270 capsule, Rfl: 3    ipratropium (ATROVENT) 21 mcg (0.03 %) nasal spray, 2 sprays by Nasal route., Disp: , Rfl:     lancets Misc, To check BG 3 times daily, to use with insurance preferred meter, Disp: 300 each, Rfl: 3    latanoprost 0.005 % ophthalmic solution, Place 1 drop into both eyes once daily., Disp: 7.5 mL, Rfl: 3    ondansetron (ZOFRAN-ODT) 8 MG TbDL, Dissolve 1 tablet (8 mg total) by mouth every 8 (eight) hours as needed (Nausea)., Disp: 10 tablet, Rfl: 0    simvastatin (ZOCOR) 20 MG tablet, Take 1 tablet (20 mg total) by mouth every evening., Disp: 90 tablet, Rfl: 3    timolol maleate 0.5% (TIMOPTIC) 0.5 % Drop, Place 1 drop into both eyes 2 (two) times daily., Disp: 30 mL, Rfl: 4    timoloL maleate, PF, 0.5 % Dpet, Place into both eyes., Disp: , Rfl:

## 2025-05-28 ENCOUNTER — OFFICE VISIT (OUTPATIENT)
Dept: SURGERY | Facility: CLINIC | Age: 85
End: 2025-05-28
Payer: MEDICARE

## 2025-05-28 VITALS — WEIGHT: 191.38 LBS | HEIGHT: 66 IN | BODY MASS INDEX: 30.76 KG/M2

## 2025-05-28 DIAGNOSIS — C50.212 MALIGNANT NEOPLASM OF UPPER-INNER QUADRANT OF LEFT BREAST IN FEMALE, ESTROGEN RECEPTOR POSITIVE: Primary | ICD-10-CM

## 2025-05-28 DIAGNOSIS — Z17.0 MALIGNANT NEOPLASM OF UPPER-INNER QUADRANT OF LEFT BREAST IN FEMALE, ESTROGEN RECEPTOR POSITIVE: Primary | ICD-10-CM

## 2025-05-28 PROCEDURE — 1126F AMNT PAIN NOTED NONE PRSNT: CPT | Mod: CPTII,S$GLB,, | Performed by: SURGERY

## 2025-05-28 PROCEDURE — 99214 OFFICE O/P EST MOD 30 MIN: CPT | Mod: S$GLB,,, | Performed by: SURGERY

## 2025-05-28 PROCEDURE — 99999 PR PBB SHADOW E&M-EST. PATIENT-LVL III: CPT | Mod: PBBFAC,,, | Performed by: SURGERY

## 2025-05-28 PROCEDURE — 1159F MED LIST DOCD IN RCRD: CPT | Mod: CPTII,S$GLB,, | Performed by: SURGERY

## 2025-05-28 PROCEDURE — 1160F RVW MEDS BY RX/DR IN RCRD: CPT | Mod: CPTII,S$GLB,, | Performed by: SURGERY

## 2025-06-12 ENCOUNTER — CARE AT HOME (OUTPATIENT)
Dept: HOME HEALTH SERVICES | Facility: CLINIC | Age: 85
End: 2025-06-12
Payer: MEDICARE

## 2025-06-12 VITALS
TEMPERATURE: 98 F | DIASTOLIC BLOOD PRESSURE: 60 MMHG | SYSTOLIC BLOOD PRESSURE: 120 MMHG | RESPIRATION RATE: 18 BRPM | OXYGEN SATURATION: 99 % | HEART RATE: 86 BPM

## 2025-06-12 DIAGNOSIS — J44.89 COPD (CHRONIC OBSTRUCTIVE PULMONARY DISEASE) WITH CHRONIC BRONCHITIS: Primary | ICD-10-CM

## 2025-06-12 DIAGNOSIS — J42 CHRONIC BRONCHITIS, UNSPECIFIED CHRONIC BRONCHITIS TYPE: Primary | ICD-10-CM

## 2025-06-12 RX ORDER — FLUTICASONE PROPIONATE AND SALMETEROL XINAFOATE 115; 21 UG/1; UG/1
2 AEROSOL, METERED RESPIRATORY (INHALATION) EVERY 12 HOURS
Qty: 12 G | Refills: 11 | Status: SHIPPED | OUTPATIENT
Start: 2025-06-12 | End: 2026-06-12

## 2025-06-12 NOTE — ASSESSMENT & PLAN NOTE
She reports that it is sometimes worse at night but it is chronic in nature and it is not worse than it was.  Continue albuterol inhaler PRN and Wixela 1 puff BID or Advair which ever one is prescribed

## 2025-06-12 NOTE — PROGRESS NOTES
[facility]  Medical Chronic Care Home Visit    Encounter Provider: Marti Ynaez   PCP: Lucio Morris MD  Consult Requested By: No ref. provider found    HISTORY OF PRESENT ILLNESS      Patient ID: Kaleigh Nieves is a 84 y.o. female is being seen in the home due to physical debility that presents a taxing effort to leave the home, to mitigate high risk of hospital readmission and/or due to the limited availability of reliable or safe options for transportation to the point of access to the provider. Prior to treatment on this visit the chart was reviewed and patient verbal consent was obtained.    Chronic medical conditions synopsis:    1) Patient is a 84 year old female with diagnoses of glaucoma, chronic bronchitis, arterial insufficiency of lower extremity, CHF, CAD, SOB with exertion, HLD, HTN, Breast CA, T2DM, GERD, Gout, and dependent edema. Patient reports that she has been feeling a little short of breath especially at night but reports that it is not any worse than it has been. She reports that she has trouble using the Wixela device and is not using it twice a day like she is supposed to.   2) Recent developments: No new issues and reports that her knees feel some what better  3) Reason for consult and visit: routine care at home follow up    DECISION MAKING TODAY       Assessment & Plan:  1. Chronic bronchitis, unspecified chronic bronchitis type  Assessment & Plan:  She reports that it is sometimes worse at night but it is chronic in nature and it is not worse than it was.  Continue albuterol inhaler PRN and Wixela 1 puff BID or Advair which ever one is prescribed             ENVIRONMENT OF CARE      Family and/or Caregiver present at visit?  No  Name of Caregiver: none  History provided by: patient    4Ms for Medical Decision-Making in Older Adults    Last Completed EAWV: 9/27/2022    MEDICATIONS:  High Risk Medications:  Total Active Medications: 1  gabapentin - 100  MG    MOBILITY:  Activities of Daily Livin/27/2022    11:21 AM   Activities of Daily Living   Ambulation Assistance Required   Ambulation Assistance Cane   Dressing Independent   Transfers Independent   Toileting Continent of bladder;Continent of bowel   Feeding Independent   Cleaning home/Chores Independent   Telephone use Independent   Shopping Independent   Paying bills Independent   Taking meds Independent     Fall Risk:      2025     1:00 PM 2025     9:40 AM 10/8/2024    11:15 AM   Fall Risk Assessment - Outpatient   Mobility Status Ambulatory w/ assistance Ambulatory w/ assistance Ambulatory w/ assistance   Number of falls 0 0 0   Identified as fall risk True True False     Disability Status:      2022    11:23 AM   Disability Status   Are you deaf or do you have serious difficulty hearing? N   Are you blind or do you have serious difficulty seeing, even when wearing glasses? N   Because of a physical, mental, or emotional condition, do you have serious difficulty concentrating, remembering, or making decisions? N   Do you have serious difficulty walking or climbing stairs? Y   Do you have difficulty dressing or bathing? N   Because of a physical, mental, or emotional condition, do you have difficulty doing errands alone such as visiting a doctor's office or shopping? N     Nutrition Screenin/27/2022    11:19 AM   Nutrition Screening   Has food intake declined over the past three months due to loss of appetite, digestive problems, chewing or swallowing difficulties? No decrease in food intake   Involuntary weight loss during the last 3 months? No weight loss   Mobility? Goes out   Has the patient suffered psychological stress or acute disease in the past three months? No   Neuropsychological problems? No psychological problems   Body Mass Index (BMI)?  BMI 23 or greater   Screening Score 14   Interpretation Normal nutritional status    Screening Score: 0-7 Malnourished, 8-11 At  Risk, 12-14 Normal  Get Up and Go:      2022    11:16 AM 2021    11:15 AM 2019    11:03 AM 6/15/2018    11:28 AM 2017    11:18 AM   Get Up and Go   Trial 1 15 seconds -- 15 seconds 7 seconds 5 seconds   Trial 2     6 seconds   Trial 3     6 seconds   Average     5.67     Whisper Test:      2022    11:16 AM   Whisper Test   Whisper Test Normal           MENTATION:   Has Dementia Dx: No  Has Anxiety Dx: No    Depression Patient Health Questionnaire:      2025    12:00 PM   Depression Patient Health Questionnaire   Over the last two weeks how often have you been bothered by little interest or pleasure in doing things Not at all   Over the last two weeks how often have you been bothered by feeling down, depressed or hopeless Not at all   PHQ-2 Total Score 0     Cognitive Function Screenin/27/2022    11:16 AM   Cognitive Function Screening   Clock Drawing Test 2    Mini-Cog 3 Minute Recall 2   Cognitive Function Screening 4       Data saved with a previous flowsheet row definition     Cognitive Function Screening Total - Less than 4 = Abnormal,  Greater than or equal to 4 = Normal        WHAT MATTERS MOST:  Advance Care Planning   ACP Status:   Patient has had an ACP conversation  Living Will: No  Power of : No  [place]: No    What is most important right now is to focus on spending time at homeavoiding the hospital    Accordingly, we have decided that the best plan to meet the patient's goals includes continuing with treatment             Is patient hospice appropriate: No  (If needed, use PPS <30 or FAST score >7)  Was referral to hospice placed: No    Impression upon entering the home:  Physical Dwelling: single family home   Appearance of home environment: cleaniness: clean, walking pathways: clear, lighting: adequate, and home structure: sound structure  Functional Status: independent  Mobility: ambulatory with device  Nutritional access: adequate intake and  access  Home Health: No, and does not need it at this time   DME/Supplies: cane     Diagnostic tests reviewed/disposition: No diagnosic tests pending after this hospitalization.  Disease/illness education: Chronic Bronchitis  Establishment or re-establishment of referral orders for community resources: No other necessary community resources.   Discussion with other health care providers: No discussion with other health care providers necessary.   Does patient have a PCP at [place]? Yes   Repatriation plan with PCP? follow-up with PCP within 90d   Does patient have an ostomy (ileostomy, colostomy, suprapubic catheter, nephrostomy tube, tracheostomy, PEG tube, pleurex catheter, cholecystostomy, etc)? No  Were BPAs reviewed? Yes    Social History     Socioeconomic History    Marital status: Single    Number of children: 0   Tobacco Use    Smoking status: Former     Current packs/day: 0.00     Average packs/day: 0.3 packs/day for 24.0 years (6.0 ttl pk-yrs)     Types: Cigarettes     Start date: 1952     Quit date: 1976     Years since quittin.9    Smokeless tobacco: Never   Substance and Sexual Activity    Alcohol use: No    Drug use: No    Sexual activity: Not Currently     Social Drivers of Health     Financial Resource Strain: Low Risk  (2022)    Overall Financial Resource Strain (CARDIA)     Difficulty of Paying Living Expenses: Not hard at all   Food Insecurity: No Food Insecurity (2022)    Hunger Vital Sign     Worried About Running Out of Food in the Last Year: Never true     Ran Out of Food in the Last Year: Never true   Transportation Needs: No Transportation Needs (2022)    PRAPARE - Transportation     Lack of Transportation (Medical): No     Lack of Transportation (Non-Medical): No   Physical Activity: Inactive (2022)    Exercise Vital Sign     Days of Exercise per Week: 0 days     Minutes of Exercise per Session: 0 min   Stress: No Stress Concern Present (2022)    [employer] - Occupational Stress Questionnaire     Feeling of Stress : Not at all   Housing Stability: Low Risk  (9/27/2022)    Housing Stability Vital Sign     Unable to Pay for Housing in the Last Year: No     Number of Places Lived in the Last Year: 1     Unstable Housing in the Last Year: No         OBJECTIVE:     Vital Signs:  There were no vitals filed for this visit.    Review of Systems   Constitutional: Negative.    HENT: Negative.     Respiratory:  Positive for shortness of breath.    Cardiovascular:  Positive for leg swelling.   Gastrointestinal: Negative.    Endocrine: Negative.    Genitourinary: Negative.    Musculoskeletal:  Positive for arthralgias.   Skin: Negative.    Allergic/Immunologic: Negative.    Neurological: Negative.    Hematological: Negative.    Psychiatric/Behavioral: Negative.         Physical Exam:  Physical Exam  Vitals reviewed.   Constitutional:       General: She is not in acute distress.     Appearance: She is not ill-appearing.   HENT:      Head: Normocephalic and atraumatic.      Nose: Nose normal.      Mouth/Throat:      Mouth: Mucous membranes are moist.      Pharynx: Oropharynx is clear.   Eyes:      Pupils: Pupils are equal, round, and reactive to light.   Cardiovascular:      Rate and Rhythm: Normal rate and regular rhythm.      Pulses: Normal pulses.      Heart sounds: Normal heart sounds.   Pulmonary:      Effort: Pulmonary effort is normal.      Breath sounds: Normal breath sounds.   Abdominal:      Palpations: Abdomen is soft.   Musculoskeletal:      Cervical back: Neck supple.      Right lower leg: Edema present.      Left lower leg: Edema present.   Skin:     General: Skin is warm and dry.      Capillary Refill: Capillary refill takes less than 2 seconds.   Neurological:      Mental Status: She is alert and oriented to person, place, and time.   Psychiatric:         Mood and Affect: Mood normal.         Behavior: Behavior normal.         Thought  Content: Thought content normal.         INSTRUCTIONS FOR PATIENT:     Scheduled Follow-up, Appts Reviewed with Modifications if Needed: Yes  Future Appointments   Date Time Provider Department Center   9/3/2025  9:40 AM Hung Partida MD [facility] [place]   12/1/2025 11:00 AM Yahir Cedeno MD [facility] [place]   4/14/2026  9:30 AM Lucio Morris MD [facility] [place]       Current Medications[1]    Medication Reconciliation:  Were medications changed during this appointment? Spoke with Dr Morris about the patient's wixela and possibly changing her medication to [employer] for the delivery system  Were medications in the home? Yes  Is the patient taking the medications as directed? Yes  Does the patient/caregiver understand the medications and changes? Yes  Does updated med list accurately reflects meds patient is currently taking? Yes    Encounter for Medical Follow-Up and Medication Review  - [facility] to schedule follow-up visit with patient in 4 weeks or PRN     Patient Instructions Given:  - Continue all medications, treatments and therapies as ordered.   - Follow all instructions, recommendations as discussed.  - Maintain Safety Precautions at all times.  - Attend all medical appointments as scheduled.  - For worsening symptoms: call Primary Care Physician or Nurse Practitioner.  - For emergencies, call 911 or immediately report to the nearest emergency room        Signature: Marti Yanez NP         [1]   Current Outpatient Medications:     albuterol (PROVENTIL/VENTOLIN HFA) 90 mcg/actuation inhaler, INHALE 2 PUFFS INTO THE LUNGS EVERY SIX HOURS AS NEEDED FOR WHEEZING, Disp: 18 g, Rfl: 11    allopurinoL (ZYLOPRIM) 100 MG tablet, Take 1 tablet (100 mg total) by mouth once daily., Disp: 90 tablet, Rfl: 3    aspirin 81 MG Chew, Take 81 mg by mouth once daily., Disp: , Rfl:     bisoprolol (ZEBETA) 5 MG tablet, Take 1 tablet (5 mg total) by mouth once daily., Disp: 90  tablet, Rfl: 3    blood glucose control high,low Soln, 1 each by Misc.(Non-Drug; Combo Route) route once a week., Disp: 1 each, Rfl: 3    blood sugar diagnostic (ACCU-CHEK GUIDE TEST STRIPS) Strp, Inject 1 strip into the skin 3 (three) times daily., Disp: 300 strip, Rfl: 3    blood-glucose meter kit, To check BG 3 times daily, to use with insurance preferred meter, Disp: 1 each, Rfl: 0    bumetanide (BUMEX) 1 MG tablet, Take 2 tablets (2 mg total) by mouth once daily., Disp: 180 tablet, Rfl: 3    diclofenac (VOLTAREN) 75 MG EC tablet, Take 1 tablet (75 mg total) by mouth 2 (two) times daily as needed (pain)., Disp: 180 tablet, Rfl: 3    gabapentin (NEURONTIN) 100 MG capsule, Take 1 capsule (100 mg total) by mouth 3 (three) times daily. For leg pains, Disp: 270 capsule, Rfl: 3    ipratropium (ATROVENT) 21 mcg (0.03 %) nasal spray, 2 sprays by Nasal route., Disp: , Rfl:     lancets Misc, To check BG 3 times daily, to use with insurance preferred meter, Disp: 300 each, Rfl: 3    latanoprost 0.005 % ophthalmic solution, Place 1 drop into both eyes once daily., Disp: 7.5 mL, Rfl: 3    ondansetron (ZOFRAN-ODT) 8 MG TbDL, Dissolve 1 tablet (8 mg total) by mouth every 8 (eight) hours as needed (Nausea)., Disp: 10 tablet, Rfl: 0    simvastatin (ZOCOR) 20 MG tablet, Take 1 tablet (20 mg total) by mouth every evening., Disp: 90 tablet, Rfl: 3    timolol maleate 0.5% (TIMOPTIC) 0.5 % Drop, Place 1 drop into both eyes 2 (two) times daily., Disp: 30 mL, Rfl: 4    timoloL maleate, PF, 0.5 % Dpet, Place into both eyes., Disp: , Rfl:

## 2025-06-20 DIAGNOSIS — J44.89 COPD (CHRONIC OBSTRUCTIVE PULMONARY DISEASE) WITH CHRONIC BRONCHITIS: Primary | ICD-10-CM

## 2025-06-20 RX ORDER — FLUTICASONE PROPIONATE AND SALMETEROL XINAFOATE 115; 21 UG/1; UG/1
2 AEROSOL, METERED RESPIRATORY (INHALATION) EVERY 12 HOURS
Qty: 12 G | Refills: 11 | Status: SHIPPED | OUTPATIENT
Start: 2025-06-20 | End: 2026-06-20

## 2025-06-23 ENCOUNTER — TELEPHONE (OUTPATIENT)
Dept: SURGERY | Facility: CLINIC | Age: 85
End: 2025-06-23
Payer: MEDICARE

## 2025-06-23 NOTE — TELEPHONE ENCOUNTER
----- Message from Yahir Cedeno MD sent at 6/21/2025 12:25 PM CDT -----  Regarding: mammogram  Patient has mammogram ordered and appears due for 1 but does not appear to be scheduled.

## 2025-06-24 ENCOUNTER — EXTERNAL HOME HEALTH (OUTPATIENT)
Dept: HOME HEALTH SERVICES | Facility: HOSPITAL | Age: 85
End: 2025-06-24
Payer: MEDICARE

## 2025-06-24 ENCOUNTER — TELEPHONE (OUTPATIENT)
Dept: PRIMARY CARE CLINIC | Facility: CLINIC | Age: 85
End: 2025-06-24
Payer: MEDICARE

## 2025-06-24 NOTE — TELEPHONE ENCOUNTER
----- Message from Alie sent at 6/24/2025 12:12 PM CDT -----  Contact: KINSEY REAVES [[id number]]  .Type:  Patient Requesting Call    Who Called:KINSEY REAVES [[id number]]  Does the patient know what this is regarding?:Patient is requesting a call back in regards to needing her order for her special mammogram sent to [facility]  Would the patient rather a call back or a response via [facility]? Call back  Best Call Back Number:.[phone]     Additional Information:  ----- Message -----  From: Alie Hassan  Sent: 6/24/2025  12:13 PM CDT  To: Arturo Valdes Staff    .Type:  Patient Requesting Call    Who Called:KINSEY REAVES [[id number]]  Does the patient know what this is regarding?:Patient is requesting a call back in regards to needing her order for her special mammogram sent to [facility]  Would the patient rather a call back or a response via [facility]? Call back  Best Call Back Number:.[phone] ([place])    Additional Information:

## 2025-06-25 DIAGNOSIS — Z17.0 MALIGNANT NEOPLASM OF UPPER-INNER QUADRANT OF LEFT BREAST IN FEMALE, ESTROGEN RECEPTOR POSITIVE: Primary | ICD-10-CM

## 2025-06-25 DIAGNOSIS — Z12.31 ENCOUNTER FOR SCREENING MAMMOGRAM FOR MALIGNANT NEOPLASM OF BREAST: ICD-10-CM

## 2025-06-25 DIAGNOSIS — C50.212 MALIGNANT NEOPLASM OF UPPER-INNER QUADRANT OF LEFT BREAST IN FEMALE, ESTROGEN RECEPTOR POSITIVE: Primary | ICD-10-CM

## 2025-07-01 ENCOUNTER — DOCUMENT SCAN (OUTPATIENT)
Dept: HOME HEALTH SERVICES | Facility: HOSPITAL | Age: 85
End: 2025-07-01
Payer: MEDICARE

## 2025-07-21 ENCOUNTER — HOSPITAL ENCOUNTER (OUTPATIENT)
Dept: RADIOLOGY | Facility: HOSPITAL | Age: 85
Discharge: HOME | End: 2025-07-21
Payer: MEDICARE

## 2025-07-21 VITALS — HEIGHT: 66 IN | WEIGHT: 191.38 LBS | BODY MASS INDEX: 30.76 KG/M2

## 2025-07-21 DIAGNOSIS — Z17.0 MALIGNANT NEOPLASM OF UPPER-INNER QUADRANT OF LEFT BREAST IN FEMALE, ESTROGEN RECEPTOR POSITIVE: ICD-10-CM

## 2025-07-21 DIAGNOSIS — C50.212 MALIGNANT NEOPLASM OF UPPER-INNER QUADRANT OF LEFT BREAST IN FEMALE, ESTROGEN RECEPTOR POSITIVE: ICD-10-CM

## 2025-07-21 DIAGNOSIS — Z12.31 ENCOUNTER FOR SCREENING MAMMOGRAM FOR MALIGNANT NEOPLASM OF BREAST: ICD-10-CM

## 2025-07-21 PROCEDURE — 77066 DX MAMMO INCL CAD BI: CPT | Mod: TC

## 2025-07-21 PROCEDURE — 77062 BREAST TOMOSYNTHESIS BI: CPT | Mod: 26,,, | Performed by: STUDENT IN AN ORGANIZED HEALTH CARE EDUCATION/TRAINING PROGRAM

## 2025-07-21 PROCEDURE — 77066 DX MAMMO INCL CAD BI: CPT | Mod: 26,,, | Performed by: STUDENT IN AN ORGANIZED HEALTH CARE EDUCATION/TRAINING PROGRAM

## 2025-07-22 ENCOUNTER — PATIENT MESSAGE (OUTPATIENT)
Dept: SURGERY | Facility: HOSPITAL | Age: 85
End: 2025-07-22
Payer: MEDICARE

## 2025-07-24 ENCOUNTER — CARE AT HOME (OUTPATIENT)
Dept: HOME HEALTH SERVICES | Facility: CLINIC | Age: 85
End: 2025-07-24
Payer: MEDICARE

## 2025-07-24 VITALS
HEART RATE: 80 BPM | TEMPERATURE: 98 F | DIASTOLIC BLOOD PRESSURE: 60 MMHG | RESPIRATION RATE: 18 BRPM | OXYGEN SATURATION: 94 % | SYSTOLIC BLOOD PRESSURE: 148 MMHG

## 2025-07-24 DIAGNOSIS — M1A.9XX0 CHRONIC GOUT WITHOUT TOPHUS, UNSPECIFIED CAUSE, UNSPECIFIED SITE: ICD-10-CM

## 2025-07-24 DIAGNOSIS — J44.89 COPD (CHRONIC OBSTRUCTIVE PULMONARY DISEASE) WITH CHRONIC BRONCHITIS: Primary | ICD-10-CM

## 2025-07-24 RX ORDER — ALLOPURINOL 100 MG/1
100 TABLET ORAL DAILY
Qty: 90 TABLET | Refills: 3 | Status: SHIPPED | OUTPATIENT
Start: 2025-07-24

## 2025-07-24 RX ORDER — FLUTICASONE PROPIONATE AND SALMETEROL XINAFOATE 115; 21 UG/1; UG/1
2 AEROSOL, METERED RESPIRATORY (INHALATION) EVERY 12 HOURS
Qty: 12 G | Refills: 11 | Status: SHIPPED | OUTPATIENT
Start: 2025-07-24 | End: 2026-07-24

## 2025-07-24 NOTE — PROGRESS NOTES
"[facility]  Medical Chronic Care Home Visit    Encounter Provider: Marti Yanez   PCP: Lucio Morris MD  Consult Requested By: No ref. provider found    HISTORY OF PRESENT ILLNESS      Patient ID: Kaleigh Nieves is a 84 y.o. female is being seen in the home due to physical debility that presents a taxing effort to leave the home, to mitigate high risk of hospital readmission and/or due to the limited availability of reliable or safe options for transportation to the point of access to the provider. Prior to treatment on this visit the chart was reviewed and patient verbal consent was obtained.    Chronic medical conditions synopsis:    1) Mrs Nieves is an 84 year old female with diagnoses of glaucoma, HTN, T2DM, HTN, HLD, RIVAS, CAD, CHF, arterial insufficiency of lower extremities, aortic atherosclerosis, Hx of Breast CA, chronic gout, and dependent edema of BLE. Upon arrival patient was ambulatory to the door. Patient reports that she has been doing "ok" but her legs are still swollen and painful.   2) Recent developments: no new issues. Patient never received her inhaler  3) Reason for consult and visit: care at home visit    DECISION MAKING TODAY       Assessment & Plan:  1. COPD (chronic obstructive pulmonary disease) with chronic bronchitis  Comments:  fluticasonepropion-salmeterol 115-21 mcg/dose 2 puffs    BID  Orders:  -     fluticasone propion-salmeterol 115-21 mcg/dose (ADVAIR HFA) 115-21 mcg/actuation HFAA inhaler; Inhale 2 puffs into the lungs every 12 (twelve) hours. Controller  Dispense: 12 g; Refill: 11    2. Chronic gout without tophus, unspecified cause, unspecified site  Comments:  chronic and stable  continue allopurinol 100 mg PO QD   follow up with PCP as instructed  Orders:  -     allopurinoL (ZYLOPRIM) 100 MG tablet; Take 1 tablet (100 mg total) by mouth once daily.  Dispense: 90 tablet; Refill: 3           ENVIRONMENT OF CARE      Family and/or Caregiver present at visit?  " No  Name of Caregiver: N/A  History provided by: patient    4Ms for Medical Decision-Making in Older Adults    Last Completed EAWV: 2022    MEDICATIONS:  High Risk Medications:  Total Active Medications: 1  gabapentin - 100 MG    MOBILITY:  Activities of Daily Livin/27/2022    11:21 AM   Activities of Daily Living   Ambulation Assistance Required   Ambulation Assistance Cane   Dressing Independent   Transfers Independent   Toileting Continent of bladder;Continent of bowel   Feeding Independent   Cleaning home/Chores Independent   Telephone use Independent   Shopping Independent   Paying bills Independent   Taking meds Independent     Fall Risk:      2025     1:00 PM 2025     9:40 AM 10/8/2024    11:15 AM   Fall Risk Assessment - Outpatient   Mobility Status Ambulatory w/ assistance Ambulatory w/ assistance Ambulatory w/ assistance   Number of falls 0 0 0   Identified as fall risk True True False     Disability Status:      2022    11:23 AM   Disability Status   Are you deaf or do you have serious difficulty hearing? N   Are you blind or do you have serious difficulty seeing, even when wearing glasses? N   Because of a physical, mental, or emotional condition, do you have serious difficulty concentrating, remembering, or making decisions? N   Do you have serious difficulty walking or climbing stairs? Y   Do you have difficulty dressing or bathing? N   Because of a physical, mental, or emotional condition, do you have difficulty doing errands alone such as visiting a doctor's office or shopping? N     Nutrition Screenin/27/2022    11:19 AM   Nutrition Screening   Has food intake declined over the past three months due to loss of appetite, digestive problems, chewing or swallowing difficulties? No decrease in food intake   Involuntary weight loss during the last 3 months? No weight loss   Mobility? Goes out   Has the patient suffered psychological stress or acute disease in the past  three months? No   Neuropsychological problems? No psychological problems   Body Mass Index (BMI)?  BMI 23 or greater   Screening Score 14   Interpretation Normal nutritional status    Screening Score: 0-7 Malnourished, 8-11 At Risk, 12-14 Normal  Get Up and Go:      2022    11:16 AM 2021    11:15 AM 2019    11:03 AM 6/15/2018    11:28 AM 2017    11:18 AM   Get Up and Go   Trial 1 15 seconds -- 15 seconds 7 seconds 5 seconds   Trial 2     6 seconds   Trial 3     6 seconds   Average     5.67     Whisper Test:      2022    11:16 AM   Whisper Test   Whisper Test Normal           MENTATION:   Has Dementia Dx: No  Has Anxiety Dx: No    Depression Patient Health Questionnaire:      2025    12:00 PM   Depression Patient Health Questionnaire   Over the last two weeks how often have you been bothered by little interest or pleasure in doing things Not at all   Over the last two weeks how often have you been bothered by feeling down, depressed or hopeless Not at all   PHQ-2 Total Score 0     Cognitive Function Screenin/27/2022    11:16 AM   Cognitive Function Screening   Clock Drawing Test 2    Mini-Cog 3 Minute Recall 2   Cognitive Function Screening 4       Data saved with a previous flowsheet row definition     Cognitive Function Screening Total - Less than 4 = Abnormal,  Greater than or equal to 4 = Normal        WHAT MATTERS MOST:  Advance Care Planning   ACP Status:   Patient has had an ACP conversation  Living Will: No  Power of : No  POLST: No    Patient did not wish or was not able to name a surrogate decision maker or provide an Advance Care Plan.           Is patient hospice appropriate: No  (If needed, use PPS <30 or FAST score >7)  Was referral to hospice placed: No    Impression upon entering the home:  Physical Dwelling: single family home   Appearance of home environment: cleaniness: clean, walking pathways: clear, lighting: adequate, and home structure: sound  structure  Functional Status: independent  Mobility: ambulatory  Nutritional access: adequate intake and access  Home Health: No, and does not need it at this time   DME/Supplies: none     Diagnostic tests reviewed/disposition: No diagnosic tests pending after this hospitalization.  Disease/illness education: COPD  Establishment or re-establishment of referral orders for community resources: No other necessary community resources.   Discussion with other health care providers: No discussion with other health care providers necessary.   Does patient have a PCP at [facility]? Yes   Repatriation plan with PCP? Care at Home reason: mobility   Does patient have an ostomy (ileostomy, colostomy, suprapubic catheter, nephrostomy tube, tracheostomy, PEG tube, pleurex catheter, cholecystostomy, etc)? No  Were BPAs reviewed? Yes    Social History     Socioeconomic History    Marital status: Single    Number of children: 0   Tobacco Use    Smoking status: Former     Current packs/day: 0.00     Average packs/day: 0.3 packs/day for 24.0 years (6.0 ttl pk-yrs)     Types: Cigarettes     Start date: 1952     Quit date: 1976     Years since quittin.1    Smokeless tobacco: Never   Substance and Sexual Activity    Alcohol use: No    Drug use: No    Sexual activity: Not Currently     Social Drivers of Health     Financial Resource Strain: Low Risk  (2022)    Overall Financial Resource Strain (CARDIA)     Difficulty of Paying Living Expenses: Not hard at all   Food Insecurity: No Food Insecurity (2022)    Hunger Vital Sign     Worried About Running Out of Food in the Last Year: Never true     Ran Out of Food in the Last Year: Never true   Transportation Needs: No Transportation Needs (2022)    PRAPARE - Transportation     Lack of Transportation (Medical): No     Lack of Transportation (Non-Medical): No   Physical Activity: Inactive (2022)    Exercise Vital Sign     Days of Exercise per Week: 0 days     Minutes  of Exercise per Session: 0 min   Stress: No Stress Concern Present (9/27/2022)    [employer] [place] [employer] - Occupational Stress Questionnaire     Feeling of Stress : Not at all   Housing Stability: Low Risk  (9/27/2022)    Housing Stability Vital Sign     Unable to Pay for Housing in the Last Year: No     Number of Places Lived in the Last Year: 1     Unstable Housing in the Last Year: No         OBJECTIVE:     Vital Signs:  Vitals:    07/24/25 1104   BP: (!) 148/60   Pulse: 80   Resp: 18   Temp: 97.9 °F (36.6 °C)       Review of Systems   Constitutional: Negative.    HENT: Negative.     Eyes: Negative.    Respiratory:  Positive for shortness of breath (chronic with exertion).    Cardiovascular:  Positive for leg swelling.   Gastrointestinal: Negative.    Endocrine: Negative.    Genitourinary: Negative.    Musculoskeletal:  Positive for arthralgias.   Neurological: Negative.    Psychiatric/Behavioral: Negative.         Physical Exam:  Physical Exam  Vitals reviewed.   Constitutional:       General: She is not in acute distress.     Appearance: She is not ill-appearing.   HENT:      Head: Normocephalic and atraumatic.      Nose: Nose normal.      Mouth/Throat:      Mouth: Mucous membranes are moist.      Pharynx: Oropharynx is clear.   Eyes:      Pupils: Pupils are equal, round, and reactive to light.   Cardiovascular:      Rate and Rhythm: Normal rate and regular rhythm.      Heart sounds: Normal heart sounds.   Pulmonary:      Effort: Pulmonary effort is normal.      Breath sounds: Examination of the right-lower field reveals decreased breath sounds. Examination of the left-lower field reveals decreased breath sounds. Decreased breath sounds present.   Abdominal:      General: Bowel sounds are normal.      Palpations: Abdomen is soft.   Musculoskeletal:      Cervical back: Neck supple.      Right lower leg: Edema present.      Left lower leg: Edema present.   Skin:     General: Skin is warm and  dry.   Neurological:      Mental Status: She is alert and oriented to person, place, and time.   Psychiatric:         Mood and Affect: Mood normal.         Behavior: Behavior normal.         INSTRUCTIONS FOR PATIENT:     Scheduled Follow-up, Appts Reviewed with Modifications if Needed: Yes  Future Appointments   Date Time Provider Department Center   9/3/2025  9:40 AM Hung Partida MD [facility] [place]   10/23/2025 To Be Determined Marti Fair NP [address]   12/1/2025 11:00 AM Yahir Cedeno MD [facility] [place]   4/14/2026  9:30 AM Lucio Morris MD [facility]       Current Medications[1]    Medication Reconciliation:  Were medications changed during this appointment? No  Were medications in the home? Yes  Is the patient taking the medications as directed? Yes  Does the patient/caregiver understand the medications and changes? Yes  Does updated med list accurately reflects meds patient is currently taking? Yes    Encounter for Medical Follow-Up and Medication Review  - [facility] to schedule follow-up visit with patient in 4 weeks or PRN     Patient Instructions Given:  - Continue all medications, treatments and therapies as ordered.   - Follow all instructions, recommendations as discussed.  - Maintain Safety Precautions at all times.  - Attend all medical appointments as scheduled.  - For worsening symptoms: call Primary Care Physician or Nurse Practitioner.  - For emergencies, call 911 or immediately report to the nearest emergency room        Signature: Marti Fair NP         [1]   Current Outpatient Medications:     albuterol (PROVENTIL/VENTOLIN HFA) 90 mcg/actuation inhaler, INHALE 2 PUFFS INTO THE LUNGS EVERY SIX HOURS AS NEEDED FOR WHEEZING, Disp: 18 g, Rfl: 11    allopurinoL (ZYLOPRIM) 100 MG tablet, Take 1 tablet (100 mg total) by mouth once daily., Disp: 90 tablet, Rfl: 3    aspirin 81 MG Chew, Take 81 mg by mouth once daily., Disp: , Rfl:      bisoprolol (ZEBETA) 5 MG tablet, Take 1 tablet (5 mg total) by mouth once daily., Disp: 90 tablet, Rfl: 3    blood glucose control high,low Soln, 1 each by Misc.(Non-Drug; Combo Route) route once a week., Disp: 1 each, Rfl: 3    blood sugar diagnostic (ACCU-CHEK GUIDE TEST STRIPS) Strp, Inject 1 strip into the skin 3 (three) times daily., Disp: 300 strip, Rfl: 3    blood-glucose meter kit, To check BG 3 times daily, to use with insurance preferred meter, Disp: 1 each, Rfl: 0    bumetanide (BUMEX) 1 MG tablet, Take 2 tablets (2 mg total) by mouth once daily., Disp: 180 tablet, Rfl: 3    diclofenac (VOLTAREN) 75 MG EC tablet, Take 1 tablet (75 mg total) by mouth 2 (two) times daily as needed (pain)., Disp: 180 tablet, Rfl: 3    fluticasone propion-salmeterol 115-21 mcg/dose (ADVAIR HFA) 115-21 mcg/actuation HFAA inhaler, Inhale 2 puffs into the lungs every 12 (twelve) hours. Controller, Disp: 12 g, Rfl: 11    gabapentin (NEURONTIN) 100 MG capsule, Take 1 capsule (100 mg total) by mouth 3 (three) times daily. For leg pains, Disp: 270 capsule, Rfl: 3    ipratropium (ATROVENT) 21 mcg (0.03 %) nasal spray, 2 sprays by Nasal route., Disp: , Rfl:     lancets Misc, To check BG 3 times daily, to use with insurance preferred meter, Disp: 300 each, Rfl: 3    latanoprost 0.005 % ophthalmic solution, Place 1 drop into both eyes once daily., Disp: 7.5 mL, Rfl: 3    ondansetron (ZOFRAN-ODT) 8 MG TbDL, Dissolve 1 tablet (8 mg total) by mouth every 8 (eight) hours as needed (Nausea)., Disp: 10 tablet, Rfl: 0    simvastatin (ZOCOR) 20 MG tablet, Take 1 tablet (20 mg total) by mouth every evening., Disp: 90 tablet, Rfl: 3    timolol maleate 0.5% (TIMOPTIC) 0.5 % Drop, Place 1 drop into both eyes 2 (two) times daily., Disp: 30 mL, Rfl: 4    timoloL maleate, PF, 0.5 % Dpet, Place into both eyes., Disp: , Rfl:

## 2025-08-29 ENCOUNTER — TELEPHONE (OUTPATIENT)
Dept: PRIMARY CARE CLINIC | Facility: CLINIC | Age: 85
End: 2025-08-29
Payer: MEDICARE

## 2025-08-29 RX ORDER — MUPIROCIN 20 MG/G
OINTMENT TOPICAL 2 TIMES DAILY
Qty: 30 G | Refills: 5 | Status: SHIPPED | OUTPATIENT
Start: 2025-08-29

## 2025-09-02 DIAGNOSIS — Z00.00 ROUTINE ADULT HEALTH MAINTENANCE: Primary | ICD-10-CM

## 2025-09-03 ENCOUNTER — OFFICE VISIT (OUTPATIENT)
Dept: CARDIOLOGY | Facility: CLINIC | Age: 85
End: 2025-09-03
Payer: MEDICARE

## 2025-09-03 ENCOUNTER — HOSPITAL ENCOUNTER (OUTPATIENT)
Dept: CARDIOLOGY | Facility: HOSPITAL | Age: 85
Discharge: HOME | End: 2025-09-03
Attending: INTERNAL MEDICINE
Payer: MEDICARE

## 2025-09-03 VITALS
SYSTOLIC BLOOD PRESSURE: 120 MMHG | WEIGHT: 182.13 LBS | DIASTOLIC BLOOD PRESSURE: 55 MMHG | HEIGHT: 66 IN | HEART RATE: 82 BPM | OXYGEN SATURATION: 95 % | BODY MASS INDEX: 29.27 KG/M2

## 2025-09-03 DIAGNOSIS — R06.09 DOE (DYSPNEA ON EXERTION): ICD-10-CM

## 2025-09-03 DIAGNOSIS — R60.9 SWELLING: ICD-10-CM

## 2025-09-03 DIAGNOSIS — M79.672 BILATERAL LEG AND FOOT PAIN: ICD-10-CM

## 2025-09-03 DIAGNOSIS — I73.9 PAD (PERIPHERAL ARTERY DISEASE): ICD-10-CM

## 2025-09-03 DIAGNOSIS — I77.9 CAROTID ARTERY DISEASE, UNSPECIFIED LATERALITY, UNSPECIFIED TYPE: ICD-10-CM

## 2025-09-03 DIAGNOSIS — M79.604 BILATERAL LEG AND FOOT PAIN: ICD-10-CM

## 2025-09-03 DIAGNOSIS — M79.671 BILATERAL LEG AND FOOT PAIN: ICD-10-CM

## 2025-09-03 DIAGNOSIS — E78.5 HYPERLIPIDEMIA, UNSPECIFIED HYPERLIPIDEMIA TYPE: Primary | ICD-10-CM

## 2025-09-03 DIAGNOSIS — Z00.00 ROUTINE ADULT HEALTH MAINTENANCE: ICD-10-CM

## 2025-09-03 DIAGNOSIS — E11.51 TYPE II DIABETES MELLITUS WITH PERIPHERAL CIRCULATORY DISORDER: ICD-10-CM

## 2025-09-03 DIAGNOSIS — M79.605 BILATERAL LEG AND FOOT PAIN: ICD-10-CM

## 2025-09-03 PROCEDURE — 99999 PR PBB SHADOW E&M-EST. PATIENT-LVL III: CPT | Mod: PBBFAC,,, | Performed by: INTERNAL MEDICINE

## (undated) DEVICE — MANIFOLD 4 PORT

## (undated) DEVICE — ELECTRODE BLD EXT 6.50 ST DISP

## (undated) DEVICE — GLOVE PROTEXIS HYDROGEL SZ7.5

## (undated) DEVICE — NDL SAFETY 25G X 1.5 ECLIPSE

## (undated) DEVICE — BANDAGE ROLL COTTN 4.5INX4.1YD

## (undated) DEVICE — Device

## (undated) DEVICE — DRAPE INCISE IOBAN 2 13X13IN

## (undated) DEVICE — APPLIER CLIP LIAGCLIP 9.375IN

## (undated) DEVICE — WAVEGUIDE BRITEFIELD DISP

## (undated) DEVICE — SUT ETHILON 3-0 PS2 18 BLK

## (undated) DEVICE — NDL HYPO SAFETY 25GX1.5IN

## (undated) DEVICE — COVER OVERHEAD SURG LT BLUE

## (undated) DEVICE — ELECTRODE BLADE W/SLEEVE 2.75

## (undated) DEVICE — SEE MEDLINE ITEM 157173

## (undated) DEVICE — SOCKINETTE IMPERVIOUS 12X48IN

## (undated) DEVICE — SYR 10CC LUER LOCK

## (undated) DEVICE — PACK BASIC SETUP SC BR

## (undated) DEVICE — DRAPE UTILITY W/ TAPE 20X30IN

## (undated) DEVICE — BANDAGE ELASTIC 3X5 VELCRO ST

## (undated) DEVICE — SOL 9P NACL IRR PIC IL

## (undated) DEVICE — CONTAINER SPECIMEN OR STER 4OZ

## (undated) DEVICE — BLADE SURG #15 CARBON STEEL

## (undated) DEVICE — DRAPE CHEST FEN 15X10IN

## (undated) DEVICE — SPONGE COTTON TRAY 4X4IN

## (undated) DEVICE — HANDSWITCH SUCTION COAG

## (undated) DEVICE — SUT VICRYL 3-0 27 SH

## (undated) DEVICE — EVACUATOR PENCIL SMOKE NEPTUNE

## (undated) DEVICE — ALCOHOL 70% ISOP RUBBING 4OZ

## (undated) DEVICE — GLOVE 8 PROTEXIS PI BLUE

## (undated) DEVICE — SCRUB 10% POVIDONE IODINE 4OZ

## (undated) DEVICE — NDL SAFETY 22G X 1.5 ECLIPSE

## (undated) DEVICE — GLOVE 8.5 PROTEXIS PI BLUE

## (undated) DEVICE — APPLICATOR CHLORAPREP ORN 26ML

## (undated) DEVICE — DECANTER VIAL ASEPTIC TRANSFER

## (undated) DEVICE — SEE MEDLINE ITEM 152522

## (undated) DEVICE — DRAPE PLASTIC U 60X72

## (undated) DEVICE — PACK ECLIPSE UNIVERSAL STERILE

## (undated) DEVICE — BRA MAMMARY SUPPORT XLARGE

## (undated) DEVICE — GAUZE SPONGE 4X4 12PLY

## (undated) DEVICE — ADHESIVE DERMABOND ADVANCED

## (undated) DEVICE — COVER LIGHT HANDLE 80/CA

## (undated) DEVICE — SEE MEDLINE ITEM 146308

## (undated) DEVICE — POSITIONER HEAD DONUT 9IN FOAM

## (undated) DEVICE — SUT ETHILON 3/0 18IN PS-1

## (undated) DEVICE — DRAPE THREE-QTR REINF 53X77IN

## (undated) DEVICE — CONNECTOR TUBING STR 5 IN 1

## (undated) DEVICE — ELECTRODE REM PLYHSV RETURN 9

## (undated) DEVICE — GLOVE BIOGEL ECLIPSE SZ 6.5

## (undated) DEVICE — SUT VICRYL 4-0 RB1 27IN UD

## (undated) DEVICE — SEE MEDLINE ITEM 157117

## (undated) DEVICE — DRAPE SURG W/TWL 17 5/8X23

## (undated) DEVICE — SEE MEDLINE ITEM 157027

## (undated) DEVICE — GLOVE PROTEXIS HYDROGEL SZ8

## (undated) DEVICE — CONTAINER SPECIMEN STRL 4OZ

## (undated) DEVICE — CORD BIPOLAR ELECTROSURGICAL

## (undated) DEVICE — TOWEL OR DISP STRL BLUE 4/PK

## (undated) DEVICE — NDL HYPO 27G X 1 1/2

## (undated) DEVICE — SUT 2/0 30IN SILK BLK BRAI

## (undated) DEVICE — SEE MEDLINE ITEM 157131

## (undated) DEVICE — TOURNIQUET SB QC DP 18X4IN

## (undated) DEVICE — PAD ABDOMINAL STERILE 8X10IN

## (undated) DEVICE — SOL NACL IRR 1000ML BTL

## (undated) DEVICE — SEE MEDLINE ITEM 157216

## (undated) DEVICE — COVER PROXIMA MAYO STAND

## (undated) DEVICE — SUPPORT ULNA NERVE PROTECTOR

## (undated) DEVICE — SUT MCRYL PLUS 4-0 PS2 27IN

## (undated) DEVICE — PAD CAST SPECIALIST STRL 4

## (undated) DEVICE — CLIP LIGATING TITANIUM SMALL